# Patient Record
Sex: MALE | Race: BLACK OR AFRICAN AMERICAN | NOT HISPANIC OR LATINO | Employment: OTHER | URBAN - METROPOLITAN AREA
[De-identification: names, ages, dates, MRNs, and addresses within clinical notes are randomized per-mention and may not be internally consistent; named-entity substitution may affect disease eponyms.]

---

## 2017-01-20 ENCOUNTER — PATIENT MESSAGE (OUTPATIENT)
Dept: FAMILY MEDICINE | Facility: CLINIC | Age: 67
End: 2017-01-20

## 2017-01-20 DIAGNOSIS — I10 ESSENTIAL HYPERTENSION: ICD-10-CM

## 2017-01-23 RX ORDER — LISINOPRIL 40 MG/1
40 TABLET ORAL DAILY
Qty: 90 TABLET | Refills: 4 | Status: SHIPPED | OUTPATIENT
Start: 2017-01-23 | End: 2018-02-02 | Stop reason: SDUPTHER

## 2017-02-07 DIAGNOSIS — I10 ESSENTIAL HYPERTENSION: ICD-10-CM

## 2017-02-07 DIAGNOSIS — E78.5 HYPERLIPIDEMIA: ICD-10-CM

## 2017-02-07 RX ORDER — MYCOPHENOLATE MOFETIL 250 MG/1
750 CAPSULE ORAL 2 TIMES DAILY
Qty: 540 CAPSULE | Refills: 3 | Status: SHIPPED | OUTPATIENT
Start: 2017-02-07 | End: 2018-02-02 | Stop reason: SDUPTHER

## 2017-02-07 RX ORDER — DILTIAZEM HYDROCHLORIDE 180 MG/1
180 CAPSULE, EXTENDED RELEASE ORAL DAILY
Qty: 90 CAPSULE | Refills: 3 | Status: SHIPPED | OUTPATIENT
Start: 2017-02-07 | End: 2018-02-02 | Stop reason: SDUPTHER

## 2017-02-07 RX ORDER — ATORVASTATIN CALCIUM 80 MG/1
80 TABLET, FILM COATED ORAL DAILY
Qty: 90 TABLET | Refills: 3 | Status: SHIPPED | OUTPATIENT
Start: 2017-02-07 | End: 2018-05-16 | Stop reason: SDUPTHER

## 2017-02-17 ENCOUNTER — LAB VISIT (OUTPATIENT)
Dept: LAB | Facility: HOSPITAL | Age: 67
End: 2017-02-17
Attending: INTERNAL MEDICINE
Payer: MEDICARE

## 2017-02-17 ENCOUNTER — OFFICE VISIT (OUTPATIENT)
Dept: DIABETES | Facility: CLINIC | Age: 67
End: 2017-02-17
Payer: MEDICARE

## 2017-02-17 VITALS
HEIGHT: 74 IN | BODY MASS INDEX: 39.66 KG/M2 | WEIGHT: 309.06 LBS | SYSTOLIC BLOOD PRESSURE: 132 MMHG | DIASTOLIC BLOOD PRESSURE: 82 MMHG

## 2017-02-17 DIAGNOSIS — E78.2 MIXED HYPERLIPIDEMIA: ICD-10-CM

## 2017-02-17 DIAGNOSIS — Z79.52 LONG TERM CURRENT USE OF SYSTEMIC STEROIDS: ICD-10-CM

## 2017-02-17 DIAGNOSIS — E11.65 TYPE II OR UNSPECIFIED TYPE DIABETES MELLITUS WITH RENAL MANIFESTATIONS, UNCONTROLLED(250.42): Primary | ICD-10-CM

## 2017-02-17 DIAGNOSIS — E78.5 HYPERLIPIDEMIA, UNSPECIFIED HYPERLIPIDEMIA TYPE: ICD-10-CM

## 2017-02-17 DIAGNOSIS — R19.7 DIARRHEA: ICD-10-CM

## 2017-02-17 DIAGNOSIS — E11.29 TYPE II OR UNSPECIFIED TYPE DIABETES MELLITUS WITH RENAL MANIFESTATIONS, UNCONTROLLED(250.42): Primary | ICD-10-CM

## 2017-02-17 DIAGNOSIS — Z79.899 ENCOUNTER FOR LONG-TERM (CURRENT) USE OF MEDICATIONS: ICD-10-CM

## 2017-02-17 DIAGNOSIS — I10 ESSENTIAL HYPERTENSION: ICD-10-CM

## 2017-02-17 DIAGNOSIS — E66.01 MORBID OBESITY WITH BMI OF 40.0-44.9, ADULT: ICD-10-CM

## 2017-02-17 DIAGNOSIS — N28.9 RENAL INSUFFICIENCY: ICD-10-CM

## 2017-02-17 DIAGNOSIS — Z94.1 STATUS POST HEART TRANSPLANT: ICD-10-CM

## 2017-02-17 DIAGNOSIS — T86.298 OTHER COMPLICATION OF HEART TRANSPLANT: ICD-10-CM

## 2017-02-17 LAB
ALBUMIN SERPL BCP-MCNC: 2.9 G/DL
ALP SERPL-CCNC: 117 U/L
ALT SERPL W/O P-5'-P-CCNC: 25 U/L
ANION GAP SERPL CALC-SCNC: 6 MMOL/L
AST SERPL-CCNC: 31 U/L
BASOPHILS # BLD AUTO: 0.01 K/UL
BASOPHILS NFR BLD: 0.3 %
BILIRUB SERPL-MCNC: 0.4 MG/DL
BNP SERPL-MCNC: 188 PG/ML
BUN SERPL-MCNC: 17 MG/DL
CALCIUM SERPL-MCNC: 8.9 MG/DL
CHLORIDE SERPL-SCNC: 109 MMOL/L
CHOLEST/HDLC SERPL: 4 {RATIO}
CO2 SERPL-SCNC: 28 MMOL/L
CREAT SERPL-MCNC: 1.8 MG/DL
DIFFERENTIAL METHOD: ABNORMAL
EOSINOPHIL # BLD AUTO: 0.1 K/UL
EOSINOPHIL NFR BLD: 3.2 %
ERYTHROCYTE [DISTWIDTH] IN BLOOD BY AUTOMATED COUNT: 15.9 %
EST. GFR  (AFRICAN AMERICAN): 44.3 ML/MIN/1.73 M^2
EST. GFR  (NON AFRICAN AMERICAN): 38.4 ML/MIN/1.73 M^2
GLUCOSE SERPL-MCNC: 106 MG/DL
GLUCOSE SERPL-MCNC: 126 MG/DL (ref 70–110)
HCT VFR BLD AUTO: 38.6 %
HDL/CHOLESTEROL RATIO: 25 %
HDLC SERPL-MCNC: 144 MG/DL
HDLC SERPL-MCNC: 36 MG/DL
HGB BLD-MCNC: 11.8 G/DL
LDLC SERPL CALC-MCNC: 79.6 MG/DL
LYMPHOCYTES # BLD AUTO: 1.5 K/UL
LYMPHOCYTES NFR BLD: 38.7 %
MCH RBC QN AUTO: 24.7 PG
MCHC RBC AUTO-ENTMCNC: 30.6 %
MCV RBC AUTO: 81 FL
MONOCYTES # BLD AUTO: 0.4 K/UL
MONOCYTES NFR BLD: 10.4 %
NEUTROPHILS # BLD AUTO: 1.8 K/UL
NEUTROPHILS NFR BLD: 47.1 %
NONHDLC SERPL-MCNC: 108 MG/DL
PLATELET # BLD AUTO: 191 K/UL
PMV BLD AUTO: 10.7 FL
POTASSIUM SERPL-SCNC: 4.1 MMOL/L
PROT SERPL-MCNC: 6.8 G/DL
RBC # BLD AUTO: 4.77 M/UL
SODIUM SERPL-SCNC: 143 MMOL/L
TRIGL SERPL-MCNC: 142 MG/DL
WBC # BLD AUTO: 3.75 K/UL

## 2017-02-17 PROCEDURE — 99214 OFFICE O/P EST MOD 30 MIN: CPT | Mod: S$PBB,,, | Performed by: NURSE PRACTITIONER

## 2017-02-17 PROCEDURE — 86832 HLA CLASS I HIGH DEFIN QUAL: CPT | Mod: 91

## 2017-02-17 PROCEDURE — 99999 PR PBB SHADOW E&M-EST. PATIENT-LVL III: CPT | Mod: PBBFAC,,, | Performed by: NURSE PRACTITIONER

## 2017-02-17 PROCEDURE — 99213 OFFICE O/P EST LOW 20 MIN: CPT | Mod: PBBFAC | Performed by: NURSE PRACTITIONER

## 2017-02-17 PROCEDURE — 86833 HLA CLASS II HIGH DEFIN QUAL: CPT | Mod: 91

## 2017-02-17 PROCEDURE — 82948 REAGENT STRIP/BLOOD GLUCOSE: CPT | Mod: PBBFAC | Performed by: NURSE PRACTITIONER

## 2017-02-17 NOTE — PROGRESS NOTES
"PCP: Dr. Stanley Chanel    HISTORY OF PRESENT ILLNESS: 66 year old  male patient is in clinic today for uncontrolled diabetes.   He has had diabetes for many years and has attended diabetes education in the past.  Patient currently takes Humulin 70/30 for diabetes.  He had a cardiac and renal transplant in 2002, on immunosuppressant drugs.  Most recent A1C was 8.2, ADA recommends less than 7.0.   He has gained 1 pound since last visit.    Per meter download, fasting BGs 87 - 148; hs 90, 144 - 228; overnight, BG range from 60 -70. On last visit, we discussed taking his Humulin 70/30 before evening meal, not at bedtime.  However, patient continues to take at bedtime, which is probably why he has some nocturnal hypoglycemia, with BG 60, which he treats with OJ.       Patient denies polyuria, polydipsia, polyphagia, or blurred vision.  Also denies nausea, vomiting, has occasional diarrhea.        Height: 6' 2 "  Weight: 309 pounds, BMI 39.63  Blood Glucose reading this AM: 112 mg/dl Fasting  Blood Glucose reading in clinic: 126 mg/dl at 9:15 am    DIABETES MEDICATIONS:   Humulin 70/30, 55 - 60 units TWICE DAILY ac    LABS REVIEWED.    REVIEW OF SYSTEMS:  GENERAL: Denies fever, chills, change in appetite.  HEENT: Has minor impaired hearing, denies dysphagia. (+) history of vertigo  RESPIRATORY: Denies shortness of breath, cough or wheezing.  CARDIOVASCULAR: Denies chest pain, palpitations.  GI: Denies hematochezia.  : Denies hematuria, dysuria or frequency.  MS: Normal gait. Denies difficulty with mobility, muscle or joint pain.   SKIN: Denies rashes and lesions.  NEURO: Occasional numbness, tingling in feet.   PSYCH: Denies depression or anxiety. No suicidal ideations.  ENDO: See HPI.    STANDARDS OF CARE:   Eye exam: [facility], Last exam Spring 2016  Dental exam: Has regular exams; denies gums bleeding.  Podiatry exam: None.  SOCIAL HISTORY: . Lives with " spouse. Has 2 children. Patient retired as manager for [employer].     ACTIVITY LEVEL: No regular activity. Works in garden and yard. Occasionally walks.  MEAL PLANNING: Number of meals per day - three. Number of snacks per day - occasionally. Per dietary recall, patient is not limiting carbohydrates, saturated fats and sodium.  Breakfast can be cereal with lactose free milk or 2 eggs, toast or eggs grits and jaffe.  Mid morning snack can be chips or cookies.  Lunch can be sandwich with price, fruit.  Dinner is usually red beans, rice, sausage, broccolli salad.  Can then have a lite beer.  Drinks water with meals.  BLOOD GLUCOSE TESTING: Self-monitoring with ONE TOUCH meter    PHYSICAL EXAMINATION:  GENERAL: WDWN  male in no acute distress, responds appropriately. AAO X 3.   NECK: Supple, no thyromegaly, no cervical or supraclavicular lymphadenopathy, no carotid bruit.  HEART: Regular rate and rhythm. No rubs, murmurs or gallops.   LUNGS: CTA bilaterally. Unlabored breathing, no use of accessory muscles.  MUSCULOSKELETAL: Normal gait and muscle strength.  ABDOMEN: Active bowel sounds X 4, no masses or tenderness.   SKIN: Warm, dry skin. No lesions or abrasions.  NEUROLOGIC: Cranial nerves II-XII grossly intact.   EXTREMITIES: Trace edema.  FOOT EXAMINATION: Protective Sensation (w/ 10 gram monofilament): Right: Intact Left: Intact //  Visual Inspection: Dry Skin -  Bilateral, except pale, boggy areas between 4 and 5 digit of bilateral feet. .  //  Pedal Pulses:  Right: Present Left: Present    ASSESSMENT:  1. Diabetes Type 2, ED, s/p renal and cardiac transplant - improving control per records on Humulin 70/30, A1C 8.2   2. Hypertension - good control on diltiazem, lisinopril, lasix.  3. Hyperlipidemia - good control on Lipitor  4. Morbid Obesity - BMI 39.68  5. Tinea Pedis    PLAN:  1.) Patient was instructed to monitor blood glucose 2 - 3 x daily, fasting and ac dinner or at bedtime.   Discussed ADA goal for fasting blood sugar, 80 - 130 mg/dL; pp blood sugars below 180 mg/dl. Also, discussed prevention of hypoglycemia and the need to adjust goals to higher levels if persistent hypoglycemia.  Reminded him to bring records or meter to each visit for review.   2.) He will continue Humulin 70/30 pens, 55 - 60 units TWICE DAILY ac.  He continues to take his evening dose of Humulin 70/30 at bedtime, despite our discussion to take this dose before evening meal.  This is probably why he continues to have nocturnal hypo.  Lamisil cream to feet twice daily for 2 weeks.  Instructed to keep feet clean, dry, wear white cotton socks.  Phone review of BG readings in one week with adjustment of meds as needed.    3.) Reviewed carb counting, portion control, importance of spacing meals throughout the day to prevent post prandial elevations. Recommended low saturated fat, low sodium diet to aid in control of hypertension and cholesterol.  4.) Discussed activity, benefits, methods, and precautions. Recommended patient start some form of exercise and increase as tolerated to 30 minutes per day to facilitate weight loss and aid in control of BGs.  5.) Future Labs at follow up appointment: C peptide, ELIZA, insulin antibody.   6.) Return to clinic in 3 months for follow up.  Pending Labs today, including A1C.  Advised patient to call clinic with any questions or concerns.     Jael Garrison, BELÉN-C, CDE

## 2017-02-17 NOTE — MR AVS SNAPSHOT
[facility]  [address] [facility]  [place] [address]  Phone: [phone]  Fax: [phone]                  Nigel Albrecht   2017 9:00 AM   Office Visit    Description:  Male : 1950   Provider:  OTTO Tran, MAICO   Department:  [facility]           Reason for Visit     Diabetes           Diagnoses this Visit        Comments    Type II or unspecified type diabetes mellitus with renal manifestations, uncontrolled    -  Primary     Morbid obesity with BMI of 40.0-44.9, adult         Hyperlipidemia, unspecified hyperlipidemia type                To Do List           Future Appointments        Provider Department Dept Phone    3/27/2017 9:00 AM MD Teddy Dvais IV - Urology [phone]    2017 8:30 AM OTTO Tran, MAICO [facility] [phone]      Goals (5 Years of Data)     None      Follow-Up and Disposition     Return in about 3 months (around 2017).      [facility]     [facility] On Call Nurse Care Line - / Assistance  Registered nurses in the [facility] On Call Center provide clinical advisement, health education, appointment booking, and other advisory services.  Call for this free service at [phone].             Medications           Message regarding Medications     Verify the changes and/or additions to your medication regime listed below are the same as discussed with your clinician today.  If any of these changes or additions are incorrect, please notify your healthcare provider.             Verify that the below list of medications is an accurate representation of the medications you are currently taking.  If none reported, the list may be blank. If incorrect, please contact your healthcare provider. Carry this list with you in case of emergency.           Current Medications     atorvastatin (LIPITOR) 80 MG tablet Take 1 tablet (80 mg total) by mouth once  daily.    blood pressure kit-extra large Kit     blood sugar diagnostic (ONETOUCH ULTRA TEST) Strp 1 strip by Misc.(Non-Drug; Combo Route) route 3 (three) times daily. Dispense Accucheck  brand    calcium carbonate (OS-CARRIE) 500 mg calcium (1,250 mg) tablet Take 1 tablet by mouth once daily.    cetirizine (ZYRTEC) 10 MG tablet Take 10 mg by mouth as needed. 1 Tablet Oral Every day as needed    cholecalciferol, vitamin D3, (VITAMIN D3) 5,000 unit Tab Take 5,000 Units by mouth once daily.    clobetasol 0.05% (TEMOVATE) 0.05 % Oint AAA bid    diltiaZEM (TIAZAC) 180 MG CpSR Take 1 capsule (180 mg total) by mouth once daily.    esomeprazole (NEXIUM) 40 MG capsule Take by mouth. 1 Capsule, Delayed Release(E.C.) Oral PRN    fluticasone (FLONASE) 50 mcg/actuation nasal spray 1 spray by Each Nare route as needed for Rhinitis.    insulin NPH and regular human (HUMULIN 70/30 KWIKPEN) 100 unit/mL (70-30) InPn pen INJECT 60 TO 65 UNITS IN   THE MORNING BEFORE A MEAL  AND 55 TO 60 UNITS IN THE  EVENING BEFORE A MEAL    iron polysaccharides (NIFEREX) 150 mg iron Cap Take 1 capsule (150 mg total) by mouth once daily.    lisinopril (PRINIVIL,ZESTRIL) 40 MG tablet Take 1 tablet (40 mg total) by mouth once daily.    Low-Dose Aspirin 81 mg Tab Take by mouth. 1 Tablet Oral Every day    meclizine (ANTIVERT) 25 mg tablet Take 25 mg by mouth 2 (two) times daily as needed.     mycophenolate (CELLCEPT) 250 mg Cap Take 3 capsules (750 mg total) by mouth 2 (two) times daily.    sirolimus 0.5 mg Tab Take 3 tablets (1.5 mg total) by mouth once daily.    torsemide (DEMADEX) 5 MG Tab Take 2 tablets (10 mg total) by mouth once daily.    metoprolol succinate (TOPROL-XL) 50 MG 24 hr tablet Take 1 tablet (50 mg total) by mouth once daily.    sildenafil (VIAGRA) 100 MG tablet Take 1 tablet (100 mg total) by mouth daily as needed for Erectile Dysfunction.           Clinical Reference Information           Your Vitals Were     BP Height Weight BMI        "132/82 6' 2" (1.88 m) 140.2 kg (309 lb 1.4 oz) 39.68 kg/m2       Blood Pressure          Most Recent Value    BP  132/82      Allergies as of 2/17/2017     No Known Drug Allergies      Immunizations Administered on Date of Encounter - 2/17/2017     None      Orders Placed During Today's Visit      Normal Orders This Visit    POCT glucose          2/17/2017  9:50 AM - Jay Sinclair LPN      Component Results     Component Value Flag Ref Range Units Status    POC Glucose 126 (A) 70 - 110 mg/dL Final            Maintenance Dialysis History     Patient has no recorded history of maintenance dialysis.      Transplant Information        Txp Date Organ Coordinator Care Team    5/24/2002 Heart Loretta Will        Txp Date Organ Coordinator Care Team    5/24/2002 Kidney Emy Sandoval, REYES       Language Assistance Services     ATTENTION: Language assistance services are available, free of charge. Please call [phone].      ATENCIÓN: Si habla español, tiene a luna disposición servicios gratuitos de asistencia lingüística. Llame al [phone].     CHÚ Ý: N?u b?n nói Ti?ng Vi?t, có các d?ch v? h? tr? ngôn ng? mi?n phí dành cho b?n. G?i s? [phone].         [facility] complies with applicable Federal civil rights laws and does not discriminate on the basis of race, color, national origin, age, disability, or sex.        "

## 2017-02-18 LAB — SIROLIMUS BLD-MCNC: 6.6 NG/ML

## 2017-02-19 LAB
ESTIMATED AVG GLUCOSE: 163 MG/DL
HBA1C MFR BLD HPLC: 7.3 %

## 2017-02-20 LAB
CLASS I ANTIBODY COMMENTS - LUMINEX: NORMAL
CLASS II ANTIBODIES - LUMINEX: NORMAL
CLASS II ANTIBODY COMMENTS - LUMINEX: NORMAL
DSA1 TESTING DATE: NORMAL
DSA2 TESTING DATE: NORMAL
SERUM COLLECTION DT - LUMINEX CLASS I: NORMAL
SERUM COLLECTION DT - LUMINEX CLASS II: NORMAL

## 2017-02-21 ENCOUNTER — TELEPHONE (OUTPATIENT)
Dept: TRANSPLANT | Facility: CLINIC | Age: 67
End: 2017-02-21

## 2017-02-23 LAB
C1Q1 TESTING DATE: NORMAL
C1Q2 TESTING DATE: NORMAL
CLASS I ANTIBODY COMMENTS - LUMINEX: NORMAL
CLASS II ANTIBODY COMMENTS - LUMINEX: NORMAL
SERUM COLLECTION DT - LUMINEX CLASS I: NORMAL
SERUM COLLECTION DT - LUMINEX CLASS II: NORMAL

## 2017-04-05 ENCOUNTER — TELEPHONE (OUTPATIENT)
Dept: TRANSPLANT | Facility: CLINIC | Age: 67
End: 2017-04-05

## 2017-04-11 ENCOUNTER — TELEPHONE (OUTPATIENT)
Dept: TRANSPLANT | Facility: CLINIC | Age: 67
End: 2017-04-11

## 2017-04-17 DIAGNOSIS — Z94.1 HEART REPLACED BY TRANSPLANT: Primary | ICD-10-CM

## 2017-04-24 ENCOUNTER — PATIENT MESSAGE (OUTPATIENT)
Dept: FAMILY MEDICINE | Facility: CLINIC | Age: 67
End: 2017-04-24

## 2017-04-24 RX ORDER — METOPROLOL SUCCINATE 50 MG/1
50 TABLET, EXTENDED RELEASE ORAL DAILY
Qty: 90 TABLET | Refills: 3 | Status: SHIPPED | OUTPATIENT
Start: 2017-04-24 | End: 2018-04-20 | Stop reason: SDUPTHER

## 2017-04-24 NOTE — TELEPHONE ENCOUNTER
Nigel Turner MD 4 hours ago (9:29 AM)                          Please send in a mail order prescription to [facility] for metoprolol succinate 50 MG 24 hr tablet, 90 day supply with refills.  Thanks

## 2017-05-11 ENCOUNTER — TELEPHONE (OUTPATIENT)
Dept: TRANSPLANT | Facility: CLINIC | Age: 67
End: 2017-05-11

## 2017-05-11 ENCOUNTER — LAB VISIT (OUTPATIENT)
Dept: LAB | Facility: HOSPITAL | Age: 67
End: 2017-05-11
Attending: INTERNAL MEDICINE
Payer: MEDICARE

## 2017-05-11 DIAGNOSIS — Z79.899 ENCOUNTER FOR LONG-TERM (CURRENT) USE OF MEDICATIONS: ICD-10-CM

## 2017-05-11 DIAGNOSIS — Z94.1 STATUS POST HEART TRANSPLANT: ICD-10-CM

## 2017-05-11 DIAGNOSIS — I10 ESSENTIAL HYPERTENSION: ICD-10-CM

## 2017-05-11 DIAGNOSIS — Z79.52 LONG TERM CURRENT USE OF SYSTEMIC STEROIDS: ICD-10-CM

## 2017-05-11 DIAGNOSIS — T86.298 OTHER COMPLICATIONS OF HEART TRANSPLANT: ICD-10-CM

## 2017-05-11 DIAGNOSIS — Z29.89 PROPHYLACTIC IMMUNOTHERAPY: Chronic | ICD-10-CM

## 2017-05-11 DIAGNOSIS — N28.9 RENAL INSUFFICIENCY: ICD-10-CM

## 2017-05-11 DIAGNOSIS — E78.5 HYPERLIPIDEMIA: ICD-10-CM

## 2017-05-11 DIAGNOSIS — E78.2 MIXED HYPERLIPIDEMIA: ICD-10-CM

## 2017-05-11 DIAGNOSIS — R19.7 DIARRHEA: ICD-10-CM

## 2017-05-11 DIAGNOSIS — N18.30 CKD (CHRONIC KIDNEY DISEASE), STAGE III: Chronic | ICD-10-CM

## 2017-05-11 DIAGNOSIS — Z94.1 HEART TRANSPLANTED: ICD-10-CM

## 2017-05-11 LAB
ALBUMIN SERPL BCP-MCNC: 3.1 G/DL
ALP SERPL-CCNC: 106 U/L
ALT SERPL W/O P-5'-P-CCNC: 16 U/L
ANION GAP SERPL CALC-SCNC: 9 MMOL/L
AST SERPL-CCNC: 26 U/L
BASOPHILS # BLD AUTO: 0.03 K/UL
BASOPHILS NFR BLD: 0.8 %
BILIRUB SERPL-MCNC: 0.5 MG/DL
BNP SERPL-MCNC: 230 PG/ML
BUN SERPL-MCNC: 15 MG/DL
CALCIUM SERPL-MCNC: 9.1 MG/DL
CHLORIDE SERPL-SCNC: 108 MMOL/L
CHOLEST/HDLC SERPL: 4 {RATIO}
CO2 SERPL-SCNC: 26 MMOL/L
COMPLEXED PSA SERPL-MCNC: 1.5 NG/ML
CREAT SERPL-MCNC: 1.8 MG/DL
DIFFERENTIAL METHOD: ABNORMAL
EOSINOPHIL # BLD AUTO: 0.1 K/UL
EOSINOPHIL NFR BLD: 2.3 %
ERYTHROCYTE [DISTWIDTH] IN BLOOD BY AUTOMATED COUNT: 15.5 %
EST. GFR  (AFRICAN AMERICAN): 44.3 ML/MIN/1.73 M^2
EST. GFR  (NON AFRICAN AMERICAN): 38.4 ML/MIN/1.73 M^2
GLUCOSE SERPL-MCNC: 93 MG/DL
HCT VFR BLD AUTO: 38.5 %
HDL/CHOLESTEROL RATIO: 25 %
HDLC SERPL-MCNC: 148 MG/DL
HDLC SERPL-MCNC: 37 MG/DL
HGB BLD-MCNC: 12.4 G/DL
LDLC SERPL CALC-MCNC: 87.2 MG/DL
LYMPHOCYTES # BLD AUTO: 1.7 K/UL
LYMPHOCYTES NFR BLD: 41.6 %
MAGNESIUM SERPL-MCNC: 2 MG/DL
MCH RBC QN AUTO: 25.6 PG
MCHC RBC AUTO-ENTMCNC: 32.2 %
MCV RBC AUTO: 79 FL
MONOCYTES # BLD AUTO: 0.3 K/UL
MONOCYTES NFR BLD: 6.8 %
NEUTROPHILS # BLD AUTO: 1.9 K/UL
NEUTROPHILS NFR BLD: 48.2 %
NONHDLC SERPL-MCNC: 111 MG/DL
PLATELET # BLD AUTO: 189 K/UL
PMV BLD AUTO: 10.4 FL
POTASSIUM SERPL-SCNC: 4.1 MMOL/L
PROT SERPL-MCNC: 6.9 G/DL
RBC # BLD AUTO: 4.85 M/UL
SODIUM SERPL-SCNC: 143 MMOL/L
TRIGL SERPL-MCNC: 119 MG/DL
WBC # BLD AUTO: 3.97 K/UL

## 2017-05-11 PROCEDURE — 86977 RBC SERUM PRETX INCUBJ/INHIB: CPT

## 2017-05-11 PROCEDURE — 80053 COMPREHEN METABOLIC PANEL: CPT

## 2017-05-11 PROCEDURE — 36415 COLL VENOUS BLD VENIPUNCTURE: CPT | Mod: PO

## 2017-05-11 PROCEDURE — 80195 ASSAY OF SIROLIMUS: CPT

## 2017-05-11 PROCEDURE — 85025 COMPLETE CBC W/AUTO DIFF WBC: CPT

## 2017-05-11 PROCEDURE — 86833 HLA CLASS II HIGH DEFIN QUAL: CPT | Mod: 91

## 2017-05-11 PROCEDURE — 83735 ASSAY OF MAGNESIUM: CPT

## 2017-05-11 PROCEDURE — 80061 LIPID PANEL: CPT

## 2017-05-11 PROCEDURE — 86832 HLA CLASS I HIGH DEFIN QUAL: CPT

## 2017-05-11 PROCEDURE — 83880 ASSAY OF NATRIURETIC PEPTIDE: CPT

## 2017-05-11 PROCEDURE — 84153 ASSAY OF PSA TOTAL: CPT

## 2017-05-11 NOTE — TELEPHONE ENCOUNTER
Pt called to inform me that the lab in [place] could not draw one of his labs today.I scheduled his allomap on May 16, 2017 when he comes to clinic.

## 2017-05-12 LAB
CLASS I ANTIBODY COMMENTS - LUMINEX: NORMAL
CLASS II ANTIBODIES - LUMINEX: NORMAL
CLASS II ANTIBODY COMMENTS - LUMINEX: NORMAL
DSA1 TESTING DATE: NORMAL
DSA2 TESTING DATE: NORMAL
SERUM COLLECTION DT - LUMINEX CLASS I: NORMAL
SERUM COLLECTION DT - LUMINEX CLASS II: NORMAL
SIROLIMUS BLD-MCNC: 7.1 NG/ML

## 2017-05-15 ENCOUNTER — TELEPHONE (OUTPATIENT)
Dept: TRANSPLANT | Facility: CLINIC | Age: 67
End: 2017-05-15

## 2017-05-15 NOTE — TELEPHONE ENCOUNTER
Called pt to let him know I added on a chest xray since we usually get one yearly. Also, told him his allomap lab is not fasting so I was moving it to after the DSE. I reminded him that he needs to take meds especially HTN meds prior to DSE with a sip of water (despite fasting).

## 2017-05-16 ENCOUNTER — HOSPITAL ENCOUNTER (OUTPATIENT)
Dept: CARDIOLOGY | Facility: CLINIC | Age: 67
Discharge: HOME | End: 2017-05-16

## 2017-05-16 ENCOUNTER — HOSPITAL ENCOUNTER (OUTPATIENT)
Dept: CARDIOLOGY | Facility: CLINIC | Age: 67
Discharge: HOME | End: 2017-05-16
Payer: MEDICARE

## 2017-05-16 ENCOUNTER — OFFICE VISIT (OUTPATIENT)
Dept: TRANSPLANT | Facility: CLINIC | Age: 67
End: 2017-05-16
Payer: MEDICARE

## 2017-05-16 ENCOUNTER — TELEPHONE (OUTPATIENT)
Dept: TRANSPLANT | Facility: CLINIC | Age: 67
End: 2017-05-16

## 2017-05-16 ENCOUNTER — HOSPITAL ENCOUNTER (OUTPATIENT)
Dept: RADIOLOGY | Facility: HOSPITAL | Age: 67
Discharge: HOME | End: 2017-05-16
Attending: INTERNAL MEDICINE
Payer: MEDICARE

## 2017-05-16 VITALS
SYSTOLIC BLOOD PRESSURE: 185 MMHG | WEIGHT: 313.94 LBS | RESPIRATION RATE: 18 BRPM | HEART RATE: 68 BPM | BODY MASS INDEX: 40.29 KG/M2 | HEIGHT: 74 IN | DIASTOLIC BLOOD PRESSURE: 82 MMHG | TEMPERATURE: 98 F | OXYGEN SATURATION: 100 %

## 2017-05-16 VITALS
WEIGHT: 315 LBS | HEART RATE: 69 BPM | BODY MASS INDEX: 40.43 KG/M2 | SYSTOLIC BLOOD PRESSURE: 140 MMHG | HEIGHT: 74 IN | DIASTOLIC BLOOD PRESSURE: 70 MMHG

## 2017-05-16 DIAGNOSIS — I10 ESSENTIAL HYPERTENSION: ICD-10-CM

## 2017-05-16 DIAGNOSIS — T86.298 OTHER COMPLICATIONS OF HEART TRANSPLANT: ICD-10-CM

## 2017-05-16 DIAGNOSIS — R19.7 DIARRHEA: ICD-10-CM

## 2017-05-16 DIAGNOSIS — N18.30 CKD (CHRONIC KIDNEY DISEASE), STAGE III: Chronic | ICD-10-CM

## 2017-05-16 DIAGNOSIS — Z94.1 HEART TRANSPLANTED: ICD-10-CM

## 2017-05-16 DIAGNOSIS — Z94.0 DECEASED-DONOR KIDNEY TRANSPLANT: Primary | ICD-10-CM

## 2017-05-16 DIAGNOSIS — E78.2 MIXED HYPERLIPIDEMIA: ICD-10-CM

## 2017-05-16 DIAGNOSIS — Z79.52 LONG TERM CURRENT USE OF SYSTEMIC STEROIDS: ICD-10-CM

## 2017-05-16 DIAGNOSIS — Z79.899 ENCOUNTER FOR LONG-TERM (CURRENT) USE OF MEDICATIONS: ICD-10-CM

## 2017-05-16 DIAGNOSIS — Z29.89 PROPHYLACTIC IMMUNOTHERAPY: Chronic | ICD-10-CM

## 2017-05-16 DIAGNOSIS — Z94.1 STATUS POST HEART TRANSPLANT: ICD-10-CM

## 2017-05-16 DIAGNOSIS — N28.9 RENAL INSUFFICIENCY: ICD-10-CM

## 2017-05-16 DIAGNOSIS — N52.1 ERECTILE DYSFUNCTION DUE TO DISEASES CLASSIFIED ELSEWHERE: ICD-10-CM

## 2017-05-16 DIAGNOSIS — Z94.1 HEART REPLACED BY TRANSPLANT: ICD-10-CM

## 2017-05-16 DIAGNOSIS — T86.290 VASCULOPATHY OF CARDIAC ALLOGRAFT: ICD-10-CM

## 2017-05-16 DIAGNOSIS — R80.9 PROTEINURIA, UNSPECIFIED: ICD-10-CM

## 2017-05-16 DIAGNOSIS — E66.01 MORBID OBESITY WITH BMI OF 40.0-44.9, ADULT: ICD-10-CM

## 2017-05-16 LAB
DIASTOLIC DYSFUNCTION: NO
ESTIMATED PA SYSTOLIC PRESSURE: 18.84
RETIRED EF AND QEF - SEE NOTES: 60 (ref 55–65)
TRICUSPID VALVE REGURGITATION: NORMAL

## 2017-05-16 PROCEDURE — 99213 OFFICE O/P EST LOW 20 MIN: CPT | Mod: PBBFAC,25,27 | Performed by: INTERNAL MEDICINE

## 2017-05-16 PROCEDURE — 99215 OFFICE O/P EST HI 40 MIN: CPT | Mod: S$PBB,,, | Performed by: INTERNAL MEDICINE

## 2017-05-16 PROCEDURE — 99999 PR PBB SHADOW E&M-EST. PATIENT-LVL IV: CPT | Mod: PBBFAC,,, | Performed by: INTERNAL MEDICINE

## 2017-05-16 PROCEDURE — 93325 DOPPLER ECHO COLOR FLOW MAPG: CPT | Mod: 26,S$PBB,, | Performed by: INTERNAL MEDICINE

## 2017-05-16 PROCEDURE — 93351 STRESS TTE COMPLETE: CPT | Mod: 26,S$PBB,, | Performed by: INTERNAL MEDICINE

## 2017-05-16 PROCEDURE — 99999 PR PBB SHADOW E&M-EST. PATIENT-LVL III: CPT | Mod: PBBFAC,,, | Performed by: INTERNAL MEDICINE

## 2017-05-16 PROCEDURE — 71020 XR CHEST PA AND LATERAL: CPT | Mod: 26,,, | Performed by: RADIOLOGY

## 2017-05-16 PROCEDURE — 93320 DOPPLER ECHO COMPLETE: CPT | Mod: 26,S$PBB,, | Performed by: INTERNAL MEDICINE

## 2017-05-16 RX ORDER — SILDENAFIL 100 MG/1
100 TABLET, FILM COATED ORAL
Qty: 15 TABLET | Refills: 5 | Status: SHIPPED | OUTPATIENT
Start: 2017-05-16 | End: 2017-05-22 | Stop reason: SDUPTHER

## 2017-05-16 RX ORDER — SIROLIMUS 1 MG/1
1 TABLET, FILM COATED ORAL DAILY
COMMUNITY
Start: 2017-04-23 | End: 2018-02-05 | Stop reason: SDUPTHER

## 2017-05-16 NOTE — PROGRESS NOTES
"Subjective:   Patient ID:  Nigel Albrecht is a 66 y.o. male who presents for heart transplant follow-up.      HPI    67 y/o AAM s/p OHTx 5/24/02 and kidney tx 5/25/02 at 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 No issues.     Review of Systems   Constitution: Negative for chills, decreased appetite, diaphoresis, fever, weakness, malaise/fatigue, night sweats, weight gain and weight loss.   Cardiovascular: Negative for chest pain, claudication, cyanosis, dyspnea on exertion, irregular heartbeat, leg swelling, near-syncope, orthopnea and palpitations.   Respiratory: Negative for cough, hemoptysis, shortness of breath, sleep disturbances due to breathing, snoring, sputum production and wheezing.    Gastrointestinal: Negative for abdominal pain and diarrhea.       Objective:     BP (!) 193/88 (BP Location: Right arm, Patient Position: Sitting, BP Method: Automatic)  Pulse 69  Ht 6' 2" (1.88 m)  Wt (!) 143.2 kg (315 lb 11.2 oz)  BMI 40.53 kg/m2    Physical Exam   Constitutional: He is oriented to person, place, and time. He appears well-developed and well-nourished.   HENT:   Head: Normocephalic and atraumatic.   Eyes: Conjunctivae and EOM are normal. Pupils are equal, round, and reactive to light.   Neck: Normal range of motion. Neck supple. No JVD present.   Cardiovascular: Normal rate and regular rhythm.  Exam reveals no gallop and no friction rub.    No murmur heard.  Pulmonary/Chest: Breath sounds normal. No respiratory distress. He has no wheezes. He has no rales. He exhibits no tenderness.   Abdominal: Soft. Bowel sounds are normal. He exhibits no distension and no mass. There is no hepatosplenomegaly, splenomegaly or hepatomegaly. There is no tenderness. There is no rebound, no guarding and no CVA tenderness.   No hepatoslenomegaly   Musculoskeletal: Normal range of motion. He exhibits no " edema or tenderness.   Neurological: He is alert and oriented to person, place, and time. He has normal reflexes. No cranial nerve deficit. He exhibits normal muscle tone. Coordination normal.   Skin: Skin is warm and dry.   Psychiatric: He has a normal mood and affect.         Chemistry        Component Value Date/Time     2017 0933    K 4.1 201733     2017 0933    CO2 26 2017 0933    BUN 15 2017 0933    CREATININE 1.8 (H) 2017 0933    GLU 93 2017 0933        Component Value Date/Time    CALCIUM 9.1 2017 0933    ALKPHOS 106 2017 0933    AST 26 201733    ALT 16 201733    BILITOT 0.5 2017 0933            Magnesium   Date Value Ref Range Status   2017 2.0 1.6 - 2.6 mg/dL Final       Lab Results   Component Value Date    WBC 3.97 2017    HGB 12.4 (L) 2017    HCT 38.5 (L) 2017    MCV 79 (L) 2017     2017       Lab Results   Component Value Date    INR 0.9 2016    INR 1.0 10/03/2014    INR 1.0 2014       BNP   Date Value Ref Range Status   2017 230 (H) 0 - 99 pg/mL Final     Comment:     Values of less than 100 pg/ml are consistent with non-CHF populations.   2017 188 (H) 0 - 99 pg/mL Final     Comment:     Values of less than 100 pg/ml are consistent with non-CHF populations.   10/05/2016 167 (H) 0 - 99 pg/mL Final     Comment:     Values of less than 100 pg/ml are consistent with non-CHF populations.       No results found for: LDH    Tacrolimus Lvl   Date Value Ref Range Status   2011 <1.5 (L) 5.0 - 15.0 ng/mL Final     No results found for: SIROLIMUS  Cyclosporine, LC/MS   Date Value Ref Range Status   2011 <10 (L) 100 - 400 ng/ml Final     Comment:     Reference Normals:  For Kidney Transplants: 100-300 ng/mL       Assessment:     1. -donor kidney/ heart transplant performed at [facility] on 2002 - ESRD etiology unknown; ESHF due to IHSS     2. Chronic immunosuppression with Sirolimus and Cellcept    3. CKD (chronic kidney disease), stage III    4. Heart transplanted    5. Vasculopathy of cardiac allograft        Plan:   Await Dobutamine echo    Return instructions as set forth by post transplant schedule or as needed:    Clinic: Return for labs and/or biopsy weekly the first month, every two weeks during month 2 and then monthly for the first year at the provider or coordinator's discretion. During the second year, return to clinic every 3 months. Post transplant year 3-5 return every 6 months. There will be a comprehensive post transplant evaluation every year that may include LHC/RHC/biopsy, stress test, echo, CXR, and other health screening exams.    In addition to the clinical assessment, I have ordered Allomap testing for this patient to assist in identification of moderate/severe acute cellular rejection (ACR) in a pt with stable Allograft function instead of endomyocardial biopsy.     Patient is reminded to call with any health changes as these can be early signs of transplant complications. Patient is advised to make sure any new medications or changes of old medications are discussed with a pharmacist or physician knowledgeable with transplant to avoid rejection/drug toxicity related to significant drug interactions.    UNOS Patient Status  Functional Status: 100% - Normal, no complaints, no evidence of disease  Physical Capacity: No Limitations  Working for Income: yes  If yes, working activity level: Working Part Time Reason Unknown

## 2017-05-16 NOTE — MR AVS SNAPSHOT
[facility]  [address]  [facility] [address]  Phone: [phone]                  Nigel Castañedahire   2017 1:30 PM   Office Visit    Description:  Male : 1950   Provider:  Edy Reese MD   Department:  [facility]           Reason for Visit     Kidney Transplant Reassessment           Diagnoses this Visit        Comments    -donor kidney transplant    -  Primary     Prophylactic immunotherapy         Essential hypertension         CKD (chronic kidney disease), stage III         Morbid obesity with BMI of 40.0-44.9, adult         Erectile dysfunction due to diseases classified elsewhere         Proteinuria, unspecified                To Do List           Future Appointments        Provider Department Dept Phone    2017 8:30 AM Jael Garrison, NP-C, [facility] [phone]      Goals (5 Years of Data)     None       These Medications        Disp Refills Start End    sildenafil (VIAGRA) 100 MG tablet 15 tablet 5 2017    Take 1 tablet (100 mg total) by mouth as needed for Erectile Dysfunction. - Oral    Pharmacy: [facility]/pharmacy #[id number] - [place], [address] #: [phone]         [facility] On Call     [facility] On Call Nurse Care Line -  Assistance  Unless otherwise directed by your provider, please contact [facility], our nurse care line that is available for  assistance.     Registered nurses in the [facility] On Call Center provide: appointment scheduling, clinical advisement, health education, and other advisory services.  Call: [phone] (toll free)               Medications           Message regarding Medications     Verify the changes and/or additions to your medication regime listed below are the same as discussed with your clinician today.  If any of these changes or additions are incorrect, please notify your healthcare provider.        CHANGE how you are taking these  medications     Start Taking Instead of    sildenafil (VIAGRA) 100 MG tablet sildenafil (VIAGRA) 100 MG tablet    Dosage:  Take 1 tablet (100 mg total) by mouth as needed for Erectile Dysfunction. Dosage:  Take 1 tablet (100 mg total) by mouth daily as needed for Erectile Dysfunction.    Reason for Change:  Reorder            Verify that the below list of medications is an accurate representation of the medications you are currently taking.  If none reported, the list may be blank. If incorrect, please contact your healthcare provider. Carry this list with you in case of emergency.           Current Medications     atorvastatin (LIPITOR) 80 MG tablet Take 1 tablet (80 mg total) by mouth once daily.    blood pressure kit-extra large Kit     blood sugar diagnostic (ONETOUCH ULTRA TEST) Strp 1 strip by Misc.(Non-Drug; Combo Route) route 3 (three) times daily. Dispense Accucheck  brand    calcium carbonate (OS-CARRIE) 500 mg calcium (1,250 mg) tablet Take 1 tablet by mouth once daily.    cetirizine (ZYRTEC) 10 MG tablet Take 10 mg by mouth as needed. 1 Tablet Oral Every day as needed    cholecalciferol, vitamin D3, (VITAMIN D3) 5,000 unit Tab Take 5,000 Units by mouth once daily.    clobetasol 0.05% (TEMOVATE) 0.05 % Oint AAA bid    diltiaZEM (TIAZAC) 180 MG CpSR Take 1 capsule (180 mg total) by mouth once daily.    esomeprazole (NEXIUM) 40 MG capsule Take by mouth. 1 Capsule, Delayed Release(E.C.) Oral PRN    fluticasone (FLONASE) 50 mcg/actuation nasal spray 1 spray by Each Nare route as needed for Rhinitis.    insulin NPH and regular human (HUMULIN 70/30 KWIKPEN) 100 unit/mL (70-30) InPn pen INJECT 60 TO 65 UNITS IN   THE MORNING BEFORE A MEAL  AND 55 TO 60 UNITS IN THE  EVENING BEFORE A MEAL    iron polysaccharides (NIFEREX) 150 mg iron Cap Take 1 capsule (150 mg total) by mouth once daily.    lisinopril (PRINIVIL,ZESTRIL) 40 MG tablet Take 1 tablet (40 mg total) by mouth once daily.    Low-Dose Aspirin 81 mg Tab Take  "by mouth. 1 Tablet Oral Every day    meclizine (ANTIVERT) 25 mg tablet Take 25 mg by mouth 2 (two) times daily as needed.     metoprolol succinate (TOPROL-XL) 50 MG 24 hr tablet Take 1 tablet (50 mg total) by mouth once daily.    mycophenolate (CELLCEPT) 250 mg Cap Take 3 capsules (750 mg total) by mouth 2 (two) times daily.    sildenafil (VIAGRA) 100 MG tablet Take 1 tablet (100 mg total) by mouth as needed for Erectile Dysfunction.    sirolimus (RAPAMUNE) 1 MG Tab Take 1 tablet by mouth Daily.    torsemide (DEMADEX) 5 MG Tab Take 2 tablets (10 mg total) by mouth once daily.           Clinical Reference Information           Your Vitals Were     BP Pulse Temp Resp Height Weight    185/82 (BP Location: Right arm, Patient Position: Sitting, BP Method: Automatic) 68 97.7 °F (36.5 °C) (Oral) 18 6' 2" (1.88 m) 142.4 kg (313 lb 15 oz)    SpO2 BMI             100% 40.31 kg/m2         Blood Pressure          Most Recent Value    BP  (!)  185/82      Allergies as of 5/16/2017     No Known Drug Allergies      Immunizations Administered on Date of Encounter - 5/16/2017     None      Orders Placed During Today's Visit     Future Labs/Procedures Expected by Expires    Protein, urine, timed  As directed 5/16/2018      Maintenance Dialysis History     Patient has no recorded history of maintenance dialysis.      Transplant Information        Txp Date Organ Coordinator Care Team    5/24/2002 Heart Dominique Cobb, RN        Txp Date Organ Coordinator Care Team    5/24/2002 Kidney Emy Sandoval RN       Language Assistance Services     ATTENTION: Language assistance services are available, free of charge. Please call [phone].      ATENCIÓN: Si habla español, tiene a luna disposición servicios gratuitos de asistencia lingüística. Llame al [phone].     CHÚ Ý: N?u b?n nói Ti?ng Vi?t, có các d?ch v? h? tr? ngôn ng? mi?n phí dành cho b?n. G?i s? [phone].         [facility] complies with applicable " Federal civil rights laws and does not discriminate on the basis of race, color, national origin, age, disability, or sex.

## 2017-05-16 NOTE — PROGRESS NOTES
Kidney/Heart Post-Transplant Assessment    Referring Physician:   Current Nephrologist:     ORGAN:   Donor Type:   PHS Increased Risk:   Cold Ischemia:  mins  Induction Medications:     Subjective:     CC:  Reassessment of renal allograft function and management of chronic immunosuppression.    HPI:  Mr. Albrecht is a 66 y.o. year old Black or  male who received a  kidney transplant on 5/24/02.  He has CKD stage 3 - GFR 30-59 and his baseline creatinine is between 1.6 to 1.9. He takes mycophenolate mofetil and sirolimus for maintenance immunosuppression. He denies any recent hospitalizations or ER visits since his previous clinic visit.    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 Today I found the following issues:    Over proteinuria     Anasarca    Uncontrolled hypertension    Not following low salt diet    Positive DSA DR53  With MFI 16,000 to 20,000 for  At least 1 yr    Patient is feeling well, he saw Dr Wilkerson in . Acknowledge some non adherence with low salt diet and clinic visits with his nephrologist but coming to see Heart transplant once a year. No opportunistic infection, no ER admissions or surgeries    Appetite is good . Active at home. BMI is 40.    Diabetes is better controlled with a A1c of 7.3% down from 8 and 10% in the past orthopnea    No strokes. No claudication    Review of Systems     Anasarca  Uncontrolled hypertension     Skin: no skin rash  CNS; no headaches, blurred vision, seizure, or syncope  ENT: No JVD,  Adenopathies,  nasal congestion. No oral lesions  Cardiac: No chest pain, dyspnea, claudication, (++) edema. Negative for palpitations  Respiratory: No SOB, cough, hemoptysis   Gastro-intestinal: No diarrhea, constipation, abdominal pain, nausea, vomit. No ascitis  Genitourinary: no hematuria, dysuria, frequency, frequency  Musculoskeletal: joint pain, arthritis or vasculitic  "changes. ++edema upper extremeties  Psych: alert awake, oriented, No cranial nerves deficit.      Objective:   Blood pressure (!) 185/82, pulse 68, temperature 97.7 °F (36.5 °C), temperature source Oral, resp. rate 18, height 6' 2" (1.88 m), weight (!) 142.4 kg (313 lb 15 oz), SpO2 100 %.body mass index is 40.31 kg/(m^2).    Physical Exam     Edema arms and lower extremites  Abdominal obesity    Head: normocephalic  Neck: No JVD, cervical axillary, or femoral adenopathies  Heart: no murmurs, Normal s1 and s2, No gallops, no rubs, No murmurs  Lungs; CTA, good respiratory effort, no crackles  Abdomen: soft, non tender, no splenomegaly or hepatomegaly, no massess, no bruits  Extremities: (++) edema, skin rash, joint pain  SNC: awake, alert oriented. Cranial nerves are intact, no focalized, sensitivity and strength preserved      Labs:  Lab Results   Component Value Date    WBC 3.97 2017    HGB 12.4 (L) 2017    HCT 38.5 (L) 2017     2017    K 4.1 2017     2017    CO2 26 2017    BUN 15 2017    CREATININE 1.8 (H) 2017    EGFRNONAA 38.4 (A) 2017    CALCIUM 9.1 2017    PHOS 3.3 10/05/2016    MG 2.0 2017    ALBUMIN 3.1 (L) 2017    AST 26 2017    ALT 16 2017       No results found for: EXTANC, EXTWBC, EXTSEGS, EXTPLATELETS, EXTHEMOGLOBI, EXTHEMATOCRI, EXTCREATININ, EXTSODIUM, EXTPOTASSIUM, EXTBUN, EXTCO2, EXTCALCIUM, EXTPHOSPHORU, EXTGLUCOSE, EXTALBUMIN, EXTAST, EXTALT, EXTBILITOTAL, EXTLIPASE, EXTAMYLASE    No results found for: EXTCYCLOSLVL, EXTSIROLIMUS, EXTTACROLVL, EXTPROTCRE, EXTPTHINTACT, EXTPROTEINUA, EXTWBCUA, EXTRBCUA    Labs were reviewed with the patient.    Assessment:     1. -donor kidney/ heart transplant performed at [facility] on 2002 - ESRD etiology unknown; ESHF due to IHSS    2. Chronic immunosuppression with Sirolimus and Cellcept    3. Essential hypertension    4. CKD (chronic kidney disease), " stage III    5. Morbid obesity with BMI of 40.0-44.9, adult    6. Erectile dysfunction due to diseases classified elsewhere    7. Proteinuria, unspecified        Plan:     In summary:  No change with sirolimus and MMF dose  24 hr urine collection  Will discuss with heart transplant presence of DSA DR53 with high MFI. We discussed potential need for a kidney biopsy  Proteinuria: multifactorial Immune and non immune factors: chronic AMR, sirolimus induced, ?Morbid obesity with hyper filtration injury, ?Diabetic nephropathy, uncontrolled hypertension, Prograf toxicity  Extensive education provided about low salt intake, need for follow up with his nephrologist, wt loss, home BP monitoring, diabetes control, possible need for a kidney biopsy, will discuss with kidney and heart transplant team  We spoke for 1 hr  All questions answered    1. CKD stage: baseline CKD stage 3 with proteinuria. 24 hr urine collection. Possible kidney biopsy depending on my discussion with heart and kidney transplant team. Unfortunately limited data available for chronic AMR. For now strict life style changes . BP control, wt loss. See above. Will give consideration to conversion to tacro based protocol  Education provided in appropriate fluid intake, potassium intake. Continue with oral hydration    2. Immunosuppression: will discuss tacrolimus -based protocol. For now No change with sirolimus or MMF dose Will closely monitor for toxicities, education provided about adherence to medicines and need to communicate any side effct to the transplant nurse or physician    3. Allograft Function:stable creatinine worsening proteinuria. 24 collection for proteinuria,   Lab Results   Component Value Date    CREATININE 1.8 (H) 05/11/2017       4. Hypertension management:  Continue with home blood pressure monitoring, low salt and healthy life discussed with the patient. Discuss home BP monitoring with either: his nephrologist heart or kidney  transplant. I made it clear importance of BP control     5. Metabolic Bone Disease/Secondary Hyperparathyroidism: calcium and phosphorus as per our center protocol. Will monitor PTH, Vit D level, calcium      Lab Results   Component Value Date    .0 (H) 10/05/2016    CALCIUM 9.1 05/11/2017    PHOS 3.3 10/05/2016       6. Electrolytes: reviewed with the patient, essentially within the normal range no need for acute changes today, will monitor as per our center guidelines     Lab Results   Component Value Date     05/11/2017    K 4.1 05/11/2017     05/11/2017    CO2 26 05/11/2017       7. Anemia: h/h stable, mild leucopenia associated with IS. will continue monitoring as per our center guidelines. No indication for acute intervention today     Lab Results   Component Value Date    WBC 3.97 05/11/2017    HGB 12.4 (L) 05/11/2017    HCT 38.5 (L) 05/11/2017    MCV 79 (L) 05/11/2017     05/11/2017       8.Proteinuria: 24 hr urine collection, possible sirolimus induced. CAN, hyper filtration injury, diabetic, chronic AMR injury will continue with pr/cr ratio as per our center guidelines  Lab Results   Component Value Date    PROTEINURINE 227 (H) 05/11/2017    CREATRANDUR 191.0 05/11/2017    UTPCR 1.19 (H) 05/11/2017        9. BK virus infection screening: will continue with urine or blood PCR as per our guidelines to prevent BK virus viremia and allograft dysfunction  No results found for: BKVIRUSDNAUR, BKQUANTURINE, BKVIRUSLOG, BKVIRUSURINE, BKVIRUSPCRQB      10. Weight education: provided during the clinic visit   Body mass index is 40.31 kg/(m^2).       11.Patient safety education regarding immunosuppression including prophylaxis posttransplant for CMV, PCP : Education provided about vaccination and prevention of infections    12.  Cytopenias: no significant cytopenias will monitor as per our guidelines. Medicine list reviewed including potential causes of drug-induced cytopenias     Lab Results    Component Value Date    WBC 3.97 05/11/2017    HGB 12.4 (L) 05/11/2017    HCT 38.5 (L) 05/11/2017    MCV 79 (L) 05/11/2017     05/11/2017       I spoke with the patient for 60 minutes. More than half dedicated to counseling and education. All questions answered                Follow-up:   Annual follow-up with kidney transplant clinic with repeat labs, including renal function panel with UA, urine protein/creatinine ratio, and drug trough level q3 months.  Patient also reminded to follow-up with general nephrologist.    Edy Reese MD       Education:   Material provided to the patient.  Patient reminded to call with any health changes since these can be early signs of significant complications.  Also, I advised the patient to be sure any new medications or changes of old medications are discussed with either a pharmacist or physician knowledgeable with transplant to avoid rejection/drug toxicity related to significant drug interactions.    UNOS Patient Status  Functional Status: 90% - Able to carry on normal activity: minor symptoms of disease  Physical Capacity: No Limitations

## 2017-05-19 ENCOUNTER — PATIENT MESSAGE (OUTPATIENT)
Dept: TRANSPLANT | Facility: CLINIC | Age: 67
End: 2017-05-19

## 2017-05-22 DIAGNOSIS — N18.30 CKD (CHRONIC KIDNEY DISEASE), STAGE III: Chronic | ICD-10-CM

## 2017-05-22 DIAGNOSIS — Z29.89 PROPHYLACTIC IMMUNOTHERAPY: Chronic | ICD-10-CM

## 2017-05-22 DIAGNOSIS — Z94.0 DECEASED-DONOR KIDNEY TRANSPLANT: ICD-10-CM

## 2017-05-22 DIAGNOSIS — E66.01 MORBID OBESITY WITH BMI OF 40.0-44.9, ADULT: ICD-10-CM

## 2017-05-22 DIAGNOSIS — I10 ESSENTIAL HYPERTENSION: ICD-10-CM

## 2017-05-22 DIAGNOSIS — N52.1 ERECTILE DYSFUNCTION DUE TO DISEASES CLASSIFIED ELSEWHERE: ICD-10-CM

## 2017-05-22 RX ORDER — SILDENAFIL 25 MG/1
25 TABLET, FILM COATED ORAL
Qty: 15 TABLET | Refills: 3 | Status: SHIPPED | OUTPATIENT
Start: 2017-05-22 | End: 2017-05-26 | Stop reason: SDUPTHER

## 2017-05-22 RX ORDER — SILDENAFIL CITRATE 20 MG/1
20 TABLET ORAL DAILY PRN
Qty: 30 TABLET | Refills: 0 | OUTPATIENT
Start: 2017-05-22 | End: 2018-05-22

## 2017-05-26 ENCOUNTER — TELEPHONE (OUTPATIENT)
Dept: TRANSPLANT | Facility: CLINIC | Age: 67
End: 2017-05-26

## 2017-05-26 DIAGNOSIS — Z94.0 DECEASED-DONOR KIDNEY TRANSPLANT: ICD-10-CM

## 2017-05-26 DIAGNOSIS — Z29.89 PROPHYLACTIC IMMUNOTHERAPY: Chronic | ICD-10-CM

## 2017-05-26 DIAGNOSIS — I10 ESSENTIAL HYPERTENSION: ICD-10-CM

## 2017-05-26 DIAGNOSIS — N18.30 CKD (CHRONIC KIDNEY DISEASE), STAGE III: Chronic | ICD-10-CM

## 2017-05-26 DIAGNOSIS — Z94.0 KIDNEY REPLACED BY TRANSPLANT: Primary | ICD-10-CM

## 2017-05-26 DIAGNOSIS — E66.01 MORBID OBESITY WITH BMI OF 40.0-44.9, ADULT: ICD-10-CM

## 2017-05-26 DIAGNOSIS — N52.1 ERECTILE DYSFUNCTION DUE TO DISEASES CLASSIFIED ELSEWHERE: ICD-10-CM

## 2017-05-26 DIAGNOSIS — R80.9 NEPHROTIC RANGE PROTEINURIA: ICD-10-CM

## 2017-05-26 RX ORDER — SILDENAFIL 25 MG/1
25 TABLET, FILM COATED ORAL
Qty: 15 TABLET | Refills: 3 | Status: SHIPPED | OUTPATIENT
Start: 2017-05-26 | End: 2017-05-29 | Stop reason: SDUPTHER

## 2017-05-26 NOTE — TELEPHONE ENCOUNTER
Spoke to pt, he requests rx for Sildenafil sent to his local  [facility] pharmacy, not the mail order [facility] pharmacy.  Scheduled 24hr urine PC ratio 5/30/17.

## 2017-05-26 NOTE — TELEPHONE ENCOUNTER
----- Message from Dot Gtz sent at 5/26/2017  8:52 AM CDT -----  Contact: patient   Calling to speak with you about his prescription. Please call

## 2017-05-29 DIAGNOSIS — Z29.89 PROPHYLACTIC IMMUNOTHERAPY: Chronic | ICD-10-CM

## 2017-05-29 DIAGNOSIS — N18.30 CKD (CHRONIC KIDNEY DISEASE), STAGE III: Chronic | ICD-10-CM

## 2017-05-29 DIAGNOSIS — Z94.0 DECEASED-DONOR KIDNEY TRANSPLANT: ICD-10-CM

## 2017-05-29 DIAGNOSIS — N52.1 ERECTILE DYSFUNCTION DUE TO DISEASES CLASSIFIED ELSEWHERE: ICD-10-CM

## 2017-05-29 DIAGNOSIS — I10 ESSENTIAL HYPERTENSION: ICD-10-CM

## 2017-05-29 DIAGNOSIS — E66.01 MORBID OBESITY WITH BMI OF 40.0-44.9, ADULT: ICD-10-CM

## 2017-05-29 RX ORDER — SILDENAFIL 25 MG/1
25 TABLET, FILM COATED ORAL
Qty: 15 TABLET | Refills: 3 | Status: SHIPPED | OUTPATIENT
Start: 2017-05-29 | End: 2018-05-16

## 2017-05-30 ENCOUNTER — LAB VISIT (OUTPATIENT)
Dept: LAB | Facility: HOSPITAL | Age: 67
End: 2017-05-30
Attending: NURSE PRACTITIONER
Payer: MEDICARE

## 2017-05-30 ENCOUNTER — OFFICE VISIT (OUTPATIENT)
Dept: DIABETES | Facility: CLINIC | Age: 67
End: 2017-05-30
Payer: MEDICARE

## 2017-05-30 VITALS
HEIGHT: 74 IN | SYSTOLIC BLOOD PRESSURE: 126 MMHG | DIASTOLIC BLOOD PRESSURE: 86 MMHG | BODY MASS INDEX: 40.43 KG/M2 | WEIGHT: 315 LBS

## 2017-05-30 DIAGNOSIS — R80.9 NEPHROTIC RANGE PROTEINURIA: ICD-10-CM

## 2017-05-30 DIAGNOSIS — E11.22 TYPE 2 DIABETES MELLITUS WITH STAGE 3 CHRONIC KIDNEY DISEASE, WITH LONG-TERM CURRENT USE OF INSULIN: Primary | ICD-10-CM

## 2017-05-30 DIAGNOSIS — N18.30 TYPE 2 DIABETES MELLITUS WITH STAGE 3 CHRONIC KIDNEY DISEASE, WITH LONG-TERM CURRENT USE OF INSULIN: Primary | ICD-10-CM

## 2017-05-30 DIAGNOSIS — Z94.0 KIDNEY REPLACED BY TRANSPLANT: ICD-10-CM

## 2017-05-30 DIAGNOSIS — I10 ESSENTIAL HYPERTENSION: ICD-10-CM

## 2017-05-30 DIAGNOSIS — N18.30 TYPE 2 DIABETES MELLITUS WITH STAGE 3 CHRONIC KIDNEY DISEASE, WITH LONG-TERM CURRENT USE OF INSULIN: ICD-10-CM

## 2017-05-30 DIAGNOSIS — E11.22 TYPE 2 DIABETES MELLITUS WITH STAGE 3 CHRONIC KIDNEY DISEASE, WITH LONG-TERM CURRENT USE OF INSULIN: ICD-10-CM

## 2017-05-30 DIAGNOSIS — Z79.4 TYPE 2 DIABETES MELLITUS WITH STAGE 3 CHRONIC KIDNEY DISEASE, WITH LONG-TERM CURRENT USE OF INSULIN: Primary | ICD-10-CM

## 2017-05-30 DIAGNOSIS — Z79.4 TYPE 2 DIABETES MELLITUS WITH STAGE 3 CHRONIC KIDNEY DISEASE, WITH LONG-TERM CURRENT USE OF INSULIN: ICD-10-CM

## 2017-05-30 LAB
CREAT 24H UR-MRATE: 84.9 MG/HR
CREAT SERPL-MCNC: 1.7 MG/DL
CREAT UR-MCNC: 81 MG/DL
CREATININE, URINE (MG/SPEC): 2037.2 MG/SPEC
EST. GFR  (AFRICAN AMERICAN): 47.5 ML/MIN/1.73 M^2
EST. GFR  (NON AFRICAN AMERICAN): 41.1 ML/MIN/1.73 M^2
GLUCOSE SERPL-MCNC: 134 MG/DL (ref 70–110)
PROT 24H UR-MRATE: 2012 MG/SPEC
PROT UR-MCNC: 80 MG/DL
URINE COLLECTION DURATION: 24 HR
URINE COLLECTION DURATION: 24 HR
URINE VOLUME: 2515 ML
URINE VOLUME: 2515 ML

## 2017-05-30 PROCEDURE — 84156 ASSAY OF PROTEIN URINE: CPT

## 2017-05-30 PROCEDURE — 82565 ASSAY OF CREATININE: CPT

## 2017-05-30 PROCEDURE — 36415 COLL VENOUS BLD VENIPUNCTURE: CPT

## 2017-05-30 PROCEDURE — 99214 OFFICE O/P EST MOD 30 MIN: CPT | Mod: S$PBB,,, | Performed by: NURSE PRACTITIONER

## 2017-05-30 PROCEDURE — 82570 ASSAY OF URINE CREATININE: CPT

## 2017-05-30 PROCEDURE — 83036 HEMOGLOBIN GLYCOSYLATED A1C: CPT

## 2017-05-30 PROCEDURE — 99999 PR PBB SHADOW E&M-EST. PATIENT-LVL IV: CPT | Mod: PBBFAC,,, | Performed by: NURSE PRACTITIONER

## 2017-05-30 NOTE — PROGRESS NOTES
"PCP: Dr. Stanley Chanel    HISTORY OF PRESENT ILLNESS: 66 year old  male patient is in clinic today for uncontrolled diabetes.   He has had diabetes for many years and has attended diabetes education in the past.  Patient currently takes Humulin 70 / 30 for diabetes.  He had a cardiac and renal transplant in 2002, on immunosuppressant drugs.  Most recent A1C was 7.3, ADA recommends less than 7.0.   He has gained 6 pounds since last visit.  He forgot his meter today.  Per recall,  fasting BG 90 - 120; evening time 130.     Patient denies polyuria, polydipsia, polyphagia, or blurred vision.   Also denies nausea, vomiting, has occasional diarrhea.  Has rare hypoglycemic symptoms, which he treats with OJ.  He denies any recent hospitalizations or emergency room visits.     Height: 6 ' 2 "  Weight: 315 pounds, BMI 40.45  Blood Glucose reading this AM: 126 mg/dl fasting  Blood Glucose reading in clinic: 134 mg/dl at 8:38 am    DIABETES MEDICATIONS:   Humulin 70 / 30, 55 - 60 units TWICE DAILY ac    LABS REVIEWED.    REVIEW OF SYSTEMS:  GENERAL: Denies fever, chills, change in appetite.  HEENT: Has minor impaired hearing, denies dysphagia. History of vertigo.   RESPIRATORY: Denies shortness of breath, cough or wheezing.  CARDIOVASCULAR: Denies chest pain, palpitations.  GI: Denies hematochezia.  : Denies hematuria, dysuria or frequency.  MS: Normal gait. Denies difficulty with mobility, muscle or joint pain.   SKIN: Denies rashes and lesions.  NEURO: Occasional numbness, tingling in feet.   PSYCH: Denies depression or anxiety. No suicidal ideations.  ENDO: See HPI.    STANDARDS OF CARE:   Eye exam: [facility], Last exam Spring 2017.  He plans to schedule an appointment.  Dental exam: Has regular exams; denies gums bleeding.  Podiatry exam: None.  SOCIAL HISTORY: . Lives with spouse. Has 2 children. Patient retired as manager for [employer]. "     ACTIVITY LEVEL: Stays active in the garden and yard. Occasionally walks.  MEAL PLANNING: Number of meals per day - 3. Number of snacks per day - occasionally.  Breakfast can be cereal with lactose free milk or 2 eggs, toast or eggs grits and jaffe.  Mid morning snack can be chips or cookies.  Lunch can be sandwich with price, fruit.  Dinner is usually red beans, rice, sausage, broccolli salad.  Can then have a lite beer.  He drinks mostly water.      BLOOD GLUCOSE TESTING: Self-monitoring with ONE TOUCH VERIO meter    PHYSICAL EXAMINATION:  GENERAL: WDWN  male in no acute distress, responds appropriately.  AAO X 3.   NECK: Supple, no thyromegaly, no cervical or supraclavicular lymphadenopathy, no carotid bruit.  HEART: Regular rate and rhythm. No rubs, murmurs or gallops.   LUNGS: CTA bilaterally. Unlabored breathing, no use of accessory muscles.  MUSCULOSKELETAL: Normal gait and muscle strength.  ABDOMEN: Active bowel sounds X 4, no masses or tenderness.   SKIN: Warm, dry skin. No lesions or abrasions.  NEUROLOGIC: Cranial nerves II-XII grossly intact.   EXTREMITIES: Trace edema.  FOOT EXAMINATION: Protective Sensation (w/ 10 gram monofilament):  Right: Intact Left: Intact //  Visual Inspection:  Normal -  Bilateral //  Pedal Pulses:  Right: Present  Left: Present    ASSESSMENT:  1. Diabetes Type 2, ED, s / p renal and cardiac transplant - fair control on Humulin 70 / 30, A1C 7.3   2. Hypertension - good control on diltiazem, Lisinopril, Lasix.  3. Hyperlipidemia - good control on Lipitor  4. Morbid Obesity - BMI 40.45    PLAN:  1.) Patient was instructed to monitor blood glucose 2 - 3 x daily, fasting and ac dinner or at bedtime.  Discussed ADA goal for fasting blood sugar, 80 - 130 mg/dL; pp blood sugars below 180 mg/dl. Also, discussed prevention of hypoglycemia and the need to adjust goals to higher levels if persistent hypoglycemia.  Reminded him to bring records or meter to each visit for  review.   2.) He will continue Humulin 70 / 30 pens, 55 - 60 units TWICE DAILY ac.  He continues to take his evening dose of Humulin 70 / 30 at bedtime, despite our discussion to take this dose before evening meal.  This is probably why he continues to have pp spikes around bedtime and occasional nocturnal hypo throughout the night. He did not bring readings today but agrees to fax for review.    3.) Meal planning teaching: Carbohydrate definition - one serving is 15 gms. Carbohydrate spacing - carbohydrates should be spaced into approximately 3 meals with 2 snacks (of one carbohydrate) between meals or at bedtime. Increase vegetable intake to 2 or more cups of vegetables per day as well as 2 fruit servings. Recommended low saturated fat, low sodium diet to aid in control of hypertension and cholesterol.  4.) Discussed activity, benefits, methods, and precautions. Recommended patient start some form of exercise and increase as tolerated to 30 minutes per day to facilitate weight loss and aid in control of BGs.  6.) Return to clinic in 3 months for follow up.  Pending Labs today, including A1C.  Advised patient to call clinic with any questions or concerns.     Jael Garrison, NP-C, CDE

## 2017-05-31 LAB
ESTIMATED AVG GLUCOSE: 163 MG/DL
HBA1C MFR BLD HPLC: 7.3 %

## 2017-07-25 ENCOUNTER — TELEPHONE (OUTPATIENT)
Dept: UROLOGY | Facility: CLINIC | Age: 67
End: 2017-07-25

## 2017-07-25 ENCOUNTER — PATIENT MESSAGE (OUTPATIENT)
Dept: UROLOGY | Facility: CLINIC | Age: 67
End: 2017-07-25

## 2017-07-25 NOTE — TELEPHONE ENCOUNTER
----- Message from Leonard Espinosa sent at 7/25/2017 11:46 AM CDT -----  Contact: Pt   Pt would like a call back from staff for scheduling    Pt is requesting to be seen before 8/22/17    Can be reached at [phone]

## 2017-08-02 DIAGNOSIS — Z12.5 SCREENING FOR PROSTATE CANCER: ICD-10-CM

## 2017-08-02 DIAGNOSIS — T86.21 CARDIAC TRANSPLANT REJECTION: ICD-10-CM

## 2017-08-02 DIAGNOSIS — Z79.52 LONG TERM CURRENT USE OF SYSTEMIC STEROIDS: ICD-10-CM

## 2017-08-02 DIAGNOSIS — Z94.1 STATUS POST HEART TRANSPLANT: Primary | ICD-10-CM

## 2017-08-02 DIAGNOSIS — I10 ESSENTIAL HYPERTENSION: ICD-10-CM

## 2017-08-02 DIAGNOSIS — Z79.899 ENCOUNTER FOR LONG-TERM (CURRENT) USE OF MEDICATIONS: ICD-10-CM

## 2017-08-02 DIAGNOSIS — E78.2 MIXED HYPERLIPIDEMIA: ICD-10-CM

## 2017-08-04 DIAGNOSIS — Z94.1 STATUS POST HEART TRANSPLANT: ICD-10-CM

## 2017-08-04 DIAGNOSIS — E78.2 MIXED HYPERLIPIDEMIA: ICD-10-CM

## 2017-08-10 ENCOUNTER — OFFICE VISIT (OUTPATIENT)
Dept: UROLOGY | Facility: CLINIC | Age: 67
End: 2017-08-10
Payer: MEDICARE

## 2017-08-10 VITALS — DIASTOLIC BLOOD PRESSURE: 92 MMHG | SYSTOLIC BLOOD PRESSURE: 140 MMHG | WEIGHT: 315 LBS | BODY MASS INDEX: 40.44 KG/M2

## 2017-08-10 DIAGNOSIS — Z12.5 PROSTATE CANCER SCREENING: Primary | ICD-10-CM

## 2017-08-10 DIAGNOSIS — N52.9 ERECTILE DYSFUNCTION, UNSPECIFIED ERECTILE DYSFUNCTION TYPE: ICD-10-CM

## 2017-08-10 PROCEDURE — 99214 OFFICE O/P EST MOD 30 MIN: CPT | Mod: S$PBB,,, | Performed by: UROLOGY

## 2017-08-10 PROCEDURE — 3080F DIAST BP >= 90 MM HG: CPT | Mod: ,,, | Performed by: UROLOGY

## 2017-08-10 PROCEDURE — 1159F MED LIST DOCD IN RCRD: CPT | Mod: ,,, | Performed by: UROLOGY

## 2017-08-10 PROCEDURE — 3077F SYST BP >= 140 MM HG: CPT | Mod: ,,, | Performed by: UROLOGY

## 2017-08-10 PROCEDURE — 1126F AMNT PAIN NOTED NONE PRSNT: CPT | Mod: ,,, | Performed by: UROLOGY

## 2017-08-10 PROCEDURE — 99212 OFFICE O/P EST SF 10 MIN: CPT | Mod: PBBFAC | Performed by: UROLOGY

## 2017-08-10 PROCEDURE — 99999 PR PBB SHADOW E&M-EST. PATIENT-LVL II: CPT | Mod: PBBFAC,,, | Performed by: UROLOGY

## 2017-08-10 RX ORDER — SILDENAFIL CITRATE 20 MG/1
20 TABLET ORAL
Qty: 50 TABLET | Refills: 11 | Status: SHIPPED | OUTPATIENT
Start: 2017-08-10 | End: 2018-08-20 | Stop reason: SDUPTHER

## 2017-08-10 NOTE — PROGRESS NOTES
Chief Complaint: Nocturia and frequency in daytime     HPI:   8/10/17: Flomax helped a bit but he stopped it not needing it; didn't get the sleep study.  No LUTS now.  Cut back on caffeine to one cup.    3/30/16: 64 yo man s/p renal transplant in  here with nocturia x3, was x0-1 4-5 months ago.  No hesitancy.  Some dribble when done putting things away.Urgency.  Some double voiding.  No abd/pelvic pain and no exac/rel factors.  No hematuria.  No urolithiasis.  No urinary bother.  No  history.  Normal sexual function.  Not usually constipated.  Viagra for ED rx but not used but once.  No BPH meds.    Allergies:  No known drug allergies    Medications:  has a current medication list which includes the following prescription(s): atorvastatin, blood pressure kit-extra large, blood sugar diagnostic, calcium carbonate, cetirizine, cholecalciferol (vitamin d3), clobetasol 0.05%, diltiazem, esomeprazole, fluticasone, insulin nph and regular human, iron polysaccharides, lisinopril, low-dose aspirin, meclizine, metoprolol succinate, mycophenolate, sildenafil, sirolimus, and torsemide.    Review of Systems:  General: No fever, chills, fatigability, or weight loss.  Skin: No rashes, itching, or changes in color or texture of skin.  Chest: Denies PETERSON, cyanosis, wheezing, cough, and sputum production.  Abdomen: Appetite fine. No weight loss. Denies diarrhea, abdominal pain, hematemesis, or blood in stool.  Musculoskeletal: No joint stiffness or swelling. Some back pain.  : As above.  All other review of systems negative.    PMH:   has a past medical history of Allergic rhinitis (2013); Blood transfusion; CKD (chronic kidney disease), stage III (2014); -donor kidney transplant; Diabetes mellitus, type 2 (2013); Gout, arthritis (2013); Heart attack; Heart transplanted; Hyperlipidemia (2013); Hypertension; Morbid obesity (2013); Organ transplant (); and Prophylactic  immunotherapy.    PSH:   has a past surgical history that includes Kidney transplant; Heart transplant; and Revision total hip arthroplasty.    FamHx: family history includes Diabetes in his brother and maternal grandmother; Early death in his brother; Heart disease in his brother; Hyperlipidemia in his brother and sister; Hypertension in his brother; Kidney disease in his son; Stroke in his brother and mother.    SocHx:  reports that he quit smoking about 33 years ago. His smoking use included Cigarettes. He has a 20.00 pack-year smoking history. He has never used smokeless tobacco. He reports that he drinks about 0.6 oz of alcohol per week . He reports that he does not use drugs.      Physical Exam:  Vitals:    08/10/17 1544   BP: (!) 140/92     General: A&Ox3, no apparent distress, no deformities  Neck: No masses, normal thyroid  Lungs: normal inspiration, no use of accessory muscles  Heart: normal pulse, no arrhythmias  Abdomen: Soft, NT, ND, no masses, no hernias, no hepatosplenomegaly  Lymphatic: Neck and groin nodes negative  Skin: The skin is warm and dry. No jaundice.  Ext: No c/c/e.  : Test desc chavo, no abnormalities of epididymus. Penis normal, with normal penile and scrotal skin. Meatus normal. Normal rectal tone, no hemorrhoids. Prost 40 gm no nodules or masses appreciated. SV not palpable. Perineum and anus normal.    Labs/Studies:   Urinalysis performed in clinic, summary: UA normal  PSA     6/13: 1.3    6/14: 1.1    6/15: 1.7    5/17: 1.5  Bladder Scan performed in office:     3/16: PVR 24 ml.    Impression/Plan:   1. Doing fine now.  Annual prostate screening.  PSA/RTC one year.  2. Discussed ED.  Sildenafil rx.

## 2017-08-30 ENCOUNTER — PATIENT MESSAGE (OUTPATIENT)
Dept: TRANSPLANT | Facility: CLINIC | Age: 67
End: 2017-08-30

## 2017-09-11 ENCOUNTER — OFFICE VISIT (OUTPATIENT)
Dept: DIABETES | Facility: CLINIC | Age: 67
End: 2017-09-11
Payer: MEDICARE

## 2017-09-11 ENCOUNTER — LAB VISIT (OUTPATIENT)
Dept: LAB | Facility: HOSPITAL | Age: 67
End: 2017-09-11
Attending: INTERNAL MEDICINE
Payer: MEDICARE

## 2017-09-11 VITALS
BODY MASS INDEX: 40.43 KG/M2 | SYSTOLIC BLOOD PRESSURE: 132 MMHG | WEIGHT: 315 LBS | DIASTOLIC BLOOD PRESSURE: 74 MMHG | HEIGHT: 74 IN

## 2017-09-11 DIAGNOSIS — N18.30 TYPE 2 DIABETES MELLITUS WITH STAGE 3 CHRONIC KIDNEY DISEASE, WITH LONG-TERM CURRENT USE OF INSULIN: ICD-10-CM

## 2017-09-11 DIAGNOSIS — N18.30 TYPE 2 DIABETES MELLITUS WITH STAGE 3 CHRONIC KIDNEY DISEASE, WITH LONG-TERM CURRENT USE OF INSULIN: Primary | ICD-10-CM

## 2017-09-11 DIAGNOSIS — Z79.4 TYPE 2 DIABETES MELLITUS WITH STAGE 3 CHRONIC KIDNEY DISEASE, WITH LONG-TERM CURRENT USE OF INSULIN: Primary | ICD-10-CM

## 2017-09-11 DIAGNOSIS — E66.01 MORBID OBESITY WITH BMI OF 40.0-44.9, ADULT: ICD-10-CM

## 2017-09-11 DIAGNOSIS — Z79.4 TYPE 2 DIABETES MELLITUS WITH STAGE 3 CHRONIC KIDNEY DISEASE, WITH LONG-TERM CURRENT USE OF INSULIN: ICD-10-CM

## 2017-09-11 DIAGNOSIS — E11.22 TYPE 2 DIABETES MELLITUS WITH STAGE 3 CHRONIC KIDNEY DISEASE, WITH LONG-TERM CURRENT USE OF INSULIN: ICD-10-CM

## 2017-09-11 DIAGNOSIS — E11.22 TYPE 2 DIABETES MELLITUS WITH STAGE 3 CHRONIC KIDNEY DISEASE, WITH LONG-TERM CURRENT USE OF INSULIN: Primary | ICD-10-CM

## 2017-09-11 DIAGNOSIS — Z94.1 STATUS POST HEART TRANSPLANT: ICD-10-CM

## 2017-09-11 DIAGNOSIS — I10 ESSENTIAL HYPERTENSION: ICD-10-CM

## 2017-09-11 DIAGNOSIS — E78.5 HYPERLIPIDEMIA, UNSPECIFIED HYPERLIPIDEMIA TYPE: ICD-10-CM

## 2017-09-11 LAB
ALBUMIN SERPL BCP-MCNC: 3 G/DL
ALP SERPL-CCNC: 116 U/L
ALT SERPL W/O P-5'-P-CCNC: 21 U/L
ANION GAP SERPL CALC-SCNC: 8 MMOL/L
AST SERPL-CCNC: 30 U/L
BASOPHILS # BLD AUTO: 0.02 K/UL
BASOPHILS NFR BLD: 0.4 %
BILIRUB SERPL-MCNC: 0.5 MG/DL
BNP SERPL-MCNC: 155 PG/ML
BUN SERPL-MCNC: 17 MG/DL
CALCIUM SERPL-MCNC: 8.9 MG/DL
CHLORIDE SERPL-SCNC: 106 MMOL/L
CO2 SERPL-SCNC: 27 MMOL/L
CREAT SERPL-MCNC: 1.7 MG/DL
DIFFERENTIAL METHOD: ABNORMAL
EOSINOPHIL # BLD AUTO: 0.1 K/UL
EOSINOPHIL NFR BLD: 1.9 %
ERYTHROCYTE [DISTWIDTH] IN BLOOD BY AUTOMATED COUNT: 14.9 %
EST. GFR  (AFRICAN AMERICAN): 47.2 ML/MIN/1.73 M^2
EST. GFR  (NON AFRICAN AMERICAN): 40.8 ML/MIN/1.73 M^2
GLUCOSE SERPL-MCNC: 115 MG/DL
GLUCOSE SERPL-MCNC: 126 MG/DL (ref 70–110)
HCT VFR BLD AUTO: 38.5 %
HGB BLD-MCNC: 12.5 G/DL
LYMPHOCYTES # BLD AUTO: 2 K/UL
LYMPHOCYTES NFR BLD: 43.4 %
MAGNESIUM SERPL-MCNC: 1.9 MG/DL
MCH RBC QN AUTO: 25.9 PG
MCHC RBC AUTO-ENTMCNC: 32.5 G/DL
MCV RBC AUTO: 80 FL
MONOCYTES # BLD AUTO: 0.3 K/UL
MONOCYTES NFR BLD: 6 %
NEUTROPHILS # BLD AUTO: 2.2 K/UL
NEUTROPHILS NFR BLD: 48.1 %
PLATELET # BLD AUTO: 199 K/UL
PMV BLD AUTO: 10.8 FL
POTASSIUM SERPL-SCNC: 3.6 MMOL/L
PROT SERPL-MCNC: 7 G/DL
RBC # BLD AUTO: 4.83 M/UL
SODIUM SERPL-SCNC: 141 MMOL/L
WBC # BLD AUTO: 4.63 K/UL

## 2017-09-11 PROCEDURE — 80195 ASSAY OF SIROLIMUS: CPT

## 2017-09-11 PROCEDURE — 99214 OFFICE O/P EST MOD 30 MIN: CPT | Mod: PBBFAC,PO | Performed by: NURSE PRACTITIONER

## 2017-09-11 PROCEDURE — 80053 COMPREHEN METABOLIC PANEL: CPT

## 2017-09-11 PROCEDURE — 1126F AMNT PAIN NOTED NONE PRSNT: CPT | Mod: ,,, | Performed by: NURSE PRACTITIONER

## 2017-09-11 PROCEDURE — 3075F SYST BP GE 130 - 139MM HG: CPT | Mod: ,,, | Performed by: NURSE PRACTITIONER

## 2017-09-11 PROCEDURE — 3066F NEPHROPATHY DOC TX: CPT | Mod: ,,, | Performed by: NURSE PRACTITIONER

## 2017-09-11 PROCEDURE — 83880 ASSAY OF NATRIURETIC PEPTIDE: CPT

## 2017-09-11 PROCEDURE — 86832 HLA CLASS I HIGH DEFIN QUAL: CPT

## 2017-09-11 PROCEDURE — 3045F PR MOST RECENT HEMOGLOBIN A1C LEVEL 7.0-9.0%: CPT | Mod: ,,, | Performed by: NURSE PRACTITIONER

## 2017-09-11 PROCEDURE — 85025 COMPLETE CBC W/AUTO DIFF WBC: CPT

## 2017-09-11 PROCEDURE — 99999 PR PBB SHADOW E&M-EST. PATIENT-LVL IV: CPT | Mod: PBBFAC,,, | Performed by: NURSE PRACTITIONER

## 2017-09-11 PROCEDURE — 86833 HLA CLASS II HIGH DEFIN QUAL: CPT | Mod: 91

## 2017-09-11 PROCEDURE — 99214 OFFICE O/P EST MOD 30 MIN: CPT | Mod: S$PBB,,, | Performed by: NURSE PRACTITIONER

## 2017-09-11 PROCEDURE — 82948 REAGENT STRIP/BLOOD GLUCOSE: CPT | Mod: PBBFAC,PO | Performed by: NURSE PRACTITIONER

## 2017-09-11 PROCEDURE — 86977 RBC SERUM PRETX INCUBJ/INHIB: CPT

## 2017-09-11 PROCEDURE — 83036 HEMOGLOBIN GLYCOSYLATED A1C: CPT

## 2017-09-11 PROCEDURE — 1159F MED LIST DOCD IN RCRD: CPT | Mod: ,,, | Performed by: NURSE PRACTITIONER

## 2017-09-11 PROCEDURE — 86832 HLA CLASS I HIGH DEFIN QUAL: CPT | Mod: 91

## 2017-09-11 PROCEDURE — 83735 ASSAY OF MAGNESIUM: CPT

## 2017-09-11 PROCEDURE — 3078F DIAST BP <80 MM HG: CPT | Mod: ,,, | Performed by: NURSE PRACTITIONER

## 2017-09-11 NOTE — PROGRESS NOTES
"PCP: Dr. Stanley Chanel    HISTORY OF PRESENT ILLNESS: 67 year old  male patient is in clinic today for uncontrolled diabetes.   He has had diabetes for many years and has attended diabetes education in the past.  Patient currently takes Humulin 70 / 30 for diabetes.  He had a cardiac and renal transplant in 2002, on immunosuppressant drugs.  Most recent A1C was 7.3, ADA recommends less than 7.0.   He has gained 2 pounds since last visit.  Per meter, fasting BG are 91, 115 - 148, < 167 >; hs 158 - 259.    Patient denies polyuria, polydipsia, polyphagia, or blurred vision.   Also denies nausea, vomiting, has occasional diarrhea.  Has rare hypoglycemic symptoms, which he treats with OJ.  He denies any recent hospitalizations or emergency room visits.     Height: 6 ' 2 "  Weight: 317 pounds, BMI 40.72  Blood Glucose reading this AM: 110 mg/dl fasting  Blood Glucose reading in clinic: 126 mg/dl at 8:09 am    DIABETES MEDICATIONS:   Humulin 70 / 30, 50 - 60 units TWICE DAILY ac    LABS REVIEWED.    REVIEW OF SYSTEMS:  GENERAL: Denies fever, chills, change in appetite.  HEENT: Has minor impaired hearing, denies dysphagia. History of vertigo.   RESPIRATORY: Denies shortness of breath, cough or wheezing.  CARDIOVASCULAR: Denies chest pain, palpitations.  GI: Denies hematochezia.  : Denies hematuria, dysuria or frequency.  MS: Normal gait. Denies difficulty with mobility, muscle or joint pain.   SKIN: Denies rashes and lesions.  NEURO: Occasional numbness, tingling in feet.   PSYCH: Denies depression or anxiety. No suicidal ideations.  ENDO: See HPI.    STANDARDS OF CARE:   Eye exam: [facility], Last exam Spring 2017.    Dental exam: Has regular exams; denies gums bleeding.  Podiatry exam: None.  SOCIAL HISTORY: . Lives with spouse. Has 2 children. Patient retired as manager for [employer].     ACTIVITY LEVEL: Stays active in the garden and yard. " Occasionally walks.  MEAL PLANNING: Number of meals per day - 3. Number of snacks per day - occasionally.  Breakfast can be cereal with lactose free milk or 2 eggs, toast or eggs grits and jaffe.  Mid morning snack can be chips or cookies.  Lunch can be sandwich with price, fruit.  Dinner is usually red beans, rice, sausage, broccolli salad.  Can then have a lite beer.  He drinks mostly water.      BLOOD GLUCOSE TESTING: Self-monitoring with ONE TOUCH VERIO meter    PHYSICAL EXAMINATION:  GENERAL: WDWN  male in no acute distress, responds appropriately.  AAO X 3.   NECK: Supple, no thyromegaly, no cervical or supraclavicular lymphadenopathy, no carotid bruit.  HEART: Regular rate and rhythm. No rubs, murmurs or gallops.   LUNGS: CTA bilaterally. Unlabored breathing, no use of accessory muscles.  MUSCULOSKELETAL: Normal gait and muscle strength.  ABDOMEN: Active bowel sounds X 4, no masses or tenderness.   SKIN: Warm, dry skin. No lesions or abrasions.  NEUROLOGIC: Cranial nerves II-XII grossly intact.   EXTREMITIES: Trace edema.  FOOT EXAMINATION: Protective Sensation (w/ 10 gram monofilament):  Right: Intact Left: Intact //  Visual Inspection:  Normal -  Bilateral, except dry skin. //  Pedal Pulses:  Right: Present  Left: Present    ASSESSMENT:  1. Diabetes Type 2, ED, s / p renal and cardiac transplant - fair control on Humulin 70 / 30, A1C 7.3   2. Hypertension - good control on diltiazem, Lisinopril, Lasix.  3. Hyperlipidemia - good control on Lipitor  4. Morbid Obesity - BMI 40.72    PLAN:  1.) Patient was instructed to monitor blood glucose 2 - 3 x daily, fasting and ac dinner or at bedtime.  Discussed ADA goal for fasting blood sugar, 80 - 130 mg/dL; pp blood sugars below 180 mg/dl. Also, discussed prevention of hypoglycemia and the need to adjust goals to higher levels if persistent hypoglycemia.  Reminded him to bring records or meter to each visit for review.   2.) Reviewed pathophysiology  of diabetes, complications related to the disease, importance of annual dilated eye exam and daily foot examination.  3.) We discussed the ADA recommendations: Hemoglobin A1c below 7.0 %. All patients with diabetes should be on statins unless contraindicated.  ACE or ARB therapy if not contraindicated.    4.) I discussed possibly switching to MDI with basal and bolus insulin, but patient is reluctant to do this due to multiple injections.  We are going to continue Humulin 70 / 30, but instead will do a 60 % dosing in the am, so 75 units before breakfast and and 40 % dosing in the evening, 50 units before dinner.  Hopefully, this will allow for more insulin throughout the day to cover breakfast and lunch.    5.) Meal planning teaching: Carbohydrate definition - one serving is 15 gms. Carbohydrate spacing - carbohydrates should be spaced into approximately 3 meals with 2 snacks ( of one carbohydrate ) between meals or at bedtime. Increase vegetable intake to 2 or more cups of vegetables per day as well as 2 fruit servings. Recommended low saturated fat, low sodium diet to aid in control of hypertension and cholesterol.  6.) Discussed activity, benefits, methods, and precautions. Recommended patient start some form of exercise and increase as tolerated to 30 minutes per day to facilitate weight loss and aid in control of BGs.  7.) Return to clinic in 3 months for follow up.  Pending A1C results today.  Advised patient to call clinic with any questions or concerns.     Jael Garrison, NP-C, CDE

## 2017-09-12 ENCOUNTER — TELEPHONE (OUTPATIENT)
Dept: DIABETES | Facility: CLINIC | Age: 67
End: 2017-09-12

## 2017-09-12 DIAGNOSIS — E66.01 MORBID OBESITY WITH BMI OF 40.0-44.9, ADULT: ICD-10-CM

## 2017-09-12 DIAGNOSIS — E78.5 HYPERLIPIDEMIA, UNSPECIFIED HYPERLIPIDEMIA TYPE: ICD-10-CM

## 2017-09-12 DIAGNOSIS — E11.22 TYPE 2 DIABETES MELLITUS WITH STAGE 3 CHRONIC KIDNEY DISEASE, WITH LONG-TERM CURRENT USE OF INSULIN: Primary | ICD-10-CM

## 2017-09-12 DIAGNOSIS — Z79.4 TYPE 2 DIABETES MELLITUS WITH STAGE 3 CHRONIC KIDNEY DISEASE, WITH LONG-TERM CURRENT USE OF INSULIN: Primary | ICD-10-CM

## 2017-09-12 DIAGNOSIS — I10 ESSENTIAL HYPERTENSION: ICD-10-CM

## 2017-09-12 DIAGNOSIS — N18.30 TYPE 2 DIABETES MELLITUS WITH STAGE 3 CHRONIC KIDNEY DISEASE, WITH LONG-TERM CURRENT USE OF INSULIN: Primary | ICD-10-CM

## 2017-09-12 LAB
CLASS I ANTIBODY COMMENTS - LUMINEX: NORMAL
CLASS II ANTIBODY COMMENTS - LUMINEX: NORMAL
DSA1 TESTING DATE: NORMAL
DSA12 TESTING DATE: NORMAL
DSA2 TESTING DATE: NORMAL
ESTIMATED AVG GLUCOSE: 177 MG/DL
HBA1C MFR BLD HPLC: 7.8 %
SERUM COLLECTION DT - LUMINEX CLASS I: NORMAL
SERUM COLLECTION DT - LUMINEX CLASS II: NORMAL
SIROLIMUS BLD-MCNC: 6.3 NG/ML

## 2017-09-12 NOTE — TELEPHONE ENCOUNTER
I spoke with patient via telephone and discussed recent A1C results of 7.8 %.  He started taking the adjusted insulin dose today and plans to call with readings next week so we can evaluate whether the changes helped.

## 2017-09-18 LAB
C1Q1 TESTING DATE: NORMAL
C1Q1 TESTING DATE: NORMAL
C1Q2 TESTING DATE: NORMAL
CLASS I ANTIBODY COMMENTS - LUMINEX: NORMAL
CLASS II ANTIBODY COMMENTS - LUMINEX: NORMAL
SERUM COLLECTION DT - LUMINEX CLASS I: NORMAL
SERUM COLLECTION DT - LUMINEX CLASS II: NORMAL

## 2017-09-19 ENCOUNTER — PATIENT MESSAGE (OUTPATIENT)
Dept: TRANSPLANT | Facility: CLINIC | Age: 67
End: 2017-09-19

## 2017-11-01 ENCOUNTER — PATIENT OUTREACH (OUTPATIENT)
Dept: ADMINISTRATIVE | Facility: HOSPITAL | Age: 67
End: 2017-11-01

## 2017-11-01 NOTE — PROGRESS NOTES
I spoke with pt that stated he will book his yearly f/u online sometime next week. Pt did confirm he still see's Dr Perry for his Diabetic Eye Exams. Pt stated it was ok to obtain that record for Dr Zimmerman. Request sent to Dr Perry's office. Pt did verbalized understanding of needed appt.

## 2017-11-01 NOTE — LETTER
November 1, 2017    Dr Júnior Perry             [facility]  [address]  [facility] [address]  Phone: [phone] November 1, 2017     Patient: Nigel Albrecht    YOB: 1950   Date of Visit: 11/1/2017     To whom it may concern     We are seeing Nigel Albrecht, YOB: 1950, at [facility]. MCKENNA MOSER MD is their primary care physician. To help with our paloma maintenance records could you please send the following:     Most recent copy of Diabetic Eye Exam that states   Positive or Negative Retinopathy.      Please send fax to [phone], Attention Deidre WESTON LPN Care Coordination.    Thank-you in advance for your assistance. If you have any questions or concerns, please don't hesitate to contact me at [phone].     Deidre DAVIS LPN  Care Coordination Department  [facility]

## 2017-11-27 ENCOUNTER — TELEPHONE (OUTPATIENT)
Dept: FAMILY MEDICINE | Facility: CLINIC | Age: 67
End: 2017-11-27

## 2017-11-27 ENCOUNTER — HOSPITAL ENCOUNTER (OUTPATIENT)
Dept: RADIOLOGY | Facility: HOSPITAL | Age: 67
Discharge: HOME | End: 2017-11-27
Attending: FAMILY MEDICINE
Payer: MEDICARE

## 2017-11-27 ENCOUNTER — OFFICE VISIT (OUTPATIENT)
Dept: FAMILY MEDICINE | Facility: CLINIC | Age: 67
End: 2017-11-27
Payer: MEDICARE

## 2017-11-27 VITALS
SYSTOLIC BLOOD PRESSURE: 140 MMHG | WEIGHT: 314.06 LBS | DIASTOLIC BLOOD PRESSURE: 80 MMHG | BODY MASS INDEX: 40.31 KG/M2 | TEMPERATURE: 98 F | HEIGHT: 74 IN | HEART RATE: 68 BPM | OXYGEN SATURATION: 94 %

## 2017-11-27 DIAGNOSIS — R06.02 SOB (SHORTNESS OF BREATH): ICD-10-CM

## 2017-11-27 DIAGNOSIS — I50.33 ACUTE ON CHRONIC DIASTOLIC CONGESTIVE HEART FAILURE: ICD-10-CM

## 2017-11-27 DIAGNOSIS — R06.02 SOB (SHORTNESS OF BREATH): Primary | ICD-10-CM

## 2017-11-27 LAB
CTP QC/QA: YES
FLUAV AG NPH QL: NEGATIVE
FLUBV AG NPH QL: NEGATIVE

## 2017-11-27 PROCEDURE — 99214 OFFICE O/P EST MOD 30 MIN: CPT | Mod: S$PBB,,, | Performed by: FAMILY MEDICINE

## 2017-11-27 PROCEDURE — 71020 XR CHEST PA AND LATERAL: CPT | Mod: TC,PO

## 2017-11-27 PROCEDURE — 99213 OFFICE O/P EST LOW 20 MIN: CPT | Mod: PBBFAC,25,PO | Performed by: FAMILY MEDICINE

## 2017-11-27 PROCEDURE — 99999 PR PBB SHADOW E&M-EST. PATIENT-LVL III: CPT | Mod: PBBFAC,,, | Performed by: FAMILY MEDICINE

## 2017-11-27 PROCEDURE — 71020 XR CHEST PA AND LATERAL: CPT | Mod: 26,,, | Performed by: RADIOLOGY

## 2017-11-27 PROCEDURE — 87804 INFLUENZA ASSAY W/OPTIC: CPT | Mod: PBBFAC,PO | Performed by: FAMILY MEDICINE

## 2017-11-27 RX ORDER — DOXYCYCLINE 100 MG/1
100 CAPSULE ORAL 2 TIMES DAILY
Qty: 20 CAPSULE | Refills: 0 | Status: SHIPPED | OUTPATIENT
Start: 2017-11-27 | End: 2018-05-16

## 2017-11-27 RX ORDER — ALBUTEROL SULFATE 90 UG/1
2 AEROSOL, METERED RESPIRATORY (INHALATION) EVERY 6 HOURS PRN
Qty: 1 INHALER | Refills: 0 | Status: SHIPPED | OUTPATIENT
Start: 2017-11-27 | End: 2019-01-09

## 2017-11-27 NOTE — TELEPHONE ENCOUNTER
----- Message from Torie Hodge sent at 11/27/2017  3:54 PM CST -----  called rg status of albuterol 90mg & doxycyline 100mg...[phone] (waiting at phar)

## 2017-11-27 NOTE — PROGRESS NOTES
Subjective:       Patient ID: Nigel Albrecht is a 67 y.o. male.    Chief Complaint: Cough and Chest Congestion    67 y old male on chronic  immunosuppressive agents , former  Heavy smoker now with  Chest congestion and sob for 1 w .  No fever ,  + flu vaccines . No Sick contacts .  Stopped smoking over 30 y .ago . Taking robitussin and mucinex       Cough   This is a recurrent problem. The current episode started 1 to 4 weeks ago. The problem has been waxing and waning. The problem occurs constantly. The cough is productive of sputum, productive of brown sputum and productive of purulent sputum. Associated symptoms include nasal congestion, shortness of breath and wheezing. Pertinent negatives include no chest pain, chills, ear congestion, ear pain, fever, headaches, heartburn, hemoptysis, rhinorrhea, sore throat, sweats or weight loss. Nothing aggravates the symptoms. He has tried OTC cough suppressant for the symptoms. The treatment provided mild relief. His past medical history is significant for environmental allergies. There is no history of asthma, bronchiectasis, emphysema or pneumonia.     Review of Systems   Constitutional: Negative.  Negative for chills, fever and weight loss.   HENT: Negative.  Negative for ear pain, rhinorrhea and sore throat.    Respiratory: Positive for cough, shortness of breath and wheezing. Negative for hemoptysis.    Cardiovascular: Negative.  Negative for chest pain.   Gastrointestinal: Negative.  Negative for heartburn.   Genitourinary: Negative.    Musculoskeletal: Negative.    Allergic/Immunologic: Positive for environmental allergies.   Neurological: Negative for headaches.   Hematological: Negative.    Psychiatric/Behavioral: Negative.        Objective:      Physical Exam   Constitutional: He is oriented to person, place, and time. He appears well-developed and well-nourished. No distress.   HENT:   Head: Normocephalic and atraumatic.   Right Ear: External ear normal.    Left Ear: External ear normal.   Nose: Nose normal.   Mouth/Throat: No oropharyngeal exudate.   Eyes: Conjunctivae and EOM are normal. Pupils are equal, round, and reactive to light. Right eye exhibits no discharge. Left eye exhibits no discharge. No scleral icterus.   Neck: Normal range of motion. Neck supple. No JVD present. No tracheal deviation present. No thyromegaly present.   Cardiovascular: Normal rate, regular rhythm, normal heart sounds and intact distal pulses.  Exam reveals no gallop and no friction rub.    No murmur heard.  Pulmonary/Chest: Effort normal. No stridor. No respiratory distress. He has decreased breath sounds in the right upper field, the right middle field, the right lower field, the left upper field, the left middle field and the left lower field. He has no wheezes. He has rhonchi in the right upper field, the right middle field, the right lower field, the left upper field, the left middle field and the left lower field. He has no rales. He exhibits no tenderness.   Abdominal: Soft. Bowel sounds are normal. He exhibits no distension. There is no tenderness. There is no rebound and no guarding.   Musculoskeletal: Normal range of motion. He exhibits no edema or tenderness.   Lymphadenopathy:     He has no cervical adenopathy.   Neurological: He is alert and oriented to person, place, and time. He has normal reflexes. He displays normal reflexes. No cranial nerve deficit. He exhibits normal muscle tone. Coordination normal.   Skin: Skin is warm and dry. No rash noted. He is not diaphoretic. No erythema. No pallor.   Psychiatric: He has a normal mood and affect. His behavior is normal. Judgment and thought content normal.       Assessment:      Nigel was seen today for cough and chest congestion.    Diagnoses and all orders for this visit:    SOB (shortness of breath)  -     X-Ray Chest PA And Lateral; Future  -     POCT Influenza A/B  -     Brain natriuretic peptide; Future  -     CBC auto  differential; Future  -     Comprehensive metabolic panel; Future    Acute on chronic diastolic congestive heart failure    Other orders  -     doxycycline (MONODOX) 100 MG capsule; Take 1 capsule (100 mg total) by mouth 2 (two) times daily.  -     albuterol 90 mcg/actuation inhaler; Inhale 2 puffs into the lungs every 6 (six) hours as needed for Wheezing. Rescue      Plan:   Increase torsemide to 20 mg  Qd  WS discussed. F.u in 2 days     cxr : Findings suggestive of possible pulmonary venous congestion versus early failure.  See above

## 2017-11-29 ENCOUNTER — OFFICE VISIT (OUTPATIENT)
Dept: FAMILY MEDICINE | Facility: CLINIC | Age: 67
End: 2017-11-29
Payer: MEDICARE

## 2017-11-29 ENCOUNTER — LAB VISIT (OUTPATIENT)
Dept: LAB | Facility: HOSPITAL | Age: 67
End: 2017-11-29
Attending: FAMILY MEDICINE
Payer: MEDICARE

## 2017-11-29 VITALS
SYSTOLIC BLOOD PRESSURE: 136 MMHG | OXYGEN SATURATION: 94 % | BODY MASS INDEX: 39.6 KG/M2 | HEIGHT: 74 IN | WEIGHT: 308.56 LBS | TEMPERATURE: 98 F | DIASTOLIC BLOOD PRESSURE: 82 MMHG | HEART RATE: 70 BPM

## 2017-11-29 DIAGNOSIS — I51.89 RIGHT VENTRICULAR SYSTOLIC DYSFUNCTION: Primary | ICD-10-CM

## 2017-11-29 DIAGNOSIS — I51.89 RIGHT VENTRICULAR SYSTOLIC DYSFUNCTION: ICD-10-CM

## 2017-11-29 LAB
ALBUMIN SERPL BCP-MCNC: 2.7 G/DL
ALP SERPL-CCNC: 123 U/L
ALT SERPL W/O P-5'-P-CCNC: 20 U/L
ANION GAP SERPL CALC-SCNC: 10 MMOL/L
AST SERPL-CCNC: 33 U/L
BILIRUB SERPL-MCNC: 0.6 MG/DL
BNP SERPL-MCNC: 134 PG/ML
BUN SERPL-MCNC: 15 MG/DL
CALCIUM SERPL-MCNC: 8.6 MG/DL
CHLORIDE SERPL-SCNC: 104 MMOL/L
CO2 SERPL-SCNC: 26 MMOL/L
CREAT SERPL-MCNC: 2 MG/DL
EST. GFR  (AFRICAN AMERICAN): 38.8 ML/MIN/1.73 M^2
EST. GFR  (NON AFRICAN AMERICAN): 33.5 ML/MIN/1.73 M^2
GLUCOSE SERPL-MCNC: 220 MG/DL
POTASSIUM SERPL-SCNC: 4.1 MMOL/L
PROT SERPL-MCNC: 7.4 G/DL
SODIUM SERPL-SCNC: 140 MMOL/L

## 2017-11-29 PROCEDURE — 99213 OFFICE O/P EST LOW 20 MIN: CPT | Mod: PBBFAC,PO | Performed by: FAMILY MEDICINE

## 2017-11-29 PROCEDURE — 83880 ASSAY OF NATRIURETIC PEPTIDE: CPT

## 2017-11-29 PROCEDURE — 99999 PR PBB SHADOW E&M-EST. PATIENT-LVL III: CPT | Mod: PBBFAC,,, | Performed by: FAMILY MEDICINE

## 2017-11-29 PROCEDURE — 99213 OFFICE O/P EST LOW 20 MIN: CPT | Mod: S$PBB,,, | Performed by: FAMILY MEDICINE

## 2017-11-29 PROCEDURE — 36415 COLL VENOUS BLD VENIPUNCTURE: CPT | Mod: PO

## 2017-11-29 PROCEDURE — 80053 COMPREHEN METABOLIC PANEL: CPT

## 2017-11-29 NOTE — PROGRESS NOTES
Subjective:       Patient ID: Nigel Albrecht is a 67 y.o. male.    Chief Complaint: Follow-up    67 y old male with   Systolic CHF here  For f.u on exacerbation . Feels much better , less congested . Down 8 lbs since increasing  torsemide dose.       Review of Systems   Constitutional: Negative.    HENT: Negative.    Respiratory: Positive for cough.    Cardiovascular: Negative.    Gastrointestinal: Negative.    Genitourinary: Negative.    Musculoskeletal: Negative.        Objective:      Physical Exam   Constitutional: He is oriented to person, place, and time. No distress.   HENT:   Head: Normocephalic and atraumatic.   Right Ear: External ear normal.   Left Ear: External ear normal.   Nose: Nose normal.   Mouth/Throat: No oropharyngeal exudate.   Eyes: Conjunctivae and EOM are normal. Pupils are equal, round, and reactive to light. Right eye exhibits no discharge. Left eye exhibits no discharge. No scleral icterus.   Neck: Normal range of motion. Neck supple. No JVD present. No tracheal deviation present. No thyromegaly present.   Cardiovascular: Normal rate, regular rhythm, normal heart sounds and intact distal pulses.  Exam reveals no gallop and no friction rub.    No murmur heard.  Pulmonary/Chest: Effort normal. No stridor. No respiratory distress. He has no wheezes. He has rales in the right middle field and the left middle field. He exhibits no tenderness.   Abdominal: Soft. Bowel sounds are normal. He exhibits no distension. There is no tenderness. There is no rebound and no guarding.   Musculoskeletal: Normal range of motion.        Right lower leg: He exhibits edema.        Left lower leg: He exhibits edema. He exhibits no tenderness.   Lymphadenopathy:     He has no cervical adenopathy.   Neurological: He is alert and oriented to person, place, and time. He has normal reflexes. He displays normal reflexes. No cranial nerve deficit. He exhibits normal muscle tone. Coordination normal.   Skin: Skin is  warm and dry. No rash noted. He is not diaphoretic. No erythema. No pallor.   Psychiatric: He has a normal mood and affect. His behavior is normal. Judgment and thought content normal.       Assessment:       1. Right ventricular systolic dysfunction        Plan:     Nigel was seen today for follow-up.    Diagnoses and all orders for this visit:    Right ventricular systolic dysfunction  -     Brain natriuretic peptide; Future  -     Comprehensive metabolic panel; Future     Clinically improved  . Get labs . Prn f.u

## 2017-11-30 ENCOUNTER — TELEPHONE (OUTPATIENT)
Dept: FAMILY MEDICINE | Facility: CLINIC | Age: 67
End: 2017-11-30

## 2017-11-30 DIAGNOSIS — N17.9 AKI (ACUTE KIDNEY INJURY): Primary | ICD-10-CM

## 2018-01-25 ENCOUNTER — TELEPHONE (OUTPATIENT)
Dept: TRANSPLANT | Facility: CLINIC | Age: 68
End: 2018-01-25

## 2018-01-25 NOTE — TELEPHONE ENCOUNTER
Pt called to ask if he could change his labs to [place] from [facility]. Labs were changed in his appts.

## 2018-01-30 ENCOUNTER — LAB VISIT (OUTPATIENT)
Dept: LAB | Facility: HOSPITAL | Age: 68
End: 2018-01-30
Attending: INTERNAL MEDICINE
Payer: MEDICARE

## 2018-01-30 DIAGNOSIS — E78.2 MIXED HYPERLIPIDEMIA: ICD-10-CM

## 2018-01-30 DIAGNOSIS — Z94.1 STATUS POST HEART TRANSPLANT: ICD-10-CM

## 2018-01-30 LAB
ALBUMIN SERPL BCP-MCNC: 2.9 G/DL
ALP SERPL-CCNC: 118 U/L
ALT SERPL W/O P-5'-P-CCNC: 24 U/L
ANION GAP SERPL CALC-SCNC: 7 MMOL/L
AST SERPL-CCNC: 34 U/L
BASOPHILS # BLD AUTO: 0.02 K/UL
BASOPHILS NFR BLD: 0.4 %
BILIRUB SERPL-MCNC: 0.4 MG/DL
BUN SERPL-MCNC: 18 MG/DL
CALCIUM SERPL-MCNC: 8.9 MG/DL
CHLORIDE SERPL-SCNC: 107 MMOL/L
CHOLEST SERPL-MCNC: 153 MG/DL
CHOLEST/HDLC SERPL: 4.4 {RATIO}
CO2 SERPL-SCNC: 28 MMOL/L
CREAT SERPL-MCNC: 1.8 MG/DL
DIFFERENTIAL METHOD: ABNORMAL
EOSINOPHIL # BLD AUTO: 0.1 K/UL
EOSINOPHIL NFR BLD: 2.2 %
ERYTHROCYTE [DISTWIDTH] IN BLOOD BY AUTOMATED COUNT: 15.4 %
EST. GFR  (AFRICAN AMERICAN): 44 ML/MIN/1.73 M^2
EST. GFR  (NON AFRICAN AMERICAN): 38.1 ML/MIN/1.73 M^2
GLUCOSE SERPL-MCNC: 133 MG/DL
HCT VFR BLD AUTO: 37.7 %
HDLC SERPL-MCNC: 35 MG/DL
HDLC SERPL: 22.9 %
HGB BLD-MCNC: 11.8 G/DL
IMM GRANULOCYTES # BLD AUTO: 0.01 K/UL
IMM GRANULOCYTES NFR BLD AUTO: 0.2 %
LDLC SERPL CALC-MCNC: 87.2 MG/DL
LYMPHOCYTES # BLD AUTO: 1.8 K/UL
LYMPHOCYTES NFR BLD: 39.4 %
MAGNESIUM SERPL-MCNC: 1.8 MG/DL
MCH RBC QN AUTO: 25.8 PG
MCHC RBC AUTO-ENTMCNC: 31.3 G/DL
MCV RBC AUTO: 82 FL
MONOCYTES # BLD AUTO: 0.4 K/UL
MONOCYTES NFR BLD: 8.2 %
NEUTROPHILS # BLD AUTO: 2.2 K/UL
NEUTROPHILS NFR BLD: 49.6 %
NONHDLC SERPL-MCNC: 118 MG/DL
NRBC BLD-RTO: 0 /100 WBC
PLATELET # BLD AUTO: 195 K/UL
PMV BLD AUTO: 11.2 FL
POTASSIUM SERPL-SCNC: 4.1 MMOL/L
PROT SERPL-MCNC: 7.1 G/DL
RBC # BLD AUTO: 4.58 M/UL
SODIUM SERPL-SCNC: 142 MMOL/L
TRIGL SERPL-MCNC: 154 MG/DL
WBC # BLD AUTO: 4.49 K/UL

## 2018-01-30 PROCEDURE — 85025 COMPLETE CBC W/AUTO DIFF WBC: CPT

## 2018-01-30 PROCEDURE — 86977 RBC SERUM PRETX INCUBJ/INHIB: CPT | Mod: 91

## 2018-01-30 PROCEDURE — 80061 LIPID PANEL: CPT

## 2018-01-30 PROCEDURE — 36415 COLL VENOUS BLD VENIPUNCTURE: CPT | Mod: PO

## 2018-01-30 PROCEDURE — 86833 HLA CLASS II HIGH DEFIN QUAL: CPT | Mod: 91

## 2018-01-30 PROCEDURE — 83735 ASSAY OF MAGNESIUM: CPT

## 2018-01-30 PROCEDURE — 83880 ASSAY OF NATRIURETIC PEPTIDE: CPT

## 2018-01-30 PROCEDURE — 80053 COMPREHEN METABOLIC PANEL: CPT

## 2018-01-30 PROCEDURE — 80195 ASSAY OF SIROLIMUS: CPT

## 2018-01-30 PROCEDURE — 86832 HLA CLASS I HIGH DEFIN QUAL: CPT | Mod: 91

## 2018-01-30 PROCEDURE — 86832 HLA CLASS I HIGH DEFIN QUAL: CPT

## 2018-01-31 LAB
BNP SERPL-MCNC: 196 PG/ML
SIROLIMUS BLD-MCNC: 8 NG/ML

## 2018-02-01 LAB
C1Q1 TESTING DATE: NORMAL
C1Q1 TESTING DATE: NORMAL
C1Q2 TESTING DATE: NORMAL
CLASS I ANTIBODY COMMENTS - LUMINEX: NORMAL
CLASS I ANTIBODY COMMENTS - LUMINEX: NORMAL
CLASS II ANTIBODIES - LUMINEX: NORMAL
CLASS II ANTIBODY COMMENTS - LUMINEX: NORMAL
CLASS II ANTIBODY COMMENTS - LUMINEX: NORMAL
DSA1 TESTING DATE: NORMAL
DSA12 TESTING DATE: NORMAL
DSA2 TESTING DATE: NORMAL
SERUM COLLECTION DT - LUMINEX CLASS I: NORMAL
SERUM COLLECTION DT - LUMINEX CLASS I: NORMAL
SERUM COLLECTION DT - LUMINEX CLASS II: NORMAL
SERUM COLLECTION DT - LUMINEX CLASS II: NORMAL

## 2018-02-02 ENCOUNTER — PATIENT MESSAGE (OUTPATIENT)
Dept: FAMILY MEDICINE | Facility: CLINIC | Age: 68
End: 2018-02-02

## 2018-02-02 DIAGNOSIS — I10 ESSENTIAL HYPERTENSION: ICD-10-CM

## 2018-02-03 RX ORDER — MYCOPHENOLATE MOFETIL 250 MG/1
CAPSULE ORAL
Qty: 540 CAPSULE | Refills: 3 | Status: SHIPPED | OUTPATIENT
Start: 2018-02-03 | End: 2018-02-26 | Stop reason: SDUPTHER

## 2018-02-03 RX ORDER — DILTIAZEM HYDROCHLORIDE 180 MG/1
CAPSULE, EXTENDED RELEASE ORAL
Qty: 90 CAPSULE | Refills: 3 | Status: SHIPPED | OUTPATIENT
Start: 2018-02-03 | End: 2018-02-26 | Stop reason: SDUPTHER

## 2018-02-05 DIAGNOSIS — Z94.1 STATUS POST HEART TRANSPLANT: Primary | ICD-10-CM

## 2018-02-05 RX ORDER — SIROLIMUS 1 MG/1
1 TABLET, FILM COATED ORAL DAILY
Qty: 90 TABLET | Refills: 3 | Status: SHIPPED | OUTPATIENT
Start: 2018-02-05 | End: 2019-02-25 | Stop reason: SDUPTHER

## 2018-02-05 RX ORDER — TORSEMIDE 5 MG/1
10 TABLET ORAL DAILY
Qty: 180 TABLET | Refills: 3 | Status: SHIPPED | OUTPATIENT
Start: 2018-02-05 | End: 2019-02-19 | Stop reason: SDUPTHER

## 2018-02-05 RX ORDER — SIROLIMUS 1 MG/1
1 TABLET, FILM COATED ORAL DAILY
OUTPATIENT
Start: 2018-02-05

## 2018-02-05 RX ORDER — LISINOPRIL 40 MG/1
TABLET ORAL
Qty: 90 TABLET | Refills: 1 | Status: SHIPPED | OUTPATIENT
Start: 2018-02-05 | End: 2018-09-05 | Stop reason: SDUPTHER

## 2018-02-05 NOTE — TELEPHONE ENCOUNTER
Spoke with patient and informed him that refill for demadex has been sent to pharmacy, however, he would need to contact his physician that prescribed the sirolimus in order to get a refill. Pt verbalized understanding.

## 2018-02-20 DIAGNOSIS — E08.9 DIABETES MELLITUS DUE TO UNDERLYING CONDITION WITHOUT COMPLICATION, WITH LONG-TERM CURRENT USE OF INSULIN: ICD-10-CM

## 2018-02-20 DIAGNOSIS — Z79.4 DIABETES MELLITUS DUE TO UNDERLYING CONDITION WITHOUT COMPLICATION, WITH LONG-TERM CURRENT USE OF INSULIN: ICD-10-CM

## 2018-02-20 RX ORDER — BLOOD-GLUCOSE METER
EACH MISCELLANEOUS
Qty: 300 STRIP | Refills: 3 | Status: SHIPPED | OUTPATIENT
Start: 2018-02-20 | End: 2019-03-18 | Stop reason: SDUPTHER

## 2018-02-26 ENCOUNTER — PATIENT MESSAGE (OUTPATIENT)
Dept: DIABETES | Facility: CLINIC | Age: 68
End: 2018-02-26

## 2018-02-26 DIAGNOSIS — Z94.1 STATUS POST HEART TRANSPLANT: Primary | ICD-10-CM

## 2018-02-26 DIAGNOSIS — I10 ESSENTIAL HYPERTENSION: ICD-10-CM

## 2018-02-26 RX ORDER — DILTIAZEM HYDROCHLORIDE 180 MG/1
180 CAPSULE, EXTENDED RELEASE ORAL DAILY
Qty: 90 CAPSULE | Refills: 3 | Status: SHIPPED | OUTPATIENT
Start: 2018-02-26 | End: 2018-10-30 | Stop reason: DRUGHIGH

## 2018-02-26 RX ORDER — MYCOPHENOLATE MOFETIL 250 MG/1
CAPSULE ORAL
Qty: 540 CAPSULE | Refills: 3 | Status: SHIPPED | OUTPATIENT
Start: 2018-02-26 | End: 2018-12-31 | Stop reason: SDUPTHER

## 2018-03-05 ENCOUNTER — OFFICE VISIT (OUTPATIENT)
Dept: DIABETES | Facility: CLINIC | Age: 68
End: 2018-03-05
Payer: MEDICARE

## 2018-03-05 VITALS
SYSTOLIC BLOOD PRESSURE: 144 MMHG | DIASTOLIC BLOOD PRESSURE: 88 MMHG | WEIGHT: 315 LBS | HEIGHT: 74 IN | BODY MASS INDEX: 40.43 KG/M2

## 2018-03-05 DIAGNOSIS — Z79.4 TYPE 2 DIABETES MELLITUS WITH STAGE 3 CHRONIC KIDNEY DISEASE, WITH LONG-TERM CURRENT USE OF INSULIN: Primary | ICD-10-CM

## 2018-03-05 DIAGNOSIS — N18.30 TYPE 2 DIABETES MELLITUS WITH STAGE 3 CHRONIC KIDNEY DISEASE, WITH LONG-TERM CURRENT USE OF INSULIN: Primary | ICD-10-CM

## 2018-03-05 DIAGNOSIS — I10 ESSENTIAL HYPERTENSION: ICD-10-CM

## 2018-03-05 DIAGNOSIS — E78.5 HYPERLIPIDEMIA, UNSPECIFIED HYPERLIPIDEMIA TYPE: ICD-10-CM

## 2018-03-05 DIAGNOSIS — E11.22 TYPE 2 DIABETES MELLITUS WITH STAGE 3 CHRONIC KIDNEY DISEASE, WITH LONG-TERM CURRENT USE OF INSULIN: Primary | ICD-10-CM

## 2018-03-05 DIAGNOSIS — E66.01 MORBID OBESITY WITH BMI OF 40.0-44.9, ADULT: ICD-10-CM

## 2018-03-05 PROCEDURE — 99214 OFFICE O/P EST MOD 30 MIN: CPT | Mod: S$PBB,,, | Performed by: NURSE PRACTITIONER

## 2018-03-05 PROCEDURE — 99214 OFFICE O/P EST MOD 30 MIN: CPT | Mod: PBBFAC,PO | Performed by: NURSE PRACTITIONER

## 2018-03-05 PROCEDURE — 99999 PR PBB SHADOW E&M-EST. PATIENT-LVL IV: CPT | Mod: PBBFAC,,, | Performed by: NURSE PRACTITIONER

## 2018-03-05 NOTE — PROGRESS NOTES
"PCP: Dr. Stanley Chanel    HISTORY OF PRESENT ILLNESS: 67 year old  male patient is in clinic today for uncontrolled diabetes.   He has had diabetes for many years and has attended diabetes education in the past.  Patient currently takes Humulin 70 / 30 for diabetes.  He had a cardiac and renal transplant in 2002, on immunosuppressant drugs.  Most recent A1C was 7.8, ADA recommends less than 7.0.   He has gained 5 pounds since last visit.  Per recall, fasting BG are 180 - 230; hs > 250 s.    Patient denies polyuria, polydipsia, polyphagia, or blurred vision.   Also denies nausea, vomiting, has occasional diarrhea.  Has rare hypoglycemic symptoms, which he treats with OJ.  He denies any recent hospitalizations or emergency room visits.     Height: 6 ' 2 "  Weight: 322 pounds, BMI 41.44  Blood Glucose reading this AM: 130 mg/dl fasting  Blood Glucose reading in clinic: 204  mg/dl at 12:30 pm    DIABETES MEDICATIONS:   Humulin 70 / 30, 55 - 60 units breakfast and 60 - 70 units before dinner     LABS REVIEWED.    REVIEW OF SYSTEMS:  GENERAL: Denies fever, chills, change in appetite.  HEENT: Has minor impaired hearing, denies dysphagia. History of vertigo.   RESPIRATORY: Denies shortness of breath, cough or wheezing.  CARDIOVASCULAR: Denies chest pain, palpitations.  GI: Denies hematochezia.  : Denies hematuria, dysuria or frequency.  MS: Normal gait. Denies difficulty with mobility, muscle or joint pain.   SKIN: Denies rashes and lesions.  NEURO: Occasional numbness, tingling in feet.   PSYCH: Denies depression or anxiety. No suicidal ideations.  ENDO: See HPI.    STANDARDS OF CARE:   Eye exam: [facility], Last exam Spring 2017    Dental exam: Has regular exams; denies gums bleeding.  Podiatry exam: None.  SOCIAL HISTORY: . Lives with spouse. Has 2 children. Patient retired as manager for [employer].     ACTIVITY LEVEL: Stays active in the " Heme/Onc garden and yard. Occasionally walks.  MEAL PLANNING: Number of meals per day - 3. Number of snacks per day - occasionally.  Breakfast can be cereal with lactose free milk or 2 eggs, toast or eggs grits and jaffe.  Mid morning snack can be chips or cookies.  Lunch can be sandwich with price, fruit.  Dinner is usually red beans, rice, sausage, broccolli salad.  Can then have a lite beer.  He drinks mostly water.      BLOOD GLUCOSE TESTING: Self-monitoring with ONE TOUCH VERIO meter    PHYSICAL EXAMINATION:  GENERAL: WDWN  male in no acute distress, responds appropriately.  AAO X 3.   NECK: Supple, no thyromegaly, no cervical or supraclavicular lymphadenopathy, no carotid bruit.  HEART: Regular rate and rhythm. No rubs, murmurs or gallops.   LUNGS: CTA bilaterally. Unlabored breathing, no use of accessory muscles.  MUSCULOSKELETAL: Normal gait and muscle strength.  ABDOMEN: Active bowel sounds X 4, no masses or tenderness.   SKIN: Warm, dry skin. No lesions or abrasions.  NEUROLOGIC: Cranial nerves II-XII grossly intact.   EXTREMITIES: Trace edema.  FOOT EXAMINATION: Protective Sensation (w/ 10 gram monofilament):  Right: Intact Left: Intact //  Visual Inspection:  Normal -  Bilateral, except dry skin. +1 pitting edema to ankles, lower extremities. //  Pedal Pulses:  Right: Present  Left: Present    ASSESSMENT:  1. 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 Hypertension - good control on diltiazem, Lisinopril, Lasix.  3. Hyperlipidemia - good control on Lipitor  4. Morbid Obesity - BMI 41.44    PLAN:  1.) Patient was instructed to monitor blood glucose 2 - 3 x daily, fasting and ac dinner or at bedtime.  Discussed ADA goal for fasting blood sugar, 80 - 130 mg/dL; pp blood sugars below 180 mg/dl. Also, discussed prevention of hypoglycemia and the need to adjust goals to higher levels if persistent hypoglycemia.  Reminded him to bring  records or meter to each visit for review.   2.) Reviewed pathophysiology of diabetes, complications related to the disease, importance of annual dilated eye exam and daily foot examination.  3.) We discussed the ADA recommendations: Hemoglobin A1c below 7.0 %. All patients with diabetes should be on statins unless contraindicated.  ACE or ARB therapy if not contraindicated.    4.) I discussed possibly switching to MDI with basal and bolus insulin, but patient is reluctant to do this due to multiple injections.  Will continue Humulin 70 / 30, 55 - 60 units before breakfast and 60 - 70 units before dinner.  We discussed starting a GLP-1 to help lower pp BG and weight. Start Trulicity 1.5 mg weekly. Sample voucher given. Discussed importance of rotating sites.  Reviewed possible GI side effects.  Patient verbalized understanding.   5.) Meal planning teaching: Carbohydrate definition - one serving is 15 gms. Carbohydrate spacing - carbohydrates should be spaced into approximately 3 meals with 2 snacks ( of one carbohydrate ) between meals or at bedtime. Increase vegetable intake to 2 or more cups of vegetables per day as well as 2 fruit servings. Recommended low saturated fat, low sodium diet to aid in control of hypertension and cholesterol.  6.) Discussed activity, benefits, methods, and precautions. Recommended patient start some form of exercise and increase as tolerated to 30 minutes per day to facilitate weight loss and aid in control of BGs.  7.) Return to clinic in 1 month for follow up. Advised patient to call clinic with any questions or concerns.     Jael Garrison, BELÉN-C, CDE

## 2018-03-07 ENCOUNTER — PATIENT MESSAGE (OUTPATIENT)
Dept: DIABETES | Facility: CLINIC | Age: 68
End: 2018-03-07

## 2018-03-23 ENCOUNTER — PATIENT MESSAGE (OUTPATIENT)
Dept: TRANSPLANT | Facility: CLINIC | Age: 68
End: 2018-03-23

## 2018-03-23 ENCOUNTER — PATIENT MESSAGE (OUTPATIENT)
Dept: FAMILY MEDICINE | Facility: CLINIC | Age: 68
End: 2018-03-23

## 2018-03-26 ENCOUNTER — OFFICE VISIT (OUTPATIENT)
Dept: FAMILY MEDICINE | Facility: CLINIC | Age: 68
End: 2018-03-26
Payer: MEDICARE

## 2018-03-26 VITALS
TEMPERATURE: 98 F | HEART RATE: 67 BPM | SYSTOLIC BLOOD PRESSURE: 159 MMHG | WEIGHT: 311.75 LBS | HEIGHT: 74 IN | DIASTOLIC BLOOD PRESSURE: 80 MMHG | BODY MASS INDEX: 40.01 KG/M2

## 2018-03-26 DIAGNOSIS — Z01.818 PRE-OP EXAMINATION: ICD-10-CM

## 2018-03-26 DIAGNOSIS — E66.01 SEVERE OBESITY (BMI >= 40): ICD-10-CM

## 2018-03-26 DIAGNOSIS — H53.8 BLURRY VISION, BILATERAL: Primary | ICD-10-CM

## 2018-03-26 DIAGNOSIS — Z94.0 H/O KIDNEY TRANSPLANT: ICD-10-CM

## 2018-03-26 DIAGNOSIS — Z94.1 H/O HEART TRANSPLANT: ICD-10-CM

## 2018-03-26 DIAGNOSIS — I10 HTN, GOAL BELOW 130/80: ICD-10-CM

## 2018-03-26 PROCEDURE — 99213 OFFICE O/P EST LOW 20 MIN: CPT | Mod: PBBFAC,PO | Performed by: FAMILY MEDICINE

## 2018-03-26 PROCEDURE — 99213 OFFICE O/P EST LOW 20 MIN: CPT | Mod: S$PBB,,, | Performed by: FAMILY MEDICINE

## 2018-03-26 PROCEDURE — 99999 PR PBB SHADOW E&M-EST. PATIENT-LVL III: CPT | Mod: PBBFAC,,, | Performed by: FAMILY MEDICINE

## 2018-03-26 NOTE — PATIENT INSTRUCTIONS

## 2018-03-26 NOTE — PROGRESS NOTES
Subjective:       Patient ID: Nigel Albrecht is a 67 y.o. male.    Chief Complaint: Pre-op Exam (for eye surgery)      HPI      Blurry vision. Associated w difficulty in reading.   Cataract surgery scheduled with [facility] for  and  and need clearance for surgery.   Denies chest pain and resp distress.  He is a heart transplant patient: saw his cardiology- Dr Vázquez yesterday and had EKG performed. Per patient , his cardiologist jackeline it would be ok to do the surgery.    HTN: chronic. Uncontrolled. BP was found to be elevated in the clinic. On CCB, ACEI, and torsemide. Reported that he takes med all the time, no issue with non complaince.          Last edited by Nida Barnett MA on 3/26/2018 11:09 AM. (History)      Past Medical History:   Diagnosis Date    Allergic rhinitis 2013    Blood transfusion     CKD (chronic kidney disease), stage III 2014    -donor kidney transplant     Diabetes mellitus, type 2 2013    Gout, arthritis 2013    Heart attack     Heart transplanted     Hyperlipidemia 2013    Hypertension     Morbid obesity 2013    Organ transplant 2002    heart and kidney    Prophylactic immunotherapy      Family History   Problem Relation Age of Onset    Stroke Mother     Hyperlipidemia Sister     Diabetes Brother     Early death Brother     Heart disease Brother     Hyperlipidemia Brother     Hypertension Brother     Stroke Brother     Kidney disease Son     Diabetes Maternal Grandmother     Melanoma Neg Hx     Eczema Neg Hx     Lupus Neg Hx     Psoriasis Neg Hx      Social History     Social History    Marital status:      Spouse name: N/A    Number of children: N/A    Years of education: N/A     Occupational History     Retired     Social History Main Topics    Smoking status: Former Smoker     Packs/day: 1.00     Years: 20.00     Types: Cigarettes     Quit date: 1984    Smokeless tobacco: Never Used  "   Alcohol use 0.6 oz/week     1 Cans of beer per week      Comment: daily    Drug use: No    Sexual activity: No     Other Topics Concern    Not on file     Social History Narrative    No narrative on file       Review of Systems   Constitutional: Negative for appetite change and fever.   HENT: Negative for congestion, facial swelling and voice change.    Eyes: Negative for discharge and itching.   Respiratory: Negative for cough, chest tightness and wheezing.    Cardiovascular: Negative.  Negative for chest pain and leg swelling.   Gastrointestinal: Negative for abdominal pain, nausea and vomiting.   Endocrine: Negative for cold intolerance and heat intolerance.   Genitourinary: Negative for dysuria and flank pain.   Musculoskeletal: Negative for myalgias and neck stiffness.   Skin: Negative for pallor and rash.   Neurological: Negative for facial asymmetry and weakness.   Psychiatric/Behavioral: Negative for agitation and confusion.       Objective:      BP (!) 159/80 (BP Location: Right arm, Patient Position: Sitting)   Pulse 67   Temp 98.2 °F (36.8 °C) (Oral)   Ht 6' 2" (1.88 m)   Wt (!) 141.4 kg (311 lb 11.7 oz)   BMI 40.02 kg/m²   Physical Exam   Constitutional: He is oriented to person, place, and time. He appears well-developed. No distress.   HENT:   Head: Normocephalic and atraumatic.   Right Ear: External ear normal.   Left Ear: External ear normal.   Eyes: Conjunctivae and EOM are normal.   Neck: Neck supple.   Cardiovascular: Normal rate and regular rhythm.    Pulmonary/Chest: Effort normal. No respiratory distress.   Abdominal: Normal appearance and bowel sounds are normal. There is no hepatosplenomegaly.   Neurological: He is alert and oriented to person, place, and time.   Skin: Skin is warm and dry.   Psychiatric: He has a normal mood and affect. His behavior is normal.   Nursing note and vitals reviewed.      Lab Results   Component Value Date    WBC 4.49 01/30/2018    HGB 11.8 (L) " 01/30/2018    HCT 37.7 (L) 01/30/2018    MCV 82 01/30/2018     01/30/2018       Chemistry        Component Value Date/Time     01/30/2018 0910    K 4.1 01/30/2018 0910     01/30/2018 0910    CO2 28 01/30/2018 0910    BUN 18 01/30/2018 0910    CREATININE 1.8 (H) 01/30/2018 0910     (H) 01/30/2018 0910        Component Value Date/Time    CALCIUM 8.9 01/30/2018 0910    ALKPHOS 118 01/30/2018 0910    AST 34 01/30/2018 0910    ALT 24 01/30/2018 0910    BILITOT 0.4 01/30/2018 0910    ESTGFRAFRICA 44.0 (A) 01/30/2018 0910    EGFRNONAA 38.1 (A) 01/30/2018 0910          Assessment:       1. Blurry vision, bilateral    2. Pre-op examination    3. H/O heart transplant    4. Severe obesity (BMI >= 40)    5. HTN, goal below 130/80        Plan:   Blurry vision, bilateral  Managed by ophthalmologist  Pre-op examination  Low risk surgery on a high risk patient based on his comorbidities heart-kidney transplant and uncontrolled HTN. No active chest pain or lung disease.   Has mets greater than 4. Recent labs reviewed. No further testing required.     H/O heart transplant  Stable, and managed by Cards.  Severe obesity (BMI >= 40)  Comments:  bmi 40.02    HTN, goal below 130/80  No change in med, iterated need to take med as ordered.   PCP is managing.

## 2018-03-27 ENCOUNTER — PATIENT MESSAGE (OUTPATIENT)
Dept: DIABETES | Facility: CLINIC | Age: 68
End: 2018-03-27

## 2018-03-27 NOTE — TELEPHONE ENCOUNTER
----- Message from Keaton Beltran sent at 3/27/2018 12:11 PM CDT -----  Contact: Pt  Please give pt a call at ..[phone] (home) he was returning the nurse call and regarding his trulicity.

## 2018-04-20 RX ORDER — METOPROLOL SUCCINATE 50 MG/1
TABLET, EXTENDED RELEASE ORAL
Qty: 90 TABLET | Refills: 1 | Status: SHIPPED | OUTPATIENT
Start: 2018-04-20 | End: 2018-11-13 | Stop reason: SDUPTHER

## 2018-04-27 DIAGNOSIS — Z94.0 KIDNEY REPLACED BY TRANSPLANT: Primary | ICD-10-CM

## 2018-04-30 ENCOUNTER — PATIENT MESSAGE (OUTPATIENT)
Dept: DIABETES | Facility: CLINIC | Age: 68
End: 2018-04-30

## 2018-04-30 DIAGNOSIS — N18.30 TYPE 2 DIABETES MELLITUS WITH STAGE 3 CHRONIC KIDNEY DISEASE, WITH LONG-TERM CURRENT USE OF INSULIN: Primary | ICD-10-CM

## 2018-04-30 DIAGNOSIS — Z79.4 TYPE 2 DIABETES MELLITUS WITH STAGE 3 CHRONIC KIDNEY DISEASE, WITH LONG-TERM CURRENT USE OF INSULIN: Primary | ICD-10-CM

## 2018-04-30 DIAGNOSIS — E11.22 TYPE 2 DIABETES MELLITUS WITH STAGE 3 CHRONIC KIDNEY DISEASE, WITH LONG-TERM CURRENT USE OF INSULIN: Primary | ICD-10-CM

## 2018-05-01 ENCOUNTER — PATIENT MESSAGE (OUTPATIENT)
Dept: DIABETES | Facility: CLINIC | Age: 68
End: 2018-05-01

## 2018-05-07 ENCOUNTER — LAB VISIT (OUTPATIENT)
Dept: LAB | Facility: HOSPITAL | Age: 68
End: 2018-05-07
Attending: INTERNAL MEDICINE
Payer: MEDICARE

## 2018-05-07 ENCOUNTER — OFFICE VISIT (OUTPATIENT)
Dept: DIABETES | Facility: CLINIC | Age: 68
End: 2018-05-07
Payer: MEDICARE

## 2018-05-07 VITALS
DIASTOLIC BLOOD PRESSURE: 82 MMHG | BODY MASS INDEX: 40.08 KG/M2 | WEIGHT: 312.19 LBS | SYSTOLIC BLOOD PRESSURE: 136 MMHG

## 2018-05-07 DIAGNOSIS — E11.22 TYPE 2 DIABETES MELLITUS WITH STAGE 3 CHRONIC KIDNEY DISEASE, WITH LONG-TERM CURRENT USE OF INSULIN: Primary | ICD-10-CM

## 2018-05-07 DIAGNOSIS — E66.01 MORBID OBESITY WITH BMI OF 40.0-44.9, ADULT: ICD-10-CM

## 2018-05-07 DIAGNOSIS — Z79.4 TYPE 2 DIABETES MELLITUS WITH STAGE 3 CHRONIC KIDNEY DISEASE, WITH LONG-TERM CURRENT USE OF INSULIN: Primary | ICD-10-CM

## 2018-05-07 DIAGNOSIS — I10 ESSENTIAL HYPERTENSION: ICD-10-CM

## 2018-05-07 DIAGNOSIS — E78.5 HYPERLIPIDEMIA, UNSPECIFIED HYPERLIPIDEMIA TYPE: ICD-10-CM

## 2018-05-07 DIAGNOSIS — Z94.1 STATUS POST HEART TRANSPLANT: ICD-10-CM

## 2018-05-07 DIAGNOSIS — N18.30 TYPE 2 DIABETES MELLITUS WITH STAGE 3 CHRONIC KIDNEY DISEASE, WITH LONG-TERM CURRENT USE OF INSULIN: Primary | ICD-10-CM

## 2018-05-07 LAB
ALBUMIN SERPL BCP-MCNC: 3 G/DL
ALP SERPL-CCNC: 114 U/L
ALT SERPL W/O P-5'-P-CCNC: 26 U/L
ANION GAP SERPL CALC-SCNC: 9 MMOL/L
AST SERPL-CCNC: 36 U/L
BASOPHILS # BLD AUTO: 0.03 K/UL
BASOPHILS NFR BLD: 0.7 %
BILIRUB SERPL-MCNC: 0.4 MG/DL
BNP SERPL-MCNC: 179 PG/ML
BUN SERPL-MCNC: 17 MG/DL
CALCIUM SERPL-MCNC: 9.1 MG/DL
CHLORIDE SERPL-SCNC: 109 MMOL/L
CO2 SERPL-SCNC: 25 MMOL/L
CREAT SERPL-MCNC: 1.7 MG/DL
DIFFERENTIAL METHOD: ABNORMAL
EOSINOPHIL # BLD AUTO: 0.1 K/UL
EOSINOPHIL NFR BLD: 2.3 %
ERYTHROCYTE [DISTWIDTH] IN BLOOD BY AUTOMATED COUNT: 14.5 %
EST. GFR  (AFRICAN AMERICAN): 47.2 ML/MIN/1.73 M^2
EST. GFR  (NON AFRICAN AMERICAN): 40.8 ML/MIN/1.73 M^2
ESTIMATED AVG GLUCOSE: 169 MG/DL
GLUCOSE SERPL-MCNC: 114 MG/DL
GLUCOSE SERPL-MCNC: 122 MG/DL (ref 70–110)
HBA1C MFR BLD HPLC: 7.5 %
HCT VFR BLD AUTO: 38.7 %
HGB BLD-MCNC: 12.2 G/DL
IMM GRANULOCYTES # BLD AUTO: 0.01 K/UL
IMM GRANULOCYTES NFR BLD AUTO: 0.2 %
LYMPHOCYTES # BLD AUTO: 1.7 K/UL
LYMPHOCYTES NFR BLD: 38.6 %
MAGNESIUM SERPL-MCNC: 2 MG/DL
MCH RBC QN AUTO: 26.1 PG
MCHC RBC AUTO-ENTMCNC: 31.5 G/DL
MCV RBC AUTO: 83 FL
MONOCYTES # BLD AUTO: 0.4 K/UL
MONOCYTES NFR BLD: 8.6 %
NEUTROPHILS # BLD AUTO: 2.2 K/UL
NEUTROPHILS NFR BLD: 49.6 %
NRBC BLD-RTO: 0 /100 WBC
PLATELET # BLD AUTO: 200 K/UL
PMV BLD AUTO: 11.7 FL
POTASSIUM SERPL-SCNC: 4.1 MMOL/L
PROT SERPL-MCNC: 7.2 G/DL
RBC # BLD AUTO: 4.68 M/UL
SODIUM SERPL-SCNC: 143 MMOL/L
T4 SERPL-MCNC: 5.6 UG/DL
TSH SERPL DL<=0.005 MIU/L-ACNC: 1.77 UIU/ML
WBC # BLD AUTO: 4.43 K/UL

## 2018-05-07 PROCEDURE — 86833 HLA CLASS II HIGH DEFIN QUAL: CPT

## 2018-05-07 PROCEDURE — 86833 HLA CLASS II HIGH DEFIN QUAL: CPT | Mod: 91

## 2018-05-07 PROCEDURE — 83735 ASSAY OF MAGNESIUM: CPT

## 2018-05-07 PROCEDURE — 86977 RBC SERUM PRETX INCUBJ/INHIB: CPT | Mod: 91

## 2018-05-07 PROCEDURE — 86832 HLA CLASS I HIGH DEFIN QUAL: CPT | Mod: 91

## 2018-05-07 PROCEDURE — 36415 COLL VENOUS BLD VENIPUNCTURE: CPT | Mod: PO

## 2018-05-07 PROCEDURE — 83880 ASSAY OF NATRIURETIC PEPTIDE: CPT

## 2018-05-07 PROCEDURE — 85025 COMPLETE CBC W/AUTO DIFF WBC: CPT

## 2018-05-07 PROCEDURE — 80053 COMPREHEN METABOLIC PANEL: CPT

## 2018-05-07 PROCEDURE — 84436 ASSAY OF TOTAL THYROXINE: CPT

## 2018-05-07 PROCEDURE — 84443 ASSAY THYROID STIM HORMONE: CPT

## 2018-05-07 PROCEDURE — 99214 OFFICE O/P EST MOD 30 MIN: CPT | Mod: S$PBB,,, | Performed by: NURSE PRACTITIONER

## 2018-05-07 PROCEDURE — 82948 REAGENT STRIP/BLOOD GLUCOSE: CPT | Mod: PBBFAC,PO | Performed by: NURSE PRACTITIONER

## 2018-05-07 PROCEDURE — 80195 ASSAY OF SIROLIMUS: CPT

## 2018-05-07 PROCEDURE — 99213 OFFICE O/P EST LOW 20 MIN: CPT | Mod: PBBFAC,PO,25 | Performed by: NURSE PRACTITIONER

## 2018-05-07 PROCEDURE — 99999 PR PBB SHADOW E&M-EST. PATIENT-LVL III: CPT | Mod: PBBFAC,,, | Performed by: NURSE PRACTITIONER

## 2018-05-07 PROCEDURE — 83036 HEMOGLOBIN GLYCOSYLATED A1C: CPT

## 2018-05-07 NOTE — PROGRESS NOTES
"PCP: Dr. Stanley Chanel    HISTORY OF PRESENT ILLNESS: 67 year old  male patient is in clinic today for uncontrolled diabetes.   He has had diabetes for many years and has attended diabetes education in the past.  Patient currently takes Humulin 70 / 30 for diabetes.  He had a cardiac and renal transplant in 2002, on immunosuppressant drugs.  Most recent A1C was 7.8, ADA recommends less than 7.0.   He has lost 10 pounds since last visit.  Per meter, fasting BG are 93 - 144.  He is not really monitoring other times.    Patient denies polyuria, polydipsia, polyphagia, or blurred vision.   Also denies nausea, vomiting, has occasional diarrhea.  Has rare hypoglycemic symptoms, which he treats with OJ.  He denies any recent hospitalizations or emergency room visits.     Height: 6 ' 2 "  Weight: 312 pounds, BMI 40.08  Blood Glucose reading this AM: 141 mg/dl fasting  Blood Glucose reading in clinic: 122 mg/dl at 10:48 am    DIABETES MEDICATIONS:   Humulin 70 / 30, 50 units BID ac  Trulicity 1.5 mg weekly    LABS REVIEWED.    REVIEW OF SYSTEMS:  GENERAL: Denies fever, chills, change in appetite.  HEENT: Has minor impaired hearing, denies dysphagia. History of vertigo.   RESPIRATORY: Denies shortness of breath, cough or wheezing.  CARDIOVASCULAR: Denies chest pain, palpitations.  GI: Denies hematochezia.  : Denies hematuria, dysuria or frequency.  MS: Normal gait. Denies difficulty with mobility, muscle or joint pain.   SKIN: Denies rashes and lesions.  NEURO: Occasional numbness, tingling in feet.   PSYCH: Denies depression or anxiety. No suicidal ideations.  ENDO: See HPI.    STANDARDS OF CARE:   Eye exam: [facility], Last exam Spring 2018  Dental exam: Has regular exams; denies gums bleeding.  Podiatry exam: None.  SOCIAL HISTORY: . Lives with spouse. Has 2 children. Patient retired as manager for [employer].     ACTIVITY LEVEL: Stays active in " the garden and yard. Occasionally walks.  MEAL PLANNING: Number of meals per day - 3. Number of snacks per day - occasionally.  Breakfast can be cereal with lactose free milk or 2 eggs, toast or eggs grits and jaffe.  Mid morning snack can be chips or cookies.  Lunch can be sandwich with price, fruit.  Dinner is usually red beans, rice, sausage, broccolli salad.  Can then have a lite beer.  He drinks mostly water.      BLOOD GLUCOSE TESTING: Self-monitoring with ONE TOUCH VERIO meter    PHYSICAL EXAMINATION:  GENERAL: WDWN  male in no acute distress, responds appropriately.  AAO X 3.   NECK: Supple, no thyromegaly, no cervical or supraclavicular lymphadenopathy, no carotid bruit.  HEART: Regular rate and rhythm. No rubs, murmurs or gallops.   LUNGS: CTA bilaterally. Unlabored breathing, no use of accessory muscles.  MUSCULOSKELETAL: Normal gait and muscle strength.  ABDOMEN: Active bowel sounds X 4, no masses or tenderness.   SKIN: Warm, dry skin. No lesions or abrasions.  NEUROLOGIC: Cranial nerves II-XII grossly intact.   EXTREMITIES: Trace edema.  FOOT EXAMINATION: Protective Sensation (w/ 10 gram monofilament):  Right: Intact Left: Intact //  Visual Inspection:  Normal -  Bilateral, except dry skin. +1 pitting edema to ankles, lower extremities. //  Pedal Pulses:  Right: Present  Left: Present    ASSESSMENT:  1. Diabetes Type 2, ED, s / p renal and cardiac transplant - per meter, improving control on Humulin 70 / 30, Trulicity, A1C 7.8   2. Hypertension - good control on diltiazem, Lisinopril, Lasix.  3. Hyperlipidemia - good control on Lipitor  4. Morbid Obesity - BMI 40.08    PLAN:  1.) Patient was instructed to monitor blood glucose 2 - 3 x daily, fasting and ac dinner or at bedtime.  Discussed ADA goal for fasting blood sugar, 80 - 130 mg/dL; pp blood sugars below 180 mg/dl. Also, discussed prevention of hypoglycemia and the need to adjust goals to higher levels if persistent hypoglycemia.   Reminded him to bring records or meter to each visit for review.   2.) Reviewed pathophysiology of diabetes, complications related to the disease, importance of annual dilated eye exam and daily foot examination.  3.) We discussed the ADA recommendations: Hemoglobin A1c below 7.0 %. All patients with diabetes should be on statins unless contraindicated.  ACE or ARB therapy if not contraindicated.    4.) Continue  Humulin 70 / 30, 50 units BID ac.  Continue Trulicity 1.5 mg weekly.   He currently has about 6 pens of Trulicity left and would like to use that supply.  Once he finishes his current supply, will consider starting Ozempic 1 mg weekly.  5.) Meal planning teaching: Carbohydrate definition - one serving is 15 gms. Carbohydrate spacing - carbohydrates should be spaced into approximately 3 meals with 2 snacks ( of one carbohydrate ) between meals or at bedtime. Increase vegetable intake to 2 or more cups of vegetables per day as well as 2 fruit servings. Recommended low saturated fat, low sodium diet to aid in control of hypertension and cholesterol.  6.) Discussed activity, benefits, methods, and precautions. Recommended patient start some form of exercise and increase as tolerated to 30 minutes per day to facilitate weight loss and aid in control of BGs.  7.) Return to clinic in 3 months for follow up.  Pending Labs Today.  Advised patient to call clinic with any questions or concerns.     Jael Garrison, BELÉN-C, CDE

## 2018-05-08 LAB — SIROLIMUS BLD-MCNC: 6.8 NG/ML

## 2018-05-09 LAB
CLASS I ANTIBODY COMMENTS - LUMINEX: NORMAL
CLASS II ANTIBODIES - LUMINEX: NORMAL
CLASS II ANTIBODY COMMENTS - LUMINEX: NORMAL
DSA1 TESTING DATE: NORMAL
DSA12 TESTING DATE: NORMAL
DSA2 TESTING DATE: NORMAL
SERUM COLLECTION DT - LUMINEX CLASS I: NORMAL
SERUM COLLECTION DT - LUMINEX CLASS II: NORMAL

## 2018-05-10 LAB
C1Q1 TESTING DATE: NORMAL
C1Q1 TESTING DATE: NORMAL
C1Q2 TESTING DATE: NORMAL
CLASS I ANTIBODY COMMENTS - LUMINEX: NORMAL
CLASS II ANTIBODIES - LUMINEX: NORMAL
CLASS II ANTIBODY COMMENTS - LUMINEX: NORMAL
SERUM COLLECTION DT - LUMINEX CLASS I: NORMAL
SERUM COLLECTION DT - LUMINEX CLASS II: NORMAL

## 2018-05-16 ENCOUNTER — HOSPITAL ENCOUNTER (OUTPATIENT)
Dept: CARDIOLOGY | Facility: CLINIC | Age: 68
Discharge: HOME | End: 2018-05-16
Attending: INTERNAL MEDICINE
Payer: MEDICARE

## 2018-05-16 ENCOUNTER — OFFICE VISIT (OUTPATIENT)
Dept: TRANSPLANT | Facility: CLINIC | Age: 68
End: 2018-05-16
Payer: MEDICARE

## 2018-05-16 ENCOUNTER — HOSPITAL ENCOUNTER (OUTPATIENT)
Dept: RADIOLOGY | Facility: HOSPITAL | Age: 68
Discharge: HOME | End: 2018-05-16
Attending: INTERNAL MEDICINE
Payer: MEDICARE

## 2018-05-16 ENCOUNTER — HOSPITAL ENCOUNTER (OUTPATIENT)
Dept: CARDIOLOGY | Facility: CLINIC | Age: 68
Discharge: HOME | End: 2018-05-16
Payer: MEDICARE

## 2018-05-16 ENCOUNTER — HOSPITAL ENCOUNTER (OUTPATIENT)
Dept: RADIOLOGY | Facility: CLINIC | Age: 68
Discharge: HOME | End: 2018-05-16
Attending: INTERNAL MEDICINE
Payer: MEDICARE

## 2018-05-16 VITALS
SYSTOLIC BLOOD PRESSURE: 163 MMHG | WEIGHT: 315 LBS | BODY MASS INDEX: 40.43 KG/M2 | HEIGHT: 74 IN | TEMPERATURE: 97 F | DIASTOLIC BLOOD PRESSURE: 80 MMHG | RESPIRATION RATE: 16 BRPM | OXYGEN SATURATION: 97 % | HEART RATE: 68 BPM

## 2018-05-16 VITALS
HEIGHT: 74 IN | SYSTOLIC BLOOD PRESSURE: 160 MMHG | DIASTOLIC BLOOD PRESSURE: 85 MMHG | WEIGHT: 314.63 LBS | HEART RATE: 62 BPM | BODY MASS INDEX: 40.38 KG/M2

## 2018-05-16 DIAGNOSIS — Z79.60 LONG-TERM USE OF IMMUNOSUPPRESSANT MEDICATION: ICD-10-CM

## 2018-05-16 DIAGNOSIS — E11.22 TYPE 2 DIABETES MELLITUS WITH STAGE 3 CHRONIC KIDNEY DISEASE, WITH LONG-TERM CURRENT USE OF INSULIN: ICD-10-CM

## 2018-05-16 DIAGNOSIS — N18.30 TYPE 2 DIABETES MELLITUS WITH STAGE 3 CHRONIC KIDNEY DISEASE, WITH LONG-TERM CURRENT USE OF INSULIN: ICD-10-CM

## 2018-05-16 DIAGNOSIS — Z94.0 DECEASED-DONOR KIDNEY TRANSPLANT: Primary | ICD-10-CM

## 2018-05-16 DIAGNOSIS — N18.30 CKD (CHRONIC KIDNEY DISEASE), STAGE III: Chronic | ICD-10-CM

## 2018-05-16 DIAGNOSIS — I10 ESSENTIAL HYPERTENSION: ICD-10-CM

## 2018-05-16 DIAGNOSIS — E78.5 HYPERLIPIDEMIA, UNSPECIFIED HYPERLIPIDEMIA TYPE: ICD-10-CM

## 2018-05-16 DIAGNOSIS — Z94.1 STATUS POST HEART TRANSPLANT: ICD-10-CM

## 2018-05-16 DIAGNOSIS — T86.290 VASCULOPATHY OF CARDIAC ALLOGRAFT: ICD-10-CM

## 2018-05-16 DIAGNOSIS — E66.01 MORBID OBESITY WITH BMI OF 40.0-44.9, ADULT: ICD-10-CM

## 2018-05-16 DIAGNOSIS — E78.2 MIXED HYPERLIPIDEMIA: ICD-10-CM

## 2018-05-16 DIAGNOSIS — Z94.1 HEART TRANSPLANTED: Primary | ICD-10-CM

## 2018-05-16 DIAGNOSIS — Z29.89 PROPHYLACTIC IMMUNOTHERAPY: Chronic | ICD-10-CM

## 2018-05-16 DIAGNOSIS — Z79.4 TYPE 2 DIABETES MELLITUS WITH STAGE 3 CHRONIC KIDNEY DISEASE, WITH LONG-TERM CURRENT USE OF INSULIN: ICD-10-CM

## 2018-05-16 LAB
DIASTOLIC DYSFUNCTION: NO
ESTIMATED PA SYSTOLIC PRESSURE: 25.81
MITRAL VALVE REGURGITATION: NORMAL
RETIRED EF AND QEF - SEE NOTES: 50 (ref 55–65)
TRICUSPID VALVE REGURGITATION: NORMAL

## 2018-05-16 PROCEDURE — 99999 PR PBB SHADOW E&M-EST. PATIENT-LVL V: CPT | Mod: PBBFAC,,, | Performed by: NURSE PRACTITIONER

## 2018-05-16 PROCEDURE — 71046 X-RAY EXAM CHEST 2 VIEWS: CPT | Mod: TC,FY

## 2018-05-16 PROCEDURE — C8930 TTE W OR W/O CONTR, CONT ECG: HCPCS | Mod: PBBFAC | Performed by: INTERNAL MEDICINE

## 2018-05-16 PROCEDURE — 99214 OFFICE O/P EST MOD 30 MIN: CPT | Mod: S$PBB,,, | Performed by: INTERNAL MEDICINE

## 2018-05-16 PROCEDURE — 93325 DOPPLER ECHO COLOR FLOW MAPG: CPT | Mod: 26,S$PBB,, | Performed by: INTERNAL MEDICINE

## 2018-05-16 PROCEDURE — 99214 OFFICE O/P EST MOD 30 MIN: CPT | Mod: S$PBB,,, | Performed by: NURSE PRACTITIONER

## 2018-05-16 PROCEDURE — 99999 PR PBB SHADOW E&M-EST. PATIENT-LVL III: CPT | Mod: PBBFAC,,, | Performed by: INTERNAL MEDICINE

## 2018-05-16 PROCEDURE — 77080 DXA BONE DENSITY AXIAL: CPT | Mod: TC

## 2018-05-16 PROCEDURE — 99215 OFFICE O/P EST HI 40 MIN: CPT | Mod: PBBFAC,25,27 | Performed by: NURSE PRACTITIONER

## 2018-05-16 PROCEDURE — 93010 ELECTROCARDIOGRAM REPORT: CPT | Mod: S$PBB,,, | Performed by: INTERNAL MEDICINE

## 2018-05-16 PROCEDURE — 93351 STRESS TTE COMPLETE: CPT | Mod: 26,S$PBB,, | Performed by: INTERNAL MEDICINE

## 2018-05-16 PROCEDURE — 99213 OFFICE O/P EST LOW 20 MIN: CPT | Mod: PBBFAC,25 | Performed by: INTERNAL MEDICINE

## 2018-05-16 PROCEDURE — 71046 X-RAY EXAM CHEST 2 VIEWS: CPT | Mod: 26,,, | Performed by: RADIOLOGY

## 2018-05-16 PROCEDURE — 93320 DOPPLER ECHO COMPLETE: CPT | Mod: 26,S$PBB,, | Performed by: INTERNAL MEDICINE

## 2018-05-16 PROCEDURE — 93005 ELECTROCARDIOGRAM TRACING: CPT | Mod: PBBFAC | Performed by: INTERNAL MEDICINE

## 2018-05-16 PROCEDURE — 77080 DXA BONE DENSITY AXIAL: CPT | Mod: 26,,, | Performed by: INTERNAL MEDICINE

## 2018-05-16 RX ORDER — DOXAZOSIN 2 MG/1
2 TABLET ORAL NIGHTLY
Qty: 90 TABLET | Refills: 3 | Status: SHIPPED | OUTPATIENT
Start: 2018-05-16 | End: 2019-08-26 | Stop reason: SDUPTHER

## 2018-05-16 RX ORDER — ATORVASTATIN CALCIUM 80 MG/1
80 TABLET, FILM COATED ORAL DAILY
Qty: 90 TABLET | Refills: 3 | Status: SHIPPED | OUTPATIENT
Start: 2018-05-16 | End: 2019-05-07 | Stop reason: SDUPTHER

## 2018-05-16 NOTE — PROGRESS NOTES
Two patient identifiers verified. Consent obtained. No prior blood transfusion reaction noted. # 22 g IV placed to right RFA via aseptic technique. 3ml of Optison administered for 2D imaging, patient tolerated well. Imaging completed. IV removed, pressure dressing applied.

## 2018-05-16 NOTE — LETTER
May 16, 2018                     [address]  [address]  [facility] [address]  Phone: [phone]   Patient: Nigel Albrecht   MR Number: [id number]   YOB: 1950   Date of Visit: 5/16/2018       Dear      Thank you for referring Nigel Albrecht to me for evaluation. Attached you will find relevant portions of my assessment and plan of care.    If you have questions, please do not hesitate to call me. I look forward to following Nigel Albrecht along with you.    Sincerely,    Malou Conn, NP    Enclosure    If you would like to receive this communication electronically, please contact [email or web address] or [phone] to request [employer] Link access.    [employer] Link is a tool which provides read-only access to select patient information with whom you have a relationship. Its easy to use and provides real time access to review your patients record including encounter summaries, notes, results, and demographic information.    If you feel you have received this communication in error or would no longer like to receive these types of communications, please e-mail [email or web address]

## 2018-05-16 NOTE — PROGRESS NOTES
Subjective:   Patient ID:  Nigel Albrecht is a 67 y.o. male who presents for heart transplant follow-up.      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       Pt has been doing very well- checks his BP at home, not usually as high as it is today but does run ~140. Keeps busy cutting grass, got his  license- doesn't really exercise and says he has no excuse- does not get SOB with his ADLs- able to walk around [employer] without difficulty.  Works in his garden without difficulty. No CP.  Has a little swelling in his legs (L>R) which is chronic- had an US done long ago and was recommended that he wear compression stockings but it's not convenient elevates his feet   At night and in am the swelling goes down    Current IS    Rapa  1mg daily (goal 5-8)  Cellcept 750mg bid      DSE today    1 - S/P OHTX    5/24/2002.     2 - Eccentric LVH with low normal to mildly depressed left ventricular systolic function (EF 50-55%).     3 - Normal left ventricular diastolic function.     4 - Normal right ventricular systolic function .     5 - Mild mitral regurgitation.     6 - Mild tricuspid regurgitation.     7 - The estimated PA systolic pressure is greater than 26 mmHg.     No evidence of stress induced myocardial ischemia. Sensitivity is impaired due to failure to reach target heart rate      Review of Systems   Constitution: Negative for chills, decreased appetite, diaphoresis, fever, weakness, malaise/fatigue, night sweats, weight gain and weight loss.   Cardiovascular: Negative for chest pain, claudication, cyanosis, dyspnea on exertion, irregular heartbeat, leg swelling, near-syncope, orthopnea and palpitations.   Respiratory: Negative for cough, hemoptysis, shortness of breath, sleep disturbances due to breathing, snoring,  "sputum production and wheezing.    Gastrointestinal: Negative for abdominal pain and diarrhea.       Objective:     BP (!) 160/85 (BP Location: Left arm, Patient Position: Sitting, BP Method: Large (Automatic))   Pulse 62   Ht 6' 2" (1.88 m)   Wt (!) 142.7 kg (314 lb 9.5 oz)   BMI 40.39 kg/m²     Physical Exam   Constitutional: He is oriented to person, place, and time. He appears well-developed and well-nourished.   HENT:   Head: Normocephalic and atraumatic.   Eyes: Conjunctivae and EOM are normal. Pupils are equal, round, and reactive to light.   Neck: Normal range of motion. Neck supple. No JVD present.   Cardiovascular: Normal rate and regular rhythm.  Exam reveals no gallop and no friction rub.    No murmur heard.  Pulmonary/Chest: Breath sounds normal. No respiratory distress. He has no wheezes. He has no rales. He exhibits no tenderness.   Abdominal: Soft. Bowel sounds are normal. He exhibits no distension and no mass. There is no hepatosplenomegaly, splenomegaly or hepatomegaly. There is no tenderness. There is no rebound, no guarding and no CVA tenderness.   No hepatoslenomegaly   Musculoskeletal: Normal range of motion. He exhibits no edema or tenderness.   Neurological: He is alert and oriented to person, place, and time. He has normal reflexes. No cranial nerve deficit. He exhibits normal muscle tone. Coordination normal.   Skin: Skin is warm and dry.   Psychiatric: He has a normal mood and affect.         Chemistry        Component Value Date/Time     05/07/2018 1021    K 4.1 05/07/2018 1021     05/07/2018 1021    CO2 25 05/07/2018 1021    BUN 17 05/07/2018 1021    CREATININE 1.7 (H) 05/07/2018 1021     (H) 05/07/2018 1021        Component Value Date/Time    CALCIUM 9.1 05/07/2018 1021    ALKPHOS 114 05/07/2018 1021    AST 36 05/07/2018 1021    ALT 26 05/07/2018 1021    BILITOT 0.4 05/07/2018 1021            Magnesium   Date Value Ref Range Status   05/07/2018 2.0 1.6 - 2.6 mg/dL " Final       Lab Results   Component Value Date    WBC 4.43 05/07/2018    HGB 12.2 (L) 05/07/2018    HCT 38.7 (L) 05/07/2018    MCV 83 05/07/2018     05/07/2018       Lab Results   Component Value Date    INR 0.9 06/22/2016    INR 1.0 10/03/2014    INR 1.0 06/02/2014       BNP   Date Value Ref Range Status   05/07/2018 179 (H) 0 - 99 pg/mL Final     Comment:     Values of less than 100 pg/ml are consistent with non-CHF populations.   01/30/2018 196 (H) 0 - 99 pg/mL Final     Comment:     Values of less than 100 pg/ml are consistent with non-CHF populations.   11/29/2017 134 (H) 0 - 99 pg/mL Final     Comment:     Values of less than 100 pg/ml are consistent with non-CHF populations.       No results found for: LDH    rapa level 6.8    Assessment:     1. Heart transplanted - 16 yr post OHT, overall doing well, has very mild LV dysfxn in setting of chronic DSA and today BP is uncontrolled, rapa level at goal   2. Long-term use of immunosuppressant medication    3. CKD (chronic kidney disease), stage III- Cr at lower end of his usual range   4. Essential hypertension    5. Hyperlipidemia, unspecified hyperlipidemia type    6. Morbid obesity with BMI of 40.0-44.9, adult  Body mass index is 40.39 kg/m².     7. Type 2 diabetes mellitus with stage 3 chronic kidney disease, with long-term current use of insulin    8. Vasculopathy of cardiac allograft    9. Chronic immunosuppression with Sirolimus and Cellcept    10. Mixed hyperlipidemia        Plan:   Start cardura 2mg qhs for HTN    Asked pt to Continue tracking blood pressure at home and bring log to next visit.    Really should work on diet, exercise and wt loss    Return instructions as set forth by post transplant schedule or as needed:    Clinic: Return for labs and/or biopsy weekly the first month, every two weeks during month 2 and then monthly for the first year at the provider or coordinator's discretion. During the second year, return to clinic every 3  months. Post transplant year 3-5 return every 6 months. There will be a comprehensive post transplant evaluation every year that may include LHC/RHC/biopsy, stress test, echo, CXR, and other health screening exams.    In addition to the clinical assessment, I have ordered Allomap testing for this patient to assist in identification of moderate/severe acute cellular rejection (ACR) in a pt with stable Allograft function instead of endomyocardial biopsy.     Patient is reminded to call with any health changes as these can be early signs of transplant complications. Patient is advised to make sure any new medications or changes of old medications are discussed with a pharmacist or physician knowledgeable with transplant to avoid rejection/drug toxicity related to significant drug interactions.    UNOS Patient Status  Functional Status: 100% - Normal, no complaints, no evidence of disease  Physical Capacity: No Limitations  Working for Income: yes  If yes, working activity level: Working Part Time Reason Unknown

## 2018-05-16 NOTE — PATIENT INSTRUCTIONS
For high blood pressure, start cardura (doxazosyn)  2mg at bedtime    Continue all other medications    Continue tracking blood pressure at home and bring log to next visit.    Keep salt intake to under 2000 mg sodium, fluids to under 2 L (64 oz)    Call us if you find yourself getting more short of breath, have more swelling or unexpected weight changes      Flu shot this fall

## 2018-05-16 NOTE — PROGRESS NOTES
Kidney Post-Transplant Assessment    Referring Physician:   Current Nephrologist:     ORGAN:   Donor Type:   PHS Increased Risk:   Cold Ischemia:  mins  Induction Medications:     Subjective:     CC:  Reassessment of renal allograft function and management of chronic immunosuppression.    HPI:  Mr. Albrecht is a 67 y.o. year old Black or  male who received a  heart and kidney transplant on 02.  He has CKD stage 3 - GFR 30-59 and his baseline creatinine is between 1.4-1.6, most recent sCr 1.7. He takes mycophenolate mofetil and sirolimus for maintenance immunosuppression. He denies any recent hospitalizations or ER visits since his previous clinic visit.    The patient saw heart transplant today   Overall feels well. No health concerns today. Denies chest pain, SOB, leg pain, abdominal pain or LUTs. He has not followed up with general nephrology in years. Reports having bilateral cataract surgery ~ 3 weeks ago and is healing well.       Past Medical History:   Diagnosis Date    Allergic rhinitis 2013    Blood transfusion     CKD (chronic kidney disease), stage III 2014    -donor kidney transplant     Diabetes mellitus, type 2 2013    Gout, arthritis 2013    Heart attack     Heart transplanted     Hyperlipidemia 2013    Hypertension     Morbid obesity 2013    Organ transplant 2002    heart and kidney    Prophylactic immunotherapy        Review of Systems   Constitutional: Negative for activity change, appetite change, chills, fatigue, fever and unexpected weight change.   HENT: Negative for congestion, facial swelling, postnasal drip, rhinorrhea, sinus pressure, sore throat and trouble swallowing.    Eyes: Negative for pain, redness and visual disturbance.   Respiratory: Negative for cough, chest tightness, shortness of breath and wheezing.    Cardiovascular: Positive for leg swelling. Negative for chest pain and palpitations.  "  Gastrointestinal: Negative for abdominal pain, diarrhea, nausea and vomiting.   Genitourinary: Negative for dysuria, flank pain and urgency.   Musculoskeletal: Negative for gait problem, neck pain and neck stiffness.   Skin: Negative for rash.   Allergic/Immunologic: Positive for immunocompromised state. Negative for environmental allergies and food allergies.   Neurological: Negative for dizziness, weakness, light-headedness and headaches.   Psychiatric/Behavioral: Negative for agitation and confusion. The patient is not nervous/anxious.        Objective:   Blood pressure (!) 163/80, pulse 68, temperature 97.3 °F (36.3 °C), temperature source Oral, resp. rate 16, height 6' 2" (1.88 m), weight (!) 143.1 kg (315 lb 7.7 oz), SpO2 97 %.body mass index is 40.5 kg/m².    Physical Exam   Constitutional: He is oriented to person, place, and time. He appears well-developed and well-nourished.   HENT:   Head: Normocephalic.   Mouth/Throat: Oropharynx is clear and moist. No oropharyngeal exudate.   Eyes: Conjunctivae and EOM are normal. Pupils are equal, round, and reactive to light. No scleral icterus.   Neck: Normal range of motion. Neck supple.   Cardiovascular: Normal rate, regular rhythm and normal heart sounds.    Pulmonary/Chest: Effort normal and breath sounds normal.   Abdominal: Soft. Normal appearance and bowel sounds are normal. He exhibits no distension and no mass. There is no splenomegaly or hepatomegaly. There is no tenderness. There is no rebound, no guarding, no CVA tenderness, no tenderness at McBurney's point and negative Good's sign.       Musculoskeletal: Normal range of motion. He exhibits edema.   Bilateral trace to +1 peripheral edema    Lymphadenopathy:     He has no cervical adenopathy.   Neurological: He is alert and oriented to person, place, and time. He exhibits normal muscle tone. Coordination normal.   Skin: Skin is warm and dry.   Psychiatric: He has a normal mood and affect. His behavior " is normal.   Vitals reviewed.      Labs:  Lab Results   Component Value Date    WBC 4.43 2018    HGB 12.2 (L) 2018    HCT 38.7 (L) 2018     2018    K 4.1 2018     2018    CO2 25 2018    BUN 17 2018    CREATININE 1.7 (H) 2018    EGFRNONAA 40.8 (A) 2018    CALCIUM 9.1 2018    PHOS 3.3 10/05/2016    MG 2.0 2018    ALBUMIN 3.0 (L) 2018    AST 36 2018    ALT 26 2018       No results found for: EXTANC, EXTWBC, EXTSEGS, EXTPLATELETS, EXTHEMOGLOBI, EXTHEMATOCRI, EXTCREATININ, EXTSODIUM, EXTPOTASSIUM, EXTBUN, EXTCO2, EXTCALCIUM, EXTPHOSPHORU, EXTGLUCOSE, EXTALBUMIN, EXTAST, EXTALT, EXTBILITOTAL, EXTLIPASE, EXTAMYLASE    No results found for: EXTCYCLOSLVL, EXTSIROLIMUS, EXTTACROLVL, EXTPROTCRE, EXTPTHINTACT, EXTPROTEINUA, EXTWBCUA, EXTRBCUA    Labs were reviewed with the patient.    Assessment:     1. -donor kidney/ heart transplant performed at [facility] on 2002 - ESRD etiology unknown; ESHF due to IHSS    2. Type 2 diabetes mellitus with stage 3 chronic kidney disease, with long-term current use of insulin    3. CKD (chronic kidney disease), stage III    4. Chronic immunosuppression with Sirolimus and Cellcept    5. Long-term use of immunosuppressant medication    6. Morbid obesity with BMI of 40.0-44.9, adult        Plan:   Referral made for general nephrology     Follow-up:   1. CKD stage: 3 stable    2. Immunosuppression:    Rapamune  trough 6.8 -->MGMT deferred to heart transplant.    Continue  Rapamune 1 mg daily ,  Mg BID . Will continue to monitor for drug toxicities    3. Allograft Function: Stable. Continue good po hydration.        Lab Results   Component Value Date    CREATININE 1.7 (H) 2018  15yr 11mo   eGFR if African American >60 mL/min/1.73 m^2 47.2 (A)       4. Hypertension management: advise low salt diet and home BP monitoring    Lisinopril 40 mg QD, Toprol XL 50 mg  QD      5. Metabolic Bone Disease/Secondary Hyperparathyroidism:stable  Will monitor PTH, CA and Vit D/guidelines,        5/7/2018  15yr 11mo   Magnesium 1.6 - 2.6 mg/dL 2.0     Lab Results   Component Value Date    .0 (H) 10/05/2016    CALCIUM 9.1 05/07/2018    PHOS 3.3 10/05/2016   cholecalciferol and calcium carbonate as prescribed -->MGMT deferred to general nephrology    6. Electrolytes:  Will monitor /guidelines  Lab Results   Component Value Date     05/07/2018    K 4.1 05/07/2018     05/07/2018    CO2 25 05/07/2018       7. Anemia: stable. No need for intervention    Will monitor /guidelines  Lab Results   Component Value Date    WBC 4.43 05/07/2018    HGB 12.2 (L) 05/07/2018    HCT 38.7 (L) 05/07/2018    MCV 83 05/07/2018     05/07/2018       8.  Cytopenias: no significant cytopenias will monitor as per our guidelines. Medicine list reviewed including potential causes of drug-induced cytopenias    9.Proteinuria: continue p/c ratio as per guidelines      Improving :   5/7/2018  15yr 11mo   Prot/Creat Ratio, Ur 0.00 - 0.20 0.90 (A)     10. BK virus infection screening:  will continue to monitor/ guidelines    11. Weight education: provided during the clinic visit   Body mass index is 40.5 kg/m².         12.Patient safety education regarding immunosuppression including prophylaxis posttransplant for CMV, PCP : Education provided about vaccination and prevention of infections          Follow-up:   Annual follow-up with kidney transplant clinic with repeat labs, including renal function panel with UA, urine protein/creatinine ratio, and drug trough level q3 months.  Patient also reminded to follow-up with general nephrologist.    Malou Conn NP       Education:   Material provided to the patient.  Patient reminded to call with any health changes since these can be early signs of significant complications.  Also, I advised the patient to be sure any new medications or changes of old  medications are discussed with either a pharmacist or physician knowledgeable with transplant to avoid rejection/drug toxicity related to significant drug interactions.    UNOS Patient Status  Functional Status: 80% - Normal activity with effort: some symptoms of disease  Physical Capacity: No Limitations

## 2018-05-18 LAB
C1Q1A TESTING DATE: NORMAL
CLASS I ANTIBODY COMMENTS - LUMINEX: NORMAL
SERUM COLLECTION DT - LUMINEX CLASS I: NORMAL

## 2018-06-19 ENCOUNTER — PATIENT MESSAGE (OUTPATIENT)
Dept: DIABETES | Facility: CLINIC | Age: 68
End: 2018-06-19

## 2018-06-20 NOTE — TELEPHONE ENCOUNTER
I spoke with patient via telephone.  He is interested in trying once weekly Ozempic for 90 days.  He agrees to stop Trulicity. Prescription sent to pharmacy.

## 2018-07-16 ENCOUNTER — LAB VISIT (OUTPATIENT)
Dept: LAB | Facility: HOSPITAL | Age: 68
End: 2018-07-16
Attending: PSYCHIATRY & NEUROLOGY
Payer: MEDICARE

## 2018-07-16 DIAGNOSIS — N18.6 ESRD (END STAGE RENAL DISEASE): Primary | ICD-10-CM

## 2018-07-16 DIAGNOSIS — N18.6 ESRD (END STAGE RENAL DISEASE): ICD-10-CM

## 2018-07-16 LAB
BASOPHILS # BLD AUTO: 0.03 K/UL
BASOPHILS NFR BLD: 0.5 %
DIFFERENTIAL METHOD: ABNORMAL
EOSINOPHIL # BLD AUTO: 0.1 K/UL
EOSINOPHIL NFR BLD: 1.8 %
ERYTHROCYTE [DISTWIDTH] IN BLOOD BY AUTOMATED COUNT: 14.6 %
HCT VFR BLD AUTO: 37 %
HGB BLD-MCNC: 11.8 G/DL
IMM GRANULOCYTES # BLD AUTO: 0.01 K/UL
IMM GRANULOCYTES NFR BLD AUTO: 0.2 %
LYMPHOCYTES # BLD AUTO: 2.2 K/UL
LYMPHOCYTES NFR BLD: 38.4 %
MCH RBC QN AUTO: 26.6 PG
MCHC RBC AUTO-ENTMCNC: 31.9 G/DL
MCV RBC AUTO: 83 FL
MONOCYTES # BLD AUTO: 0.5 K/UL
MONOCYTES NFR BLD: 9.6 %
NEUTROPHILS # BLD AUTO: 2.8 K/UL
NEUTROPHILS NFR BLD: 49.5 %
NRBC BLD-RTO: 0 /100 WBC
PLATELET # BLD AUTO: 182 K/UL
PMV BLD AUTO: 12.6 FL
RBC # BLD AUTO: 4.44 M/UL
WBC # BLD AUTO: 5.65 K/UL

## 2018-07-16 PROCEDURE — 85025 COMPLETE CBC W/AUTO DIFF WBC: CPT

## 2018-07-16 PROCEDURE — 80069 RENAL FUNCTION PANEL: CPT

## 2018-07-16 PROCEDURE — 36415 COLL VENOUS BLD VENIPUNCTURE: CPT | Mod: PO

## 2018-07-17 ENCOUNTER — OFFICE VISIT (OUTPATIENT)
Dept: NEPHROLOGY | Facility: CLINIC | Age: 68
End: 2018-07-17
Payer: MEDICARE

## 2018-07-17 VITALS
DIASTOLIC BLOOD PRESSURE: 74 MMHG | WEIGHT: 315 LBS | HEIGHT: 74 IN | SYSTOLIC BLOOD PRESSURE: 128 MMHG | HEART RATE: 71 BPM | BODY MASS INDEX: 40.43 KG/M2

## 2018-07-17 DIAGNOSIS — R80.1 PERSISTENT PROTEINURIA: ICD-10-CM

## 2018-07-17 DIAGNOSIS — E66.01 MORBID OBESITY DUE TO EXCESS CALORIES: ICD-10-CM

## 2018-07-17 DIAGNOSIS — N18.30 CKD (CHRONIC KIDNEY DISEASE), STAGE III: ICD-10-CM

## 2018-07-17 DIAGNOSIS — M10.9 GOUT, ARTHRITIS: ICD-10-CM

## 2018-07-17 DIAGNOSIS — I10 ESSENTIAL HYPERTENSION: ICD-10-CM

## 2018-07-17 DIAGNOSIS — Z94.0 DECEASED-DONOR KIDNEY TRANSPLANT: Primary | ICD-10-CM

## 2018-07-17 DIAGNOSIS — I87.8 VENOUS STASIS: ICD-10-CM

## 2018-07-17 DIAGNOSIS — Z94.1 HEART TRANSPLANTED: ICD-10-CM

## 2018-07-17 LAB
ALBUMIN SERPL BCP-MCNC: 3.1 G/DL
ANION GAP SERPL CALC-SCNC: 13 MMOL/L
BUN SERPL-MCNC: 20 MG/DL
CALCIUM SERPL-MCNC: 9.2 MG/DL
CHLORIDE SERPL-SCNC: 106 MMOL/L
CO2 SERPL-SCNC: 23 MMOL/L
CREAT SERPL-MCNC: 1.9 MG/DL
EST. GFR  (AFRICAN AMERICAN): 41 ML/MIN/1.73 M^2
EST. GFR  (NON AFRICAN AMERICAN): 35.4 ML/MIN/1.73 M^2
GLUCOSE SERPL-MCNC: 121 MG/DL
PHOSPHATE SERPL-MCNC: 3.1 MG/DL
POTASSIUM SERPL-SCNC: 4.4 MMOL/L
SODIUM SERPL-SCNC: 142 MMOL/L

## 2018-07-17 PROCEDURE — 99214 OFFICE O/P EST MOD 30 MIN: CPT | Mod: S$PBB,,, | Performed by: INTERNAL MEDICINE

## 2018-07-17 PROCEDURE — 99999 PR PBB SHADOW E&M-EST. PATIENT-LVL III: CPT | Mod: PBBFAC,,, | Performed by: INTERNAL MEDICINE

## 2018-07-17 PROCEDURE — 99213 OFFICE O/P EST LOW 20 MIN: CPT | Mod: PBBFAC,PO | Performed by: INTERNAL MEDICINE

## 2018-07-17 NOTE — PROGRESS NOTES
Subjective:       Patient ID: Nigel Albrecht is a 68 y.o. Black or  male who presents for new evaluation of Chronic Kidney Disease; Kidney Transplant; Hypertension; and Proteinuria    Hypertension   Pertinent negatives include no chest pain, headaches, neck pain, palpitations or shortness of breath.    Patient is a  Black or  male who received a heart and kidney transplant on 02. He has CKD stage 3 - GFR 30-59 and his baseline creatinine is between 1.4-1.6mg/dl. He takes cellcept and sirolimus for maintenance immunosuppression. He denies any recent hospitalizations or ER visits since his previous clinic visit.he has been followed in the transplant Center at [place].  He is now here to establish his care locally.  His main issues are that he has been weight with a BMI of 40.  His hemoglobin A1c is more than 10 with uncontrolled type II diabetes.  He has seen Caitlin Alcantar NP  for diabetic management.  Patient has been dealing with his son's illness in last 2 years who just  and during this time he gained a lot of weight and A1c went from 8 to 10 ; he is off steroids for 7 years now       Have been walking 20 min a day     2014 HbA1c came down to 8.1       6/30/15  CV evaluation normal DSE    2016  A1c down to 7.6 (9.4) still has LE edema ;started on torsemide ; Rapamycin increased to 2 mg ; C normal coronoaries; Lisinopril raised to 40 mg     10/2016 Rapa level 13 and dose reduced to 3 mg       2018 patient comes back for follow-up after a gap of 2 years.  Interim records reviewed from [facility] at the transplant Center          Review of Systems   Constitutional: Negative.  Negative for activity change, appetite change, chills, diaphoresis, fatigue and fever.   HENT: Negative.  Negative for congestion and trouble swallowing.    Eyes: Negative.    Respiratory: Positive for apnea. Negative for cough, chest tightness, shortness of breath and wheezing.         " ZEINAB symtoms    Cardiovascular: Negative.  Negative for chest pain, palpitations and leg swelling.   Gastrointestinal: Negative.  Negative for abdominal distention, abdominal pain, nausea and vomiting.   Genitourinary: Negative.  Negative for decreased urine volume, difficulty urinating, dysuria, enuresis, flank pain, frequency, hematuria, penile swelling, scrotal swelling and urgency.   Musculoskeletal: Negative.  Negative for arthralgias, back pain, joint swelling and neck pain.   Skin: Negative for rash.   Neurological: Negative.  Negative for tremors, seizures and headaches.   Psychiatric/Behavioral: Negative.  Negative for confusion and sleep disturbance. The patient is not nervous/anxious.        Objective:       Vitals:    07/17/18 1207   BP: 128/74   Pulse: 71   Weight: (!) 143.8 kg (317 lb 0.3 oz)   Height: 6' 2" (1.88 m)       5 ib weight loss in 3 mo ( 317 IN 6/2015 ) overall stable     Physical Exam   Constitutional: He is oriented to person, place, and time. He appears well-developed and well-nourished. No distress.   HENT:   Head: Normocephalic.   Eyes: Conjunctivae and EOM are normal. Pupils are equal, round, and reactive to light. No scleral icterus.   Neck: Normal range of motion. Neck supple. No JVD present. No thyromegaly present.   Cardiovascular: Normal rate, regular rhythm, normal heart sounds and intact distal pulses.  Exam reveals no gallop and no friction rub.    No murmur heard.  Loud P2   Pulmonary/Chest: Effort normal and breath sounds normal. No stridor. No respiratory distress. He has no wheezes. He has no rales. He exhibits no tenderness.   Abdominal: Soft. Bowel sounds are normal. He exhibits no distension and no mass. There is no tenderness. There is no rebound and no guarding.    positive truncal obesity   Musculoskeletal: Normal range of motion. He exhibits edema.   Neurological: He is alert and oriented to person, place, and time. He has normal reflexes. No cranial nerve deficit. " He exhibits normal muscle tone. Coordination normal.   Skin: Skin is warm and dry. No rash noted. He is not diaphoretic. No erythema. No pallor.   Psychiatric: He has a normal mood and affect. His behavior is normal. Judgment and thought content normal.         Lab Results   Component Value Date    WBC 5.65 2018    HGB 11.8 (L) 2018    HCT 37.0 (L) 2018    MCV 83 2018     2018     Lab Results   Component Value Date    CREATININE 1.9 (H) 2018    BUN 20 2018     2018    K 4.4 2018     2018    CO2 23 2018     Lab Results   Component Value Date    .0 (H) 10/05/2016    CALCIUM 9.2 2018    PHOS 3.1 2018     Lab Results   Component Value Date    HGBA1C 7.5 (H) 2018     Lab Results   Component Value Date    CHOL 153 2018    CHOL 148 2017    CHOL 144 2017     Lab Results   Component Value Date    HDL 35 (L) 2018    HDL 37 (L) 2017    HDL 36 (L) 2017     Lab Results   Component Value Date    LDLCALC 87.2 2018    LDLCALC 87.2 2017    LDLCALC 79.6 2017     Lab Results   Component Value Date    TRIG 154 (H) 2018    TRIG 119 2017    TRIG 142 2017     Lab Results   Component Value Date    CHOLHDL 22.9 2018    CHOLHDL 25.0 2017    CHOLHDL 25.0 2017           GFR 35% with cystatin C         Assessment:       1. -donor kidney/ heart transplant performed at [facility] on 2002 - ESRD etiology unknown; ESHF due to IHSS    2. CKD (chronic kidney disease), stage III    3. Essential hypertension    4. Morbid obesity due to excess calories    5. Gout, arthritis    6. Persistent proteinuria    7. Heart transplanted        Plan:         1.  Chronic kidney disease stage III: stable ; diabetic nephropathy ;  GFR 35 % stable in last many years ; check Vitamin D level     2.  Kidney transplant: immunosuppression is doing well on current  immunosuppressive agents.  Please refer to the transplant evaluation from 2017 for discussions with heart transplant for Prograf based regimen and considering kidney biopsy.      3.  Morbid obesity and diabetes type II controlled: follow up Caitlin Alcantar ; doing better now ; +luz feedback followed up in diabetic clinic as well with SHERRILL Hernandez    4.  Hypertension:  with Hx of IHSS and CHF now heart transplant ; stable    5. Proteinuria : on ACEI in remission ; consider Atrasentan once available     6. Edema:  Needs stockings but he doesn't use it ;  torsemide 5 mg tab --1-2 ( not taking daily) ; adequate diuresis will help heart, BP and leg swelling . Consult vascular for venous insufficiency              fup 3 months       Laith Wilkerson MD

## 2018-07-17 NOTE — LETTER
July 17, 2018      Malou Conn, BELÉN  [address]  [facility]  1st Floor Clinic  [place] [address]           [facility] - Nephrology  [address] [facility] [place] [address]  Phone: [phone]  Fax: [phone]          Patient: Nigel Albrecht   MR Number: [id number]   YOB: 1950   Date of Visit: 7/17/2018       Dear Malou Conn:    Thank you for referring Nigel Albrecht to me for evaluation. Attached you will find relevant portions of my assessment and plan of care.    If you have questions, please do not hesitate to call me. I look forward to following Nigel Albrecht along with you.    Sincerely,    Laith Wilkerson MD    Enclosure  CC:  Krystin Zimmerman MD    If you would like to receive this communication electronically, please contact [email or web address] or [phone] to request more information on [employer] Link access.    For providers and/or their staff who would like to refer a patient to [facility], please contact us through our one-stop-shop provider referral line, [facility], at [phone].    If you feel you have received this communication in error or would no longer like to receive these types of communications, please e-mail [email or web address]

## 2018-07-31 DIAGNOSIS — Z79.4 UNCONTROLLED TYPE 2 DIABETES MELLITUS WITH HYPEROSMOLARITY WITHOUT COMA, WITH LONG-TERM CURRENT USE OF INSULIN: Primary | ICD-10-CM

## 2018-07-31 DIAGNOSIS — E11.00 UNCONTROLLED TYPE 2 DIABETES MELLITUS WITH HYPEROSMOLARITY WITHOUT COMA, WITH LONG-TERM CURRENT USE OF INSULIN: Primary | ICD-10-CM

## 2018-08-08 ENCOUNTER — LAB VISIT (OUTPATIENT)
Dept: LAB | Facility: HOSPITAL | Age: 68
End: 2018-08-08
Attending: UROLOGY
Payer: MEDICARE

## 2018-08-08 DIAGNOSIS — Z12.5 PROSTATE CANCER SCREENING: ICD-10-CM

## 2018-08-08 DIAGNOSIS — N52.9 ERECTILE DYSFUNCTION, UNSPECIFIED ERECTILE DYSFUNCTION TYPE: ICD-10-CM

## 2018-08-08 DIAGNOSIS — Z79.4 UNCONTROLLED TYPE 2 DIABETES MELLITUS WITH HYPEROSMOLARITY WITHOUT COMA, WITH LONG-TERM CURRENT USE OF INSULIN: ICD-10-CM

## 2018-08-08 DIAGNOSIS — E11.00 UNCONTROLLED TYPE 2 DIABETES MELLITUS WITH HYPEROSMOLARITY WITHOUT COMA, WITH LONG-TERM CURRENT USE OF INSULIN: ICD-10-CM

## 2018-08-08 LAB
ALBUMIN SERPL BCP-MCNC: 3.2 G/DL
ALP SERPL-CCNC: 111 U/L
ALT SERPL W/O P-5'-P-CCNC: 26 U/L
ANION GAP SERPL CALC-SCNC: 10 MMOL/L
AST SERPL-CCNC: 34 U/L
BILIRUB SERPL-MCNC: 0.4 MG/DL
BUN SERPL-MCNC: 17 MG/DL
CALCIUM SERPL-MCNC: 9 MG/DL
CHLORIDE SERPL-SCNC: 105 MMOL/L
CO2 SERPL-SCNC: 26 MMOL/L
COMPLEXED PSA SERPL-MCNC: 1.8 NG/ML
CREAT SERPL-MCNC: 1.9 MG/DL
EST. GFR  (AFRICAN AMERICAN): 41 ML/MIN/1.73 M^2
EST. GFR  (NON AFRICAN AMERICAN): 35.4 ML/MIN/1.73 M^2
ESTIMATED AVG GLUCOSE: 157 MG/DL
GLUCOSE SERPL-MCNC: 88 MG/DL
HBA1C MFR BLD HPLC: 7.1 %
POTASSIUM SERPL-SCNC: 3.9 MMOL/L
PROT SERPL-MCNC: 7.3 G/DL
SODIUM SERPL-SCNC: 141 MMOL/L

## 2018-08-08 PROCEDURE — 36415 COLL VENOUS BLD VENIPUNCTURE: CPT | Mod: PO

## 2018-08-08 PROCEDURE — 84153 ASSAY OF PSA TOTAL: CPT

## 2018-08-08 PROCEDURE — 83036 HEMOGLOBIN GLYCOSYLATED A1C: CPT

## 2018-08-08 PROCEDURE — 80053 COMPREHEN METABOLIC PANEL: CPT

## 2018-08-09 ENCOUNTER — TELEPHONE (OUTPATIENT)
Dept: UROLOGY | Facility: CLINIC | Age: 68
End: 2018-08-09

## 2018-08-09 NOTE — TELEPHONE ENCOUNTER
----- Message from Eunice Houston sent at 8/9/2018  2:23 PM CDT -----  Contact: self  Patient need to speak with nurse.   Patient states accidentally cancelled appointment scheduled for 8/16/2018 at 1:40pm.   Please call pt at [phone]    FYI:   I was unable to reschedule appointment

## 2018-08-20 ENCOUNTER — OFFICE VISIT (OUTPATIENT)
Dept: UROLOGY | Facility: CLINIC | Age: 68
End: 2018-08-20
Payer: MEDICARE

## 2018-08-20 VITALS
HEIGHT: 74 IN | SYSTOLIC BLOOD PRESSURE: 144 MMHG | BODY MASS INDEX: 39.9 KG/M2 | HEART RATE: 73 BPM | DIASTOLIC BLOOD PRESSURE: 78 MMHG | WEIGHT: 310.88 LBS

## 2018-08-20 DIAGNOSIS — N40.0 BENIGN PROSTATIC HYPERPLASIA, UNSPECIFIED WHETHER LOWER URINARY TRACT SYMPTOMS PRESENT: ICD-10-CM

## 2018-08-20 DIAGNOSIS — N52.9 ERECTILE DYSFUNCTION, UNSPECIFIED ERECTILE DYSFUNCTION TYPE: ICD-10-CM

## 2018-08-20 DIAGNOSIS — Z12.5 PROSTATE CANCER SCREENING: Primary | ICD-10-CM

## 2018-08-20 LAB
BILIRUB SERPL-MCNC: NORMAL MG/DL
BLOOD URINE, POC: NORMAL
COLOR, POC UA: YELLOW
GLUCOSE UR QL STRIP: NORMAL
KETONES UR QL STRIP: NORMAL
LEUKOCYTE ESTERASE URINE, POC: NORMAL
NITRITE, POC UA: NORMAL
PH, POC UA: 5
PROTEIN, POC: NORMAL
SPECIFIC GRAVITY, POC UA: 1.01
UROBILINOGEN, POC UA: NORMAL

## 2018-08-20 PROCEDURE — 81002 URINALYSIS NONAUTO W/O SCOPE: CPT | Mod: PBBFAC | Performed by: UROLOGY

## 2018-08-20 PROCEDURE — 99999 PR PBB SHADOW E&M-EST. PATIENT-LVL III: CPT | Mod: PBBFAC,,, | Performed by: UROLOGY

## 2018-08-20 PROCEDURE — 99214 OFFICE O/P EST MOD 30 MIN: CPT | Mod: S$PBB,,, | Performed by: UROLOGY

## 2018-08-20 PROCEDURE — 99213 OFFICE O/P EST LOW 20 MIN: CPT | Mod: PBBFAC | Performed by: UROLOGY

## 2018-08-20 RX ORDER — SILDENAFIL CITRATE 20 MG/1
20 TABLET ORAL
Qty: 50 TABLET | Refills: 11 | Status: SHIPPED | OUTPATIENT
Start: 2018-08-20 | End: 2019-07-10

## 2018-08-20 RX ORDER — SILDENAFIL CITRATE 20 MG/1
20 TABLET ORAL
Qty: 50 TABLET | Refills: 11 | OUTPATIENT
Start: 2018-08-20 | End: 2018-08-20 | Stop reason: SDUPTHER

## 2018-08-20 NOTE — PROGRESS NOTES
Chief Complaint: Prostate cancer screening/BPH     HPI:   18: Not needing flomax, no caffeine still.  Took doxazosin for BP for a while.  Sildenafil working for ED.  8/10/17: Flomax helped a bit but he stopped it not needing it; didn't get the sleep study.  No LUTS now.  Cut back on caffeine to one cup.    3/30/16: 64 yo man s/p renal transplant in  here with nocturia x3, was x0-1 4-5 months ago.  No hesitancy.  Some dribble when done putting things away.Urgency.  Some double voiding.  No abd/pelvic pain and no exac/rel factors.  No hematuria.  No urolithiasis.  No urinary bother.  No  history.  Normal sexual function.  Not usually constipated.  Viagra for ED rx but not used but once.  No BPH meds.    Allergies:  No known drug allergies    Medications:  has a current medication list which includes the following prescription(s): albuterol, atorvastatin, blood pressure kit-extra large, calcium carbonate, cetirizine, cholecalciferol (vitamin d3), diltiazem, doxazosin, fluticasone, insulin nph/reg human, lisinopril, low-dose aspirin, meclizine, metoprolol succinate, multivit-min/fa/lycopen/lutein, mycophenolate, onetouch verio, semaglutide, sildenafil, sirolimus, and torsemide.    Review of Systems:  General: No fever, chills, fatigability, or weight loss.  Skin: No rashes, itching, or changes in color or texture of skin.  Chest: Denies PETERSON, cyanosis, wheezing, cough, and sputum production.  Abdomen: Appetite fine. No weight loss. Denies diarrhea, abdominal pain, hematemesis, or blood in stool.  Musculoskeletal: No joint stiffness or swelling. Some back pain.  : As above.  All other review of systems negative.    PMH:   has a past medical history of Allergic rhinitis (2013), Blood transfusion, Cataract, CKD (chronic kidney disease), stage III (2014), -donor kidney transplant, Diabetes mellitus, type 2 (2013), Gout, arthritis (2013), Heart attack, Heart transplanted,  Hyperlipidemia (6/26/2013), Hypertension, Morbid obesity (6/26/2013), Organ transplant (2002), and Prophylactic immunotherapy.    PSH:   has a past surgical history that includes Kidney transplant; Heart transplant; Revision total hip arthroplasty; Eye surgery; Cataract extraction (Bilateral); and COLONOSCOPY (N/A, 4/24/2015).    FamHx: family history includes Diabetes in his brother and maternal grandmother; Early death in his brother; Heart disease in his brother; Hyperlipidemia in his brother and sister; Hypertension in his brother; Kidney disease in his son; Stroke in his brother and mother.    SocHx:  reports that he quit smoking about 34 years ago. His smoking use included cigarettes. He has a 20.00 pack-year smoking history. he has never used smokeless tobacco. He reports that he drinks about 0.6 oz of alcohol per week. He reports that he does not use drugs.      Physical Exam:  Vitals:    08/20/18 1257   BP: (!) 144/78   Pulse: 73     General: A&Ox3, no apparent distress, no deformities  Neck: No masses, normal thyroid  Lungs: normal inspiration, no use of accessory muscles  Heart: normal pulse, no arrhythmias  Abdomen: Soft, NT, ND  Skin: The skin is warm and dry. No jaundice.  Ext: No c/c/e.  :   8/17: Test desc chavo, no abnormalities of epididymus. Penis normal, with normal penile and scrotal skin. Meatus normal. Normal rectal tone, no hemorrhoids. Prost 40 gm no nodules or masses appreciated. SV not palpable. Perineum and anus normal.    Labs/Studies:   Urinalysis performed in clinic, summary: UA normal  PSA     6/13: 1.3    6/14: 1.1    6/15: 1.7    5/17: 1.5    7/18: 1.8  Bladder Scan performed in office:     3/16: PVR 24 ml.    Impression/Plan:   1. Doing fine now.  Annual prostate screening.  PSA/RTC one year.  2. Discussed ED.  Sildenafil rx.  3. HTN: discussed and referred to PCP for further management.

## 2018-08-22 ENCOUNTER — OFFICE VISIT (OUTPATIENT)
Dept: DIABETES | Facility: CLINIC | Age: 68
End: 2018-08-22
Payer: MEDICARE

## 2018-08-22 VITALS
WEIGHT: 312.31 LBS | DIASTOLIC BLOOD PRESSURE: 88 MMHG | HEIGHT: 74 IN | SYSTOLIC BLOOD PRESSURE: 144 MMHG | BODY MASS INDEX: 40.08 KG/M2

## 2018-08-22 DIAGNOSIS — I10 ESSENTIAL HYPERTENSION: ICD-10-CM

## 2018-08-22 DIAGNOSIS — E66.01 MORBID OBESITY WITH BMI OF 40.0-44.9, ADULT: ICD-10-CM

## 2018-08-22 DIAGNOSIS — E78.5 HYPERLIPIDEMIA, UNSPECIFIED HYPERLIPIDEMIA TYPE: ICD-10-CM

## 2018-08-22 DIAGNOSIS — Z79.4 TYPE 2 DIABETES MELLITUS WITH STAGE 3 CHRONIC KIDNEY DISEASE, WITH LONG-TERM CURRENT USE OF INSULIN: Primary | ICD-10-CM

## 2018-08-22 DIAGNOSIS — N18.30 TYPE 2 DIABETES MELLITUS WITH STAGE 3 CHRONIC KIDNEY DISEASE, WITH LONG-TERM CURRENT USE OF INSULIN: Primary | ICD-10-CM

## 2018-08-22 DIAGNOSIS — E11.22 TYPE 2 DIABETES MELLITUS WITH STAGE 3 CHRONIC KIDNEY DISEASE, WITH LONG-TERM CURRENT USE OF INSULIN: Primary | ICD-10-CM

## 2018-08-22 LAB — GLUCOSE SERPL-MCNC: 150 MG/DL (ref 70–110)

## 2018-08-22 PROCEDURE — 99214 OFFICE O/P EST MOD 30 MIN: CPT | Mod: S$PBB,,, | Performed by: NURSE PRACTITIONER

## 2018-08-22 PROCEDURE — 99214 OFFICE O/P EST MOD 30 MIN: CPT | Mod: PBBFAC,PO | Performed by: NURSE PRACTITIONER

## 2018-08-22 PROCEDURE — 99999 PR PBB SHADOW E&M-EST. PATIENT-LVL IV: CPT | Mod: PBBFAC,,, | Performed by: NURSE PRACTITIONER

## 2018-08-22 PROCEDURE — 82948 REAGENT STRIP/BLOOD GLUCOSE: CPT | Mod: PBBFAC,PO | Performed by: NURSE PRACTITIONER

## 2018-08-22 NOTE — PROGRESS NOTES
"PCP: Dr. Stanley Chanel    HISTORY OF PRESENT ILLNESS: 68 year old  male patient is in clinic today for uncontrolled diabetes.   He has had diabetes for many years and has attended diabetes education in the past.  Patient currently takes Humulin 70 / 30 for diabetes.  He had a cardiac and renal transplant in 2002.  Most recent A1C was 7.1, ADA recommends less than 7.0.  Per meter, fasting BGs are 66, 73 - 149; hs 116 - 199.      Patient denies polyuria, polydipsia, polyphagia, or blurred vision.  Also denies nausea, vomiting, diarrhea.  Has rare hypoglycemia, which he treats with OJ.  He denies any recent hospitalizations or emergency room visits.     Of note, he was switched to Ozempic about 2 months ago and since starting this GLP-1, patient has not noticed much change in blood sugar readings or weight control.     Height: 6 ' 2 "  Weight: 312 pounds, BMI 40.09  Blood Glucose reading this AM: 141 mg/dl fasting  Blood Glucose reading in clinic: 122 mg/dl at 10:48 am    DIABETES MEDICATIONS:   Humulin 70 / 30, 50 units BID ac  Ozempic 1 mg weekly     LABS REVIEWED.    REVIEW OF SYSTEMS:  GENERAL: Denies fever, chills, change in appetite.  HEENT: Has minor impaired hearing, denies dysphagia. History of vertigo.   RESPIRATORY: Denies shortness of breath, cough or wheezing.  CARDIOVASCULAR: Denies chest pain, palpitations.  GI: Denies hematochezia.  : Denies hematuria, dysuria or frequency.  MS: Normal gait. Denies difficulty with mobility, muscle or joint pain.   SKIN: Denies rashes and lesions.  NEURO: Occasional numbness, tingling in feet.   PSYCH: Denies depression or anxiety. No suicidal ideations.  ENDO: See HPI.    STANDARDS OF CARE:   Eye exam: [facility], Last exam May 2018 - he had cataract removed  Dental exam: Has regular exams; denies gums bleeding.  Podiatry exam: None  ACE / ARB: Yes  Statin: Yes    ACTIVITY LEVEL: Stays active in the garden and " yard. Occasionally walks.  MEAL PLANNING: Number of meals per day - 3. Number of snacks per day - occasionally.  Breakfast can be cereal with lactose free milk or 2 eggs, toast or eggs grits and jaffe.  Mid morning snack can be chips or cookies.  Lunch can be sandwich with price, fruit.  Dinner is usually red beans, rice, sausage, broccolli salad.  Can then have a lite beer.  He drinks mostly water.      BLOOD GLUCOSE TESTING: Self-monitoring with ONE TOUCH VERIO meter  SOCIAL HISTORY: . Lives with spouse. Has 2 children. Patient retired as manager for [employer].     PHYSICAL EXAMINATION:  GENERAL: WDWN  male in no acute distress, responds appropriately.  AAO X 3.   NECK: Supple, no thyromegaly, no cervical or supraclavicular lymphadenopathy, no carotid bruit.  HEART: Regular rate and rhythm. No rubs, murmurs or gallops.   LUNGS: CTA bilaterally. Unlabored breathing, no use of accessory muscles.  MUSCULOSKELETAL: Normal gait and muscle strength.  ABDOMEN: Active bowel sounds X 4, no masses or tenderness.   SKIN: Warm, dry skin. No lesions or abrasions.  NEUROLOGIC: Cranial nerves II-XII grossly intact.   FOOT EXAMINATION: Protective Sensation (w/ 10 gram monofilament):  Right: Intact Left: Intact //  Visual Inspection:  Normal -  Bilateral, except dry skin. +1 pitting edema to ankles, lower extremities. //  Pedal Pulses:  Right: Present  Left: Present    ASSESSMENT:  1. Diabetes Type 2, ED, s / p renal and cardiac transplant - per meter, improving control on Humulin 70 / 30, Ozempic, A1C 7.1  2. Hypertension - good control on diltiazem, Lisinopril, Lasix.  3. Hyperlipidemia - good control on Lipitor  4. Morbid Obesity - BMI 40.09    PLAN:  1.) Patient was instructed to monitor blood glucose 2 - 3 x daily, fasting and ac dinner or at bedtime.  Discussed ADA goal for fasting blood sugar, 80 - 130 mg/dL; pp blood sugars below 180 mg/dl. Also, discussed prevention of hypoglycemia  and the need to adjust goals to higher levels if persistent hypoglycemia.  Reminded him to bring records or meter to each visit for review.   2.) Reviewed pathophysiology of diabetes, complications related to the disease, importance of annual dilated eye exam and daily foot examination.  3.) We discussed the ADA recommendations: Hemoglobin A1c below 7.0 %. All patients with diabetes should be on statins unless contraindicated.  ACE or ARB therapy if not contraindicated.    4.) Continue  Humulin 70 / 30, 55 - 60 units BID ac.  Continue Ozempic 1 mg weekly - he is tolerating medication as needed.  For now, we will continue with Ozempic, but patient has not really noticed a significant change in BG readings since starting this medication and Ozempic is more expensive.  In the future, may consider restarting Trulicity.   5.) Meal planning teaching: Carbohydrate definition - one serving is 15 gms. Carbohydrate spacing - carbohydrates should be spaced into approximately 3 meals with 2 snacks ( of one carbohydrate ) between meals or at bedtime. Increase vegetable intake to 2 or more cups of vegetables per day as well as 2 fruit servings. Recommended low saturated fat, low sodium diet to aid in control of hypertension and cholesterol.  6.) Discussed activity, benefits, methods, and precautions. Recommended patient start some form of exercise and increase as tolerated to 30 minutes per day to facilitate weight loss and aid in control of BGs.  7.) Return to clinic in 3 months for follow up. Advised patient to call clinic with any questions or concerns.     Jael Garrison, NP-C, CDE

## 2018-08-27 ENCOUNTER — PATIENT MESSAGE (OUTPATIENT)
Dept: NEPHROLOGY | Facility: CLINIC | Age: 68
End: 2018-08-27

## 2018-08-27 ENCOUNTER — TELEPHONE (OUTPATIENT)
Dept: NEPHROLOGY | Facility: CLINIC | Age: 68
End: 2018-08-27

## 2018-08-27 NOTE — TELEPHONE ENCOUNTER
Called patient in regards to the message he sent through the portal. Patient would like to know since he was taking Nexium, was that part of the problem that contributed to his CKD? He saw some legal litigations in regards to that. Please advise

## 2018-09-04 DIAGNOSIS — I10 ESSENTIAL HYPERTENSION: ICD-10-CM

## 2018-09-05 RX ORDER — LISINOPRIL 40 MG/1
TABLET ORAL
Qty: 90 TABLET | Refills: 1 | Status: SHIPPED | OUTPATIENT
Start: 2018-09-05 | End: 2018-12-26 | Stop reason: SDUPTHER

## 2018-10-18 ENCOUNTER — LAB VISIT (OUTPATIENT)
Dept: LAB | Facility: HOSPITAL | Age: 68
End: 2018-10-18
Attending: INTERNAL MEDICINE
Payer: MEDICARE

## 2018-10-18 DIAGNOSIS — I10 ESSENTIAL HYPERTENSION: ICD-10-CM

## 2018-10-18 DIAGNOSIS — Z94.1 HEART TRANSPLANTED: ICD-10-CM

## 2018-10-18 DIAGNOSIS — E66.01 MORBID OBESITY DUE TO EXCESS CALORIES: ICD-10-CM

## 2018-10-18 DIAGNOSIS — Z94.0 DECEASED-DONOR KIDNEY TRANSPLANT: ICD-10-CM

## 2018-10-18 DIAGNOSIS — R80.1 PERSISTENT PROTEINURIA: ICD-10-CM

## 2018-10-18 DIAGNOSIS — M10.9 GOUT, ARTHRITIS: ICD-10-CM

## 2018-10-18 DIAGNOSIS — N18.30 CKD (CHRONIC KIDNEY DISEASE), STAGE III: ICD-10-CM

## 2018-10-18 LAB
25(OH)D3+25(OH)D2 SERPL-MCNC: 36 NG/ML
ALBUMIN SERPL BCP-MCNC: 3.1 G/DL
ANION GAP SERPL CALC-SCNC: 5 MMOL/L
BASOPHILS # BLD AUTO: 0.02 K/UL
BASOPHILS NFR BLD: 0.4 %
BUN SERPL-MCNC: 16 MG/DL
CALCIUM SERPL-MCNC: 9 MG/DL
CHLORIDE SERPL-SCNC: 109 MMOL/L
CO2 SERPL-SCNC: 29 MMOL/L
CREAT SERPL-MCNC: 1.8 MG/DL
DIFFERENTIAL METHOD: ABNORMAL
EOSINOPHIL # BLD AUTO: 0.1 K/UL
EOSINOPHIL NFR BLD: 2 %
ERYTHROCYTE [DISTWIDTH] IN BLOOD BY AUTOMATED COUNT: 14.9 %
EST. GFR  (AFRICAN AMERICAN): 43.7 ML/MIN/1.73 M^2
EST. GFR  (NON AFRICAN AMERICAN): 37.8 ML/MIN/1.73 M^2
GLUCOSE SERPL-MCNC: 86 MG/DL
HCT VFR BLD AUTO: 39.9 %
HGB BLD-MCNC: 12.2 G/DL
IMM GRANULOCYTES # BLD AUTO: 0.02 K/UL
IMM GRANULOCYTES NFR BLD AUTO: 0.4 %
LYMPHOCYTES # BLD AUTO: 2.1 K/UL
LYMPHOCYTES NFR BLD: 41.6 %
MCH RBC QN AUTO: 25.8 PG
MCHC RBC AUTO-ENTMCNC: 30.6 G/DL
MCV RBC AUTO: 84 FL
MONOCYTES # BLD AUTO: 0.4 K/UL
MONOCYTES NFR BLD: 8.9 %
NEUTROPHILS # BLD AUTO: 2.3 K/UL
NEUTROPHILS NFR BLD: 46.7 %
NRBC BLD-RTO: 0 /100 WBC
PHOSPHATE SERPL-MCNC: 3.3 MG/DL
PLATELET # BLD AUTO: 201 K/UL
PMV BLD AUTO: 11.3 FL
POTASSIUM SERPL-SCNC: 4.3 MMOL/L
RBC # BLD AUTO: 4.73 M/UL
SODIUM SERPL-SCNC: 143 MMOL/L
WBC # BLD AUTO: 4.93 K/UL

## 2018-10-18 PROCEDURE — 36415 COLL VENOUS BLD VENIPUNCTURE: CPT | Mod: PO

## 2018-10-18 PROCEDURE — 85025 COMPLETE CBC W/AUTO DIFF WBC: CPT

## 2018-10-18 PROCEDURE — 82306 VITAMIN D 25 HYDROXY: CPT

## 2018-10-18 PROCEDURE — 80069 RENAL FUNCTION PANEL: CPT

## 2018-10-18 PROCEDURE — 80195 ASSAY OF SIROLIMUS: CPT

## 2018-10-19 LAB — SIROLIMUS BLD-MCNC: 6.9 NG/ML

## 2018-10-20 LAB
CYSTATIN C SERPL-MCNC: 1.93 MG/L
GFR/BSA.PRED SERPLBLD CYS-BASED-ARV: 31 ML/MIN/BSA

## 2018-10-30 ENCOUNTER — OFFICE VISIT (OUTPATIENT)
Dept: NEPHROLOGY | Facility: CLINIC | Age: 68
End: 2018-10-30
Payer: MEDICARE

## 2018-10-30 VITALS
WEIGHT: 314.63 LBS | HEART RATE: 76 BPM | SYSTOLIC BLOOD PRESSURE: 142 MMHG | HEIGHT: 74 IN | DIASTOLIC BLOOD PRESSURE: 90 MMHG | BODY MASS INDEX: 40.38 KG/M2

## 2018-10-30 DIAGNOSIS — N18.30 CKD (CHRONIC KIDNEY DISEASE), STAGE III: ICD-10-CM

## 2018-10-30 DIAGNOSIS — R80.1 PERSISTENT PROTEINURIA: ICD-10-CM

## 2018-10-30 DIAGNOSIS — I10 ESSENTIAL HYPERTENSION: ICD-10-CM

## 2018-10-30 DIAGNOSIS — E66.01 MORBID OBESITY DUE TO EXCESS CALORIES: ICD-10-CM

## 2018-10-30 DIAGNOSIS — Z94.1 HEART TRANSPLANTED: ICD-10-CM

## 2018-10-30 DIAGNOSIS — M10.9 GOUT, ARTHRITIS: ICD-10-CM

## 2018-10-30 DIAGNOSIS — I87.8 VENOUS STASIS: ICD-10-CM

## 2018-10-30 DIAGNOSIS — Z94.0 DECEASED-DONOR KIDNEY TRANSPLANT: Primary | ICD-10-CM

## 2018-10-30 PROCEDURE — 99999 PR PBB SHADOW E&M-EST. PATIENT-LVL III: CPT | Mod: PBBFAC,,, | Performed by: INTERNAL MEDICINE

## 2018-10-30 PROCEDURE — 99213 OFFICE O/P EST LOW 20 MIN: CPT | Mod: PBBFAC,PO | Performed by: INTERNAL MEDICINE

## 2018-10-30 PROCEDURE — 99214 OFFICE O/P EST MOD 30 MIN: CPT | Mod: S$PBB,,, | Performed by: INTERNAL MEDICINE

## 2018-10-30 RX ORDER — DILTIAZEM HYDROCHLORIDE 240 MG/1
1 CAPSULE, EXTENDED RELEASE ORAL DAILY
COMMUNITY
Start: 2018-09-24 | End: 2020-09-02 | Stop reason: SDUPTHER

## 2018-10-30 RX ORDER — AMOXICILLIN 500 MG/1
CAPSULE ORAL
Refills: 0 | COMMUNITY
Start: 2018-10-08 | End: 2019-01-09

## 2018-10-30 NOTE — PROGRESS NOTES
Subjective:       Patient ID: Nigel Albrecht is a 68 y.o. Black or  male who presents for new evaluation of Kidney Transplant; Heart Transplant; and Follow-up    Hypertension   Pertinent negatives include no chest pain, headaches, neck pain, palpitations or shortness of breath.    Patient is a  Black or  male who received a heart and kidney transplant on 02. He has CKD stage 3 - GFR 30-59 and his baseline creatinine is between 1.4-1.6mg/dl. He takes cellcept and sirolimus for maintenance immunosuppression. He denies any recent hospitalizations or ER visits since his previous clinic visit.he has been followed in the transplant Center at [facility].  He is now here to establish his care locally.  His main issues are that he has been weight with a BMI of 40.  His hemoglobin A1c is more than 10 with uncontrolled type II diabetes.  He has seen Caitlin Alcantar NP  for diabetic management.  Patient has been dealing with his son's illness in last 2 years who just  and during this time he gained a lot of weight and A1c went from 8 to 10 ; he is off steroids for 7 years now       Have been walking 20 min a day     2014 HbA1c came down to 8.1       6/30/15  CV evaluation normal DSE    2016  A1c down to 7.6 (9.4) still has LE edema ;started on torsemide ; Rapamycin increased to 2 mg ; C normal coronoaries; Lisinopril raised to 40 mg     10/2016 Rapa level 13 and dose reduced to 3 mg       2018 patient comes back for follow-up after a gap of 2 years.  Interim records reviewed from [facility] at the transplant Center    10/2018 s/p Cristino ( no flow issues) . Normal Vit D : still has edema : weight stable ; s/p  eval       Review of Systems   Constitutional: Negative.  Negative for activity change, appetite change, chills, diaphoresis, fatigue and fever.   HENT: Negative.  Negative for congestion and trouble swallowing.    Eyes: Negative.    Respiratory: Positive for apnea.  "Negative for cough, chest tightness, shortness of breath and wheezing.         ZEINAB symtoms    Cardiovascular: Negative.  Negative for chest pain, palpitations and leg swelling.   Gastrointestinal: Negative.  Negative for abdominal distention, abdominal pain, nausea and vomiting.   Genitourinary: Negative.  Negative for decreased urine volume, difficulty urinating, dysuria, enuresis, flank pain, frequency, hematuria, penile swelling, scrotal swelling and urgency.   Musculoskeletal: Negative.  Negative for arthralgias, back pain, joint swelling and neck pain.   Skin: Negative for rash.   Neurological: Negative.  Negative for tremors, seizures and headaches.   Psychiatric/Behavioral: Negative.  Negative for confusion and sleep disturbance. The patient is not nervous/anxious.        Objective:       Vitals:    10/30/18 0936   BP: (!) 142/90   Pulse: 76   Weight: (!) 142.7 kg (314 lb 9.5 oz)   Height: 6' 2" (1.88 m)       5 ib weight loss in 3 mo ( 317 IN 6/2015 ) overall stable x 3 years     Physical Exam   Constitutional: He is oriented to person, place, and time. He appears well-developed and well-nourished. No distress.   HENT:   Head: Normocephalic.   Eyes: Conjunctivae and EOM are normal. Pupils are equal, round, and reactive to light. No scleral icterus.   Neck: Normal range of motion. Neck supple. No JVD present. No thyromegaly present.   Cardiovascular: Normal rate, regular rhythm, normal heart sounds and intact distal pulses. Exam reveals no gallop and no friction rub.   No murmur heard.  Loud P2   Pulmonary/Chest: Effort normal and breath sounds normal. No stridor. No respiratory distress. He has no wheezes. He has no rales. He exhibits no tenderness.   Abdominal: Soft. Bowel sounds are normal. He exhibits no distension and no mass. There is no tenderness. There is no rebound and no guarding.    positive truncal obesity    RLQ allograft not tender no bruit    Musculoskeletal: Normal range of motion. He " exhibits edema.   Neurological: He is alert and oriented to person, place, and time. He has normal reflexes. No cranial nerve deficit. He exhibits normal muscle tone. Coordination normal.   Skin: Skin is warm and dry. No rash noted. He is not diaphoretic. No erythema. No pallor.   Psychiatric: He has a normal mood and affect. His behavior is normal. Judgment and thought content normal.         Lab Results   Component Value Date    WBC 4.93 10/18/2018    HGB 12.2 (L) 10/18/2018    HCT 39.9 (L) 10/18/2018    MCV 84 10/18/2018     10/18/2018     Lab Results   Component Value Date    CREATININE 1.8 (H) 10/18/2018    BUN 16 10/18/2018     10/18/2018    K 4.3 10/18/2018     10/18/2018    CO2 29 10/18/2018     Lab Results   Component Value Date    .0 (H) 10/05/2016    CALCIUM 9.0 10/18/2018    PHOS 3.3 10/18/2018     Lab Results   Component Value Date    HGBA1C 7.1 (H) 2018     Lab Results   Component Value Date    CHOL 153 2018    CHOL 148 2017    CHOL 144 2017     Lab Results   Component Value Date    HDL 35 (L) 2018    HDL 37 (L) 2017    HDL 36 (L) 2017     Lab Results   Component Value Date    LDLCALC 87.2 2018    LDLCALC 87.2 2017    LDLCALC 79.6 2017     Lab Results   Component Value Date    TRIG 154 (H) 2018    TRIG 119 2017    TRIG 142 2017     Lab Results   Component Value Date    CHOLHDL 22.9 2018    CHOLHDL 25.0 2017    CHOLHDL 25.0 2017           GFR 35% with cystatin C         Assessment:       1. -donor kidney/ heart transplant performed at [facility] on 2002 - ESRD etiology unknown; ESHF due to IHSS    2. CKD (chronic kidney disease), stage III    3. Essential hypertension    4. Morbid obesity due to excess calories    5. Gout, arthritis    6. Persistent proteinuria    7. Heart transplanted    8. Venous stasis        Plan:         1.  Chronic kidney disease stage III: stable ;  diabetic nephropathy ;  GFR 35 % stable in last many years ;normal  Vitamin D level     2.  Kidney transplant: immunosuppression is doing well on current immunosuppressive agents. ( Please refer to the transplant evaluation from 2017 for discussions with heart transplant for Prograf based regimen and considering kidney biopsy.)      3.  Morbid obesity and diabetes type II controlled: follow up Caitlin Ortiz ; doing better now ; +luz feedback followed up in diabetic clinic as well with a SHERRILL Mendoza    4.  Hypertension:  with Hx of IHSS and CHF now heart transplant ; stable    5. Proteinuria : on ACEI in remission ; consider Atrasentan once available     6. Edema:  Needs stockings but he doesn't use it ;  torsemide 5 mg tab --1-2 ( not taking daily) ; adequate diuresis will help heart, BP and leg swelling .s/p  Consult vascular for venous insufficiency     7. A1c 7.1              fup 3 months       Laith Wilkerson MD

## 2018-11-06 ENCOUNTER — PATIENT MESSAGE (OUTPATIENT)
Dept: TRANSPLANT | Facility: CLINIC | Age: 68
End: 2018-11-06

## 2018-11-06 RX ORDER — METOPROLOL SUCCINATE 50 MG/1
TABLET, EXTENDED RELEASE ORAL
Qty: 90 TABLET | Refills: 1 | OUTPATIENT
Start: 2018-11-06

## 2018-11-07 ENCOUNTER — PATIENT MESSAGE (OUTPATIENT)
Dept: TRANSPLANT | Facility: CLINIC | Age: 68
End: 2018-11-07

## 2018-11-08 ENCOUNTER — PATIENT MESSAGE (OUTPATIENT)
Dept: DIABETES | Facility: CLINIC | Age: 68
End: 2018-11-08

## 2018-11-08 DIAGNOSIS — Z79.4 TYPE 2 DIABETES MELLITUS WITH STAGE 3 CHRONIC KIDNEY DISEASE, WITH LONG-TERM CURRENT USE OF INSULIN: ICD-10-CM

## 2018-11-08 DIAGNOSIS — N18.30 TYPE 2 DIABETES MELLITUS WITH STAGE 3 CHRONIC KIDNEY DISEASE, WITH LONG-TERM CURRENT USE OF INSULIN: Primary | ICD-10-CM

## 2018-11-08 DIAGNOSIS — E11.22 TYPE 2 DIABETES MELLITUS WITH STAGE 3 CHRONIC KIDNEY DISEASE, WITH LONG-TERM CURRENT USE OF INSULIN: ICD-10-CM

## 2018-11-08 DIAGNOSIS — E11.22 TYPE 2 DIABETES MELLITUS WITH STAGE 3 CHRONIC KIDNEY DISEASE, WITH LONG-TERM CURRENT USE OF INSULIN: Primary | ICD-10-CM

## 2018-11-08 DIAGNOSIS — Z79.4 TYPE 2 DIABETES MELLITUS WITH STAGE 3 CHRONIC KIDNEY DISEASE, WITH LONG-TERM CURRENT USE OF INSULIN: Primary | ICD-10-CM

## 2018-11-08 DIAGNOSIS — N18.30 TYPE 2 DIABETES MELLITUS WITH STAGE 3 CHRONIC KIDNEY DISEASE, WITH LONG-TERM CURRENT USE OF INSULIN: ICD-10-CM

## 2018-11-13 ENCOUNTER — PATIENT MESSAGE (OUTPATIENT)
Dept: TRANSPLANT | Facility: CLINIC | Age: 68
End: 2018-11-13

## 2018-11-13 DIAGNOSIS — Z94.9 HYPERTENSION ASSOCIATED WITH TRANSPLANTATION: Primary | ICD-10-CM

## 2018-11-13 DIAGNOSIS — I15.8 HYPERTENSION ASSOCIATED WITH TRANSPLANTATION: Primary | ICD-10-CM

## 2018-11-13 RX ORDER — METOPROLOL SUCCINATE 50 MG/1
50 TABLET, EXTENDED RELEASE ORAL DAILY
Qty: 90 TABLET | Refills: 3 | Status: SHIPPED | OUTPATIENT
Start: 2018-11-13 | End: 2018-11-13 | Stop reason: SDUPTHER

## 2018-11-13 RX ORDER — METOPROLOL SUCCINATE 50 MG/1
50 TABLET, EXTENDED RELEASE ORAL DAILY
Qty: 90 TABLET | Refills: 3 | Status: SHIPPED | OUTPATIENT
Start: 2018-11-13 | End: 2019-10-28 | Stop reason: SDUPTHER

## 2018-11-14 ENCOUNTER — PATIENT MESSAGE (OUTPATIENT)
Dept: TRANSPLANT | Facility: CLINIC | Age: 68
End: 2018-11-14

## 2018-11-28 ENCOUNTER — LAB VISIT (OUTPATIENT)
Dept: LAB | Facility: HOSPITAL | Age: 68
End: 2018-11-28
Attending: NURSE PRACTITIONER
Payer: MEDICARE

## 2018-11-28 DIAGNOSIS — E11.22 TYPE 2 DIABETES MELLITUS WITH STAGE 3 CHRONIC KIDNEY DISEASE, WITH LONG-TERM CURRENT USE OF INSULIN: ICD-10-CM

## 2018-11-28 DIAGNOSIS — N18.30 TYPE 2 DIABETES MELLITUS WITH STAGE 3 CHRONIC KIDNEY DISEASE, WITH LONG-TERM CURRENT USE OF INSULIN: ICD-10-CM

## 2018-11-28 DIAGNOSIS — Z79.4 TYPE 2 DIABETES MELLITUS WITH STAGE 3 CHRONIC KIDNEY DISEASE, WITH LONG-TERM CURRENT USE OF INSULIN: ICD-10-CM

## 2018-11-28 LAB
ALBUMIN SERPL BCP-MCNC: 3 G/DL
ALP SERPL-CCNC: 102 U/L
ALT SERPL W/O P-5'-P-CCNC: 25 U/L
ANION GAP SERPL CALC-SCNC: 9 MMOL/L
AST SERPL-CCNC: 32 U/L
BILIRUB SERPL-MCNC: 0.3 MG/DL
BUN SERPL-MCNC: 17 MG/DL
CALCIUM SERPL-MCNC: 9.1 MG/DL
CHLORIDE SERPL-SCNC: 110 MMOL/L
CO2 SERPL-SCNC: 26 MMOL/L
CREAT SERPL-MCNC: 1.7 MG/DL
EST. GFR  (AFRICAN AMERICAN): 46.9 ML/MIN/1.73 M^2
EST. GFR  (NON AFRICAN AMERICAN): 40.5 ML/MIN/1.73 M^2
GLUCOSE SERPL-MCNC: 98 MG/DL
POTASSIUM SERPL-SCNC: 4.1 MMOL/L
PROT SERPL-MCNC: 6.9 G/DL
SODIUM SERPL-SCNC: 145 MMOL/L

## 2018-11-28 PROCEDURE — 36415 COLL VENOUS BLD VENIPUNCTURE: CPT | Mod: PO

## 2018-11-28 PROCEDURE — 83036 HEMOGLOBIN GLYCOSYLATED A1C: CPT

## 2018-11-28 PROCEDURE — 80053 COMPREHEN METABOLIC PANEL: CPT

## 2018-11-29 LAB
ESTIMATED AVG GLUCOSE: 151 MG/DL
HBA1C MFR BLD HPLC: 6.9 %

## 2018-12-05 ENCOUNTER — OFFICE VISIT (OUTPATIENT)
Dept: DIABETES | Facility: CLINIC | Age: 68
End: 2018-12-05
Payer: MEDICARE

## 2018-12-05 VITALS
DIASTOLIC BLOOD PRESSURE: 78 MMHG | BODY MASS INDEX: 39.9 KG/M2 | SYSTOLIC BLOOD PRESSURE: 138 MMHG | WEIGHT: 310.94 LBS | HEIGHT: 74 IN

## 2018-12-05 DIAGNOSIS — E11.22 TYPE 2 DIABETES MELLITUS WITH STAGE 3 CHRONIC KIDNEY DISEASE, WITH LONG-TERM CURRENT USE OF INSULIN: Primary | ICD-10-CM

## 2018-12-05 DIAGNOSIS — E78.5 HYPERLIPIDEMIA, UNSPECIFIED HYPERLIPIDEMIA TYPE: ICD-10-CM

## 2018-12-05 DIAGNOSIS — I10 ESSENTIAL HYPERTENSION: ICD-10-CM

## 2018-12-05 DIAGNOSIS — Z79.4 TYPE 2 DIABETES MELLITUS WITH STAGE 3 CHRONIC KIDNEY DISEASE, WITH LONG-TERM CURRENT USE OF INSULIN: Primary | ICD-10-CM

## 2018-12-05 DIAGNOSIS — N18.30 TYPE 2 DIABETES MELLITUS WITH STAGE 3 CHRONIC KIDNEY DISEASE, WITH LONG-TERM CURRENT USE OF INSULIN: Primary | ICD-10-CM

## 2018-12-05 DIAGNOSIS — E66.01 MORBID OBESITY WITH BMI OF 40.0-44.9, ADULT: ICD-10-CM

## 2018-12-05 LAB — GLUCOSE SERPL-MCNC: 193 MG/DL (ref 70–110)

## 2018-12-05 PROCEDURE — 99999 PR PBB SHADOW E&M-EST. PATIENT-LVL IV: CPT | Mod: PBBFAC,,, | Performed by: NURSE PRACTITIONER

## 2018-12-05 PROCEDURE — 82962 GLUCOSE BLOOD TEST: CPT | Mod: PBBFAC,PO | Performed by: NURSE PRACTITIONER

## 2018-12-05 PROCEDURE — 99214 OFFICE O/P EST MOD 30 MIN: CPT | Mod: PBBFAC,PO | Performed by: NURSE PRACTITIONER

## 2018-12-05 PROCEDURE — 99214 OFFICE O/P EST MOD 30 MIN: CPT | Mod: S$PBB,,, | Performed by: NURSE PRACTITIONER

## 2018-12-05 NOTE — PROGRESS NOTES
"PCP: Dr. Stanley Chanel    HISTORY OF PRESENT ILLNESS: 68 year old  male patient is in clinic today for diabetes follow up.   He has had diabetes for many years and has attended diabetes education in the past.  Patient currently takes Humulin 70 / 30 for diabetes.  He had a cardiac and renal transplant in 2002.  Most recent A1C was 6.9, ADA recommends less than 7.0.  For the past week, patient reports more elevated fasting BG readings in the 160 s and ac dinner 170 s - 180 s; however, prior to that BGs were 80 s - 130 s.  He has lost 2 pounds since last visit.      Patient denies polyuria, polydipsia, polyphagia, or blurred vision.  Also denies nausea, vomiting, diarrhea.  Has rare hypoglycemia, which he treats with OJ.  He denies any recent hospitalizations or emergency room visits.     Height: 6 ' 2 "  Weight: 310 pounds, BMI 39.92  Blood Glucose reading this AM: 141 mg/dl fasting  Blood Glucose reading in clinic: 193 mg/dl at 10:28 am    DIABETES MEDICATIONS:   Humulin 70 / 30, 60 units BID ac  Ozempic 1 mg weekly     LABS REVIEWED.    REVIEW OF SYSTEMS:  GENERAL: Denies fever, chills, change in appetite.   HEENT: Has minor impaired hearing, denies dysphagia. History of vertigo.  Nasal congestion.  RESPIRATORY: Denies shortness of breath, cough or wheezing.  CARDIOVASCULAR: Denies chest pain, palpitations.  GI: Denies hematochezia.  : Denies hematuria, dysuria or frequency.  MS: Normal gait. Denies difficulty with mobility, muscle or joint pain.   SKIN: Denies rashes and lesions.  NEURO: Occasional numbness, tingling in feet.   PSYCH: Denies depression or anxiety. No suicidal ideations.  ENDO: See HPI.    STANDARDS OF CARE:   Eye exam: [facility], Last exam May 2018 - he had cataract removed  Dental exam: Has regular exams; denies gums bleeding.  Podiatry exam: None  ACE / ARB: Yes  Statin: Yes    LIFESTYLE:  ACTIVITY LEVEL: None  MEAL PLANNING: Number of " meals per day - 3. Number of snacks per day - occasionally.  Breakfast can be cereal with lactose free milk or 2 eggs, toast or eggs grits and jaffe.  Mid morning snack can be chips or cookies.  Lunch can be sandwich with price, fruit.  Dinner is usually red beans, rice, sausage, broccolli salad.  Can then have a lite beer.  He drinks mostly water.    BLOOD GLUCOSE TESTING: Self-monitoring with ONE TOUCH VERIO meter  SOCIAL HISTORY: . Lives with spouse. Has 2 children. Patient retired as manager for [employer].     PHYSICAL EXAMINATION:  GENERAL: WDWN  male in no acute distress, responds appropriately.  AAO X 3.   NECK: Supple, no thyromegaly, no cervical or supraclavicular lymphadenopathy, no carotid bruit.  HEART: Regular rate and rhythm. No rubs, murmurs or gallops.   LUNGS: CTA bilaterally. Unlabored breathing, no use of accessory muscles.  MUSCULOSKELETAL: Normal gait and muscle strength.  ABDOMEN: Active bowel sounds X 4, no masses or tenderness.   SKIN: Warm, dry skin. No lesions or abrasions.  NEUROLOGIC: Cranial nerves II-XII grossly intact.   FOOT EXAMINATION: Protective Sensation (w/ 10 gram monofilament):  Right: Intact Left: Intact //  Visual Inspection:  Normal -  Bilateral, except dry skin. +1 pitting edema to ankles, lower extremities. //  Pedal Pulses:  Right: PresentLeft: Present    ASSESSMENT:  1. Diabetes Type 2, ED, s / p renal and cardiac transplant - controlled on Humulin 70 / 30, Ozempic, A1C 6.9  2. Hypertension - good control on diltiazem, Lisinopril, Lasix.  3. Hyperlipidemia - good control on Lipitor  4. Morbid Obesity - BMI 39.92    PLAN:  1.) Patient was instructed to monitor blood glucose 2 - 3 x daily, fasting and ac dinner or at bedtime.  Discussed ADA goal for fasting blood sugar, 80 - 130 mg/dL; pp blood sugars below 180 mg/dl. Also, discussed prevention of hypoglycemia and the need to adjust goals to higher levels if persistent hypoglycemia.   Reminded him to bring records or meter to each visit for review.   2.) Reviewed pathophysiology of diabetes, complications related to the disease, importance of annual dilated eye exam and daily foot examination.  3.) We discussed the ADA recommendations: Hemoglobin A1c below 7.0 %. All patients with diabetes should be on statins unless contraindicated.  ACE or ARB therapy if not contraindicated.    4.) Continue  Humulin 70 / 30, 55 - 60 units BID ac.  Continue Ozempic 1 mg weekly - he is tolerating medication as needed.  For now, will continue current dose of medications.  For the past week, fasting BGs have been in the 160 s, which patient attributes to cold symptoms.  He will continue to monitor and notify our office if BGs continue to stay elevated.  5.) Meal planning teaching: Carbohydrate definition - one serving is 15 gms. Carbohydrate spacing - carbohydrates should be spaced into approximately 3 meals with 2 snacks ( of one carbohydrate ) between meals or at bedtime. Increase vegetable intake to 2 or more cups of vegetables per day as well as 2 fruit servings. Recommended low saturated fat, low sodium diet to aid in control of hypertension and cholesterol.  6.) Discussed activity, benefits, methods, and precautions. Recommended patient start some form of exercise and increase as tolerated to 30 minutes per day to facilitate weight loss and aid in control of BGs.  7.) Return to clinic in 3 months for follow up. Advised patient to call clinic with any questions or concerns.     Jael Garrison, BELÉN-C, CDE    A total of 30 minutes was spent in face to face time, of which over 50 % was spent in counseling patient on disease process, complications, treatment, and side effects of medications.

## 2018-12-26 DIAGNOSIS — I10 ESSENTIAL HYPERTENSION: ICD-10-CM

## 2018-12-26 RX ORDER — LISINOPRIL 40 MG/1
40 TABLET ORAL DAILY
Qty: 90 TABLET | Refills: 1 | Status: SHIPPED | OUTPATIENT
Start: 2018-12-26 | End: 2019-02-19 | Stop reason: SDUPTHER

## 2018-12-26 RX ORDER — METOPROLOL SUCCINATE 50 MG/1
TABLET, EXTENDED RELEASE ORAL
Qty: 90 TABLET | Refills: 1 | OUTPATIENT
Start: 2018-12-26

## 2018-12-27 NOTE — TELEPHONE ENCOUNTER
Called into [facility] a 10 day rx of Lisinopril 30 mg daily for pt who is waiting for mail order rx.

## 2018-12-31 DIAGNOSIS — Z94.1 STATUS POST HEART TRANSPLANT: ICD-10-CM

## 2018-12-31 RX ORDER — MYCOPHENOLATE MOFETIL 250 MG/1
CAPSULE ORAL
Qty: 540 CAPSULE | Refills: 3 | Status: SHIPPED | OUTPATIENT
Start: 2018-12-31 | End: 2020-01-06 | Stop reason: SDUPTHER

## 2019-01-09 ENCOUNTER — OFFICE VISIT (OUTPATIENT)
Dept: FAMILY MEDICINE | Facility: CLINIC | Age: 69
End: 2019-01-09
Attending: FAMILY MEDICINE
Payer: MEDICARE

## 2019-01-09 VITALS
HEART RATE: 76 BPM | BODY MASS INDEX: 40.43 KG/M2 | HEIGHT: 74 IN | OXYGEN SATURATION: 97 % | TEMPERATURE: 98 F | WEIGHT: 315 LBS | DIASTOLIC BLOOD PRESSURE: 80 MMHG | SYSTOLIC BLOOD PRESSURE: 132 MMHG

## 2019-01-09 DIAGNOSIS — Z94.1 HEART TRANSPLANTED: ICD-10-CM

## 2019-01-09 DIAGNOSIS — E78.5 DM TYPE 2 WITH DIABETIC DYSLIPIDEMIA: Primary | ICD-10-CM

## 2019-01-09 DIAGNOSIS — N18.30 CKD (CHRONIC KIDNEY DISEASE) STAGE 3, GFR 30-59 ML/MIN: ICD-10-CM

## 2019-01-09 DIAGNOSIS — E11.69 DM TYPE 2 WITH DIABETIC DYSLIPIDEMIA: Primary | ICD-10-CM

## 2019-01-09 DIAGNOSIS — E66.01 MORBID OBESITY WITH BMI OF 40.0-44.9, ADULT: ICD-10-CM

## 2019-01-09 DIAGNOSIS — T86.290 VASCULOPATHY OF CARDIAC ALLOGRAFT: ICD-10-CM

## 2019-01-09 PROCEDURE — 99999 PR PBB SHADOW E&M-EST. PATIENT-LVL III: CPT | Mod: PBBFAC,,, | Performed by: FAMILY MEDICINE

## 2019-01-09 PROCEDURE — 99214 OFFICE O/P EST MOD 30 MIN: CPT | Mod: 25,S$PBB,, | Performed by: FAMILY MEDICINE

## 2019-01-09 PROCEDURE — 99214 PR OFFICE/OUTPT VISIT, EST, LEVL IV, 30-39 MIN: ICD-10-PCS | Mod: 25,S$PBB,, | Performed by: FAMILY MEDICINE

## 2019-01-09 PROCEDURE — 99999 PR PBB SHADOW E&M-EST. PATIENT-LVL III: ICD-10-PCS | Mod: PBBFAC,,, | Performed by: FAMILY MEDICINE

## 2019-01-09 PROCEDURE — G0009 ADMIN PNEUMOCOCCAL VACCINE: HCPCS | Mod: PBBFAC,PO

## 2019-01-09 PROCEDURE — 99213 OFFICE O/P EST LOW 20 MIN: CPT | Mod: PBBFAC,PO,25 | Performed by: FAMILY MEDICINE

## 2019-01-09 NOTE — PROGRESS NOTES
Subjective:       Patient ID: Nigel Albrecht is a 68 y.o. male.    Chief Complaint: Annual Exam    68 y old male with DM , HTN , DLP , CKD s/p OHT and DDKT here To [facility] , On aspirin, Not LITA He will like to wait , completed  hep b vacc , he walks 15 min daily 3 times weekly , He does not follow any renal diet , no low carb . Opthalmology :  Seen at [place]  : seen last oct 18 ., Fastin bs :     , 1 hrs after dinner :  50  . F.u with Dr Vázquez(card) and transplant team in [place] . Denies rafael CP . Sob , palpations . off of prednisone        Review of Systems   Constitutional: Negative.    HENT: Negative.    Eyes: Negative.    Respiratory: Negative.    Cardiovascular: Negative.    Gastrointestinal: Negative.    Genitourinary: Negative.    Musculoskeletal: Negative.    Skin: Negative.    Hematological: Negative.        Objective:      Physical Exam   Constitutional: He is oriented to person, place, and time. No distress.   HENT:   Head: Normocephalic and atraumatic.   Right Ear: External ear normal.   Left Ear: External ear normal.   Nose: Nose normal.   Mouth/Throat: No oropharyngeal exudate.   Eyes: Conjunctivae and EOM are normal. Pupils are equal, round, and reactive to light. Right eye exhibits no discharge. Left eye exhibits no discharge. No scleral icterus.   Neck: Normal range of motion. Neck supple. No JVD present. No tracheal deviation present. No thyromegaly present.   Cardiovascular: Normal rate, regular rhythm, normal heart sounds and intact distal pulses. Exam reveals no gallop and no friction rub.   No murmur heard.  Pulses:       Dorsalis pedis pulses are 2+ on the right side, and 2+ on the left side.        Posterior tibial pulses are 2+ on the right side, and 2+ on the left side.   Pulmonary/Chest: Effort normal and breath sounds normal. No stridor. No respiratory distress. He has no wheezes. He has no rales. He exhibits no tenderness.   Abdominal: Soft. Bowel sounds are normal. He  exhibits no distension. There is no tenderness. There is no rebound and no guarding.   Musculoskeletal: Normal range of motion. He exhibits no edema or tenderness.        Right foot: There is normal range of motion and no deformity.        Left foot: There is no deformity.   Feet:   Right Foot:   Protective Sensation: 10 sites tested. 10 sites sensed.   Skin Integrity: Negative for ulcer, blister, skin breakdown, erythema, warmth or callus.   Left Foot:   Protective Sensation: 10 sites tested. 10 sites sensed.   Skin Integrity: Negative for ulcer, blister, skin breakdown, erythema, warmth, callus or dry skin.   Lymphadenopathy:     He has no cervical adenopathy.   Neurological: He is alert and oriented to person, place, and time. He has normal reflexes. He displays normal reflexes. No cranial nerve deficit. He exhibits normal muscle tone. Coordination normal.   Skin: Skin is warm and dry. No rash noted. He is not diaphoretic. No erythema. No pallor.   Dystrophic hyperkeratotic toenails bilaterally    Psychiatric: He has a normal mood and affect. His behavior is normal. Judgment and thought content normal.       Assessment:       1. DM type 2 with diabetic dyslipidemia    2. Morbid obesity with BMI of 40.0-44.9, adult    3. CKD (chronic kidney disease) stage 3, GFR 30-59 ml/min    4. Heart transplanted    5. Vasculopathy of cardiac allograft        Plan:     Nigel was seen today for annual exam.    Diagnoses and all orders for this visit:    DM type 2 with diabetic dyslipidemia    Morbid obesity with BMI of 40.0-44.9, adult    CKD (chronic kidney disease) stage 3, GFR 30-59 ml/min    Heart transplanted    Vasculopathy of cardiac allograft    Stable and well controlled currently. Continue Current medications as prescribed. No medication changes made today.   Pt. encouraged to follow a strict diabetic type diet.   Encouraged daily physical activity/exercise for at least 30mins if tolerated.   Weight control recommenced  as always.   Discussed how lifestyle changes make biggest impact on diabetes control.   Continue home glucose monitoring once daily and keep log.   Counseling provided today about hypoglycemia as well as about how diabetes increases risk of cardiovascular, cerebrovascular ,peripheral vascular disease and other serious health complications. He has been instructed to call if developing any new symptoms or hypoglycemia related symptoms.   See encounter for associated orders/medications.     The patient is asked to make an attempt to improve diet and exercise patterns to aid in medical management of this problem.  Avoid NSAID. F.u with  neprho    F.u with Dr Vázquez . Will request rec

## 2019-01-11 ENCOUNTER — PATIENT MESSAGE (OUTPATIENT)
Dept: TRANSPLANT | Facility: CLINIC | Age: 69
End: 2019-01-11

## 2019-01-28 ENCOUNTER — LAB VISIT (OUTPATIENT)
Dept: LAB | Facility: HOSPITAL | Age: 69
End: 2019-01-28
Attending: INTERNAL MEDICINE
Payer: MEDICARE

## 2019-01-28 DIAGNOSIS — I10 ESSENTIAL HYPERTENSION: ICD-10-CM

## 2019-01-28 DIAGNOSIS — Z94.1 STATUS POST HEART TRANSPLANT: ICD-10-CM

## 2019-01-28 DIAGNOSIS — N18.30 TYPE 2 DIABETES MELLITUS WITH STAGE 3 CHRONIC KIDNEY DISEASE, WITH LONG-TERM CURRENT USE OF INSULIN: ICD-10-CM

## 2019-01-28 DIAGNOSIS — E11.22 TYPE 2 DIABETES MELLITUS WITH STAGE 3 CHRONIC KIDNEY DISEASE, WITH LONG-TERM CURRENT USE OF INSULIN: ICD-10-CM

## 2019-01-28 DIAGNOSIS — E66.01 MORBID OBESITY DUE TO EXCESS CALORIES: ICD-10-CM

## 2019-01-28 DIAGNOSIS — N18.30 CKD (CHRONIC KIDNEY DISEASE), STAGE III: ICD-10-CM

## 2019-01-28 DIAGNOSIS — M10.9 GOUT, ARTHRITIS: ICD-10-CM

## 2019-01-28 DIAGNOSIS — I87.8 VENOUS STASIS: ICD-10-CM

## 2019-01-28 DIAGNOSIS — E78.2 MIXED HYPERLIPIDEMIA: ICD-10-CM

## 2019-01-28 DIAGNOSIS — Z79.4 TYPE 2 DIABETES MELLITUS WITH STAGE 3 CHRONIC KIDNEY DISEASE, WITH LONG-TERM CURRENT USE OF INSULIN: ICD-10-CM

## 2019-01-28 DIAGNOSIS — Z94.1 HEART TRANSPLANTED: ICD-10-CM

## 2019-01-28 DIAGNOSIS — Z94.0 DECEASED-DONOR KIDNEY TRANSPLANT: ICD-10-CM

## 2019-01-28 DIAGNOSIS — R80.1 PERSISTENT PROTEINURIA: ICD-10-CM

## 2019-01-28 LAB
ALBUMIN SERPL BCP-MCNC: 3.1 G/DL
ALBUMIN SERPL BCP-MCNC: 3.1 G/DL
ALP SERPL-CCNC: 110 U/L
ALT SERPL W/O P-5'-P-CCNC: 27 U/L
ANION GAP SERPL CALC-SCNC: 9 MMOL/L
ANION GAP SERPL CALC-SCNC: 9 MMOL/L
AST SERPL-CCNC: 34 U/L
BASOPHILS # BLD AUTO: 0.03 K/UL
BASOPHILS # BLD AUTO: 0.03 K/UL
BASOPHILS NFR BLD: 0.7 %
BASOPHILS NFR BLD: 0.7 %
BILIRUB SERPL-MCNC: 0.4 MG/DL
BNP SERPL-MCNC: 164 PG/ML
BUN SERPL-MCNC: 18 MG/DL
BUN SERPL-MCNC: 18 MG/DL
CALCIUM SERPL-MCNC: 9.3 MG/DL
CALCIUM SERPL-MCNC: 9.3 MG/DL
CHLORIDE SERPL-SCNC: 109 MMOL/L
CHLORIDE SERPL-SCNC: 109 MMOL/L
CHOLEST SERPL-MCNC: 174 MG/DL
CHOLEST/HDLC SERPL: 5.3 {RATIO}
CO2 SERPL-SCNC: 24 MMOL/L
CO2 SERPL-SCNC: 24 MMOL/L
CREAT SERPL-MCNC: 1.8 MG/DL
CREAT SERPL-MCNC: 1.8 MG/DL
DIFFERENTIAL METHOD: ABNORMAL
DIFFERENTIAL METHOD: ABNORMAL
EOSINOPHIL # BLD AUTO: 0.1 K/UL
EOSINOPHIL # BLD AUTO: 0.1 K/UL
EOSINOPHIL NFR BLD: 1.9 %
EOSINOPHIL NFR BLD: 1.9 %
ERYTHROCYTE [DISTWIDTH] IN BLOOD BY AUTOMATED COUNT: 15.3 %
ERYTHROCYTE [DISTWIDTH] IN BLOOD BY AUTOMATED COUNT: 15.3 %
EST. GFR  (AFRICAN AMERICAN): 43.7 ML/MIN/1.73 M^2
EST. GFR  (AFRICAN AMERICAN): 43.7 ML/MIN/1.73 M^2
EST. GFR  (NON AFRICAN AMERICAN): 37.8 ML/MIN/1.73 M^2
EST. GFR  (NON AFRICAN AMERICAN): 37.8 ML/MIN/1.73 M^2
GLUCOSE SERPL-MCNC: 124 MG/DL
GLUCOSE SERPL-MCNC: 124 MG/DL
HCT VFR BLD AUTO: 40 %
HCT VFR BLD AUTO: 40 %
HDLC SERPL-MCNC: 33 MG/DL
HDLC SERPL: 19 %
HGB BLD-MCNC: 12.4 G/DL
HGB BLD-MCNC: 12.4 G/DL
IMM GRANULOCYTES # BLD AUTO: 0.02 K/UL
IMM GRANULOCYTES # BLD AUTO: 0.02 K/UL
IMM GRANULOCYTES NFR BLD AUTO: 0.5 %
IMM GRANULOCYTES NFR BLD AUTO: 0.5 %
LDLC SERPL CALC-MCNC: 103.2 MG/DL
LYMPHOCYTES # BLD AUTO: 1.8 K/UL
LYMPHOCYTES # BLD AUTO: 1.8 K/UL
LYMPHOCYTES NFR BLD: 41.9 %
LYMPHOCYTES NFR BLD: 41.9 %
MAGNESIUM SERPL-MCNC: 1.8 MG/DL
MCH RBC QN AUTO: 25.9 PG
MCH RBC QN AUTO: 25.9 PG
MCHC RBC AUTO-ENTMCNC: 31 G/DL
MCHC RBC AUTO-ENTMCNC: 31 G/DL
MCV RBC AUTO: 84 FL
MCV RBC AUTO: 84 FL
MONOCYTES # BLD AUTO: 0.4 K/UL
MONOCYTES # BLD AUTO: 0.4 K/UL
MONOCYTES NFR BLD: 10.1 %
MONOCYTES NFR BLD: 10.1 %
NEUTROPHILS # BLD AUTO: 1.9 K/UL
NEUTROPHILS # BLD AUTO: 1.9 K/UL
NEUTROPHILS NFR BLD: 44.9 %
NEUTROPHILS NFR BLD: 44.9 %
NONHDLC SERPL-MCNC: 141 MG/DL
NRBC BLD-RTO: 0 /100 WBC
NRBC BLD-RTO: 0 /100 WBC
PHOSPHATE SERPL-MCNC: 3.3 MG/DL
PLATELET # BLD AUTO: 196 K/UL
PLATELET # BLD AUTO: 196 K/UL
PMV BLD AUTO: 11.1 FL
PMV BLD AUTO: 11.1 FL
POTASSIUM SERPL-SCNC: 3.8 MMOL/L
POTASSIUM SERPL-SCNC: 3.8 MMOL/L
PROT SERPL-MCNC: 7.1 G/DL
PTH-INTACT SERPL-MCNC: 102 PG/ML
RBC # BLD AUTO: 4.79 M/UL
RBC # BLD AUTO: 4.79 M/UL
SODIUM SERPL-SCNC: 142 MMOL/L
SODIUM SERPL-SCNC: 142 MMOL/L
TRIGL SERPL-MCNC: 189 MG/DL
WBC # BLD AUTO: 4.27 K/UL
WBC # BLD AUTO: 4.27 K/UL

## 2019-01-28 PROCEDURE — 83970 ASSAY OF PARATHORMONE: CPT

## 2019-01-28 PROCEDURE — 80195 ASSAY OF SIROLIMUS: CPT

## 2019-01-28 PROCEDURE — 80069 RENAL FUNCTION PANEL: CPT

## 2019-01-28 PROCEDURE — 86833 HLA CLASS II HIGH DEFIN QUAL: CPT | Mod: 59

## 2019-01-28 PROCEDURE — 36415 COLL VENOUS BLD VENIPUNCTURE: CPT | Mod: PO

## 2019-01-28 PROCEDURE — 83880 ASSAY OF NATRIURETIC PEPTIDE: CPT

## 2019-01-28 PROCEDURE — 86977 RBC SERUM PRETX INCUBJ/INHIB: CPT

## 2019-01-28 PROCEDURE — 85025 COMPLETE CBC W/AUTO DIFF WBC: CPT

## 2019-01-28 PROCEDURE — 80061 LIPID PANEL: CPT

## 2019-01-28 PROCEDURE — 80053 COMPREHEN METABOLIC PANEL: CPT

## 2019-01-28 PROCEDURE — 86832 HLA CLASS I HIGH DEFIN QUAL: CPT

## 2019-01-28 PROCEDURE — 86832 HLA CLASS I HIGH DEFIN QUAL: CPT | Mod: 91

## 2019-01-28 PROCEDURE — 83735 ASSAY OF MAGNESIUM: CPT

## 2019-01-29 LAB — SIROLIMUS BLD-MCNC: 8 NG/ML

## 2019-01-30 LAB
CLASS I ANTIBODY COMMENTS - LUMINEX: NORMAL
CLASS II ANTIBODIES - LUMINEX: NORMAL
CLASS II ANTIBODY COMMENTS - LUMINEX: NORMAL
CYSTATIN C SERPL-MCNC: 1.87 MG/L
DSA1 TESTING DATE: NORMAL
DSA12 TESTING DATE: NORMAL
DSA2 TESTING DATE: NORMAL
GFR/BSA.PRED SERPLBLD CYS-BASED-ARV: 33 ML/MIN/BSA
SERUM COLLECTION DT - LUMINEX CLASS I: NORMAL
SERUM COLLECTION DT - LUMINEX CLASS II: NORMAL

## 2019-02-06 ENCOUNTER — OFFICE VISIT (OUTPATIENT)
Dept: NEPHROLOGY | Facility: CLINIC | Age: 69
End: 2019-02-06
Payer: MEDICARE

## 2019-02-06 VITALS
DIASTOLIC BLOOD PRESSURE: 88 MMHG | WEIGHT: 315 LBS | HEIGHT: 74 IN | HEART RATE: 78 BPM | BODY MASS INDEX: 40.43 KG/M2 | SYSTOLIC BLOOD PRESSURE: 148 MMHG

## 2019-02-06 DIAGNOSIS — R80.1 PERSISTENT PROTEINURIA: ICD-10-CM

## 2019-02-06 DIAGNOSIS — E66.01 MORBID OBESITY DUE TO EXCESS CALORIES: ICD-10-CM

## 2019-02-06 DIAGNOSIS — Z94.0 DECEASED-DONOR KIDNEY TRANSPLANT: Primary | ICD-10-CM

## 2019-02-06 DIAGNOSIS — M10.9 GOUT, ARTHRITIS: ICD-10-CM

## 2019-02-06 DIAGNOSIS — N18.30 CKD (CHRONIC KIDNEY DISEASE), STAGE III: ICD-10-CM

## 2019-02-06 DIAGNOSIS — I87.8 VENOUS STASIS: ICD-10-CM

## 2019-02-06 DIAGNOSIS — Z94.1 HEART TRANSPLANTED: ICD-10-CM

## 2019-02-06 DIAGNOSIS — I10 ESSENTIAL HYPERTENSION: ICD-10-CM

## 2019-02-06 PROCEDURE — 99213 OFFICE O/P EST LOW 20 MIN: CPT | Mod: PBBFAC,PN | Performed by: INTERNAL MEDICINE

## 2019-02-06 PROCEDURE — 99214 PR OFFICE/OUTPT VISIT, EST, LEVL IV, 30-39 MIN: ICD-10-PCS | Mod: S$PBB,,, | Performed by: INTERNAL MEDICINE

## 2019-02-06 PROCEDURE — 99999 PR PBB SHADOW E&M-EST. PATIENT-LVL III: ICD-10-PCS | Mod: PBBFAC,,, | Performed by: INTERNAL MEDICINE

## 2019-02-06 PROCEDURE — 99214 OFFICE O/P EST MOD 30 MIN: CPT | Mod: S$PBB,,, | Performed by: INTERNAL MEDICINE

## 2019-02-06 PROCEDURE — 99999 PR PBB SHADOW E&M-EST. PATIENT-LVL III: CPT | Mod: PBBFAC,,, | Performed by: INTERNAL MEDICINE

## 2019-02-06 NOTE — PROGRESS NOTES
Subjective:       Patient ID: Nigel Albrecht is a 68 y.o. Black or  male who presents for new evaluation of Kidney Transplant; Heart Transplant; Follow-up; Edema; Chronic Kidney Disease; and hyperparathyroid of renal origin    Hypertension   Pertinent negatives include no chest pain, headaches, neck pain, palpitations or shortness of breath.   Edema   Pertinent negatives include no abdominal pain, arthralgias, chest pain, chills, congestion, coughing, diaphoresis, fatigue, fever, headaches, joint swelling, nausea, neck pain, rash or vomiting.    Patient is a  Black or  male who received a heart and kidney transplant on 02. He has CKD stage 3 - GFR 30-59 and his baseline creatinine is between 1.4-1.6mg/dl. He takes cellcept and sirolimus for maintenance immunosuppression. He denies any recent hospitalizations or ER visits since his previous clinic visit.he has been followed in the transplant Center at [facility].  He is now here to establish his care locally.  His main issues are that he has been weight with a BMI of 40.  His hemoglobin A1c is more than 10 with uncontrolled type II diabetes.  He has seen Caitlin Alcantar NP  for diabetic management.  Patient has been dealing with his son's illness in last 2 years who just  and during this time he gained a lot of weight and A1c went from 8 to 10 ; he is off steroids for 7 years now       Have been walking 20 min a day     2014 HbA1c came down to 8.1       6/30/15  CV evaluation normal DSE    2016  A1c down to 7.6 (9.4) still has LE edema ;started on torsemide ; Rapamycin increased to 2 mg ; C normal coronoaries; Lisinopril raised to 40 mg     10/2016 Rapa level 13 and dose reduced to 3 mg       2018 patient comes back for follow-up after a gap of 2 years.  Interim records reviewed from [facility] at the transplant Center    10/2018 s/p Cristino ( no flow issues) . Normal Vit D : still has edema : weight stable ; s/p  " eval       Review of Systems   Constitutional: Negative.  Negative for activity change, appetite change, chills, diaphoresis, fatigue and fever.   HENT: Negative.  Negative for congestion and trouble swallowing.    Eyes: Negative.    Respiratory: Positive for apnea. Negative for cough, chest tightness, shortness of breath and wheezing.         ZEINAB symtoms    Cardiovascular: Negative.  Negative for chest pain, palpitations and leg swelling.   Gastrointestinal: Negative.  Negative for abdominal distention, abdominal pain, nausea and vomiting.   Genitourinary: Negative.  Negative for decreased urine volume, difficulty urinating, dysuria, enuresis, flank pain, frequency, hematuria, penile swelling, scrotal swelling and urgency.   Musculoskeletal: Negative.  Negative for arthralgias, back pain, joint swelling and neck pain.   Skin: Negative for rash.   Neurological: Negative.  Negative for tremors, seizures and headaches.   Psychiatric/Behavioral: Negative.  Negative for confusion and sleep disturbance. The patient is not nervous/anxious.        Objective:       Vitals:    02/06/19 1034   BP: (!) 148/88   Pulse: 78   Weight: (!) 143.2 kg (315 lb 11.2 oz)   Height: 6' 2" (1.88 m)       5 ib weight loss in 3 mo ( 317 IN 6/2015 ) overall stable x 3 years     2/2019 315 ib     Physical Exam   Constitutional: He is oriented to person, place, and time. He appears well-developed and well-nourished. No distress.   HENT:   Head: Normocephalic.   Eyes: Conjunctivae and EOM are normal. Pupils are equal, round, and reactive to light. No scleral icterus.   Neck: Normal range of motion. Neck supple. No JVD present. No thyromegaly present.   Cardiovascular: Normal rate, regular rhythm, normal heart sounds and intact distal pulses. Exam reveals no gallop and no friction rub.   No murmur heard.  Loud P2   Pulmonary/Chest: Effort normal and breath sounds normal. No stridor. No respiratory distress. He has no wheezes. He has no rales. " He exhibits no tenderness.   Abdominal: Soft. Bowel sounds are normal. He exhibits no distension and no mass. There is no tenderness. There is no rebound and no guarding.    positive truncal obesity    RLQ allograft not tender no bruit    Musculoskeletal: Normal range of motion. He exhibits edema.   Neurological: He is alert and oriented to person, place, and time. He has normal reflexes. No cranial nerve deficit. He exhibits normal muscle tone. Coordination normal.   Skin: Skin is warm and dry. No rash noted. He is not diaphoretic. No erythema. No pallor.   Psychiatric: He has a normal mood and affect. His behavior is normal. Judgment and thought content normal.         Lab Results   Component Value Date    WBC 4.27 01/28/2019    WBC 4.27 01/28/2019    HGB 12.4 (L) 01/28/2019    HGB 12.4 (L) 01/28/2019    HCT 40.0 01/28/2019    HCT 40.0 01/28/2019    MCV 84 01/28/2019    MCV 84 01/28/2019     01/28/2019     01/28/2019     Lab Results   Component Value Date    CREATININE 1.8 (H) 01/28/2019    CREATININE 1.8 (H) 01/28/2019    BUN 18 01/28/2019    BUN 18 01/28/2019     01/28/2019     01/28/2019    K 3.8 01/28/2019    K 3.8 01/28/2019     01/28/2019     01/28/2019    CO2 24 01/28/2019    CO2 24 01/28/2019     Lab Results   Component Value Date    .0 (H) 01/28/2019    CALCIUM 9.3 01/28/2019    CALCIUM 9.3 01/28/2019    PHOS 3.3 01/28/2019     Lab Results   Component Value Date    HGBA1C 6.9 (H) 11/28/2018     Lab Results   Component Value Date    CHOL 174 01/28/2019    CHOL 153 01/30/2018    CHOL 148 05/11/2017     Lab Results   Component Value Date    HDL 33 (L) 01/28/2019    HDL 35 (L) 01/30/2018    HDL 37 (L) 05/11/2017     Lab Results   Component Value Date    LDLCALC 103.2 01/28/2019    LDLCALC 87.2 01/30/2018    LDLCALC 87.2 05/11/2017     Lab Results   Component Value Date    TRIG 189 (H) 01/28/2019    TRIG 154 (H) 01/30/2018    TRIG 119 05/11/2017     Lab Results    Component Value Date    CHOLHDL 19.0 (L) 2019    CHOLHDL 22.9 2018    CHOLHDL 25.0 2017           GFR 35% with cystatin C     33% 2019     rapa level 8         Assessment:       1. -donor kidney/ heart transplant performed at [facility] on 2002 - ESRD etiology unknown; ESHF due to IHSS    2. CKD (chronic kidney disease), stage III    3. Essential hypertension    4. Morbid obesity due to excess calories    5. Gout, arthritis    6. Persistent proteinuria    7. Heart transplanted    8. Venous stasis        Plan:         1.  Chronic kidney disease stage III: stable ; diabetic nephropathy ;  GFR 33 % stable in last many years ;normal  Vitamin D level ; Rapa is fine at 1 mg     2.  Kidney transplant: immunosuppression is doing well on current immunosuppressive agents. ( Please refer to the transplant evaluation from 2017 for discussions with heart transplant for Prograf based regimen and considering kidney biopsy.)      3.  Morbid obesity and diabetes type II controlled: follow up Caitlin Alcantar ; doing better now ; +luz feedback followed up in diabetic clinic as well with SHERRILL Hernandez; A1c 6.9 in 2018     4.  Hypertension:  with Hx of IHSS and CHF now heart transplant ; stable    5. Proteinuria : on ACEI in remission ; consider Atrasentan once available     6. Edema:  Needs stockings but he doesn't use it ;  torsemide 5 mg tab --1-2 ( not taking daily) ; adequate diuresis will help heart, BP and leg swelling .s/p  Consult vascular for venous insufficiency                fup 3 months       Laith Wilkerson MD

## 2019-02-06 NOTE — PATIENT INSTRUCTIONS
Check your weights every morning after getting out of bed and urinating.     Target weight :310 ib       If your weight goes up 3 pounds  overnight or 5 ib in one week take 2 torsemide in the am  and call us if the fluid doesn't come off    If your weight goes down  3 pounds  overnight or 5 ib in one week hold diuretics for 3 days and restart with half dose until you reach your desired weight

## 2019-02-19 DIAGNOSIS — I10 ESSENTIAL HYPERTENSION: ICD-10-CM

## 2019-02-19 RX ORDER — TORSEMIDE 5 MG/1
TABLET ORAL
Qty: 180 TABLET | Refills: 0 | Status: SHIPPED | OUTPATIENT
Start: 2019-02-19 | End: 2020-01-06 | Stop reason: SDUPTHER

## 2019-02-19 RX ORDER — LISINOPRIL 40 MG/1
TABLET ORAL
Qty: 90 TABLET | Refills: 0 | Status: SHIPPED | OUTPATIENT
Start: 2019-02-19 | End: 2019-05-07 | Stop reason: SDUPTHER

## 2019-02-23 ENCOUNTER — PATIENT MESSAGE (OUTPATIENT)
Dept: FAMILY MEDICINE | Facility: CLINIC | Age: 69
End: 2019-02-23

## 2019-02-23 DIAGNOSIS — Z94.1 STATUS POST HEART TRANSPLANT: ICD-10-CM

## 2019-02-25 ENCOUNTER — PATIENT MESSAGE (OUTPATIENT)
Dept: TRANSPLANT | Facility: CLINIC | Age: 69
End: 2019-02-25

## 2019-02-25 DIAGNOSIS — Z94.1 STATUS POST HEART TRANSPLANT: ICD-10-CM

## 2019-02-25 RX ORDER — SIROLIMUS 1 MG/1
1 TABLET, FILM COATED ORAL DAILY
Qty: 90 TABLET | Refills: 3 | Status: SHIPPED | OUTPATIENT
Start: 2019-02-25 | End: 2020-01-06

## 2019-02-25 RX ORDER — SIROLIMUS 1 MG/1
1 TABLET, FILM COATED ORAL DAILY
Qty: 90 TABLET | Refills: 3 | OUTPATIENT
Start: 2019-02-25

## 2019-02-25 NOTE — TELEPHONE ENCOUNTER
I'm not sure which doctor to forward this medication to? The last prescribing doctor is not in [facility].

## 2019-03-06 ENCOUNTER — LAB VISIT (OUTPATIENT)
Dept: LAB | Facility: HOSPITAL | Age: 69
End: 2019-03-06
Attending: NURSE PRACTITIONER
Payer: MEDICARE

## 2019-03-06 DIAGNOSIS — Z79.4 TYPE 2 DIABETES MELLITUS WITH STAGE 3 CHRONIC KIDNEY DISEASE, WITH LONG-TERM CURRENT USE OF INSULIN: ICD-10-CM

## 2019-03-06 DIAGNOSIS — E11.22 TYPE 2 DIABETES MELLITUS WITH STAGE 3 CHRONIC KIDNEY DISEASE, WITH LONG-TERM CURRENT USE OF INSULIN: ICD-10-CM

## 2019-03-06 DIAGNOSIS — N18.30 TYPE 2 DIABETES MELLITUS WITH STAGE 3 CHRONIC KIDNEY DISEASE, WITH LONG-TERM CURRENT USE OF INSULIN: ICD-10-CM

## 2019-03-06 LAB
ALBUMIN SERPL BCP-MCNC: 3 G/DL
ALP SERPL-CCNC: 104 U/L
ALT SERPL W/O P-5'-P-CCNC: 25 U/L
ANION GAP SERPL CALC-SCNC: 6 MMOL/L
AST SERPL-CCNC: 31 U/L
BILIRUB SERPL-MCNC: 0.2 MG/DL
BUN SERPL-MCNC: 18 MG/DL
CALCIUM SERPL-MCNC: 9.3 MG/DL
CHLORIDE SERPL-SCNC: 109 MMOL/L
CO2 SERPL-SCNC: 29 MMOL/L
CREAT SERPL-MCNC: 1.8 MG/DL
EST. GFR  (AFRICAN AMERICAN): 43.7 ML/MIN/1.73 M^2
EST. GFR  (NON AFRICAN AMERICAN): 37.8 ML/MIN/1.73 M^2
ESTIMATED AVG GLUCOSE: 180 MG/DL
GLUCOSE SERPL-MCNC: 121 MG/DL
HBA1C MFR BLD HPLC: 7.9 %
POTASSIUM SERPL-SCNC: 4.2 MMOL/L
PROT SERPL-MCNC: 6.8 G/DL
SODIUM SERPL-SCNC: 144 MMOL/L

## 2019-03-06 PROCEDURE — 83036 HEMOGLOBIN GLYCOSYLATED A1C: CPT

## 2019-03-06 PROCEDURE — 36415 COLL VENOUS BLD VENIPUNCTURE: CPT | Mod: PO

## 2019-03-06 PROCEDURE — 80053 COMPREHEN METABOLIC PANEL: CPT

## 2019-03-13 ENCOUNTER — OFFICE VISIT (OUTPATIENT)
Dept: DIABETES | Facility: CLINIC | Age: 69
End: 2019-03-13
Payer: MEDICARE

## 2019-03-13 VITALS
WEIGHT: 314.25 LBS | HEART RATE: 72 BPM | HEIGHT: 74 IN | SYSTOLIC BLOOD PRESSURE: 142 MMHG | BODY MASS INDEX: 40.33 KG/M2 | TEMPERATURE: 98 F | DIASTOLIC BLOOD PRESSURE: 76 MMHG

## 2019-03-13 DIAGNOSIS — Z79.4 TYPE 2 DIABETES MELLITUS WITH STAGE 3 CHRONIC KIDNEY DISEASE, WITH LONG-TERM CURRENT USE OF INSULIN: Primary | ICD-10-CM

## 2019-03-13 DIAGNOSIS — E66.01 MORBID OBESITY WITH BMI OF 40.0-44.9, ADULT: ICD-10-CM

## 2019-03-13 DIAGNOSIS — E11.22 TYPE 2 DIABETES MELLITUS WITH STAGE 3 CHRONIC KIDNEY DISEASE, WITH LONG-TERM CURRENT USE OF INSULIN: Primary | ICD-10-CM

## 2019-03-13 DIAGNOSIS — E11.49 OTHER DIABETIC NEUROLOGICAL COMPLICATION ASSOCIATED WITH TYPE 2 DIABETES MELLITUS: ICD-10-CM

## 2019-03-13 DIAGNOSIS — N18.30 TYPE 2 DIABETES MELLITUS WITH STAGE 3 CHRONIC KIDNEY DISEASE, WITH LONG-TERM CURRENT USE OF INSULIN: Primary | ICD-10-CM

## 2019-03-13 DIAGNOSIS — E78.5 HYPERLIPIDEMIA, UNSPECIFIED HYPERLIPIDEMIA TYPE: ICD-10-CM

## 2019-03-13 PROBLEM — E11.40 DIABETIC NEUROPATHY ASSOCIATED WITH TYPE 2 DIABETES MELLITUS: Status: ACTIVE | Noted: 2019-03-13

## 2019-03-13 LAB — GLUCOSE SERPL-MCNC: 160 MG/DL (ref 70–110)

## 2019-03-13 PROCEDURE — 99214 OFFICE O/P EST MOD 30 MIN: CPT | Mod: S$PBB,,, | Performed by: NURSE PRACTITIONER

## 2019-03-13 PROCEDURE — 99214 OFFICE O/P EST MOD 30 MIN: CPT | Mod: PBBFAC,PO | Performed by: NURSE PRACTITIONER

## 2019-03-13 PROCEDURE — 99999 PR PBB SHADOW E&M-EST. PATIENT-LVL IV: ICD-10-PCS | Mod: PBBFAC,,, | Performed by: NURSE PRACTITIONER

## 2019-03-13 PROCEDURE — 82962 GLUCOSE BLOOD TEST: CPT | Mod: PBBFAC,PO | Performed by: NURSE PRACTITIONER

## 2019-03-13 PROCEDURE — 99214 PR OFFICE/OUTPT VISIT, EST, LEVL IV, 30-39 MIN: ICD-10-PCS | Mod: S$PBB,,, | Performed by: NURSE PRACTITIONER

## 2019-03-13 PROCEDURE — 99999 PR PBB SHADOW E&M-EST. PATIENT-LVL IV: CPT | Mod: PBBFAC,,, | Performed by: NURSE PRACTITIONER

## 2019-03-13 NOTE — PROGRESS NOTES
"PCP: Dr. Stanley Chanel    HISTORY OF PRESENT ILLNESS: 68 year old  male patient is in clinic today for diabetes follow up.   He has had diabetes for many years and has attended diabetes education in the past. He has the following complications from diabetes: neuropathy, stage III CKD.  He had a cardiac and renal transplant in 2002.  Most recent A1C was 7.9, ADA recommends less than 7.0.   He has gained 4 pounds since last visit.    Last month, patient was sick with sinusitis and started on abx.  During that time, BGs were elevated in the 160 s - 190 s.  He is feeling much better and fasting BGs improved.  Lately, fasting BGs are 88 - 130 s, occasional 160 - 190 s; before meals  < 180 s; hs 160 s - 180 s.  He denies any recent hospitalizations or emergency room visits.     Height: 6 ' 2 "  Weight: 314 pounds, BMI 40.35  Blood Glucose reading this AM: 88 mg/dl fasting  Blood Glucose reading in clinic: 160 mg/dl at 10:38 am    DIABETES MEDICATIONS:   Humulin 70 / 30, 60 units BID ac  Ozempic 1 mg weekly     LABS REVIEWED.    REVIEW OF SYSTEMS:  GENERAL: Denies fever, chills, change in appetite.   HEENT: Has minor impaired hearing, denies dysphagia.   GI: Denies hematochezia. Denies nausea, vomiting, diarrhea.  : Denies hematuria, dysuria or frequency.   SKIN: Denies rashes and lesions.  NEURO: Occasional numbness, tingling in feet.   PSYCH: Denies depression or anxiety. No suicidal ideations.  ENDO: Denies polyuria, polydipsia, or polyphagia. Negative for blurred vision.    STANDARDS OF CARE:   Eye exam: [facility] [place] [facility], Last exam 03 / 2019 - he had cataract removed  Dental exam: Has regular exams; denies gums bleeding.  Podiatry exam: None  ACE / ARB: Yes  Statin: Yes    LIFESTYLE:  ACTIVITY LEVEL: None  MEAL PLANNING: Number of meals per day - 3. Number of snacks per day - occasionally.  Breakfast can be cereal with lactose free milk or 2 eggs, toast or eggs grits " and jaffe.  Mid morning snack can be chips or cookies.  Lunch can be sandwich with price, fruit.  Dinner is usually red beans, rice, sausage, broccolli salad.  Can then have a lite beer.  He drinks mostly water.    BLOOD GLUCOSE TESTING: Self-monitoring with ONE TOUCH VERIO meter  SOCIAL HISTORY: . Lives with spouse. Has 2 children. Patient retired as manager for [employer].     PHYSICAL EXAMINATION:  GENERAL: Well developed, well nourished  male in no acute distress.  NECK: Supple, no thyromegaly, normal range of motion.  HEART: Regular rate and rhythm. No rubs, murmurs or gallops.   LUNGS: CTA bilaterally. Unlabored breathing, no use of accessory muscles.  MUSCULOSKELETAL: Normal gait and muscle strength.  SKIN: Warm, dry skin. No lesions or abrasions.  NEUROLOGIC: Alert and oriented to person, place, and time. Responds appropriately.   FOOT EXAMINATION: Protective Sensation (w/ 10 gram monofilament):  Right: Intact  Left: Intact  // Visual Inspection: Dry Skin -  Bilateral  // Pedal Pulses:  Right: Present Left: Present    ASSESSMENT:  1. Diabetes Type 2, ED, s / p renal and cardiac transplant - uncontrolled on Humulin 70 / 30, Ozempic, A1C 7.9  2. Hypertension - good control on diltiazem, Lisinopril, Lasix.  3. Hyperlipidemia - good control on Lipitor  4. Morbid Obesity - BMI 40.35    PLAN:  1.) Continue monitoring blood glucose 4 x daily, fasting and ac dinner or at bedtime.  Discussed ADA goal for fasting blood sugar, 80 - 130 mg/dL; pp blood sugars below 180 mg/dl. Also, discussed prevention of hypoglycemia and the need to adjust goals to higher levels if persistent hypoglycemia.  Reminded him to bring records or meter to each visit for review.   2.) Reviewed pathophysiology of diabetes, complications related to the disease, importance of annual dilated eye exam and daily foot examination.  3.) We discussed the ADA recommendations: Hemoglobin A1c below 7.0 %. All patients  with diabetes should be on statins unless contraindicated.  ACE or ARB therapy if not contraindicated.    4.) Continue  Humulin 70 / 30, but adjust to 50 units before breakfast, 40 units before lunch, and 40 units before dinner.  Continue Ozempic 1 mg weekly - he is tolerating medication as needed.  AOB signed for dexcom CGM. Indications for CGM:  Elevated A1C, Hypoglycemic episodes and Postprandial hyperglycemia.   5.) Meal planning teaching: Carbohydrate definition - one serving is 15 gms. Carbohydrate spacing - carbohydrates should be spaced into approximately 3 meals with 2 snacks ( of one carbohydrate ) between meals or at bedtime. Increase vegetable intake to 2 or more cups of vegetables per day as well as 2 fruit servings. Recommended low saturated fat, low sodium diet to aid in control of hypertension and cholesterol.  6.) Discussed activity, benefits, methods, and precautions. Recommended patient start some form of exercise and increase as tolerated to 30 minutes per day to facilitate weight loss and aid in control of BGs.  7.) Return to clinic in 8 weeks for follow up. Advised patient to call clinic with any questions or concerns.     Jael Garrison, BELÉN-C, CDE    A total of 30 minutes was spent in face to face time, of which over 50 % was spent in counseling patient on disease process, complications, treatment, and side effects of medications.

## 2019-03-15 DIAGNOSIS — E08.9 DIABETES MELLITUS DUE TO UNDERLYING CONDITION WITHOUT COMPLICATION, WITH LONG-TERM CURRENT USE OF INSULIN: ICD-10-CM

## 2019-03-15 DIAGNOSIS — Z79.4 DIABETES MELLITUS DUE TO UNDERLYING CONDITION WITHOUT COMPLICATION, WITH LONG-TERM CURRENT USE OF INSULIN: ICD-10-CM

## 2019-03-18 ENCOUNTER — PATIENT MESSAGE (OUTPATIENT)
Dept: DIABETES | Facility: CLINIC | Age: 69
End: 2019-03-18

## 2019-03-18 DIAGNOSIS — E08.9 DIABETES MELLITUS DUE TO UNDERLYING CONDITION WITHOUT COMPLICATION, WITH LONG-TERM CURRENT USE OF INSULIN: ICD-10-CM

## 2019-03-18 DIAGNOSIS — Z94.1 STATUS POST HEART TRANSPLANT: Primary | ICD-10-CM

## 2019-03-18 DIAGNOSIS — Z79.4 DIABETES MELLITUS DUE TO UNDERLYING CONDITION WITHOUT COMPLICATION, WITH LONG-TERM CURRENT USE OF INSULIN: ICD-10-CM

## 2019-03-30 ENCOUNTER — OFFICE VISIT (OUTPATIENT)
Dept: URGENT CARE | Facility: CLINIC | Age: 69
End: 2019-03-30
Payer: MEDICARE

## 2019-03-30 VITALS
HEART RATE: 76 BPM | DIASTOLIC BLOOD PRESSURE: 80 MMHG | HEIGHT: 74 IN | SYSTOLIC BLOOD PRESSURE: 146 MMHG | BODY MASS INDEX: 40.43 KG/M2 | TEMPERATURE: 99 F | OXYGEN SATURATION: 97 % | WEIGHT: 315 LBS

## 2019-03-30 DIAGNOSIS — E11.22 TYPE 2 DIABETES MELLITUS WITH STAGE 3 CHRONIC KIDNEY DISEASE, WITH LONG-TERM CURRENT USE OF INSULIN: ICD-10-CM

## 2019-03-30 DIAGNOSIS — R10.9 ABDOMINAL PAIN, UNSPECIFIED ABDOMINAL LOCATION: Primary | ICD-10-CM

## 2019-03-30 DIAGNOSIS — Z79.4 TYPE 2 DIABETES MELLITUS WITH STAGE 3 CHRONIC KIDNEY DISEASE, WITH LONG-TERM CURRENT USE OF INSULIN: ICD-10-CM

## 2019-03-30 DIAGNOSIS — N18.30 TYPE 2 DIABETES MELLITUS WITH STAGE 3 CHRONIC KIDNEY DISEASE, WITH LONG-TERM CURRENT USE OF INSULIN: ICD-10-CM

## 2019-03-30 DIAGNOSIS — Z79.60 LONG-TERM USE OF IMMUNOSUPPRESSANT MEDICATION: ICD-10-CM

## 2019-03-30 DIAGNOSIS — Z94.1 HEART TRANSPLANTED: ICD-10-CM

## 2019-03-30 PROCEDURE — 99213 OFFICE O/P EST LOW 20 MIN: CPT | Mod: S$PBB,,, | Performed by: NURSE PRACTITIONER

## 2019-03-30 PROCEDURE — 99215 OFFICE O/P EST HI 40 MIN: CPT | Mod: PBBFAC,PN | Performed by: NURSE PRACTITIONER

## 2019-03-30 PROCEDURE — 99999 PR PBB SHADOW E&M-EST. PATIENT-LVL V: CPT | Mod: PBBFAC,,, | Performed by: NURSE PRACTITIONER

## 2019-03-30 PROCEDURE — 99213 PR OFFICE/OUTPT VISIT, EST, LEVL III, 20-29 MIN: ICD-10-PCS | Mod: S$PBB,,, | Performed by: NURSE PRACTITIONER

## 2019-03-30 PROCEDURE — 99999 PR PBB SHADOW E&M-EST. PATIENT-LVL V: ICD-10-PCS | Mod: PBBFAC,,, | Performed by: NURSE PRACTITIONER

## 2019-03-30 NOTE — PROGRESS NOTES
"Subjective:      Patient ID: Nigel Albrecht is a 68 y.o. male.    Chief Complaint: Abdominal Pain (3 days)    Abdominal Pain   This is a new problem. The current episode started in the past 7 days (3 days). The problem occurs intermittently. The problem has been waxing and waning. The pain is located in the epigastric region and RUQ. The pain is moderate. The quality of the pain is cramping. The abdominal pain radiates to the RUQ and back. Associated symptoms include constipation (chronic, LBM about 2 hrs ago - normal) and a fever (felt a little feverish today). Pertinent negatives include no vomiting. Exacerbated by: position, laying down. The pain is relieved by nothing. Treatments tried: took aleve 2 nights ago. The treatment provided no relief.     Review of Systems   Constitutional: Positive for appetite change and fever (felt a little feverish today).   HENT: Negative.    Respiratory: Negative.    Cardiovascular: Negative.    Gastrointestinal: Positive for abdominal pain and constipation (chronic, LBM about 2 hrs ago - normal). Negative for vomiting.   Skin: Negative.        Objective:   BP (!) 146/80   Pulse 76   Temp 99 °F (37.2 °C)   Ht 6' 2" (1.88 m)   Wt (!) 144 kg (317 lb 7.4 oz)   SpO2 97%   BMI 40.76 kg/m²   Physical Exam   Constitutional: He is oriented to person, place, and time. He appears well-developed and well-nourished. No distress.   Complete exam deferred due to recommending ER for more comprehensive workup than what is available in Urgent Care on weekend.   HENT:   Head: Normocephalic and atraumatic.   Neck: Normal range of motion. Neck supple.   Cardiovascular: Normal rate.   Pulmonary/Chest: Effort normal. No respiratory distress.   Neurological: He is alert and oriented to person, place, and time.   Skin: Skin is warm and dry. He is not diaphoretic.   Vitals reviewed.    Assessment:      1. Abdominal pain, unspecified abdominal location    2. Heart transplanted    3. Type 2 " diabetes mellitus with stage 3 chronic kidney disease, with long-term current use of insulin    4. Long-term use of immunosuppressant medication       Plan:   Abdominal pain, unspecified abdominal location    Heart transplanted    Type 2 diabetes mellitus with stage 3 chronic kidney disease, with long-term current use of insulin    Long-term use of immunosuppressant medication    ER for further workup. Mr. Albrecht agrees to plan and would like to go to [facility]. Report called to Rhiannon at [facility] ER nursing staff.

## 2019-04-08 ENCOUNTER — TELEPHONE (OUTPATIENT)
Dept: INTERNAL MEDICINE | Facility: CLINIC | Age: 69
End: 2019-04-08

## 2019-04-08 NOTE — TELEPHONE ENCOUNTER
Spoke to patient and was advised that he needs to schedule an appointment with dr.daniela SIMON Zimmerman.  Patient then advised that he just got out of the hospital and has the shingles.  Advised patient that dr.daniela SIMON Zimmerman was no longer at the [facility] location and that she was in [place].  Patient verbally understood and asked if I could schedule him an appointment for today.  I advised the patient that dr.daniela SIMON Zimmerman was booked today, but could send her staff a note to contact him.  Patient declined and stated that he was at another appointment and will go online later to see what she has available.

## 2019-04-09 ENCOUNTER — TELEPHONE (OUTPATIENT)
Dept: FAMILY MEDICINE | Facility: CLINIC | Age: 69
End: 2019-04-09

## 2019-04-10 ENCOUNTER — OFFICE VISIT (OUTPATIENT)
Dept: FAMILY MEDICINE | Facility: CLINIC | Age: 69
End: 2019-04-10
Attending: FAMILY MEDICINE
Payer: MEDICARE

## 2019-04-10 VITALS
BODY MASS INDEX: 39.61 KG/M2 | WEIGHT: 308.56 LBS | SYSTOLIC BLOOD PRESSURE: 158 MMHG | TEMPERATURE: 99 F | OXYGEN SATURATION: 95 % | HEART RATE: 95 BPM | DIASTOLIC BLOOD PRESSURE: 90 MMHG

## 2019-04-10 DIAGNOSIS — B02.7 DISSEMINATED HERPES ZOSTER: Primary | ICD-10-CM

## 2019-04-10 DIAGNOSIS — E11.69 DM TYPE 2 WITH DIABETIC DYSLIPIDEMIA: ICD-10-CM

## 2019-04-10 DIAGNOSIS — E78.5 DM TYPE 2 WITH DIABETIC DYSLIPIDEMIA: ICD-10-CM

## 2019-04-10 DIAGNOSIS — I10 HYPERTENSION, UNSPECIFIED TYPE: ICD-10-CM

## 2019-04-10 DIAGNOSIS — N18.30 STAGE 3 CHRONIC KIDNEY DISEASE: ICD-10-CM

## 2019-04-10 DIAGNOSIS — D84.9 IMMUNOCOMPROMISED: ICD-10-CM

## 2019-04-10 DIAGNOSIS — K82.8 BILIARY DYSKINESIA: ICD-10-CM

## 2019-04-10 PROCEDURE — 99215 PR OFFICE/OUTPT VISIT, EST, LEVL V, 40-54 MIN: ICD-10-PCS | Mod: S$PBB,,, | Performed by: FAMILY MEDICINE

## 2019-04-10 PROCEDURE — 99999 PR PBB SHADOW E&M-EST. PATIENT-LVL III: CPT | Mod: PBBFAC,,, | Performed by: FAMILY MEDICINE

## 2019-04-10 PROCEDURE — 99213 OFFICE O/P EST LOW 20 MIN: CPT | Mod: PBBFAC,PO | Performed by: FAMILY MEDICINE

## 2019-04-10 PROCEDURE — 99999 PR PBB SHADOW E&M-EST. PATIENT-LVL III: ICD-10-PCS | Mod: PBBFAC,,, | Performed by: FAMILY MEDICINE

## 2019-04-10 PROCEDURE — 99215 OFFICE O/P EST HI 40 MIN: CPT | Mod: S$PBB,,, | Performed by: FAMILY MEDICINE

## 2019-04-10 RX ORDER — OXYCODONE AND ACETAMINOPHEN 5; 325 MG/1; MG/1
1 TABLET ORAL EVERY 6 HOURS PRN
Qty: 20 TABLET | Refills: 0 | Status: SHIPPED | OUTPATIENT
Start: 2019-04-10 | End: 2019-04-23 | Stop reason: SDUPTHER

## 2019-04-10 RX ORDER — PANTOPRAZOLE SODIUM 40 MG/1
TABLET, DELAYED RELEASE ORAL
Refills: 0 | COMMUNITY
Start: 2019-04-06 | End: 2019-05-15

## 2019-04-10 RX ORDER — HYDRALAZINE HYDROCHLORIDE 25 MG/1
25 TABLET, FILM COATED ORAL DAILY
COMMUNITY
Start: 2019-04-06 | End: 2019-08-01 | Stop reason: SDUPTHER

## 2019-04-10 RX ORDER — MUPIROCIN 20 MG/G
OINTMENT TOPICAL 2 TIMES DAILY
Qty: 15 G | Refills: 0 | Status: SHIPPED | OUTPATIENT
Start: 2019-04-10 | End: 2022-01-07

## 2019-04-10 RX ORDER — CLONIDINE 0.3 MG/24H
1 PATCH, EXTENDED RELEASE TRANSDERMAL WEEKLY
COMMUNITY
Start: 2019-04-07 | End: 2019-04-23 | Stop reason: SDUPTHER

## 2019-04-10 RX ORDER — ACYCLOVIR 800 MG/1
TABLET ORAL
Refills: 0 | COMMUNITY
Start: 2019-04-06 | End: 2019-07-10

## 2019-04-10 NOTE — PROGRESS NOTES
Subjective:       Patient ID: Nigel Albrecht is a 68 y.o. male.    Chief Complaint: Follow-up    68-year-old male with  HTN, DM 2, chronic kidney disease s/ post transplant kidney ,heart transplant, MO here for f.u on Hosp at [facility] on 3/30 due to n/v and abd pain .  Hepatobiliary nuclear medicine scan demostarted  biliary dyskinesia, he also developped  rash on R flank . Diagnosed with zoster . Start on IV acyclovir . He is schedule to see Dr Purvis for Cholecystectomy . His Cr elevated to 2.0 . Lisinopril was held. He was already seen by Dr Sexton and had labs repeated , cr improved to 1.7 . BP was also noted to be elevated. He was started on oral  hydralazine 25 mg TID . Eating fair ,  Fastin bs: 120 , pre lunch : 160 , qhs: 160 . In severe pain due to zoster infection . Not taking any medications     Review of Systems   Constitutional: Negative.    HENT: Negative.    Eyes: Negative.    Respiratory: Negative.    Cardiovascular: Negative.    Gastrointestinal: Negative.    Genitourinary: Negative.    Musculoskeletal: Negative.    Skin: Negative.    Hematological: Negative.        Objective:      Physical Exam   Constitutional: He is oriented to person, place, and time. No distress.   HENT:   Head: Normocephalic and atraumatic.   Right Ear: External ear normal.   Left Ear: External ear normal.   Nose: Nose normal.   Mouth/Throat: No oropharyngeal exudate.   Eyes: Pupils are equal, round, and reactive to light. Conjunctivae and EOM are normal. Right eye exhibits no discharge. Left eye exhibits no discharge. No scleral icterus.   Neck: Normal range of motion. Neck supple. No JVD present. No tracheal deviation present. No thyromegaly present.   Cardiovascular: Normal rate, regular rhythm, normal heart sounds and intact distal pulses. Exam reveals no gallop and no friction rub.   No murmur heard.  Pulmonary/Chest: Effort normal and breath sounds normal. No stridor. No respiratory distress. He has no wheezes. He has no  rales. He exhibits no tenderness.   Abdominal: Soft. Bowel sounds are normal. He exhibits no distension. There is no tenderness. There is no rebound and no guarding.   Musculoskeletal: Normal range of motion. He exhibits no edema or tenderness.   Lymphadenopathy:     He has no cervical adenopathy.   Neurological: He is alert and oriented to person, place, and time. He has normal reflexes. He displays normal reflexes. No cranial nerve deficit. He exhibits normal muscle tone. Coordination normal.   Skin: Skin is warm and dry. Lesion, petechiae and rash noted. Rash is vesicular. He is not diaphoretic. There is erythema. No pallor.        Psychiatric: He has a normal mood and affect. His behavior is normal. Judgment and thought content normal.       Assessment:     Nigel was seen today for follow-up.    Diagnoses and all orders for this visit:    Disseminated herpes zoster    Immunocompromised    DM type 2 with diabetic dyslipidemia    Hypertension, unspecified type    Biliary dyskinesia    Stage 3 chronic kidney disease    Other orders  -     oxyCODONE-acetaminophen (PERCOCET) 5-325 mg per tablet; Take 1 tablet by mouth every 6 (six) hours as needed for Pain.  -     mupirocin (BACTROBAN) 2 % ointment; Apply topically 2 (two) times daily.       Plan:   Improving . Cont Acyclovir .   Transplant   Controlled. Cont med   Uncontrolled. Resume lisinopril . Cont hydralazine 12.5 mg BID   . N.v in 1 w   GS   Avoid NSAIDD. Fu with nephro

## 2019-04-17 ENCOUNTER — TELEPHONE (OUTPATIENT)
Dept: FAMILY MEDICINE | Facility: CLINIC | Age: 69
End: 2019-04-17

## 2019-04-17 ENCOUNTER — TELEPHONE (OUTPATIENT)
Dept: INTERNAL MEDICINE | Facility: CLINIC | Age: 69
End: 2019-04-17

## 2019-04-17 ENCOUNTER — CLINICAL SUPPORT (OUTPATIENT)
Dept: INTERNAL MEDICINE | Facility: CLINIC | Age: 69
End: 2019-04-17
Payer: MEDICARE

## 2019-04-17 VITALS — DIASTOLIC BLOOD PRESSURE: 94 MMHG | SYSTOLIC BLOOD PRESSURE: 166 MMHG

## 2019-04-17 DIAGNOSIS — N25.81 SECONDARY HYPERPARATHYROIDISM: ICD-10-CM

## 2019-04-17 DIAGNOSIS — Z94.0 KIDNEY REPLACED BY TRANSPLANT: Primary | ICD-10-CM

## 2019-04-17 NOTE — TELEPHONE ENCOUNTER
----- Message from Cuba Andrade sent at 4/17/2019  4:23 PM CDT -----  Contact: self  Type:  Patient Returning Call    Who Called:pt  Who Left Message for Patient:paloma  Does the patient know what this is regarding?:no  Would the patient rather a call back or a response via [facility]? Call back  Best Call Back Number:[phone]  Additional Information: no    Thanks,  Cuba Andrade

## 2019-04-17 NOTE — TELEPHONE ENCOUNTER
Blood Pressure taken L arm with Large cuff while patient was in a seated position with bilateral feet planted on the floor.  Blood pressure reading is 166/94.   Woodr, NRCMA

## 2019-04-17 NOTE — TELEPHONE ENCOUNTER
Conveyed to patient that Dr. Singletary recommends to take 25 mg of Hydralazine in the morning and 12.5 mg at night and to come for a nurse visit in 1 week. Pt states he will call back to schedule on Monday.

## 2019-04-23 ENCOUNTER — CLINICAL SUPPORT (OUTPATIENT)
Dept: FAMILY MEDICINE | Facility: CLINIC | Age: 69
End: 2019-04-23
Payer: MEDICARE

## 2019-04-23 VITALS — HEART RATE: 78 BPM

## 2019-04-23 PROCEDURE — 99213 OFFICE O/P EST LOW 20 MIN: CPT | Mod: PBBFAC,PO

## 2019-04-23 PROCEDURE — 99999 PR PBB SHADOW E&M-EST. PATIENT-LVL III: CPT | Mod: PBBFAC,,,

## 2019-04-23 PROCEDURE — 99999 PR PBB SHADOW E&M-EST. PATIENT-LVL III: ICD-10-PCS | Mod: PBBFAC,,,

## 2019-04-23 RX ORDER — OXYCODONE AND ACETAMINOPHEN 5; 325 MG/1; MG/1
1 TABLET ORAL EVERY 6 HOURS PRN
Qty: 20 TABLET | Refills: 0 | Status: SHIPPED | OUTPATIENT
Start: 2019-04-23 | End: 2019-05-15 | Stop reason: SDUPTHER

## 2019-04-23 RX ORDER — CLONIDINE 0.3 MG/24H
1 PATCH, EXTENDED RELEASE TRANSDERMAL WEEKLY
Qty: 4 PATCH | Refills: 11 | Status: SHIPPED | OUTPATIENT
Start: 2019-04-23 | End: 2020-06-09

## 2019-04-23 NOTE — TELEPHONE ENCOUNTER
Patient here for BP check. Reading in right arm is 138/86 and pulse of 78. Patient reports taking all BP medications. Pt is still taking Hydralazine 25 mg in the morning and 1/2 tablet at night.   Pt also requesting a refill on Oxycodone and Clonidine patches. Advised him that I will send request to Dr. Singletary.

## 2019-04-23 NOTE — TELEPHONE ENCOUNTER
Message left for patient to return call to clinic.    Oxycodone prescription faxed to [facility] pharmacy.

## 2019-04-25 ENCOUNTER — TELEPHONE (OUTPATIENT)
Dept: TRANSPLANT | Facility: CLINIC | Age: 69
End: 2019-04-25

## 2019-04-25 NOTE — TELEPHONE ENCOUNTER
Pt called to inform me he was just released from the hospital in  for Shingles and gallbladder issues. He is in need of cardiac clearance for gallbladder removal and asked if his annual can be moved up sooner. I sent a message to  to schedule in  on Tuesday with [facility].

## 2019-04-26 ENCOUNTER — TELEPHONE (OUTPATIENT)
Dept: FAMILY MEDICINE | Facility: CLINIC | Age: 69
End: 2019-04-26

## 2019-04-26 NOTE — TELEPHONE ENCOUNTER
Spoke with pt, informed him that Oxycodone prescription is at the front for . He verbalized understanding.

## 2019-04-26 NOTE — TELEPHONE ENCOUNTER
----- Message from Erica Parada sent at 4/26/2019 11:55 AM CDT -----  Contact: self [phone]  Type:  Patient Returning Call    Who Called:Nigel Albrecht  Who Left Message for Patient:Anali  Does the patient know what this is regarding?:unk  Would the patient rather a call back or a response via [facility]? Call back  Best Call Back Number:[phone]  Additional Information:

## 2019-04-30 ENCOUNTER — LAB VISIT (OUTPATIENT)
Dept: LAB | Facility: HOSPITAL | Age: 69
End: 2019-04-30
Attending: INTERNAL MEDICINE
Payer: MEDICARE

## 2019-04-30 ENCOUNTER — OFFICE VISIT (OUTPATIENT)
Dept: TRANSPLANT | Facility: CLINIC | Age: 69
End: 2019-04-30
Payer: MEDICARE

## 2019-04-30 ENCOUNTER — CLINICAL SUPPORT (OUTPATIENT)
Dept: CARDIOLOGY | Facility: CLINIC | Age: 69
End: 2019-04-30
Payer: MEDICARE

## 2019-04-30 VITALS
DIASTOLIC BLOOD PRESSURE: 86 MMHG | SYSTOLIC BLOOD PRESSURE: 140 MMHG | HEIGHT: 74 IN | BODY MASS INDEX: 37.06 KG/M2 | OXYGEN SATURATION: 99 % | WEIGHT: 288.81 LBS | HEART RATE: 73 BPM

## 2019-04-30 DIAGNOSIS — Z94.1 HEART TRANSPLANT RECIPIENT: Primary | ICD-10-CM

## 2019-04-30 DIAGNOSIS — E11.22 TYPE 2 DIABETES MELLITUS WITH STAGE 3 CHRONIC KIDNEY DISEASE, WITH LONG-TERM CURRENT USE OF INSULIN: ICD-10-CM

## 2019-04-30 DIAGNOSIS — Z79.4 TYPE 2 DIABETES MELLITUS WITH STAGE 3 CHRONIC KIDNEY DISEASE, WITH LONG-TERM CURRENT USE OF INSULIN: ICD-10-CM

## 2019-04-30 DIAGNOSIS — Z94.1 STATUS POST HEART TRANSPLANT: ICD-10-CM

## 2019-04-30 DIAGNOSIS — E78.2 MIXED HYPERLIPIDEMIA: ICD-10-CM

## 2019-04-30 DIAGNOSIS — Z94.0 KIDNEY REPLACED BY TRANSPLANT: ICD-10-CM

## 2019-04-30 DIAGNOSIS — N18.30 TYPE 2 DIABETES MELLITUS WITH STAGE 3 CHRONIC KIDNEY DISEASE, WITH LONG-TERM CURRENT USE OF INSULIN: ICD-10-CM

## 2019-04-30 LAB
ALBUMIN SERPL BCP-MCNC: 3.1 G/DL (ref 3.5–5.2)
ALBUMIN SERPL BCP-MCNC: 3.1 G/DL (ref 3.5–5.2)
ALP SERPL-CCNC: 99 U/L (ref 55–135)
ALT SERPL W/O P-5'-P-CCNC: 18 U/L (ref 10–44)
ANION GAP SERPL CALC-SCNC: 7 MMOL/L (ref 8–16)
AST SERPL-CCNC: 22 U/L (ref 10–40)
BILIRUB DIRECT SERPL-MCNC: 0.3 MG/DL (ref 0.1–0.3)
BILIRUB SERPL-MCNC: 0.6 MG/DL (ref 0.1–1)
BNP SERPL-MCNC: 121 PG/ML (ref 0–99)
BUN SERPL-MCNC: 13 MG/DL (ref 8–23)
CALCIUM SERPL-MCNC: 9.5 MG/DL (ref 8.7–10.5)
CHLORIDE SERPL-SCNC: 104 MMOL/L (ref 95–110)
CHOLEST SERPL-MCNC: 116 MG/DL (ref 120–199)
CHOLEST/HDLC SERPL: 4.1 {RATIO} (ref 2–5)
CO2 SERPL-SCNC: 26 MMOL/L (ref 23–29)
CREAT SERPL-MCNC: 1.8 MG/DL (ref 0.5–1.4)
EST. GFR  (AFRICAN AMERICAN): 43.7 ML/MIN/1.73 M^2
EST. GFR  (NON AFRICAN AMERICAN): 37.8 ML/MIN/1.73 M^2
ESTIMATED AVG GLUCOSE: 163 MG/DL (ref 68–131)
GLUCOSE SERPL-MCNC: 166 MG/DL (ref 70–110)
HBA1C MFR BLD HPLC: 7.3 % (ref 4–5.6)
HDLC SERPL-MCNC: 28 MG/DL (ref 40–75)
HDLC SERPL: 24.1 % (ref 20–50)
LDLC SERPL CALC-MCNC: 58 MG/DL (ref 63–159)
MAGNESIUM SERPL-MCNC: 1.8 MG/DL (ref 1.6–2.6)
MAGNESIUM SERPL-MCNC: 1.8 MG/DL (ref 1.6–2.6)
NONHDLC SERPL-MCNC: 88 MG/DL
PHOSPHATE SERPL-MCNC: 3.3 MG/DL (ref 2.7–4.5)
POTASSIUM SERPL-SCNC: 4.2 MMOL/L (ref 3.5–5.1)
PROT SERPL-MCNC: 7.6 G/DL (ref 6–8.4)
SODIUM SERPL-SCNC: 137 MMOL/L (ref 136–145)
T4 SERPL-MCNC: 8.4 UG/DL (ref 4.5–11.5)
TRIGL SERPL-MCNC: 150 MG/DL (ref 30–150)
TSH SERPL DL<=0.005 MIU/L-ACNC: 1.37 UIU/ML (ref 0.4–4)

## 2019-04-30 PROCEDURE — 86833 HLA CLASS II HIGH DEFIN QUAL: CPT | Mod: 59

## 2019-04-30 PROCEDURE — 99999 PR PBB SHADOW E&M-EST. PATIENT-LVL III: CPT | Mod: PBBFAC,,, | Performed by: INTERNAL MEDICINE

## 2019-04-30 PROCEDURE — 93005 ELECTROCARDIOGRAM TRACING: CPT | Mod: PBBFAC | Performed by: INTERNAL MEDICINE

## 2019-04-30 PROCEDURE — 83880 ASSAY OF NATRIURETIC PEPTIDE: CPT

## 2019-04-30 PROCEDURE — 84443 ASSAY THYROID STIM HORMONE: CPT

## 2019-04-30 PROCEDURE — 80195 ASSAY OF SIROLIMUS: CPT

## 2019-04-30 PROCEDURE — 80061 LIPID PANEL: CPT

## 2019-04-30 PROCEDURE — 99999 PR PBB SHADOW E&M-EST. PATIENT-LVL III: ICD-10-PCS | Mod: PBBFAC,,, | Performed by: INTERNAL MEDICINE

## 2019-04-30 PROCEDURE — 99213 PR OFFICE/OUTPT VISIT, EST, LEVL III, 20-29 MIN: ICD-10-PCS | Mod: S$PBB,,, | Performed by: INTERNAL MEDICINE

## 2019-04-30 PROCEDURE — 83735 ASSAY OF MAGNESIUM: CPT

## 2019-04-30 PROCEDURE — 83036 HEMOGLOBIN GLYCOSYLATED A1C: CPT

## 2019-04-30 PROCEDURE — 86977 RBC SERUM PRETX INCUBJ/INHIB: CPT | Mod: 59

## 2019-04-30 PROCEDURE — 86832 HLA CLASS I HIGH DEFIN QUAL: CPT | Mod: 59

## 2019-04-30 PROCEDURE — 84075 ASSAY ALKALINE PHOSPHATASE: CPT

## 2019-04-30 PROCEDURE — 80069 RENAL FUNCTION PANEL: CPT

## 2019-04-30 PROCEDURE — 36415 COLL VENOUS BLD VENIPUNCTURE: CPT

## 2019-04-30 PROCEDURE — 93010 EKG 12-LEAD: ICD-10-PCS | Mod: S$PBB,,, | Performed by: INTERNAL MEDICINE

## 2019-04-30 PROCEDURE — 86832 HLA CLASS I HIGH DEFIN QUAL: CPT

## 2019-04-30 PROCEDURE — 93010 ELECTROCARDIOGRAM REPORT: CPT | Mod: S$PBB,,, | Performed by: INTERNAL MEDICINE

## 2019-04-30 PROCEDURE — 84436 ASSAY OF TOTAL THYROXINE: CPT

## 2019-04-30 PROCEDURE — 99213 OFFICE O/P EST LOW 20 MIN: CPT | Mod: S$PBB,,, | Performed by: INTERNAL MEDICINE

## 2019-04-30 PROCEDURE — 99213 OFFICE O/P EST LOW 20 MIN: CPT | Mod: PBBFAC,25,PO | Performed by: INTERNAL MEDICINE

## 2019-04-30 RX ORDER — GABAPENTIN 100 MG/1
100 CAPSULE ORAL 3 TIMES DAILY
Qty: 90 CAPSULE | Refills: 11 | Status: SHIPPED | OUTPATIENT
Start: 2019-04-30 | End: 2019-05-01

## 2019-04-30 NOTE — PROGRESS NOTES
"Subjective:   Patient ID:  Nigel Albrecht is a 68 y.o. male who presents for heart transplant follow-up.      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 Patient seems to be doing well, all of his annual testing was not completed however. Patient denies N/V/F/C, lightheadedness, dizziness, PND, orthopnea, LE edema, abdominal pain, abdominal pressure, chest pain, chest pressure, syncope, pre-syncope.    Current IS    Rapa  1mg daily (goal 5-8)  Cellcept 750mg bid      DSE 05/16/18:    1 - S/P OHTX    5/24/2002.     2 - Eccentric LVH with low normal to mildly depressed left ventricular systolic function (EF 50-55%).     3 - Normal left ventricular diastolic function.     4 - Normal right ventricular systolic function .     5 - Mild mitral regurgitation.     6 - Mild tricuspid regurgitation.     7 - The estimated PA systolic pressure is greater than 26 mmHg.     No evidence of stress induced myocardial ischemia. Sensitivity is impaired due to failure to reach target heart rate      Review of Systems   Constitution: Negative for chills, decreased appetite, diaphoresis, fever, malaise/fatigue, night sweats, weight gain and weight loss.   Cardiovascular: Negative for chest pain, claudication, cyanosis, dyspnea on exertion, irregular heartbeat, leg swelling, near-syncope, orthopnea and palpitations.   Respiratory: Negative for cough, hemoptysis, shortness of breath, sleep disturbances due to breathing, snoring, sputum production and wheezing.    Gastrointestinal: Negative for abdominal pain and diarrhea.   Neurological: Negative for weakness.       Objective:     BP (!) 140/86 (BP Location: Left arm, Patient Position: Sitting, BP Method: Medium (Manual))   Pulse 73   Ht 6' 2" (1.88 m)   Wt 131 kg (288 lb 12.8 oz)   SpO2 99%   BMI 37.08 kg/m² "     Physical Exam   Constitutional: He is oriented to person, place, and time. He appears well-developed and well-nourished.   HENT:   Head: Normocephalic and atraumatic.   Eyes: Pupils are equal, round, and reactive to light. Conjunctivae and EOM are normal.   Neck: Normal range of motion. Neck supple. No JVD present.   Cardiovascular: Normal rate and regular rhythm. Exam reveals no gallop and no friction rub.   No murmur heard.  Pulmonary/Chest: Breath sounds normal. No respiratory distress. He has no wheezes. He has no rales. He exhibits no tenderness.   Abdominal: Soft. Bowel sounds are normal. He exhibits no distension and no mass. There is no hepatosplenomegaly, splenomegaly or hepatomegaly. There is no tenderness. There is no rebound, no guarding and no CVA tenderness.   No hepatoslenomegaly   Musculoskeletal: Normal range of motion. He exhibits no edema or tenderness.   Neurological: He is alert and oriented to person, place, and time. He has normal reflexes. No cranial nerve deficit. He exhibits normal muscle tone. Coordination normal.   Skin: Skin is warm and dry.   Psychiatric: He has a normal mood and affect.         Chemistry        Component Value Date/Time     03/06/2019 0803    K 4.2 03/06/2019 0803     03/06/2019 0803    CO2 29 03/06/2019 0803    BUN 18 03/06/2019 0803    CREATININE 1.8 (H) 03/06/2019 0803     (H) 03/06/2019 0803        Component Value Date/Time    CALCIUM 9.3 03/06/2019 0803    ALKPHOS 104 03/06/2019 0803    AST 31 03/06/2019 0803    ALT 25 03/06/2019 0803    BILITOT 0.2 03/06/2019 0803            Magnesium   Date Value Ref Range Status   01/28/2019 1.8 1.6 - 2.6 mg/dL Final       Lab Results   Component Value Date    WBC 4.27 01/28/2019    WBC 4.27 01/28/2019    HGB 12.4 (L) 01/28/2019    HGB 12.4 (L) 01/28/2019    HCT 40.0 01/28/2019    HCT 40.0 01/28/2019    MCV 84 01/28/2019    MCV 84 01/28/2019     01/28/2019     01/28/2019       Lab Results    Component Value Date    INR 0.9 06/22/2016    INR 1.0 10/03/2014    INR 1.0 06/02/2014       BNP   Date Value Ref Range Status   01/28/2019 164 (H) 0 - 99 pg/mL Final     Comment:     Values of less than 100 pg/ml are consistent with non-CHF populations.   05/07/2018 179 (H) 0 - 99 pg/mL Final     Comment:     Values of less than 100 pg/ml are consistent with non-CHF populations.   01/30/2018 196 (H) 0 - 99 pg/mL Final     Comment:     Values of less than 100 pg/ml are consistent with non-CHF populations.       No results found for: LDH    Assessment:     1. Heart transplanted - 16 yr post OHT, overall doing well, has very mild LV dysfxn in setting of chronic DSA and today BP is uncontrolled, rapa level at goal   2. Long-term use of immunosuppressant medication    3. CKD (chronic kidney disease), stage III- Cr at lower end of his usual range   4. Essential hypertension    5. Hyperlipidemia, unspecified hyperlipidemia type    6. Morbid obesity with BMI of 40.0-44.9, adult  Body mass index is 37.08 kg/m².     7. Type 2 diabetes mellitus with stage 3 chronic kidney disease, with long-term current use of insulin    8. Vasculopathy of cardiac allograft    9. Chronic immunosuppression with Sirolimus and Cellcept    10. Mixed hyperlipidemia        Plan:   Patient will send more BP logs to [facility]. No changes to any medications for now, will follow up on stress test, cxr, etc as indicated.     Return instructions as set forth by post transplant schedule or as needed:    Clinic: Return for labs and/or biopsy weekly the first month, every two weeks during month 2 and then monthly for the first year at the provider or coordinator's discretion. During the second year, return to clinic every 3 months. Post transplant year 3-5 return every 6 months. There will be a comprehensive post transplant evaluation every year that may include LHC/RHC/biopsy, stress test, echo, CXR, and other health screening exams.    In addition to the  clinical assessment, I have ordered Allomap testing for this patient to assist in identification of moderate/severe acute cellular rejection (ACR) in a pt with stable Allograft function instead of endomyocardial biopsy.     Patient is reminded to call with any health changes as these can be early signs of transplant complications. Patient is advised to make sure any new medications or changes of old medications are discussed with a pharmacist or physician knowledgeable with transplant to avoid rejection/drug toxicity related to significant drug interactions.    UNOS Patient Status  Functional Status: 100% - Normal, no complaints, no evidence of disease  Physical Capacity: No Limitations  Working for Income: yes  If yes, working activity level: Working Part Time Reason Unknown

## 2019-05-01 DIAGNOSIS — Z94.1 STATUS POST HEART TRANSPLANT: ICD-10-CM

## 2019-05-01 DIAGNOSIS — B02.23 POST-HERPETIC POLYNEUROPATHY: Primary | ICD-10-CM

## 2019-05-01 LAB — SIROLIMUS BLD-MCNC: 8.5 NG/ML (ref 4–20)

## 2019-05-01 RX ORDER — GABAPENTIN 100 MG/1
100 CAPSULE ORAL 3 TIMES DAILY
Qty: 21 CAPSULE | Refills: 0 | Status: SHIPPED | OUTPATIENT
Start: 2019-05-01 | End: 2019-05-08

## 2019-05-07 DIAGNOSIS — B02.23 POST-HERPETIC POLYNEUROPATHY: ICD-10-CM

## 2019-05-07 DIAGNOSIS — I10 ESSENTIAL HYPERTENSION: ICD-10-CM

## 2019-05-07 DIAGNOSIS — Z94.1 STATUS POST HEART TRANSPLANT: ICD-10-CM

## 2019-05-07 DIAGNOSIS — E78.2 MIXED HYPERLIPIDEMIA: ICD-10-CM

## 2019-05-08 RX ORDER — ATORVASTATIN CALCIUM 80 MG/1
TABLET, FILM COATED ORAL
Qty: 90 TABLET | Refills: 3 | Status: SHIPPED | OUTPATIENT
Start: 2019-05-08 | End: 2020-03-09

## 2019-05-08 RX ORDER — LISINOPRIL 40 MG/1
TABLET ORAL
Qty: 90 TABLET | Refills: 0 | Status: SHIPPED | OUTPATIENT
Start: 2019-05-08 | End: 2019-07-31 | Stop reason: SDUPTHER

## 2019-05-08 NOTE — TELEPHONE ENCOUNTER
Spoke with Dr. Cee who ordered to increase neftali to 300 mg TID and follow up with PCP  For any other problems related to shingles.

## 2019-05-08 NOTE — TELEPHONE ENCOUNTER
Pt called asking if he can increase the gabapentin Dr. Cee prescribed for him? The dose he is taking is not working. Will discuss with Dr. Cee.

## 2019-05-09 LAB
C1Q1 TESTING DATE: NORMAL
C1Q1A TESTING DATE: NORMAL
C1Q2 TESTING DATE: NORMAL
C1Q2A TESTING DATE: NORMAL
CLASS I ANTIBODY COMMENTS - LUMINEX: NORMAL
CLASS I ANTIBODY COMMENTS - LUMINEX: NORMAL
CLASS II ANTIBODY COMMENTS - LUMINEX: NORMAL
CLASS II ANTIBODY COMMENTS - LUMINEX: NORMAL
HC1Q TESTING DATE: NORMAL
HC1QA TESTING DATE: NORMAL
SERUM COLLECTION DT - LUMINEX CLASS I: NORMAL
SERUM COLLECTION DT - LUMINEX CLASS I: NORMAL
SERUM COLLECTION DT - LUMINEX CLASS II: NORMAL
SERUM COLLECTION DT - LUMINEX CLASS II: NORMAL

## 2019-05-10 RX ORDER — GABAPENTIN 100 MG/1
300 CAPSULE ORAL 3 TIMES DAILY
Qty: 63 CAPSULE | Refills: 0 | Status: SHIPPED | OUTPATIENT
Start: 2019-05-10 | End: 2019-07-10

## 2019-05-15 ENCOUNTER — OFFICE VISIT (OUTPATIENT)
Dept: DIABETES | Facility: CLINIC | Age: 69
End: 2019-05-15
Payer: MEDICARE

## 2019-05-15 ENCOUNTER — OFFICE VISIT (OUTPATIENT)
Dept: FAMILY MEDICINE | Facility: CLINIC | Age: 69
End: 2019-05-15
Attending: FAMILY MEDICINE
Payer: MEDICARE

## 2019-05-15 ENCOUNTER — TELEPHONE (OUTPATIENT)
Dept: FAMILY MEDICINE | Facility: CLINIC | Age: 69
End: 2019-05-15

## 2019-05-15 VITALS
HEIGHT: 74 IN | OXYGEN SATURATION: 100 % | HEART RATE: 81 BPM | TEMPERATURE: 98 F | BODY MASS INDEX: 36.7 KG/M2 | DIASTOLIC BLOOD PRESSURE: 80 MMHG | SYSTOLIC BLOOD PRESSURE: 134 MMHG | WEIGHT: 285.94 LBS

## 2019-05-15 VITALS
HEIGHT: 74 IN | BODY MASS INDEX: 34.66 KG/M2 | DIASTOLIC BLOOD PRESSURE: 68 MMHG | WEIGHT: 270.06 LBS | SYSTOLIC BLOOD PRESSURE: 120 MMHG

## 2019-05-15 DIAGNOSIS — E11.22 TYPE 2 DIABETES MELLITUS WITH STAGE 3 CHRONIC KIDNEY DISEASE, WITH LONG-TERM CURRENT USE OF INSULIN: Primary | ICD-10-CM

## 2019-05-15 DIAGNOSIS — N18.30 CKD (CHRONIC KIDNEY DISEASE) STAGE 3, GFR 30-59 ML/MIN: ICD-10-CM

## 2019-05-15 DIAGNOSIS — E11.49 OTHER DIABETIC NEUROLOGICAL COMPLICATION ASSOCIATED WITH TYPE 2 DIABETES MELLITUS: ICD-10-CM

## 2019-05-15 DIAGNOSIS — D84.821 IMMUNOSUPPRESSED DUE TO CHEMOTHERAPY: ICD-10-CM

## 2019-05-15 DIAGNOSIS — E78.5 DM TYPE 2 WITH DIABETIC DYSLIPIDEMIA: ICD-10-CM

## 2019-05-15 DIAGNOSIS — E78.5 HYPERLIPIDEMIA, UNSPECIFIED HYPERLIPIDEMIA TYPE: ICD-10-CM

## 2019-05-15 DIAGNOSIS — E66.01 MORBID OBESITY WITH BMI OF 40.0-44.9, ADULT: ICD-10-CM

## 2019-05-15 DIAGNOSIS — E11.69 DM TYPE 2 WITH DIABETIC DYSLIPIDEMIA: ICD-10-CM

## 2019-05-15 DIAGNOSIS — T45.1X5A IMMUNOSUPPRESSED DUE TO CHEMOTHERAPY: ICD-10-CM

## 2019-05-15 DIAGNOSIS — Z94.1 HEART TRANSPLANTED: Primary | ICD-10-CM

## 2019-05-15 DIAGNOSIS — E11.49 OTHER DIABETIC NEUROLOGICAL COMPLICATION ASSOCIATED WITH TYPE 2 DIABETES MELLITUS: Primary | ICD-10-CM

## 2019-05-15 DIAGNOSIS — Z79.4 TYPE 2 DIABETES MELLITUS WITH STAGE 3 CHRONIC KIDNEY DISEASE, WITH LONG-TERM CURRENT USE OF INSULIN: Primary | ICD-10-CM

## 2019-05-15 DIAGNOSIS — N18.30 TYPE 2 DIABETES MELLITUS WITH STAGE 3 CHRONIC KIDNEY DISEASE, WITH LONG-TERM CURRENT USE OF INSULIN: Primary | ICD-10-CM

## 2019-05-15 DIAGNOSIS — Z79.899 IMMUNOSUPPRESSED DUE TO CHEMOTHERAPY: ICD-10-CM

## 2019-05-15 DIAGNOSIS — I10 HYPERTENSION, UNSPECIFIED TYPE: ICD-10-CM

## 2019-05-15 DIAGNOSIS — B02.29 POST HERPETIC NEURALGIA: Primary | ICD-10-CM

## 2019-05-15 LAB — GLUCOSE SERPL-MCNC: 117 MG/DL (ref 70–110)

## 2019-05-15 PROCEDURE — 99214 PR OFFICE/OUTPT VISIT, EST, LEVL IV, 30-39 MIN: ICD-10-PCS | Mod: S$PBB,,, | Performed by: FAMILY MEDICINE

## 2019-05-15 PROCEDURE — 82962 GLUCOSE BLOOD TEST: CPT | Mod: PBBFAC,PO | Performed by: NURSE PRACTITIONER

## 2019-05-15 PROCEDURE — 99999 PR PBB SHADOW E&M-EST. PATIENT-LVL III: CPT | Mod: PBBFAC,,, | Performed by: NURSE PRACTITIONER

## 2019-05-15 PROCEDURE — 99999 PR PBB SHADOW E&M-EST. PATIENT-LVL III: CPT | Mod: PBBFAC,,, | Performed by: FAMILY MEDICINE

## 2019-05-15 PROCEDURE — 99213 OFFICE O/P EST LOW 20 MIN: CPT | Mod: PBBFAC,27,PO | Performed by: NURSE PRACTITIONER

## 2019-05-15 PROCEDURE — 99999 PR PBB SHADOW E&M-EST. PATIENT-LVL III: ICD-10-PCS | Mod: PBBFAC,,, | Performed by: FAMILY MEDICINE

## 2019-05-15 PROCEDURE — 99213 OFFICE O/P EST LOW 20 MIN: CPT | Mod: PBBFAC,PO | Performed by: FAMILY MEDICINE

## 2019-05-15 PROCEDURE — 99214 OFFICE O/P EST MOD 30 MIN: CPT | Mod: S$PBB,,, | Performed by: FAMILY MEDICINE

## 2019-05-15 PROCEDURE — 99214 PR OFFICE/OUTPT VISIT, EST, LEVL IV, 30-39 MIN: ICD-10-PCS | Mod: S$PBB,,, | Performed by: NURSE PRACTITIONER

## 2019-05-15 PROCEDURE — 99214 OFFICE O/P EST MOD 30 MIN: CPT | Mod: S$PBB,,, | Performed by: NURSE PRACTITIONER

## 2019-05-15 PROCEDURE — 99999 PR PBB SHADOW E&M-EST. PATIENT-LVL III: ICD-10-PCS | Mod: PBBFAC,,, | Performed by: NURSE PRACTITIONER

## 2019-05-15 RX ORDER — OXYCODONE AND ACETAMINOPHEN 5; 325 MG/1; MG/1
1 TABLET ORAL EVERY 6 HOURS PRN
Qty: 20 TABLET | Refills: 0 | Status: SHIPPED | OUTPATIENT
Start: 2019-05-15 | End: 2019-07-10

## 2019-05-15 RX ORDER — LIDOCAINE 50 MG/G
1 PATCH TOPICAL DAILY
Qty: 15 PATCH | Refills: 0 | Status: SHIPPED | OUTPATIENT
Start: 2019-05-15 | End: 2019-07-10

## 2019-05-15 NOTE — TELEPHONE ENCOUNTER
----- Message from Chani Fuentes sent at 5/15/2019 11:42 AM CDT -----  Contact: [facility]([place])-[phone]  Would like to consult with nurse regarding medication (Lidocaine patch), please call back at [phone]. Thanks/ar

## 2019-05-15 NOTE — PROGRESS NOTES
Subjective:       Patient ID: Nigel Albrecht is a 68 y.o. male.    Chief Complaint: Pain (from shingles; abdomen to back)    68 y old male with t2 dm , chronic  immunosuppression ,stage  3 ckd here for f.u on R flank  post herpetic neurologia  . Seen by Transplant team on 4/30 . Started on Gabapentin 200 mg tid wit poor relief . Oxycodone helps . Still feels stabbing pain , worst in am .     Review of Systems   Constitutional: Negative.    HENT: Negative.    Eyes: Negative.    Respiratory: Negative.    Cardiovascular: Negative.    Gastrointestinal: Negative.    Genitourinary: Negative.    Musculoskeletal: Negative.    Skin: Negative.    Hematological: Negative.        Objective:      Physical Exam   Skin:        hypopigmented patches        Assessment:     Nigel was seen today for pain.    Diagnoses and all orders for this visit:    Post herpetic neuralgia    DM type 2 with diabetic dyslipidemia    Immunosuppressed due to chemotherapy    CKD (chronic kidney disease) stage 3, GFR 30-59 ml/min    Other orders  -     oxyCODONE-acetaminophen (PERCOCET) 5-325 mg per tablet; Take 1 tablet by mouth every 6 (six) hours as needed for Pain.  -     lidocaine (LIDODERM) 5 %; Place 1 patch onto the skin once daily. Remove & Discard patch within 12 hours or as directed by MD      Plan:   Increase Gabapentin to 1200 mg ( max 1400 mg given stage 3 ckd)   Call with progress in 1 w   Controlled. Cont med   Stable , f.u with transplant team   Avoid High dose of gabapentin

## 2019-05-15 NOTE — TELEPHONE ENCOUNTER
Attempted to contact N[place] at [facility], no answer. Unable to leave message at this time.

## 2019-05-15 NOTE — PROGRESS NOTES
"PCP: Krystin Zimmerman MD    Subjective:     Chief Complaint: Diabetes Follow Up    HISTORY OF PRESENT ILLNESS: 68 y.o.  male presenting for diabetes follow up. Patient has had diabetes for many years with the following complications: neuropathy, stage III CKD.  He has attended diabetes education in the past.  He had a cardiac and renal transplant in 2002.  Blood glucose testing is performed regularly. Per recall, fasting BG 78 - 128, occasional 180; ac dinner 150 s.  He denies having hypoglycemia.     Last month, patient was diagnosed with active shingles, which has slowly improved, although he still has some pain.  He was scheduled to have cholecystomy ( secondary to bilary colic ), but development of shingles delayed procedure.  Patient reports decreased appetite and has lost > 20 pounds.     Height: 6' 2" (188 cm)  //  Weight: 122.5 kg (270 lb 1 oz), Body mass index is 34.67 kg/m².  Home Blood Glucose reading this AM: Not Taken  His blood sugar in clinic today is: 117 mg/dl at 9:58 am      Labs Reviewed.       DM MEDICATIONS:   Humulin 70 / 30, 50 units ( bfast ) / 40  units (lunch ) / 40 units (dinner )  Ozempic 1 mg weekly     Review of Systems   Constitutional: Positive for unexpected weight change. Negative for appetite change and fatigue.   Eyes: Negative for visual disturbance.   Gastrointestinal: Negative for abdominal pain, constipation, diarrhea and nausea.   Endocrine: Negative for polydipsia, polyphagia and polyuria.   Neurological: Negative for dizziness, numbness and headaches.   Psychiatric/Behavioral: Negative for confusion and decreased concentration. The patient is not nervous/anxious.        Diabetes Management Status  Statin: Taking  ACE/ARB: Taking    Screening or Prevention Patient's value Goal Complete/Controlled?   HgA1C Testing and Control   Lab Results   Component Value Date    HGBA1C 7.3 (H) 04/30/2019      Annually/Less than 8% Yes   Lipid profile : 04/30/2019 " Annually Yes   LDL control Lab Results   Component Value Date    LDLCALC 58.0 (L) 2019    Annually/Less than 100 mg/dl  Yes   Nephropathy screening Lab Results   Component Value Date    MICALBCREAT 527.8 (H) 2018     Lab Results   Component Value Date    PROTEINUA 2+ (A) 2019    Annually Yes   Blood pressure BP Readings from Last 1 Encounters:   05/15/19 134/80    Less than 140/90 Yes   Dilated retinal exam : 2019 Annually Yes, Aminata Perry    Foot exam   : 2019 Annually Yes     ACTIVITY LEVEL: Rarely Active. Discussed activities, benefits, methods, and precautions.  MEAL PLANNING: Patient reports number of meals per day to be 2 and number of snacks per day to be 2.   Patient is encouraged to carb count and consume no more than 45 - 60 grams of carbohydrates in each meal, and 1800 k / jovany per day.      BLOOD GLUCOSE TESTIN to 2 times daily  SOCIAL HISTORY: . Lives with spouse. Has 2 children. Patient retired as manager for [employer].     Objective:      Physical Exam   Constitutional: He appears well-developed and well-nourished.   HENT:   Head: Normocephalic and atraumatic.   Eyes: Pupils are equal, round, and reactive to light. EOM are normal.   Neck: Normal range of motion.   Cardiovascular: Normal rate and regular rhythm.   Pulses:       Dorsalis pedis pulses are 2+ on the right side, and 2+ on the left side.   Pulmonary/Chest: Effort normal and breath sounds normal.   Musculoskeletal: Normal range of motion.   Feet:   Right Foot:   Protective Sensation: 6 sites tested. 4 sites sensed.   Skin Integrity: Positive for dry skin. Negative for ulcer or callus.   Left Foot:   Protective Sensation: 6 sites tested. 4 sites sensed.   Skin Integrity: Positive for dry skin. Negative for ulcer, blister or callus.   Skin: Skin is warm and dry. No rash noted.   Psychiatric: He has a normal mood and affect. His behavior is normal. Judgment and thought content  normal.       Assessment / Plan:     1.) Other diabetic neurological complication associated with type 2 diabetes mellitus  Comments:  -  Condition controlled.  Due to recent weight loss and decreased appetite, patient blood sugars have been much lower.  For now, will hold off on making any adjustments to DM regimen.  Continue Ozempic 1 mg weekly. Will go  back to BID dosing of insulin, Humulin 70 / 30, 60 units BID. He received his Dexcom CGM in the mail and was scheduled for training with [facility] this month.     Orders:  -     POCT Glucose, Hand-Held Device  -     Nursing communication: signed medical release for Eye Exam records  -     semaglutide (OZEMPIC) 1 mg/dose (2 mg/1.5 mL) PnIj; Inject 1 Syringe into the skin every 14 (fourteen) days.  Dispense: 9 mL; Refill: 1    2.) Hyperlipidemia, unspecified hyperlipidemia type - continue meds as prescribed    3.) Hypertension, unspecified type - continue meds as prescribed    4.) Morbid obesity with BMI of 40.0-44.9, adult    Additional Plan Details:    1.) Continue monitoring blood sugar 4 x daily, fasting and ac dinner or at bedtime. Discussed ADA goal for fasting blood sugar, 80 - 130 mg/dL; pp blood sugars below 180 mg/dl. Also, discussed prevention of hypoglycemia and the need to adjust goals to higher levels if persistent hypoglycemia.  Reminded to bring BG records or meter to each visit for review.  2.) Return to clinic in 3 months for follow up. The patient was explained the above plan and given opportunity to ask questions.  He understands, chooses and consents to this plan and accepts all the risks, which include but are not limited to the risks mentioned above. He understands the alternative of having no testing, interventions or treatments at this time. He left content and without further questions.     Jael Garrison, BELÉN-C, CDE    A total of 30 minutes was spent in face to face time, of which over 50 % was spent in counseling patient on disease  process, complications, treatment, and side effects of medications.

## 2019-05-16 ENCOUNTER — CLINICAL SUPPORT (OUTPATIENT)
Dept: DIABETES | Facility: CLINIC | Age: 69
End: 2019-05-16
Payer: MEDICARE

## 2019-05-16 VITALS — BODY MASS INDEX: 36.64 KG/M2 | WEIGHT: 285.5 LBS | HEIGHT: 74 IN

## 2019-05-16 DIAGNOSIS — E11.49 DIABETIC NEUROPATHY WITH NEUROLOGIC COMPLICATION: Primary | ICD-10-CM

## 2019-05-16 DIAGNOSIS — E11.40 DIABETIC NEUROPATHY WITH NEUROLOGIC COMPLICATION: Primary | ICD-10-CM

## 2019-05-16 PROCEDURE — 99999 PR PBB SHADOW E&M-EST. PATIENT-LVL IV: CPT | Mod: PBBFAC,,, | Performed by: DIETITIAN, REGISTERED

## 2019-05-16 PROCEDURE — 95249 CONT GLUC MNTR PT PROV EQP: CPT | Mod: PBBFAC,PN | Performed by: DIETITIAN, REGISTERED

## 2019-05-16 PROCEDURE — 99999 PR PBB SHADOW E&M-EST. PATIENT-LVL IV: ICD-10-PCS | Mod: PBBFAC,,, | Performed by: DIETITIAN, REGISTERED

## 2019-05-16 PROCEDURE — 99214 OFFICE O/P EST MOD 30 MIN: CPT | Mod: PBBFAC,25,PN | Performed by: DIETITIAN, REGISTERED

## 2019-05-16 NOTE — LETTER
May 16, 2019        Jael Garrison, PhD, NP-C  [address]  [place] [address]             [facility]  [address]  [place] [address]  Phone: [phone]  Fax: [phone]   Patient: Nigel Albrecht   MR Number: [id number]   YOB: 1950   Date of Visit: 5/16/2019       Dear Dr. Garrison:    Thank you for referring Nigel Albrecht to me for Dexcom G6 CGMS training. Below are the relevant portions of my assessment and plan of care.     If you have questions, please do not hesitate to call me. I look forward to following Nigel along with you.    Sincerely,      Pavithra Landon RD, CDE           CC  Krystin Zimmerman MD

## 2019-05-16 NOTE — PROGRESS NOTES
"DIABETES EDUCATION  DEXCOM G6 CGM TRAINING     Patient referred to clinic today for Dexcom G6 continuous glucose sensor system training.  Patient arrived with  fully charged. Assisted in downloading Dexcom G6 mobile ace and Clarity report ace to phone (clinic share code [id number]). Education was provided using "Quick Start Guide" per Dexcom protocol. Set-up both  and phone ace.      § Overview:  5min glucose reading updates, trending arrows, BG graph screens, battery life indicator, Blue Tooth Symbol.  § Menus: trend Graph, start sensor, enter BG, events, Alerts, Settings, Shutdown, Stop Sensor  §  Settings:                          * Urgent Low: 55 mg/dL                          * Low alert: 80, repeat 15min                          * High alert:200                          * Rise rate: on                          * Fall Rate: on                          * Signal Loss: on                          * No Reading: on                          * Always sound: on     · Reviewed additional setting options with patient, including Graph Height and Transmitter ID. Transmitter was paired with /phone ace.    · Reviewed where to find sensor insertion time and sensor expiration date.   · Discussed no need to calibrate sensor during the entirety of the 10 day wear if sensor code entered at set-up every 10days. Discussed that pt can calibrate sensor if desired, but at that time she will need to continue calibrating every 12 hours for sensor to remain accurate. Reviewed appropriate calibration techniques.  · Reviewed sensor site selection. Pt selected site and prepped using aseptic technique. Pt inserted sensor, secured transmitter into pod and started sensor session. Patient able to demonstrate without difficulty.    · Reviewed sensor pod/transmitter removal from site, and removal of transmitter from sensor pod. Encouraged to review manual prior to starting another sensor.   · Reviewed problem " solving aspects of sensor transmission/ variables that can disrupt RF transmission.  range 20 feet, but the first 3 hrs keep within 3 feet of transmitter.  · Pt instructed on lag time of interstitial fluid from CBG and was advised to tx hypoglycemia and dose insulin based on SMBG values.  · Dexcom technical support contact number given and examples of when to contact them discussed. Pt will download software at home for Dexcom download.      Time spent with patient: 60 minutes

## 2019-05-20 ENCOUNTER — TELEPHONE (OUTPATIENT)
Dept: DIABETES | Facility: CLINIC | Age: 69
End: 2019-05-20

## 2019-05-20 NOTE — TELEPHONE ENCOUNTER
----- Message from Mery Fernandez sent at 5/20/2019  1:57 PM CDT -----  Contact: pharmacy ref# [id number]  Type:  Pharmacy Calling to Clarify an RX    Name of Caller: sofia   Pharmacy Name:     [facility] - [place], [place] - [address] AT [address] [place] [address]  [facility] [address]  Phone: [phone] Fax: [phone]      Prescription Name: OZEMPIC 1 mg  What do they need to clarify?: the direction of the pt  Med   Best Call Back Number:  Additional Information:

## 2019-05-20 NOTE — TELEPHONE ENCOUNTER
----- Message from Eda Martinez MA sent at 5/16/2019  3:52 PM CDT -----      ----- Message -----  From: Pavithra Landon RD, CDE  Sent: 5/16/2019   2:55 PM  To: Jael Garrison, PhD, NP-C, #    Good afternoon,  [facility] clinic share code [id number].   Saved to endocrine snapshot for when pt comes to clinic.  Thanks ladies!!! Pavithra

## 2019-05-20 NOTE — TELEPHONE ENCOUNTER
Called to clarify Ozempic prescription with pharmacist. Voiced understanding, and Rx will be processed today.

## 2019-05-27 ENCOUNTER — PATIENT OUTREACH (OUTPATIENT)
Dept: ADMINISTRATIVE | Facility: HOSPITAL | Age: 69
End: 2019-05-27

## 2019-05-30 NOTE — TELEPHONE ENCOUNTER
Orders linked.    Patient Education     Eating Heart-Healthy Foods  Eating has a big impact on your heart health. In fact, eating healthier can improve several of your heart risks at once. For instance, it helps you manage weight, cholesterol, and blood pressure. Here are ideas to help you make heart-healthy changes without giving up all the foods and flavors you love.  Getting started  · Talk with your healthcare provider about eating plans, such as the DASH or Mediterranean diet. You may also be referred to a dietitian.  · Change a few things at a time. Give yourself time to get used to a few eating changes before adding more.  · Work to create a tasty, healthy eating plan that you can stick to for the rest of your life.    Goals for healthy eating  Below are some tips to improve your eating habits:  · Limit saturated fats and trans fats. Saturated fats raise your levels of cholesterol, so keep these fats to a minimum. They are found in foods such as fatty meats, whole milk, cheese, and palm and coconut oils. Avoid trans fats because they lower good cholesterol as well as raise bad cholesterol. Trans fats are most often found in processed foods.  · Reduce sodium (salt) intake. Eating too much salt may increase your blood pressure. Limit your sodium intake to 2,300 milligrams (mg) per day (the amount in 1 teaspoon of salt), or less if your healthcare provider recommends it. Dining out less often and eating fewer processed foods are two great ways to decrease the amount of salt you consume.  · Managing calories. A calorie is a unit of energy. Your body burns calories for fuel, but if you eat more calories than your body burns, the extras are stored as fat. Your healthcare provider can help you create a diet plan to manage your calories. This will likely include eating healthier foods as well as exercising regularly. To help you track your progress, keep a diary to record what you eat and how often you exercise.  Choose the right  foods  Aim to make these foods staples of your diet. If you have diabetes, you may have different recommendations than what is listed here:  · Fruits and vegetables provide plenty of nutrients without a lot of calories. At meals, fill half your plate with these foods. Split the other half of your plate between whole grains and lean protein.  · Whole grains are high in fiber and rich in vitamins and nutrients. Good choices include whole-wheat bread, pasta, and brown rice.  · Lean proteins give you nutrition with less fat. Good choices include fish, skinless chicken, and beans.  · Low-fat or nonfat dairy provides nutrients without a lot of fat. Try low-fat or nonfat milk, cheese, or yogurt.  · Healthy fats can be good for you in small amounts. These are unsaturated fats, such as olive oil, nuts, and fish. Try to have at least 2 servings per week of fatty fish, such as salmon, sardines, mackerel, rainbow trout, and albacore tuna. These contain omega-3 fatty acids, which are good for your heart. Flaxseed is another source of a heart-healthy fat.  More on heart-healthy eating  Read food labels  Healthy eating starts at the grocery store. Be sure to pay attention to food labels on packaged foods. Look for products that are high in fiber and protein, and low in saturated fat, cholesterol, and sodium. Avoid products that contain trans fat. And pay close attention to serving size. For instance, if you plan to eat two servings, double all the numbers on the label.  Prepare food right  A key part of healthy cooking is cutting down on added fat and salt. Look on the internet for lower-fat, lower-sodium recipes. Also, try these tips:  · Remove fat from meat and skin from poultry before cooking.  · Skim fat from the surface of soups and sauces.  · Broil, boil, bake, steam, grill, and microwave food without added fats.  · Choose ingredients that spice up your food without adding calories, fat, or sodium. Try these items:  horseradish, hot sauce, lemon, mustard, nonfat salad dressings, and vinegar. For salt-free herbs and spices, try basil, cilantro, cinnamon, pepper, and rosemary.  Date Last Reviewed: 10/1/2017  © 5358-3523 [employer]. [address] [place] [address]. All rights reserved. This information is not intended as a substitute for professional medical care. Always follow your healthcare professional's instructions.         Patient Education     Discharge Instructions: Taking Blood Pressure Medications  You have been diagnosed with high blood pressure (hypertension). Diet and exercise help control high blood pressure. Many people also need the help of one or more medicines. Here are the main types of blood pressure medicines and how they work.  Diuretics  Diuretics are sometimes called \"water pills\" because they work in the kidney and flush excess water and sodium (salt) from the body. These are one of the most common and  important medicines for the management of blood pressure.  Beta-blockers  Beta-blockers reduce nerve impulses to the heart and blood vessels. This makes the heart beat slower and with less force. Your blood pressure drops, and your heart does not have to work as hard, which may improve pumping function.  ACE inhibitors  ACE inhibitors cause the vessels to relax. This helps the blood flow better and lowers blood pressure.  Angiotensin antagonists  Angiotensin antagonists shield blood vessels from a hormone that causes the blood vessels to get stiff and narrow. Your vessels become wider, and your blood pressure goes down.  Calcium channel blockers (CCBs)  CCBs keep calcium from entering the muscle cells of the heart and blood vessels. This causes blood vessels to relax, and your blood pressure goes down.  Alpha-blockers  Alpha-blockers reduce nerve impulses to blood vessels. This allows blood to pass easily, causing blood pressure to go down.  Alpha-beta blockers  Alpha-beta blockers  work the same way as alpha-blockers but also slow your heartbeat. As a result, less blood is pumped through your blood vessels and your blood pressure goes down.  Vasodilators  Vasodilators directly open blood vessels by relaxing the muscle in the vessel walls, causing blood pressure to go down.  Date Last Reviewed: 4/27/2016  © 8770-7786 [employer]. [address] [place] [address]. All rights reserved. This information is not intended as a substitute for professional medical care. Always follow your healthcare professional's instructions.

## 2019-06-05 ENCOUNTER — LAB VISIT (OUTPATIENT)
Dept: LAB | Facility: HOSPITAL | Age: 69
End: 2019-06-05
Attending: INTERNAL MEDICINE
Payer: MEDICARE

## 2019-06-05 DIAGNOSIS — E66.01 MORBID OBESITY DUE TO EXCESS CALORIES: ICD-10-CM

## 2019-06-05 DIAGNOSIS — M10.9 GOUT, ARTHRITIS: ICD-10-CM

## 2019-06-05 DIAGNOSIS — N18.30 CKD (CHRONIC KIDNEY DISEASE), STAGE III: ICD-10-CM

## 2019-06-05 DIAGNOSIS — Z94.0 DECEASED-DONOR KIDNEY TRANSPLANT: ICD-10-CM

## 2019-06-05 DIAGNOSIS — Z94.0 KIDNEY REPLACED BY TRANSPLANT: ICD-10-CM

## 2019-06-05 DIAGNOSIS — I10 ESSENTIAL HYPERTENSION: ICD-10-CM

## 2019-06-05 LAB
25(OH)D3+25(OH)D2 SERPL-MCNC: 46 NG/ML (ref 30–96)
ALBUMIN SERPL BCP-MCNC: 2.9 G/DL (ref 3.5–5.2)
ALBUMIN SERPL BCP-MCNC: 2.9 G/DL (ref 3.5–5.2)
ALP SERPL-CCNC: 95 U/L (ref 55–135)
ALT SERPL W/O P-5'-P-CCNC: 16 U/L (ref 10–44)
ANION GAP SERPL CALC-SCNC: 8 MMOL/L (ref 8–16)
AST SERPL-CCNC: 25 U/L (ref 10–40)
BASOPHILS # BLD AUTO: 0.04 K/UL (ref 0–0.2)
BASOPHILS NFR BLD: 0.9 % (ref 0–1.9)
BILIRUB DIRECT SERPL-MCNC: 0.2 MG/DL (ref 0.1–0.3)
BILIRUB SERPL-MCNC: 0.3 MG/DL (ref 0.1–1)
BUN SERPL-MCNC: 14 MG/DL (ref 8–23)
CALCIUM SERPL-MCNC: 9 MG/DL (ref 8.7–10.5)
CHLORIDE SERPL-SCNC: 109 MMOL/L (ref 95–110)
CO2 SERPL-SCNC: 25 MMOL/L (ref 23–29)
CREAT SERPL-MCNC: 1.5 MG/DL (ref 0.5–1.4)
DIFFERENTIAL METHOD: ABNORMAL
EOSINOPHIL # BLD AUTO: 0.1 K/UL (ref 0–0.5)
EOSINOPHIL NFR BLD: 2.2 % (ref 0–8)
ERYTHROCYTE [DISTWIDTH] IN BLOOD BY AUTOMATED COUNT: 15.1 % (ref 11.5–14.5)
EST. GFR  (AFRICAN AMERICAN): 54.5 ML/MIN/1.73 M^2
EST. GFR  (NON AFRICAN AMERICAN): 47.2 ML/MIN/1.73 M^2
GLUCOSE SERPL-MCNC: 129 MG/DL (ref 70–110)
HCT VFR BLD AUTO: 38.9 % (ref 40–54)
HGB BLD-MCNC: 11.8 G/DL (ref 14–18)
IMM GRANULOCYTES # BLD AUTO: 0.02 K/UL (ref 0–0.04)
IMM GRANULOCYTES NFR BLD AUTO: 0.4 % (ref 0–0.5)
LYMPHOCYTES # BLD AUTO: 1.8 K/UL (ref 1–4.8)
LYMPHOCYTES NFR BLD: 40.5 % (ref 18–48)
MAGNESIUM SERPL-MCNC: 1.8 MG/DL (ref 1.6–2.6)
MCH RBC QN AUTO: 26 PG (ref 27–31)
MCHC RBC AUTO-ENTMCNC: 30.3 G/DL (ref 32–36)
MCV RBC AUTO: 86 FL (ref 82–98)
MONOCYTES # BLD AUTO: 0.4 K/UL (ref 0.3–1)
MONOCYTES NFR BLD: 7.9 % (ref 4–15)
NEUTROPHILS # BLD AUTO: 2.2 K/UL (ref 1.8–7.7)
NEUTROPHILS NFR BLD: 48.1 % (ref 38–73)
NRBC BLD-RTO: 0 /100 WBC
PHOSPHATE SERPL-MCNC: 3.9 MG/DL (ref 2.7–4.5)
PLATELET # BLD AUTO: 203 K/UL (ref 150–350)
PMV BLD AUTO: 11.3 FL (ref 9.2–12.9)
POTASSIUM SERPL-SCNC: 3.9 MMOL/L (ref 3.5–5.1)
PROT SERPL-MCNC: 7.1 G/DL (ref 6–8.4)
PTH-INTACT SERPL-MCNC: 97 PG/ML (ref 9–77)
RBC # BLD AUTO: 4.54 M/UL (ref 4.6–6.2)
SODIUM SERPL-SCNC: 142 MMOL/L (ref 136–145)
URATE SERPL-MCNC: 5.2 MG/DL (ref 3.4–7)
WBC # BLD AUTO: 4.54 K/UL (ref 3.9–12.7)

## 2019-06-05 PROCEDURE — 83735 ASSAY OF MAGNESIUM: CPT

## 2019-06-05 PROCEDURE — 80069 RENAL FUNCTION PANEL: CPT

## 2019-06-05 PROCEDURE — 83970 ASSAY OF PARATHORMONE: CPT

## 2019-06-05 PROCEDURE — 85025 COMPLETE CBC W/AUTO DIFF WBC: CPT

## 2019-06-05 PROCEDURE — 36415 COLL VENOUS BLD VENIPUNCTURE: CPT | Mod: PO

## 2019-06-05 PROCEDURE — 80076 HEPATIC FUNCTION PANEL: CPT

## 2019-06-05 PROCEDURE — 80195 ASSAY OF SIROLIMUS: CPT

## 2019-06-05 PROCEDURE — 84550 ASSAY OF BLOOD/URIC ACID: CPT

## 2019-06-05 PROCEDURE — 82306 VITAMIN D 25 HYDROXY: CPT

## 2019-06-06 LAB — SIROLIMUS BLD-MCNC: 8.5 NG/ML (ref 4–20)

## 2019-06-07 LAB
CYSTATIN C SERPL-MCNC: 1.69 MG/L (ref 0.77–1.42)
GFR/BSA.PRED SERPLBLD CYS-BASED-ARV: 38 ML/MIN/BSA

## 2019-06-12 ENCOUNTER — NUTRITION (OUTPATIENT)
Dept: DIABETES | Facility: CLINIC | Age: 69
End: 2019-06-12
Payer: MEDICARE

## 2019-06-12 VITALS — WEIGHT: 277.13 LBS | HEIGHT: 75 IN | BODY MASS INDEX: 34.46 KG/M2

## 2019-06-12 DIAGNOSIS — E11.49 DIABETIC NEUROPATHY WITH NEUROLOGIC COMPLICATION: Primary | ICD-10-CM

## 2019-06-12 DIAGNOSIS — E11.40 DIABETIC NEUROPATHY WITH NEUROLOGIC COMPLICATION: Primary | ICD-10-CM

## 2019-06-12 PROCEDURE — 99999 PR PBB SHADOW E&M-EST. PATIENT-LVL IV: CPT | Mod: PBBFAC,,, | Performed by: DIETITIAN, REGISTERED

## 2019-06-12 PROCEDURE — 99999 PR PBB SHADOW E&M-EST. PATIENT-LVL IV: ICD-10-PCS | Mod: PBBFAC,,, | Performed by: DIETITIAN, REGISTERED

## 2019-06-12 PROCEDURE — G0108 DIAB MANAGE TRN  PER INDIV: HCPCS | Mod: PBBFAC | Performed by: DIETITIAN, REGISTERED

## 2019-06-12 PROCEDURE — 99214 OFFICE O/P EST MOD 30 MIN: CPT | Mod: PBBFAC | Performed by: DIETITIAN, REGISTERED

## 2019-06-12 NOTE — PROGRESS NOTES
Diabetes Education  Author: Pavithra Landon RD, CDE  Date: 6/12/2019    Diabetes Care Management Summary  Diabetes Education Record Assessment/Progress: Comprehensive/Group  Current Diabetes Risk Level: Moderate(Noted A1C improved from 7.9 to 7.3 over past couple mos.)     Last A1c:   Lab Results   Component Value Date    HGBA1C 7.3 (H) 04/30/2019     Last visit with Diabetes Educator: Last Education Visit: Not Found    Diabetes Type  Diabetes Type : Type II    Diabetes History  Diabetes Diagnosis: >10 years  Current Treatment: Diet, Exercise, Insulin(humulin 70/30 insulin 50units 1-2 times daily (per pt dosing))  Reviewed Problem List with Patient: Yes    Health Maintenance was reviewed today with patient. Discussed with patient importance of routine eye exams, foot exams/foot care, blood work (i.e.: A1c, microalbumin, and lipid), dental visits, yearly flu vaccine, and pneumonia vaccine as indicated by PCP. Patient verbalized understanding.     Health Maintenance Topics with due status: Not Due       Topic Last Completion Date    Colonoscopy 04/24/2015    Influenza Vaccine 09/24/2018    Eye Exam 03/01/2019    Lipid Panel 04/30/2019    Hemoglobin A1c 04/30/2019    Foot Exam 05/15/2019     Health Maintenance Due   Topic Date Due    TETANUS VACCINE  06/28/1968     Nutrition  Meal Planning: skipping meals(Intake ~8457-1378 cals/d; wt decreased ~8 lbs since 5/16. Pt working to reduce carb; no excess fat, sodium. Noted 2meals/d, skips lunch, no use MR shake.)  Meal Plan 24 Hour Recall - Breakfast: brunch - hasbrown OR grits, eggs OR toast - coffee, blk   Meal Plan 24 Hour Recall - Lunch: none  Meal Plan 24 Hour Recall - Dinner: rice, peas, okra, bkd chix - water    Meal Plan 24 Hour Recall - Snack: hs occs chips; alem: ethol (2-3d/wk)    Monitoring   Self Monitoring : Dexcom downloaded, saved to media. Avg  w/ 95% readings within target, 5% readings above and 0% below. Noted time period w/out data due to pt  waiting for supplies. He used glucometer as back-up during this time.   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 )    Current Diabetes Treatment   Current Treatment: Diet, Exercise, Insulin(humulin 70/30 insulin 50units 1-2 times daily (per pt dosing))    Social History  Preferred Learning Method: Face to Face  Primary Support: Self  Smoking Status: Ex Smoker  Alcohol Use: Weekly       DDS-2 Score  ( > 3 = SIGNIFICANT DISTRESS): 1.5        Barriers to Change  Barriers to Change: None  Learning Challenges : None    Readiness to Learn   Readiness to Learn : Eager    Cultural Influences  Cultural Influences: No    Diabetes Education Assessment/Progress  Diabetes Disease Process (diabetes disease process and treatment options): Demonstrates Understanding/Competency(verbalizes/demonstrates)  Nutrition (Incorporating nutritional management into one's lifestyle): Discussion, Individual Session, Demonstrates Understanding/Competency (verbalizes/demonstrates), Written Materials Provided  Physical Activity (incorporating physical activity into one's lifestyle): Discussion, Individual Session, Demonstrates Understanding/Competency (verbalizes/demonstrates), Written Materials Provided  Medications (states correct name, dose, onset, peak, duration, side effects & timing of meds): Discussion, Individual Session, Demonstrates Understanding/Competency(verbalizes/demonstrates), Written Materials Provided  Monitoring (monitoring blood glucose/other parameters & using results): Discussion, Individual Session, Demonstrates Understanding/Competency (verbalizes/demonstrates)  Acute Complications (preventing, detecting, and treating acute complications): Discussion, Individual Session, Demonstrates Understanding/Competency (verbalizes/demonstrates)  Chronic Complications (preventing, detecting, and treating chronic  complications): Demonstrates Understanding/Competency (verbalizes/demonstrates)  Clinical (diabetes, other pertinent medical history, and relevant comorbidities reviewed during visit): Demonstrates Understanding/Competency (verbalizes/demonstrates)  Cognitive (knowledge of self-management skills, functional health literacy): Demonstrates Understanding/Competency (verbalizes/demonstrates)  Psychosocial (emotional response to diabetes): Discussion, Individual Session, Demonstrates Understanding/Competency (verbalizes/demonstrates)  Diabetes Distress and Support Systems: Discussion, Individual Session, Demonstrates Understanding/Competency (verbalizes/demonstrates)  Behavioral (readiness for change, lifestyle practices, self-care behaviors): Discussion, Individual Session, Demonstrates Understanding/Competency (verbalizes/demonstrates)    Goals  Patient has selected/evaluated goals during today's session: Yes, selected  Healthy Eating: Set(use meal plan - use MR shake (Glucerna) to improve meal patterns 3meals/d and support BG stability )  Start Date: 06/12/19  Target Date: 07/24/19  Monitoring: Set(use dexcom as directed)  Met Percentage : 100%  Start Date: 06/12/19  Target Date: 07/24/19  Medications: Set(take medications as Rx)  Met Percentage : 75%  Start Date: 06/12/19  Target Date: 07/24/19     Diabetes Care Plan/Intervention  Education Plan/Intervention: Individual Follow-Up DSMT(Maintain visits w/ ABates for medical mgmt. )    Diabetes Meal Plan  Restrictions: Low Fat, Low Sodium  Calories: 1800  Carbohydrate Per Meal: 45-60g  Carbohydrate Per Snack : 15-30g    Today's Self-Management Care Plan was developed with the patient's input and is based on barriers identified during today's assessment.    The long and short-term goals in the care plan were written with the patient/caregiver's input. The patient has agreed to work toward these goals to improve his overall diabetes control.      The patient received a  copy of today's self-management plan and verbalized understanding of the care plan, goals, and all of today's instructions.      The patient was encouraged to communicate with his physician and care team regarding his condition(s) and treatment.  I provided the patient with my contact information today and encouraged him to contact me via phone or patient portal as needed.     Education Units of Time   Time Spent: 30 min

## 2019-06-12 NOTE — LETTER
June 12, 2019        Jael Garrison, PhD, NP-C  [address] [facility]  [place] [address]             [facility]  [address] [facility]  [place] [address]  Phone: [phone]  Fax: [phone]   Patient: Nigel Albrecht   MR Number: [id number]   YOB: 1950   Date of Visit: 6/12/2019       Dear Dr. Garrison:    Thank you for referring Nigel Albrecht to me for Dexcom fu and download. Below are the relevant portions of my assessment and plan of care.     If you have questions, please do not hesitate to call me. I look forward to following Nigel along with you.    Sincerely,      Pavithra Landon RD, CDE           CC  Krystin Zimmerman MD

## 2019-06-20 ENCOUNTER — OFFICE VISIT (OUTPATIENT)
Dept: NEPHROLOGY | Facility: CLINIC | Age: 69
End: 2019-06-20
Payer: MEDICARE

## 2019-06-20 VITALS
HEART RATE: 70 BPM | HEIGHT: 74 IN | SYSTOLIC BLOOD PRESSURE: 128 MMHG | BODY MASS INDEX: 35.88 KG/M2 | DIASTOLIC BLOOD PRESSURE: 64 MMHG | WEIGHT: 279.56 LBS

## 2019-06-20 DIAGNOSIS — I10 ESSENTIAL HYPERTENSION: ICD-10-CM

## 2019-06-20 DIAGNOSIS — Z94.0 DECEASED-DONOR KIDNEY TRANSPLANT: Primary | ICD-10-CM

## 2019-06-20 DIAGNOSIS — E66.01 MORBID OBESITY DUE TO EXCESS CALORIES: ICD-10-CM

## 2019-06-20 DIAGNOSIS — M10.9 GOUT, ARTHRITIS: ICD-10-CM

## 2019-06-20 DIAGNOSIS — N18.30 CKD (CHRONIC KIDNEY DISEASE), STAGE III: ICD-10-CM

## 2019-06-20 DIAGNOSIS — R80.1 PERSISTENT PROTEINURIA: ICD-10-CM

## 2019-06-20 PROCEDURE — 99214 OFFICE O/P EST MOD 30 MIN: CPT | Mod: PBBFAC | Performed by: INTERNAL MEDICINE

## 2019-06-20 PROCEDURE — 99214 OFFICE O/P EST MOD 30 MIN: CPT | Mod: S$PBB,,, | Performed by: INTERNAL MEDICINE

## 2019-06-20 PROCEDURE — 99999 PR PBB SHADOW E&M-EST. PATIENT-LVL IV: CPT | Mod: PBBFAC,,, | Performed by: INTERNAL MEDICINE

## 2019-06-20 PROCEDURE — 99214 PR OFFICE/OUTPT VISIT, EST, LEVL IV, 30-39 MIN: ICD-10-PCS | Mod: S$PBB,,, | Performed by: INTERNAL MEDICINE

## 2019-06-20 PROCEDURE — 99999 PR PBB SHADOW E&M-EST. PATIENT-LVL IV: ICD-10-PCS | Mod: PBBFAC,,, | Performed by: INTERNAL MEDICINE

## 2019-06-20 NOTE — PROGRESS NOTES
Subjective:       Patient ID: Nigel Albrecht is a 68 y.o. Black or  male who presents for follow up  evaluation of Chronic Kidney Disease and Proteinuria    Edema   Pertinent negatives include no abdominal pain, arthralgias, chest pain, chills, congestion, coughing, diaphoresis, fatigue, fever, headaches, joint swelling, nausea, neck pain, rash or vomiting.   Hypertension   Pertinent negatives include no chest pain, headaches, neck pain, palpitations or shortness of breath.    Patient is a  Black or  male who received a heart and kidney transplant on 02. He has CKD stage 3 - GFR 30-59 and his baseline creatinine is between 1.4-1.6mg/dl. He takes cellcept and sirolimus for maintenance immunosuppression. He denies any recent hospitalizations or ER visits since his previous clinic visit.he has been followed in the transplant Center at [facility].  He is now here to establish his care locally.  His main issues are that he has been weight with a BMI of 40.  His hemoglobin A1c is more than 10 with uncontrolled type II diabetes.  He has seen Caitlin Alcantar NP  for diabetic management.  Patient has been dealing with his son's illness in last 2 years who just  and during this time he gained a lot of weight and A1c went from 8 to 10 ; he is off steroids for 7 years now       Have been walking 20 min a day     2014 HbA1c came down to 8.1       6/30/15  CV evaluation normal DSE    2016  A1c down to 7.6 (9.4) still has LE edema ;started on torsemide ; Rapamycin increased to 2 mg ; C normal coronoaries; Lisinopril raised to 40 mg     10/2016 Rapa level 13 and dose reduced to 3 mg       2018 patient comes back for follow-up after a gap of 2 years.  Interim records reviewed from [facility] at the transplant Center    10/2018 s/p Olinde ( no flow issues) . Normal Vit D : still has edema : weight stable ; s/p  eval     2019 creatinine 1.5 GFR 38%.  Rapamycin level  "8.5.weight 279 = 35 ib loss after shingles       Review of Systems   Constitutional: Negative.  Negative for activity change, appetite change, chills, diaphoresis, fatigue and fever.   HENT: Negative.  Negative for congestion and trouble swallowing.    Eyes: Negative.    Respiratory: Positive for apnea. Negative for cough, chest tightness, shortness of breath and wheezing.         ZEINAB symtoms    Cardiovascular: Negative.  Negative for chest pain, palpitations and leg swelling.   Gastrointestinal: Negative.  Negative for abdominal distention, abdominal pain, nausea and vomiting.   Genitourinary: Negative.  Negative for decreased urine volume, difficulty urinating, dysuria, enuresis, flank pain, frequency, hematuria, penile swelling, scrotal swelling and urgency.   Musculoskeletal: Negative.  Negative for arthralgias, back pain, joint swelling and neck pain.   Skin: Negative for rash.   Neurological: Negative.  Negative for tremors, seizures and headaches.   Psychiatric/Behavioral: Negative.  Negative for confusion and sleep disturbance. The patient is not nervous/anxious.        Objective:       Vitals:    06/20/19 1050   BP: 128/64   Pulse: 70   Weight: 126.8 kg (279 lb 8.7 oz)   Height: 6' 2" (1.88 m)       5 ib weight loss in 3 mo ( 317 IN 6/2015 ) overall stable x 3 years     2/2019 315 ib     6/2019 279     Physical Exam   Constitutional: He is oriented to person, place, and time. He appears well-developed and well-nourished. No distress.   HENT:   Head: Normocephalic.   Eyes: Pupils are equal, round, and reactive to light. Conjunctivae and EOM are normal. No scleral icterus.   Neck: Normal range of motion. Neck supple. No JVD present. No thyromegaly present.   Cardiovascular: Normal rate, regular rhythm, normal heart sounds and intact distal pulses. Exam reveals no gallop and no friction rub.   No murmur heard.  Loud P2   Pulmonary/Chest: Effort normal and breath sounds normal. No stridor. No respiratory " distress. He has no wheezes. He has no rales. He exhibits no tenderness.   Abdominal: Soft. Bowel sounds are normal. He exhibits no distension and no mass. There is no tenderness. There is no rebound and no guarding.    positive truncal obesity    RLQ allograft not tender no bruit    Musculoskeletal: Normal range of motion. He exhibits edema.   Neurological: He is alert and oriented to person, place, and time. He has normal reflexes. No cranial nerve deficit. He exhibits normal muscle tone. Coordination normal.   Skin: Skin is warm and dry. No rash noted. He is not diaphoretic. No erythema. No pallor.   Psychiatric: He has a normal mood and affect. His behavior is normal. Judgment and thought content normal.         Lab Results   Component Value Date    WBC 4.54 2019    HGB 11.8 (L) 2019    HCT 38.9 (L) 2019    MCV 86 2019     2019     Lab Results   Component Value Date    CREATININE 1.5 (H) 2019    BUN 14 2019     2019    K 3.9 2019     2019    CO2 25 2019     Lab Results   Component Value Date    PTH 97.0 (H) 2019    CALCIUM 9.0 2019    PHOS 3.9 2019     Lab Results   Component Value Date    HGBA1C 7.3 (H) 2019     Lab Results   Component Value Date    CHOL 116 (L) 2019    CHOL 174 2019    CHOL 153 2018     Lab Results   Component Value Date    HDL 28 (L) 2019    HDL 33 (L) 2019    HDL 35 (L) 2018     Lab Results   Component Value Date    LDLCALC 58.0 (L) 2019    LDLCALC 103.2 2019    LDLCALC 87.2 2018     Lab Results   Component Value Date    TRIG 150 2019    TRIG 189 (H) 2019    TRIG 154 (H) 2018     Lab Results   Component Value Date    CHOLHDL 24.1 2019    CHOLHDL 19.0 (L) 2019    CHOLHDL 22.9 2018                 Assessment:       1. -donor kidney/ heart transplant performed at [facility] on 2002 - ESRD  etiology unknown; ESHF due to IHSS    2. CKD (chronic kidney disease), stage III    3. Essential hypertension    4. Morbid obesity due to excess calories    5. Gout, arthritis    6. Persistent proteinuria        Plan:         1.  Chronic kidney disease stage III: stable ; diabetic nephropathy ;  GFR 30-40 % stable in last many years ;normal  Vitamin D level ; rapamycin level is adequate at 8.5 with a dose of 1 mg     2.  Kidney transplant: immunosuppression is doing well on current immunosuppressive agents. ( Please refer to the transplant evaluation from 2017 for discussions with heart transplant for Prograf based regimen and considering kidney biopsy.)      3.  Morbid obesity and diabetes type II controlled: follow up Caitlin Alcantar ; doing better now ; +luz feedback followed up in diabetic clinic as well with a SHERRILL Mendoza; A1c 7.3 in April 2019    4.  Hypertension:  with Hx of IHSS and CHF now heart transplant ; stable    5. Proteinuria : on ACEI in remission    6. Edema:  Needs stockings but he doesn't use it ;  torsemide 5 mg tab --1-2 ( not taking daily) ; adequate diuresis will help heart, BP and leg swelling .s/p  Consult vascular for venous insufficiency                fup 6 months       Laith Wilkerson MD

## 2019-07-10 ENCOUNTER — LAB VISIT (OUTPATIENT)
Dept: LAB | Facility: HOSPITAL | Age: 69
End: 2019-07-10
Attending: FAMILY MEDICINE
Payer: MEDICARE

## 2019-07-10 ENCOUNTER — OFFICE VISIT (OUTPATIENT)
Dept: FAMILY MEDICINE | Facility: CLINIC | Age: 69
End: 2019-07-10
Attending: FAMILY MEDICINE
Payer: MEDICARE

## 2019-07-10 VITALS
HEIGHT: 74 IN | WEIGHT: 275.25 LBS | OXYGEN SATURATION: 98 % | DIASTOLIC BLOOD PRESSURE: 78 MMHG | BODY MASS INDEX: 35.32 KG/M2 | TEMPERATURE: 98 F | SYSTOLIC BLOOD PRESSURE: 138 MMHG | HEART RATE: 73 BPM

## 2019-07-10 DIAGNOSIS — E78.5 DM TYPE 2 WITH DIABETIC DYSLIPIDEMIA: ICD-10-CM

## 2019-07-10 DIAGNOSIS — E11.69 DM TYPE 2 WITH DIABETIC DYSLIPIDEMIA: ICD-10-CM

## 2019-07-10 DIAGNOSIS — B02.29 POSTHERPETIC NEURALGIA: ICD-10-CM

## 2019-07-10 DIAGNOSIS — I10 HYPERTENSION, UNSPECIFIED TYPE: ICD-10-CM

## 2019-07-10 DIAGNOSIS — E66.01 SEVERE OBESITY (BMI 35.0-35.9 WITH COMORBIDITY): ICD-10-CM

## 2019-07-10 DIAGNOSIS — E11.69 DM TYPE 2 WITH DIABETIC DYSLIPIDEMIA: Primary | ICD-10-CM

## 2019-07-10 DIAGNOSIS — E78.5 DM TYPE 2 WITH DIABETIC DYSLIPIDEMIA: Primary | ICD-10-CM

## 2019-07-10 LAB
ALBUMIN SERPL BCP-MCNC: 3.1 G/DL (ref 3.5–5.2)
ALP SERPL-CCNC: 100 U/L (ref 55–135)
ALT SERPL W/O P-5'-P-CCNC: 20 U/L (ref 10–44)
ANION GAP SERPL CALC-SCNC: 8 MMOL/L (ref 8–16)
AST SERPL-CCNC: 29 U/L (ref 10–40)
BASOPHILS # BLD AUTO: 0.03 K/UL (ref 0–0.2)
BASOPHILS NFR BLD: 0.5 % (ref 0–1.9)
BILIRUB SERPL-MCNC: 0.3 MG/DL (ref 0.1–1)
BUN SERPL-MCNC: 16 MG/DL (ref 8–23)
CALCIUM SERPL-MCNC: 9.6 MG/DL (ref 8.7–10.5)
CHLORIDE SERPL-SCNC: 108 MMOL/L (ref 95–110)
CO2 SERPL-SCNC: 26 MMOL/L (ref 23–29)
CREAT SERPL-MCNC: 1.7 MG/DL (ref 0.5–1.4)
DIFFERENTIAL METHOD: ABNORMAL
EOSINOPHIL # BLD AUTO: 0.1 K/UL (ref 0–0.5)
EOSINOPHIL NFR BLD: 2.1 % (ref 0–8)
ERYTHROCYTE [DISTWIDTH] IN BLOOD BY AUTOMATED COUNT: 15.2 % (ref 11.5–14.5)
EST. GFR  (AFRICAN AMERICAN): 46.5 ML/MIN/1.73 M^2
EST. GFR  (NON AFRICAN AMERICAN): 40.3 ML/MIN/1.73 M^2
ESTIMATED AVG GLUCOSE: 134 MG/DL (ref 68–131)
GLUCOSE SERPL-MCNC: 110 MG/DL (ref 70–110)
HBA1C MFR BLD HPLC: 6.3 % (ref 4–5.6)
HCT VFR BLD AUTO: 39 % (ref 40–54)
HGB BLD-MCNC: 11.9 G/DL (ref 14–18)
IMM GRANULOCYTES # BLD AUTO: 0.01 K/UL (ref 0–0.04)
IMM GRANULOCYTES NFR BLD AUTO: 0.2 % (ref 0–0.5)
LYMPHOCYTES # BLD AUTO: 2.2 K/UL (ref 1–4.8)
LYMPHOCYTES NFR BLD: 39 % (ref 18–48)
MCH RBC QN AUTO: 26.3 PG (ref 27–31)
MCHC RBC AUTO-ENTMCNC: 30.5 G/DL (ref 32–36)
MCV RBC AUTO: 86 FL (ref 82–98)
MONOCYTES # BLD AUTO: 0.6 K/UL (ref 0.3–1)
MONOCYTES NFR BLD: 9.8 % (ref 4–15)
NEUTROPHILS # BLD AUTO: 2.8 K/UL (ref 1.8–7.7)
NEUTROPHILS NFR BLD: 48.4 % (ref 38–73)
NRBC BLD-RTO: 0 /100 WBC
PLATELET # BLD AUTO: 204 K/UL (ref 150–350)
PMV BLD AUTO: 11.1 FL (ref 9.2–12.9)
POTASSIUM SERPL-SCNC: 4.3 MMOL/L (ref 3.5–5.1)
PROT SERPL-MCNC: 7.3 G/DL (ref 6–8.4)
RBC # BLD AUTO: 4.53 M/UL (ref 4.6–6.2)
SODIUM SERPL-SCNC: 142 MMOL/L (ref 136–145)
WBC # BLD AUTO: 5.72 K/UL (ref 3.9–12.7)

## 2019-07-10 PROCEDURE — 99214 OFFICE O/P EST MOD 30 MIN: CPT | Mod: S$PBB,,, | Performed by: FAMILY MEDICINE

## 2019-07-10 PROCEDURE — 99214 PR OFFICE/OUTPT VISIT, EST, LEVL IV, 30-39 MIN: ICD-10-PCS | Mod: S$PBB,,, | Performed by: FAMILY MEDICINE

## 2019-07-10 PROCEDURE — 83036 HEMOGLOBIN GLYCOSYLATED A1C: CPT

## 2019-07-10 PROCEDURE — 99999 PR PBB SHADOW E&M-EST. PATIENT-LVL III: CPT | Mod: PBBFAC,,, | Performed by: FAMILY MEDICINE

## 2019-07-10 PROCEDURE — 99213 OFFICE O/P EST LOW 20 MIN: CPT | Mod: PBBFAC,PO | Performed by: FAMILY MEDICINE

## 2019-07-10 PROCEDURE — 36415 COLL VENOUS BLD VENIPUNCTURE: CPT | Mod: PO

## 2019-07-10 PROCEDURE — 99999 PR PBB SHADOW E&M-EST. PATIENT-LVL III: ICD-10-PCS | Mod: PBBFAC,,, | Performed by: FAMILY MEDICINE

## 2019-07-10 PROCEDURE — 85025 COMPLETE CBC W/AUTO DIFF WBC: CPT

## 2019-07-10 PROCEDURE — 80053 COMPREHEN METABOLIC PANEL: CPT

## 2019-07-10 RX ORDER — OXYCODONE AND ACETAMINOPHEN 5; 325 MG/1; MG/1
1 TABLET ORAL EVERY 6 HOURS PRN
Qty: 20 TABLET | Refills: 0 | Status: SHIPPED | OUTPATIENT
Start: 2019-07-10 | End: 2022-01-07

## 2019-07-10 NOTE — PROGRESS NOTES
Subjective:       Patient ID: Nigel Albrecht is a 69 y.o. male.    Chief Complaint: Follow-up    69 y old male with DM , HTN , DLP ,  Sever obesity , CKD s/p OHT and DDKT here To [facility] , On aspirin, Not LITA He will like to wait , completed  hep b vacc , he walks 15 min daily 3 times weekly , He does not follow any renal diet , no low carb . Opthalmology :  Seen at [facility]  : seen last  Feb ., Fastin bs : 105     , 170  1 hrs after dinner  . F.u with Dr Vázquez([facility]) and transplant team in [place] . Denies any CP . Sob , palpations . off of prednisone  . Still with postherpetic neuralgia . Gabapentin caused nausea. Percocet alleviated pain . Seldom takes it .        Review of Systems   Constitutional: Negative.    HENT: Negative.    Eyes: Negative.    Respiratory: Negative.    Cardiovascular: Negative.    Gastrointestinal: Negative.    Genitourinary: Negative.    Musculoskeletal: Negative.    Skin: Negative.    Hematological: Negative.        Objective:      Physical Exam   Constitutional: He is oriented to person, place, and time. No distress.   HENT:   Head: Normocephalic and atraumatic.   Right Ear: External ear normal.   Left Ear: External ear normal.   Nose: Nose normal.   Mouth/Throat: No oropharyngeal exudate.   Eyes: Pupils are equal, round, and reactive to light. Conjunctivae and EOM are normal. Right eye exhibits no discharge. Left eye exhibits no discharge. No scleral icterus.   Neck: Normal range of motion. Neck supple. No JVD present. No tracheal deviation present. No thyromegaly present.   Cardiovascular: Normal rate, regular rhythm, normal heart sounds and intact distal pulses. Exam reveals no gallop and no friction rub.   No murmur heard.  Pulmonary/Chest: Effort normal and breath sounds normal. No stridor. No respiratory distress. He has no wheezes. He has no rales. He exhibits no tenderness.   Abdominal: Soft. Bowel sounds are normal. He exhibits no distension. There is no tenderness. There is  no rebound and no guarding.   Musculoskeletal: Normal range of motion. He exhibits no edema or tenderness.   Lymphadenopathy:     He has no cervical adenopathy.   Neurological: He is alert and oriented to person, place, and time. He has normal reflexes. He displays normal reflexes. No cranial nerve deficit. He exhibits normal muscle tone. Coordination normal.   Skin: Skin is warm and dry. No rash noted. He is not diaphoretic. No erythema. No pallor.   Psychiatric: He has a normal mood and affect. His behavior is normal. Judgment and thought content normal.       Assessment:     Nigel was seen today for follow-up.    Diagnoses and all orders for this visit:    DM type 2 with diabetic dyslipidemia  -     CBC auto differential; Future  -     Comprehensive metabolic panel; Future  -     Hemoglobin A1c; Future    Hypertension, unspecified type  -     CBC auto differential; Future  -     Comprehensive metabolic panel; Future  -     Hemoglobin A1c; Future    Severe obesity (BMI 35.0-35.9 with comorbidity)    Postherpetic neuralgia    Other orders  -     oxyCODONE-acetaminophen (PERCOCET) 5-325 mg per tablet; Take 1 tablet by mouth every 6 (six) hours as needed for Pain.      Plan:   Pt. encouraged to follow a strict diabetic type diet.   Encouraged daily physical activity/exercise for at least 30mins if tolerated.   Weight control recommenced as always.   Discussed how lifestyle changes make biggest impact on diabetes control.   Continue home glucose monitoring once daily and keep log.   Counseling provided today about hypoglycemia as well as about how diabetes increases risk of cardiovascular, cerebrovascular ,peripheral vascular disease and other serious health complications. He has been instructed to call if developing any new symptoms or hypoglycemia related symptoms.   See encounter for associated orders/medications.   - Lipid panel; Future    Controlled. Cont med   Diet and ex  Few Oxycodone provided

## 2019-07-11 ENCOUNTER — PATIENT MESSAGE (OUTPATIENT)
Dept: FAMILY MEDICINE | Facility: CLINIC | Age: 69
End: 2019-07-11

## 2019-07-24 ENCOUNTER — NUTRITION (OUTPATIENT)
Dept: DIABETES | Facility: CLINIC | Age: 69
End: 2019-07-24
Payer: MEDICARE

## 2019-07-24 VITALS — BODY MASS INDEX: 36.18 KG/M2 | HEIGHT: 74 IN | WEIGHT: 281.94 LBS

## 2019-07-24 DIAGNOSIS — E11.40 DIABETIC NEUROPATHY WITH NEUROLOGIC COMPLICATION: Primary | ICD-10-CM

## 2019-07-24 DIAGNOSIS — E11.49 DIABETIC NEUROPATHY WITH NEUROLOGIC COMPLICATION: Primary | ICD-10-CM

## 2019-07-24 PROCEDURE — 99999 PR PBB SHADOW E&M-EST. PATIENT-LVL IV: ICD-10-PCS | Mod: PBBFAC,,, | Performed by: DIETITIAN, REGISTERED

## 2019-07-24 PROCEDURE — G0108 DIAB MANAGE TRN  PER INDIV: HCPCS | Mod: PBBFAC | Performed by: DIETITIAN, REGISTERED

## 2019-07-24 PROCEDURE — 99999 PR PBB SHADOW E&M-EST. PATIENT-LVL IV: CPT | Mod: PBBFAC,,, | Performed by: DIETITIAN, REGISTERED

## 2019-07-24 PROCEDURE — 99214 OFFICE O/P EST MOD 30 MIN: CPT | Mod: PBBFAC | Performed by: DIETITIAN, REGISTERED

## 2019-07-24 NOTE — LETTER
July 24, 2019    Jael Garrison, PhD, NP-C  [address]  [place] [address]       Patient: Nigel Albrecht   MR Number: [id number]   YOB: 1950   Date of Visit: 7/24/2019     Dear Dr. Garrison:    Thank you for referring Nigel for diabetes self-management education and support. He has completed all components of our Diabetes Management Program and his Self-Management Support Plan. Below is a summary of his clinical outcomes and goal progress.     Patient Outcomes:    A1c Status:   Lab Results   Component Value Date    HGBA1C 6.3 (H) 07/10/2019    HGBA1C 7.3 (H) 04/30/2019     Goals  Patient has selected/evaluated goals during today's session: Yes, evaluated  Healthy Eating: Set(use meal plan - use MR shake (Glucerna) to improve meal patterns 3meals/d and support BG stability )  Met Percentage : 50%  Start Date: 07/24/19(switch to lower sugar cereal (list prov) and use pro shake at lunch (list prov) )  Target Date: 10/24/19  Physical Activity: Set(150min/wk - walking prog)  Start Date: 07/24/19  Target Date: 10/24/19  Monitoring: Set(use dexcom as directed)  Met Percentage : 100%  Start Date: 07/24/19  Target Date: 07/24/19  Medications: Set(take medications as Rx)  Met Percentage : 100%  Start Date: 07/24/19  Target Date: 10/24/19    Diabetes Self-Management Support Plan  Diabetes Learning: DM websites, DM apps(Dexcom ace)  Review Status: Patient has selected and agrees to support plan.    Follow up:   · Nigel to follow diabetes support plan indicated above  · Nigel to attend medical appointments as scheduled  · Nigel to update you on his DM education progress as needed      If you have questions, please do not hesitate to call me. I look forward to providing additional education and support 3mos, as needed.    Sincerely,    Pavithra Landon, RD, CDE

## 2019-07-24 NOTE — PROGRESS NOTES
Diabetes Education  Author: Pavithra Landon RD, CDE  Date: 7/24/2019    Diabetes Care Management Summary  Diabetes Education Record Assessment/Progress: Post Program/Follow-up  Current Diabetes Risk Level: Moderate(Noted A1C improved from 7.9 to 7.3 over past couple mos.)     Last A1c:   Lab Results   Component Value Date    HGBA1C 6.3 (H) 07/10/2019     Last visit with Diabetes Educator: Initial visit 5/16/19 EVANSShannon with A1C 7.3 (4/30/19)    Diabetes Type  Diabetes Type : Type II    Diabetes History  Diabetes Diagnosis: >10 years  Current Treatment: Diet, Exercise, Insulin, Injectable(ozempic 1mg weekly, humulin 70/30 insulin 50units 1-2 times daily (per pt dosing))  Reviewed Problem List with Patient: Yes    Health Maintenance was reviewed today with patient. Discussed with patient importance of routine eye exams, foot exams/foot care, blood work (i.e.: A1c, microalbumin, and lipid), dental visits, yearly flu vaccine, and pneumonia vaccine as indicated by PCP. Patient verbalized understanding.     Health Maintenance Topics with due status: Not Due       Topic Last Completion Date    Colonoscopy 04/24/2015    Influenza Vaccine 09/24/2018    Eye Exam 03/01/2019    Lipid Panel 04/30/2019    Foot Exam 05/15/2019    Hemoglobin A1c 07/10/2019    Low Dose Statin 07/24/2019     Health Maintenance Due   Topic Date Due    TETANUS VACCINE  06/28/1968     Nutrition  Meal Plan 24 Hour Recall - Breakfast: sugary cereal (frosted flakes, raisin bran) - coffee, blk   Meal Plan 24 Hour Recall - Lunch: none  Meal Plan 24 Hour Recall - Dinner: bkd chix/pork chop w/ greens, green beans, potatoes - water    Meal Plan 24 Hour Recall - Snack: none; alem: ethol - gin daily (1 serv/d)   Intake ~2751-1472 cals/d; wt decreased ~36 lbs since Mar'19. Pt working to reduce carb; no excess fat, sodium. Noted 2meals/d, skips lunch, no use MR shake; higher sugar bfst cereal contributing to pp excursion.     Monitoring   Self Monitoring :  Dexcom downloaded, 1mos report saved to media. Avg  w/ 74% readings within target, 26% readings above and 1% below. CGMS use 87%. PP excursions after bfst (sugary cereal) and after dinner (ethol cocktail).  Blood Glucose Logs: No  In the last month, how often have you had a low blood sugar reaction?: never    Exercise   Exercise Type: (Walks 2-3x/wk for ~1mile)    Current Diabetes Treatment   Current Treatment: Diet, Exercise, Insulin, Injectable(ozempic 1mg weekly, humulin 70/30 insulin 50units 1-2 times daily (per pt dosing))    Social History  Preferred Learning Method: Face to Face  Primary Support: Self  Smoking Status: Ex Smoker  Alcohol Use: Daily     DDS-2 Score  ( > 3 = SIGNIFICANT DISTRESS): 1       Barriers to Change  Barriers to Change: None  Learning Challenges : None    Readiness to Learn   Readiness to Learn : Eager    Cultural Influences  Cultural Influences: No    Diabetes Education Assessment/Progress  Diabetes Disease Process (diabetes disease process and treatment options): Demonstrates Understanding/Competency(verbalizes/demonstrates)  Nutrition (Incorporating nutritional management into one's lifestyle): Discussion, Individual Session, Demonstrates Understanding/Competency (verbalizes/demonstrates), Written Materials Provided  Physical Activity (incorporating physical activity into one's lifestyle): Discussion, Individual Session, Demonstrates Understanding/Competency (verbalizes/demonstrates)  Medications (states correct name, dose, onset, peak, duration, side effects & timing of meds): Discussion, Individual Session, Demonstrates Understanding/Competency(verbalizes/demonstrates), Written Materials Provided  Monitoring (monitoring blood glucose/other parameters & using results): Discussion, Individual Session, Demonstrates Understanding/Competency (verbalizes/demonstrates)  Acute Complications (preventing, detecting, and treating acute complications): Discussion, Individual Session,  Demonstrates Understanding/Competency (verbalizes/demonstrates)  Chronic Complications (preventing, detecting, and treating chronic complications): Demonstrates Understanding/Competency (verbalizes/demonstrates)  Clinical (diabetes, other pertinent medical history, and relevant comorbidities reviewed during visit): Demonstrates Understanding/Competency (verbalizes/demonstrates)  Cognitive (knowledge of self-management skills, functional health literacy): Demonstrates Understanding/Competency (verbalizes/demonstrates)  Psychosocial (emotional response to diabetes): Discussion, Individual Session, Demonstrates Understanding/Competency (verbalizes/demonstrates)  Diabetes Distress and Support Systems: Discussion, Individual Session, Demonstrates Understanding/Competency (verbalizes/demonstrates)  Behavioral (readiness for change, lifestyle practices, self-care behaviors): Discussion, Individual Session, Demonstrates Understanding/Competency (verbalizes/demonstrates)    Goals  Patient has selected/evaluated goals during today's session: Yes, evaluated  Healthy Eating: Set(use meal plan - use MR shake (Glucerna) to improve meal patterns 3meals/d and support BG stability )  Met Percentage : 50%  Start Date: 07/24/19(switch to lower sugar cereal (list prov) and use pro shake at lunch (list prov) )  Target Date: 10/24/19  Physical Activity: Set(150min/wk - walking prog)  Start Date: 07/24/19  Target Date: 10/24/19  Monitoring: Set(use dexcom as directed)  Met Percentage : 100%  Start Date: 07/24/19  Target Date: 07/24/19  Medications: Set(take medications as Rx)  Met Percentage : 100%  Start Date: 07/24/19  Target Date: 10/24/19    Diabetes Self-Management Support Plan  Diabetes Learning: DM websites, DM apps(Dexcom ace)  Review Status: Patient has selected and agrees to support plan.    Diabetes Care Plan/Intervention  Education Plan/Intervention: Individual Follow-Up DSMT(Maintain visits w/ ABates for medical mgmt.  Encouraged overall progress.)    Diabetes Meal Plan  Restrictions: Low Fat, Low Sodium  Calories: 1800  Carbohydrate Per Meal: 45-60g  Carbohydrate Per Snack : 15-30g    Today's Self-Management Care Plan was developed with the patient's input and is based on barriers identified during today's assessment.    The long and short-term goals in the care plan were written with the patient/caregiver's input. The patient has agreed to work toward these goals to improve his overall diabetes control.      The patient received a copy of today's self-management plan and verbalized understanding of the care plan, goals, and all of today's instructions.      The patient was encouraged to communicate with his physician and care team regarding his condition(s) and treatment.  I provided the patient with my contact information today and encouraged him to contact me via phone or patient portal as needed.     Education Units of Time   Time Spent: 30 min

## 2019-07-31 ENCOUNTER — PATIENT MESSAGE (OUTPATIENT)
Dept: FAMILY MEDICINE | Facility: CLINIC | Age: 69
End: 2019-07-31

## 2019-07-31 DIAGNOSIS — I10 ESSENTIAL HYPERTENSION: ICD-10-CM

## 2019-07-31 DIAGNOSIS — E11.22 TYPE 2 DIABETES MELLITUS WITH STAGE 3 CHRONIC KIDNEY DISEASE, WITH LONG-TERM CURRENT USE OF INSULIN: ICD-10-CM

## 2019-07-31 DIAGNOSIS — N18.30 TYPE 2 DIABETES MELLITUS WITH STAGE 3 CHRONIC KIDNEY DISEASE, WITH LONG-TERM CURRENT USE OF INSULIN: ICD-10-CM

## 2019-07-31 DIAGNOSIS — Z79.4 TYPE 2 DIABETES MELLITUS WITH STAGE 3 CHRONIC KIDNEY DISEASE, WITH LONG-TERM CURRENT USE OF INSULIN: ICD-10-CM

## 2019-07-31 RX ORDER — LISINOPRIL 40 MG/1
TABLET ORAL
Qty: 90 TABLET | Refills: 0 | Status: SHIPPED | OUTPATIENT
Start: 2019-07-31 | End: 2019-10-28 | Stop reason: SDUPTHER

## 2019-08-01 RX ORDER — HYDRALAZINE HYDROCHLORIDE 25 MG/1
25 TABLET, FILM COATED ORAL 2 TIMES DAILY
Qty: 180 TABLET | Refills: 0 | Status: SHIPPED | OUTPATIENT
Start: 2019-08-01 | End: 2019-10-28 | Stop reason: SDUPTHER

## 2019-08-03 ENCOUNTER — TELEPHONE (OUTPATIENT)
Dept: DIABETES | Facility: CLINIC | Age: 69
End: 2019-08-03

## 2019-08-19 ENCOUNTER — LAB VISIT (OUTPATIENT)
Dept: LAB | Facility: HOSPITAL | Age: 69
End: 2019-08-19
Attending: UROLOGY
Payer: MEDICARE

## 2019-08-19 DIAGNOSIS — N40.0 BENIGN PROSTATIC HYPERPLASIA, UNSPECIFIED WHETHER LOWER URINARY TRACT SYMPTOMS PRESENT: ICD-10-CM

## 2019-08-19 DIAGNOSIS — Z79.4 TYPE 2 DIABETES MELLITUS WITH STAGE 3 CHRONIC KIDNEY DISEASE, WITH LONG-TERM CURRENT USE OF INSULIN: ICD-10-CM

## 2019-08-19 DIAGNOSIS — E11.22 TYPE 2 DIABETES MELLITUS WITH STAGE 3 CHRONIC KIDNEY DISEASE, WITH LONG-TERM CURRENT USE OF INSULIN: ICD-10-CM

## 2019-08-19 DIAGNOSIS — N18.30 TYPE 2 DIABETES MELLITUS WITH STAGE 3 CHRONIC KIDNEY DISEASE, WITH LONG-TERM CURRENT USE OF INSULIN: ICD-10-CM

## 2019-08-19 DIAGNOSIS — N52.9 ERECTILE DYSFUNCTION, UNSPECIFIED ERECTILE DYSFUNCTION TYPE: ICD-10-CM

## 2019-08-19 DIAGNOSIS — Z12.5 PROSTATE CANCER SCREENING: ICD-10-CM

## 2019-08-19 LAB
ALBUMIN SERPL BCP-MCNC: 3 G/DL (ref 3.5–5.2)
ALP SERPL-CCNC: 103 U/L (ref 55–135)
ALT SERPL W/O P-5'-P-CCNC: 21 U/L (ref 10–44)
ANION GAP SERPL CALC-SCNC: 11 MMOL/L (ref 8–16)
AST SERPL-CCNC: 27 U/L (ref 10–40)
BILIRUB SERPL-MCNC: 0.3 MG/DL (ref 0.1–1)
BUN SERPL-MCNC: 18 MG/DL (ref 8–23)
CALCIUM SERPL-MCNC: 9.3 MG/DL (ref 8.7–10.5)
CHLORIDE SERPL-SCNC: 107 MMOL/L (ref 95–110)
CO2 SERPL-SCNC: 25 MMOL/L (ref 23–29)
CREAT SERPL-MCNC: 1.7 MG/DL (ref 0.5–1.4)
EST. GFR  (AFRICAN AMERICAN): 46.5 ML/MIN/1.73 M^2
EST. GFR  (NON AFRICAN AMERICAN): 40.3 ML/MIN/1.73 M^2
GLUCOSE SERPL-MCNC: 126 MG/DL (ref 70–110)
POTASSIUM SERPL-SCNC: 4.2 MMOL/L (ref 3.5–5.1)
PROT SERPL-MCNC: 6.9 G/DL (ref 6–8.4)
SODIUM SERPL-SCNC: 143 MMOL/L (ref 136–145)

## 2019-08-19 PROCEDURE — 83036 HEMOGLOBIN GLYCOSYLATED A1C: CPT

## 2019-08-19 PROCEDURE — 80053 COMPREHEN METABOLIC PANEL: CPT

## 2019-08-19 PROCEDURE — 36415 COLL VENOUS BLD VENIPUNCTURE: CPT | Mod: PO

## 2019-08-19 PROCEDURE — 84153 ASSAY OF PSA TOTAL: CPT

## 2019-08-20 LAB
COMPLEXED PSA SERPL-MCNC: 2.1 NG/ML (ref 0–4)
ESTIMATED AVG GLUCOSE: 134 MG/DL (ref 68–131)
HBA1C MFR BLD HPLC: 6.3 % (ref 4–5.6)

## 2019-08-26 ENCOUNTER — OFFICE VISIT (OUTPATIENT)
Dept: UROLOGY | Facility: CLINIC | Age: 69
End: 2019-08-26
Payer: MEDICARE

## 2019-08-26 VITALS
HEIGHT: 74 IN | WEIGHT: 275.56 LBS | HEART RATE: 76 BPM | SYSTOLIC BLOOD PRESSURE: 142 MMHG | BODY MASS INDEX: 35.36 KG/M2 | DIASTOLIC BLOOD PRESSURE: 88 MMHG

## 2019-08-26 DIAGNOSIS — I10 HYPERTENSION, UNSPECIFIED TYPE: ICD-10-CM

## 2019-08-26 DIAGNOSIS — N52.9 ERECTILE DYSFUNCTION, UNSPECIFIED ERECTILE DYSFUNCTION TYPE: ICD-10-CM

## 2019-08-26 DIAGNOSIS — Z12.5 PROSTATE CANCER SCREENING: Primary | ICD-10-CM

## 2019-08-26 LAB
BILIRUB SERPL-MCNC: NORMAL MG/DL
BLOOD URINE, POC: NORMAL
COLOR, POC UA: NORMAL
GLUCOSE UR QL STRIP: NORMAL
KETONES UR QL STRIP: NORMAL
LEUKOCYTE ESTERASE URINE, POC: NORMAL
NITRITE, POC UA: NORMAL
PH, POC UA: 5
PROTEIN, POC: 3
SPECIFIC GRAVITY, POC UA: 1.02
UROBILINOGEN, POC UA: NORMAL

## 2019-08-26 PROCEDURE — 81002 URINALYSIS NONAUTO W/O SCOPE: CPT | Mod: PBBFAC | Performed by: UROLOGY

## 2019-08-26 PROCEDURE — 99214 PR OFFICE/OUTPT VISIT, EST, LEVL IV, 30-39 MIN: ICD-10-PCS | Mod: S$PBB,,, | Performed by: UROLOGY

## 2019-08-26 PROCEDURE — 99214 OFFICE O/P EST MOD 30 MIN: CPT | Mod: S$PBB,,, | Performed by: UROLOGY

## 2019-08-26 PROCEDURE — 99999 PR PBB SHADOW E&M-EST. PATIENT-LVL III: CPT | Mod: PBBFAC,,, | Performed by: UROLOGY

## 2019-08-26 PROCEDURE — 99213 OFFICE O/P EST LOW 20 MIN: CPT | Mod: PBBFAC | Performed by: UROLOGY

## 2019-08-26 PROCEDURE — 99999 PR PBB SHADOW E&M-EST. PATIENT-LVL III: ICD-10-PCS | Mod: PBBFAC,,, | Performed by: UROLOGY

## 2019-08-26 RX ORDER — GABAPENTIN 100 MG/1
100 CAPSULE ORAL 3 TIMES DAILY
COMMUNITY
End: 2020-05-20

## 2019-08-26 RX ORDER — SILDENAFIL CITRATE 20 MG/1
20 TABLET ORAL 3 TIMES DAILY
COMMUNITY
End: 2020-06-09

## 2019-08-26 RX ORDER — DOXAZOSIN 2 MG/1
2 TABLET ORAL NIGHTLY
Qty: 90 TABLET | Refills: 3 | Status: SHIPPED | OUTPATIENT
Start: 2019-08-26 | End: 2020-06-09

## 2019-08-26 NOTE — PROGRESS NOTES
Chief Complaint: Prostate cancer screening/BPH     HPI:   8/26/19: Voiding fine, no complaints.  Reviewed history in detail. Had a bad case of shingles otherwise fine.  Not using the ED meds not worried about it.  8/20/18: Not needing flomax, no caffeine still.  Took doxazosin for BP for a while.  Sildenafil working for ED.  8/10/17: Flomax helped a bit but he stopped it not needing it; didn't get the sleep study.  No LUTS now.  Cut back on caffeine to one cup.    3/30/16: 64 yo man s/p renal transplant in 2002 here with nocturia x3, was x0-1 4-5 months ago.  No hesitancy.  Some dribble when done putting things away.Urgency.  Some double voiding.  No abd/pelvic pain and no exac/rel factors.  No hematuria.  No urolithiasis.  No urinary bother.  No  history.  Normal sexual function.  Not usually constipated.  Viagra for ED rx but not used but once.  No BPH meds.    Allergies:  No known drug allergies    Medications:  has a current medication list which includes the following prescription(s): atorvastatin, blood sugar diagnostic, blood-glucose sensor, calcium carbonate, cetirizine, cholecalciferol (vitamin d3), clonidine 0.3 mg/24 hr td ptwk, diltiazem, fluticasone propionate, gabapentin, insulin nph/reg human, lisinopril, low-dose aspirin, meclizine, metoprolol succinate, multivit-min/fa/lycopen/lutein, mupirocin, mycophenolate, oxycodone-acetaminophen, semaglutide, sildenafil, sirolimus, torsemide, doxazosin, and hydralazine.    Review of Systems:  General: No fever, chills, fatigability, or weight loss.  Skin: No rashes, itching, or changes in color or texture of skin.  Chest: Denies PETERSON, cyanosis, wheezing, cough, and sputum production.  Abdomen: Appetite fine. No weight loss. Denies diarrhea, abdominal pain, hematemesis, or blood in stool.  Musculoskeletal: No joint stiffness or swelling. Some back pain.  : As above.  All other review of systems negative.    PMH:   has a past medical history of Allergic  rhinitis (2013), Blood transfusion, Cataract, CKD (chronic kidney disease), stage III (2014), -donor kidney transplant, Diabetes mellitus, type 2 (2013), Gout, arthritis (2013), Heart attack, Heart transplanted, Hyperlipidemia (2013), Hypertension, Morbid obesity (2013), Organ transplant (), and Prophylactic immunotherapy.    PSH:   has a past surgical history that includes Kidney transplant; Heart transplant; Revision total hip arthroplasty; Eye surgery; and Cataract extraction (Bilateral).    FamHx: family history includes Diabetes in his brother and maternal grandmother; Early death in his brother; Heart disease in his brother; Hyperlipidemia in his brother and sister; Hypertension in his brother; Kidney disease in his son; Stroke in his brother and mother.    SocHx:  reports that he quit smoking about 35 years ago. His smoking use included cigarettes. He has a 20.00 pack-year smoking history. He has never used smokeless tobacco. He reports that he drinks about 0.6 oz of alcohol per week. He reports that he does not use drugs.      Physical Exam:  Vitals:    19 1332   BP: (!) 142/88   Pulse: 76     General: A&Ox3, no apparent distress, no deformities  Neck: No masses, normal thyroid  Lungs: normal inspiration, no use of accessory muscles  Heart: normal pulse, no arrhythmias  Abdomen: Soft, NT, ND  Skin: The skin is warm and dry. No jaundice.  Ext: No c/c/e.  :   : Test desc chavo, no abnormalities of epididymus. Penis normal, with normal penile and scrotal skin. Meatus normal. Normal rectal tone, no hemorrhoids. Prost 40 gm no nodules or masses appreciated. SV not palpable. Perineum and anus normal.    Labs/Studies:   Urinalysis performed in clinic, summary: UA normal  PSA     : 1.3    : 1.1    6/15: 1.7    : 1.5    : 1.8    : 2.1  Bladder Scan performed in office:     3/16: PVR 24 ml.    Impression/Plan:   1. Doing fine now.  Annual prostate  screening.  PSA/RTC one year.  2. Discussed ED and no meds at this time.  3. HTN: discussed and referred to PCP for further management.

## 2019-08-27 ENCOUNTER — TELEPHONE (OUTPATIENT)
Dept: FAMILY MEDICINE | Facility: CLINIC | Age: 69
End: 2019-08-27

## 2019-08-27 DIAGNOSIS — B02.29 POST HERPETIC NEURALGIA: Primary | ICD-10-CM

## 2019-08-27 NOTE — TELEPHONE ENCOUNTER
----- Message from Keaton Beltran sent at 8/27/2019  9:31 AM CDT -----  Contact: Pt  Please  Give pt a call at .[phone] regarding some issues with shingles

## 2019-08-28 ENCOUNTER — OFFICE VISIT (OUTPATIENT)
Dept: DIABETES | Facility: CLINIC | Age: 69
End: 2019-08-28
Payer: MEDICARE

## 2019-08-28 ENCOUNTER — PATIENT MESSAGE (OUTPATIENT)
Dept: FAMILY MEDICINE | Facility: CLINIC | Age: 69
End: 2019-08-28

## 2019-08-28 ENCOUNTER — TELEPHONE (OUTPATIENT)
Dept: FAMILY MEDICINE | Facility: CLINIC | Age: 69
End: 2019-08-28

## 2019-08-28 VITALS
BODY MASS INDEX: 35.25 KG/M2 | HEIGHT: 74 IN | DIASTOLIC BLOOD PRESSURE: 80 MMHG | SYSTOLIC BLOOD PRESSURE: 144 MMHG | WEIGHT: 274.69 LBS

## 2019-08-28 DIAGNOSIS — E78.5 HYPERLIPIDEMIA, UNSPECIFIED HYPERLIPIDEMIA TYPE: ICD-10-CM

## 2019-08-28 DIAGNOSIS — E66.01 MORBID OBESITY WITH BMI OF 40.0-44.9, ADULT: ICD-10-CM

## 2019-08-28 DIAGNOSIS — N18.30 TYPE 2 DIABETES MELLITUS WITH STAGE 3 CHRONIC KIDNEY DISEASE, WITH LONG-TERM CURRENT USE OF INSULIN: ICD-10-CM

## 2019-08-28 DIAGNOSIS — E11.22 TYPE 2 DIABETES MELLITUS WITH STAGE 3 CHRONIC KIDNEY DISEASE, WITH LONG-TERM CURRENT USE OF INSULIN: ICD-10-CM

## 2019-08-28 DIAGNOSIS — Z79.4 TYPE 2 DIABETES MELLITUS WITH STAGE 3 CHRONIC KIDNEY DISEASE, WITH LONG-TERM CURRENT USE OF INSULIN: ICD-10-CM

## 2019-08-28 DIAGNOSIS — E11.49 OTHER DIABETIC NEUROLOGICAL COMPLICATION ASSOCIATED WITH TYPE 2 DIABETES MELLITUS: Primary | ICD-10-CM

## 2019-08-28 LAB — GLUCOSE SERPL-MCNC: 147 MG/DL (ref 70–110)

## 2019-08-28 PROCEDURE — 99214 OFFICE O/P EST MOD 30 MIN: CPT | Mod: PBBFAC,PO | Performed by: NURSE PRACTITIONER

## 2019-08-28 PROCEDURE — 82962 GLUCOSE BLOOD TEST: CPT | Mod: PBBFAC,PO | Performed by: NURSE PRACTITIONER

## 2019-08-28 PROCEDURE — 99999 PR PBB SHADOW E&M-EST. PATIENT-LVL IV: ICD-10-PCS | Mod: PBBFAC,,, | Performed by: NURSE PRACTITIONER

## 2019-08-28 PROCEDURE — 99999 PR PBB SHADOW E&M-EST. PATIENT-LVL IV: CPT | Mod: PBBFAC,,, | Performed by: NURSE PRACTITIONER

## 2019-08-28 PROCEDURE — 99214 PR OFFICE/OUTPT VISIT, EST, LEVL IV, 30-39 MIN: ICD-10-PCS | Mod: S$PBB,,, | Performed by: NURSE PRACTITIONER

## 2019-08-28 PROCEDURE — 99214 OFFICE O/P EST MOD 30 MIN: CPT | Mod: S$PBB,,, | Performed by: NURSE PRACTITIONER

## 2019-08-28 NOTE — TELEPHONE ENCOUNTER
----- Message from Megan Mehta sent at 8/28/2019  9:59 AM CDT -----  Contact: Patient   ype:  Patient Returning Call    Who Called: Nigel  Who Left Message for Patient: Not sure  Does the patient know what this is regarding?: Appointment   Would the patient rather a call back or a response via [facility]? Call back   Best Call Back Number: Please call him at [phone]  Additional Information: n/a

## 2019-08-28 NOTE — TELEPHONE ENCOUNTER
Informed pt Dr. Singletary states he will need to see pain management to discuss pain med refill due to Dr. Singletary not treating chronic pain. PM appt scheduled at this time. Pt verbalized understanding.

## 2019-08-28 NOTE — PROGRESS NOTES
"PCP: Krystin Zimmerman MD    Subjective:     Chief Complaint: Diabetes Follow Up    HISTORY OF PRESENT ILLNESS: 69 y.o.  male presenting for diabetes follow up.  Patient has had diabetes for many years with the following complications: neuropathy, stage III CKD.  He has attended diabetes education in the past.  He had a cardiac and renal transplant in 2002.  Blood glucose testing is performed regularly.  Per recall, fasting BG 60 100  - 120 s; ac dinner 140 s.  He is having some fasting hypoglycemia, BGs in 60 s.     He denies any recent hospitalizations or emergency room visits, but continues to have post herpetic neuralgia from shingles, which he plans to see pain management.     Height: 6' 2" (188 cm)  //  Weight: 124.6 kg (274 lb 11.1 oz), Body mass index is 35.27 kg/m².  Home Blood Glucose reading this AM: 105 mg/dl fasting  His blood sugar in clinic today is: 147 mg/dl at 10:40 am      Labs Reviewed.       DM MEDICATIONS:   Humulin 70 / 30, 50 units BID ac  Ozempic 1 mg weekly     Review of Systems   Constitutional: Negative for appetite change, fatigue and unexpected weight change.   Eyes: Negative for visual disturbance.   Gastrointestinal: Negative for abdominal pain, constipation, diarrhea and nausea.   Endocrine: Negative for polydipsia, polyphagia and polyuria.   Neurological: Negative for dizziness, numbness and headaches.   Psychiatric/Behavioral: Negative for confusion and decreased concentration. The patient is not nervous/anxious.        Diabetes Management Status  Statin: Taking  ACE/ARB: Taking    Screening or Prevention Patient's value Goal Complete/Controlled?   HgA1C Testing and Control   Lab Results   Component Value Date    HGBA1C 6.3 (H) 08/19/2019      Annually/Less than 8% Yes   Lipid profile : 04/30/2019 Annually Yes   LDL control Lab Results   Component Value Date    LDLCALC 58.0 (L) 04/30/2019    Annually/Less than 100 mg/dl  Yes   Nephropathy screening Lab " Results   Component Value Date    MICALBCREAT 527.8 (H) 2018     Lab Results   Component Value Date    PROTEINUA 3+ (A) 2019    Annually Yes   Blood pressure BP Readings from Last 1 Encounters:   19 (!) 144/80    Less than 140/90 Yes   Dilated retinal exam : 2019 Annually Yes, Aminata Perry    Foot exam   : 2019 Annually Yes     ACTIVITY LEVEL: Rarely Active. Discussed activities, benefits, methods, and precautions.  MEAL PLANNING: Patient reports number of meals per day to be 2 and number of snacks per day to be 2.   Patient is encouraged to carb count and consume no more than 45 - 60 grams of carbohydrates in each meal, and 1800 k / jovany per day.      BLOOD GLUCOSE TESTIN to 2 times daily  SOCIAL HISTORY: . Lives with spouse. Has 2 children. Patient retired as manager for [email or web address]     Objective:      Physical Exam   Constitutional: He appears well-developed and well-nourished.   HENT:   Head: Normocephalic and atraumatic.   Eyes: Pupils are equal, round, and reactive to light. EOM are normal.   Neck: Normal range of motion.   Cardiovascular: Normal rate and regular rhythm.   Pulses:       Dorsalis pedis pulses are 2+ on the right side, and 2+ on the left side.   Pulmonary/Chest: Effort normal and breath sounds normal.   Musculoskeletal: Normal range of motion.   Feet:   Right Foot:   Protective Sensation: 6 sites tested. 4 sites sensed.   Skin Integrity: Positive for dry skin. Negative for ulcer or callus.   Left Foot:   Protective Sensation: 6 sites tested. 4 sites sensed.   Skin Integrity: Positive for dry skin. Negative for ulcer, blister or callus.   Skin: Skin is warm and dry. No rash noted.   Psychiatric: He has a normal mood and affect. His behavior is normal. Judgment and thought content normal.       Assessment / Plan:     1.) Other diabetic neurological complication associated with type 2 diabetes mellitus, stage III CKD  Comments:  -   Condition controlled.  Due to recent weight loss and decreased appetite, patient blood sugars have been much lower. Continue Ozempic 1 mg weekly. Continue Humulin 70 / 30, 50 units BID, some days he skips his evening  dose of Humulin due to BG < 140 s, which causes hypoglycemia upon awakening.  He can no longer afford the copay for Dexcom and is interested in Freestyle Georgette CGM.  Patient was given Freestyle reader.     Orders:  -     POCT Glucose, Hand-Held Device    2.) Hyperlipidemia, unspecified hyperlipidemia type - continue meds as prescribed    3.) Hypertension, unspecified type - continue meds as prescribed    4.) Morbid obesity with BMI of 40.0-44.9, adult    Additional Plan Details:    1.) Continue monitoring blood sugar 4 x daily, fasting and ac dinner or at bedtime. Discussed ADA goal for fasting blood sugar, 80 - 130 mg/dL; pp blood sugars below 180 mg/dl. Also, discussed prevention of hypoglycemia and the need to adjust goals to higher levels if persistent hypoglycemia.  Reminded to bring BG records or meter to each visit for review.  2.) Return to clinic in 3 months for follow up. The patient was explained the above plan and given opportunity to ask questions.  He understands, chooses and consents to this plan and accepts all the risks, which include but are not limited to the risks mentioned above. He understands the alternative of having no testing, interventions or treatments at this time. He left content and without further questions.     Jael Garrison, BELÉN-C, CDE    A total of 30 minutes was spent in face to face time, of which over 50 % was spent in counseling patient on disease process, complications, treatment, and side effects of medications.

## 2019-08-28 NOTE — TELEPHONE ENCOUNTER
----- Message from Corky Coates sent at 8/28/2019  9:36 AM CDT -----  Contact: pt   ..Type:  Patient Returning Call    Who Called: pt   Who Left Message for Patient: Anali   Does the patient know what this is regarding? Callback from yesterday   Would the patient rather a call back or a response via [facility]?  Callback   Best Call Back Number: ..[phone]  Additional Information:

## 2019-08-29 ENCOUNTER — PATIENT MESSAGE (OUTPATIENT)
Dept: FAMILY MEDICINE | Facility: CLINIC | Age: 69
End: 2019-08-29

## 2019-08-29 ENCOUNTER — TELEPHONE (OUTPATIENT)
Dept: FAMILY MEDICINE | Facility: CLINIC | Age: 69
End: 2019-08-29

## 2019-08-29 DIAGNOSIS — B02.29 POST HERPETIC NEURALGIA: Primary | ICD-10-CM

## 2019-09-04 ENCOUNTER — TELEPHONE (OUTPATIENT)
Dept: FAMILY MEDICINE | Facility: CLINIC | Age: 69
End: 2019-09-04

## 2019-09-04 ENCOUNTER — PATIENT MESSAGE (OUTPATIENT)
Dept: DIABETES | Facility: CLINIC | Age: 69
End: 2019-09-04

## 2019-09-04 NOTE — TELEPHONE ENCOUNTER
----- Message from Chani Fuentes sent at 9/4/2019  3:27 PM CDT -----  Contact: Dr Wagner([facility])-[phone] an [phone]-Fax  Would like to consult with nurse regarding office notes, please call back at [phone] or [phone]. Thanks/ar

## 2019-09-04 NOTE — TELEPHONE ENCOUNTER
Contacted [facility] to do further investigation on why patient hasn't been able to receive his sensors. Per cover my meds, a PA is not necessary for pt's freestyle georgette sensors. Rx was being held due to pt previously being on Dexcom. Updated pharmacist that patient is now using Freestyle Georgette. Rx will now be released. Pt was updated of all info.

## 2019-10-02 ENCOUNTER — PES CALL (OUTPATIENT)
Dept: ADMINISTRATIVE | Facility: CLINIC | Age: 69
End: 2019-10-02

## 2019-10-24 ENCOUNTER — NUTRITION (OUTPATIENT)
Dept: DIABETES | Facility: CLINIC | Age: 69
End: 2019-10-24
Payer: MEDICARE

## 2019-10-24 VITALS — BODY MASS INDEX: 36.13 KG/M2 | WEIGHT: 281.5 LBS | HEIGHT: 74 IN

## 2019-10-24 DIAGNOSIS — E11.40 DIABETIC NEUROPATHY WITH NEUROLOGIC COMPLICATION: Primary | ICD-10-CM

## 2019-10-24 DIAGNOSIS — E11.49 DIABETIC NEUROPATHY WITH NEUROLOGIC COMPLICATION: Primary | ICD-10-CM

## 2019-10-24 PROCEDURE — 99999 PR PBB SHADOW E&M-EST. PATIENT-LVL III: ICD-10-PCS | Mod: PBBFAC,,, | Performed by: DIETITIAN, REGISTERED

## 2019-10-24 PROCEDURE — 99213 OFFICE O/P EST LOW 20 MIN: CPT | Mod: PBBFAC | Performed by: DIETITIAN, REGISTERED

## 2019-10-24 PROCEDURE — G0108 DIAB MANAGE TRN  PER INDIV: HCPCS | Mod: PBBFAC | Performed by: DIETITIAN, REGISTERED

## 2019-10-24 PROCEDURE — 99999 PR PBB SHADOW E&M-EST. PATIENT-LVL III: CPT | Mod: PBBFAC,,, | Performed by: DIETITIAN, REGISTERED

## 2019-10-24 NOTE — LETTER
October 24, 2019      Jael Garrison, PhD, NP-C  [address]  [place] [address]         Patient: Nigel Albrecht   MR Number: [id number]   YOB: 1950   Date of Visit: 10/24/2019     Dear Dr. Garrison:    Thank you for referring Nigel for diabetes self-management education and support. He has completed all components of our Diabetes Management Program and his Self-Management Support Plan. Below is a summary of his clinical outcomes and goal progress.    Patient Outcomes:    A1c Status:   Lab Results   Component Value Date    HGBA1C 6.3 (H) 08/19/2019    HGBA1C 6.3 (H) 07/10/2019     Goals  Healthy Eating: % Met(regular meal patterns; switch to lower sugar cereal)  Met Percentage : 75%  Physical Activity: % Met  Met Percentage : 50%  Monitoring: % Met(use CGM as directed)  Met Percentage : 75%    Diabetes Self-Management Support Plan  Diabetes Learning: DM websites, DM apps    Follow up:   · Nigel to follow diabetes support plan indicated above  · Nigel to attend medical appointments as scheduled  · Nigel to update you on his DM education progress as needed      If you have questions, please do not hesitate to call me. Best practice for DSMT is once per year or as needed.    Sincerely, Pavithra Landon, KEERTHI, CDE

## 2019-10-24 NOTE — PROGRESS NOTES
Diabetes Education  Author: Pavithra Landon RD, CDE  Date: 10/24/2019    Diabetes Care Management Summary  Diabetes Education Record Assessment/Progress: Comprehensive/Group  Current Diabetes Risk Level: Moderate Noted A1C improved from 7.9 to 7.3 and now 6.3.     Last A1c:   Lab Results   Component Value Date    HGBA1C 6.3 (H) 08/19/2019     Last visit with Diabetes Educator: 7/24/19 Megan    Diabetes Type  Diabetes Type : Type II    Diabetes History  Diabetes Diagnosis: >10 years  Current Treatment: Diet, Injectable, Exercise, Insulin(ozempic 1mg weekly, humulin 70/30 insulin 50units 1-2 x/d)    Health Maintenance was reviewed today with patient. Discussed with patient importance of routine eye exams, foot exams/foot care, blood work (i.e.: A1c, microalbumin, and lipid), dental visits, yearly flu vaccine, and pneumonia vaccine as indicated by PCP. Patient verbalized understanding.     Health Maintenance Topics with due status: Not Due       Topic Last Completion Date    Colonoscopy 04/24/2015    Eye Exam 03/01/2019    Lipid Panel 04/30/2019    Hemoglobin A1c 08/19/2019    Foot Exam 08/28/2019    Low Dose Statin 10/24/2019     Health Maintenance Due   Topic Date Due    TETANUS VACCINE  06/28/1968       Nutrition  Meal Planning: (~1800-2500cals/d. Pt is working to lower carb, fat, sodium.) Higher jovany/carb foods when traveling.    Monitoring   Self Monitoring : Freestyle Georgette   Per reports, Avg-30d  w/ 90% in target (), 10% above target. Pt reports rare hypoglycemia, treats using candy.     Exercise   Exercise Type: walking  Intensity: Moderate  Frequency: (3d/wk)  Duration: (20-30min)    Current Diabetes Treatment   Current Treatment: Diet, Injectable, Exercise, Insulin(ozempic 1mg weekly, humulin 70/30 insulin 50units 1-2 x/d)    Social History  Preferred Learning Method: Face to Face  Primary Support: Self  Smoking Status: Ex Smoker  Alcohol Use: Never     DDS-2 Score  ( > 3 = SIGNIFICANT  DISTRESS): 1       Barriers to Change  Barriers to Change: None  Learning Challenges : None    Readiness to Learn   Readiness to Learn : Eager    Cultural Influences  Cultural Influences: No    Diabetes Education Assessment/Progress  Diabetes Disease Process (diabetes disease process and treatment options): Individual Session, Demonstrates Understanding/Competency(verbalizes/demonstrates)  Nutrition (Incorporating nutritional management into one's lifestyle): Individual Session, Demonstrates Understanding/Competency (verbalizes/demonstrates)  Physical Activity (incorporating physical activity into one's lifestyle): Individual Session, Demonstrates Understanding/Competency (verbalizes/demonstrates)  Medications (states correct name, dose, onset, peak, duration, side effects & timing of meds): Individual Session, Demonstrates Understanding/Competency(verbalizes/demonstrates)  Monitoring (monitoring blood glucose/other parameters & using results): Individual Session, Demonstrates Understanding/Competency (verbalizes/demonstrates)  Acute Complications (preventing, detecting, and treating acute complications): Individual Session, Demonstrates Understanding/Competency (verbalizes/demonstrates)  Chronic Complications (preventing, detecting, and treating chronic complications): Individual Session, Demonstrates Understanding/Competency (verbalizes/demonstrates)  Clinical (diabetes, other pertinent medical history, and relevant comorbidities reviewed during visit): Individual Session, Demonstrates Understanding/Competency (verbalizes/demonstrates)  Cognitive (knowledge of self-management skills, functional health literacy): Individual Session, Demonstrates Understanding/Competency (verbalizes/demonstrates)  Psychosocial (emotional response to diabetes): Individual Session, Demonstrates Understanding/Competency (verbalizes/demonstrates)  Diabetes Distress and Support Systems: Individual Session, Demonstrates  Understanding/Competency (verbalizes/demonstrates)  Behavioral (readiness for change, lifestyle practices, self-care behaviors): Individual Session, Demonstrates Understanding/Competency (verbalizes/demonstrates)    Goals  Healthy Eating: % Met(regular meal patterns; switch to lower sugar cereal)  Met Percentage : 75%  Physical Activity: % Met  Met Percentage : 50%  Monitoring: % Met(use CGM as directed)  Met Percentage : 75%    Diabetes Self-Management Support Plan  Diabetes Learning: DM websites, DM apps    Plan: Pt will fu w/ ABates for additional medical mgmt support. Encouraged overall progress, wt decreased ~30 lbs since 1/19. Pt will continue to work toward 150min/wk walking and continue to work on carb management. DSMT 1year, prn, as referred.     Diabetes Meal Plan  Restrictions: Low Fat, Low Sodium  Calories: 1800  Carbohydrate Per Meal: 45-60g  Carbohydrate Per Snack : 15-30g    Today's Self-Management Care Plan was developed with the patient's input and is based on barriers identified during today's assessment.    The long and short-term goals in the care plan were written with the patient/caregiver's input. The patient has agreed to work toward these goals to improve his overall diabetes control.      The patient received a copy of today's self-management plan and verbalized understanding of the care plan, goals, and all of today's instructions.      The patient was encouraged to communicate with his physician and care team regarding his condition(s) and treatment.  I provided the patient with my contact information today and encouraged him to contact me via phone or patient portal as needed.     Education Units of Time   Time Spent: 30 min

## 2019-10-28 DIAGNOSIS — I15.8 HYPERTENSION ASSOCIATED WITH TRANSPLANTATION: ICD-10-CM

## 2019-10-28 DIAGNOSIS — Z94.9 HYPERTENSION ASSOCIATED WITH TRANSPLANTATION: ICD-10-CM

## 2019-10-28 DIAGNOSIS — I10 ESSENTIAL HYPERTENSION: ICD-10-CM

## 2019-10-28 RX ORDER — HYDRALAZINE HYDROCHLORIDE 25 MG/1
TABLET, FILM COATED ORAL
Qty: 180 TABLET | Refills: 0 | Status: SHIPPED | OUTPATIENT
Start: 2019-10-28 | End: 2020-01-06

## 2019-10-28 RX ORDER — LISINOPRIL 40 MG/1
TABLET ORAL
Qty: 90 TABLET | Refills: 0 | Status: SHIPPED | OUTPATIENT
Start: 2019-10-28 | End: 2020-01-06

## 2019-10-28 RX ORDER — METOPROLOL SUCCINATE 50 MG/1
TABLET, EXTENDED RELEASE ORAL
Qty: 90 TABLET | Refills: 3 | Status: SHIPPED | OUTPATIENT
Start: 2019-10-28 | End: 2020-07-27 | Stop reason: SDUPTHER

## 2019-11-27 DIAGNOSIS — N18.30 TYPE 2 DIABETES MELLITUS WITH STAGE 3 CHRONIC KIDNEY DISEASE, WITH LONG-TERM CURRENT USE OF INSULIN: Primary | ICD-10-CM

## 2019-11-27 DIAGNOSIS — E11.22 TYPE 2 DIABETES MELLITUS WITH STAGE 3 CHRONIC KIDNEY DISEASE, WITH LONG-TERM CURRENT USE OF INSULIN: Primary | ICD-10-CM

## 2019-11-27 DIAGNOSIS — Z79.4 TYPE 2 DIABETES MELLITUS WITH STAGE 3 CHRONIC KIDNEY DISEASE, WITH LONG-TERM CURRENT USE OF INSULIN: Primary | ICD-10-CM

## 2019-12-02 ENCOUNTER — LAB VISIT (OUTPATIENT)
Dept: LAB | Facility: HOSPITAL | Age: 69
End: 2019-12-02
Attending: FAMILY MEDICINE
Payer: MEDICARE

## 2019-12-02 DIAGNOSIS — N18.30 TYPE 2 DIABETES MELLITUS WITH STAGE 3 CHRONIC KIDNEY DISEASE, WITH LONG-TERM CURRENT USE OF INSULIN: ICD-10-CM

## 2019-12-02 DIAGNOSIS — E11.22 TYPE 2 DIABETES MELLITUS WITH STAGE 3 CHRONIC KIDNEY DISEASE, WITH LONG-TERM CURRENT USE OF INSULIN: ICD-10-CM

## 2019-12-02 DIAGNOSIS — Z79.4 TYPE 2 DIABETES MELLITUS WITH STAGE 3 CHRONIC KIDNEY DISEASE, WITH LONG-TERM CURRENT USE OF INSULIN: ICD-10-CM

## 2019-12-02 LAB
ALBUMIN SERPL BCP-MCNC: 3.1 G/DL (ref 3.5–5.2)
ALP SERPL-CCNC: 106 U/L (ref 55–135)
ALT SERPL W/O P-5'-P-CCNC: 22 U/L (ref 10–44)
ANION GAP SERPL CALC-SCNC: 8 MMOL/L (ref 8–16)
AST SERPL-CCNC: 28 U/L (ref 10–40)
BILIRUB SERPL-MCNC: 0.5 MG/DL (ref 0.1–1)
BUN SERPL-MCNC: 19 MG/DL (ref 8–23)
CALCIUM SERPL-MCNC: 9.1 MG/DL (ref 8.7–10.5)
CHLORIDE SERPL-SCNC: 106 MMOL/L (ref 95–110)
CO2 SERPL-SCNC: 29 MMOL/L (ref 23–29)
CREAT SERPL-MCNC: 1.9 MG/DL (ref 0.5–1.4)
EST. GFR  (AFRICAN AMERICAN): 40.7 ML/MIN/1.73 M^2
EST. GFR  (NON AFRICAN AMERICAN): 35.2 ML/MIN/1.73 M^2
GLUCOSE SERPL-MCNC: 147 MG/DL (ref 70–110)
POTASSIUM SERPL-SCNC: 4.5 MMOL/L (ref 3.5–5.1)
PROT SERPL-MCNC: 7.2 G/DL (ref 6–8.4)
SODIUM SERPL-SCNC: 143 MMOL/L (ref 136–145)

## 2019-12-02 PROCEDURE — 83036 HEMOGLOBIN GLYCOSYLATED A1C: CPT

## 2019-12-02 PROCEDURE — 80053 COMPREHEN METABOLIC PANEL: CPT

## 2019-12-02 PROCEDURE — 36415 COLL VENOUS BLD VENIPUNCTURE: CPT | Mod: PO

## 2019-12-03 LAB
ESTIMATED AVG GLUCOSE: 163 MG/DL (ref 68–131)
HBA1C MFR BLD HPLC: 7.3 % (ref 4–5.6)

## 2019-12-04 ENCOUNTER — OFFICE VISIT (OUTPATIENT)
Dept: DIABETES | Facility: CLINIC | Age: 69
End: 2019-12-04
Payer: MEDICARE

## 2019-12-04 VITALS
DIASTOLIC BLOOD PRESSURE: 70 MMHG | WEIGHT: 280.19 LBS | HEIGHT: 74 IN | BODY MASS INDEX: 35.96 KG/M2 | SYSTOLIC BLOOD PRESSURE: 132 MMHG

## 2019-12-04 DIAGNOSIS — I10 HYPERTENSION, UNSPECIFIED TYPE: ICD-10-CM

## 2019-12-04 DIAGNOSIS — N18.30 TYPE 2 DIABETES MELLITUS WITH STAGE 3 CHRONIC KIDNEY DISEASE, WITH LONG-TERM CURRENT USE OF INSULIN: Primary | ICD-10-CM

## 2019-12-04 DIAGNOSIS — E11.22 TYPE 2 DIABETES MELLITUS WITH STAGE 3 CHRONIC KIDNEY DISEASE, WITH LONG-TERM CURRENT USE OF INSULIN: Primary | ICD-10-CM

## 2019-12-04 DIAGNOSIS — E78.5 HYPERLIPIDEMIA, UNSPECIFIED HYPERLIPIDEMIA TYPE: ICD-10-CM

## 2019-12-04 DIAGNOSIS — E66.01 MORBID OBESITY WITH BMI OF 40.0-44.9, ADULT: ICD-10-CM

## 2019-12-04 DIAGNOSIS — E11.49 OTHER DIABETIC NEUROLOGICAL COMPLICATION ASSOCIATED WITH TYPE 2 DIABETES MELLITUS: ICD-10-CM

## 2019-12-04 DIAGNOSIS — Z79.4 TYPE 2 DIABETES MELLITUS WITH STAGE 3 CHRONIC KIDNEY DISEASE, WITH LONG-TERM CURRENT USE OF INSULIN: Primary | ICD-10-CM

## 2019-12-04 LAB — GLUCOSE SERPL-MCNC: 161 MG/DL (ref 70–110)

## 2019-12-04 PROCEDURE — 99214 OFFICE O/P EST MOD 30 MIN: CPT | Mod: PBBFAC,PO | Performed by: NURSE PRACTITIONER

## 2019-12-04 PROCEDURE — 99999 PR PBB SHADOW E&M-EST. PATIENT-LVL IV: ICD-10-PCS | Mod: PBBFAC,,, | Performed by: NURSE PRACTITIONER

## 2019-12-04 PROCEDURE — 82962 GLUCOSE BLOOD TEST: CPT | Mod: PBBFAC,PO | Performed by: NURSE PRACTITIONER

## 2019-12-04 PROCEDURE — 99999 PR PBB SHADOW E&M-EST. PATIENT-LVL IV: CPT | Mod: PBBFAC,,, | Performed by: NURSE PRACTITIONER

## 2019-12-04 PROCEDURE — 99214 PR OFFICE/OUTPT VISIT, EST, LEVL IV, 30-39 MIN: ICD-10-PCS | Mod: S$PBB,,, | Performed by: NURSE PRACTITIONER

## 2019-12-04 PROCEDURE — 99214 OFFICE O/P EST MOD 30 MIN: CPT | Mod: S$PBB,,, | Performed by: NURSE PRACTITIONER

## 2019-12-04 NOTE — PROGRESS NOTES
"PCP: Krystin Zimmerman MD    Subjective:     Chief Complaint: Diabetes Follow Up    HISTORY OF PRESENT ILLNESS: 69 y.o.  male presenting for diabetes follow up.  Patient has had diabetes for many years with the following complications: neuropathy, stage III CKD.  He has attended diabetes education in the past.  He had a cardiac and renal transplant in 2002.      The patient Freestyle CGM was downloaded and reviewed. The report was technically satisfactory. For the past 14 days, patient average glucose was 130 mg/dL, with standard deviation of 35.2. He was above range 9 % of the time, in range 90 % of the time, and below range 1 % of the time. The target range for this patient was 70 - 180 mg/dL.      He denies any recent hospitalizations, emergency room, syncope, or diaphoresis.     Height: 6' 2" (188 cm)  //  Weight: 127.1 kg (280 lb 3.3 oz), Body mass index is 35.98 kg/m².  He has gained 6 pounds since last visit.    His blood sugar in clinic today is: 161 mg/dl at 10:53 am      Labs Reviewed.       DM MEDICATIONS:   Humulin 70 / 30, 50 units BID ac, if dinner BG reading < 120, he skips evening dose  Ozempic 1 mg weekly     Review of Systems   Constitutional: Negative for appetite change, fatigue and unexpected weight change.   Eyes: Negative for visual disturbance.   Gastrointestinal: Negative for abdominal pain, constipation, diarrhea and nausea.   Endocrine: Negative for polydipsia, polyphagia and polyuria.   Neurological: Negative for dizziness, numbness and headaches.   Psychiatric/Behavioral: Negative for confusion and decreased concentration. The patient is not nervous/anxious.        Diabetes Management Status  Statin: Taking  ACE/ARB: Taking    Screening or Prevention Patient's value Goal Complete/Controlled?   HgA1C Testing and Control   Lab Results   Component Value Date    HGBA1C 7.3 (H) 12/02/2019      Annually/Less than 8% Yes   Lipid profile : 04/30/2019 Annually Yes   LDL " control Lab Results   Component Value Date    LDLCALC 58.0 (L) 2019    Annually/Less than 100 mg/dl  Yes   Nephropathy screening Lab Results   Component Value Date    MICALBCREAT 527.8 (H) 2018     Lab Results   Component Value Date    PROTEINUA 3+ (A) 2019    Annually Yes   Blood pressure BP Readings from Last 1 Encounters:   19 132/70    Less than 140/90 Yes   Dilated retinal exam : 2019 Annually Yes, Aminata Perry    Foot exam   : 2019 Annually Yes     ACTIVITY LEVEL: Rarely Active. Discussed activities, benefits, methods, and precautions.  MEAL PLANNING: Patient reports number of meals per day to be 2 and number of snacks per day to be 2.   Patient is encouraged to carb count and consume no more than 45 - 60 grams of carbohydrates in each meal, and 1800 k / jovany per day.      BLOOD GLUCOSE TESTIN to 2 times daily  SOCIAL HISTORY: . Lives with spouse. Has 2 children. Patient retired as manager for [employer].     Objective:      Physical Exam   Constitutional: He appears well-developed and well-nourished.   HENT:   Head: Normocephalic and atraumatic.   Eyes: Pupils are equal, round, and reactive to light. EOM are normal.   Neck: Normal range of motion.   Cardiovascular: Normal rate and regular rhythm.   Pulses:       Dorsalis pedis pulses are 2+ on the right side, and 2+ on the left side.   Pulmonary/Chest: Effort normal and breath sounds normal.   Musculoskeletal: Normal range of motion.   Feet:   Right Foot:   Protective Sensation: 6 sites tested. 4 sites sensed.   Skin Integrity: Positive for dry skin. Negative for ulcer or callus.   Left Foot:   Protective Sensation: 6 sites tested. 4 sites sensed.   Skin Integrity: Positive for dry skin. Negative for ulcer, blister or callus.   Skin: Skin is warm and dry. No rash noted.   Psychiatric: He has a normal mood and affect. His behavior is normal. Judgment and thought content normal.        Assessment / Plan:     1.) Other diabetic neurological complication associated with type 2 diabetes mellitus, stage III CKD  Comments:  -  Condition controlled.  Continue Ozempic 1 mg weekly. Continue Humulin 70 / 30, 50 units BID. Some days he skips his evening dose of Humulin ( if BG < 120 s )to prevent overnight  Hypoglycemia. Continue Freestyle Georgette CGM.   He reports dietary indiscretion and will continue watching carbohydrates.  Otherwise, during his 3 month follow up, will need to make  medication adjustments.     Orders:  -     POCT Glucose, Hand-Held Device    2.) Hyperlipidemia, unspecified hyperlipidemia type - continue meds as prescribed    3.) Hypertension, unspecified type - continue meds as prescribed    4.) Morbid obesity with BMI of 40.0-44.9, adult    Additional Plan Details:    1.) Continue monitoring blood sugar 4 x daily, fasting and ac dinner or at bedtime. Discussed ADA goal for fasting blood sugar, 80 - 130 mg/dL; pp blood sugars below 180 mg/dl. Also, discussed prevention of hypoglycemia and the need to adjust goals to higher levels if persistent hypoglycemia.  Reminded to bring BG records or meter to each visit for review.  2.) Return to clinic in 3 months for follow up. The patient was explained the above plan and given opportunity to ask questions.  He understands, chooses and consents to this plan and accepts all the risks, which include but are not limited to the risks mentioned above. He understands the alternative of having no testing, interventions or treatments at this time. He left content and without further questions.     Jael Garrison, BELÉN-C, CDE    A total of 30 minutes was spent in face to face time, of which over 50 % was spent in counseling patient on disease process, complications, treatment, and side effects of medications.

## 2020-01-06 DIAGNOSIS — Z94.1 STATUS POST HEART TRANSPLANT: ICD-10-CM

## 2020-01-06 DIAGNOSIS — I10 ESSENTIAL HYPERTENSION: ICD-10-CM

## 2020-01-06 RX ORDER — LISINOPRIL 40 MG/1
TABLET ORAL
Qty: 90 TABLET | Refills: 0 | Status: SHIPPED | OUTPATIENT
Start: 2020-01-06 | End: 2020-03-09

## 2020-01-06 RX ORDER — SIROLIMUS 1 MG/1
TABLET, FILM COATED ORAL
Qty: 90 TABLET | Refills: 3 | Status: SHIPPED | OUTPATIENT
Start: 2020-01-06 | End: 2020-07-28 | Stop reason: SDUPTHER

## 2020-01-06 RX ORDER — MYCOPHENOLATE MOFETIL 250 MG/1
CAPSULE ORAL
Qty: 540 CAPSULE | Refills: 3 | Status: SHIPPED | OUTPATIENT
Start: 2020-01-06 | End: 2020-07-28 | Stop reason: SDUPTHER

## 2020-01-06 RX ORDER — TORSEMIDE 5 MG/1
TABLET ORAL
Qty: 180 TABLET | Refills: 0 | Status: SHIPPED | OUTPATIENT
Start: 2020-01-06 | End: 2020-03-10

## 2020-01-06 RX ORDER — HYDRALAZINE HYDROCHLORIDE 25 MG/1
TABLET, FILM COATED ORAL
Qty: 180 TABLET | Refills: 0 | Status: SHIPPED | OUTPATIENT
Start: 2020-01-06 | End: 2020-03-09

## 2020-03-09 DIAGNOSIS — I10 ESSENTIAL HYPERTENSION: ICD-10-CM

## 2020-03-09 DIAGNOSIS — E78.2 MIXED HYPERLIPIDEMIA: ICD-10-CM

## 2020-03-09 RX ORDER — HYDRALAZINE HYDROCHLORIDE 25 MG/1
25 TABLET, FILM COATED ORAL 2 TIMES DAILY
Qty: 180 TABLET | Refills: 3 | Status: SHIPPED | OUTPATIENT
Start: 2020-03-09 | End: 2020-06-09

## 2020-03-09 RX ORDER — ATORVASTATIN CALCIUM 80 MG/1
TABLET, FILM COATED ORAL
Qty: 90 TABLET | Refills: 3 | Status: SHIPPED | OUTPATIENT
Start: 2020-03-09 | End: 2020-07-28 | Stop reason: SDUPTHER

## 2020-03-09 RX ORDER — LISINOPRIL 40 MG/1
40 TABLET ORAL DAILY
Qty: 90 TABLET | Refills: 3 | Status: SHIPPED | OUTPATIENT
Start: 2020-03-09 | End: 2020-07-28 | Stop reason: SDUPTHER

## 2020-03-10 RX ORDER — TORSEMIDE 5 MG/1
TABLET ORAL
Qty: 180 TABLET | Refills: 0 | Status: SHIPPED | OUTPATIENT
Start: 2020-03-10 | End: 2020-07-27 | Stop reason: SDUPTHER

## 2020-03-11 ENCOUNTER — LAB VISIT (OUTPATIENT)
Dept: LAB | Facility: HOSPITAL | Age: 70
End: 2020-03-11
Attending: NURSE PRACTITIONER
Payer: MEDICARE

## 2020-03-11 DIAGNOSIS — Z79.4 TYPE 2 DIABETES MELLITUS WITH STAGE 3 CHRONIC KIDNEY DISEASE, WITH LONG-TERM CURRENT USE OF INSULIN: ICD-10-CM

## 2020-03-11 DIAGNOSIS — E11.22 TYPE 2 DIABETES MELLITUS WITH STAGE 3 CHRONIC KIDNEY DISEASE, WITH LONG-TERM CURRENT USE OF INSULIN: ICD-10-CM

## 2020-03-11 DIAGNOSIS — N18.30 TYPE 2 DIABETES MELLITUS WITH STAGE 3 CHRONIC KIDNEY DISEASE, WITH LONG-TERM CURRENT USE OF INSULIN: ICD-10-CM

## 2020-03-11 LAB
ESTIMATED AVG GLUCOSE: 169 MG/DL (ref 68–131)
HBA1C MFR BLD HPLC: 7.5 % (ref 4–5.6)

## 2020-03-11 PROCEDURE — 83036 HEMOGLOBIN GLYCOSYLATED A1C: CPT

## 2020-03-11 PROCEDURE — 36415 COLL VENOUS BLD VENIPUNCTURE: CPT | Mod: PO

## 2020-03-11 PROCEDURE — 80053 COMPREHEN METABOLIC PANEL: CPT

## 2020-03-12 LAB
ALBUMIN SERPL BCP-MCNC: 3 G/DL (ref 3.5–5.2)
ALP SERPL-CCNC: 106 U/L (ref 55–135)
ALT SERPL W/O P-5'-P-CCNC: 21 U/L (ref 10–44)
ANION GAP SERPL CALC-SCNC: 10 MMOL/L (ref 8–16)
AST SERPL-CCNC: 30 U/L (ref 10–40)
BILIRUB SERPL-MCNC: 0.4 MG/DL (ref 0.1–1)
BUN SERPL-MCNC: 15 MG/DL (ref 8–23)
CALCIUM SERPL-MCNC: 8.9 MG/DL (ref 8.7–10.5)
CHLORIDE SERPL-SCNC: 106 MMOL/L (ref 95–110)
CO2 SERPL-SCNC: 27 MMOL/L (ref 23–29)
CREAT SERPL-MCNC: 1.8 MG/DL (ref 0.5–1.4)
EST. GFR  (AFRICAN AMERICAN): 43.4 ML/MIN/1.73 M^2
EST. GFR  (NON AFRICAN AMERICAN): 37.6 ML/MIN/1.73 M^2
GLUCOSE SERPL-MCNC: 142 MG/DL (ref 70–110)
POTASSIUM SERPL-SCNC: 4.1 MMOL/L (ref 3.5–5.1)
PROT SERPL-MCNC: 7 G/DL (ref 6–8.4)
SODIUM SERPL-SCNC: 143 MMOL/L (ref 136–145)

## 2020-03-15 ENCOUNTER — TELEPHONE (OUTPATIENT)
Dept: DIABETES | Facility: CLINIC | Age: 70
End: 2020-03-15

## 2020-03-16 ENCOUNTER — PATIENT OUTREACH (OUTPATIENT)
Dept: ADMINISTRATIVE | Facility: OTHER | Age: 70
End: 2020-03-16

## 2020-03-25 ENCOUNTER — PATIENT MESSAGE (OUTPATIENT)
Dept: TRANSPLANT | Facility: CLINIC | Age: 70
End: 2020-03-25

## 2020-04-03 DIAGNOSIS — Z94.1 STATUS POST HEART TRANSPLANT: Primary | ICD-10-CM

## 2020-04-30 ENCOUNTER — TELEPHONE (OUTPATIENT)
Dept: TRANSPLANT | Facility: CLINIC | Age: 70
End: 2020-04-30

## 2020-04-30 NOTE — TELEPHONE ENCOUNTER
Pt called to ask if he can have labs drawn the week before his clinic visit in [place]. Informed pt he can have labs drawn there. Appt made for Rita 3 for labs in [place]. Unable to have urine pr/cr ratio done in [place], will keep it at [facility], but advised pt he can go at any time that day.

## 2020-05-18 ENCOUNTER — PATIENT OUTREACH (OUTPATIENT)
Dept: ADMINISTRATIVE | Facility: OTHER | Age: 70
End: 2020-05-18

## 2020-05-19 NOTE — PROGRESS NOTES
"PCP: Krystin Zimmerman MD    Subjective:     Chief Complaint: Diabetes Follow Up    HISTORY OF PRESENT ILLNESS: 69 y.o.  male presenting for diabetes follow up.  Patient has had diabetes for many years with the following complications: neuropathy, stage III CKD.  Other pertinent conditions include: cardiac and rental transplant in 2002 on immunosuppressants. He has attended diabetes education in the past.       The patient Freestyle CGM was downloaded and reviewed.  For the past 14 days, patient average glucose was 129 mg/d. He was above range 29 % of the time, in range 62 % of the time, and below range 9 % of the time.  His average blood sugars range from 76 - 176.  He has a total of 6 low events with 208 minutes.  Usually hypoglycemia occurs overnight or during the early am hours; but, he has pp spikes after dinner time.     He denies any recent hospitalizations, emergency room, syncope, or diaphoresis.     Height: 6' 2" (188 cm)  //  Weight: 133 kg (293 lb 1.6 oz), Body mass index is 37.63 kg/m².  He has gained 13 pounds since last visit.    His blood sugar in clinic today is: 150 mg/dl at 10:03 am      Labs Reviewed.       DM MEDICATIONS:   Humulin 70 / 30, 50 units BID ac  Ozempic 1 mg weekly     Review of Systems   Constitutional: Negative for appetite change, fatigue and unexpected weight change.   Eyes: Negative for visual disturbance.   Gastrointestinal: Negative for abdominal pain, constipation, diarrhea and nausea.   Endocrine: Negative for polydipsia, polyphagia and polyuria.   Neurological: Negative for dizziness, numbness and headaches.   Psychiatric/Behavioral: Negative for confusion and decreased concentration. The patient is not nervous/anxious.        Diabetes Management Status  Statin: Taking  ACE/ARB: Taking    Screening or Prevention Patient's value Goal Complete/Controlled?   HgA1C Testing and Control   Lab Results   Component Value Date    HGBA1C 7.5 (H) 03/11/2020 "      Annually/Less than 8% Yes   Lipid profile : 2019 Annually Yes   LDL control Lab Results   Component Value Date    LDLCALC 58.0 (L) 2019    Annually/Less than 100 mg/dl  Yes   Nephropathy screening Lab Results   Component Value Date    MICALBCREAT 527.8 (H) 2018     Lab Results   Component Value Date    PROTEINUA 3+ (A) 2019    Annually Yes   Blood pressure BP Readings from Last 1 Encounters:   20 134/80    Less than 140/90 Yes   Dilated retinal exam : 2019 Annually Yes, Aminata Perry   Foot exam   : 2020 Annually Yes     ACTIVITY LEVEL: Rarely Active. Discussed activities, benefits, methods, and precautions.  MEAL PLANNING: Patient reports number of meals per day to be 2 and number of snacks per day to be 2.   Patient is encouraged to carb count and consume no more than 45 - 60 grams of carbohydrates in each meal, and 1800 k / jovany per day.      BLOOD GLUCOSE TESTIN to 2 times daily  SOCIAL HISTORY: . Lives with spouse. Has 2 children. Patient retired as manager for [employer].     Objective:      Physical Exam   Constitutional: He appears well-developed and well-nourished.   HENT:   Head: Normocephalic and atraumatic.   Eyes: Pupils are equal, round, and reactive to light. EOM are normal.   Neck: Normal range of motion.   Cardiovascular: Normal rate and regular rhythm.   Pulses:       Dorsalis pedis pulses are 2+ on the right side, and 2+ on the left side.   Pulmonary/Chest: Effort normal and breath sounds normal.   Musculoskeletal: Normal range of motion.   Feet:   Right Foot:   Protective Sensation: 6 sites tested. 4 sites sensed.   Skin Integrity: Positive for dry skin. Negative for ulcer or callus.   Left Foot:   Protective Sensation: 6 sites tested. 4 sites sensed.   Skin Integrity: Positive for dry skin. Negative for ulcer, blister or callus.   Skin: Skin is warm and dry. No rash noted.   Psychiatric: He has a normal mood and  affect. His behavior is normal. Judgment and thought content normal.       Assessment / Plan:     1.) Other diabetic neurological complication associated with type 2 diabetes mellitus, stage III CKD  Comments:  -  Condition controlled.  Continue Freestyle Georgette CGM.  Continue Ozempic 1 mg weekly.  He is tolerating without GI side effects.      - We had a discussion about the MOA of mixed insulin.  I explained that nocturnal hypoglycemia can occur with NPH insulin.  I expressed that it would be best to split his mixed insulin into separate pens, so we could lower has  intermediate acting insulin without lowering meal time insulin.  However, he is hesitant about increasing his number of insulin injections.      - For now, he wishes to continue with Humulin 70 / 30, but agrees to take 70 units before breakfast and lower to 30 units before dinner to minimize fasting hypoglycemia.  He reports dietary indiscretion at dinner time, but will  start watching and decreasing carbs with dinner to hopefully prevent pp spikes especially since we are decreasing his dinner dose of insulin to 30 units to prevent overnight hypoglycemia.     Orders:  -     POCT Glucose, Hand-Held Device  - Future Labs scheduled: repeat A1C, micral     2.) Hyperlipidemia, unspecified hyperlipidemia type - continue meds as prescribed    3.) Hypertension, unspecified type - continue meds as prescribed    4.) Morbid obesity with BMI of 40.0-44.9, adult    Additional Plan Details:    1.) Continue monitoring blood sugar 4 x daily, fasting and ac dinner or at bedtime. Discussed ADA goal for fasting blood sugar, 80 - 130 mg/dL; pp blood sugars below 180 mg/dl. Also, discussed prevention of hypoglycemia and the need to adjust goals to higher levels if persistent hypoglycemia.  Reminded to bring BG records or meter to each visit for review.  2.) Return to clinic in 3 months for follow up. The patient was explained the above plan and given opportunity to ask  questions.  He understands, chooses and consents to this plan and accepts all the risks, which include but are not limited to the risks mentioned above. He understands the alternative of having no testing, interventions or treatments at this time. He left content and without further questions.     Jael Garrison, NP-C, CDE    A total of 30 minutes was spent in face to face time, of which over 50 % was spent in counseling patient on disease process, complications, treatment, and side effects of medications.

## 2020-05-20 ENCOUNTER — OFFICE VISIT (OUTPATIENT)
Dept: DIABETES | Facility: CLINIC | Age: 70
End: 2020-05-20
Payer: MEDICARE

## 2020-05-20 VITALS
HEIGHT: 74 IN | SYSTOLIC BLOOD PRESSURE: 134 MMHG | BODY MASS INDEX: 37.62 KG/M2 | DIASTOLIC BLOOD PRESSURE: 80 MMHG | TEMPERATURE: 98 F | WEIGHT: 293.13 LBS

## 2020-05-20 DIAGNOSIS — E11.49 OTHER DIABETIC NEUROLOGICAL COMPLICATION ASSOCIATED WITH TYPE 2 DIABETES MELLITUS: Primary | ICD-10-CM

## 2020-05-20 DIAGNOSIS — E78.5 HYPERLIPIDEMIA, UNSPECIFIED HYPERLIPIDEMIA TYPE: ICD-10-CM

## 2020-05-20 DIAGNOSIS — I10 HYPERTENSION, UNSPECIFIED TYPE: ICD-10-CM

## 2020-05-20 DIAGNOSIS — E66.01 MORBID OBESITY WITH BMI OF 40.0-44.9, ADULT: ICD-10-CM

## 2020-05-20 LAB — GLUCOSE SERPL-MCNC: 150 MG/DL (ref 70–110)

## 2020-05-20 PROCEDURE — 99999 PR PBB SHADOW E&M-EST. PATIENT-LVL V: CPT | Mod: PBBFAC,,, | Performed by: NURSE PRACTITIONER

## 2020-05-20 PROCEDURE — 99214 PR OFFICE/OUTPT VISIT, EST, LEVL IV, 30-39 MIN: ICD-10-PCS | Mod: S$PBB,,, | Performed by: NURSE PRACTITIONER

## 2020-05-20 PROCEDURE — 82962 GLUCOSE BLOOD TEST: CPT | Mod: PBBFAC,PO | Performed by: NURSE PRACTITIONER

## 2020-05-20 PROCEDURE — 99215 OFFICE O/P EST HI 40 MIN: CPT | Mod: PBBFAC,PO | Performed by: NURSE PRACTITIONER

## 2020-05-20 PROCEDURE — 99999 PR PBB SHADOW E&M-EST. PATIENT-LVL V: ICD-10-PCS | Mod: PBBFAC,,, | Performed by: NURSE PRACTITIONER

## 2020-05-20 PROCEDURE — 99214 OFFICE O/P EST MOD 30 MIN: CPT | Mod: S$PBB,,, | Performed by: NURSE PRACTITIONER

## 2020-05-20 RX ORDER — GABAPENTIN 300 MG/1
300 CAPSULE ORAL 2 TIMES DAILY
COMMUNITY
Start: 2020-03-09 | End: 2020-11-16 | Stop reason: SDUPTHER

## 2020-05-27 ENCOUNTER — TELEPHONE (OUTPATIENT)
Dept: TRANSPLANT | Facility: CLINIC | Age: 70
End: 2020-05-27

## 2020-05-27 NOTE — TELEPHONE ENCOUNTER
Pt called asking if he can do both urine labs at the same time. I linked one lab to the other for next week.

## 2020-06-03 ENCOUNTER — LAB VISIT (OUTPATIENT)
Dept: LAB | Facility: HOSPITAL | Age: 70
End: 2020-06-03
Attending: INTERNAL MEDICINE
Payer: MEDICARE

## 2020-06-03 DIAGNOSIS — E78.2 MIXED HYPERLIPIDEMIA: ICD-10-CM

## 2020-06-03 DIAGNOSIS — E11.49 OTHER DIABETIC NEUROLOGICAL COMPLICATION ASSOCIATED WITH TYPE 2 DIABETES MELLITUS: ICD-10-CM

## 2020-06-03 DIAGNOSIS — Z94.1 STATUS POST HEART TRANSPLANT: ICD-10-CM

## 2020-06-03 LAB
BASOPHILS # BLD AUTO: 0.04 K/UL (ref 0–0.2)
BASOPHILS NFR BLD: 0.9 % (ref 0–1.9)
DIFFERENTIAL METHOD: ABNORMAL
EOSINOPHIL # BLD AUTO: 0.1 K/UL (ref 0–0.5)
EOSINOPHIL NFR BLD: 2.6 % (ref 0–8)
ERYTHROCYTE [DISTWIDTH] IN BLOOD BY AUTOMATED COUNT: 14.6 % (ref 11.5–14.5)
HCT VFR BLD AUTO: 39.4 % (ref 40–54)
HGB BLD-MCNC: 11.7 G/DL (ref 14–18)
IMM GRANULOCYTES # BLD AUTO: 0.01 K/UL (ref 0–0.04)
IMM GRANULOCYTES NFR BLD AUTO: 0.2 % (ref 0–0.5)
LYMPHOCYTES # BLD AUTO: 1.8 K/UL (ref 1–4.8)
LYMPHOCYTES NFR BLD: 42.9 % (ref 18–48)
MCH RBC QN AUTO: 26.1 PG (ref 27–31)
MCHC RBC AUTO-ENTMCNC: 29.7 G/DL (ref 32–36)
MCV RBC AUTO: 88 FL (ref 82–98)
MONOCYTES # BLD AUTO: 0.4 K/UL (ref 0.3–1)
MONOCYTES NFR BLD: 9 % (ref 4–15)
NEUTROPHILS # BLD AUTO: 1.9 K/UL (ref 1.8–7.7)
NEUTROPHILS NFR BLD: 44.4 % (ref 38–73)
NRBC BLD-RTO: 0 /100 WBC
PLATELET # BLD AUTO: 200 K/UL (ref 150–350)
PMV BLD AUTO: 11.6 FL (ref 9.2–12.9)
RBC # BLD AUTO: 4.48 M/UL (ref 4.6–6.2)
WBC # BLD AUTO: 4.22 K/UL (ref 3.9–12.7)

## 2020-06-03 PROCEDURE — 84443 ASSAY THYROID STIM HORMONE: CPT

## 2020-06-03 PROCEDURE — 86977 RBC SERUM PRETX INCUBJ/INHIB: CPT

## 2020-06-03 PROCEDURE — 85025 COMPLETE CBC W/AUTO DIFF WBC: CPT

## 2020-06-03 PROCEDURE — 86832 HLA CLASS I HIGH DEFIN QUAL: CPT | Mod: 59

## 2020-06-03 PROCEDURE — 83880 ASSAY OF NATRIURETIC PEPTIDE: CPT

## 2020-06-03 PROCEDURE — 84436 ASSAY OF TOTAL THYROXINE: CPT

## 2020-06-03 PROCEDURE — 86833 HLA CLASS II HIGH DEFIN QUAL: CPT | Mod: 59

## 2020-06-03 PROCEDURE — 36415 COLL VENOUS BLD VENIPUNCTURE: CPT | Mod: PO

## 2020-06-03 PROCEDURE — 86832 HLA CLASS I HIGH DEFIN QUAL: CPT

## 2020-06-03 PROCEDURE — 80061 LIPID PANEL: CPT

## 2020-06-03 PROCEDURE — 83735 ASSAY OF MAGNESIUM: CPT

## 2020-06-03 PROCEDURE — 80053 COMPREHEN METABOLIC PANEL: CPT

## 2020-06-03 PROCEDURE — 80195 ASSAY OF SIROLIMUS: CPT

## 2020-06-03 PROCEDURE — 86833 HLA CLASS II HIGH DEFIN QUAL: CPT

## 2020-06-03 PROCEDURE — 83036 HEMOGLOBIN GLYCOSYLATED A1C: CPT

## 2020-06-04 ENCOUNTER — TELEPHONE (OUTPATIENT)
Dept: TRANSPLANT | Facility: CLINIC | Age: 70
End: 2020-06-04

## 2020-06-04 LAB
ALBUMIN SERPL BCP-MCNC: 3.1 G/DL (ref 3.5–5.2)
ALP SERPL-CCNC: 92 U/L (ref 55–135)
ALT SERPL W/O P-5'-P-CCNC: 23 U/L (ref 10–44)
ANION GAP SERPL CALC-SCNC: 9 MMOL/L (ref 8–16)
AST SERPL-CCNC: 30 U/L (ref 10–40)
BILIRUB SERPL-MCNC: 0.3 MG/DL (ref 0.1–1)
BNP SERPL-MCNC: 96 PG/ML (ref 0–99)
BUN SERPL-MCNC: 22 MG/DL (ref 8–23)
CALCIUM SERPL-MCNC: 8.6 MG/DL (ref 8.7–10.5)
CHLORIDE SERPL-SCNC: 109 MMOL/L (ref 95–110)
CHOLEST SERPL-MCNC: 169 MG/DL (ref 120–199)
CHOLEST/HDLC SERPL: 4.8 {RATIO} (ref 2–5)
CLASS I ANTIBODY COMMENTS - LUMINEX: NORMAL
CLASS II ANTIBODIES - LUMINEX: NORMAL
CLASS II ANTIBODY COMMENTS - LUMINEX: NORMAL
CO2 SERPL-SCNC: 24 MMOL/L (ref 23–29)
CREAT SERPL-MCNC: 1.9 MG/DL (ref 0.5–1.4)
DSA1 TESTING DATE: NORMAL
DSA12 TESTING DATE: NORMAL
DSA2 TESTING DATE: NORMAL
EST. GFR  (AFRICAN AMERICAN): 40.7 ML/MIN/1.73 M^2
EST. GFR  (NON AFRICAN AMERICAN): 35.2 ML/MIN/1.73 M^2
ESTIMATED AVG GLUCOSE: 148 MG/DL (ref 68–131)
GLUCOSE SERPL-MCNC: 85 MG/DL (ref 70–110)
HBA1C MFR BLD HPLC: 6.8 % (ref 4–5.6)
HDLC SERPL-MCNC: 35 MG/DL (ref 40–75)
HDLC SERPL: 20.7 % (ref 20–50)
LDLC SERPL CALC-MCNC: 93.8 MG/DL (ref 63–159)
MAGNESIUM SERPL-MCNC: 2 MG/DL (ref 1.6–2.6)
NONHDLC SERPL-MCNC: 134 MG/DL
POTASSIUM SERPL-SCNC: 4.1 MMOL/L (ref 3.5–5.1)
PROT SERPL-MCNC: 7 G/DL (ref 6–8.4)
SERUM COLLECTION DT - LUMINEX CLASS I: NORMAL
SERUM COLLECTION DT - LUMINEX CLASS II: NORMAL
SIROLIMUS BLD-MCNC: 9.8 NG/ML (ref 4–20)
SODIUM SERPL-SCNC: 142 MMOL/L (ref 136–145)
T4 SERPL-MCNC: 5.7 UG/DL (ref 4.5–11.5)
TRIGL SERPL-MCNC: 201 MG/DL (ref 30–150)
TSH SERPL DL<=0.005 MIU/L-ACNC: 1.3 UIU/ML (ref 0.4–4)

## 2020-06-04 NOTE — TELEPHONE ENCOUNTER
Reviewed sirolimus level with pt, which is 9.8, goal is 6-8. Asked pt to have it redrawn the morning of his appts, he stated that would be fine. Lab appt made for  0800.

## 2020-06-05 ENCOUNTER — PATIENT OUTREACH (OUTPATIENT)
Dept: ADMINISTRATIVE | Facility: OTHER | Age: 70
End: 2020-06-05

## 2020-06-09 ENCOUNTER — OFFICE VISIT (OUTPATIENT)
Dept: DERMATOLOGY | Facility: CLINIC | Age: 70
End: 2020-06-09
Payer: MEDICARE

## 2020-06-09 ENCOUNTER — HOSPITAL ENCOUNTER (OUTPATIENT)
Dept: CARDIOLOGY | Facility: HOSPITAL | Age: 70
Discharge: HOME | End: 2020-06-09
Attending: INTERNAL MEDICINE
Payer: MEDICARE

## 2020-06-09 ENCOUNTER — HOSPITAL ENCOUNTER (OUTPATIENT)
Dept: RADIOLOGY | Facility: HOSPITAL | Age: 70
Discharge: HOME | End: 2020-06-09
Attending: INTERNAL MEDICINE
Payer: MEDICARE

## 2020-06-09 ENCOUNTER — OFFICE VISIT (OUTPATIENT)
Dept: TRANSPLANT | Facility: CLINIC | Age: 70
End: 2020-06-09
Payer: MEDICARE

## 2020-06-09 VITALS
HEIGHT: 74 IN | RESPIRATION RATE: 18 BRPM | SYSTOLIC BLOOD PRESSURE: 164 MMHG | HEART RATE: 62 BPM | WEIGHT: 297 LBS | BODY MASS INDEX: 38.12 KG/M2 | DIASTOLIC BLOOD PRESSURE: 82 MMHG

## 2020-06-09 VITALS
SYSTOLIC BLOOD PRESSURE: 176 MMHG | WEIGHT: 297.63 LBS | HEIGHT: 74 IN | HEART RATE: 72 BPM | DIASTOLIC BLOOD PRESSURE: 84 MMHG | BODY MASS INDEX: 38.2 KG/M2

## 2020-06-09 DIAGNOSIS — E78.2 MIXED HYPERLIPIDEMIA: ICD-10-CM

## 2020-06-09 DIAGNOSIS — L81.4 LENTIGO: ICD-10-CM

## 2020-06-09 DIAGNOSIS — D22.9 MULTIPLE BENIGN NEVI: Primary | ICD-10-CM

## 2020-06-09 DIAGNOSIS — D18.01 CHERRY ANGIOMA: ICD-10-CM

## 2020-06-09 DIAGNOSIS — B02.29 POST ZOSTER NEURALGIA: ICD-10-CM

## 2020-06-09 DIAGNOSIS — I10 ESSENTIAL HYPERTENSION: ICD-10-CM

## 2020-06-09 DIAGNOSIS — L82.1 SEBORRHEIC KERATOSES: ICD-10-CM

## 2020-06-09 DIAGNOSIS — T86.290 VASCULOPATHY OF CARDIAC ALLOGRAFT: Primary | ICD-10-CM

## 2020-06-09 DIAGNOSIS — Z94.1 STATUS POST HEART TRANSPLANT: ICD-10-CM

## 2020-06-09 DIAGNOSIS — L85.3 DRY SKIN: ICD-10-CM

## 2020-06-09 DIAGNOSIS — Z94.0 DECEASED-DONOR KIDNEY TRANSPLANT: ICD-10-CM

## 2020-06-09 DIAGNOSIS — Z12.83 SCREENING FOR MALIGNANT NEOPLASM OF SKIN: ICD-10-CM

## 2020-06-09 DIAGNOSIS — N18.30 CKD (CHRONIC KIDNEY DISEASE), STAGE III: Chronic | ICD-10-CM

## 2020-06-09 DIAGNOSIS — Z94.1 HEART TRANSPLANTED: ICD-10-CM

## 2020-06-09 DIAGNOSIS — E66.01 MORBID OBESITY WITH BMI OF 40.0-44.9, ADULT: ICD-10-CM

## 2020-06-09 LAB
ASCENDING AORTA: 3.56 CM
BSA FOR ECHO PROCEDURE: 2.65 M2
CV ECHO LV RWT: 0.31 CM
CV STRESS BASE HR: 62 BPM
DIASTOLIC BLOOD PRESSURE: 87 MMHG
DOP CALC LVOT AREA: 3.9 CM2
DOP CALC LVOT DIAMETER: 2.22 CM
DOP CALC LVOT PEAK VEL: 0.71 M/S
DOP CALC LVOT STROKE VOLUME: 64.49 CM3
DOP CALCLVOT PEAK VEL VTI: 16.67 CM
E WAVE DECELERATION TIME: 184.61 MSEC
E/A RATIO: 2.11
E/E' RATIO: 9.79 M/S
ECHO LV POSTERIOR WALL: 0.96 CM (ref 0.6–1.1)
FRACTIONAL SHORTENING: 31 % (ref 28–44)
INTERVENTRICULAR SEPTUM: 0.98 CM (ref 0.6–1.1)
IVRT: 108.47 MSEC
LEFT INTERNAL DIMENSION IN SYSTOLE: 4.26 CM (ref 2.1–4)
LEFT VENTRICLE DIASTOLIC VOLUME INDEX: 74.03 ML/M2
LEFT VENTRICLE DIASTOLIC VOLUME: 190.32 ML
LEFT VENTRICLE MASS INDEX: 96 G/M2
LEFT VENTRICLE SYSTOLIC VOLUME INDEX: 31.6 ML/M2
LEFT VENTRICLE SYSTOLIC VOLUME: 81.28 ML
LEFT VENTRICULAR INTERNAL DIMENSION IN DIASTOLE: 6.15 CM (ref 3.5–6)
LEFT VENTRICULAR MASS: 247.59 G
LV LATERAL E/E' RATIO: 8.45 M/S
LV SEPTAL E/E' RATIO: 11.63 M/S
MV PEAK A VEL: 0.44 M/S
MV PEAK E VEL: 0.93 M/S
OHS CV CPX 1 MINUTE RECOVERY HEART RATE: 95 BPM
OHS CV CPX 85 PERCENT MAX PREDICTED HEART RATE MALE: 128
OHS CV CPX MAX PREDICTED HEART RATE: 151
OHS CV CPX PATIENT IS FEMALE: 0
OHS CV CPX PATIENT IS MALE: 1
OHS CV CPX PEAK DIASTOLIC BLOOD PRESSURE: 43 MMHG
OHS CV CPX PEAK HEAR RATE: 97 BPM
OHS CV CPX PEAK RATE PRESSURE PRODUCT: ABNORMAL
OHS CV CPX PEAK SYSTOLIC BLOOD PRESSURE: 167 MMHG
OHS CV CPX PERCENT MAX PREDICTED HEART RATE ACHIEVED: 64
OHS CV CPX RATE PRESSURE PRODUCT PRESENTING: ABNORMAL
PISA TR MAX VEL: 2.42 M/S
RA PRESSURE: 3 MMHG
RIGHT VENTRICULAR END-DIASTOLIC DIMENSION: 4.3 CM
RV TISSUE DOPPLER FREE WALL SYSTOLIC VELOCITY 1 (APICAL 4 CHAMBER VIEW): 8.04 CM/S
SINUS: 3.8 CM
STJ: 3.21 CM
SYSTOLIC BLOOD PRESSURE: 165 MMHG
TDI LATERAL: 0.11 M/S
TDI SEPTAL: 0.08 M/S
TDI: 0.1 M/S
TR MAX PG: 23 MMHG
TRICUSPID ANNULAR PLANE SYSTOLIC EXCURSION: 1.81 CM
TV REST PULMONARY ARTERY PRESSURE: 26 MMHG

## 2020-06-09 PROCEDURE — 63600175 PHARM REV CODE 636 W HCPCS: Performed by: INTERNAL MEDICINE

## 2020-06-09 PROCEDURE — 99999 PR PBB SHADOW E&M-EST. PATIENT-LVL II: CPT | Mod: PBBFAC,,, | Performed by: DERMATOLOGY

## 2020-06-09 PROCEDURE — 93352 ADMIN ECG CONTRAST AGENT: CPT | Mod: ,,, | Performed by: INTERNAL MEDICINE

## 2020-06-09 PROCEDURE — 93351 STRESS TTE COMPLETE: CPT

## 2020-06-09 PROCEDURE — 99212 OFFICE O/P EST SF 10 MIN: CPT | Mod: PBBFAC,25,27 | Performed by: DERMATOLOGY

## 2020-06-09 PROCEDURE — 71046 X-RAY EXAM CHEST 2 VIEWS: CPT | Mod: 26,,, | Performed by: RADIOLOGY

## 2020-06-09 PROCEDURE — 93351 ECHOCARDIOGRAM STRESS TEST WITH COLOR FLOW DOPPLER (CUPID ONLY): ICD-10-PCS | Mod: 26,,, | Performed by: INTERNAL MEDICINE

## 2020-06-09 PROCEDURE — 99999 PR PBB SHADOW E&M-EST. PATIENT-LVL II: ICD-10-PCS | Mod: PBBFAC,,, | Performed by: DERMATOLOGY

## 2020-06-09 PROCEDURE — 99999 PR PBB SHADOW E&M-EST. PATIENT-LVL III: CPT | Mod: PBBFAC,,, | Performed by: INTERNAL MEDICINE

## 2020-06-09 PROCEDURE — 99203 OFFICE O/P NEW LOW 30 MIN: CPT | Mod: S$PBB,,, | Performed by: DERMATOLOGY

## 2020-06-09 PROCEDURE — 99203 PR OFFICE/OUTPT VISIT, NEW, LEVL III, 30-44 MIN: ICD-10-PCS | Mod: S$PBB,,, | Performed by: DERMATOLOGY

## 2020-06-09 PROCEDURE — 99213 OFFICE O/P EST LOW 20 MIN: CPT | Mod: PBBFAC,25 | Performed by: INTERNAL MEDICINE

## 2020-06-09 PROCEDURE — 93351 STRESS TTE COMPLETE: CPT | Mod: 26,,, | Performed by: INTERNAL MEDICINE

## 2020-06-09 PROCEDURE — 71046 XR CHEST PA AND LATERAL: ICD-10-PCS | Mod: 26,,, | Performed by: RADIOLOGY

## 2020-06-09 PROCEDURE — 93352 ECHOCARDIOGRAM STRESS TEST WITH COLOR FLOW DOPPLER (CUPID ONLY): ICD-10-PCS | Mod: ,,, | Performed by: INTERNAL MEDICINE

## 2020-06-09 PROCEDURE — 99999 PR PBB SHADOW E&M-EST. PATIENT-LVL III: ICD-10-PCS | Mod: PBBFAC,,, | Performed by: INTERNAL MEDICINE

## 2020-06-09 PROCEDURE — 63600150 PHARM REV CODE 636: Performed by: INTERNAL MEDICINE

## 2020-06-09 PROCEDURE — 71046 X-RAY EXAM CHEST 2 VIEWS: CPT | Mod: TC,FY

## 2020-06-09 PROCEDURE — 99215 OFFICE O/P EST HI 40 MIN: CPT | Mod: S$PBB,,, | Performed by: INTERNAL MEDICINE

## 2020-06-09 PROCEDURE — 99215 PR OFFICE/OUTPT VISIT, EST, LEVL V, 40-54 MIN: ICD-10-PCS | Mod: S$PBB,,, | Performed by: INTERNAL MEDICINE

## 2020-06-09 RX ORDER — AMITRIPTYLINE HYDROCHLORIDE 25 MG/1
TABLET, FILM COATED ORAL
Qty: 60 TABLET | Refills: 0 | Status: SHIPPED | OUTPATIENT
Start: 2020-06-09 | End: 2020-09-02

## 2020-06-09 RX ORDER — DOBUTAMINE HYDROCHLORIDE 100 MG/100ML
60 INJECTION INTRAVENOUS
Status: COMPLETED | OUTPATIENT
Start: 2020-06-09 | End: 2020-06-09

## 2020-06-09 RX ADMIN — HUMAN ALBUMIN MICROSPHERES AND PERFLUTREN 0.66 MG: 10; .22 INJECTION, SOLUTION INTRAVENOUS at 02:06

## 2020-06-09 RX ADMIN — DOBUTAMINE IN DEXTROSE 60 MCG/KG/MIN: 100 INJECTION, SOLUTION INTRAVENOUS at 02:06

## 2020-06-09 NOTE — PROGRESS NOTES
"Subjective:   Patient ID:  Nigel Albrecht is a 69 y.o. male who presents for heart transplant follow-up.      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 Patient seems to be doing well, all of his annual testing was not completed however. Patient denies N/V/F/C, lightheadedness, dizziness, PND, orthopnea, LE edema, abdominal pain, abdominal pressure, chest pain, chest pressure, syncope, pre-syncope.    BP is elevated    Current IS    Rapa  1mg daily (goal 5-8)  Cellcept 750mg bid      DSE 05/16/18:    1 - S/P OHTX    5/24/2002.     2 - Eccentric LVH with low normal to mildly depressed left ventricular systolic function (EF 50-55%).     3 - Normal left ventricular diastolic function.     4 - Normal right ventricular systolic function .     5 - Mild mitral regurgitation.     6 - Mild tricuspid regurgitation.     7 - The estimated PA systolic pressure is greater than 26 mmHg.     No evidence of stress induced myocardial ischemia. Sensitivity is impaired due to failure to reach target heart rate      Review of Systems   Constitution: Negative for chills, decreased appetite, diaphoresis, fever, malaise/fatigue, night sweats, weight gain and weight loss.   Cardiovascular: Negative for chest pain, claudication, cyanosis, dyspnea on exertion, irregular heartbeat, leg swelling, near-syncope, orthopnea and palpitations.   Respiratory: Negative for cough, hemoptysis, shortness of breath, sleep disturbances due to breathing, snoring, sputum production and wheezing.    Gastrointestinal: Negative for abdominal pain and diarrhea.   Neurological: Negative for weakness.       Objective:     BP (!) 176/84 (BP Location: Right arm, Patient Position: Sitting, BP Method: Large (Automatic))   Pulse 72   Ht 6' 2" (1.88 m)   Wt 135 kg (297 lb 9.9 oz)   BMI 38.21 " kg/m²     Physical Exam   Constitutional: He is oriented to person, place, and time. He appears well-developed and well-nourished.   HENT:   Head: Normocephalic and atraumatic.   Eyes: Pupils are equal, round, and reactive to light. Conjunctivae and EOM are normal.   Neck: Normal range of motion. Neck supple. No JVD present.   Cardiovascular: Normal rate and regular rhythm. Exam reveals no gallop and no friction rub.   No murmur heard.  Pulmonary/Chest: Breath sounds normal. No respiratory distress. He has no wheezes. He has no rales. He exhibits no tenderness.   Abdominal: Soft. Bowel sounds are normal. He exhibits no distension and no mass. There is no hepatosplenomegaly, splenomegaly or hepatomegaly. There is no tenderness. There is no rebound, no guarding and no CVA tenderness.   No hepatoslenomegaly   Musculoskeletal: Normal range of motion. He exhibits no edema or tenderness.   Neurological: He is alert and oriented to person, place, and time. He has normal reflexes. No cranial nerve deficit. He exhibits normal muscle tone. Coordination normal.   Skin: Skin is warm and dry.   Psychiatric: He has a normal mood and affect.         Chemistry        Component Value Date/Time     06/09/2020 0720    K 4.2 06/09/2020 0720     06/09/2020 0720    CO2 27 06/09/2020 0720    BUN 16 06/09/2020 0720    CREATININE 1.8 (H) 06/09/2020 0720     (H) 06/09/2020 0720        Component Value Date/Time    CALCIUM 8.8 06/09/2020 0720    ALKPHOS 109 06/09/2020 0720    AST 29 06/09/2020 0720    ALT 21 06/09/2020 0720    BILITOT 0.3 06/09/2020 0720            Magnesium   Date Value Ref Range Status   06/03/2020 2.0 1.6 - 2.6 mg/dL Final       Lab Results   Component Value Date    WBC 4.22 06/03/2020    HGB 11.7 (L) 06/03/2020    HCT 39.4 (L) 06/03/2020    MCV 88 06/03/2020     06/03/2020       Lab Results   Component Value Date    INR 1.0 04/30/2019    INR 0.9 06/22/2016    INR 1.0 10/03/2014       BNP   Date  Value Ref Range Status   06/03/2020 96 0 - 99 pg/mL Final     Comment:     Values of less than 100 pg/ml are consistent with non-CHF populations.   04/30/2019 121 (H) 0 - 99 pg/mL Final     Comment:     Values of less than 100 pg/ml are consistent with non-CHF populations.   01/28/2019 164 (H) 0 - 99 pg/mL Final     Comment:     Values of less than 100 pg/ml are consistent with non-CHF populations.       No results found for: LDH    Assessment:     1. Heart transplanted - 16 yr post OHT, overall doing well, has very mild LV dysfxn in setting of chronic DSA and today BP is uncontrolled, rapa level at goal   2. Long-term use of immunosuppressant medication    3. CKD (chronic kidney disease), stage III- Cr at lower end of his usual range   4. Essential hypertension    5. Hyperlipidemia, unspecified hyperlipidemia type    6. Morbid obesity with BMI of 40.0-44.9, adult  Body mass index is 38.21 kg/m².     7. Type 2 diabetes mellitus with stage 3 chronic kidney disease, with long-term current use of insulin    8. Vasculopathy of cardiac allograft    9. Chronic immunosuppression with Sirolimus and Cellcept    10. Mixed hyperlipidemia        Plan:   BP log at home May need amlodipine instead of norvac    Clinic: Return for labs and/or biopsy weekly the first month, every two weeks during month 2 and then monthly for the first year at the provider or coordinator's discretion. During the second year, return to clinic every 3 months. Post transplant year 3-5 return every 6 months. There will be a comprehensive post transplant evaluation every year that may include LHC/RHC/biopsy, stress test, echo, CXR, and other health screening exams.    In addition to the clinical assessment, I have ordered Allomap testing for this patient to assist in identification of moderate/severe acute cellular rejection (ACR) in a pt with stable Allograft function instead of endomyocardial biopsy.     Patient is reminded to call with any health  changes as these can be early signs of transplant complications. Patient is advised to make sure any new medications or changes of old medications are discussed with a pharmacist or physician knowledgeable with transplant to avoid rejection/drug toxicity related to significant drug interactions.    UNOS Patient Status  Functional Status: 100% - Normal, no complaints, no evidence of disease  Physical Capacity: No Limitations  Working for Income: yes  If yes, working activity level: Working Part Time Reason Unknown

## 2020-06-09 NOTE — PATIENT INSTRUCTIONS
XEROSIS (DRY SKIN)        1. Definition    Xerosis is the term for dry skin.  We all have a natural oil coating over our skin produced by the skin oil glands.  If this oil is removed, the skin becomes dry which can lead to cracking, which can lead to inflammation.  Xerosis is usually a long-term problem that recurs often, especially in the winter.    2. Cause     Long hot baths or showers can remove our natural oil and lead to xerosis.  One should never take more than one bath or shower a day and for no longer than ten minutes.   Use of harsh soaps such as Zest, Dial, and Ivory can worsen and cause xerosis.   Cold winter weather worsens xerosis because the amount of moisture contained in cold air is much less than the amount of moisture in warm air.    3. Treatment     Treatment is intended to restore the natural oil to your skin.  Keep the skin lubricated.     Do not take more than one bath or shower a day.  Use lukewarm water, not hot.  Hot water dries out the skin.     Use a gentle moisturizing soap such as Cetaphil soap, Oil of Olay, Dove, Basis, Ivory moisture care, Restoraderm cleanser.     When toweling dry, dont rub.  Blot the skin so there is still some water left on the skin.  You should apply a moisturizing cream to all of the skin such as Cerave cream, Cetaphil cream, Restoraderm or Eucerin Original Formula cream.   Alpha hydroxyacid lotions, i.e., AmLactin, also work very well for preventing dry skin, but may burn when used on inflamed or reddened skin.     If you like to swim during the winter months, you should not use soap when getting out of the pool.  When you have finished swimming, rinse off the chlorine with cool to warm water.  If this will be the only shower of the day, then you may use Cetaphil or another mild soap to cleanse your skin.  After the shower, apply a moisturizing cream to all of the skin as above.        [address] [facility] [place] [address]/ [phone]  [phone] FAX/ [email or web address]

## 2020-06-09 NOTE — NURSING NOTE
Patient verified by 2 identifiers and allergies reviewed.  22 g IV placed to GIA.  Denies previous reactions to blood transfusions & DSE consent obtained.  3cc Optison administered, echo images obtained, DSE testing completed.  Pt tolerated testing well. IV removed post testing.  Post study discharge instructions reviewed with patient, patient verbalized understanding.  Patient discharged to home in stable condition.

## 2020-06-09 NOTE — PROGRESS NOTES
Subjective:       Patient ID:  Nigel Albrecht is a 69 y.o. male who presents for   Chief Complaint   Patient presents with    Skin Check     tbse     Patient is here today for a mole check. yes  Pt has a history of sun exposure in the past. yes  Pt recalls several blistering sunburns in the past- no  Pt has history of tanning bed use- no  Pt has  had moles removed in the past- no  Pt has history of melanoma in first degree relatives-  no        Review of Systems   Constitutional: Negative for fever, chills and fatigue.   Skin: Positive for wears hat. Negative for daily sunscreen use and recent sunburn.   Hematologic/Lymphatic: Does not bruise/bleed easily.        Objective:    Physical Exam   Constitutional: He appears well-developed and well-nourished. No distress.   Neurological: He is alert and oriented to person, place, and time. He is not disoriented.   Psychiatric: He has a normal mood and affect.   Skin:   Areas Examined (abnormalities noted in diagram):   Scalp / Hair Palpated and Inspected  Head / Face Inspection Performed  Neck Inspection Performed  Chest / Axilla Inspection Performed  Abdomen Inspection Performed  Genitals / Buttocks / Groin Inspection Performed  Back Inspection Performed  RUE Inspected  LUE Inspection Performed  RLE Inspected  LLE Inspection Performed  Nails and Digits Inspection Performed                       Diagram Legend     Erythematous scaling macule/papule c/w actinic keratosis       Vascular papule c/w angioma      Pigmented verrucoid papule/plaque c/w seborrheic keratosis      Yellow umbilicated papule c/w sebaceous hyperplasia      Irregularly shaped tan macule c/w lentigo     1-2 mm smooth white papules consistent with Milia      Movable subcutaneous cyst with punctum c/w epidermal inclusion cyst      Subcutaneous movable cyst c/w pilar cyst      Firm pink to brown papule c/w dermatofibroma      Pedunculated fleshy papule(s) c/w skin tag(s)      Evenly pigmented macule  c/w junctional nevus     Mildly variegated pigmented, slightly irregular-bordered macule c/w mildly atypical nevus      Flesh colored to evenly pigmented papule c/w intradermal nevus       Pink pearly papule/plaque c/w basal cell carcinoma      Erythematous hyperkeratotic cursted plaque c/w SCC      Surgical scar with no sign of skin cancer recurrence      Open and closed comedones      Inflammatory papules and pustules      Verrucoid papule consistent consistent with wart     Erythematous eczematous patches and plaques     Dystrophic onycholytic nail with subungual debris c/w onychomycosis     Umbilicated papule    Erythematous-base heme-crusted tan verrucoid plaque consistent with inflamed seborrheic keratosis     Erythematous Silvery Scaling Plaque c/w Psoriasis     See annotation      Assessment / Plan:        Multiple benign nevi  TBSE body skin examination performed today including at least 12 points as noted in physical examination. No lesions suspicious for malignancy noted.  Reassurance provided.  Instructed patient to observe lesion(s) for changes and follow up in clinic if changes are noted. Discussed ABCDE's of moles and brochure provided.      Cherry angioma  This is a benign vascular lesion. Reassurance given. No treatment required.       Seborrheic keratoses  These are benign inherited growths without a malignant potential. Reassurance given to patient. No treatment is necessary.     Lentigo  This is a benign hyperpigmented sun induced lesion. Daily sun protection will reduce the number of new lesions. Treatment of these benign lesions are considered cosmetic.    Screening for malignant neoplasm of skin  Patient instructed in importance in daily sun protection of at least spf 30. Sun avoidance and topical protection discussed.     Recommend  for daily use on face and neck.    Patient encouraged to wear hat for all outdoor exposure.     Also discussed sun protective clothing.      Dry skin  Good skin  care regimen discussed including limiting to one bath or shower/day, using lukewarm water with mild soap and moisturizing cream to skin 1 - 2x/day. Brochure was provided and reviewed with patient.      Post zoster neuralgia  -     amitriptyline (ELAVIL) 25 MG tablet; Take 1 pill for the first week at night, if tolerated take two pills nightly.  Dispense: 60 tablet; Refill: 0    [facility]  F/u 3 mo for post herpertic neuralgia (video visit)  F/u next June for skin cancer check          No follow-ups on file.

## 2020-06-12 LAB
C1Q1 TESTING DATE: NORMAL
C1Q2 TESTING DATE: NORMAL
CLASS I ANTIBODY COMMENTS - LUMINEX: NORMAL
CLASS II ANTIBODIES - LUMINEX: NORMAL
CLASS II ANTIBODY COMMENTS - LUMINEX: NORMAL
HC1Q TESTING DATE: NORMAL
SERUM COLLECTION DT - LUMINEX CLASS I: NORMAL
SERUM COLLECTION DT - LUMINEX CLASS II: NORMAL

## 2020-06-16 ENCOUNTER — TELEPHONE (OUTPATIENT)
Dept: TRANSPLANT | Facility: CLINIC | Age: 70
End: 2020-06-16

## 2020-06-16 NOTE — TELEPHONE ENCOUNTER
Pt called with his BP log. They are as follows:  6/14 am 142/88          Pm  121/74  6/15 am 135/79          Pm 127/76  6/16 am 143/93    Pt states his BP ranges from 120's-140's/70's-80's. Will stay on BOP medication at this time and will continue to check BP daily. Pt was instructed to call if his BP stays above 140/90. Pt stated his understanding.

## 2020-06-18 ENCOUNTER — TELEPHONE (OUTPATIENT)
Dept: TRANSPLANT | Facility: CLINIC | Age: 70
End: 2020-06-18

## 2020-06-18 NOTE — TELEPHONE ENCOUNTER
Chart review with Dr. Gonsalez. Pt 18 years post, doing well.  Reviewed BP readings 120-140/70-80, which was down from clinic.     Meds:  Sirolimus 1 mg (goal 5-8)  /750  Atorvastain 80 mg      Labs:  Sirolimus 9.8 (repeat 5.7)  Creatinine 1.9 (repeat 1.8, baseline)  WBC 4.22  BNP 96  Chol 169  Triglyceride 201 (down from 300's)  HDL 35  LDL 93.8    DSE 60%, no myocardial ischemia    Plan: No changes. Pt will call if BP readings stay above 140/80 for addition to BP regime.

## 2020-07-15 DIAGNOSIS — Z71.89 COMPLEX CARE COORDINATION: ICD-10-CM

## 2020-07-27 DIAGNOSIS — I10 ESSENTIAL HYPERTENSION: ICD-10-CM

## 2020-07-27 DIAGNOSIS — Z94.1 STATUS POST HEART TRANSPLANT: ICD-10-CM

## 2020-07-27 DIAGNOSIS — Z94.9 HYPERTENSION ASSOCIATED WITH TRANSPLANTATION: ICD-10-CM

## 2020-07-27 DIAGNOSIS — I15.8 HYPERTENSION ASSOCIATED WITH TRANSPLANTATION: ICD-10-CM

## 2020-07-27 DIAGNOSIS — E78.2 MIXED HYPERLIPIDEMIA: ICD-10-CM

## 2020-07-28 RX ORDER — ATORVASTATIN CALCIUM 80 MG/1
80 TABLET, FILM COATED ORAL DAILY
Qty: 90 TABLET | Refills: 3 | Status: SHIPPED | OUTPATIENT
Start: 2020-07-28 | End: 2020-09-02 | Stop reason: SDUPTHER

## 2020-07-28 RX ORDER — TORSEMIDE 5 MG/1
TABLET ORAL
Qty: 180 TABLET | Refills: 0 | Status: SHIPPED | OUTPATIENT
Start: 2020-07-28 | End: 2020-09-02 | Stop reason: SDUPTHER

## 2020-07-28 RX ORDER — MYCOPHENOLATE MOFETIL 250 MG/1
CAPSULE ORAL
Qty: 540 CAPSULE | Refills: 3 | OUTPATIENT
Start: 2020-07-28 | End: 2021-01-25 | Stop reason: SDUPTHER

## 2020-07-28 RX ORDER — SIROLIMUS 1 MG/1
1 TABLET, FILM COATED ORAL DAILY
Qty: 90 TABLET | Refills: 3 | OUTPATIENT
Start: 2020-07-28 | End: 2020-11-27

## 2020-07-28 RX ORDER — METOPROLOL SUCCINATE 50 MG/1
50 TABLET, EXTENDED RELEASE ORAL DAILY
Qty: 90 TABLET | Refills: 3 | Status: SHIPPED | OUTPATIENT
Start: 2020-07-28 | End: 2020-09-02 | Stop reason: SDUPTHER

## 2020-07-28 RX ORDER — LISINOPRIL 40 MG/1
40 TABLET ORAL DAILY
Qty: 90 TABLET | Refills: 3 | Status: SHIPPED | OUTPATIENT
Start: 2020-07-28 | End: 2020-09-02 | Stop reason: SDUPTHER

## 2020-08-05 ENCOUNTER — PATIENT OUTREACH (OUTPATIENT)
Dept: ADMINISTRATIVE | Facility: HOSPITAL | Age: 70
End: 2020-08-05

## 2020-08-05 NOTE — PROGRESS NOTES
Pt on MSSP report for not being seen in > 12 months by PCP. Called pt to schedule annual. No answer, left message for a return call.

## 2020-08-12 ENCOUNTER — LAB VISIT (OUTPATIENT)
Dept: LAB | Facility: HOSPITAL | Age: 70
End: 2020-08-12
Attending: UROLOGY
Payer: MEDICARE

## 2020-08-12 DIAGNOSIS — I10 HYPERTENSION, UNSPECIFIED TYPE: ICD-10-CM

## 2020-08-12 DIAGNOSIS — Z12.5 PROSTATE CANCER SCREENING: ICD-10-CM

## 2020-08-12 DIAGNOSIS — N52.9 ERECTILE DYSFUNCTION, UNSPECIFIED ERECTILE DYSFUNCTION TYPE: ICD-10-CM

## 2020-08-12 PROCEDURE — 36415 COLL VENOUS BLD VENIPUNCTURE: CPT | Mod: PO

## 2020-08-12 PROCEDURE — 84153 ASSAY OF PSA TOTAL: CPT

## 2020-08-13 LAB — COMPLEXED PSA SERPL-MCNC: 2.5 NG/ML (ref 0–4)

## 2020-08-17 ENCOUNTER — TELEPHONE (OUTPATIENT)
Dept: FAMILY MEDICINE | Facility: CLINIC | Age: 70
End: 2020-08-17

## 2020-08-17 NOTE — TELEPHONE ENCOUNTER
----- Message from Ismael Li sent at 8/17/2020  3:39 PM CDT -----  Contact: self  Type:  Sooner Apoointment Request    Caller is requesting a sooner appointment.  Caller declined first available appointment listed below.  Caller will not accept being placed on the waitlist and is requesting a message be sent to doctor.  Name of Caller:Nigel SCHMID Trena   When is the first available appointment?09/02/2020  Symptoms:annual  Would the patient rather a call back or a response via [facility]? Call back  Best Call Back Number:[phone]  Additional Information: pt already has appt on this day and would like to come in around 11am.  Thanks/AS

## 2020-08-17 NOTE — TELEPHONE ENCOUNTER
Spoke with pt, he states that he wanted to schedule an annual with Dr. Singletary on 9/2/20 when he comes for diabetes appointment on that day. Appointment scheduled on 9/2/20 at 11:20 am.

## 2020-08-25 ENCOUNTER — PATIENT OUTREACH (OUTPATIENT)
Dept: ADMINISTRATIVE | Facility: OTHER | Age: 70
End: 2020-08-25

## 2020-08-25 DIAGNOSIS — E11.22 TYPE 2 DIABETES MELLITUS WITH STAGE 3 CHRONIC KIDNEY DISEASE, WITH LONG-TERM CURRENT USE OF INSULIN: Primary | ICD-10-CM

## 2020-08-25 DIAGNOSIS — N18.30 TYPE 2 DIABETES MELLITUS WITH STAGE 3 CHRONIC KIDNEY DISEASE, WITH LONG-TERM CURRENT USE OF INSULIN: Primary | ICD-10-CM

## 2020-08-25 DIAGNOSIS — Z79.4 TYPE 2 DIABETES MELLITUS WITH STAGE 3 CHRONIC KIDNEY DISEASE, WITH LONG-TERM CURRENT USE OF INSULIN: Primary | ICD-10-CM

## 2020-08-25 NOTE — PROGRESS NOTES
Chart reviewed.   Immunizations: Triggered Imm Registry     Orders placed: eye exam   Upcoming appts to satisfy TELMA topics: n/a

## 2020-08-26 ENCOUNTER — OFFICE VISIT (OUTPATIENT)
Dept: UROLOGY | Facility: CLINIC | Age: 70
End: 2020-08-26
Payer: MEDICARE

## 2020-08-26 VITALS
HEIGHT: 74 IN | BODY MASS INDEX: 38.89 KG/M2 | SYSTOLIC BLOOD PRESSURE: 142 MMHG | WEIGHT: 303 LBS | DIASTOLIC BLOOD PRESSURE: 84 MMHG

## 2020-08-26 DIAGNOSIS — Z12.5 PROSTATE CANCER SCREENING: Primary | ICD-10-CM

## 2020-08-26 DIAGNOSIS — E66.01 MORBID OBESITY: ICD-10-CM

## 2020-08-26 DIAGNOSIS — I10 HYPERTENSION, UNSPECIFIED TYPE: ICD-10-CM

## 2020-08-26 DIAGNOSIS — N52.9 ERECTILE DYSFUNCTION, UNSPECIFIED ERECTILE DYSFUNCTION TYPE: ICD-10-CM

## 2020-08-26 LAB
BILIRUB SERPL-MCNC: NORMAL MG/DL
BLOOD URINE, POC: NORMAL
CLARITY, POC UA: CLEAR
COLOR, POC UA: YELLOW
GLUCOSE UR QL STRIP: NORMAL
KETONES UR QL STRIP: NORMAL
LEUKOCYTE ESTERASE URINE, POC: NORMAL
NITRITE, POC UA: NORMAL
PH, POC UA: 5
PROTEIN, POC: NORMAL
SPECIFIC GRAVITY, POC UA: 1.01
UROBILINOGEN, POC UA: NORMAL

## 2020-08-26 PROCEDURE — 99999 PR PBB SHADOW E&M-EST. PATIENT-LVL III: CPT | Mod: PBBFAC,,, | Performed by: UROLOGY

## 2020-08-26 PROCEDURE — 99213 OFFICE O/P EST LOW 20 MIN: CPT | Mod: PBBFAC | Performed by: UROLOGY

## 2020-08-26 PROCEDURE — 81002 URINALYSIS NONAUTO W/O SCOPE: CPT | Mod: PBBFAC | Performed by: UROLOGY

## 2020-08-26 PROCEDURE — 99214 OFFICE O/P EST MOD 30 MIN: CPT | Mod: S$PBB,,, | Performed by: UROLOGY

## 2020-08-26 PROCEDURE — 99999 PR PBB SHADOW E&M-EST. PATIENT-LVL III: ICD-10-PCS | Mod: PBBFAC,,, | Performed by: UROLOGY

## 2020-08-26 PROCEDURE — 99214 PR OFFICE/OUTPT VISIT, EST, LEVL IV, 30-39 MIN: ICD-10-PCS | Mod: S$PBB,,, | Performed by: UROLOGY

## 2020-08-26 RX ORDER — PAPAVERINE HYDROCHLORIDE 30 MG/ML
INJECTION INTRAMUSCULAR; INTRAVENOUS
Qty: 10 ML | Refills: 5 | Status: SHIPPED | OUTPATIENT
Start: 2020-08-26 | End: 2021-10-27

## 2020-08-26 NOTE — PROGRESS NOTES
Chief Complaint: Prostate cancer screening/BPH     HPI:   8/26/20: No problems at all.  Reviewed history in detail.  ED about the same.    8/26/19: Voiding fine, no complaints.  Reviewed history in detail. Had a bad case of shingles otherwise fine.  Not using the ED meds not worried about it.  8/20/18: Not needing flomax, no caffeine still.  Took doxazosin for BP for a while.  Sildenafil working for ED.  8/10/17: Flomax helped a bit but he stopped it not needing it; didn't get the sleep study.  No LUTS now.  Cut back on caffeine to one cup.    3/30/16: 64 yo man s/p renal transplant in 2002 here with nocturia x3, was x0-1 4-5 months ago.  No hesitancy.  Some dribble when done putting things away.Urgency.  Some double voiding.  No abd/pelvic pain and no exac/rel factors.  No hematuria.  No urolithiasis.  No urinary bother.  No  history.  Normal sexual function.  Not usually constipated.  Viagra for ED rx but not used but once.  No BPH meds.    Allergies:  No known drug allergies    Medications:  has a current medication list which includes the following prescription(s): amitriptyline, atorvastatin, blood sugar diagnostic, blood-glucose sensor, calcium carbonate, cetirizine, cholecalciferol (vitamin d3), diltiazem, flash glucose sensor, fluticasone propionate, gabapentin, insulin nph/reg human, lisinopril, low-dose aspirin, meclizine, metoprolol succinate, multivit-min/fa/lycopen/lutein, mupirocin, mycophenolate, oxycodone-acetaminophen, semaglutide, sirolimus, and torsemide.    Review of Systems:  General: No fever, chills, fatigability, or weight loss.  Skin: No rashes, itching, or changes in color or texture of skin.  Chest: Denies PETERSON, cyanosis, wheezing, cough, and sputum production.  Abdomen: Appetite fine. No weight loss. Denies diarrhea, abdominal pain, hematemesis, or blood in stool.  Musculoskeletal: No joint stiffness or swelling. Some back pain.  : As above.  All other review of systems  negative.    PMH:   has a past medical history of Allergic rhinitis (2013), Blood transfusion, Cataract, CKD (chronic kidney disease), stage III (2014), -donor kidney transplant, Diabetes mellitus, type 2 (2013), Gout, arthritis (2013), Heart attack, Heart transplanted, Hyperlipidemia (2013), Hypertension, Morbid obesity (2013), Organ transplant (), and Prophylactic immunotherapy.    PSH:   has a past surgical history that includes Kidney transplant; Heart transplant; Revision total hip arthroplasty; Eye surgery; and Cataract extraction (Bilateral).    FamHx: family history includes Diabetes in his brother and maternal grandmother; Early death in his brother; Heart disease in his brother; Hyperlipidemia in his brother and sister; Hypertension in his brother; Kidney disease in his son; Stroke in his brother and mother.    SocHx:  reports that he quit smoking about 36 years ago. His smoking use included cigarettes. He has a 20.00 pack-year smoking history. He has never used smokeless tobacco. He reports current alcohol use of about 1.0 standard drinks of alcohol per week. He reports that he does not use drugs.      Physical Exam:  Vitals:    20 1047   BP: (!) 142/84     General: A&Ox3, no apparent distress, no deformities  Neck: No masses, normal thyroid  Lungs: normal inspiration, no use of accessory muscles  Heart: normal pulse, no arrhythmias  Abdomen: Soft, NT, ND  Skin: The skin is warm and dry. No jaundice.  Ext: No c/c/e.  :   : Test desc chavo, no abnormalities of epididymus. Penis normal, with normal penile and scrotal skin. Meatus normal. Normal rectal tone, no hemorrhoids. Prost 40 gm no nodules or masses appreciated. SV not palpable. Perineum and anus normal.    Labs/Studies:   Urinalysis performed in clinic, summary: UA normal  PSA     : 1.3    : 1.1    6/15: 1.7    : 1.5    : 1.8    : 2.1    : 2.5  Bladder Scan performed in  office:     3/16: PVR 24 ml.    Impression/Plan:   1. Doing fine now.  Annual prostate screening.  PSA/RTC one year.  Slowly rising  2. Discussed ED and no meds at this time.  PDE-5 doesn't work.  Trimix rx.  3. HTN: discussed has been elev; going to PCP soon.  4. Obesity: discussed portion control

## 2020-09-01 DIAGNOSIS — E11.49 OTHER DIABETIC NEUROLOGICAL COMPLICATION ASSOCIATED WITH TYPE 2 DIABETES MELLITUS: Primary | ICD-10-CM

## 2020-09-01 NOTE — PROGRESS NOTES
"PCP: Krystin Zimmerman MD    Subjective:     Chief Complaint: Diabetes Follow Up    HISTORY OF PRESENT ILLNESS: 70 y.o.  male presenting for diabetes follow up.  Patient has had diabetes for many years with the following complications: neuropathy, stage III CKD.  Other pertinent conditions include: cardiac and renal transplant in 2002 on immunosuppressants. He has attended diabetes education in the past.       The patient Freestyle CGM was reviewed. For the past 28 days, patient average glucose was 147 mg/d. He was above range 24 % of the time, in range 75 % of the time, and below range 1 % of the time.  His average blood sugars range from 86 - 186.  He has a total of 4 low events with 109 minutes. Overall, he is having evening time hyperglycemia, which is due to taking insulin after rather than before meals.    He denies any recent hospitalizations, emergency room, syncope, or diaphoresis.     Height: 6' 2" (188 cm)  //  Weight: 136 kg (299 lb 13.2 oz), Body mass index is 38.5 kg/m².  He has gained 6 pounds since last visit.    His blood sugar in clinic today is: 127 mg/dl at 10:07 am      Labs Reviewed.       DM MEDICATIONS:   Humulin 70 / 30, 50 units before breakfast and 60 units AFTER dinner  Ozempic 1 mg weekly     Review of Systems   Constitutional: Negative for appetite change, fatigue and unexpected weight change.   Eyes: Negative for visual disturbance.   Gastrointestinal: Negative for abdominal pain, constipation, diarrhea and nausea.   Endocrine: Negative for polydipsia, polyphagia and polyuria.   Neurological: Negative for dizziness, numbness and headaches.   Psychiatric/Behavioral: Negative for confusion and decreased concentration. The patient is not nervous/anxious.        Diabetes Management Status  Statin: Taking  ACE/ARB: Taking    Screening or Prevention Patient's value Goal Complete/Controlled?   HgA1C Testing and Control   Lab Results   Component Value Date    HGBA1C " 6.8 (H) 2020      Annually/Less than 8% Yes   Lipid profile : 2020 Annually Yes   LDL control Lab Results   Component Value Date    LDLCALC 93.8 2020    Annually/Less than 100 mg/dl  Yes   Nephropathy screening Lab Results   Component Value Date    MICALBCREAT 274.7 (H) 2020     Lab Results   Component Value Date    PROTEINUA 3+ (A) 2019    Annually Yes   Blood pressure BP Readings from Last 1 Encounters:   20 128/80    Less than 140/90 Yes   Dilated retinal exam : 2019 Annually Yes, Aminata Perry, encouraged patient to make an appointment   Foot exam   : 2020 Annually Yes     ACTIVITY LEVEL: Rarely Active. Discussed activities, benefits, methods, and precautions.  MEAL PLANNING: Patient reports number of meals per day to be 2 and number of snacks per day to be 2.   Patient is encouraged to carb count and consume no more than 45 - 60 grams of carbohydrates in each meal, and 1800 k / jovany per day.      BLOOD GLUCOSE TESTIN to 2 times daily  SOCIAL HISTORY: . Lives with spouse. Has 2 children. Patient retired as manager for [employer].     Objective:      Physical Exam  Constitutional:       Appearance: He is well-developed.   HENT:      Head: Normocephalic and atraumatic.   Eyes:      Pupils: Pupils are equal, round, and reactive to light.   Neck:      Musculoskeletal: Normal range of motion.   Cardiovascular:      Rate and Rhythm: Normal rate and regular rhythm.      Pulses:           Dorsalis pedis pulses are 2+ on the right side and 2+ on the left side.   Pulmonary:      Effort: Pulmonary effort is normal.      Breath sounds: Normal breath sounds.   Musculoskeletal: Normal range of motion.   Feet:      Right foot:      Protective Sensation: 6 sites tested. 4 sites sensed.      Skin integrity: Dry skin present. No ulcer or callus.      Left foot:      Protective Sensation: 6 sites tested. 4 sites sensed.      Skin integrity: Dry skin  present. No ulcer, blister or callus.   Skin:     General: Skin is warm and dry.      Findings: No rash.   Psychiatric:         Behavior: Behavior normal.         Thought Content: Thought content normal.         Judgment: Judgment normal.         Assessment / Plan:     1.) Type 2 diabetes mellitus, with stage III CKD with long term use of insulin  Comments:  -  Condition controlled.  Continue Freestyle Georgette CGM.  Continue Ozempic 1 mg weekly. He is tolerating without GI side effects. In the past, we have concerned splitting his mixed insulin, but patient prefers  less injections.  On today, I learned that he is taking his evening dose of 70 /30 AFTER, rather than before meals, which causes pp hyperglycemia.  I advised to take his insulin BEFORE meals.  He agrees to  start doing this ! He reports dietary indiscretion at dinner time, but will start watching and decreasing carbs with dinner to hopefully prevent pp spikes especially.    Orders:  -     POCT Glucose, Hand-Held Device  - Labs Today: repeat A1C, micral     2.) Mixed Hyperlipidemia - continue meds as prescribed    3.) Essential Hypertension  - continue meds as prescribed    4.) Morbid obesity with BMI of 40.0-44.9, adult    Additional Plan Details:    1.) Continue monitoring blood sugar 4 x daily, fasting and ac dinner or at bedtime. Discussed ADA goal for fasting blood sugar, 80 - 130 mg/dL; pp blood sugars below 180 mg/dl. Also, discussed prevention of hypoglycemia and the need to adjust goals to higher levels if persistent hypoglycemia.  Reminded to bring BG records or meter to each visit for review.  2.) Return to clinic in 3 months for follow up. The patient was explained the above plan and given opportunity to ask questions.  He understands, chooses and consents to this plan and accepts all the risks, which include but are not limited to the risks mentioned above. He understands the alternative of having no testing, interventions or treatments at this  time. He left content and without further questions.     Jael Garrison, BELÉN-C, CDE    A total of 30 minutes was spent in face to face time, of which over 50 % was spent in counseling patient on disease process, complications, treatment, and side effects of medications.

## 2020-09-02 ENCOUNTER — LAB VISIT (OUTPATIENT)
Dept: LAB | Facility: HOSPITAL | Age: 70
End: 2020-09-02
Attending: NURSE PRACTITIONER
Payer: MEDICARE

## 2020-09-02 ENCOUNTER — OFFICE VISIT (OUTPATIENT)
Dept: FAMILY MEDICINE | Facility: CLINIC | Age: 70
End: 2020-09-02
Attending: FAMILY MEDICINE
Payer: MEDICARE

## 2020-09-02 ENCOUNTER — OFFICE VISIT (OUTPATIENT)
Dept: DIABETES | Facility: CLINIC | Age: 70
End: 2020-09-02
Payer: MEDICARE

## 2020-09-02 VITALS
DIASTOLIC BLOOD PRESSURE: 80 MMHG | HEIGHT: 74 IN | OXYGEN SATURATION: 97 % | SYSTOLIC BLOOD PRESSURE: 142 MMHG | WEIGHT: 300.06 LBS | WEIGHT: 299.81 LBS | BODY MASS INDEX: 38.52 KG/M2 | TEMPERATURE: 99 F | HEART RATE: 77 BPM | SYSTOLIC BLOOD PRESSURE: 128 MMHG | BODY MASS INDEX: 38.48 KG/M2 | DIASTOLIC BLOOD PRESSURE: 88 MMHG

## 2020-09-02 DIAGNOSIS — B02.29 POST ZOSTER NEURALGIA: ICD-10-CM

## 2020-09-02 DIAGNOSIS — N18.30 TYPE 2 DIABETES MELLITUS WITH STAGE 3 CHRONIC KIDNEY DISEASE, WITH LONG-TERM CURRENT USE OF INSULIN: Primary | ICD-10-CM

## 2020-09-02 DIAGNOSIS — I10 ESSENTIAL HYPERTENSION: ICD-10-CM

## 2020-09-02 DIAGNOSIS — Z94.9 HYPERTENSION ASSOCIATED WITH TRANSPLANTATION: ICD-10-CM

## 2020-09-02 DIAGNOSIS — E11.22 TYPE 2 DIABETES MELLITUS WITH STAGE 3 CHRONIC KIDNEY DISEASE, WITH LONG-TERM CURRENT USE OF INSULIN: Primary | ICD-10-CM

## 2020-09-02 DIAGNOSIS — E11.69 DM TYPE 2 WITH DIABETIC DYSLIPIDEMIA: ICD-10-CM

## 2020-09-02 DIAGNOSIS — Z79.4 TYPE 2 DIABETES MELLITUS WITH STAGE 3 CHRONIC KIDNEY DISEASE, WITH LONG-TERM CURRENT USE OF INSULIN: ICD-10-CM

## 2020-09-02 DIAGNOSIS — E66.01 MORBID OBESITY WITH BMI OF 40.0-44.9, ADULT: ICD-10-CM

## 2020-09-02 DIAGNOSIS — E78.5 DM TYPE 2 WITH DIABETIC DYSLIPIDEMIA: ICD-10-CM

## 2020-09-02 DIAGNOSIS — E78.2 MIXED HYPERLIPIDEMIA: ICD-10-CM

## 2020-09-02 DIAGNOSIS — E11.22 TYPE 2 DIABETES MELLITUS WITH STAGE 3 CHRONIC KIDNEY DISEASE, WITH LONG-TERM CURRENT USE OF INSULIN: ICD-10-CM

## 2020-09-02 DIAGNOSIS — Z12.11 COLON CANCER SCREENING: Primary | ICD-10-CM

## 2020-09-02 DIAGNOSIS — I15.8 HYPERTENSION ASSOCIATED WITH TRANSPLANTATION: ICD-10-CM

## 2020-09-02 DIAGNOSIS — Z79.4 TYPE 2 DIABETES MELLITUS WITH STAGE 3 CHRONIC KIDNEY DISEASE, WITH LONG-TERM CURRENT USE OF INSULIN: Primary | ICD-10-CM

## 2020-09-02 DIAGNOSIS — N18.30 TYPE 2 DIABETES MELLITUS WITH STAGE 3 CHRONIC KIDNEY DISEASE, WITH LONG-TERM CURRENT USE OF INSULIN: ICD-10-CM

## 2020-09-02 LAB
ALBUMIN SERPL BCP-MCNC: 3.2 G/DL (ref 3.5–5.2)
ALP SERPL-CCNC: 109 U/L (ref 55–135)
ALT SERPL W/O P-5'-P-CCNC: 22 U/L (ref 10–44)
ANION GAP SERPL CALC-SCNC: 6 MMOL/L (ref 8–16)
AST SERPL-CCNC: 32 U/L (ref 10–40)
BILIRUB SERPL-MCNC: 0.4 MG/DL (ref 0.1–1)
BUN SERPL-MCNC: 18 MG/DL (ref 8–23)
CALCIUM SERPL-MCNC: 9 MG/DL (ref 8.7–10.5)
CHLORIDE SERPL-SCNC: 107 MMOL/L (ref 95–110)
CO2 SERPL-SCNC: 28 MMOL/L (ref 23–29)
CREAT SERPL-MCNC: 2 MG/DL (ref 0.5–1.4)
EST. GFR  (AFRICAN AMERICAN): 38 ML/MIN/1.73 M^2
EST. GFR  (NON AFRICAN AMERICAN): 32.8 ML/MIN/1.73 M^2
GLUCOSE SERPL-MCNC: 105 MG/DL (ref 70–110)
GLUCOSE SERPL-MCNC: 127 MG/DL (ref 70–110)
POTASSIUM SERPL-SCNC: 4.2 MMOL/L (ref 3.5–5.1)
PROT SERPL-MCNC: 7.1 G/DL (ref 6–8.4)
SODIUM SERPL-SCNC: 141 MMOL/L (ref 136–145)

## 2020-09-02 PROCEDURE — 99215 OFFICE O/P EST HI 40 MIN: CPT | Mod: PBBFAC,PO,25 | Performed by: FAMILY MEDICINE

## 2020-09-02 PROCEDURE — 99213 OFFICE O/P EST LOW 20 MIN: CPT | Mod: PBBFAC,27,PO,25 | Performed by: NURSE PRACTITIONER

## 2020-09-02 PROCEDURE — 99999 PR PBB SHADOW E&M-EST. PATIENT-LVL III: ICD-10-PCS | Mod: PBBFAC,,, | Performed by: NURSE PRACTITIONER

## 2020-09-02 PROCEDURE — 99999 PR PBB SHADOW E&M-EST. PATIENT-LVL V: ICD-10-PCS | Mod: PBBFAC,,, | Performed by: FAMILY MEDICINE

## 2020-09-02 PROCEDURE — 99214 PR OFFICE/OUTPT VISIT, EST, LEVL IV, 30-39 MIN: ICD-10-PCS | Mod: 25,S$PBB,, | Performed by: FAMILY MEDICINE

## 2020-09-02 PROCEDURE — 80053 COMPREHEN METABOLIC PANEL: CPT

## 2020-09-02 PROCEDURE — 99214 PR OFFICE/OUTPT VISIT, EST, LEVL IV, 30-39 MIN: ICD-10-PCS | Mod: S$PBB,,, | Performed by: NURSE PRACTITIONER

## 2020-09-02 PROCEDURE — 82962 GLUCOSE BLOOD TEST: CPT | Mod: PBBFAC,PO | Performed by: NURSE PRACTITIONER

## 2020-09-02 PROCEDURE — 90471 IMMUNIZATION ADMIN: CPT | Mod: PBBFAC,PO

## 2020-09-02 PROCEDURE — 99214 OFFICE O/P EST MOD 30 MIN: CPT | Mod: S$PBB,,, | Performed by: NURSE PRACTITIONER

## 2020-09-02 PROCEDURE — 99999 PR PBB SHADOW E&M-EST. PATIENT-LVL III: CPT | Mod: PBBFAC,,, | Performed by: NURSE PRACTITIONER

## 2020-09-02 PROCEDURE — 99999 PR PBB SHADOW E&M-EST. PATIENT-LVL V: CPT | Mod: PBBFAC,,, | Performed by: FAMILY MEDICINE

## 2020-09-02 PROCEDURE — 36415 COLL VENOUS BLD VENIPUNCTURE: CPT | Mod: PO

## 2020-09-02 PROCEDURE — 99214 OFFICE O/P EST MOD 30 MIN: CPT | Mod: 25,S$PBB,, | Performed by: FAMILY MEDICINE

## 2020-09-02 PROCEDURE — 83036 HEMOGLOBIN GLYCOSYLATED A1C: CPT

## 2020-09-02 RX ORDER — TORSEMIDE 5 MG/1
TABLET ORAL
Qty: 180 TABLET | Refills: 4 | Status: SHIPPED | OUTPATIENT
Start: 2020-09-02 | End: 2021-11-11

## 2020-09-02 RX ORDER — METOPROLOL SUCCINATE 50 MG/1
50 TABLET, EXTENDED RELEASE ORAL DAILY
Qty: 90 TABLET | Refills: 3 | Status: SHIPPED | OUTPATIENT
Start: 2020-09-02 | End: 2021-06-24

## 2020-09-02 RX ORDER — DILTIAZEM HYDROCHLORIDE 240 MG/1
240 CAPSULE, EXTENDED RELEASE ORAL DAILY
Qty: 90 CAPSULE | Refills: 4 | Status: SHIPPED | OUTPATIENT
Start: 2020-09-02 | End: 2021-06-14 | Stop reason: ALTCHOICE

## 2020-09-02 RX ORDER — AMITRIPTYLINE HYDROCHLORIDE 25 MG/1
TABLET, FILM COATED ORAL
Qty: 180 TABLET | Refills: 1 | Status: SHIPPED | OUTPATIENT
Start: 2020-09-02 | End: 2021-09-25

## 2020-09-02 RX ORDER — LISINOPRIL 40 MG/1
40 TABLET ORAL DAILY
Qty: 90 TABLET | Refills: 3 | Status: SHIPPED | OUTPATIENT
Start: 2020-09-02 | End: 2021-08-26

## 2020-09-02 RX ORDER — ATORVASTATIN CALCIUM 80 MG/1
80 TABLET, FILM COATED ORAL DAILY
Qty: 90 TABLET | Refills: 3 | Status: SHIPPED | OUTPATIENT
Start: 2020-09-02 | End: 2021-06-24

## 2020-09-02 NOTE — PROGRESS NOTES
Subjective:       Patient ID: Nigel Albrecht is a 70 y.o. male.    Chief Complaint: Annual Exam    70  y old male with t2DM , HTN , DLP ,  Severe obesity , CKD s/p OHT and DDKT here To [facility] , On aspirin, Not LITA He will like to wait , completed  hep b vacc , he walks 15 min daily 3 times weekly , He does not follow any renal diet , no low carb . Opthalmology :  Seen at [facility]  : seen last  Feb ., Fastin bs : 105     , 170  1 hrs after dinner  . F.u with Dr Vázquez([facility]) and transplant team in [place] . Denies any CP . Sob , palpations . off of prednisone  . Still with postherpetic neuralgia . Gabapentin caused nausea. Percocet alleviated pain . Seldom takes it . On daily amitriptyline and topical lidocaine     Review of Systems   Constitutional: Negative.    HENT: Negative.    Eyes: Negative.    Respiratory: Negative.    Cardiovascular: Negative.    Gastrointestinal: Negative.    Genitourinary: Negative.    Musculoskeletal: Negative.    Skin: Negative.    Hematological: Negative.        Objective:      Physical Exam  Constitutional:       General: He is not in acute distress.     Appearance: He is well-developed. He is obese. He is not diaphoretic.   HENT:      Head: Normocephalic and atraumatic.      Right Ear: External ear normal.      Left Ear: External ear normal.      Nose: Nose normal.      Mouth/Throat:      Pharynx: No oropharyngeal exudate.   Eyes:      General: No scleral icterus.        Right eye: No discharge.         Left eye: No discharge.      Conjunctiva/sclera: Conjunctivae normal.      Pupils: Pupils are equal, round, and reactive to light.   Neck:      Musculoskeletal: Normal range of motion and neck supple.      Thyroid: No thyromegaly.      Vascular: No JVD.      Trachea: No tracheal deviation.   Cardiovascular:      Rate and Rhythm: Normal rate and regular rhythm.      Heart sounds: Normal heart sounds. No murmur. No friction rub. No gallop.    Pulmonary:      Effort: Pulmonary effort  is normal. No respiratory distress.      Breath sounds: Normal breath sounds. No stridor. No wheezing or rales.   Chest:      Chest wall: No tenderness.   Abdominal:      General: Bowel sounds are normal. There is no distension.      Palpations: Abdomen is soft.      Tenderness: There is no abdominal tenderness. There is no guarding or rebound.   Musculoskeletal: Normal range of motion.         General: No tenderness.   Lymphadenopathy:      Cervical: No cervical adenopathy.   Skin:     General: Skin is warm and dry.      Coloration: Skin is not pale.      Findings: No erythema or rash.   Neurological:      Mental Status: He is alert and oriented to person, place, and time.      Cranial Nerves: No cranial nerve deficit.      Motor: No abnormal muscle tone.      Coordination: Coordination normal.      Deep Tendon Reflexes: Reflexes are normal and symmetric. Reflexes normal.   Psychiatric:         Behavior: Behavior normal.         Thought Content: Thought content normal.         Judgment: Judgment normal.         Assessment:     Nigel was seen today for annual exam.    Diagnoses and all orders for this visit:    Colon cancer screening  -     Case request GI: COLONOSCOPY    Mixed hyperlipidemia  -     atorvastatin (LIPITOR) 80 MG tablet; Take 1 tablet (80 mg total) by mouth once daily.    Post zoster neuralgia  -     amitriptyline (ELAVIL) 25 MG tablet; Take 1 pill for the first week at night, if tolerated take two pills nightly.    Essential hypertension  -     lisinopriL (PRINIVIL,ZESTRIL) 40 MG tablet; Take 1 tablet (40 mg total) by mouth once daily.    Hypertension associated with transplantation  -     metoprolol succinate (TOPROL-XL) 50 MG 24 hr tablet; Take 1 tablet (50 mg total) by mouth once daily.    DM type 2 with diabetic dyslipidemia    Other orders  -     diltiaZEM (TIAZAC) 240 MG Cs24; Take 1 capsule (240 mg total) by mouth once daily.  -     torsemide (DEMADEX) 5 MG Tab; TAKE 2 TABLETS (10MG TOTAL)ONCE  DAILY  -     (In Office Administered) Tdap Vaccine      Plan:   Diet and ex   Cont meds   Controlled. Cont med   Labs

## 2020-09-03 LAB
ESTIMATED AVG GLUCOSE: 183 MG/DL (ref 68–131)
HBA1C MFR BLD HPLC: 8 % (ref 4–5.6)

## 2020-09-09 ENCOUNTER — TELEPHONE (OUTPATIENT)
Dept: DIABETES | Facility: CLINIC | Age: 70
End: 2020-09-09

## 2020-09-09 NOTE — TELEPHONE ENCOUNTER
I spoke with patient regarding elevated A1C of 8.0 %.  He voices that he has already made some dietary changes and is taking insulin before ( not after ) meals.  Please schedule patient for a nurse visit in 3 weeks, so Freestyle Georgette can be downloaded and reviewed.  Thanks !

## 2020-09-16 ENCOUNTER — TELEPHONE (OUTPATIENT)
Dept: ENDOSCOPY | Facility: HOSPITAL | Age: 70
End: 2020-09-16

## 2020-09-17 ENCOUNTER — TELEPHONE (OUTPATIENT)
Dept: ENDOSCOPY | Facility: HOSPITAL | Age: 70
End: 2020-09-17

## 2020-09-17 NOTE — TELEPHONE ENCOUNTER
Location Screening:    If answers yes to any of the following, schedule at [place] ONLY. If No, OK for either location.    1. Is there a diagnosis of heart failure, severe heart valve disease (aortic stenosis) or mechanical valve? no  a. Is the Left Ventricle Ejection Fraction <30% ? no    2. Does the pt have pulmonary hypertension? no   a. Is pulmonary arterial pressure gradient >50mmHg? no   b. Is there evidence of right ventricular dysfunction? no    3. Does the pt have achalasia? no    4. Any history of negative reaction to anesthesia? no   a. Myasthenia gravis? no   b. Malignant hyperthermia? no   c. Other? no    5. Is procedure for esophageal banding? no      COVID Screening    1. Have you had a fever in the last 7 days or have you used fever reducing medicines for a fever in the last 7 days?  no    2. Are you experiencing shortness of breath, cough, muscle aches, loss of taste or loss of smell?  no    If answered yes to questions 1 and 2, the patient must seek medical attention with their PCP.  Do not schedule their procedure.     3. Are you residing with anyone who has tested positive for Covid?  no    If answered yes to question 3, recommend 14 day self-quarantine from the date of relative's positive test and place special needs note in the depot.  Wait to schedule.     ENDO screening    1. Have you been admitted for cardiac, kidney or pulmonary causes to the hospital in the past 3 months? no   If yes, schedule an appointment with PCP before scheduling endoscopic procedure.     2. Have you had a stent placed in the last 12 months? no   If yes, for a screening visit, cancel and message the ordering provider.  The patient will need a new order when the time is appropriate.     3. Have you had a stroke or heart attack in the past 6 months? no   If yes, cancel and refer patient to ordering provider for clearance, also message ordering provider to inform.     4. Have you had any chest pain in the past 3 months?  "no   If yes, Have you been evaluated by your PCP and/or cardiologist and it was determined to not be heart related? no   If No, Pt needs to be seen by PCP or Cardiologist .  Pt can be scheduled once clearance obtained by either of those providers.     5. Do you take prescription weight loss medications?  no   If yes, must stop for 2 weeks prior to procedure.     6. Have you been diagnosed with diverticulitis within the past 3 months? no   If yes, must have been seen by GI within the last 3 months, if not schedule with GI [facility].    If pt has been seen by GI, schedule procedure 8-12 weeks post antibiotic treatment.     7. Are you on Dialysis? no  If yes, schedule procedure for the day AFTER dialysis.  Appt time should be 9am or later, patient arrival time is 2 hours prior.  Nulytely or miralax prep for all patients with kidney disease.     8. Are you diabetic?  yes   If yes, schedule morning appt. Advise pt to hold all diabetic meds day of procedure.     9. If pt is older than 80 years of age and HAS NOT been seen by GI or PCP within the last 6 months, needs appt with GI [facility].   If pt has been seen by the GI provider or PCP within the past 6  months AND meets criteria, schedule procedure AND send message to the endoscopist.     10. Is patient on a "high risk" medication (blood thinner/antiplatelet agent)?  no   If yes, has cardiac clearance been obtained within the last 60 days? No   If no, a new clearance needs to be obtained.     Final Questions:    1.I have reviewed the last colonoscopy for recommendations regarding next procedure bowel prep.  yes  2. I have reviewed medications and allergies.  yes  3. I have verified the pharmacy information and appropriate prep sent if needed. yes  4. Prep instructions have been mailed or sent to portal per patient request. yes        All schedulers will have ability to reach out to the ordering GI provider to clarify any issues.     "

## 2020-09-28 ENCOUNTER — TELEPHONE (OUTPATIENT)
Dept: ENDOSCOPY | Facility: HOSPITAL | Age: 70
End: 2020-09-28

## 2020-09-28 RX ORDER — SODIUM, POTASSIUM,MAG SULFATES 17.5-3.13G
1 SOLUTION, RECONSTITUTED, ORAL ORAL DAILY
Qty: 1 KIT | Refills: 0 | Status: SHIPPED | OUTPATIENT
Start: 2020-09-28 | End: 2020-09-30

## 2020-09-28 NOTE — TELEPHONE ENCOUNTER
Spoke with patient regarding procedure scheduled for 10-6-2020.  Patient stated he needed instructions and Rx sent to [facility].  Instructions sent via portal and patient verbalized understanding.

## 2020-09-29 ENCOUNTER — PATIENT MESSAGE (OUTPATIENT)
Dept: OTHER | Facility: OTHER | Age: 70
End: 2020-09-29

## 2020-09-30 ENCOUNTER — TELEPHONE (OUTPATIENT)
Dept: ENDOSCOPY | Facility: HOSPITAL | Age: 70
End: 2020-09-30

## 2020-09-30 DIAGNOSIS — Z13.9 SCREENING PROCEDURE: Primary | ICD-10-CM

## 2020-09-30 NOTE — TELEPHONE ENCOUNTER
Spoke with patient regarding covid testing.  Patient will arrive 2 hours early for testing as noted on snapboard patient verbalized understanding of the above.  Also reviewed prep drinking times with patient.

## 2020-10-01 ENCOUNTER — PATIENT OUTREACH (OUTPATIENT)
Dept: ADMINISTRATIVE | Facility: HOSPITAL | Age: 70
End: 2020-10-01

## 2020-10-02 ENCOUNTER — TELEPHONE (OUTPATIENT)
Dept: ENDOSCOPY | Facility: HOSPITAL | Age: 70
End: 2020-10-02

## 2020-10-02 DIAGNOSIS — K57.30 DIVERTICULOSIS OF COLON: Primary | Chronic | ICD-10-CM

## 2020-10-02 DIAGNOSIS — Z01.818 PRE-OPERATIVE CLEARANCE: ICD-10-CM

## 2020-10-03 ENCOUNTER — LAB VISIT (OUTPATIENT)
Dept: OTOLARYNGOLOGY | Facility: CLINIC | Age: 70
End: 2020-10-03
Payer: MEDICARE

## 2020-10-03 DIAGNOSIS — Z01.818 PRE-OPERATIVE CLEARANCE: ICD-10-CM

## 2020-10-03 DIAGNOSIS — K57.30 DIVERTICULOSIS OF COLON: Chronic | ICD-10-CM

## 2020-10-03 PROCEDURE — U0003 INFECTIOUS AGENT DETECTION BY NUCLEIC ACID (DNA OR RNA); SEVERE ACUTE RESPIRATORY SYNDROME CORONAVIRUS 2 (SARS-COV-2) (CORONAVIRUS DISEASE [COVID-19]), AMPLIFIED PROBE TECHNIQUE, MAKING USE OF HIGH THROUGHPUT TECHNOLOGIES AS DESCRIBED BY CMS-2020-01-R: HCPCS

## 2020-10-04 LAB — SARS-COV-2 RNA RESP QL NAA+PROBE: NOT DETECTED

## 2020-10-06 ENCOUNTER — ANESTHESIA EVENT (OUTPATIENT)
Dept: ENDOSCOPY | Facility: HOSPITAL | Age: 70
End: 2020-10-06
Payer: MEDICARE

## 2020-10-06 ENCOUNTER — HOSPITAL ENCOUNTER (OUTPATIENT)
Facility: HOSPITAL | Age: 70
Discharge: HOME | End: 2020-10-06
Attending: FAMILY MEDICINE | Admitting: FAMILY MEDICINE
Payer: MEDICARE

## 2020-10-06 ENCOUNTER — ANESTHESIA (OUTPATIENT)
Dept: ENDOSCOPY | Facility: HOSPITAL | Age: 70
End: 2020-10-06
Payer: MEDICARE

## 2020-10-06 ENCOUNTER — PATIENT MESSAGE (OUTPATIENT)
Dept: ADMINISTRATIVE | Facility: HOSPITAL | Age: 70
End: 2020-10-06

## 2020-10-06 VITALS
OXYGEN SATURATION: 98 % | DIASTOLIC BLOOD PRESSURE: 71 MMHG | BODY MASS INDEX: 39.03 KG/M2 | RESPIRATION RATE: 18 BRPM | WEIGHT: 304 LBS | HEART RATE: 71 BPM | TEMPERATURE: 97 F | SYSTOLIC BLOOD PRESSURE: 133 MMHG

## 2020-10-06 DIAGNOSIS — Z86.010 HISTORY OF COLON POLYPS: ICD-10-CM

## 2020-10-06 DIAGNOSIS — Z12.11 SPECIAL SCREENING FOR MALIGNANT NEOPLASMS, COLON: Primary | ICD-10-CM

## 2020-10-06 PROBLEM — Z86.0100 HISTORY OF COLON POLYPS: Status: ACTIVE | Noted: 2020-10-06

## 2020-10-06 LAB — POCT GLUCOSE: 124 MG/DL (ref 70–110)

## 2020-10-06 PROCEDURE — 25000003 PHARM REV CODE 250: Performed by: NURSE ANESTHETIST, CERTIFIED REGISTERED

## 2020-10-06 PROCEDURE — G0105 COLORECTAL SCRN; HI RISK IND: HCPCS | Mod: ,,, | Performed by: FAMILY MEDICINE

## 2020-10-06 PROCEDURE — 37000009 HC ANESTHESIA EA ADD 15 MINS: Performed by: FAMILY MEDICINE

## 2020-10-06 PROCEDURE — G0105 COLORECTAL SCRN; HI RISK IND: HCPCS | Performed by: FAMILY MEDICINE

## 2020-10-06 PROCEDURE — 37000008 HC ANESTHESIA 1ST 15 MINUTES: Performed by: FAMILY MEDICINE

## 2020-10-06 PROCEDURE — G0105 COLORECTAL SCRN; HI RISK IND: ICD-10-PCS | Mod: ,,, | Performed by: FAMILY MEDICINE

## 2020-10-06 PROCEDURE — 63600175 PHARM REV CODE 636 W HCPCS: Performed by: NURSE ANESTHETIST, CERTIFIED REGISTERED

## 2020-10-06 RX ORDER — SODIUM CHLORIDE 0.9 % (FLUSH) 0.9 %
10 SYRINGE (ML) INJECTION
Status: CANCELLED | OUTPATIENT
Start: 2020-10-06

## 2020-10-06 RX ORDER — LIDOCAINE HCL/PF 100 MG/5ML
SYRINGE (ML) INTRAVENOUS
Status: DISCONTINUED | OUTPATIENT
Start: 2020-10-06 | End: 2020-10-06

## 2020-10-06 RX ORDER — SODIUM CHLORIDE, SODIUM LACTATE, POTASSIUM CHLORIDE, CALCIUM CHLORIDE 600; 310; 30; 20 MG/100ML; MG/100ML; MG/100ML; MG/100ML
INJECTION, SOLUTION INTRAVENOUS CONTINUOUS PRN
Status: DISCONTINUED | OUTPATIENT
Start: 2020-10-06 | End: 2020-10-06

## 2020-10-06 RX ORDER — PROPOFOL 10 MG/ML
INJECTION, EMULSION INTRAVENOUS
Status: DISCONTINUED | OUTPATIENT
Start: 2020-10-06 | End: 2020-10-06

## 2020-10-06 RX ADMIN — PROPOFOL 30 MG: 10 INJECTION, EMULSION INTRAVENOUS at 09:10

## 2020-10-06 RX ADMIN — PROPOFOL 70 MG: 10 INJECTION, EMULSION INTRAVENOUS at 09:10

## 2020-10-06 RX ADMIN — LIDOCAINE HYDROCHLORIDE 50 MG: 20 INJECTION, SOLUTION INTRAVENOUS at 09:10

## 2020-10-06 RX ADMIN — SODIUM CHLORIDE, SODIUM LACTATE, POTASSIUM CHLORIDE, AND CALCIUM CHLORIDE: 600; 310; 30; 20 INJECTION, SOLUTION INTRAVENOUS at 09:10

## 2020-10-06 NOTE — ANESTHESIA PREPROCEDURE EVALUATION
10/06/2020  Nigel Albrecht is a 70 y.o., male.    Anesthesia Evaluation    I have reviewed the Patient Summary Reports.    I have reviewed the Nursing Notes. I have reviewed the NPO Status.   I have reviewed the Medications.     Review of Systems  Anesthesia Hx:  No problems with previous Anesthesia  Denies Family Hx of Anesthesia complications.   Denies Personal Hx of Anesthesia complications.   Social:  Former Smoker    Hematology/Oncology:  Hematology Normal   Oncology Normal     EENT/Dental:EENT/Dental Normal   Cardiovascular:   Hypertension Past MI  ECG has been reviewed. · S/P Heart Transplantation 2002  · Normal left ventricular systolic function. The estimated ejection fraction is 60%.  · Normal right ventricular systolic function.  · Normal LV diastolic function.  · The estimated PA systolic pressure is 26 mmHg.  · Normal central venous pressure (3 mmHg).  · The ECG portion of this study is negative for myocardial ischemia.  · The stress echo portion of this study is negative for myocardial ischemia.  · Overall, this study is negative for myocardial ischemia. Sensitivity is impaired due to the patient's failure to achieve target heart rate.      Pulmonary:  Pulmonary Normal    Renal/:   Chronic Renal Disease, CRI -donor kidney transplant   Hepatic/GI:  Hepatic/GI Normal    Musculoskeletal:  Musculoskeletal Normal    Neurological:  Neurology Normal    Endocrine:   Diabetes, poorly controlled, type 2    Dermatological:  Skin Normal    Psych:  Psychiatric Normal           Physical Exam  General:  Obesity    Airway/Jaw/Neck:  Airway Findings: Mouth Opening: Normal Tongue: Normal  General Airway Assessment: Adult, Average  Mallampati: II  TM Distance: Normal, at least 6 cm      Dental:  Dental Findings: In tact   Chest/Lungs:  Chest/Lungs Findings: Clear to auscultation, Normal  Respiratory Rate     Heart/Vascular:  Heart Findings: Rate: Normal  Rhythm: Regular Rhythm        Mental Status:  Mental Status Findings:  Cooperative, Alert and Oriented         Anesthesia Plan  Type of Anesthesia, risks & benefits discussed:  Anesthesia Type:  MAC  Patient's Preference:   Intra-op Monitoring Plan: standard ASA monitors  Intra-op Monitoring Plan Comments:   Post Op Pain Control Plan:   Post Op Pain Control Plan Comments:   Induction:   IV  Beta Blocker:  Patient is on a Beta-Blocker and has received one dose within the past 24 hours (No further documentation required).       Informed Consent: Patient understands risks and agrees with Anesthesia plan.  Questions answered. Anesthesia consent signed with patient.  ASA Score: 3     Day of Surgery Review of History & Physical: I have interviewed and examined the patient. I have reviewed the patient's H&P dated:  There are no significant changes.          Ready For Surgery From Anesthesia Perspective.

## 2020-10-06 NOTE — TRANSFER OF CARE
Anesthesia Transfer of Care Note    Patient: Nigel Albrecht    Procedure(s) Performed: Procedure(s) (LRB):  COLONOSCOPY (N/A)    Patient location: PACU    Anesthesia Type: MAC    Transport from OR: Transported from OR on room air with adequate spontaneous ventilation    Post pain: adequate analgesia    Post assessment: no apparent anesthetic complications    Post vital signs: stable    Level of consciousness: responds to stimulation    Nausea/Vomiting: no nausea/vomiting    Complications: none    Transfer of care protocol was followed      Last vitals:   Visit Vitals  BP (!) 173/83 (BP Location: Left arm, Patient Position: Lying)   Pulse 73   Temp 36.5 °C (97.7 °F) (Temporal)   Resp 18   Wt (!) 137.9 kg (304 lb 0.2 oz)   SpO2 100%   BMI 39.03 kg/m²

## 2020-10-06 NOTE — DISCHARGE SUMMARY
Endoscopy Discharge Summary      Admit Date: 10/6/2020    Discharge Date and Time:  10/6/2020 9:37 AM    Attending Physician: Conner Redman MD     Discharge Physician: Conner Redman MD     Principal Admitting Diagnoses: Special screening for malignant neoplasms, colon         Discharge Diagnosis: The primary encounter diagnosis was Special screening for malignant neoplasms, colon. A diagnosis of History of colon polyps was also pertinent to this visit.     Discharged Condition: Good    Indication for Admission: Special screening for malignant neoplasms, colon     Hospital Course: Patient was admitted for an inpatient procedure and tolerated the procedure well with no complications.    Significant Diagnostic Studies: Colonoscopy    Pathology (if any):  Specimen (12h ago, onward)    None          Estimated Blood Loss: 0 ml.    Discussed with: patient and family.    Disposition: Home.    Follow Up/Patient Instructions:   Current Discharge Medication List      CONTINUE these medications which have NOT CHANGED    Details   amitriptyline (ELAVIL) 25 MG tablet Take 1 pill for the first week at night, if tolerated take two pills nightly.  Qty: 180 tablet, Refills: 1    Associated Diagnoses: Post zoster neuralgia      atorvastatin (LIPITOR) 80 MG tablet Take 1 tablet (80 mg total) by mouth once daily.  Qty: 90 tablet, Refills: 3    Associated Diagnoses: Mixed hyperlipidemia      blood sugar diagnostic (ONETOUCH VERIO) Strp TEST THREE TIMES A DAY AS DIRECTED  Qty: 300 strip, Refills: 3    Associated Diagnoses: Diabetes mellitus due to underlying condition without complication, with long-term current use of insulin      calcium carbonate (OS-CARRIE) 500 mg calcium (1,250 mg) tablet Take 1 tablet by mouth once daily.      cholecalciferol, vitamin D3, (VITAMIN D3) 5,000 unit Tab Take 5,000 Units by mouth once daily.      diltiaZEM (TIAZAC) 240 MG Cs24 Take 1 capsule (240 mg total) by mouth once daily.  Qty: 90 capsule,  "Refills: 4      flash glucose sensor (FREESTYLE SHREE 14 DAY SENSOR) Kit 1 application by Misc.(Non-Drug; Combo Route) route every 14 (fourteen) days.  Qty: 6 kit, Refills: 3      gabapentin (NEURONTIN) 300 MG capsule 300 mg 2 (two) times daily.       insulin NPH/Reg human (HUMULIN 70/30 U-100 KWIKPEN) 100 unit/mL (70-30) InPn pen Inject 60 Units into the skin 2 (two) times daily.  Qty: 8 Box, Refills: 1    Associated Diagnoses: Type 2 diabetes mellitus with stage 3 chronic kidney disease, with long-term current use of insulin      lisinopriL (PRINIVIL,ZESTRIL) 40 MG tablet Take 1 tablet (40 mg total) by mouth once daily.  Qty: 90 tablet, Refills: 3    Comments: .  Associated Diagnoses: Essential hypertension      Low-Dose Aspirin 81 mg Tab Take by mouth. 1 Tablet Oral Every day      metoprolol succinate (TOPROL-XL) 50 MG 24 hr tablet Take 1 tablet (50 mg total) by mouth once daily.  Qty: 90 tablet, Refills: 3    Comments: .  Associated Diagnoses: Hypertension associated with transplantation      MULTIVIT-MIN/FA/LYCOPEN/LUTEIN (CENTRUM SILVER MEN ORAL) Take by mouth once daily.      mycophenolate (CELLCEPT) 250 mg Cap TAKE 3 CAPSULES (=750MG)   TWO TIMES A DAY  Qty: 540 capsule, Refills: 3    Comments: Txp Date:5/24/2002 (Kidney), 5/24/2002 (Heart / Kidney) Disch Date: ICD10:Z94.0 Txp Location:[facility] ([place], [place])  [facility] ([place])  Associated Diagnoses: Status post heart transplant      oxyCODONE-acetaminophen (PERCOCET) 5-325 mg per tablet Take 1 tablet by mouth every 6 (six) hours as needed for Pain.  Qty: 20 tablet, Refills: 0      papaverine 30 mg/mL injection Inject 0.3 ml of trimix solution prn ED max once daily  Prepare 10ml of trimix solution containing:    Papaverine 30mg/ml    Phentolamine 1 mg/ml    Alprostadil 10mcg/ml  Dispense 10ml per refill  Qs syringes 1cc/30g/0.5" and alcohol swabs dispense as needed for Intracavernosal injection  Qty: 10 " mL, Refills: 5      semaglutide (OZEMPIC) 1 mg/dose (2 mg/1.5 mL) PnIj Inject 1 mg into the skin weekly.  Qty: 9 mL, Refills: 1      sirolimus (RAPAMUNE) 1 MG Tab Take 1 tablet (1 mg total) by mouth once daily.  Qty: 90 tablet, Refills: 3    Comments: Txp Date:5/24/2002 (Kidney), 5/24/2002 (Heart / Kidney) Disch Date: ICD10:Z94.0 Txp Location:[facility] ([place], [place])  [facility] ([place])  Associated Diagnoses: Status post heart transplant      torsemide (DEMADEX) 5 MG Tab TAKE 2 TABLETS (10MG TOTAL)ONCE DAILY  Qty: 180 tablet, Refills: 4      cetirizine (ZYRTEC) 10 MG tablet Take 10 mg by mouth as needed. 1 Tablet Oral Every day as needed      fluticasone (FLONASE) 50 mcg/actuation nasal spray 1 spray by Each Nare route as needed for Rhinitis.      mupirocin (BACTROBAN) 2 % ointment Apply topically 2 (two) times daily.  Qty: 15 g, Refills: 0             Discharge Procedure Orders   Diet general     Call MD for:  temperature >100.4     Call MD for:  persistent nausea and vomiting     Call MD for:  severe uncontrolled pain     Call MD for:  difficulty breathing, headache or visual disturbances     Call MD for:  redness, tenderness, or signs of infection (pain, swelling, redness, odor or green/yellow discharge around incision site)     Call MD for:  hives     Call MD for:  persistent dizziness or light-headedness     No dressing needed       Follow-up Information     Go to Krystin Zimmerman MD.    Specialty: Family Medicine  Why: As needed  Contact information:  [address] [place] [id number] [phone]

## 2020-10-06 NOTE — [employer] PATIENT INSTRUCTIONS
Discharge Summary/Instructions after an Endoscopic Procedure  Patient Name: Nigel Albrecht  Patient MRN: [id number]  Patient YOB: 1950 Tuesday, October 6, 2020 Conner Redman MD  RESTRICTIONS:  During your procedure today, you received medications for sedation.  These   medications may affect your judgment, balance and coordination.  Therefore,   for 24 hours, you have the following restrictions:   - DO NOT drive a car, operate machinery, make legal/financial decisions,   sign important papers or drink alcohol.    ACTIVITY:  Today: no heavy lifting, straining or running due to procedural   sedation/anesthesia.  The following day: return to full activity including work.  DIET:  Eat and drink normally unless instructed otherwise.     TREATMENT FOR COMMON SIDE EFFECTS:  - Mild abdominal pain, nausea, belching, bloating or excessive gas:  rest,   eat lightly and use a heating pad.  - Sore Throat: treat with throat lozenges and/or gargle with warm salt   water.  - Because air was used during the procedure, expelling large amounts of air   from your rectum or belching is normal.  - If a bowel prep was taken, you may not have a bowel movement for 1-3 days.    This is normal.  SYMPTOMS TO WATCH FOR AND REPORT TO YOUR PHYSICIAN:  1. Abdominal pain or bloating, other than gas cramps.  2. Chest pain.  3. Back pain.  4. Signs of infection such as: chills or fever occurring within 24 hours   after the procedure.  5. Rectal bleeding, which would show as bright red, maroon, or black stools.   (A tablespoon of blood from the rectum is not serious, especially if   hemorrhoids are present.)  6. Vomiting.  7. Weakness or dizziness.  GO DIRECTLY TO THE NEAREST EMERGENCY ROOM IF YOU HAVE ANY OF THE FOLLOWING:      Difficulty breathing              Chills and/or fever over 101 F   Persistent vomiting and/or vomiting blood   Severe abdominal pain   Severe chest pain   Black, tarry stools   Bleeding- more than one  tablespoon   Any other symptom or condition that you feel may need urgent attention  Your doctor recommends these additional instructions:  If any biopsies were taken, your doctors clinic will contact you in 1 to 2   weeks with any results.  - Patient has a contact number available for emergencies.  The signs and   symptoms of potential delayed complications were discussed with the   patient.  Return to normal activities tomorrow.  Written discharge   instructions were provided to the patient.   - Resume previous diet.   - Continue present medications.   - No repeat colonoscopy due to current age (66 years or older) and the   absence of colonic polyps.   - Discharge patient to home (via wheelchair).  For questions, problems or results please call your physician Conner Redman MD at Work:  [phone]  If you have any questions about the above instructions, call the GI   department at [phone] or call the endoscopy unit at [phone]   from 7am until 3 pm.  [facility], EMERGENCY ROOM PHONE NUMBER:   [phone]  IF A COMPLICATION OR EMERGENCY SITUATION ARISES AND YOU ARE UNABLE TO REACH   YOUR PHYSICIAN - GO DIRECTLY TO THE EMERGENCY ROOM.  I have read or have had read to me these discharge instructions for my   procedure and have received a written copy.  I understand these   instructions and will follow-up with my physician if I have any questions.     __________________________________       _____________________________________  Nurse Signature                                          Patient/Designated   Responsible Party Signature  Conner Redman MD  10/6/2020 9:33:36 AM  This report has been verified and signed electronically.  [employer]

## 2020-10-06 NOTE — H&P
Short Stay Endoscopy History and Physical    PCP - Krystin Zimmerman MD    Procedure - Colonoscopy  ASA - 3  Mallampati - per anesthesia  History of Anesthesia problems - no  Family history Anesthesia problems -  no     HPI:  This is a 70 y.o. male here for evaluation of :   Active Hospital Problems    Diagnosis  POA    *Special screening for malignant neoplasms, colon [Z12.11]  Not Applicable    History of colon polyps [Z86.010]  Not Applicable      Resolved Hospital Problems   No resolved problems to display.         Health Maintenance       Date Due Completion Date    Eye Exam 03/01/2020 3/1/2019    Override on 8/10/2017: Done (Per Aminata Perry)    Override on 2/18/2016: Done (Please see media for report from Aminata Perry Ph# [phone] or [phone])    Override on 7/1/2014: Done    Colorectal Cancer Screening 04/24/2020 4/24/2015    Override on 5/9/2011: Done    Hemoglobin A1c 03/02/2021 9/2/2020    Lipid Panel 06/03/2021 6/3/2020    Urine Microalbumin 06/03/2021 6/3/2020    PROSTATE-SPECIFIC ANTIGEN 08/12/2021 8/12/2020    Foot Exam 09/02/2021 9/2/2020    Override on 8/22/2018: Done    Override on 5/7/2018: Done    Override on 3/5/2018: Done    Override on 9/11/2017: Done    Override on 5/30/2017: Done    Override on 2/19/2017: Done    Override on 2/17/2017: Done    Override on 2/18/2015: Done    Override on 11/18/2014: Done    Override on 8/18/2014: Done    Low Dose Statin 10/06/2021 10/6/2020    TETANUS VACCINE 09/02/2030 9/2/2020          Screening - Yes  History of polyps - Yes  Diarrhea - no  Anemia - no  Blood in stools - no  Abdominal pain - no  Other - no    ROS:  CONSTITUTIONAL: Denies weight change,  fatigue, fevers, chills, night sweats.  CARDIOVASCULAR: Denies chest pain, shortness of breath, orthopnea and edema.  RESPIRATORY: Denies cough, hemoptysis, dyspnea, and wheezing.  GI: See HPI.    Medical History:   Past Medical History:   Diagnosis Date    Allergic rhinitis  2013    Blood transfusion     Cataract     CKD (chronic kidney disease), stage III 2014    -donor kidney transplant     Diabetes mellitus, type 2 2013    Gout, arthritis 2013    Heart attack     Heart transplanted     Hyperlipidemia 2013    Hypertension     Morbid obesity 2013    Organ transplant 2002    heart and kidney    Prophylactic immunotherapy        Surgical History:   Past Surgical History:   Procedure Laterality Date    CATARACT EXTRACTION Bilateral     EYE SURGERY      HEART TRANSPLANT      KIDNEY TRANSPLANT      REVISION TOTAL HIP ARTHROPLASTY         Family History:   Family History   Problem Relation Age of Onset    Stroke Mother     Hyperlipidemia Sister     Diabetes Brother     Early death Brother     Heart disease Brother     Hyperlipidemia Brother     Hypertension Brother     Stroke Brother     Kidney disease Son     Diabetes Maternal Grandmother     Melanoma Neg Hx     Eczema Neg Hx     Lupus Neg Hx     Psoriasis Neg Hx        Social History:   Social History     Tobacco Use    Smoking status: Former Smoker     Packs/day: 1.00     Years: 20.00     Pack years: 20.00     Types: Cigarettes     Quit date: 1984     Years since quittin.2    Smokeless tobacco: Never Used   Substance Use Topics    Alcohol use: Yes     Alcohol/week: 1.0 standard drinks     Types: 1 Cans of beer per week     Comment: daily    Drug use: No       Allergies:   Review of patient's allergies indicates:   Allergen Reactions    No known drug allergies        Medications:   No current facility-administered medications on file prior to encounter.      Current Outpatient Medications on File Prior to Encounter   Medication Sig Dispense Refill    amitriptyline (ELAVIL) 25 MG tablet Take 1 pill for the first week at night, if tolerated take two pills nightly. 180 tablet 1    atorvastatin (LIPITOR) 80 MG tablet Take 1 tablet (80 mg total) by mouth once  "daily. 90 tablet 3    blood sugar diagnostic (ONETOUCH VERIO) Strp TEST THREE TIMES A DAY AS DIRECTED 300 strip 3    calcium carbonate (OS-CARRIE) 500 mg calcium (1,250 mg) tablet Take 1 tablet by mouth once daily.      cholecalciferol, vitamin D3, (VITAMIN D3) 5,000 unit Tab Take 5,000 Units by mouth once daily.      diltiaZEM (TIAZAC) 240 MG Cs24 Take 1 capsule (240 mg total) by mouth once daily. 90 capsule 4    flash glucose sensor (FREESTYLE SHREE 14 DAY SENSOR) Kit 1 application by Misc.(Non-Drug; Combo Route) route every 14 (fourteen) days. 6 kit 3    gabapentin (NEURONTIN) 300 MG capsule 300 mg 2 (two) times daily.       insulin NPH/Reg human (HUMULIN 70/30 U-100 KWIKPEN) 100 unit/mL (70-30) InPn pen Inject 60 Units into the skin 2 (two) times daily. 8 Box 1    lisinopriL (PRINIVIL,ZESTRIL) 40 MG tablet Take 1 tablet (40 mg total) by mouth once daily. 90 tablet 3    Low-Dose Aspirin 81 mg Tab Take by mouth. 1 Tablet Oral Every day      metoprolol succinate (TOPROL-XL) 50 MG 24 hr tablet Take 1 tablet (50 mg total) by mouth once daily. 90 tablet 3    MULTIVIT-MIN/FA/LYCOPEN/LUTEIN (CENTRUM SILVER MEN ORAL) Take by mouth once daily.      mycophenolate (CELLCEPT) 250 mg Cap TAKE 3 CAPSULES (=750MG)   TWO TIMES A  capsule 3    oxyCODONE-acetaminophen (PERCOCET) 5-325 mg per tablet Take 1 tablet by mouth every 6 (six) hours as needed for Pain. (Patient taking differently: Take 1 tablet by mouth every 24 hours as needed for Pain. ) 20 tablet 0    papaverine 30 mg/mL injection Inject 0.3 ml of trimix solution prn ED max once daily  Prepare 10ml of trimix solution containing:    Papaverine 30mg/ml    Phentolamine 1 mg/ml    Alprostadil 10mcg/ml  Dispense 10ml per refill  Qs syringes 1cc/30g/0.5" and alcohol swabs dispense as needed for Intracavernosal injection 10 mL 5    semaglutide (OZEMPIC) 1 mg/dose (2 mg/1.5 mL) PnLudmila Inject 1 mg into the skin weekly. 9 mL 1    sirolimus (RAPAMUNE) 1 MG Tab " Take 1 tablet (1 mg total) by mouth once daily. 90 tablet 3    torsemide (DEMADEX) 5 MG Tab TAKE 2 TABLETS (10MG TOTAL)ONCE DAILY 180 tablet 4    cetirizine (ZYRTEC) 10 MG tablet Take 10 mg by mouth as needed. 1 Tablet Oral Every day as needed      fluticasone (FLONASE) 50 mcg/actuation nasal spray 1 spray by Each Nare route as needed for Rhinitis.      mupirocin (BACTROBAN) 2 % ointment Apply topically 2 (two) times daily. 15 g 0       Physical Exam:  Vital Signs:   Vitals:    10/06/20 0837   BP: (!) 173/83   Pulse: 73   Resp: 18   Temp: 97.7 °F (36.5 °C)     General Appearance: Well appearing in no acute distress  ENT: OP clear  Chest: CTA B  CV: RRR, no m/r/g  Abd: s/nt/nd/nabs  Ext: no edema    Labs:Reviewed    IMP:  Active Hospital Problems    Diagnosis  POA    *Special screening for malignant neoplasms, colon [Z12.11]  Not Applicable    History of colon polyps [Z86.010]  Not Applicable      Resolved Hospital Problems   No resolved problems to display.         Plan:   I have explained the risks and benefits of colonoscopy to the patient including but not limited to bleeding, perforation, infection, and death. The patient wishes to proceed.

## 2020-10-06 NOTE — DISCHARGE INSTRUCTIONS
Hemorrhoids    Hemorrhoids are swollen and inflamed veins inside the rectum and near the anus. The rectum is the last several inches of the colon. The anus is the passage between the rectum and the outside of the body.  Causes  The veins can become swollen due to increased pressure in them. This is most often caused by:  · Chronic constipation or diarrhea  · Straining when having a bowel movement  · Sitting too long on the toilet  · A low-fiber diet  · Pregnancy  Symptoms  · Bleeding from the rectum (this may be noticeable after bowel movements)  · Lump near the anus  · Itching around the anus  · Pain around the anus  There are different types of hemorrhoids. Depending on the type you have and the severity, you may be able to treat yourself at home. In some cases, a procedure may be the best treatment option. Your healthcare provider can tell you more about this, if needed.  Home care  General care  · To get relief from pain or itching, try:  ¨ Topical products. Your healthcare provider may prescribe or recommend creams, ointments, or pads that can be applied to the hemorrhoid. Use these exactly as directed.  ¨ Medicines. Your healthcare provider may recommend stool softeners, suppositories, or laxatives to help manage constipation. Use these exactly as directed.  ¨ Sitz baths. A sitz bath involves sitting in a few inches of warm bath water. Be careful not to make the water so hot that you burn yourself--test it before sitting in it. Soak for about 10 to 15 minutes a few times a day. This may help relieve pain.  Tips to help prevent hemorrhoids  · Eat more fiber. Fiber adds bulk to stool and absorbs water as it moves through your colon. This makes stool softer and easier to pass.  ¨ Increase the fiber in your diet with more fiber-rich foods. These include fresh fruit, vegetables, and whole grains.  ¨ Take a fiber supplement or bulking agent, if advised to by your provider. These include products such as psyllium  or methylcellulose.  · Drink plenty of water, if directed to by your provider. This can help keep stool soft.  · Be more active. Frequent exercise aids digestion and helps prevent constipation. It may also help make bowel movements more regular.  · Dont strain during bowel movements. This can make hemorrhoids more likely. Also, dont sit on the toilet for long periods of time.  Follow-up care  Follow up with your healthcare provider, or as advised. If a culture or imaging tests were done, you will be notified of the results when they are ready. This may take a few days or longer.  When to seek medical advice  Call your healthcare provider right away if any of these occur:  · Increased bleeding from the rectum  · Increased pain around the rectum or anus  · Weakness or dizziness  Call 911  Call 911 or return to the emergency department right away if any of these occur:  · Trouble breathing or swallowing  · Fainting or loss of consciousness  · Unusually fast heart rate  · Vomiting blood  · Large amounts of blood in stool  Date Last Reviewed: 6/22/2015 © 2000-2017 [employer]. [address] [place] [address], [place] [address]. All rights reserved. This information is not intended as a substitute for professional medical care. Always follow your healthcare professional's instructions.

## 2020-10-06 NOTE — ANESTHESIA RELEASE NOTE
Anesthesia Release from PACU Note    Patient: Nigel Alvaradoopshire    Procedure(s) Performed: Procedure(s) (LRB):  COLONOSCOPY (N/A)    Anesthesia type: MAC    Post pain: Adequate analgesia    Post assessment: no apparent anesthetic complications, tolerated procedure well and no evidence of recall    Last Vitals:   Visit Vitals  BP (!) 173/83 (BP Location: Left arm, Patient Position: Lying)   Pulse 73   Temp 36.5 °C (97.7 °F) (Temporal)   Resp 18   Wt (!) 137.9 kg (304 lb 0.2 oz)   SpO2 100%   BMI 39.03 kg/m²       Post vital signs: stable    Level of consciousness: responds to stimulation    Nausea/Vomiting: no nausea/no vomiting    Complications: none    Airway Patency: patent    Respiratory: unassisted    Cardiovascular: stable and blood pressure at baseline    Hydration: euvolemic

## 2020-10-07 ENCOUNTER — CLINICAL SUPPORT (OUTPATIENT)
Dept: DIABETES | Facility: CLINIC | Age: 70
End: 2020-10-07
Payer: MEDICARE

## 2020-10-10 NOTE — PROGRESS NOTES
I spoke with patient via telephone regarding recent download of Freestyle Georgette CGM ( see media tab ).  Overall, BG readings are much improved and he is spending 88 % of time in range and only 7 % above range.  At this time, no changes were made to current DM regimen.  He voiced understanding and will continue current meds ( see last chart note ) along with lifestyle modifications ( diet and exercise ).

## 2020-10-14 DIAGNOSIS — Z94.0 KIDNEY REPLACED BY TRANSPLANT: Primary | ICD-10-CM

## 2020-10-14 DIAGNOSIS — N25.81 SECONDARY HYPERPARATHYROIDISM: ICD-10-CM

## 2020-10-20 ENCOUNTER — LAB VISIT (OUTPATIENT)
Dept: LAB | Facility: HOSPITAL | Age: 70
End: 2020-10-20
Attending: INTERNAL MEDICINE
Payer: MEDICARE

## 2020-10-20 DIAGNOSIS — Z94.0 KIDNEY REPLACED BY TRANSPLANT: ICD-10-CM

## 2020-10-20 LAB
ALBUMIN SERPL BCP-MCNC: 3 G/DL (ref 3.5–5.2)
ALBUMIN SERPL BCP-MCNC: 3 G/DL (ref 3.5–5.2)
ALP SERPL-CCNC: 112 U/L (ref 55–135)
ALT SERPL W/O P-5'-P-CCNC: 26 U/L (ref 10–44)
ANION GAP SERPL CALC-SCNC: 8 MMOL/L (ref 8–16)
AST SERPL-CCNC: 36 U/L (ref 10–40)
BASOPHILS # BLD AUTO: 0.04 K/UL (ref 0–0.2)
BASOPHILS NFR BLD: 0.9 % (ref 0–1.9)
BILIRUB DIRECT SERPL-MCNC: 0.2 MG/DL (ref 0.1–0.3)
BILIRUB SERPL-MCNC: 0.2 MG/DL (ref 0.1–1)
BUN SERPL-MCNC: 16 MG/DL (ref 8–23)
CALCIUM SERPL-MCNC: 8.8 MG/DL (ref 8.7–10.5)
CHLORIDE SERPL-SCNC: 105 MMOL/L (ref 95–110)
CO2 SERPL-SCNC: 26 MMOL/L (ref 23–29)
CREAT SERPL-MCNC: 1.7 MG/DL (ref 0.5–1.4)
DIFFERENTIAL METHOD: ABNORMAL
EOSINOPHIL # BLD AUTO: 0.1 K/UL (ref 0–0.5)
EOSINOPHIL NFR BLD: 2.4 % (ref 0–8)
ERYTHROCYTE [DISTWIDTH] IN BLOOD BY AUTOMATED COUNT: 14.9 % (ref 11.5–14.5)
EST. GFR  (AFRICAN AMERICAN): 46.2 ML/MIN/1.73 M^2
EST. GFR  (NON AFRICAN AMERICAN): 40 ML/MIN/1.73 M^2
GLUCOSE SERPL-MCNC: 113 MG/DL (ref 70–110)
HCT VFR BLD AUTO: 38.7 % (ref 40–54)
HGB BLD-MCNC: 11.7 G/DL (ref 14–18)
IMM GRANULOCYTES # BLD AUTO: 0.01 K/UL (ref 0–0.04)
IMM GRANULOCYTES NFR BLD AUTO: 0.2 % (ref 0–0.5)
LYMPHOCYTES # BLD AUTO: 1.7 K/UL (ref 1–4.8)
LYMPHOCYTES NFR BLD: 38.1 % (ref 18–48)
MAGNESIUM SERPL-MCNC: 2 MG/DL (ref 1.6–2.6)
MCH RBC QN AUTO: 26 PG (ref 27–31)
MCHC RBC AUTO-ENTMCNC: 30.2 G/DL (ref 32–36)
MCV RBC AUTO: 86 FL (ref 82–98)
MONOCYTES # BLD AUTO: 0.5 K/UL (ref 0.3–1)
MONOCYTES NFR BLD: 11.4 % (ref 4–15)
NEUTROPHILS # BLD AUTO: 2.1 K/UL (ref 1.8–7.7)
NEUTROPHILS NFR BLD: 47 % (ref 38–73)
NRBC BLD-RTO: 0 /100 WBC
PHOSPHATE SERPL-MCNC: 3 MG/DL (ref 2.7–4.5)
PLATELET # BLD AUTO: 201 K/UL (ref 150–350)
PMV BLD AUTO: 11.6 FL (ref 9.2–12.9)
POTASSIUM SERPL-SCNC: 4 MMOL/L (ref 3.5–5.1)
PROT SERPL-MCNC: 7 G/DL (ref 6–8.4)
RBC # BLD AUTO: 4.5 M/UL (ref 4.6–6.2)
SODIUM SERPL-SCNC: 139 MMOL/L (ref 136–145)
WBC # BLD AUTO: 4.49 K/UL (ref 3.9–12.7)

## 2020-10-20 PROCEDURE — 84075 ASSAY ALKALINE PHOSPHATASE: CPT

## 2020-10-20 PROCEDURE — 83735 ASSAY OF MAGNESIUM: CPT

## 2020-10-20 PROCEDURE — 36415 COLL VENOUS BLD VENIPUNCTURE: CPT | Mod: PO

## 2020-10-20 PROCEDURE — 80069 RENAL FUNCTION PANEL: CPT

## 2020-10-20 PROCEDURE — 80195 ASSAY OF SIROLIMUS: CPT

## 2020-10-20 PROCEDURE — 85025 COMPLETE CBC W/AUTO DIFF WBC: CPT

## 2020-10-21 LAB — SIROLIMUS BLD-MCNC: 6.7 NG/ML (ref 4–20)

## 2020-10-26 ENCOUNTER — OFFICE VISIT (OUTPATIENT)
Dept: TRANSPLANT | Facility: CLINIC | Age: 70
End: 2020-10-26
Payer: MEDICARE

## 2020-10-26 VITALS
HEART RATE: 73 BPM | BODY MASS INDEX: 38.89 KG/M2 | DIASTOLIC BLOOD PRESSURE: 79 MMHG | TEMPERATURE: 97 F | RESPIRATION RATE: 16 BRPM | WEIGHT: 302.94 LBS | OXYGEN SATURATION: 100 % | SYSTOLIC BLOOD PRESSURE: 167 MMHG

## 2020-10-26 DIAGNOSIS — E11.49 OTHER DIABETIC NEUROLOGICAL COMPLICATION ASSOCIATED WITH TYPE 2 DIABETES MELLITUS: ICD-10-CM

## 2020-10-26 DIAGNOSIS — N18.32 STAGE 3B CHRONIC KIDNEY DISEASE: Chronic | ICD-10-CM

## 2020-10-26 DIAGNOSIS — Z94.0 DECEASED-DONOR KIDNEY TRANSPLANT: Primary | ICD-10-CM

## 2020-10-26 DIAGNOSIS — N18.32 TYPE 2 DIABETES MELLITUS WITH STAGE 3B CHRONIC KIDNEY DISEASE, WITH LONG-TERM CURRENT USE OF INSULIN: ICD-10-CM

## 2020-10-26 DIAGNOSIS — Z29.89 PROPHYLACTIC IMMUNOTHERAPY: Chronic | ICD-10-CM

## 2020-10-26 DIAGNOSIS — E11.22 TYPE 2 DIABETES MELLITUS WITH STAGE 3B CHRONIC KIDNEY DISEASE, WITH LONG-TERM CURRENT USE OF INSULIN: ICD-10-CM

## 2020-10-26 DIAGNOSIS — Z79.4 TYPE 2 DIABETES MELLITUS WITH STAGE 3B CHRONIC KIDNEY DISEASE, WITH LONG-TERM CURRENT USE OF INSULIN: ICD-10-CM

## 2020-10-26 PROCEDURE — 99215 OFFICE O/P EST HI 40 MIN: CPT | Mod: S$PBB,,, | Performed by: NURSE PRACTITIONER

## 2020-10-26 PROCEDURE — 99215 PR OFFICE/OUTPT VISIT, EST, LEVL V, 40-54 MIN: ICD-10-PCS | Mod: S$PBB,,, | Performed by: NURSE PRACTITIONER

## 2020-10-26 PROCEDURE — 99215 OFFICE O/P EST HI 40 MIN: CPT | Mod: PBBFAC | Performed by: NURSE PRACTITIONER

## 2020-10-26 PROCEDURE — 99999 PR PBB SHADOW E&M-EST. PATIENT-LVL V: CPT | Mod: PBBFAC,,, | Performed by: NURSE PRACTITIONER

## 2020-10-26 PROCEDURE — 99999 PR PBB SHADOW E&M-EST. PATIENT-LVL V: ICD-10-PCS | Mod: PBBFAC,,, | Performed by: NURSE PRACTITIONER

## 2020-10-26 NOTE — LETTER
October 26, 2020        Laith Wilkerson  [address] [address] [facility] [place] [place] [address]  Phone: [phone]  Fax: [phone]             [facility] 1st Fl  [address]  [facility] [address]  Phone: [phone]   Patient: Nigel Albrecht   MR Number: [id number]   YOB: 1950   Date of Visit: 10/26/2020       Dear Dr. Laith Wilkerson    Thank you for referring Nigel Albrecht to me for evaluation. Attached you will find relevant portions of my assessment and plan of care.    If you have questions, please do not hesitate to call me. I look forward to following Nigel Albrecht along with you.    Sincerely,    Cady Alcantar, NP    Enclosure    If you would like to receive this communication electronically, please contact [email or web address] or [phone] to request WebMD Link access.    WebMD Link is a tool which provides read-only access to select patient information with whom you have a relationship. Its easy to use and provides real time access to review your patients record including encounter summaries, notes, results, and demographic information.    If you feel you have received this communication in error or would no longer like to receive these types of communications, please e-mail [email or web address]

## 2020-10-26 NOTE — PATIENT INSTRUCTIONS
It is my privilege to participate in your transplant care! Please be sure to let us know if you have any questions or concerns about your health care - we cannot help you if we do not know. Don't forget we are on call 24/7 for any emergencies.      Education:  Patient reminded to call with any health changes, since these can be early signs of significant complications. Also, advised the patient to be sure any new medications or changes of old medications are discussed with either a pharmacist, or physician knowledgeable with transplant to avoid rejection/drug toxicity related to significant drug interactions.     Exercise: reminded Nigel of the importance of regular exercise for weight management, blood sugar and blood pressure management.  I also explained exercise has been shown to improve cardiovascular health, energy level, and sleep hygiene.  Lastly, I advised him that cardiovascular complications are leading cause of death for renal transplant recipients, and regular exercise can help lower this risk.     Please continue seeking general medical care with your primary care provider.    Please continue seeking care for your kidney with your General Nephrologist. This is to ensure that monitoring and care of your kidney is being addressed in a timely manner. Your General Nephrologist will also manage your blood pressure and possible electrolyte abnormalities in your lab work; ei. Potassium, phosphorus, calcium, magnesium, PTH, and Vitamin D.     Please follow with a Dermatologist (skin doctor) for yearly skin checks starting at 1 year post transplant or if your develp skin lesion/new moles. Your are on immunosuppression therapy to help prevent rejection of your transplanted kidney. A side effect of this medications is skin cancer. You should see the Dermatologist at least yearly. If at any point after transplant a skin lesion was remove please update your coordinator with the information.       Best  Cady Awan, BELÉN-C

## 2020-10-26 NOTE — PROGRESS NOTES
Kidney Post-Transplant Assessment    Referring Physician:   Current Nephrologist: Laith Wilkerson    ORGAN:   Donor Type:   PHS Increased Risk:   Cold Ischemia:  mins  Induction Medications:     Subjective:     CC:  Reassessment of renal allograft function and management of chronic immunosuppression.    HPI:  Mr. Albrecht is a 70 y.o. year old Black or  male who received a  heart and kidney transplant on 5/24/02.  He has CKD stage 3 - GFR 30-59 and his baseline creatinine is between 1.4-1.6. He takes mycophenolate mofetil and sirolimus for maintenance immunosuppression. He denies any recent hospitalizations or ER visits since his previous clinic visit.    Hospitalization/ ED visits  None    Interval HX:  Last seen Dr. Saavedra 6/2020. Reports doing well and has no complaints. He takes in adequate amount of water along with UOP. He voiced his BP are stable at home. Reports blood sugar this . He has 11 acres of land in [place], [place] which he maintains. He lose weight last year when he had the shingles but has gained most of it back.   Lab Results   Component Value Date    HGBA1C 8.0 (H) 09/02/2020       PCP: BELÉN Garrison: management of DM, last time seen 9/2020  General Nephrologist: last seen Dr. Nayak last year   Heart Transplant: Dr. Saavedra last seen 6/2020    Review of Systems   Constitutional: Negative for appetite change, chills, fatigue and fever.   HENT: Negative for trouble swallowing.    Respiratory: Negative for cough, chest tightness, shortness of breath and wheezing.    Cardiovascular: Negative for chest pain, palpitations and leg swelling.   Gastrointestinal: Negative for abdominal pain, constipation, diarrhea and nausea.   Genitourinary: Negative for difficulty urinating, frequency and urgency.   Musculoskeletal: Negative for arthralgias and myalgias.   Skin: Negative for rash.   Neurological: Negative for dizziness, weakness, light-headedness and headaches.        Reports  shingles last year with some residual nerve   Psychiatric/Behavioral: Negative for sleep disturbance.       Objective:   Blood pressure (!) 167/79, pulse 73, temperature 97 °F (36.1 °C), temperature source Oral, resp. rate 16, weight (!) 137.4 kg (302 lb 14.6 oz), SpO2 100 %.body mass index is 38.89 kg/m².    Physical Exam  Constitutional:       General: He is not in acute distress.     Appearance: He is well-developed. He is obese. He is not diaphoretic.   Cardiovascular:      Rate and Rhythm: Normal rate and regular rhythm.      Heart sounds: Normal heart sounds. No murmur. No friction rub. No gallop.    Pulmonary:      Effort: Pulmonary effort is normal. No respiratory distress.      Breath sounds: Normal breath sounds. No wheezing or rales.   Abdominal:      General: Bowel sounds are normal. There is no distension.      Palpations: Abdomen is soft.      Tenderness: There is no abdominal tenderness.   Musculoskeletal: Normal range of motion.         General: No tenderness.   Skin:     General: Skin is warm and dry.      Findings: No rash.      Nails: There is no clubbing.     Neurological:      Mental Status: He is alert and oriented to person, place, and time.   Psychiatric:         Behavior: Behavior normal.         Labs:  Lab Results   Component Value Date    WBC 4.49 10/20/2020    HGB 11.7 (L) 10/20/2020    HCT 38.7 (L) 10/20/2020     10/20/2020    K 4.0 10/20/2020     10/20/2020    CO2 26 10/20/2020    BUN 16 10/20/2020    CREATININE 1.7 (H) 10/20/2020    EGFRNONAA 40.0 (A) 10/20/2020    CALCIUM 8.8 10/20/2020    PHOS 3.0 10/20/2020    MG 2.0 10/20/2020    ALBUMIN 3.0 (L) 10/20/2020    ALBUMIN 3.0 (L) 10/20/2020    AST 36 10/20/2020    ALT 26 10/20/2020       No results found for: EXTANC, EXTWBC, EXTSEGS, EXTPLATELETS, EXTHEMOGLOBI, EXTHEMATOCRI, EXTCREATININ, EXTSODIUM, EXTPOTASSIUM, EXTBUN, EXTCO2, EXTCALCIUM, EXTPHOSPHORU, EXTGLUCOSE, EXTALBUMIN, EXTAST, EXTALT, EXTBILITOTAL, EXTLIPASE,  EXTAMYLASE    No results found for: EXTCYCLOSLVL, EXTSIROLIMUS, EXTTACROLVL, EXTPROTCRE, EXTPTHINTACT, EXTPROTEINUA, EXTWBCUA, EXTRBCUA    Labs were reviewed with the patient.    Assessment:     1. -donor kidney/ heart transplant performed at [facility] on 2002 - ESRD etiology unknown; ESHF due to IHSS    2. Other diabetic neurological complication associated with type 2 diabetes mellitus    3. Stage 3b chronic kidney disease    4. Type 2 diabetes mellitus with stage 3b chronic kidney disease, with long-term current use of insulin    5. BMI 38.0-38.9,adult    6. Chronic immunosuppression with Sirolimus and Cellcept        Plan:     1. CKD stage 3: will continue follow up as per our center guidelines. patient to continue close follow up with the local General nephrologist. Education provided in appropriate fluid intake, potassium intake. Continue with oral hydration.      2. Immunosuppression: Sirolimu trough 6.7, which is herapeutic target 5-8. Continue Sirolimus 1 daily ,  Mg BID   10/20/2020  18yr 4mo   Sirolimus Lvl 4.0 - 20.0 ng/mL 6.7       Will closely monitor for toxicities, education provided about adherence to medicines and need to communicate any side effect to the transplant nurse or physician.      3. Allograft Function:stable at baseline for the patient. Continue follow up as per our guidelines and with the local General nephrologist. Communication will be sent today.    10/20/2020  18yr 4mo   BUN, Bld 8 - 23 mg/dL 16   Creatinine 0.5 - 1.4 mg/dL 1.7 (A)   eGFR if African American >60 mL/min/1.73 m^2 46.2 (A)   Glucose 70 - 110 mg/dL 113 (A)     Lab Results   Component Value Date    HGBA1C 8.0 (H) 2020     Takes NPH and ozepemic --deferred management to PCP    4. Hypertension management:  Continue with home blood pressure monitoring, low salt and healthy life discussed with the patient..  BP Readings from Last 3 Encounters:   10/26/20 (!) 167/79   10/06/20 133/71   20 128/80    takes diltiazem. Lisinopril, MTP --deferred management to Heart TXP/Cardiology      5. Metabolic Bone Disease/Secondary Hyperparathyroidism:calcium and phosphorus level discussed with the patient, patient will continue follow up with the general nephrologist for management of metabolic bone disease  calcium and phosphorus as per our center protocol. Will monitor PTH, Vit D level, calcium.   Lab Results   Component Value Date    PTH 97.0 (H) 06/05/2019    CALCIUM 8.8 10/20/2020    PHOS 3.0 10/20/2020    PHOS 3.9 06/05/2019    PHOS 3.3 04/30/2019   takes calcium carbonate, VitD 3 --deferred management to General Nephrology      6. Electrolytes: reviewed with the patient, essentially within the normal range no need for acute changes today, will monitor as per our center guidelines.    10/20/2020  18yr 4mo   Magnesium 1.6 - 2.6 mg/dL 2.0     Lab Results   Component Value Date     10/20/2020    K 4.0 10/20/2020     10/20/2020    CO2 26 10/20/2020    CO2 28 09/02/2020    CO2 27 06/09/2020       7. Anemia: will continue monitoring as per our center guidelines. No indication for acute intervention today.  Lab Results   Component Value Date    WBC 4.49 10/20/2020    HGB 11.7 (L) 10/20/2020    HCT 38.7 (L) 10/20/2020    MCV 86 10/20/2020     10/20/2020       8.Proteinuria: will continue with pr/cr ratio as per our center guidelines  Lab Results   Component Value Date    PROTEINURINE 136 (H) 10/20/2020    CREATRANDUR 189.0 10/20/2020    UTPCR 0.72 (H) 10/20/2020    UTPCR 0.47 (H) 06/03/2020    UTPCR 0.87 (H) 06/05/2019    lisinopril    9. BK virus infection screening: will continue with urine or blood PCR as per our guidelines to prevent BK virus viremia and allograft dysfunction  No results found for: BKVIRUSDNAUR, BKQUANTURINE, BKVIRUSLOG, BKVIRUSURINE, BKVIRUSPCRQB      10. Weight education: provided during the clinic visit.   Body mass index is 38.89 kg/m².       11.Patient safety education regarding  immunosuppression including prophylaxis posttransplant for CMV, PCP : Education provided about vaccination and prevention of infections.    12.  Cytopenias: no significant cytopenias will monitor as per our guidelines. Medicine list reviewed including potential causes of drug-induced cytopenias  Lab Results   Component Value Date    WBC 4.49 10/20/2020    HGB 11.7 (L) 10/20/2020    HCT 38.7 (L) 10/20/2020    MCV 86 10/20/2020     10/20/2020       Follow-up:   Annual follow-up with kidney transplant clinic with repeat labs, including renal function panel with UA, urine protein/creatinine ratio, and drug trough level q3 months.  Patient also reminded to follow-up with general nephrologist.    Labs: since patient remains at high risk for rejection and drug-related complications that warrant close monitoring, labs will be ordered as follows: continue twice weekly CBC, renal panel, and drug level for first month; then same labs once weekly through 3rd month post-transplant.  Urine for UA and protein/creatinine ratio monthly.  Serum BK - PCR at 1-, 3-, 6-, 9-, 12-, 18-, 24-, 36-, 48-, and 60 months post-transplant.  Hepatic panel at 1-, 2-, 3-, 6-, 9-, 12-, 18-, 24-, and 36- months post-transplant.    Education:   Material provided to the patient.  Patient reminded to call with any health changes since these can be early signs of significant complications.  Also, I advised the patient to be sure any new medications or changes of old medications are discussed with either a pharmacist or physician knowledgeable with transplant to avoid rejection/drug toxicity related to significant drug interactions.    Exercise: reminded Nigel of the importance of regular exercise for weight management, blood sugar and blood pressure management.  I also explained exercise has been shown to improve cardiovascular health, energy level, and sleep hygiene.  Lastly, I advised him that cardiovascular complications are leading cause of  death for renal transplant recipients, and regular exercise can help lower this risk.    I spoke with the patient for 30 minutes. More than half dedicated to counseling and education. All questions answered    Cady Alcantar, NP-C  Transplant Nephrology    [facility] Patient Status  Functional Status: 80% - Normal activity with effort: some symptoms of disease  Physical Capacity: No Limitations

## 2020-10-28 ENCOUNTER — PATIENT OUTREACH (OUTPATIENT)
Dept: ADMINISTRATIVE | Facility: HOSPITAL | Age: 70
End: 2020-10-28

## 2020-10-29 ENCOUNTER — OFFICE VISIT (OUTPATIENT)
Dept: INTERNAL MEDICINE | Facility: CLINIC | Age: 70
End: 2020-10-29
Payer: MEDICARE

## 2020-10-29 VITALS
OXYGEN SATURATION: 98 % | SYSTOLIC BLOOD PRESSURE: 152 MMHG | HEART RATE: 72 BPM | WEIGHT: 303.44 LBS | BODY MASS INDEX: 38.94 KG/M2 | DIASTOLIC BLOOD PRESSURE: 68 MMHG | TEMPERATURE: 98 F | HEIGHT: 74 IN

## 2020-10-29 DIAGNOSIS — E78.2 MIXED HYPERLIPIDEMIA: ICD-10-CM

## 2020-10-29 DIAGNOSIS — Z00.00 ENCOUNTER FOR PREVENTIVE HEALTH EXAMINATION: Primary | ICD-10-CM

## 2020-10-29 DIAGNOSIS — I77.1 TORTUOUS AORTA: ICD-10-CM

## 2020-10-29 DIAGNOSIS — E11.49 OTHER DIABETIC NEUROLOGICAL COMPLICATION ASSOCIATED WITH TYPE 2 DIABETES MELLITUS: ICD-10-CM

## 2020-10-29 DIAGNOSIS — Z94.0 DECEASED-DONOR KIDNEY TRANSPLANT: ICD-10-CM

## 2020-10-29 DIAGNOSIS — Z79.4 TYPE 2 DIABETES MELLITUS WITH STAGE 3B CHRONIC KIDNEY DISEASE, WITH LONG-TERM CURRENT USE OF INSULIN: ICD-10-CM

## 2020-10-29 DIAGNOSIS — I10 ESSENTIAL HYPERTENSION: ICD-10-CM

## 2020-10-29 DIAGNOSIS — N18.32 STAGE 3B CHRONIC KIDNEY DISEASE: Chronic | ICD-10-CM

## 2020-10-29 DIAGNOSIS — N18.32 TYPE 2 DIABETES MELLITUS WITH STAGE 3B CHRONIC KIDNEY DISEASE, WITH LONG-TERM CURRENT USE OF INSULIN: ICD-10-CM

## 2020-10-29 DIAGNOSIS — E11.22 TYPE 2 DIABETES MELLITUS WITH STAGE 3B CHRONIC KIDNEY DISEASE, WITH LONG-TERM CURRENT USE OF INSULIN: ICD-10-CM

## 2020-10-29 DIAGNOSIS — M10.9 GOUT, ARTHRITIS: ICD-10-CM

## 2020-10-29 DIAGNOSIS — Z29.89 PROPHYLACTIC IMMUNOTHERAPY: Chronic | ICD-10-CM

## 2020-10-29 DIAGNOSIS — Z94.1 HEART TRANSPLANTED: ICD-10-CM

## 2020-10-29 PROCEDURE — 99215 OFFICE O/P EST HI 40 MIN: CPT | Mod: PBBFAC,PN | Performed by: NURSE PRACTITIONER

## 2020-10-29 PROCEDURE — 99999 PR PBB SHADOW E&M-EST. PATIENT-LVL V: ICD-10-PCS | Mod: PBBFAC,,, | Performed by: NURSE PRACTITIONER

## 2020-10-29 PROCEDURE — 99999 PR PBB SHADOW E&M-EST. PATIENT-LVL V: CPT | Mod: PBBFAC,,, | Performed by: NURSE PRACTITIONER

## 2020-10-29 PROCEDURE — G0439 PPPS, SUBSEQ VISIT: HCPCS | Mod: S$GLB,,, | Performed by: NURSE PRACTITIONER

## 2020-10-29 PROCEDURE — G0439 PR MEDICARE ANNUAL WELLNESS SUBSEQUENT VISIT: ICD-10-PCS | Mod: S$GLB,,, | Performed by: NURSE PRACTITIONER

## 2020-10-29 RX ORDER — INFLUENZA A VIRUS A/MICHIGAN/45/2015 X-275 (H1N1) ANTIGEN (FORMALDEHYDE INACTIVATED), INFLUENZA A VIRUS A/SINGAPORE/INFIMH-16-0019/2016 IVR-186 (H3N2) ANTIGEN (FORMALDEHYDE INACTIVATED), INFLUENZA B VIRUS B/PHUKET/3073/2013 ANTIGEN (FORMALDEHYDE INACTIVATED), AND INFLUENZA B VIRUS B/MARYLAND/15/2016 BX-69A ANTIGEN (FORMALDEHYDE INACTIVATED) 60; 60; 60; 60 UG/.7ML; UG/.7ML; UG/.7ML; UG/.7ML
INJECTION, SUSPENSION INTRAMUSCULAR
COMMUNITY
Start: 2020-09-16 | End: 2022-01-07

## 2020-10-29 NOTE — PATIENT INSTRUCTIONS
Counseling and Referral of Other Preventative  (Italic type indicates deductible and co-insurance are waived)    Patient Name: Nigel Albrecht  Today's Date: 10/29/2020    Health Maintenance       Date Due Completion Date    Hemoglobin A1c 03/02/2021 9/2/2020    Lipid Panel 06/03/2021 6/3/2020    PROSTATE-SPECIFIC ANTIGEN 08/12/2021 8/12/2020    Foot Exam 09/02/2021 9/2/2020    Override on 8/22/2018: Done    Override on 5/7/2018: Done    Override on 3/5/2018: Done    Override on 9/11/2017: Done    Override on 5/30/2017: Done    Override on 2/19/2017: Done    Override on 2/17/2017: Done    Override on 2/18/2015: Done    Override on 11/18/2014: Done    Override on 8/18/2014: Done    Eye Exam 09/25/2021 9/25/2020    Override on 8/10/2017: Done (Per Aminata Perry)    Override on 2/18/2016: Done (Please see media for report from Aminata Perry Ph# [phone] or [phone])    Override on 7/1/2014: Done    Low Dose Statin 10/26/2021 10/26/2020    TETANUS VACCINE 09/02/2030 9/2/2020        No orders of the defined types were placed in this encounter.    The following information is provided to all patients.  This information is to help you find resources for any of the problems found today that may be affecting your health:                Living healthy guide: www.[facility].[place].gov      Understanding Diabetes: www.diabetes.org      Eating healthy: www.cdc.gov/healthyweight      CDC home safety checklist: www.cdc.gov/steadi/patient.html      Agency on Aging: www.goea.[place].gov      Alcoholics anonymous (AA): www.aa.org      Physical Activity: www.jaswant.nih.gov/yy2uovo      Tobacco use: www.quitwithusla.org

## 2020-10-29 NOTE — PROGRESS NOTES
"  Nigel Albrecht presented for a  Medicare AWV and comprehensive Health Risk Assessment today. The following components were reviewed and updated:    · Medical history  · Family History  · Social history  · Allergies and Current Medications  · Health Risk Assessment  · Health Maintenance  · Care Team     ** See Completed Assessments for Annual Wellness Visit within the encounter summary.**         The following assessments were completed:  · Living Situation  · CAGE  · Depression Screening  · Timed Get Up and Go  · Whisper Test  · Cognitive Function Screening  · Nutrition Screening  · ADL Screening  · PAQ Screening        Vitals:    10/29/20 0813   BP: (!) 152/68   BP Location: Right arm   Patient Position: Sitting   BP Method: X-Large (Manual)   Pulse: 72   Temp: 97.9 °F (36.6 °C)   TempSrc: Temporal   SpO2: 98%   Weight: (!) 137.6 kg (303 lb 7.4 oz)   Height: 6' 2" (1.88 m)     Body mass index is 38.96 kg/m².  Physical Exam  Vitals signs reviewed.   Constitutional:       Appearance: Normal appearance.   HENT:      Head: Normocephalic.   Cardiovascular:      Rate and Rhythm: Normal rate and regular rhythm.   Pulmonary:      Effort: Pulmonary effort is normal.   Abdominal:      General: Bowel sounds are normal. There is no distension.   Skin:     General: Skin is warm.   Neurological:      General: No focal deficit present.      Mental Status: He is alert and oriented to person, place, and time.   Psychiatric:         Mood and Affect: Mood normal.         Behavior: Behavior normal.                   Diagnoses and health risks identified today and associated recommendations/orders:    1. Encounter for preventive health examination    2. Other diabetic neurological complication associated with type 2 diabetes mellitus  Stable.  Will continue current treatment plan.   Component      Latest Ref Rng & Units 9/2/2020 6/3/2020   Hemoglobin A1C External      4.0 - 5.6 % 8.0 (H) 6.8 (H)   Estimated Avg Glucose      68 - 131 " mg/dL 183 (H) 148 (H)       3. Heart transplanted  Stable.  Had heart and kidney transplant in .  Has routine visits with transplant team.  Will continue.     4. Mixed hyperlipidemia  Stable.  Will continue current treatment plan.   Component      Latest Ref Rng & Units 6/3/2020 2019   Cholesterol      120 - 199 mg/dL 169 116 (L)   Triglycerides      30 - 150 mg/dL 201 (H) 150   HDL      40 - 75 mg/dL 35 (L) 28 (L)   LDL Cholesterol External      63.0 - 159.0 mg/dL 93.8 58.0 (L)   HDL/Cholesterol Ratio      20.0 - 50.0 % 20.7 24.1   Total Cholesterol/HDL Ratio      2.0 - 5.0 4.8 4.1   Non-HDL Cholesterol      mg/dL 134 88       5. Essential hypertension  Stable.  Will continue current treatment plan.     6. Stage 3b chronic kidney disease  Stable.  Will continue current treatment plan.   Component      Latest Ref Rng & Units 10/20/2020 2020           9:24 AM    eGFR if African American      >60 mL/min/1.73 m:2 46.2 (A) 38.0 (A)   eGFR if non African American      >60 mL/min/1.73 m:2 40.0 (A) 32.8 (A)       7. -donor kidney/ heart transplant performed at [facility] on 2002 - ESRD etiology unknown; ESHF due to IHSS  Stable.  Will continue current treatment plan.     8. Type 2 diabetes mellitus with stage 3b chronic kidney disease, with long-term current use of insulin  Stable.  Will continue current treatment plan.     9. Gout, arthritis  Stable.  Will continue current treatment plan.     10. Chronic immunosuppression with Sirolimus and Cellcept  Stable.  Will continue current treatment plan.         Provided Nigel with a 5-10 year written screening schedule and personal prevention plan. Recommendations were developed using the USPSTF age appropriate recommendations. Education, counseling, and referrals were provided as needed. After Visit Summary printed and given to patient which includes a list of additional screenings\tests needed.    No follow-ups on file.    Christina Salazar NP

## 2020-11-13 DIAGNOSIS — Z94.1 STATUS POST HEART TRANSPLANT: ICD-10-CM

## 2020-11-13 RX ORDER — SIROLIMUS 1 MG/1
1 TABLET, FILM COATED ORAL DAILY
Qty: 90 TABLET | Refills: 3 | Status: CANCELLED | OUTPATIENT
Start: 2020-11-13

## 2020-11-13 NOTE — TELEPHONE ENCOUNTER
Pt called stated that he took his last sirolimus this morning, he gets it through Mail order and there is no new prescription on file.  Would like me to call in a 30 day supply into local [facility],   I called 8 separate pharmacies in the [place] area no one has in stock, I explained to him that he will need to get at [facility] at [facility], sirolimus 14 day supply sent and confirmed with Hina Contreras.     Gave Mr. Albrecht the phone number for the  [facility] and he will call before driving over there to get the medicine.    Spoke to pt, he did get the medicine at [facility].

## 2020-11-16 ENCOUNTER — PATIENT MESSAGE (OUTPATIENT)
Dept: FAMILY MEDICINE | Facility: CLINIC | Age: 70
End: 2020-11-16

## 2020-11-16 RX ORDER — GABAPENTIN 300 MG/1
300 CAPSULE ORAL 2 TIMES DAILY
Qty: 180 CAPSULE | Refills: 1 | Status: SHIPPED | OUTPATIENT
Start: 2020-11-16 | End: 2021-03-25

## 2020-11-27 RX ORDER — SIROLIMUS 1 MG/1
1 TABLET, FILM COATED ORAL DAILY
Qty: 14 TABLET | Refills: 0 | Status: SHIPPED | OUTPATIENT
Start: 2020-11-27 | End: 2021-02-05

## 2020-11-27 NOTE — TELEPHONE ENCOUNTER
Pt called and stated that only 14 tablets. Pt reports that he will be out on Sunday. Pt asked to have medications sent to [facility] for 90 days. Called in prescription to pharmacist at [facility]. No other questions asked.

## 2020-12-01 ENCOUNTER — LAB VISIT (OUTPATIENT)
Dept: LAB | Facility: HOSPITAL | Age: 70
End: 2020-12-01
Attending: NURSE PRACTITIONER
Payer: MEDICARE

## 2020-12-01 DIAGNOSIS — Z79.4 TYPE 2 DIABETES MELLITUS WITH STAGE 3 CHRONIC KIDNEY DISEASE, WITH LONG-TERM CURRENT USE OF INSULIN: ICD-10-CM

## 2020-12-01 DIAGNOSIS — N18.30 TYPE 2 DIABETES MELLITUS WITH STAGE 3 CHRONIC KIDNEY DISEASE, WITH LONG-TERM CURRENT USE OF INSULIN: ICD-10-CM

## 2020-12-01 DIAGNOSIS — E11.22 TYPE 2 DIABETES MELLITUS WITH STAGE 3 CHRONIC KIDNEY DISEASE, WITH LONG-TERM CURRENT USE OF INSULIN: ICD-10-CM

## 2020-12-01 LAB
ALBUMIN SERPL BCP-MCNC: 3.1 G/DL (ref 3.5–5.2)
ALP SERPL-CCNC: 102 U/L (ref 55–135)
ALT SERPL W/O P-5'-P-CCNC: 23 U/L (ref 10–44)
ANION GAP SERPL CALC-SCNC: 7 MMOL/L (ref 8–16)
AST SERPL-CCNC: 32 U/L (ref 10–40)
BILIRUB SERPL-MCNC: 0.4 MG/DL (ref 0.1–1)
BUN SERPL-MCNC: 18 MG/DL (ref 8–23)
CALCIUM SERPL-MCNC: 8.8 MG/DL (ref 8.7–10.5)
CHLORIDE SERPL-SCNC: 109 MMOL/L (ref 95–110)
CO2 SERPL-SCNC: 30 MMOL/L (ref 23–29)
CREAT SERPL-MCNC: 1.8 MG/DL (ref 0.5–1.4)
EST. GFR  (AFRICAN AMERICAN): 43.1 ML/MIN/1.73 M^2
EST. GFR  (NON AFRICAN AMERICAN): 37.3 ML/MIN/1.73 M^2
ESTIMATED AVG GLUCOSE: 151 MG/DL (ref 68–131)
GLUCOSE SERPL-MCNC: 83 MG/DL (ref 70–110)
HBA1C MFR BLD HPLC: 6.9 % (ref 4–5.6)
POTASSIUM SERPL-SCNC: 4.2 MMOL/L (ref 3.5–5.1)
PROT SERPL-MCNC: 7 G/DL (ref 6–8.4)
SODIUM SERPL-SCNC: 146 MMOL/L (ref 136–145)

## 2020-12-01 PROCEDURE — 83036 HEMOGLOBIN GLYCOSYLATED A1C: CPT

## 2020-12-01 PROCEDURE — 80053 COMPREHEN METABOLIC PANEL: CPT

## 2020-12-01 PROCEDURE — 36415 COLL VENOUS BLD VENIPUNCTURE: CPT

## 2020-12-09 ENCOUNTER — OFFICE VISIT (OUTPATIENT)
Dept: DIABETES | Facility: CLINIC | Age: 70
End: 2020-12-09
Payer: MEDICARE

## 2020-12-09 VITALS
BODY MASS INDEX: 38.62 KG/M2 | SYSTOLIC BLOOD PRESSURE: 136 MMHG | HEIGHT: 74 IN | DIASTOLIC BLOOD PRESSURE: 78 MMHG | WEIGHT: 300.94 LBS | HEART RATE: 68 BPM

## 2020-12-09 DIAGNOSIS — N18.32 TYPE 2 DIABETES MELLITUS WITH STAGE 3B CHRONIC KIDNEY DISEASE, WITH LONG-TERM CURRENT USE OF INSULIN: Primary | ICD-10-CM

## 2020-12-09 DIAGNOSIS — E11.22 TYPE 2 DIABETES MELLITUS WITH STAGE 3B CHRONIC KIDNEY DISEASE, WITH LONG-TERM CURRENT USE OF INSULIN: Primary | ICD-10-CM

## 2020-12-09 DIAGNOSIS — Z79.4 TYPE 2 DIABETES MELLITUS WITH STAGE 3B CHRONIC KIDNEY DISEASE, WITH LONG-TERM CURRENT USE OF INSULIN: Primary | ICD-10-CM

## 2020-12-09 DIAGNOSIS — E78.2 MIXED HYPERLIPIDEMIA: ICD-10-CM

## 2020-12-09 DIAGNOSIS — I10 ESSENTIAL HYPERTENSION: ICD-10-CM

## 2020-12-09 LAB — GLUCOSE SERPL-MCNC: 191 MG/DL (ref 70–110)

## 2020-12-09 PROCEDURE — 99999 PR PBB SHADOW E&M-EST. PATIENT-LVL V: ICD-10-PCS | Mod: PBBFAC,,, | Performed by: NURSE PRACTITIONER

## 2020-12-09 PROCEDURE — 99214 PR OFFICE/OUTPT VISIT, EST, LEVL IV, 30-39 MIN: ICD-10-PCS | Mod: S$PBB,,, | Performed by: NURSE PRACTITIONER

## 2020-12-09 PROCEDURE — 95251 PR GLUCOSE MONITOR, 72 HOUR, PHYS INTERP: ICD-10-PCS | Mod: ,,, | Performed by: NURSE PRACTITIONER

## 2020-12-09 PROCEDURE — 99215 OFFICE O/P EST HI 40 MIN: CPT | Mod: PBBFAC,PO | Performed by: NURSE PRACTITIONER

## 2020-12-09 PROCEDURE — 95251 CONT GLUC MNTR ANALYSIS I&R: CPT | Mod: ,,, | Performed by: NURSE PRACTITIONER

## 2020-12-09 PROCEDURE — 82962 GLUCOSE BLOOD TEST: CPT | Mod: PBBFAC,PO | Performed by: NURSE PRACTITIONER

## 2020-12-09 PROCEDURE — 99999 PR PBB SHADOW E&M-EST. PATIENT-LVL V: CPT | Mod: PBBFAC,,, | Performed by: NURSE PRACTITIONER

## 2020-12-09 PROCEDURE — 99214 OFFICE O/P EST MOD 30 MIN: CPT | Mod: S$PBB,,, | Performed by: NURSE PRACTITIONER

## 2020-12-09 RX ORDER — FLASH GLUCOSE SENSOR
1 KIT MISCELLANEOUS
Qty: 6 KIT | Refills: 1 | Status: SHIPPED | OUTPATIENT
Start: 2020-12-09 | End: 2021-09-25 | Stop reason: SDUPTHER

## 2020-12-09 RX ORDER — FLASH GLUCOSE SCANNING READER
1 EACH MISCELLANEOUS
Qty: 1 EACH | Refills: 0 | Status: SHIPPED | OUTPATIENT
Start: 2020-12-09 | End: 2022-02-09 | Stop reason: ALTCHOICE

## 2020-12-09 NOTE — PROGRESS NOTES
"PCP: Krystin Zimmerman MD    Subjective:     Chief Complaint: Diabetes Follow Up    HISTORY OF PRESENT ILLNESS: 70 y.o.  male presenting for diabetes follow up.  Patient has had diabetes for many years with the following complications: neuropathy, stage III CKD.  Other pertinent conditions include: cardiac and renal transplant in 2002 on immunosuppressants. He has attended diabetes education in the past.       The patient Freestyle CGM was reviewed. For the past 28 days, patient average glucose was 122 mg/d. He was above range 10 % of the time, in range 86 % of the time, and below range 4 % of the time.  He has a total of 6 low events with 186 minutes. There is a pattern of overnight hypoglycemia, presumably due to giving his evening dose of mixed insulin AFTER eating or right before bedtime.     He denies any recent hospitalizations, emergency room, syncope, or diaphoresis.     Height: 6' 2" (188 cm)  //  Weight: (!) 136.5 kg (300 lb 14.9 oz), Body mass index is 38.64 kg/m².  He has gained 1 pound since last visit.    His blood sugar in clinic today is: 191 mg/dl at 10:07 am ( treated a low this am )      Labs Reviewed.       DM MEDICATIONS:   Humulin 70 / 30, 30 - 40 units before BID ac  Ozempic 1 mg weekly     Review of Systems   Constitutional: Negative for appetite change, fatigue and unexpected weight change.   Eyes: Negative for visual disturbance.   Gastrointestinal: Negative for abdominal pain, constipation, diarrhea and nausea.   Endocrine: Negative for polydipsia, polyphagia and polyuria.   Neurological: Negative for dizziness, numbness and headaches.   Psychiatric/Behavioral: Negative for confusion and decreased concentration. The patient is not nervous/anxious.        Diabetes Management Status  Statin: Taking  ACE/ARB: Taking    Screening or Prevention Patient's value Goal Complete/Controlled?   HgA1C Testing and Control   Lab Results   Component Value Date    HGBA1C 6.9 (H) " 12/01/2020      Annually/Less than 8% Yes   Lipid profile : 06/03/2020 Annually Yes   LDL control Lab Results   Component Value Date    LDLCALC 93.8 06/03/2020    Annually/Less than 100 mg/dl  Yes   Nephropathy screening Lab Results   Component Value Date    MICALBCREAT 274.7 (H) 06/03/2020     Lab Results   Component Value Date    PROTEINUA 2+ (A) 10/20/2020    Annually Yes   Blood pressure BP Readings from Last 1 Encounters:   12/09/20 136/78    Less than 140/90 Yes   Dilated retinal exam : 09/25/2020 Annually Yes, Aminata Perry   Foot exam   : 12/09/2020 Annually Yes     ACTIVITY LEVEL: Rarely Active. Discussed activities, benefits, methods, and precautions.  MEAL PLANNING: Patient reports number of meals per day to be 2 and number of snacks per day to be 2.   Patient is encouraged to carb count and consume no more than 45 - 60 grams of carbohydrates in each meal, and 1800 k / jovany per day.      BLOOD GLUCOSE TESTING:  Freestyle Georgette CGM  SOCIAL HISTORY: . Lives with spouse. Has 2 children. Patient retired as manager for [employer].     Objective:      Physical Exam  Constitutional:       Appearance: He is well-developed.   HENT:      Head: Normocephalic and atraumatic.   Eyes:      Pupils: Pupils are equal, round, and reactive to light.   Neck:      Musculoskeletal: Normal range of motion.   Cardiovascular:      Rate and Rhythm: Normal rate and regular rhythm.      Pulses:           Dorsalis pedis pulses are 2+ on the right side and 2+ on the left side.   Pulmonary:      Effort: Pulmonary effort is normal.      Breath sounds: Normal breath sounds.   Musculoskeletal: Normal range of motion.   Feet:      Right foot:      Protective Sensation: 6 sites tested. 4 sites sensed.      Skin integrity: Dry skin present. No ulcer or callus.      Left foot:      Protective Sensation: 6 sites tested. 4 sites sensed.      Skin integrity: Dry skin present. No ulcer, blister or callus.   Skin:      General: Skin is warm and dry.      Findings: No rash.   Psychiatric:         Behavior: Behavior normal.         Thought Content: Thought content normal.         Judgment: Judgment normal.         Assessment / Plan:     1.) Type 2 diabetes mellitus, with stage III CKD with long term use of insulin  Comments:  -  Condition controlled. Continue Freestyle Georgette CGM. Prescription for updated Freestyle Georgette 2 sent to pharmacy.  Continue Ozempic 1 mg weekly. He is tolerating without GI side effects. In the past, we  have concerned splitting his mixed insulin, but patient prefers less injections.     - Per CGM report, there is a pattern of overnight hypoglycemia, presumably due to giving his evening dose of mixed insulin AFTER eating or right before bedtime.      - Encouraged patient to always take his mixed insulin before meals, not at bedtime to prevent overnight hypoglycemia.  For now, will continue current dose of  340 - 40 units of Humulin 70/30 BID ac.  He  reports improvement in dietary choices and is eating less carbs with dinner to hopefully prevent pp spikes especially.    Orders:  -     POCT Glucose, Hand-Held Device    2.) Mixed Hyperlipidemia - continue meds as prescribed    3.) Essential Hypertension  - continue meds as prescribed    4.) Morbid obesity with BMI of 40.0-44.9, adult    Additional Plan Details:    1.) Continue monitoring blood sugar 4 x daily, fasting and ac dinner or at bedtime. Discussed ADA goal for fasting blood sugar, 80 - 130 mg/dL; pp blood sugars below 180 mg/dl. Also, discussed prevention of hypoglycemia and the need to adjust goals to higher levels if persistent hypoglycemia.  Reminded to bring BG records or meter to each visit for review.  2.) Return to clinic in 4 months for follow up. The patient was explained the above plan and given opportunity to ask questions.  He understands, chooses and consents to this plan and accepts all the risks, which include but are not limited to the  risks mentioned above. He understands the alternative of having no testing, interventions or treatments at this time. He left content and without further questions.     Jael Garrison, RUBYC, CDE    A total of 30 minutes was spent in face to face time, of which over 50 % was spent in counseling patient on disease process, complications, treatment, and side effects of medications.

## 2021-01-08 ENCOUNTER — PATIENT MESSAGE (OUTPATIENT)
Dept: TRANSPLANT | Facility: CLINIC | Age: 71
End: 2021-01-08

## 2021-01-10 ENCOUNTER — IMMUNIZATION (OUTPATIENT)
Dept: INTERNAL MEDICINE | Facility: CLINIC | Age: 71
End: 2021-01-10
Payer: MEDICARE

## 2021-01-10 DIAGNOSIS — Z23 NEED FOR VACCINATION: ICD-10-CM

## 2021-01-10 PROCEDURE — 91300 COVID-19, MRNA, LNP-S, PF, 30 MCG/0.3 ML DOSE VACCINE: CPT | Mod: PBBFAC | Performed by: FAMILY MEDICINE

## 2021-01-31 ENCOUNTER — IMMUNIZATION (OUTPATIENT)
Dept: INTERNAL MEDICINE | Facility: CLINIC | Age: 71
End: 2021-01-31
Payer: MEDICARE

## 2021-01-31 DIAGNOSIS — Z23 NEED FOR VACCINATION: Primary | ICD-10-CM

## 2021-01-31 PROCEDURE — 91300 COVID-19, MRNA, LNP-S, PF, 30 MCG/0.3 ML DOSE VACCINE: CPT | Mod: PBBFAC

## 2021-01-31 PROCEDURE — 0002A COVID-19, MRNA, LNP-S, PF, 30 MCG/0.3 ML DOSE VACCINE: CPT | Mod: PBBFAC

## 2021-02-05 ENCOUNTER — PATIENT MESSAGE (OUTPATIENT)
Dept: TRANSPLANT | Facility: CLINIC | Age: 71
End: 2021-02-05

## 2021-02-05 RX ORDER — SIROLIMUS 1 MG/1
1 TABLET, FILM COATED ORAL DAILY
Qty: 90 TABLET | Refills: 3 | Status: SHIPPED | OUTPATIENT
Start: 2021-02-05 | End: 2021-06-14 | Stop reason: SDUPTHER

## 2021-04-07 ENCOUNTER — LAB VISIT (OUTPATIENT)
Dept: LAB | Facility: HOSPITAL | Age: 71
End: 2021-04-07
Attending: NURSE PRACTITIONER
Payer: MEDICARE

## 2021-04-07 DIAGNOSIS — E11.22 TYPE 2 DIABETES MELLITUS WITH STAGE 3 CHRONIC KIDNEY DISEASE, WITH LONG-TERM CURRENT USE OF INSULIN: ICD-10-CM

## 2021-04-07 DIAGNOSIS — N18.30 TYPE 2 DIABETES MELLITUS WITH STAGE 3 CHRONIC KIDNEY DISEASE, WITH LONG-TERM CURRENT USE OF INSULIN: ICD-10-CM

## 2021-04-07 DIAGNOSIS — Z79.4 TYPE 2 DIABETES MELLITUS WITH STAGE 3 CHRONIC KIDNEY DISEASE, WITH LONG-TERM CURRENT USE OF INSULIN: ICD-10-CM

## 2021-04-07 PROCEDURE — 80053 COMPREHEN METABOLIC PANEL: CPT | Performed by: NURSE PRACTITIONER

## 2021-04-07 PROCEDURE — 36415 COLL VENOUS BLD VENIPUNCTURE: CPT | Mod: PO | Performed by: NURSE PRACTITIONER

## 2021-04-07 PROCEDURE — 83036 HEMOGLOBIN GLYCOSYLATED A1C: CPT | Performed by: NURSE PRACTITIONER

## 2021-04-08 LAB
ALBUMIN SERPL BCP-MCNC: 3 G/DL (ref 3.5–5.2)
ALP SERPL-CCNC: 96 U/L (ref 55–135)
ALT SERPL W/O P-5'-P-CCNC: 28 U/L (ref 10–44)
ANION GAP SERPL CALC-SCNC: 7 MMOL/L (ref 8–16)
AST SERPL-CCNC: 33 U/L (ref 10–40)
BILIRUB SERPL-MCNC: 0.2 MG/DL (ref 0.1–1)
BUN SERPL-MCNC: 16 MG/DL (ref 8–23)
CALCIUM SERPL-MCNC: 8.2 MG/DL (ref 8.7–10.5)
CHLORIDE SERPL-SCNC: 107 MMOL/L (ref 95–110)
CO2 SERPL-SCNC: 27 MMOL/L (ref 23–29)
CREAT SERPL-MCNC: 1.7 MG/DL (ref 0.5–1.4)
EST. GFR  (AFRICAN AMERICAN): 46.2 ML/MIN/1.73 M^2
EST. GFR  (NON AFRICAN AMERICAN): 40 ML/MIN/1.73 M^2
ESTIMATED AVG GLUCOSE: 174 MG/DL (ref 68–131)
GLUCOSE SERPL-MCNC: 106 MG/DL (ref 70–110)
HBA1C MFR BLD: 7.7 % (ref 4–5.6)
POTASSIUM SERPL-SCNC: 4 MMOL/L (ref 3.5–5.1)
PROT SERPL-MCNC: 6.7 G/DL (ref 6–8.4)
SODIUM SERPL-SCNC: 141 MMOL/L (ref 136–145)

## 2021-04-14 ENCOUNTER — OFFICE VISIT (OUTPATIENT)
Dept: DIABETES | Facility: CLINIC | Age: 71
End: 2021-04-14
Payer: MEDICARE

## 2021-04-14 VITALS
DIASTOLIC BLOOD PRESSURE: 88 MMHG | WEIGHT: 306 LBS | HEART RATE: 73 BPM | HEIGHT: 74 IN | SYSTOLIC BLOOD PRESSURE: 150 MMHG | BODY MASS INDEX: 39.27 KG/M2

## 2021-04-14 DIAGNOSIS — E78.2 MIXED HYPERLIPIDEMIA: ICD-10-CM

## 2021-04-14 DIAGNOSIS — Z79.4 TYPE 2 DIABETES MELLITUS WITH STAGE 3B CHRONIC KIDNEY DISEASE, WITH LONG-TERM CURRENT USE OF INSULIN: Primary | ICD-10-CM

## 2021-04-14 DIAGNOSIS — I10 ESSENTIAL HYPERTENSION: ICD-10-CM

## 2021-04-14 DIAGNOSIS — E11.49 OTHER DIABETIC NEUROLOGICAL COMPLICATION ASSOCIATED WITH TYPE 2 DIABETES MELLITUS: Primary | ICD-10-CM

## 2021-04-14 DIAGNOSIS — E11.22 TYPE 2 DIABETES MELLITUS WITH STAGE 3B CHRONIC KIDNEY DISEASE, WITH LONG-TERM CURRENT USE OF INSULIN: Primary | ICD-10-CM

## 2021-04-14 DIAGNOSIS — N18.32 TYPE 2 DIABETES MELLITUS WITH STAGE 3B CHRONIC KIDNEY DISEASE, WITH LONG-TERM CURRENT USE OF INSULIN: Primary | ICD-10-CM

## 2021-04-14 LAB — GLUCOSE SERPL-MCNC: 149 MG/DL (ref 70–110)

## 2021-04-14 PROCEDURE — 99999 PR PBB SHADOW E&M-EST. PATIENT-LVL V: CPT | Mod: PBBFAC,,, | Performed by: NURSE PRACTITIONER

## 2021-04-14 PROCEDURE — 95251 CONT GLUC MNTR ANALYSIS I&R: CPT | Mod: ,,, | Performed by: NURSE PRACTITIONER

## 2021-04-14 PROCEDURE — 99215 OFFICE O/P EST HI 40 MIN: CPT | Mod: PBBFAC,PO | Performed by: NURSE PRACTITIONER

## 2021-04-14 PROCEDURE — 99214 PR OFFICE/OUTPT VISIT, EST, LEVL IV, 30-39 MIN: ICD-10-PCS | Mod: S$PBB,,, | Performed by: NURSE PRACTITIONER

## 2021-04-14 PROCEDURE — 99999 PR PBB SHADOW E&M-EST. PATIENT-LVL V: ICD-10-PCS | Mod: PBBFAC,,, | Performed by: NURSE PRACTITIONER

## 2021-04-14 PROCEDURE — 95251 PR GLUCOSE MONITOR, 72 HOUR, PHYS INTERP: ICD-10-PCS | Mod: ,,, | Performed by: NURSE PRACTITIONER

## 2021-04-14 PROCEDURE — 99214 OFFICE O/P EST MOD 30 MIN: CPT | Mod: S$PBB,,, | Performed by: NURSE PRACTITIONER

## 2021-04-14 PROCEDURE — 82962 GLUCOSE BLOOD TEST: CPT | Mod: PBBFAC,PO | Performed by: NURSE PRACTITIONER

## 2021-04-14 RX ORDER — FLASH GLUCOSE SENSOR
KIT MISCELLANEOUS
Qty: 6 KIT | Refills: 1 | Status: SHIPPED | OUTPATIENT
Start: 2021-04-14 | End: 2021-08-26

## 2021-05-06 ENCOUNTER — TELEPHONE (OUTPATIENT)
Dept: UROLOGY | Facility: CLINIC | Age: 71
End: 2021-05-06

## 2021-05-06 ENCOUNTER — PATIENT MESSAGE (OUTPATIENT)
Dept: UROLOGY | Facility: CLINIC | Age: 71
End: 2021-05-06

## 2021-05-06 DIAGNOSIS — Z94.1 STATUS POST HEART TRANSPLANT: Primary | ICD-10-CM

## 2021-05-25 RX ORDER — SIROLIMUS 1 MG/1
1 TABLET, FILM COATED ORAL DAILY
Qty: 90 TABLET | Refills: 3 | Status: SHIPPED | OUTPATIENT
Start: 2021-05-25 | End: 2021-07-28

## 2021-06-14 ENCOUNTER — HOSPITAL ENCOUNTER (OUTPATIENT)
Dept: RADIOLOGY | Facility: HOSPITAL | Age: 71
Discharge: HOME | End: 2021-06-14
Attending: INTERNAL MEDICINE
Payer: MEDICARE

## 2021-06-14 ENCOUNTER — HOSPITAL ENCOUNTER (OUTPATIENT)
Dept: CARDIOLOGY | Facility: HOSPITAL | Age: 71
Discharge: HOME | End: 2021-06-14
Attending: INTERNAL MEDICINE
Payer: MEDICARE

## 2021-06-14 ENCOUNTER — TELEPHONE (OUTPATIENT)
Dept: TRANSPLANT | Facility: CLINIC | Age: 71
End: 2021-06-14

## 2021-06-14 ENCOUNTER — OFFICE VISIT (OUTPATIENT)
Dept: TRANSPLANT | Facility: CLINIC | Age: 71
End: 2021-06-14
Payer: MEDICARE

## 2021-06-14 VITALS
HEART RATE: 65 BPM | BODY MASS INDEX: 38.73 KG/M2 | SYSTOLIC BLOOD PRESSURE: 167 MMHG | WEIGHT: 301.81 LBS | DIASTOLIC BLOOD PRESSURE: 85 MMHG | HEIGHT: 74 IN

## 2021-06-14 VITALS
SYSTOLIC BLOOD PRESSURE: 180 MMHG | HEIGHT: 74 IN | WEIGHT: 300 LBS | BODY MASS INDEX: 38.5 KG/M2 | DIASTOLIC BLOOD PRESSURE: 90 MMHG | HEART RATE: 66 BPM | RESPIRATION RATE: 16 BRPM

## 2021-06-14 DIAGNOSIS — E78.2 MIXED HYPERLIPIDEMIA: ICD-10-CM

## 2021-06-14 DIAGNOSIS — Z94.1 STATUS POST HEART TRANSPLANT: ICD-10-CM

## 2021-06-14 DIAGNOSIS — I10 ESSENTIAL HYPERTENSION: ICD-10-CM

## 2021-06-14 DIAGNOSIS — E11.22 TYPE 2 DIABETES MELLITUS WITH STAGE 3A CHRONIC KIDNEY DISEASE, WITHOUT LONG-TERM CURRENT USE OF INSULIN: ICD-10-CM

## 2021-06-14 DIAGNOSIS — Z94.0 DECEASED-DONOR KIDNEY TRANSPLANT: ICD-10-CM

## 2021-06-14 DIAGNOSIS — Z94.1 HEART REPLACED BY TRANSPLANT: Primary | ICD-10-CM

## 2021-06-14 DIAGNOSIS — N18.32 STAGE 3B CHRONIC KIDNEY DISEASE: Chronic | ICD-10-CM

## 2021-06-14 DIAGNOSIS — Z94.1 HEART TRANSPLANTED: Primary | ICD-10-CM

## 2021-06-14 DIAGNOSIS — Z79.60 LONG-TERM USE OF IMMUNOSUPPRESSANT MEDICATION: ICD-10-CM

## 2021-06-14 DIAGNOSIS — Z29.89 PROPHYLACTIC IMMUNOTHERAPY: Chronic | ICD-10-CM

## 2021-06-14 DIAGNOSIS — I15.8 HYPERTENSION ASSOCIATED WITH TRANSPLANTATION: ICD-10-CM

## 2021-06-14 DIAGNOSIS — Z94.9 HYPERTENSION ASSOCIATED WITH TRANSPLANTATION: ICD-10-CM

## 2021-06-14 DIAGNOSIS — N18.31 TYPE 2 DIABETES MELLITUS WITH STAGE 3A CHRONIC KIDNEY DISEASE, WITHOUT LONG-TERM CURRENT USE OF INSULIN: ICD-10-CM

## 2021-06-14 DIAGNOSIS — T86.290 VASCULOPATHY OF CARDIAC ALLOGRAFT: ICD-10-CM

## 2021-06-14 PROBLEM — D84.821 IMMUNOSUPPRESSION DUE TO DRUG THERAPY: Status: ACTIVE | Noted: 2018-05-16

## 2021-06-14 PROBLEM — Z79.899 IMMUNOSUPPRESSION DUE TO DRUG THERAPY: Status: ACTIVE | Noted: 2018-05-16

## 2021-06-14 LAB
ASCENDING AORTA: 3.58 CM
BSA FOR ECHO PROCEDURE: 2.67 M2
CV ECHO LV RWT: 0.47 CM
CV STRESS BASE HR: 65 BPM
DIASTOLIC BLOOD PRESSURE: 86 MMHG
DOP CALC LVOT AREA: 4.3 CM2
DOP CALC LVOT DIAMETER: 2.34 CM
DOP CALC LVOT PEAK VEL: 0.72 M/S
DOP CALC LVOT STROKE VOLUME: 68.09 CM3
DOP CALCLVOT PEAK VEL VTI: 15.84 CM
E WAVE DECELERATION TIME: 197.48 MSEC
E/A RATIO: 0.98
E/E' RATIO: 6.11 M/S
ECHO LV POSTERIOR WALL: 1.32 CM (ref 0.6–1.1)
EJECTION FRACTION: 60 %
FRACTIONAL SHORTENING: 26 % (ref 28–44)
INTERVENTRICULAR SEPTUM: 1.13 CM (ref 0.6–1.1)
IVRT: 139.87 MSEC
LEFT INTERNAL DIMENSION IN SYSTOLE: 4.17 CM (ref 2.1–4)
LEFT VENTRICLE DIASTOLIC VOLUME INDEX: 59.74 ML/M2
LEFT VENTRICLE DIASTOLIC VOLUME: 154.13 ML
LEFT VENTRICLE MASS INDEX: 112 G/M2
LEFT VENTRICLE SYSTOLIC VOLUME INDEX: 29.9 ML/M2
LEFT VENTRICLE SYSTOLIC VOLUME: 77.15 ML
LEFT VENTRICULAR INTERNAL DIMENSION IN DIASTOLE: 5.61 CM (ref 3.5–6)
LEFT VENTRICULAR MASS: 289.34 G
LV LATERAL E/E' RATIO: 5.5 M/S
LV SEPTAL E/E' RATIO: 6.88 M/S
MV PEAK A VEL: 0.56 M/S
MV PEAK E VEL: 0.55 M/S
MV STENOSIS PRESSURE HALF TIME: 57.27 MS
MV VALVE AREA P 1/2 METHOD: 3.84 CM2
OHS CV CPX 1 MINUTE RECOVERY HEART RATE: 106 BPM
OHS CV CPX 85 PERCENT MAX PREDICTED HEART RATE MALE: 128
OHS CV CPX MAX PREDICTED HEART RATE: 150
OHS CV CPX PATIENT IS FEMALE: 0
OHS CV CPX PATIENT IS MALE: 1
OHS CV CPX PEAK DIASTOLIC BLOOD PRESSURE: 54 MMHG
OHS CV CPX PEAK HEAR RATE: 107 BPM
OHS CV CPX PEAK RATE PRESSURE PRODUCT: ABNORMAL
OHS CV CPX PEAK SYSTOLIC BLOOD PRESSURE: 204 MMHG
OHS CV CPX PERCENT MAX PREDICTED HEART RATE ACHIEVED: 71
OHS CV CPX RATE PRESSURE PRODUCT PRESENTING: ABNORMAL
PISA TR MAX VEL: 2.25 M/S
RIGHT VENTRICULAR END-DIASTOLIC DIMENSION: 4.27 CM
RV TISSUE DOPPLER FREE WALL SYSTOLIC VELOCITY 1 (APICAL 4 CHAMBER VIEW): 6.97 CM/S
SINUS: 3.86 CM
STJ: 3.27 CM
SYSTOLIC BLOOD PRESSURE: 157 MMHG
TDI LATERAL: 0.1 M/S
TDI SEPTAL: 0.08 M/S
TDI: 0.09 M/S
TR MAX PG: 20 MMHG
TRICUSPID ANNULAR PLANE SYSTOLIC EXCURSION: 1.66 CM

## 2021-06-14 PROCEDURE — 25500020 PHARM REV CODE 255: Performed by: INTERNAL MEDICINE

## 2021-06-14 PROCEDURE — 93351 STRESS TTE COMPLETE: CPT

## 2021-06-14 PROCEDURE — 71046 XR CHEST PA AND LATERAL: ICD-10-PCS | Mod: 26,,, | Performed by: RADIOLOGY

## 2021-06-14 PROCEDURE — 93352 ADMIN ECG CONTRAST AGENT: CPT | Mod: ,,, | Performed by: INTERNAL MEDICINE

## 2021-06-14 PROCEDURE — 71046 X-RAY EXAM CHEST 2 VIEWS: CPT | Mod: 26,,, | Performed by: RADIOLOGY

## 2021-06-14 PROCEDURE — 99214 OFFICE O/P EST MOD 30 MIN: CPT | Mod: S$PBB,,, | Performed by: INTERNAL MEDICINE

## 2021-06-14 PROCEDURE — 99214 OFFICE O/P EST MOD 30 MIN: CPT | Mod: PBBFAC,25 | Performed by: INTERNAL MEDICINE

## 2021-06-14 PROCEDURE — 63600175 PHARM REV CODE 636 W HCPCS: Performed by: INTERNAL MEDICINE

## 2021-06-14 PROCEDURE — 93352 STRESS ECHO (CUPID ONLY): ICD-10-PCS | Mod: ,,, | Performed by: INTERNAL MEDICINE

## 2021-06-14 PROCEDURE — 99999 PR PBB SHADOW E&M-EST. PATIENT-LVL IV: ICD-10-PCS | Mod: PBBFAC,,, | Performed by: INTERNAL MEDICINE

## 2021-06-14 PROCEDURE — 93351 STRESS TTE COMPLETE: CPT | Mod: 26,,, | Performed by: INTERNAL MEDICINE

## 2021-06-14 PROCEDURE — 93351 STRESS ECHO (CUPID ONLY): ICD-10-PCS | Mod: 26,,, | Performed by: INTERNAL MEDICINE

## 2021-06-14 PROCEDURE — 99999 PR PBB SHADOW E&M-EST. PATIENT-LVL IV: CPT | Mod: PBBFAC,,, | Performed by: INTERNAL MEDICINE

## 2021-06-14 PROCEDURE — 99214 PR OFFICE/OUTPT VISIT, EST, LEVL IV, 30-39 MIN: ICD-10-PCS | Mod: S$PBB,,, | Performed by: INTERNAL MEDICINE

## 2021-06-14 PROCEDURE — 71046 X-RAY EXAM CHEST 2 VIEWS: CPT | Mod: TC,FY

## 2021-06-14 RX ORDER — DOBUTAMINE HYDROCHLORIDE 400 MG/100ML
10 INJECTION INTRAVENOUS
Status: COMPLETED | OUTPATIENT
Start: 2021-06-14 | End: 2021-06-14

## 2021-06-14 RX ORDER — AMLODIPINE BESYLATE 5 MG/1
5 TABLET ORAL DAILY
Qty: 90 TABLET | Refills: 3 | Status: SHIPPED | OUTPATIENT
Start: 2021-06-14 | End: 2021-09-02

## 2021-06-14 RX ORDER — METOPROLOL SUCCINATE 25 MG/1
TABLET, EXTENDED RELEASE ORAL
Qty: 90 TABLET | Refills: 3 | Status: SHIPPED | OUTPATIENT
Start: 2021-06-14 | End: 2022-04-28

## 2021-06-14 RX ADMIN — HUMAN ALBUMIN MICROSPHERES AND PERFLUTREN 0.66 MG: 10; .22 INJECTION, SOLUTION INTRAVENOUS at 10:06

## 2021-06-14 RX ADMIN — DOBUTAMINE HYDROCHLORIDE 10 MCG/KG/MIN: 400 INJECTION INTRAVENOUS at 10:06

## 2021-06-16 ENCOUNTER — TELEPHONE (OUTPATIENT)
Dept: TRANSPLANT | Facility: CLINIC | Age: 71
End: 2021-06-16

## 2021-06-16 ENCOUNTER — TELEPHONE (OUTPATIENT)
Dept: UROLOGY | Facility: CLINIC | Age: 71
End: 2021-06-16

## 2021-06-17 ENCOUNTER — LAB VISIT (OUTPATIENT)
Dept: LAB | Facility: HOSPITAL | Age: 71
End: 2021-06-17
Attending: INTERNAL MEDICINE
Payer: MEDICARE

## 2021-06-17 DIAGNOSIS — Z94.0 KIDNEY REPLACED BY TRANSPLANT: ICD-10-CM

## 2021-06-17 DIAGNOSIS — Z94.1 STATUS POST HEART TRANSPLANT: ICD-10-CM

## 2021-06-17 PROCEDURE — 36415 COLL VENOUS BLD VENIPUNCTURE: CPT | Mod: PO | Performed by: INTERNAL MEDICINE

## 2021-06-17 PROCEDURE — 80053 COMPREHEN METABOLIC PANEL: CPT | Performed by: INTERNAL MEDICINE

## 2021-06-17 PROCEDURE — 80195 ASSAY OF SIROLIMUS: CPT | Performed by: INTERNAL MEDICINE

## 2021-06-18 LAB
ALBUMIN SERPL BCP-MCNC: 2.9 G/DL (ref 3.5–5.2)
ALP SERPL-CCNC: 108 U/L (ref 55–135)
ALT SERPL W/O P-5'-P-CCNC: 23 U/L (ref 10–44)
ANION GAP SERPL CALC-SCNC: 10 MMOL/L (ref 8–16)
AST SERPL-CCNC: 30 U/L (ref 10–40)
BILIRUB SERPL-MCNC: 0.4 MG/DL (ref 0.1–1)
BUN SERPL-MCNC: 16 MG/DL (ref 8–23)
CALCIUM SERPL-MCNC: 9 MG/DL (ref 8.7–10.5)
CHLORIDE SERPL-SCNC: 108 MMOL/L (ref 95–110)
CO2 SERPL-SCNC: 24 MMOL/L (ref 23–29)
CREAT SERPL-MCNC: 1.9 MG/DL (ref 0.5–1.4)
EST. GFR  (AFRICAN AMERICAN): 40.4 ML/MIN/1.73 M^2
EST. GFR  (NON AFRICAN AMERICAN): 34.9 ML/MIN/1.73 M^2
GLUCOSE SERPL-MCNC: 115 MG/DL (ref 70–110)
POTASSIUM SERPL-SCNC: 4.4 MMOL/L (ref 3.5–5.1)
PROT SERPL-MCNC: 6.7 G/DL (ref 6–8.4)
SIROLIMUS BLD-MCNC: 4.8 NG/ML (ref 4–20)
SODIUM SERPL-SCNC: 142 MMOL/L (ref 136–145)

## 2021-06-21 ENCOUNTER — PATIENT OUTREACH (OUTPATIENT)
Dept: ADMINISTRATIVE | Facility: HOSPITAL | Age: 71
End: 2021-06-21

## 2021-06-22 ENCOUNTER — TELEPHONE (OUTPATIENT)
Dept: TRANSPLANT | Facility: CLINIC | Age: 71
End: 2021-06-22

## 2021-06-23 DIAGNOSIS — I15.8 HYPERTENSION ASSOCIATED WITH TRANSPLANTATION: ICD-10-CM

## 2021-06-23 DIAGNOSIS — E78.2 MIXED HYPERLIPIDEMIA: ICD-10-CM

## 2021-06-23 DIAGNOSIS — Z94.9 HYPERTENSION ASSOCIATED WITH TRANSPLANTATION: ICD-10-CM

## 2021-06-24 RX ORDER — ATORVASTATIN CALCIUM 80 MG/1
TABLET, FILM COATED ORAL
Qty: 90 TABLET | Refills: 0 | Status: SHIPPED | OUTPATIENT
Start: 2021-06-24 | End: 2021-08-26

## 2021-06-24 RX ORDER — METOPROLOL SUCCINATE 50 MG/1
TABLET, EXTENDED RELEASE ORAL
Qty: 90 TABLET | Refills: 0 | Status: SHIPPED | OUTPATIENT
Start: 2021-06-24 | End: 2021-08-26

## 2021-07-01 ENCOUNTER — EPISODE CHANGES (OUTPATIENT)
Dept: TRANSPLANT | Facility: CLINIC | Age: 71
End: 2021-07-01

## 2021-07-07 ENCOUNTER — LAB VISIT (OUTPATIENT)
Dept: LAB | Facility: HOSPITAL | Age: 71
End: 2021-07-07
Attending: NURSE PRACTITIONER
Payer: MEDICARE

## 2021-07-07 DIAGNOSIS — E11.22 TYPE 2 DIABETES MELLITUS WITH STAGE 3B CHRONIC KIDNEY DISEASE, WITH LONG-TERM CURRENT USE OF INSULIN: ICD-10-CM

## 2021-07-07 DIAGNOSIS — Z79.4 TYPE 2 DIABETES MELLITUS WITH STAGE 3B CHRONIC KIDNEY DISEASE, WITH LONG-TERM CURRENT USE OF INSULIN: ICD-10-CM

## 2021-07-07 DIAGNOSIS — N18.32 TYPE 2 DIABETES MELLITUS WITH STAGE 3B CHRONIC KIDNEY DISEASE, WITH LONG-TERM CURRENT USE OF INSULIN: ICD-10-CM

## 2021-07-07 PROCEDURE — 80053 COMPREHEN METABOLIC PANEL: CPT | Performed by: NURSE PRACTITIONER

## 2021-07-07 PROCEDURE — 83036 HEMOGLOBIN GLYCOSYLATED A1C: CPT | Performed by: NURSE PRACTITIONER

## 2021-07-07 PROCEDURE — 36415 COLL VENOUS BLD VENIPUNCTURE: CPT | Mod: PO | Performed by: NURSE PRACTITIONER

## 2021-07-08 LAB
ALBUMIN SERPL BCP-MCNC: 3 G/DL (ref 3.5–5.2)
ALP SERPL-CCNC: 101 U/L (ref 55–135)
ALT SERPL W/O P-5'-P-CCNC: 23 U/L (ref 10–44)
ANION GAP SERPL CALC-SCNC: 7 MMOL/L (ref 8–16)
AST SERPL-CCNC: 33 U/L (ref 10–40)
BILIRUB SERPL-MCNC: 0.4 MG/DL (ref 0.1–1)
BUN SERPL-MCNC: 17 MG/DL (ref 8–23)
CALCIUM SERPL-MCNC: 9.3 MG/DL (ref 8.7–10.5)
CHLORIDE SERPL-SCNC: 108 MMOL/L (ref 95–110)
CO2 SERPL-SCNC: 27 MMOL/L (ref 23–29)
CREAT SERPL-MCNC: 1.9 MG/DL (ref 0.5–1.4)
EST. GFR  (AFRICAN AMERICAN): 40.1 ML/MIN/1.73 M^2
EST. GFR  (NON AFRICAN AMERICAN): 34.7 ML/MIN/1.73 M^2
ESTIMATED AVG GLUCOSE: 169 MG/DL (ref 68–131)
GLUCOSE SERPL-MCNC: 103 MG/DL (ref 70–110)
HBA1C MFR BLD: 7.5 % (ref 4–5.6)
POTASSIUM SERPL-SCNC: 4.7 MMOL/L (ref 3.5–5.1)
PROT SERPL-MCNC: 7 G/DL (ref 6–8.4)
SODIUM SERPL-SCNC: 142 MMOL/L (ref 136–145)

## 2021-07-14 ENCOUNTER — OFFICE VISIT (OUTPATIENT)
Dept: DIABETES | Facility: CLINIC | Age: 71
End: 2021-07-14
Payer: MEDICARE

## 2021-07-14 VITALS
HEART RATE: 72 BPM | DIASTOLIC BLOOD PRESSURE: 89 MMHG | BODY MASS INDEX: 38.31 KG/M2 | SYSTOLIC BLOOD PRESSURE: 161 MMHG | WEIGHT: 298.5 LBS | HEIGHT: 74 IN

## 2021-07-14 DIAGNOSIS — E78.2 MIXED HYPERLIPIDEMIA: ICD-10-CM

## 2021-07-14 DIAGNOSIS — N18.31 TYPE 2 DIABETES MELLITUS WITH STAGE 3A CHRONIC KIDNEY DISEASE, WITHOUT LONG-TERM CURRENT USE OF INSULIN: Primary | ICD-10-CM

## 2021-07-14 DIAGNOSIS — E11.22 TYPE 2 DIABETES MELLITUS WITH STAGE 3A CHRONIC KIDNEY DISEASE, WITHOUT LONG-TERM CURRENT USE OF INSULIN: Primary | ICD-10-CM

## 2021-07-14 DIAGNOSIS — I10 ESSENTIAL HYPERTENSION: ICD-10-CM

## 2021-07-14 LAB — GLUCOSE SERPL-MCNC: 104 MG/DL (ref 70–110)

## 2021-07-14 PROCEDURE — 95251 CONT GLUC MNTR ANALYSIS I&R: CPT | Mod: ,,, | Performed by: NURSE PRACTITIONER

## 2021-07-14 PROCEDURE — 99214 OFFICE O/P EST MOD 30 MIN: CPT | Mod: S$PBB,,, | Performed by: NURSE PRACTITIONER

## 2021-07-14 PROCEDURE — 99999 PR PBB SHADOW E&M-EST. PATIENT-LVL III: ICD-10-PCS | Mod: PBBFAC,,, | Performed by: NURSE PRACTITIONER

## 2021-07-14 PROCEDURE — 82962 GLUCOSE BLOOD TEST: CPT | Mod: PBBFAC,PO | Performed by: NURSE PRACTITIONER

## 2021-07-14 PROCEDURE — 99213 OFFICE O/P EST LOW 20 MIN: CPT | Mod: PBBFAC,PO | Performed by: NURSE PRACTITIONER

## 2021-07-14 PROCEDURE — 99999 PR PBB SHADOW E&M-EST. PATIENT-LVL III: CPT | Mod: PBBFAC,,, | Performed by: NURSE PRACTITIONER

## 2021-07-14 PROCEDURE — 99214 PR OFFICE/OUTPT VISIT, EST, LEVL IV, 30-39 MIN: ICD-10-PCS | Mod: S$PBB,,, | Performed by: NURSE PRACTITIONER

## 2021-07-14 PROCEDURE — 95251 PR GLUCOSE MONITOR, 72 HOUR, PHYS INTERP: ICD-10-PCS | Mod: ,,, | Performed by: NURSE PRACTITIONER

## 2021-07-15 ENCOUNTER — PATIENT OUTREACH (OUTPATIENT)
Dept: ADMINISTRATIVE | Facility: HOSPITAL | Age: 71
End: 2021-07-15

## 2021-07-15 ENCOUNTER — TELEPHONE (OUTPATIENT)
Dept: FAMILY MEDICINE | Facility: CLINIC | Age: 71
End: 2021-07-15

## 2021-07-20 DIAGNOSIS — N18.30 STAGE 3 CHRONIC KIDNEY DISEASE, UNSPECIFIED WHETHER STAGE 3A OR 3B CKD: Primary | ICD-10-CM

## 2021-07-22 ENCOUNTER — TELEPHONE (OUTPATIENT)
Dept: TRANSPLANT | Facility: CLINIC | Age: 71
End: 2021-07-22

## 2021-07-26 ENCOUNTER — LAB VISIT (OUTPATIENT)
Dept: LAB | Facility: HOSPITAL | Age: 71
End: 2021-07-26
Attending: INTERNAL MEDICINE
Payer: MEDICARE

## 2021-07-26 DIAGNOSIS — Z94.1 STATUS POST HEART TRANSPLANT: ICD-10-CM

## 2021-07-26 DIAGNOSIS — Z94.0 KIDNEY REPLACED BY TRANSPLANT: ICD-10-CM

## 2021-07-26 PROCEDURE — 80053 COMPREHEN METABOLIC PANEL: CPT | Performed by: INTERNAL MEDICINE

## 2021-07-26 PROCEDURE — 36415 COLL VENOUS BLD VENIPUNCTURE: CPT | Mod: PO | Performed by: INTERNAL MEDICINE

## 2021-07-26 PROCEDURE — 80195 ASSAY OF SIROLIMUS: CPT | Performed by: INTERNAL MEDICINE

## 2021-07-27 LAB
ALBUMIN SERPL BCP-MCNC: 2.8 G/DL (ref 3.5–5.2)
ALP SERPL-CCNC: 85 U/L (ref 55–135)
ALT SERPL W/O P-5'-P-CCNC: 21 U/L (ref 10–44)
ANION GAP SERPL CALC-SCNC: 9 MMOL/L (ref 8–16)
AST SERPL-CCNC: 30 U/L (ref 10–40)
BILIRUB SERPL-MCNC: 0.4 MG/DL (ref 0.1–1)
BUN SERPL-MCNC: 18 MG/DL (ref 8–23)
CALCIUM SERPL-MCNC: 9.2 MG/DL (ref 8.7–10.5)
CHLORIDE SERPL-SCNC: 109 MMOL/L (ref 95–110)
CO2 SERPL-SCNC: 25 MMOL/L (ref 23–29)
CREAT SERPL-MCNC: 1.8 MG/DL (ref 0.5–1.4)
EST. GFR  (AFRICAN AMERICAN): 42.8 ML/MIN/1.73 M^2
EST. GFR  (NON AFRICAN AMERICAN): 37 ML/MIN/1.73 M^2
GLUCOSE SERPL-MCNC: 83 MG/DL (ref 70–110)
POTASSIUM SERPL-SCNC: 4 MMOL/L (ref 3.5–5.1)
PROT SERPL-MCNC: 6.4 G/DL (ref 6–8.4)
SIROLIMUS BLD-MCNC: 3.6 NG/ML (ref 4–20)
SODIUM SERPL-SCNC: 143 MMOL/L (ref 136–145)

## 2021-07-28 DIAGNOSIS — Z94.1 HEART REPLACED BY TRANSPLANT: Primary | ICD-10-CM

## 2021-07-28 RX ORDER — SIROLIMUS 1 MG/1
2 TABLET, FILM COATED ORAL DAILY
Qty: 180 TABLET | Refills: 3 | Status: SHIPPED | OUTPATIENT
Start: 2021-07-28 | End: 2021-08-06

## 2021-07-30 ENCOUNTER — TELEPHONE (OUTPATIENT)
Dept: SPORTS MEDICINE | Facility: CLINIC | Age: 71
End: 2021-07-30

## 2021-07-30 DIAGNOSIS — M25.552 LEFT HIP PAIN: Primary | ICD-10-CM

## 2021-07-30 DIAGNOSIS — M25.562 LEFT KNEE PAIN, UNSPECIFIED CHRONICITY: ICD-10-CM

## 2021-08-02 ENCOUNTER — HOSPITAL ENCOUNTER (OUTPATIENT)
Dept: RADIOLOGY | Facility: HOSPITAL | Age: 71
Discharge: HOME | End: 2021-08-02
Attending: STUDENT IN AN ORGANIZED HEALTH CARE EDUCATION/TRAINING PROGRAM
Payer: MEDICARE

## 2021-08-02 ENCOUNTER — OFFICE VISIT (OUTPATIENT)
Dept: SPORTS MEDICINE | Facility: CLINIC | Age: 71
End: 2021-08-02
Payer: MEDICARE

## 2021-08-02 VITALS — WEIGHT: 298 LBS | BODY MASS INDEX: 38.24 KG/M2 | HEIGHT: 74 IN

## 2021-08-02 DIAGNOSIS — M25.552 LEFT HIP PAIN: ICD-10-CM

## 2021-08-02 DIAGNOSIS — Z94.1 HEART TRANSPLANTED: ICD-10-CM

## 2021-08-02 DIAGNOSIS — E11.22 TYPE 2 DIABETES MELLITUS WITH STAGE 3A CHRONIC KIDNEY DISEASE, WITHOUT LONG-TERM CURRENT USE OF INSULIN: ICD-10-CM

## 2021-08-02 DIAGNOSIS — N18.31 TYPE 2 DIABETES MELLITUS WITH STAGE 3A CHRONIC KIDNEY DISEASE, WITHOUT LONG-TERM CURRENT USE OF INSULIN: ICD-10-CM

## 2021-08-02 DIAGNOSIS — D84.821 IMMUNOSUPPRESSION DUE TO DRUG THERAPY: ICD-10-CM

## 2021-08-02 DIAGNOSIS — Z79.899 IMMUNOSUPPRESSION DUE TO DRUG THERAPY: ICD-10-CM

## 2021-08-02 DIAGNOSIS — M16.12 OSTEOARTHRITIS OF LEFT HIP, UNSPECIFIED OSTEOARTHRITIS TYPE: Primary | ICD-10-CM

## 2021-08-02 DIAGNOSIS — M17.12 PRIMARY OSTEOARTHRITIS OF LEFT KNEE: ICD-10-CM

## 2021-08-02 DIAGNOSIS — M25.562 LEFT KNEE PAIN, UNSPECIFIED CHRONICITY: ICD-10-CM

## 2021-08-02 PROCEDURE — 73562 XR KNEE ORTHO LEFT WITH FLEXION: ICD-10-PCS | Mod: 26,RT,, | Performed by: RADIOLOGY

## 2021-08-02 PROCEDURE — 73562 X-RAY EXAM OF KNEE 3: CPT | Mod: 26,RT,, | Performed by: RADIOLOGY

## 2021-08-02 PROCEDURE — 73564 X-RAY EXAM KNEE 4 OR MORE: CPT | Mod: 26,LT,, | Performed by: RADIOLOGY

## 2021-08-02 PROCEDURE — 73564 XR KNEE ORTHO LEFT WITH FLEXION: ICD-10-PCS | Mod: 26,LT,, | Performed by: RADIOLOGY

## 2021-08-02 PROCEDURE — 73564 X-RAY EXAM KNEE 4 OR MORE: CPT | Mod: TC,LT

## 2021-08-02 PROCEDURE — 99999 PR PBB SHADOW E&M-EST. PATIENT-LVL III: ICD-10-PCS | Mod: PBBFAC,,, | Performed by: STUDENT IN AN ORGANIZED HEALTH CARE EDUCATION/TRAINING PROGRAM

## 2021-08-02 PROCEDURE — 73502 XR HIP WITH PELVIS WHEN PERFORMED, 2 OR 3 VIEWS LEFT: ICD-10-PCS | Mod: 26,LT,, | Performed by: RADIOLOGY

## 2021-08-02 PROCEDURE — 99204 PR OFFICE/OUTPT VISIT, NEW, LEVL IV, 45-59 MIN: ICD-10-PCS | Mod: S$PBB,,, | Performed by: STUDENT IN AN ORGANIZED HEALTH CARE EDUCATION/TRAINING PROGRAM

## 2021-08-02 PROCEDURE — 99213 OFFICE O/P EST LOW 20 MIN: CPT | Mod: PBBFAC | Performed by: STUDENT IN AN ORGANIZED HEALTH CARE EDUCATION/TRAINING PROGRAM

## 2021-08-02 PROCEDURE — 99204 OFFICE O/P NEW MOD 45 MIN: CPT | Mod: S$PBB,,, | Performed by: STUDENT IN AN ORGANIZED HEALTH CARE EDUCATION/TRAINING PROGRAM

## 2021-08-02 PROCEDURE — 99999 PR PBB SHADOW E&M-EST. PATIENT-LVL III: CPT | Mod: PBBFAC,,, | Performed by: STUDENT IN AN ORGANIZED HEALTH CARE EDUCATION/TRAINING PROGRAM

## 2021-08-02 PROCEDURE — 73502 X-RAY EXAM HIP UNI 2-3 VIEWS: CPT | Mod: TC,LT

## 2021-08-02 PROCEDURE — 73502 X-RAY EXAM HIP UNI 2-3 VIEWS: CPT | Mod: 26,LT,, | Performed by: RADIOLOGY

## 2021-08-04 ENCOUNTER — LAB VISIT (OUTPATIENT)
Dept: LAB | Facility: HOSPITAL | Age: 71
End: 2021-08-04
Attending: INTERNAL MEDICINE
Payer: MEDICARE

## 2021-08-04 DIAGNOSIS — Z94.1 STATUS POST HEART TRANSPLANT: ICD-10-CM

## 2021-08-04 LAB
ALBUMIN SERPL BCP-MCNC: 2.8 G/DL (ref 3.5–5.2)
ALP SERPL-CCNC: 99 U/L (ref 55–135)
ALT SERPL W/O P-5'-P-CCNC: 29 U/L (ref 10–44)
ANION GAP SERPL CALC-SCNC: 4 MMOL/L (ref 8–16)
AST SERPL-CCNC: 33 U/L (ref 10–40)
BILIRUB SERPL-MCNC: 0.3 MG/DL (ref 0.1–1)
BUN SERPL-MCNC: 15 MG/DL (ref 8–23)
CALCIUM SERPL-MCNC: 8.5 MG/DL (ref 8.7–10.5)
CHLORIDE SERPL-SCNC: 107 MMOL/L (ref 95–110)
CO2 SERPL-SCNC: 27 MMOL/L (ref 23–29)
CREAT SERPL-MCNC: 1.4 MG/DL (ref 0.5–1.4)
EST. GFR  (AFRICAN AMERICAN): 58 ML/MIN/1.73 M^2
EST. GFR  (NON AFRICAN AMERICAN): 50.2 ML/MIN/1.73 M^2
GLUCOSE SERPL-MCNC: 112 MG/DL (ref 70–110)
POTASSIUM SERPL-SCNC: 3.8 MMOL/L (ref 3.5–5.1)
PROT SERPL-MCNC: 6.5 G/DL (ref 6–8.4)
SODIUM SERPL-SCNC: 138 MMOL/L (ref 136–145)

## 2021-08-04 PROCEDURE — 36415 COLL VENOUS BLD VENIPUNCTURE: CPT | Mod: PO | Performed by: INTERNAL MEDICINE

## 2021-08-04 PROCEDURE — 80053 COMPREHEN METABOLIC PANEL: CPT | Performed by: INTERNAL MEDICINE

## 2021-08-04 PROCEDURE — 80195 ASSAY OF SIROLIMUS: CPT | Performed by: INTERNAL MEDICINE

## 2021-08-05 ENCOUNTER — TELEPHONE (OUTPATIENT)
Dept: TRANSPLANT | Facility: CLINIC | Age: 71
End: 2021-08-05

## 2021-08-05 LAB — SIROLIMUS BLD-MCNC: 5.9 NG/ML (ref 4–20)

## 2021-08-06 ENCOUNTER — TELEPHONE (OUTPATIENT)
Dept: TRANSPLANT | Facility: CLINIC | Age: 71
End: 2021-08-06

## 2021-08-06 DIAGNOSIS — Z94.1 HEART REPLACED BY TRANSPLANT: ICD-10-CM

## 2021-08-06 RX ORDER — SIROLIMUS 1 MG/1
2 TABLET, FILM COATED ORAL DAILY
Qty: 180 TABLET | Refills: 3 | Status: SHIPPED | OUTPATIENT
Start: 2021-08-06 | End: 2021-08-06 | Stop reason: SDUPTHER

## 2021-08-06 RX ORDER — SIROLIMUS 1 MG/1
2 TABLET, FILM COATED ORAL DAILY
Qty: 180 TABLET | Refills: 3 | Status: SHIPPED | OUTPATIENT
Start: 2021-08-06 | End: 2022-06-03

## 2021-08-06 RX ORDER — SIROLIMUS 1 MG/1
1 TABLET, FILM COATED ORAL DAILY
Qty: 90 TABLET | Refills: 3 | Status: SHIPPED | OUTPATIENT
Start: 2021-08-06 | End: 2021-08-09 | Stop reason: SDUPTHER

## 2021-08-09 ENCOUNTER — TELEPHONE (OUTPATIENT)
Dept: TRANSPLANT | Facility: CLINIC | Age: 71
End: 2021-08-09

## 2021-08-10 ENCOUNTER — PATIENT MESSAGE (OUTPATIENT)
Dept: UROLOGY | Facility: CLINIC | Age: 71
End: 2021-08-10

## 2021-08-11 ENCOUNTER — TELEPHONE (OUTPATIENT)
Dept: TRANSPLANT | Facility: CLINIC | Age: 71
End: 2021-08-11

## 2021-08-16 ENCOUNTER — IMMUNIZATION (OUTPATIENT)
Dept: PRIMARY CARE CLINIC | Facility: CLINIC | Age: 71
End: 2021-08-16
Payer: MEDICARE

## 2021-08-16 DIAGNOSIS — Z23 NEED FOR VACCINATION: Primary | ICD-10-CM

## 2021-08-16 PROCEDURE — 0003A COVID-19, MRNA, LNP-S, PF, 30 MCG/0.3 ML DOSE VACCINE: ICD-10-PCS | Mod: CV19,S$GLB,, | Performed by: FAMILY MEDICINE

## 2021-08-16 PROCEDURE — 0003A COVID-19, MRNA, LNP-S, PF, 30 MCG/0.3 ML DOSE VACCINE: CPT | Mod: CV19,S$GLB,, | Performed by: FAMILY MEDICINE

## 2021-08-16 PROCEDURE — 91300 COVID-19, MRNA, LNP-S, PF, 30 MCG/0.3 ML DOSE VACCINE: CPT | Mod: S$GLB,,, | Performed by: FAMILY MEDICINE

## 2021-08-16 PROCEDURE — 91300 COVID-19, MRNA, LNP-S, PF, 30 MCG/0.3 ML DOSE VACCINE: ICD-10-PCS | Mod: S$GLB,,, | Performed by: FAMILY MEDICINE

## 2021-08-25 ENCOUNTER — LAB VISIT (OUTPATIENT)
Dept: LAB | Facility: HOSPITAL | Age: 71
End: 2021-08-25
Attending: UROLOGY
Payer: MEDICARE

## 2021-08-25 DIAGNOSIS — E66.01 MORBID OBESITY: ICD-10-CM

## 2021-08-25 DIAGNOSIS — Z12.5 PROSTATE CANCER SCREENING: ICD-10-CM

## 2021-08-25 DIAGNOSIS — I10 HYPERTENSION, UNSPECIFIED TYPE: ICD-10-CM

## 2021-08-25 DIAGNOSIS — N18.30 STAGE 3 CHRONIC KIDNEY DISEASE, UNSPECIFIED WHETHER STAGE 3A OR 3B CKD: ICD-10-CM

## 2021-08-25 DIAGNOSIS — N52.9 ERECTILE DYSFUNCTION, UNSPECIFIED ERECTILE DYSFUNCTION TYPE: ICD-10-CM

## 2021-08-25 DIAGNOSIS — Z94.1 STATUS POST HEART TRANSPLANT: ICD-10-CM

## 2021-08-25 LAB
ALBUMIN SERPL BCP-MCNC: 2.8 G/DL (ref 3.5–5.2)
ANION GAP SERPL CALC-SCNC: 10 MMOL/L (ref 8–16)
BASOPHILS # BLD AUTO: 0.02 K/UL (ref 0–0.2)
BASOPHILS NFR BLD: 0.4 % (ref 0–1.9)
BUN SERPL-MCNC: 21 MG/DL (ref 8–23)
CALCIUM SERPL-MCNC: 9.1 MG/DL (ref 8.7–10.5)
CHLORIDE SERPL-SCNC: 108 MMOL/L (ref 95–110)
CO2 SERPL-SCNC: 26 MMOL/L (ref 23–29)
COMPLEXED PSA SERPL-MCNC: 3.4 NG/ML (ref 0–4)
CREAT SERPL-MCNC: 1.9 MG/DL (ref 0.5–1.4)
DIFFERENTIAL METHOD: ABNORMAL
EOSINOPHIL # BLD AUTO: 0.1 K/UL (ref 0–0.5)
EOSINOPHIL NFR BLD: 1.2 % (ref 0–8)
ERYTHROCYTE [DISTWIDTH] IN BLOOD BY AUTOMATED COUNT: 14.6 % (ref 11.5–14.5)
EST. GFR  (AFRICAN AMERICAN): 40 ML/MIN/1.73 M^2
EST. GFR  (NON AFRICAN AMERICAN): 35 ML/MIN/1.73 M^2
GLUCOSE SERPL-MCNC: 109 MG/DL (ref 70–110)
HCT VFR BLD AUTO: 37.7 % (ref 40–54)
HGB BLD-MCNC: 11.6 G/DL (ref 14–18)
IMM GRANULOCYTES # BLD AUTO: 0.01 K/UL (ref 0–0.04)
IMM GRANULOCYTES NFR BLD AUTO: 0.2 % (ref 0–0.5)
LYMPHOCYTES # BLD AUTO: 2 K/UL (ref 1–4.8)
LYMPHOCYTES NFR BLD: 38.6 % (ref 18–48)
MCH RBC QN AUTO: 25.7 PG (ref 27–31)
MCHC RBC AUTO-ENTMCNC: 30.8 G/DL (ref 32–36)
MCV RBC AUTO: 84 FL (ref 82–98)
MONOCYTES # BLD AUTO: 0.5 K/UL (ref 0.3–1)
MONOCYTES NFR BLD: 10 % (ref 4–15)
NEUTROPHILS # BLD AUTO: 2.5 K/UL (ref 1.8–7.7)
NEUTROPHILS NFR BLD: 49.6 % (ref 38–73)
NRBC BLD-RTO: 0 /100 WBC
PHOSPHATE SERPL-MCNC: 3.7 MG/DL (ref 2.7–4.5)
PLATELET # BLD AUTO: 209 K/UL (ref 150–450)
PMV BLD AUTO: 11.2 FL (ref 9.2–12.9)
POTASSIUM SERPL-SCNC: 4.4 MMOL/L (ref 3.5–5.1)
PTH-INTACT SERPL-MCNC: 93.9 PG/ML (ref 9–77)
RBC # BLD AUTO: 4.51 M/UL (ref 4.6–6.2)
SODIUM SERPL-SCNC: 144 MMOL/L (ref 136–145)
WBC # BLD AUTO: 5.1 K/UL (ref 3.9–12.7)

## 2021-08-25 PROCEDURE — 83970 ASSAY OF PARATHORMONE: CPT | Performed by: INTERNAL MEDICINE

## 2021-08-25 PROCEDURE — 84153 ASSAY OF PSA TOTAL: CPT | Performed by: UROLOGY

## 2021-08-25 PROCEDURE — 85025 COMPLETE CBC W/AUTO DIFF WBC: CPT | Performed by: INTERNAL MEDICINE

## 2021-08-25 PROCEDURE — 36415 COLL VENOUS BLD VENIPUNCTURE: CPT | Mod: PO | Performed by: UROLOGY

## 2021-08-25 PROCEDURE — 80069 RENAL FUNCTION PANEL: CPT | Performed by: INTERNAL MEDICINE

## 2021-08-25 PROCEDURE — 80195 ASSAY OF SIROLIMUS: CPT | Performed by: INTERNAL MEDICINE

## 2021-08-26 DIAGNOSIS — Z94.9 HYPERTENSION ASSOCIATED WITH TRANSPLANTATION: ICD-10-CM

## 2021-08-26 DIAGNOSIS — E78.2 MIXED HYPERLIPIDEMIA: ICD-10-CM

## 2021-08-26 DIAGNOSIS — I15.8 HYPERTENSION ASSOCIATED WITH TRANSPLANTATION: ICD-10-CM

## 2021-08-26 DIAGNOSIS — I10 ESSENTIAL HYPERTENSION: ICD-10-CM

## 2021-08-26 LAB — SIROLIMUS BLD-MCNC: 7 NG/ML (ref 4–20)

## 2021-08-26 RX ORDER — METOPROLOL SUCCINATE 50 MG/1
TABLET, EXTENDED RELEASE ORAL
Qty: 90 TABLET | Refills: 0 | Status: SHIPPED | OUTPATIENT
Start: 2021-08-26 | End: 2021-11-11

## 2021-08-26 RX ORDER — LISINOPRIL 40 MG/1
TABLET ORAL
Qty: 90 TABLET | Refills: 3 | Status: SHIPPED | OUTPATIENT
Start: 2021-08-26 | End: 2022-06-13

## 2021-08-26 RX ORDER — ATORVASTATIN CALCIUM 80 MG/1
TABLET, FILM COATED ORAL
Qty: 90 TABLET | Refills: 0 | Status: SHIPPED | OUTPATIENT
Start: 2021-08-26 | End: 2021-12-13

## 2021-09-01 ENCOUNTER — TELEPHONE (OUTPATIENT)
Dept: NEPHROLOGY | Facility: CLINIC | Age: 71
End: 2021-09-01

## 2021-09-02 ENCOUNTER — OFFICE VISIT (OUTPATIENT)
Dept: NEPHROLOGY | Facility: CLINIC | Age: 71
End: 2021-09-02
Payer: MEDICARE

## 2021-09-02 ENCOUNTER — TELEPHONE (OUTPATIENT)
Dept: TRANSPLANT | Facility: CLINIC | Age: 71
End: 2021-09-02

## 2021-09-02 VITALS
SYSTOLIC BLOOD PRESSURE: 150 MMHG | BODY MASS INDEX: 38.34 KG/M2 | RESPIRATION RATE: 20 BRPM | WEIGHT: 298.75 LBS | HEIGHT: 74 IN | DIASTOLIC BLOOD PRESSURE: 80 MMHG | HEART RATE: 72 BPM

## 2021-09-02 DIAGNOSIS — Z94.1 HEART REPLACED BY TRANSPLANT: ICD-10-CM

## 2021-09-02 DIAGNOSIS — I15.8 HYPERTENSION ASSOCIATED WITH TRANSPLANTATION: ICD-10-CM

## 2021-09-02 DIAGNOSIS — Z94.0 KIDNEY TRANSPLANTED: Primary | ICD-10-CM

## 2021-09-02 DIAGNOSIS — Z94.9 HYPERTENSION ASSOCIATED WITH TRANSPLANTATION: ICD-10-CM

## 2021-09-02 PROCEDURE — 99215 OFFICE O/P EST HI 40 MIN: CPT | Mod: S$PBB,,, | Performed by: INTERNAL MEDICINE

## 2021-09-02 PROCEDURE — 99999 PR PBB SHADOW E&M-EST. PATIENT-LVL IV: CPT | Mod: PBBFAC,,, | Performed by: INTERNAL MEDICINE

## 2021-09-02 PROCEDURE — 99215 PR OFFICE/OUTPT VISIT, EST, LEVL V, 40-54 MIN: ICD-10-PCS | Mod: S$PBB,,, | Performed by: INTERNAL MEDICINE

## 2021-09-02 PROCEDURE — 99214 OFFICE O/P EST MOD 30 MIN: CPT | Mod: PBBFAC | Performed by: INTERNAL MEDICINE

## 2021-09-02 PROCEDURE — 99999 PR PBB SHADOW E&M-EST. PATIENT-LVL IV: ICD-10-PCS | Mod: PBBFAC,,, | Performed by: INTERNAL MEDICINE

## 2021-09-02 RX ORDER — AMLODIPINE BESYLATE 5 MG/1
7.5 TABLET ORAL DAILY
Qty: 135 TABLET | Refills: 3 | Status: SHIPPED | OUTPATIENT
Start: 2021-09-02 | End: 2022-01-07

## 2021-09-22 ENCOUNTER — OFFICE VISIT (OUTPATIENT)
Dept: UROLOGY | Facility: CLINIC | Age: 71
End: 2021-09-22
Payer: MEDICARE

## 2021-09-22 ENCOUNTER — TELEPHONE (OUTPATIENT)
Dept: ADMINISTRATIVE | Facility: HOSPITAL | Age: 71
End: 2021-09-22

## 2021-09-22 VITALS — DIASTOLIC BLOOD PRESSURE: 80 MMHG | WEIGHT: 295.5 LBS | SYSTOLIC BLOOD PRESSURE: 140 MMHG | BODY MASS INDEX: 37.94 KG/M2

## 2021-09-22 DIAGNOSIS — N52.9 ERECTILE DYSFUNCTION, UNSPECIFIED ERECTILE DYSFUNCTION TYPE: Primary | ICD-10-CM

## 2021-09-22 DIAGNOSIS — R97.20 ELEVATED PSA: ICD-10-CM

## 2021-09-22 PROCEDURE — 99212 OFFICE O/P EST SF 10 MIN: CPT | Mod: PBBFAC | Performed by: UROLOGY

## 2021-09-22 PROCEDURE — 99214 PR OFFICE/OUTPT VISIT, EST, LEVL IV, 30-39 MIN: ICD-10-PCS | Mod: S$PBB,,, | Performed by: UROLOGY

## 2021-09-22 PROCEDURE — 99999 PR PBB SHADOW E&M-EST. PATIENT-LVL II: ICD-10-PCS | Mod: PBBFAC,,, | Performed by: UROLOGY

## 2021-09-22 PROCEDURE — 99999 PR PBB SHADOW E&M-EST. PATIENT-LVL II: CPT | Mod: PBBFAC,,, | Performed by: UROLOGY

## 2021-09-22 PROCEDURE — 99214 OFFICE O/P EST MOD 30 MIN: CPT | Mod: S$PBB,,, | Performed by: UROLOGY

## 2021-09-25 DIAGNOSIS — Z79.4 TYPE 2 DIABETES MELLITUS WITH STAGE 3B CHRONIC KIDNEY DISEASE, WITH LONG-TERM CURRENT USE OF INSULIN: ICD-10-CM

## 2021-09-25 DIAGNOSIS — N18.32 TYPE 2 DIABETES MELLITUS WITH STAGE 3B CHRONIC KIDNEY DISEASE, WITH LONG-TERM CURRENT USE OF INSULIN: ICD-10-CM

## 2021-09-25 DIAGNOSIS — E11.22 TYPE 2 DIABETES MELLITUS WITH STAGE 3B CHRONIC KIDNEY DISEASE, WITH LONG-TERM CURRENT USE OF INSULIN: ICD-10-CM

## 2021-09-25 RX ORDER — FLASH GLUCOSE SENSOR
1 KIT MISCELLANEOUS
Qty: 6 KIT | Refills: 2 | Status: SHIPPED | OUTPATIENT
Start: 2021-09-25 | End: 2022-04-27

## 2021-10-13 ENCOUNTER — LAB VISIT (OUTPATIENT)
Dept: LAB | Facility: HOSPITAL | Age: 71
End: 2021-10-13
Attending: NURSE PRACTITIONER
Payer: MEDICARE

## 2021-10-13 DIAGNOSIS — E11.22 TYPE 2 DIABETES MELLITUS WITH STAGE 3A CHRONIC KIDNEY DISEASE, WITHOUT LONG-TERM CURRENT USE OF INSULIN: ICD-10-CM

## 2021-10-13 DIAGNOSIS — N18.31 TYPE 2 DIABETES MELLITUS WITH STAGE 3A CHRONIC KIDNEY DISEASE, WITHOUT LONG-TERM CURRENT USE OF INSULIN: ICD-10-CM

## 2021-10-13 LAB
ALBUMIN SERPL BCP-MCNC: 2.9 G/DL (ref 3.5–5.2)
ALP SERPL-CCNC: 105 U/L (ref 55–135)
ALT SERPL W/O P-5'-P-CCNC: 26 U/L (ref 10–44)
ANION GAP SERPL CALC-SCNC: 7 MMOL/L (ref 8–16)
AST SERPL-CCNC: 35 U/L (ref 10–40)
BILIRUB SERPL-MCNC: 0.3 MG/DL (ref 0.1–1)
BUN SERPL-MCNC: 15 MG/DL (ref 8–23)
CALCIUM SERPL-MCNC: 8.6 MG/DL (ref 8.7–10.5)
CHLORIDE SERPL-SCNC: 112 MMOL/L (ref 95–110)
CHOLEST SERPL-MCNC: 141 MG/DL (ref 120–199)
CHOLEST/HDLC SERPL: 4.4 {RATIO} (ref 2–5)
CO2 SERPL-SCNC: 25 MMOL/L (ref 23–29)
CREAT SERPL-MCNC: 1.6 MG/DL (ref 0.5–1.4)
EST. GFR  (AFRICAN AMERICAN): 49.4 ML/MIN/1.73 M^2
EST. GFR  (NON AFRICAN AMERICAN): 42.7 ML/MIN/1.73 M^2
ESTIMATED AVG GLUCOSE: 157 MG/DL (ref 68–131)
GLUCOSE SERPL-MCNC: 114 MG/DL (ref 70–110)
HBA1C MFR BLD: 7.1 % (ref 4–5.6)
HDLC SERPL-MCNC: 32 MG/DL (ref 40–75)
HDLC SERPL: 22.7 % (ref 20–50)
LDLC SERPL CALC-MCNC: 71.2 MG/DL (ref 63–159)
NONHDLC SERPL-MCNC: 109 MG/DL
POTASSIUM SERPL-SCNC: 4.2 MMOL/L (ref 3.5–5.1)
PROT SERPL-MCNC: 6.7 G/DL (ref 6–8.4)
SODIUM SERPL-SCNC: 144 MMOL/L (ref 136–145)
TRIGL SERPL-MCNC: 189 MG/DL (ref 30–150)

## 2021-10-13 PROCEDURE — 36415 COLL VENOUS BLD VENIPUNCTURE: CPT | Mod: PO | Performed by: NURSE PRACTITIONER

## 2021-10-13 PROCEDURE — 80061 LIPID PANEL: CPT | Performed by: NURSE PRACTITIONER

## 2021-10-13 PROCEDURE — 80053 COMPREHEN METABOLIC PANEL: CPT | Performed by: NURSE PRACTITIONER

## 2021-10-13 PROCEDURE — 83036 HEMOGLOBIN GLYCOSYLATED A1C: CPT | Performed by: NURSE PRACTITIONER

## 2021-10-27 ENCOUNTER — OFFICE VISIT (OUTPATIENT)
Dept: DIABETES | Facility: CLINIC | Age: 71
End: 2021-10-27
Payer: MEDICARE

## 2021-10-27 VITALS
DIASTOLIC BLOOD PRESSURE: 84 MMHG | HEART RATE: 70 BPM | WEIGHT: 295.44 LBS | HEIGHT: 74 IN | BODY MASS INDEX: 37.92 KG/M2 | SYSTOLIC BLOOD PRESSURE: 152 MMHG

## 2021-10-27 DIAGNOSIS — N18.31 TYPE 2 DIABETES MELLITUS WITH STAGE 3A CHRONIC KIDNEY DISEASE, WITHOUT LONG-TERM CURRENT USE OF INSULIN: Primary | ICD-10-CM

## 2021-10-27 DIAGNOSIS — E11.22 TYPE 2 DIABETES MELLITUS WITH STAGE 3A CHRONIC KIDNEY DISEASE, WITHOUT LONG-TERM CURRENT USE OF INSULIN: Primary | ICD-10-CM

## 2021-10-27 DIAGNOSIS — E78.2 MIXED HYPERLIPIDEMIA: ICD-10-CM

## 2021-10-27 LAB — GLUCOSE SERPL-MCNC: 109 MG/DL (ref 70–110)

## 2021-10-27 PROCEDURE — 99215 OFFICE O/P EST HI 40 MIN: CPT | Mod: PBBFAC,PO | Performed by: NURSE PRACTITIONER

## 2021-10-27 PROCEDURE — 99999 PR PBB SHADOW E&M-EST. PATIENT-LVL V: CPT | Mod: PBBFAC,,, | Performed by: NURSE PRACTITIONER

## 2021-10-27 PROCEDURE — 99214 PR OFFICE/OUTPT VISIT, EST, LEVL IV, 30-39 MIN: ICD-10-PCS | Mod: S$PBB,,, | Performed by: NURSE PRACTITIONER

## 2021-10-27 PROCEDURE — 99214 OFFICE O/P EST MOD 30 MIN: CPT | Mod: S$PBB,,, | Performed by: NURSE PRACTITIONER

## 2021-10-27 PROCEDURE — 82962 GLUCOSE BLOOD TEST: CPT | Mod: PBBFAC,PO | Performed by: NURSE PRACTITIONER

## 2021-10-27 PROCEDURE — 99999 PR PBB SHADOW E&M-EST. PATIENT-LVL V: ICD-10-PCS | Mod: PBBFAC,,, | Performed by: NURSE PRACTITIONER

## 2021-11-10 LAB
LEFT EYE DM RETINOPATHY: NEGATIVE
RIGHT EYE DM RETINOPATHY: NEGATIVE

## 2021-11-11 DIAGNOSIS — I15.8 HYPERTENSION ASSOCIATED WITH TRANSPLANTATION: ICD-10-CM

## 2021-11-11 DIAGNOSIS — Z94.9 HYPERTENSION ASSOCIATED WITH TRANSPLANTATION: ICD-10-CM

## 2021-11-11 RX ORDER — TORSEMIDE 5 MG/1
TABLET ORAL
Qty: 180 TABLET | Refills: 3 | Status: ON HOLD | OUTPATIENT
Start: 2021-11-11 | End: 2022-07-07 | Stop reason: SDUPTHER

## 2021-11-11 RX ORDER — METOPROLOL SUCCINATE 50 MG/1
TABLET, EXTENDED RELEASE ORAL
Qty: 90 TABLET | Refills: 0 | Status: SHIPPED | OUTPATIENT
Start: 2021-11-11 | End: 2022-01-24 | Stop reason: SDUPTHER

## 2021-11-12 ENCOUNTER — PATIENT MESSAGE (OUTPATIENT)
Dept: FAMILY MEDICINE | Facility: CLINIC | Age: 71
End: 2021-11-12
Payer: MEDICARE

## 2021-11-23 ENCOUNTER — CLINICAL SUPPORT (OUTPATIENT)
Dept: URGENT CARE | Facility: CLINIC | Age: 71
End: 2021-11-23
Payer: MEDICARE

## 2021-11-23 DIAGNOSIS — Z11.52 ENCOUNTER FOR SCREENING FOR COVID-19: Primary | ICD-10-CM

## 2021-11-23 LAB
CTP QC/QA: YES
SARS-COV-2 RDRP RESP QL NAA+PROBE: NEGATIVE

## 2021-11-23 PROCEDURE — U0002: ICD-10-PCS | Mod: QW,CR,S$GLB, | Performed by: PHYSICIAN ASSISTANT

## 2021-11-23 PROCEDURE — U0002 COVID-19 LAB TEST NON-CDC: HCPCS | Mod: QW,CR,S$GLB, | Performed by: PHYSICIAN ASSISTANT

## 2021-12-16 ENCOUNTER — PATIENT OUTREACH (OUTPATIENT)
Dept: ADMINISTRATIVE | Facility: HOSPITAL | Age: 71
End: 2021-12-16
Payer: MEDICARE

## 2021-12-16 ENCOUNTER — PATIENT MESSAGE (OUTPATIENT)
Dept: ADMINISTRATIVE | Facility: HOSPITAL | Age: 71
End: 2021-12-16
Payer: MEDICARE

## 2022-01-04 ENCOUNTER — PATIENT MESSAGE (OUTPATIENT)
Dept: ADMINISTRATIVE | Facility: HOSPITAL | Age: 72
End: 2022-01-04
Payer: MEDICARE

## 2022-01-04 ENCOUNTER — PATIENT MESSAGE (OUTPATIENT)
Dept: TRANSPLANT | Facility: CLINIC | Age: 72
End: 2022-01-04
Payer: MEDICARE

## 2022-01-05 ENCOUNTER — TELEPHONE (OUTPATIENT)
Dept: FAMILY MEDICINE | Facility: CLINIC | Age: 72
End: 2022-01-05
Payer: MEDICARE

## 2022-01-05 NOTE — TELEPHONE ENCOUNTER
Working HTN Report.    Responded by portal message on 1/04/22 to request on 12/16/21 for updated bp reading.  /78, remote entry made.

## 2022-01-06 ENCOUNTER — PATIENT OUTREACH (OUTPATIENT)
Dept: ADMINISTRATIVE | Facility: HOSPITAL | Age: 72
End: 2022-01-06
Payer: MEDICARE

## 2022-01-07 ENCOUNTER — TELEPHONE (OUTPATIENT)
Dept: PAIN MEDICINE | Facility: CLINIC | Age: 72
End: 2022-01-07
Payer: MEDICARE

## 2022-01-07 ENCOUNTER — OFFICE VISIT (OUTPATIENT)
Dept: FAMILY MEDICINE | Facility: CLINIC | Age: 72
End: 2022-01-07
Attending: FAMILY MEDICINE
Payer: MEDICARE

## 2022-01-07 VITALS
HEIGHT: 74 IN | BODY MASS INDEX: 38.49 KG/M2 | WEIGHT: 299.94 LBS | TEMPERATURE: 98 F | SYSTOLIC BLOOD PRESSURE: 150 MMHG | DIASTOLIC BLOOD PRESSURE: 86 MMHG | OXYGEN SATURATION: 99 % | HEART RATE: 70 BPM

## 2022-01-07 DIAGNOSIS — E11.69 DM TYPE 2 WITH DIABETIC DYSLIPIDEMIA: Primary | ICD-10-CM

## 2022-01-07 DIAGNOSIS — N18.30 STAGE 3 CHRONIC KIDNEY DISEASE, UNSPECIFIED WHETHER STAGE 3A OR 3B CKD: ICD-10-CM

## 2022-01-07 DIAGNOSIS — B02.29 POST HERPETIC NEURALGIA: ICD-10-CM

## 2022-01-07 DIAGNOSIS — I10 HYPERTENSION, UNSPECIFIED TYPE: ICD-10-CM

## 2022-01-07 DIAGNOSIS — D84.9 IMMUNOSUPPRESSION: ICD-10-CM

## 2022-01-07 DIAGNOSIS — Z94.1 HEART TRANSPLANTED: ICD-10-CM

## 2022-01-07 DIAGNOSIS — E78.5 DM TYPE 2 WITH DIABETIC DYSLIPIDEMIA: Primary | ICD-10-CM

## 2022-01-07 PROCEDURE — 99214 OFFICE O/P EST MOD 30 MIN: CPT | Mod: S$PBB,,, | Performed by: FAMILY MEDICINE

## 2022-01-07 PROCEDURE — 99214 OFFICE O/P EST MOD 30 MIN: CPT | Mod: PBBFAC,PO | Performed by: FAMILY MEDICINE

## 2022-01-07 PROCEDURE — 99999 PR PBB SHADOW E&M-EST. PATIENT-LVL IV: ICD-10-PCS | Mod: PBBFAC,,, | Performed by: FAMILY MEDICINE

## 2022-01-07 PROCEDURE — 99999 PR PBB SHADOW E&M-EST. PATIENT-LVL IV: CPT | Mod: PBBFAC,,, | Performed by: FAMILY MEDICINE

## 2022-01-07 PROCEDURE — 99214 PR OFFICE/OUTPT VISIT, EST, LEVL IV, 30-39 MIN: ICD-10-PCS | Mod: S$PBB,,, | Performed by: FAMILY MEDICINE

## 2022-01-07 RX ORDER — AMLODIPINE BESYLATE 10 MG/1
10 TABLET ORAL DAILY
Qty: 90 TABLET | Refills: 4 | Status: SHIPPED | OUTPATIENT
Start: 2022-01-07 | End: 2022-06-03

## 2022-01-07 RX ORDER — SEMAGLUTIDE 1.34 MG/ML
INJECTION, SOLUTION SUBCUTANEOUS
Qty: 6 PEN | Refills: 1 | Status: SHIPPED | OUTPATIENT
Start: 2022-01-07 | End: 2022-05-27

## 2022-01-07 NOTE — PROGRESS NOTES
Subjective:       Patient ID: Nigel Albrecht is a 71 y.o. male.    Chief Complaint: Annual Exam    71  y old male with t2DM , HTN , DLP ,  Severe obesity , CKD s/p OHT and DDKT here [place] , On aspirin, he walks 15 min daily 3 times weekly , He does not follow any renal diet , no low carb . Opthalmology :  Seen at [facility]  : seen last  Feb ., Fastin bs : 105     , 170  1 hrs after dinner  . F.barbie with Dr Vázquez([facility]) and transplant team in [place] . Denies any CP . Sob , palpations . off of prednisone  . Still with postherpetic neuralgia . Gabapentin caused nausea. Percocet alleviated pain . Seldom takes it . On daily amitriptyline and topical lidocaine . Will like alterbative to opiates     Review of Systems   Constitutional: Negative.    HENT: Negative.    Eyes: Negative.    Respiratory: Negative.    Cardiovascular: Negative.    Gastrointestinal: Negative.    Genitourinary: Negative.    Musculoskeletal: Negative.    Skin: Negative.    Hematological: Negative.        Objective:      Physical Exam  Constitutional:       General: He is not in acute distress.     Appearance: He is well-developed and well-nourished. He is not diaphoretic.   HENT:      Head: Normocephalic and atraumatic.      Right Ear: External ear normal.      Left Ear: External ear normal.      Nose: Nose normal.      Mouth/Throat:      Pharynx: No oropharyngeal exudate.   Eyes:      General: No scleral icterus.        Right eye: No discharge.         Left eye: No discharge.      Extraocular Movements: EOM normal.      Conjunctiva/sclera: Conjunctivae normal.      Pupils: Pupils are equal, round, and reactive to light.   Neck:      Thyroid: No thyromegaly.      Vascular: No JVD.      Trachea: No tracheal deviation.   Cardiovascular:      Rate and Rhythm: Normal rate and regular rhythm.      Pulses: Intact distal pulses.      Heart sounds: Normal heart sounds. No murmur heard.  No friction rub. No gallop.    Pulmonary:      Effort: Pulmonary  effort is normal. No respiratory distress.      Breath sounds: Normal breath sounds. No stridor. No wheezing or rales.   Chest:      Chest wall: No tenderness.   Abdominal:      General: Bowel sounds are normal. There is no distension.      Palpations: Abdomen is soft.      Tenderness: There is no abdominal tenderness. There is no guarding or rebound.   Musculoskeletal:         General: No tenderness or edema. Normal range of motion.      Cervical back: Normal range of motion and neck supple.   Lymphadenopathy:      Cervical: No cervical adenopathy.   Skin:     General: Skin is warm and dry.      Coloration: Skin is not pale.      Findings: No erythema or rash.   Neurological:      Mental Status: He is alert and oriented to person, place, and time.      Cranial Nerves: No cranial nerve deficit.      Motor: No abnormal muscle tone.      Coordination: Coordination normal.      Deep Tendon Reflexes: Reflexes are normal and symmetric. Reflexes normal.   Psychiatric:         Mood and Affect: Mood and affect normal.         Behavior: Behavior normal.         Thought Content: Thought content normal.         Judgment: Judgment normal.         Assessment:     Nigel was seen today for annual exam.    Diagnoses and all orders for this visit:    DM type 2 with diabetic dyslipidemia  -     Lipid Panel; Future    Immunosuppression    Heart transplanted    Stage 3 chronic kidney disease, unspecified whether stage 3a or 3b CKD    Post herpetic neuralgia  -     Ambulatory referral/consult to [facility]; Future    Hypertension, unspecified type    Other orders  -     semaglutide (OZEMPIC) 1 mg/dose (2 mg/1.5 mL) PnIj; INJECT 1MG SUBCUTANEOUSLY  WEEKLY  -     amLODIPine (NORVASC) 10 MG tablet; Take 1 tablet (10 mg total) by mouth once daily.      Plan:   Diet and exercise   F.u with Transplant team   Avoid NSAID.f.u with nephro   Uncontrolled bp . Increase Amlodipine . Email BP readings in 1 w

## 2022-01-10 ENCOUNTER — LAB VISIT (OUTPATIENT)
Dept: LAB | Facility: HOSPITAL | Age: 72
End: 2022-01-10
Attending: UROLOGY
Payer: MEDICARE

## 2022-01-10 ENCOUNTER — TELEPHONE (OUTPATIENT)
Dept: PAIN MEDICINE | Facility: CLINIC | Age: 72
End: 2022-01-10
Payer: MEDICARE

## 2022-01-10 DIAGNOSIS — Z94.1 STATUS POST HEART TRANSPLANT: ICD-10-CM

## 2022-01-10 DIAGNOSIS — E78.5 DM TYPE 2 WITH DIABETIC DYSLIPIDEMIA: ICD-10-CM

## 2022-01-10 DIAGNOSIS — E78.2 MIXED HYPERLIPIDEMIA: ICD-10-CM

## 2022-01-10 DIAGNOSIS — E11.69 DM TYPE 2 WITH DIABETIC DYSLIPIDEMIA: ICD-10-CM

## 2022-01-10 DIAGNOSIS — R97.20 ELEVATED PSA: ICD-10-CM

## 2022-01-10 LAB
BASOPHILS # BLD AUTO: 0.03 K/UL (ref 0–0.2)
BASOPHILS NFR BLD: 0.5 % (ref 0–1.9)
DIFFERENTIAL METHOD: ABNORMAL
EOSINOPHIL # BLD AUTO: 0.1 K/UL (ref 0–0.5)
EOSINOPHIL NFR BLD: 1.6 % (ref 0–8)
ERYTHROCYTE [DISTWIDTH] IN BLOOD BY AUTOMATED COUNT: 15 % (ref 11.5–14.5)
HCT VFR BLD AUTO: 35.3 % (ref 40–54)
HGB BLD-MCNC: 11 G/DL (ref 14–18)
IMM GRANULOCYTES # BLD AUTO: 0.01 K/UL (ref 0–0.04)
IMM GRANULOCYTES NFR BLD AUTO: 0.2 % (ref 0–0.5)
LYMPHOCYTES # BLD AUTO: 1.6 K/UL (ref 1–4.8)
LYMPHOCYTES NFR BLD: 29.4 % (ref 18–48)
MCH RBC QN AUTO: 26 PG (ref 27–31)
MCHC RBC AUTO-ENTMCNC: 31.2 G/DL (ref 32–36)
MCV RBC AUTO: 84 FL (ref 82–98)
MONOCYTES # BLD AUTO: 0.5 K/UL (ref 0.3–1)
MONOCYTES NFR BLD: 8.2 % (ref 4–15)
NEUTROPHILS # BLD AUTO: 3.3 K/UL (ref 1.8–7.7)
NEUTROPHILS NFR BLD: 60.1 % (ref 38–73)
NRBC BLD-RTO: 0 /100 WBC
PLATELET # BLD AUTO: 218 K/UL (ref 150–450)
PMV BLD AUTO: 10.9 FL (ref 9.2–12.9)
RBC # BLD AUTO: 4.23 M/UL (ref 4.6–6.2)
WBC # BLD AUTO: 5.47 K/UL (ref 3.9–12.7)

## 2022-01-10 PROCEDURE — 80195 ASSAY OF SIROLIMUS: CPT | Performed by: INTERNAL MEDICINE

## 2022-01-10 PROCEDURE — 86833 HLA CLASS II HIGH DEFIN QUAL: CPT | Mod: 59 | Performed by: INTERNAL MEDICINE

## 2022-01-10 PROCEDURE — 84153 ASSAY OF PSA TOTAL: CPT | Performed by: UROLOGY

## 2022-01-10 PROCEDURE — 80061 LIPID PANEL: CPT | Performed by: INTERNAL MEDICINE

## 2022-01-10 PROCEDURE — 86977 RBC SERUM PRETX INCUBJ/INHIB: CPT | Performed by: INTERNAL MEDICINE

## 2022-01-10 PROCEDURE — 86832 HLA CLASS I HIGH DEFIN QUAL: CPT | Performed by: INTERNAL MEDICINE

## 2022-01-10 PROCEDURE — 86832 HLA CLASS I HIGH DEFIN QUAL: CPT | Mod: 59 | Performed by: INTERNAL MEDICINE

## 2022-01-10 PROCEDURE — 80053 COMPREHEN METABOLIC PANEL: CPT | Performed by: INTERNAL MEDICINE

## 2022-01-10 PROCEDURE — 36415 COLL VENOUS BLD VENIPUNCTURE: CPT | Mod: PO | Performed by: UROLOGY

## 2022-01-10 PROCEDURE — 86833 HLA CLASS II HIGH DEFIN QUAL: CPT | Performed by: INTERNAL MEDICINE

## 2022-01-10 PROCEDURE — 83880 ASSAY OF NATRIURETIC PEPTIDE: CPT | Performed by: INTERNAL MEDICINE

## 2022-01-10 PROCEDURE — 85025 COMPLETE CBC W/AUTO DIFF WBC: CPT | Performed by: INTERNAL MEDICINE

## 2022-01-11 ENCOUNTER — PATIENT MESSAGE (OUTPATIENT)
Dept: TRANSPLANT | Facility: CLINIC | Age: 72
End: 2022-01-11
Payer: MEDICARE

## 2022-01-11 LAB
ALBUMIN SERPL BCP-MCNC: 3 G/DL (ref 3.5–5.2)
ALP SERPL-CCNC: 110 U/L (ref 55–135)
ALT SERPL W/O P-5'-P-CCNC: 22 U/L (ref 10–44)
ANION GAP SERPL CALC-SCNC: 7 MMOL/L (ref 8–16)
AST SERPL-CCNC: 30 U/L (ref 10–40)
BILIRUB SERPL-MCNC: 0.4 MG/DL (ref 0.1–1)
BNP SERPL-MCNC: 144 PG/ML (ref 0–99)
BUN SERPL-MCNC: 17 MG/DL (ref 8–23)
CALCIUM SERPL-MCNC: 8.5 MG/DL (ref 8.7–10.5)
CHLORIDE SERPL-SCNC: 108 MMOL/L (ref 95–110)
CHOLEST SERPL-MCNC: 154 MG/DL (ref 120–199)
CHOLEST SERPL-MCNC: 154 MG/DL (ref 120–199)
CHOLEST/HDLC SERPL: 3.8 {RATIO} (ref 2–5)
CHOLEST/HDLC SERPL: 3.8 {RATIO} (ref 2–5)
CO2 SERPL-SCNC: 26 MMOL/L (ref 23–29)
COMPLEXED PSA SERPL-MCNC: 4 NG/ML (ref 0–4)
CREAT SERPL-MCNC: 1.7 MG/DL (ref 0.5–1.4)
EST. GFR  (AFRICAN AMERICAN): 45.9 ML/MIN/1.73 M^2
EST. GFR  (NON AFRICAN AMERICAN): 39.7 ML/MIN/1.73 M^2
GLUCOSE SERPL-MCNC: 87 MG/DL (ref 70–110)
HDLC SERPL-MCNC: 41 MG/DL (ref 40–75)
HDLC SERPL-MCNC: 41 MG/DL (ref 40–75)
HDLC SERPL: 26.6 % (ref 20–50)
HDLC SERPL: 26.6 % (ref 20–50)
LDLC SERPL CALC-MCNC: 86.6 MG/DL (ref 63–159)
LDLC SERPL CALC-MCNC: 86.6 MG/DL (ref 63–159)
NONHDLC SERPL-MCNC: 113 MG/DL
NONHDLC SERPL-MCNC: 113 MG/DL
POTASSIUM SERPL-SCNC: 4.1 MMOL/L (ref 3.5–5.1)
PROT SERPL-MCNC: 6.2 G/DL (ref 6–8.4)
SIROLIMUS BLD-MCNC: 6 NG/ML (ref 4–20)
SODIUM SERPL-SCNC: 141 MMOL/L (ref 136–145)
TRIGL SERPL-MCNC: 132 MG/DL (ref 30–150)
TRIGL SERPL-MCNC: 132 MG/DL (ref 30–150)

## 2022-01-14 ENCOUNTER — OFFICE VISIT (OUTPATIENT)
Dept: UROLOGY | Facility: CLINIC | Age: 72
End: 2022-01-14
Payer: MEDICARE

## 2022-01-14 VITALS
BODY MASS INDEX: 38.2 KG/M2 | SYSTOLIC BLOOD PRESSURE: 162 MMHG | WEIGHT: 297.63 LBS | HEART RATE: 71 BPM | DIASTOLIC BLOOD PRESSURE: 84 MMHG | HEIGHT: 74 IN | TEMPERATURE: 98 F

## 2022-01-14 DIAGNOSIS — N52.9 ERECTILE DYSFUNCTION, UNSPECIFIED ERECTILE DYSFUNCTION TYPE: ICD-10-CM

## 2022-01-14 DIAGNOSIS — R97.20 ELEVATED PSA: Primary | ICD-10-CM

## 2022-01-14 PROCEDURE — 99214 OFFICE O/P EST MOD 30 MIN: CPT | Mod: PBBFAC | Performed by: UROLOGY

## 2022-01-14 PROCEDURE — 99214 OFFICE O/P EST MOD 30 MIN: CPT | Mod: S$PBB,,, | Performed by: UROLOGY

## 2022-01-14 PROCEDURE — 99214 PR OFFICE/OUTPT VISIT, EST, LEVL IV, 30-39 MIN: ICD-10-PCS | Mod: S$PBB,,, | Performed by: UROLOGY

## 2022-01-14 PROCEDURE — 99999 PR PBB SHADOW E&M-EST. PATIENT-LVL IV: ICD-10-PCS | Mod: PBBFAC,,, | Performed by: UROLOGY

## 2022-01-14 PROCEDURE — 99999 PR PBB SHADOW E&M-EST. PATIENT-LVL IV: CPT | Mod: PBBFAC,,, | Performed by: UROLOGY

## 2022-01-14 RX ORDER — OXYCODONE AND ACETAMINOPHEN 5; 325 MG/1; MG/1
1 TABLET ORAL EVERY 4 HOURS PRN
Qty: 10 TABLET | Refills: 0 | Status: SHIPPED | OUTPATIENT
Start: 2022-01-14 | End: 2022-05-04

## 2022-01-14 RX ORDER — DIAZEPAM 5 MG/1
5 TABLET ORAL ONCE
Qty: 1 TABLET | Refills: 0 | Status: SHIPPED | OUTPATIENT
Start: 2022-01-14 | End: 2022-05-04

## 2022-01-14 RX ORDER — SULFAMETHOXAZOLE AND TRIMETHOPRIM 800; 160 MG/1; MG/1
1 TABLET ORAL 2 TIMES DAILY
Qty: 10 TABLET | Refills: 0 | Status: SHIPPED | OUTPATIENT
Start: 2022-01-14 | End: 2022-02-09 | Stop reason: ALTCHOICE

## 2022-01-14 RX ORDER — SOLIFENACIN SUCCINATE 5 MG/1
5 TABLET, FILM COATED ORAL DAILY
Qty: 90 TABLET | Refills: 3 | Status: SHIPPED | OUTPATIENT
Start: 2022-01-14 | End: 2022-05-04

## 2022-01-14 NOTE — PROGRESS NOTES
Chief Complaint:   Encounter Diagnoses   Name Primary?    Elevated PSA Yes    Erectile dysfunction, unspecified erectile dysfunction type            HPI:   22- patient's PSA is still rising, after discussions he is considering a biopsy.  He has noticed that his urgency appears to be a little bit worse, good flow.  Erections are really not an issue.  3/30/16: 66 yo man s/p renal transplant in  here with nocturia x3, was x0-1 4-5 months ago.  No hesitancy.  Some dribble when done putting things away.Urgency.  Some double voiding.  No abd/pelvic pain and no exac/rel factors.  No hematuria.  No urolithiasis.  No urinary bother.  No  history.  Normal sexual function.  Not usually constipated.  Viagra for ED rx but not used but once.  No BPH meds.    Allergies:  No known drug allergies    Medications:  has a current medication list which includes the following prescription(s): amlodipine, atorvastatin, blood sugar diagnostic, calcium carbonate, cholecalciferol (vitamin d3), freestyle jackie 2 reader, freestyle jackie 2 sensor, fluticasone propionate, freestyle jackie 2 sensor, gabapentin, humulin 70/30 u-100 kwikpen, lisinopril, low-dose aspirin, metoprolol succinate, metoprolol succinate, multivit-min/fa/lycopen/lutein, mycophenolate, ozempic, sirolimus, and torsemide.    Review of Systems:  General: No fever, chills, fatigability, or weight loss.  Skin: No rashes, itching, or changes in color or texture of skin.  Chest: Denies PETERSON, cyanosis, wheezing, cough, and sputum production.  Abdomen: Appetite fine. No weight loss. Denies diarrhea, abdominal pain, hematemesis, or blood in stool.  Musculoskeletal: No joint stiffness or swelling. Some back pain.  : As above.  All other review of systems negative.    PMH:   has a past medical history of Allergic rhinitis (2013), Blood transfusion, Cataract, CKD (chronic kidney disease), stage III (2014), -donor kidney transplant, Diabetes mellitus, type  2 (6/26/2013), Gout, arthritis (6/26/2013), Heart attack, Heart transplanted, Hyperlipidemia (6/26/2013), Hypertension, Immunodeficiency due to treatment with immunosuppressive medication, Morbid obesity (6/26/2013), Organ transplant (2002), Prophylactic immunotherapy, and Renal manifestation of secondary diabetes mellitus.    PSH:   has a past surgical history that includes Kidney transplant; Heart transplant; Revision total hip arthroplasty; Eye surgery; Cataract extraction (Bilateral); and Colonoscopy (N/A, 10/6/2020).    FamHx: family history includes Diabetes in his brother and maternal grandmother; Early death in his brother; Heart disease in his brother; Hyperlipidemia in his brother and sister; Hypertension in his brother; Kidney disease in his son; Stroke in his brother and mother.    SocHx:  reports that he quit smoking about 37 years ago. His smoking use included cigarettes. He has a 20.00 pack-year smoking history. He has never used smokeless tobacco. He reports current alcohol use of about 1.0 standard drink of alcohol per week. He reports that he does not use drugs.      Physical Exam:  Vitals:    01/14/22 0900   BP: (!) 162/84   Pulse: 71   Temp: 98.1 °F (36.7 °C)     General: A&Ox3, no apparent distress, no deformities  Neck: No masses, normal thyroid  Lungs: normal inspiration, no use of accessory muscles  Heart: normal pulse, no arrhythmias  Abdomen: Soft, NT, ND  Skin: The skin is warm and dry. No jaundice.  Ext: No c/c/e.  MARISOL: 1/22- 25g no nodules or fixation, otherwise benign.    Labs/Studies:   PSA 4.0 1/22  PSA 3.4 8/21  PSA 2.5 8/20  PSA 2.1 8/19    Impression/Plan:       1. Erectile dysfunction-  Patient was using TriMix, this is not an issue though.    2. Elevated PSA-  PSA is continuing to rise, therefore will go ahead and pursue a prostate needle biopsy.  We have discussed the implications of PSA screening, the risks, benefits and alternatives of the biopsy.  We will schedule today.       3. Urgency- relatively new finding, states that his flow is good.  Will go ahead and attempt a VESIcare 5 mg trial and re-evaluate at the next appointment with a PVR.  I have instructed him that this may cause dry mouth, dry eyes, urinary retention and constipation.  If he has any issues he is to stop the medication and contact our office.  If this does not improve we may then focus more on BPH.

## 2022-01-18 LAB
C1Q1 TESTING DATE: NORMAL
C1Q2 TESTING DATE: NORMAL
CLASS I ANTIBODY COMMENTS - LUMINEX: NORMAL
CLASS II ANTIBODY COMMENTS - LUMINEX: NORMAL
HC1Q TESTING DATE: NORMAL
SERUM COLLECTION DT - LUMINEX CLASS I: NORMAL
SERUM COLLECTION DT - LUMINEX CLASS II: NORMAL

## 2022-01-24 ENCOUNTER — PATIENT MESSAGE (OUTPATIENT)
Dept: ADMINISTRATIVE | Facility: HOSPITAL | Age: 72
End: 2022-01-24
Payer: MEDICARE

## 2022-01-24 DIAGNOSIS — I15.8 HYPERTENSION ASSOCIATED WITH TRANSPLANTATION: ICD-10-CM

## 2022-01-24 DIAGNOSIS — Z94.1 STATUS POST HEART TRANSPLANT: ICD-10-CM

## 2022-01-24 DIAGNOSIS — Z94.9 HYPERTENSION ASSOCIATED WITH TRANSPLANTATION: ICD-10-CM

## 2022-01-24 RX ORDER — METOPROLOL SUCCINATE 50 MG/1
50 TABLET, EXTENDED RELEASE ORAL DAILY
Qty: 90 TABLET | Refills: 1 | Status: SHIPPED | OUTPATIENT
Start: 2022-01-24 | End: 2022-07-18

## 2022-01-24 RX ORDER — MYCOPHENOLATE MOFETIL 250 MG/1
CAPSULE ORAL
Qty: 540 CAPSULE | Refills: 3 | OUTPATIENT
Start: 2022-01-24

## 2022-01-24 NOTE — TELEPHONE ENCOUNTER
Care Due:                  Date            Visit Type   Department     Provider  --------------------------------------------------------------------------------                                             [facility]  Last Visit: 01-      None         REYNALDO Zimmerman  Next Visit: None Scheduled  None         None Found                                                            Last  Test          Frequency    Reason                     Performed    Due Date  --------------------------------------------------------------------------------    HBA1C.......  6 months...  semaglutide..............  10-   04-    Powered by [employer]. Reference number: [id number].   1/24/2022 4:42:00 PM CST

## 2022-01-26 DIAGNOSIS — Z94.1 HEART REPLACED BY TRANSPLANT: Primary | ICD-10-CM

## 2022-01-26 DIAGNOSIS — Z94.0 KIDNEY REPLACED BY TRANSPLANT: ICD-10-CM

## 2022-01-27 DIAGNOSIS — Z94.1 STATUS POST HEART TRANSPLANT: ICD-10-CM

## 2022-01-27 RX ORDER — MYCOPHENOLATE MOFETIL 250 MG/1
750 CAPSULE ORAL 2 TIMES DAILY
Qty: 540 CAPSULE | Refills: 3 | Status: ON HOLD | OUTPATIENT
Start: 2022-01-27 | End: 2022-07-20 | Stop reason: HOSPADM

## 2022-01-31 ENCOUNTER — PES CALL (OUTPATIENT)
Dept: ADMINISTRATIVE | Facility: CLINIC | Age: 72
End: 2022-01-31
Payer: MEDICARE

## 2022-02-01 ENCOUNTER — TELEPHONE (OUTPATIENT)
Dept: FAMILY MEDICINE | Facility: CLINIC | Age: 72
End: 2022-02-01
Payer: MEDICARE

## 2022-02-01 ENCOUNTER — PATIENT OUTREACH (OUTPATIENT)
Dept: ADMINISTRATIVE | Facility: HOSPITAL | Age: 72
End: 2022-02-01
Payer: MEDICARE

## 2022-02-02 ENCOUNTER — TELEPHONE (OUTPATIENT)
Dept: TRANSPLANT | Facility: CLINIC | Age: 72
End: 2022-02-02
Payer: MEDICARE

## 2022-02-08 PROBLEM — R80.9 PROTEINURIA DUE TO TYPE 2 DIABETES MELLITUS: Status: ACTIVE | Noted: 2022-02-08

## 2022-02-08 PROBLEM — E11.29 PROTEINURIA DUE TO TYPE 2 DIABETES MELLITUS: Status: ACTIVE | Noted: 2022-02-08

## 2022-02-08 NOTE — PROGRESS NOTES
"PCP: Krystin Zimmerman MD    Subjective:     Chief Complaint: Diabetes Follow Up    HISTORY OF PRESENT ILLNESS: 71 y.o.  male presenting for diabetes follow up. Patient has had diabetes for several years with the following complications: neuropathy, stage III CKD. Other pertinent conditions include: cardiac and renal transplant in 2002 on immunosuppressants. He has attended diabetes education in the past.     The patient Freestyle CGM was reviewed. For the past 14 days, patient average glucose was 127 mg/dl, SD of 26.  He was above range 5 % of the time, in range 90 % of the time, and below range 5 % of the time.  He has a total of 3 low events with 265 minutes. Usually, hypoglycemia occurs during the nocturnal or early morning hours.     He denies any recent hospitalizations, emergency room, syncope, or diaphoresis.     Height: 6' 2" (188 cm)  //  Weight: 134.9 kg (297 lb 6.4 oz), Body mass index is 38.18 kg/m².  He has gained 2 pounds since last visit.    His blood sugar in clinic today is: 99 mg/dl at 9:17 am      Labs Reviewed.       DM MEDICATIONS:   Humulin 70 / 30, takes 30 - 40 units before breakfast; 15 - 25 units before dinner  Ozempic 1 mg weekly      Review of Systems   Constitutional: Negative for appetite change, fatigue and unexpected weight change.   Gastrointestinal: Negative for abdominal pain, constipation, diarrhea and nausea.   Endocrine: Negative for polydipsia, polyphagia and polyuria.   Neurological: Negative for dizziness, numbness and headaches.   Psychiatric/Behavioral: Negative for confusion and decreased concentration. The patient is not nervous/anxious.        Diabetes Management Status  Statin: Taking  ACE/ARB: Taking    Screening or Prevention Patient's value Goal Complete/Controlled?   HgA1C Testing and Control   Lab Results   Component Value Date    HGBA1C 6.5 (H) 02/09/2022      Annually/Less than 8% Yes   Lipid profile : 01/10/2022 Annually Yes   LDL " control Lab Results   Component Value Date    LDLCALC 86.6 01/10/2022    LDLCALC 86.6 01/10/2022    Annually/Less than 100 mg/dl  Yes   Nephropathy screening Lab Results   Component Value Date    MICALBCREAT 935.0 (H) 10/13/2021     Lab Results   Component Value Date    PROTEINUA 3+ (A) 08/25/2021    Annually Yes   Blood pressure BP Readings from Last 1 Encounters:   02/09/22 (!) 144/78    Less than 140/90 Yes   Dilated retinal exam : 11/10/2021 Annually Yes, Aminata Perry   Foot exam   : 02/09/2022 Annually Yes     ACTIVITY LEVEL: Rarely Active. Discussed activities, benefits, methods, and precautions.  MEAL PLANNING: Patient reports number of meals per day to be 2 and number of snacks per day to be 2.   Patient is encouraged to carb count and consume no more than 45 - 60 grams of carbohydrates in each meal, and 1800 k / jovany per day.      BLOOD GLUCOSE TESTING:  Freestyle Georgette CGM  SOCIAL HISTORY: . Lives with spouse. Has 2 children. Patient retired as manager for [employer].     Objective:      Physical Exam  Constitutional:       Appearance: He is well-developed.   HENT:      Head: Normocephalic and atraumatic.   Eyes:      Pupils: Pupils are equal, round, and reactive to light.   Cardiovascular:      Rate and Rhythm: Normal rate and regular rhythm.      Pulses:           Dorsalis pedis pulses are 2+ on the right side and 2+ on the left side.   Pulmonary:      Effort: Pulmonary effort is normal.      Breath sounds: Normal breath sounds.   Musculoskeletal:         General: Normal range of motion.      Cervical back: Normal range of motion.   Feet:      Right foot:      Protective Sensation: 6 sites tested. 4 sites sensed.      Skin integrity: Dry skin present. No ulcer or callus.      Left foot:      Protective Sensation: 6 sites tested. 4 sites sensed.      Skin integrity: Dry skin present. No ulcer, blister or callus.   Skin:     General: Skin is warm and dry.      Findings: No rash.    Psychiatric:         Behavior: Behavior normal.         Thought Content: Thought content normal.         Judgment: Judgment normal.         Assessment / Plan:     1.) Type 2 diabetes mellitus, with stage 3a CKD with long term use of insulin  Comments:  -  Continue Freestyle Georgette CGM for monitoring of blood sugars.  Continue Ozempic 1 mg weekly.  He is tolerating without GI side effects.  Per CGM report, patient is having  some hypoglycemia, particularly during overnight or early am hours.  Therefore, advised patient to decrease to 15 units  before dinner of Humulin 70/30 and continue  30  units before breakfast.      Orders:  -     POCT Glucose, Hand-Held Device  - Labs Today: repeat A1C    2.) Mixed Hyperlipidemia - continue meds as prescribed    3.) Hypertension, unspecified type - continue medications as prescribed.     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 Discussed ADA goal for fasting blood sugar, 80 - 130 mg/dL; pp blood sugars below 180 mg/dl. Also, discussed prevention of hypoglycemia and the need to adjust goals to higher levels if persistent hypoglycemia.  Reminded to bring BG records or meter to each visit for review.  2.) Return to clinic in 3 months for follow up. The patient was explained the above plan and given opportunity to ask questions.  He understands, chooses and consents to this plan and accepts all the risks, which include but are not limited to the risks mentioned above. He understands the alternative of having no testing, interventions or treatments at this time. He left content and without further questions.     Jael Garrison, RUBYC, CDE    A total of 30 minutes was spent in face to face time, of which over 50 % was spent in counseling patient on disease process, complications, treatment, and side effects of medications.

## 2022-02-09 ENCOUNTER — LAB VISIT (OUTPATIENT)
Dept: LAB | Facility: HOSPITAL | Age: 72
End: 2022-02-09
Attending: NURSE PRACTITIONER
Payer: MEDICARE

## 2022-02-09 ENCOUNTER — OFFICE VISIT (OUTPATIENT)
Dept: DIABETES | Facility: CLINIC | Age: 72
End: 2022-02-09
Payer: MEDICARE

## 2022-02-09 VITALS
HEIGHT: 74 IN | HEART RATE: 70 BPM | DIASTOLIC BLOOD PRESSURE: 78 MMHG | BODY MASS INDEX: 38.16 KG/M2 | WEIGHT: 297.38 LBS | SYSTOLIC BLOOD PRESSURE: 144 MMHG

## 2022-02-09 DIAGNOSIS — E11.22 TYPE 2 DIABETES MELLITUS WITH STAGE 3A CHRONIC KIDNEY DISEASE, WITH LONG-TERM CURRENT USE OF INSULIN: ICD-10-CM

## 2022-02-09 DIAGNOSIS — E11.29 PROTEINURIA DUE TO TYPE 2 DIABETES MELLITUS: ICD-10-CM

## 2022-02-09 DIAGNOSIS — N18.31 TYPE 2 DIABETES MELLITUS WITH STAGE 3A CHRONIC KIDNEY DISEASE, WITH LONG-TERM CURRENT USE OF INSULIN: ICD-10-CM

## 2022-02-09 DIAGNOSIS — Z79.4 TYPE 2 DIABETES MELLITUS WITH STAGE 3A CHRONIC KIDNEY DISEASE, WITH LONG-TERM CURRENT USE OF INSULIN: ICD-10-CM

## 2022-02-09 DIAGNOSIS — N18.31 TYPE 2 DIABETES MELLITUS WITH STAGE 3A CHRONIC KIDNEY DISEASE, WITH LONG-TERM CURRENT USE OF INSULIN: Primary | ICD-10-CM

## 2022-02-09 DIAGNOSIS — E11.22 TYPE 2 DIABETES MELLITUS WITH STAGE 3A CHRONIC KIDNEY DISEASE, WITH LONG-TERM CURRENT USE OF INSULIN: Primary | ICD-10-CM

## 2022-02-09 DIAGNOSIS — I10 HYPERTENSION, UNSPECIFIED TYPE: ICD-10-CM

## 2022-02-09 DIAGNOSIS — E78.2 MIXED HYPERLIPIDEMIA: ICD-10-CM

## 2022-02-09 DIAGNOSIS — Z79.4 TYPE 2 DIABETES MELLITUS WITH STAGE 3A CHRONIC KIDNEY DISEASE, WITH LONG-TERM CURRENT USE OF INSULIN: Primary | ICD-10-CM

## 2022-02-09 DIAGNOSIS — R80.9 PROTEINURIA DUE TO TYPE 2 DIABETES MELLITUS: ICD-10-CM

## 2022-02-09 LAB
ESTIMATED AVG GLUCOSE: 140 MG/DL (ref 68–131)
GLUCOSE SERPL-MCNC: 99 MG/DL (ref 70–110)
HBA1C MFR BLD: 6.5 % (ref 4–5.6)

## 2022-02-09 PROCEDURE — 83036 HEMOGLOBIN GLYCOSYLATED A1C: CPT | Performed by: NURSE PRACTITIONER

## 2022-02-09 PROCEDURE — 99999 PR PBB SHADOW E&M-EST. PATIENT-LVL IV: ICD-10-PCS | Mod: PBBFAC,,, | Performed by: NURSE PRACTITIONER

## 2022-02-09 PROCEDURE — 99214 OFFICE O/P EST MOD 30 MIN: CPT | Mod: S$PBB,,, | Performed by: NURSE PRACTITIONER

## 2022-02-09 PROCEDURE — 99214 PR OFFICE/OUTPT VISIT, EST, LEVL IV, 30-39 MIN: ICD-10-PCS | Mod: S$PBB,,, | Performed by: NURSE PRACTITIONER

## 2022-02-09 PROCEDURE — 82962 GLUCOSE BLOOD TEST: CPT | Mod: PBBFAC,PO | Performed by: NURSE PRACTITIONER

## 2022-02-09 PROCEDURE — 95251 PR GLUCOSE MONITOR, 72 HOUR, PHYS INTERP: ICD-10-PCS | Mod: ,,, | Performed by: NURSE PRACTITIONER

## 2022-02-09 PROCEDURE — 95251 CONT GLUC MNTR ANALYSIS I&R: CPT | Mod: ,,, | Performed by: NURSE PRACTITIONER

## 2022-02-09 PROCEDURE — 36415 COLL VENOUS BLD VENIPUNCTURE: CPT | Mod: PO | Performed by: NURSE PRACTITIONER

## 2022-02-09 PROCEDURE — 99214 OFFICE O/P EST MOD 30 MIN: CPT | Mod: PBBFAC,PO | Performed by: NURSE PRACTITIONER

## 2022-02-09 PROCEDURE — 99999 PR PBB SHADOW E&M-EST. PATIENT-LVL IV: CPT | Mod: PBBFAC,,, | Performed by: NURSE PRACTITIONER

## 2022-02-14 ENCOUNTER — PATIENT MESSAGE (OUTPATIENT)
Dept: TRANSPLANT | Facility: CLINIC | Age: 72
End: 2022-02-14
Payer: MEDICARE

## 2022-02-18 ENCOUNTER — PROCEDURE VISIT (OUTPATIENT)
Dept: UROLOGY | Facility: CLINIC | Age: 72
End: 2022-02-18
Payer: MEDICARE

## 2022-02-18 VITALS
TEMPERATURE: 98 F | WEIGHT: 298.31 LBS | DIASTOLIC BLOOD PRESSURE: 83 MMHG | HEIGHT: 74 IN | HEART RATE: 67 BPM | SYSTOLIC BLOOD PRESSURE: 154 MMHG | BODY MASS INDEX: 38.28 KG/M2

## 2022-02-18 DIAGNOSIS — N52.9 ERECTILE DYSFUNCTION, UNSPECIFIED ERECTILE DYSFUNCTION TYPE: ICD-10-CM

## 2022-02-18 DIAGNOSIS — R97.20 ELEVATED PSA: Primary | ICD-10-CM

## 2022-02-18 PROCEDURE — 96372 THER/PROPH/DIAG INJ SC/IM: CPT | Mod: PBBFAC

## 2022-02-18 PROCEDURE — 76872 PR US TRANSRECTAL: ICD-10-PCS | Mod: 26,S$PBB,, | Performed by: UROLOGY

## 2022-02-18 PROCEDURE — 88344 PR IHC OR ICC EACH MULTIPLEX ANTIBODY STAIN PROCEDURE: ICD-10-PCS | Mod: 26,,, | Performed by: PATHOLOGY

## 2022-02-18 PROCEDURE — 88305 TISSUE EXAM BY PATHOLOGIST: CPT | Mod: 26,,, | Performed by: PATHOLOGY

## 2022-02-18 PROCEDURE — 76942 ECHO GUIDE FOR BIOPSY: CPT | Mod: PBBFAC | Performed by: UROLOGY

## 2022-02-18 PROCEDURE — 88344 IMHCHEM/IMCYTCHM EA MLT ANTB: CPT | Mod: 26,,, | Performed by: PATHOLOGY

## 2022-02-18 PROCEDURE — G0416 PROSTATE BIOPSY, ANY MTHD: HCPCS | Performed by: PATHOLOGY

## 2022-02-18 PROCEDURE — 88344 IMHCHEM/IMCYTCHM EA MLT ANTB: CPT | Mod: 91 | Performed by: PATHOLOGY

## 2022-02-18 PROCEDURE — 88305 TISSUE EXAM BY PATHOLOGIST: ICD-10-PCS | Mod: 26,,, | Performed by: PATHOLOGY

## 2022-02-18 PROCEDURE — 55700 PR BIOPSY OF PROSTATE,NEEDLE/PUNCH: CPT | Mod: S$PBB,,, | Performed by: UROLOGY

## 2022-02-18 PROCEDURE — 55700 PR BIOPSY OF PROSTATE,NEEDLE/PUNCH: ICD-10-PCS | Mod: S$PBB,,, | Performed by: UROLOGY

## 2022-02-18 PROCEDURE — 76872 US TRANSRECTAL: CPT | Mod: 26,S$PBB,, | Performed by: UROLOGY

## 2022-02-18 PROCEDURE — 88305 TISSUE EXAM BY PATHOLOGIST: CPT | Mod: 59 | Performed by: PATHOLOGY

## 2022-02-18 PROCEDURE — 76872 US TRANSRECTAL: CPT | Mod: PBBFAC | Performed by: UROLOGY

## 2022-02-18 PROCEDURE — 55700 PR BIOPSY OF PROSTATE,NEEDLE/PUNCH: CPT | Mod: PBBFAC | Performed by: UROLOGY

## 2022-02-18 RX ORDER — CEFTRIAXONE 1 G/1
1 INJECTION, POWDER, FOR SOLUTION INTRAMUSCULAR; INTRAVENOUS
Status: COMPLETED | OUTPATIENT
Start: 2022-02-18 | End: 2022-02-18

## 2022-02-18 RX ORDER — LIDOCAINE HYDROCHLORIDE 20 MG/ML
JELLY TOPICAL
Status: COMPLETED | OUTPATIENT
Start: 2022-02-18 | End: 2022-02-18

## 2022-02-18 RX ADMIN — CEFTRIAXONE SODIUM 1 G: 1 INJECTION, POWDER, FOR SOLUTION INTRAMUSCULAR; INTRAVENOUS at 09:02

## 2022-02-18 RX ADMIN — LIDOCAINE HYDROCHLORIDE: 20 JELLY TOPICAL at 09:02

## 2022-02-18 NOTE — PROCEDURES
Procedures   Chief Complaint:   Encounter Diagnoses   Name Primary?    Elevated PSA Yes    Erectile dysfunction, unspecified erectile dysfunction type            HPI:   22- today for a prostate needle biopsy.  VESIcare is working extremely well to control his symptoms.  3/30/16: 64 yo man s/p renal transplant in  here with nocturia x3, was x0-1 4-5 months ago.  No hesitancy.  Some dribble when done putting things away.Urgency.  Some double voiding.  No abd/pelvic pain and no exac/rel factors.  No hematuria.  No urolithiasis.  No urinary bother.  No  history.  Normal sexual function.  Not usually constipated.  Viagra for ED rx but not used but once.  No BPH meds.    Allergies:  No known drug allergies    Medications:  has a current medication list which includes the following prescription(s): amlodipine, atorvastatin, calcium carbonate, cholecalciferol (vitamin d3), freestyle jackie 2 sensor, fluticasone propionate, gabapentin, humulin 70/30 u-100 kwikpen, lisinopril, low-dose aspirin, metoprolol succinate, metoprolol succinate, multivit-min/fa/lycopen/lutein, mycophenolate, oxycodone-acetaminophen, ozempic, sirolimus, solifenacin, torsemide, and diazepam.    Review of Systems:  General: No fever, chills, fatigability, or weight loss.  Skin: No rashes, itching, or changes in color or texture of skin.  Chest: Denies PETERSON, cyanosis, wheezing, cough, and sputum production.  Abdomen: Appetite fine. No weight loss. Denies diarrhea, abdominal pain, hematemesis, or blood in stool.  Musculoskeletal: No joint stiffness or swelling. Some back pain.  : As above.  All other review of systems negative.    PMH:   has a past medical history of Allergic rhinitis (2013), Blood transfusion, Cataract, CKD (chronic kidney disease), stage III (2014), -donor kidney transplant, Diabetes mellitus, type 2 (2013), Gout, arthritis (2013), Heart attack, Heart transplanted, Hyperlipidemia (2013),  Hypertension, Immunodeficiency due to treatment with immunosuppressive medication, Morbid obesity (6/26/2013), Organ transplant (2002), Prophylactic immunotherapy, and Renal manifestation of secondary diabetes mellitus.    PSH:   has a past surgical history that includes Kidney transplant; Heart transplant; Revision total hip arthroplasty; Eye surgery; Cataract extraction (Bilateral); and Colonoscopy (N/A, 10/6/2020).    FamHx: family history includes Diabetes in his brother and maternal grandmother; Early death in his brother; Heart disease in his brother; Hyperlipidemia in his brother and sister; Hypertension in his brother; Kidney disease in his son; Stroke in his brother and mother.    SocHx:  reports that he quit smoking about 37 years ago. His smoking use included cigarettes. He has a 20.00 pack-year smoking history. He has never used smokeless tobacco. He reports current alcohol use of about 1.0 standard drink of alcohol per week. He reports that he does not use drugs.      Physical Exam:  Vitals:    02/18/22 0855   BP: (!) 154/83   Pulse: 67   Temp: 98.1 °F (36.7 °C)     General: A&Ox3, no apparent distress, no deformities  Neck: No masses, normal thyroid  Lungs: normal inspiration, no use of accessory muscles  Heart: normal pulse, no arrhythmias  Abdomen: Soft, NT, ND  Skin: The skin is warm and dry. No jaundice.  Ext: No c/c/e.  MARISOL: 1/22- 25g no nodules or fixation, otherwise benign.    Labs/Studies:   pnbx 38.9g 2/18/22  PSA 4.0 1/22  PSA 3.4 8/21  PSA 2.5 8/20  PSA 2.1 8/19    Procedure: (1) Transrectal Prostate Biopsy                      (2) Transrectal ultrasound of prostate                     (3) Ultrasound Guidance of Prostate Biopsy needles    Detail: After proper consents were obtained, the patient was prepped and draped in normal fashion in the left lateral decubitus position for TRUS/Bx.  The U/S with rectal probe was used to size the prostate.  A spinal needle was used and 10ml of 1%  lidocaine was instilled on the either side of the prostate at the base of the seminal vesicles.  Biopsy was then performed using an 18Ga biopsy needle directed at the base, mid and apex bilaterally for a total of 12 cores. Antibiotic prophylaxis was provided using orally and intrasmuscular.    Findings: Prostate volume of 38.9 grams.      Impression/Plan:       1. Erectile dysfunction-  Patient was using TriMix, this is not an issue though.    2. Elevated PSA-  patient tolerated the biopsy well, will contact me with any complaints.  Otherwise will call him with the pathology when it returns and I will see him in 3 weeks.      3. Urgency- VESIcare 5 mg is working extremely well, will continue.

## 2022-02-22 DIAGNOSIS — I10 PRIMARY HYPERTENSION: ICD-10-CM

## 2022-02-22 DIAGNOSIS — N28.9 RENAL INSUFFICIENCY: ICD-10-CM

## 2022-02-22 DIAGNOSIS — Z94.1 STATUS POST HEART TRANSPLANT: ICD-10-CM

## 2022-02-22 DIAGNOSIS — E78.2 MIXED HYPERLIPIDEMIA: ICD-10-CM

## 2022-02-22 DIAGNOSIS — Z12.5 SCREENING FOR PROSTATE CANCER: ICD-10-CM

## 2022-02-22 NOTE — PROGRESS NOTES
New Patient Chronic Pain Note (Initial Visit)    Referring Physician: Sabino Zimmerman*    PCP: Krystin Zimmerman MD    Chief Complaint:   Chief Complaint   Patient presents with    Abdominal Pain    Low-back Pain        SUBJECTIVE:    Nigel Albrecht is a 71 y.o. male with past medical history significant for type 2 diabetes complicated by polyneuropathy, coronary artery disease status post myocardial infarction, history of orthotopic heart transplant, hyperlipidemia, hypertension, stage 3 chronic kidney disease status post orthotopic kidney transplant 2002 on immunosuppression, obesity, gout who presents to the clinic for the evaluation of right-sided lower abdominal pain.  Today patient reports prior history of shingles infection, approximately 3 years prior.  Today patient reports he had an eruption approximately from the dermatomal levels of T6 through L1 on the right side anteriorly and posteriorly.  In this time patient has had constant pain.  Pain is described as burning and stabbing in nature.  Patient denies any left-sided symptoms.  Patient denies weakness in the upper lower extremities.  Pain in this territories exacerbated with sweating and exertion.  Pain is improved with lying in the left lateral decubitus position, with heat in the shower and mildly with lidocaine patches.  Patient is currently taking gabapentin 300 mg twice daily.  Patient reports no significant improvement in his symptoms on this medication.  Patient has not been able to increase this dose secondary to feeling like a zombie.  Medication review reveals patient has trialed amitriptyline in the past with shaking side effect.    Patient reports loss of sensations.  Patient denies night fever/night sweats, urinary incontinence, bowel incontinence, significant weight loss and significant motor weakness.    Non-Pharmacologic Treatments:  Physical Therapy/Home Exercise: no  Ice/Heat:yes  TENS: no  Acupuncture:  no  Massage: no  Chiropractic: no    Other: no      Pain Medications:  - Opioids: Percocet (Oxycodone/Acetaminophen)  - Adjuvant Medications: Diazepam (Valium) and Neurontin (Gabapentin)    Pain Procedures:   None    Past Medical History:   Diagnosis Date    Allergic rhinitis 2013    Blood transfusion     Cataract     CKD (chronic kidney disease), stage III 2014    -donor kidney transplant     Diabetes mellitus, type 2 2013    Gout, arthritis 2013    Heart attack     Heart transplanted     Hyperlipidemia 2013    Hypertension     Immunodeficiency due to treatment with immunosuppressive medication     Morbid obesity 2013    Organ transplant 2002    heart and kidney    Prophylactic immunotherapy     Renal manifestation of secondary diabetes mellitus      Past Surgical History:   Procedure Laterality Date    CATARACT EXTRACTION Bilateral     COLONOSCOPY N/A 10/6/2020    Procedure: COLONOSCOPY;  Surgeon: Conner Redman MD;  Location: [facility];  Service: Endoscopy;  Laterality: N/A;    EYE SURGERY      HEART TRANSPLANT      KIDNEY TRANSPLANT      REVISION TOTAL HIP ARTHROPLASTY       Review of patient's allergies indicates:   Allergen Reactions    No known drug allergies        Current Outpatient Medications   Medication Sig    amLODIPine (NORVASC) 10 MG tablet Take 1 tablet (10 mg total) by mouth once daily.    atorvastatin (LIPITOR) 80 MG tablet TAKE 1 TABLET ONCE DAILY    calcium carbonate (OS-CARRIE) 500 mg calcium (1,250 mg) tablet Take 1 tablet by mouth once daily.    cholecalciferol, vitamin D3, 125 mcg (5,000 unit) Tab Take 5,000 Units by mouth once daily.    flash glucose sensor (FREESTYLE SHREE 2 SENSOR) Kit Inject 1 application into the skin every 14 (fourteen) days.    gabapentin (NEURONTIN) 300 MG capsule TAKE 1 CAPSULE TWICE DAILY    HUMULIN 70/30 U-100 KWIKPEN 100 unit/mL (70-30) InPn pen INJECT 60 UNITS            SUBCUTANEOUSLY TWO TIMES  A DAY    lisinopriL (PRINIVIL,ZESTRIL) 40 MG tablet TAKE 1 TABLET ONCE DAILY    Low-Dose Aspirin 81 mg Tab Take by mouth. 1 Tablet Oral Every day    metoprolol succinate (TOPROL-XL) 25 MG 24 hr tablet Take one tablet daily along with 50 mg tablet for a total of 75 mg daily.    metoprolol succinate (TOPROL-XL) 50 MG 24 hr tablet Take 1 tablet (50 mg total) by mouth once daily.    MULTIVIT-MIN/FA/LYCOPEN/LUTEIN (CENTRUM SILVER MEN ORAL) Take by mouth once daily.    mycophenolate (CELLCEPT) 250 mg Cap Take 3 capsules (750 mg total) by mouth 2 (two) times daily.    semaglutide (OZEMPIC) 1 mg/dose (2 mg/1.5 mL) PnIj INJECT 1MG SUBCUTANEOUSLY  WEEKLY    sirolimus (RAPAMUNE) 1 MG Tab Take 2 tablets (2 mg total) by mouth once daily.    solifenacin (VESICARE) 5 MG tablet Take 1 tablet (5 mg total) by mouth once daily.    torsemide (DEMADEX) 5 MG Tab TAKE 2 TABLETS (10MG TOTAL)ONCE DAILY (Patient taking differently: Take 5 mg by mouth once daily. TAKE 2 TABLETS (10MG TOTAL)ONCE DAILY)    diazePAM (VALIUM) 5 MG tablet Take 1 tablet (5 mg total) by mouth once. for 1 dose (Patient not taking: Reported on 2/23/2022)    fluticasone propionate (FLONASE) 50 mcg/actuation nasal spray 1 spray by Each Nare route as needed for Rhinitis.    nortriptyline (PAMELOR) 25 MG capsule Take 1 capsule (25 mg total) by mouth every evening for 10 days, THEN 2 capsules (50 mg total) every evening for 20 days.    oxyCODONE-acetaminophen (PERCOCET) 5-325 mg per tablet Take 1 tablet by mouth every 4 (four) hours as needed for Pain. (Patient not taking: Reported on 2/23/2022)     No current facility-administered medications for this visit.       Review of Systems     GENERAL:  No weight loss, malaise or fevers.  HEENT:   No recent changes in vision or hearing  NECK:  Negative for lumps, no difficulty with swallowing.  RESPIRATORY:  Negative for cough, wheezing or shortness of breath, patient denies any recent URI.  CARDIOVASCULAR:   Negative for chest pain, leg swelling or palpitations.  GI:  Negative for abdominal discomfort, blood in stools or black stools or change in bowel habits.  MUSCULOSKELETAL:  See HPI.  SKIN:  Negative for lesions, rash, and itching.  PSYCH:  No mood disorder or recent psychosocial stressors.   HEMATOLOGY/LYMPHOLOGY:  Negative for prolonged bleeding, bruising easily or swollen nodes.    NEURO:   No history of headaches, syncope, paralysis, seizures or tremors.  All other reviewed and negative other than HPI.    OBJECTIVE:    BP (!) 152/85   Pulse 67   Wt 136.1 kg (300 lb)   BMI 38.52 kg/m²       Physical Exam    GENERAL: Well appearing, in no acute distress, alert and oriented x3.  PSYCH:  Mood and affect appropriate.  SKIN: Skin color, texture, turgor normal, no rashes or lesions.  HEAD/FACE:  Normocephalic, atraumatic. Cranial nerves grossly intact.    CV: RRR with palpation of the radial artery.  PULM: No evidence of respiratory difficulty, symmetric chest rise.  GI:  Soft and non-tender.  THORAX:  Sternal keloid formation.  Hyperpigmentation from prior shingles rash right lower quadrant, right lower back.  Decreased sensation to alcohol wipe from thoracic dermatome T6 through L1 anteriorly and posteriorly on right  BACK:  No pain to palpation over the facet joints of the lumbar spine or spinous processes. Normal range of motion without pain reproduction.  EXTREMITIES: Peripheral joint ROM is full and pain free without obvious instability or laxity in all four extremities. No deformities, edema, or skin discoloration. Good capillary refill.  RIGHT Lower extremity: Hip flexion 5/5, Hip Abduction 5/5, Hip Adduction 5/5, Knee extension 5/5, Knee flexion 5/5, Ankle dorsiflexion5/5, Extensor hallucis longus 5/5, Ankle plantarflexion 5/5  LEFT Lower extremity:  Hip flexion 5/5, Hip Abduction 5/5,Hip Adduction 5/5, Knee extension 5/5, Knee flexion 5/5, Ankle dorsiflexion 5/5, Extensor hallucis longus 5/5, Ankle  plantarflexion 5/5    NEURO: Bilateral upper and lower extremity coordination and muscle stretch reflexes are physiologic and symmetric. No loss of sensation is noted.  GAIT: normal.    Imaging:   X-ray chest 05/06/2021  FINDINGS:  Patient has had a cardiac transplant and postoperative changes are noted.  Lungs are clear with no infiltrate vascular congestion or pleural effusion.        ASSESSMENT: 71 y.o. year old male with right-sided lower thoracic pain, consistent with     1. Neuropathic pain     2. Post herpetic neuralgia  [facility]ulatory referral[facility]         PLAN:   - Interventions:  We have discussed considering a multilevel thoracic transforaminal injection to see if this helps with neuropathic pain.  Explained the risks and benefits of the procedure in detail with the patient today in clinic along with alternative treatment options.  We will 1st start conservatively with pharmacologic management.    -we have also briefly discussed the pathophysiology behind spinal cord stimulation as a treatment for his symptoms.    - Anticoagulation use: no no anticoagulation     report:  Reviewed and consistent with medication use as prescribed.    - Medications:  -We will start nortriptyline 25 mg nightly.  We have discussed potential common side effects of duloxetine including nausea, drowsiness, excitement or anxiety, dry mouth, constipation or changes in appetite or weight.  Patient expresses understanding.    --I will start the patient on a prescription compound cream to see if this helps with their pain.  Compound medication will include lidocaine 5%, orphenadrine, aspirin, caffeine, ketamine and capsaicin for anti-inflammatory, neuropathic and anesthetic properties.  Patient can apply this medication 2-4 times daily to painful areas.    - Imaging: Reviewed available imaging with patient and answered any questions they had regarding study.    - Follow up visit: return to clinic in 4-6  weeks    The above plan and management options were discussed at length with patient. Patient is in agreement with the above and verbalized understanding.    - I discussed the goals of interventional chronic pain management with the patient on today's visit. We discussed a multimodal and systematic approach to pain.  This includes diagnostic and therapeutic injections, adjuvant pharmacologic treatment, physical therapy, and at times psychiatry.  I emphasized the importance of regular exercise, core strengthening and stretching, diet and weight loss as a cornerstone of long-term pain management.    - This condition does not require this patient to take time off of work, and the primary goal of our Pain Management services is to improve the patient's functional capacity.  - Patient Questions: Answered all of the patient's questions regarding diagnoses, therapy, treatment and next steps        Karoline Montesinos MD  Interventional Pain Management  [facility]    Disclaimer:  This note was prepared using voice recognition system and is likely to have sound alike errors that may have been overlooked even after proof reading.  Please call me with any questions

## 2022-02-23 ENCOUNTER — PATIENT OUTREACH (OUTPATIENT)
Dept: ADMINISTRATIVE | Facility: OTHER | Age: 72
End: 2022-02-23
Payer: MEDICARE

## 2022-02-23 ENCOUNTER — OFFICE VISIT (OUTPATIENT)
Dept: PAIN MEDICINE | Facility: CLINIC | Age: 72
End: 2022-02-23
Payer: MEDICARE

## 2022-02-23 VITALS
BODY MASS INDEX: 38.52 KG/M2 | WEIGHT: 300 LBS | HEART RATE: 67 BPM | SYSTOLIC BLOOD PRESSURE: 152 MMHG | DIASTOLIC BLOOD PRESSURE: 85 MMHG

## 2022-02-23 DIAGNOSIS — B02.29 POST HERPETIC NEURALGIA: ICD-10-CM

## 2022-02-23 DIAGNOSIS — M79.2 NEUROPATHIC PAIN: Primary | ICD-10-CM

## 2022-02-23 PROCEDURE — 99999 PR PBB SHADOW E&M-EST. PATIENT-LVL IV: ICD-10-PCS | Mod: PBBFAC,,, | Performed by: ANESTHESIOLOGY

## 2022-02-23 PROCEDURE — 99204 PR OFFICE/OUTPT VISIT, NEW, LEVL IV, 45-59 MIN: ICD-10-PCS | Mod: S$PBB,,, | Performed by: ANESTHESIOLOGY

## 2022-02-23 PROCEDURE — 99214 OFFICE O/P EST MOD 30 MIN: CPT | Mod: PBBFAC | Performed by: ANESTHESIOLOGY

## 2022-02-23 PROCEDURE — 99999 PR PBB SHADOW E&M-EST. PATIENT-LVL IV: CPT | Mod: PBBFAC,,, | Performed by: ANESTHESIOLOGY

## 2022-02-23 PROCEDURE — 99204 OFFICE O/P NEW MOD 45 MIN: CPT | Mod: S$PBB,,, | Performed by: ANESTHESIOLOGY

## 2022-02-23 RX ORDER — NORTRIPTYLINE HYDROCHLORIDE 25 MG/1
CAPSULE ORAL
Qty: 50 CAPSULE | Refills: 0 | Status: SHIPPED | OUTPATIENT
Start: 2022-02-23 | End: 2022-02-23

## 2022-02-23 RX ORDER — NORTRIPTYLINE HYDROCHLORIDE 25 MG/1
CAPSULE ORAL
Qty: 50 CAPSULE | Refills: 0 | Status: SHIPPED | OUTPATIENT
Start: 2022-02-23 | End: 2022-05-04

## 2022-02-24 DIAGNOSIS — Z94.1 STATUS POST HEART TRANSPLANT: Primary | ICD-10-CM

## 2022-02-25 LAB
FINAL PATHOLOGIC DIAGNOSIS: NORMAL
Lab: NORMAL

## 2022-03-04 DIAGNOSIS — E78.2 MIXED HYPERLIPIDEMIA: ICD-10-CM

## 2022-03-04 RX ORDER — ATORVASTATIN CALCIUM 80 MG/1
TABLET, FILM COATED ORAL
Qty: 90 TABLET | Refills: 3 | Status: ON HOLD | OUTPATIENT
Start: 2022-03-04 | End: 2022-07-20 | Stop reason: HOSPADM

## 2022-03-05 NOTE — TELEPHONE ENCOUNTER
Refill Authorization Note   Nigel Albrecht  is requesting a refill authorization.  Brief Assessment and Rationale for Refill:  Approve     Medication Therapy Plan:       Medication Reconciliation Completed: No   Comments:   --->Care Gap information included below if applicable.   Orders Placed This Encounter    atorvastatin (LIPITOR) 80 MG tablet      Requested Prescriptions   Signed Prescriptions Disp Refills    atorvastatin (LIPITOR) 80 MG tablet 90 tablet 3     Sig: TAKE 1 TABLET ONCE DAILY       Cardiovascular:  Antilipid - Statins Passed - 3/4/2022  8:34 PM        Passed - Patient is at least 18 years old        Passed - Valid encounter within last 15 months     Recent Visits  Date Type Provider Dept   01/07/22 Office Visit Krystin Zimmerman MD [facility] Internal Medicine   09/02/20 Office Visit Krystin Zimmerman MD [facility] Internal Medicine   Showing recent visits within past 720 days and meeting all other requirements  Future Appointments  No visits were found meeting these conditions.  Showing future appointments within next 150 days and meeting all other requirements      Future Appointments              In 1 week Jorge Kelly MD [facility] - Urology,  Medical     In 1 month LABORATORY, [facility] - Lab, [place]    In 1 month SPECIMEN LAB, [facility] - [facility]    In 2 months LAB, APPOINTMENT [facility] - Lab (Venipuncture), [facility]    In 2 months NOM OIC-XRAY [facility] - Imaging Center, Imaging Ctr    In 2 months DOBUTAMINE, ECHO Mohan Zimmerman - Echo / Stress Lab, [facility]    In 2 months MD [id number], [facility]    In 2 months BEL[facility]N [facility]- Transplant 1st Fl, [facility]    In 2 months Karoline Montesinos MD [facility] - Pain Medicine 1st Fl, [facility]    In 3 months Jael Garrison, PhD, NP-[facility] - Diabetes Mgmt, [facility]                Passed - ALT is 131 or below and within 360 days     ALT   Date  Value Ref Range Status   01/10/2022 22 10 - 44 U/L Final   10/13/2021 26 10 - 44 U/L Final   08/04/2021 29 10 - 44 U/L Final              Passed - AST is 119 or below and within 360 days     AST   Date Value Ref Range Status   01/10/2022 30 10 - 40 U/L Final   10/13/2021 35 10 - 40 U/L Final   08/04/2021 33 10 - 40 U/L Final              Passed - Total Cholesterol within 360 days     Lab Results   Component Value Date    CHOL 154 01/10/2022    CHOL 154 01/10/2022    CHOL 141 10/13/2021              Passed - LDL within 360 days     LDL Cholesterol   Date Value Ref Range Status   01/10/2022 86.6 63.0 - 159.0 mg/dL Final     Comment:     The National Cholesterol Education Program (NCEP) has set the  following guidelines (reference values) for LDL Cholesterol:  Optimal.......................<130 mg/dL  Borderline High...............130-159 mg/dL  High..........................160-189 mg/dL  Very High.....................>190 mg/dL     01/10/2022 86.6 63.0 - 159.0 mg/dL Final     Comment:     The National Cholesterol Education Program (NCEP) has set the  following guidelines (reference values) for LDL Cholesterol:  Optimal.......................<130 mg/dL  Borderline High...............130-159 mg/dL  High..........................160-189 mg/dL  Very High.....................>190 mg/dL              Passed - HDL within 360 days     HDL   Date Value Ref Range Status   01/10/2022 41 40 - 75 mg/dL Final     Comment:     The National Cholesterol Education Program (NCEP) has set the  following guidelines (reference values) for HDL Cholesterol:  Low...............<40 mg/dL  Optimal...........>60 mg/dL     01/10/2022 41 40 - 75 mg/dL Final     Comment:     The National Cholesterol Education Program (NCEP) has set the  following guidelines (reference values) for HDL Cholesterol:  Low...............<40 mg/dL  Optimal...........>60 mg/dL              Passed - Triglycerides within 360 days     Lab Results   Component Value Date     TRIG 132 01/10/2022    TRIG 132 01/10/2022    TRIG 189 (H) 10/13/2021                  Appointments  past 12m or future 3m with PCP    Date Provider   Last Visit   1/7/2022 Krystin Zimmerman MD   Next Visit   Visit date not found Krystin Zimmerman MD   ED visits in past 90 days: 0     Note composed:8:35 PM 03/04/2022          individual instruction

## 2022-03-05 NOTE — TELEPHONE ENCOUNTER
No new care gaps identified.  Powered by [employer]. Reference number: [id number].   3/04/2022 6:35:27 PM CST

## 2022-03-11 ENCOUNTER — TELEPHONE (OUTPATIENT)
Dept: RADIATION ONCOLOGY | Facility: CLINIC | Age: 72
End: 2022-03-11
Payer: MEDICARE

## 2022-03-11 ENCOUNTER — OFFICE VISIT (OUTPATIENT)
Dept: UROLOGY | Facility: CLINIC | Age: 72
End: 2022-03-11
Payer: MEDICARE

## 2022-03-11 VITALS
DIASTOLIC BLOOD PRESSURE: 78 MMHG | TEMPERATURE: 98 F | WEIGHT: 300.06 LBS | HEART RATE: 73 BPM | SYSTOLIC BLOOD PRESSURE: 150 MMHG | BODY MASS INDEX: 38.51 KG/M2 | HEIGHT: 74 IN

## 2022-03-11 DIAGNOSIS — C61 PROSTATE CANCER: Primary | ICD-10-CM

## 2022-03-11 DIAGNOSIS — N52.9 ERECTILE DYSFUNCTION, UNSPECIFIED ERECTILE DYSFUNCTION TYPE: ICD-10-CM

## 2022-03-11 PROCEDURE — 99214 OFFICE O/P EST MOD 30 MIN: CPT | Mod: S$PBB,,, | Performed by: UROLOGY

## 2022-03-11 PROCEDURE — 99214 PR OFFICE/OUTPT VISIT, EST, LEVL IV, 30-39 MIN: ICD-10-PCS | Mod: S$PBB,,, | Performed by: UROLOGY

## 2022-03-11 PROCEDURE — 99215 OFFICE O/P EST HI 40 MIN: CPT | Mod: PBBFAC | Performed by: UROLOGY

## 2022-03-11 PROCEDURE — 99999 PR PBB SHADOW E&M-EST. PATIENT-LVL V: ICD-10-PCS | Mod: PBBFAC,,, | Performed by: UROLOGY

## 2022-03-11 PROCEDURE — 99999 PR PBB SHADOW E&M-EST. PATIENT-LVL V: CPT | Mod: PBBFAC,,, | Performed by: UROLOGY

## 2022-03-11 NOTE — TELEPHONE ENCOUNTER
Attempted to call patient to schedule radiation oncology consult appt but there was no answer. Left a detailed message along with our direct number to call us back to schedule appt.

## 2022-03-11 NOTE — PROGRESS NOTES
Chief Complaint:   Encounter Diagnoses   Name Primary?    Prostate cancer Yes    Erectile dysfunction, unspecified erectile dysfunction type        HPI:   3/11/22- patient returns today to discuss his biopsy results, doing well.  [facility] is still assisting.  3/30/16: 66 yo man s/p renal transplant in  here with nocturia x3, was x0-1 4-5 months ago.  No hesitancy.  Some dribble when done putting things away.Urgency.  Some double voiding.  No abd/pelvic pain and no exac/rel factors.  No hematuria.  No urolithiasis.  No urinary bother.  No  history.  Normal sexual function.  Not usually constipated.  Viagra for ED rx but not used but once.  No BPH meds.    Allergies:  No known drug allergies    Medications:  has a current medication list which includes the following prescription(s): amlodipine, atorvastatin, calcium carbonate, cholecalciferol (vitamin d3), freestyle jackie 2 sensor, fluticasone propionate, gabapentin, humulin 70/30 u-100 kwikpen, lisinopril, low-dose aspirin, metoprolol succinate, metoprolol succinate, multivit-min/fa/lycopen/lutein, mycophenolate, oxycodone-acetaminophen, ozempic, sirolimus, solifenacin, torsemide, and diazepam.    Review of Systems:  General: No fever, chills, fatigability, or weight loss.  Skin: No rashes, itching, or changes in color or texture of skin.  Chest: Denies PETERSON, cyanosis, wheezing, cough, and sputum production.  Abdomen: Appetite fine. No weight loss. Denies diarrhea, abdominal pain, hematemesis, or blood in stool.  Musculoskeletal: No joint stiffness or swelling. Some back pain.  : As above.  All other review of systems negative.    PMH:   has a past medical history of Allergic rhinitis (2013), Blood transfusion, Cataract, CKD (chronic kidney disease), stage III (2014), -donor kidney transplant, Diabetes mellitus, type 2 (2013), Gout, arthritis (2013), Heart attack, Heart transplanted, Hyperlipidemia (2013), Hypertension,  Immunodeficiency due to treatment with immunosuppressive medication, Morbid obesity (6/26/2013), Organ transplant (2002), Prophylactic immunotherapy, and Renal manifestation of secondary diabetes mellitus.    PSH:   has a past surgical history that includes Kidney transplant; Heart transplant; Revision total hip arthroplasty; Eye surgery; Cataract extraction (Bilateral); and Colonoscopy (N/A, 10/6/2020).    FamHx: family history includes Diabetes in his brother and maternal grandmother; Early death in his brother; Heart disease in his brother; Hyperlipidemia in his brother and sister; Hypertension in his brother; Kidney disease in his son; Stroke in his brother and mother.    SocHx:  reports that he quit smoking about 37 years ago. His smoking use included cigarettes. He has a 20.00 pack-year smoking history. He has never used smokeless tobacco. He reports current alcohol use of about 1.0 standard drink of alcohol per week. He reports that he does not use drugs.      Physical Exam:  Vitals:    02/18/22 0855   BP: (!) 154/83   Pulse: 67   Temp: 98.1 °F (36.7 °C)     General: A&Ox3, no apparent distress, no deformities  Neck: No masses, normal thyroid  Lungs: normal inspiration, no use of accessory muscles  Heart: normal pulse, no arrhythmias  Abdomen: Soft, NT, ND  Skin: The skin is warm and dry. No jaundice.  Ext: No c/c/e.  MARISOL: 1/22- 25g no nodules or fixation, otherwise benign.    Labs/Studies:   pnbx Gl 6 38.9g 2/18/22  PSA 4.0 1/22  PSA 3.4 8/21  PSA 2.5 8/20  PSA 2.1 8/19    Impression/Plan:         1. Erectile dysfunction-  Patient was using TriMix, this is not an issue though.    2. Prostate cancer-  new diagnosis of prostate cancer, at this point due to findings he would rather pursue active surveillance over other treatment option which we have discussed, please see below.  He would like to talk to Radiation Oncology, therefore will send for consultation and I will see him back in 3 months with a  PSA.    3. Urgency- VESIcare 5 mg is working extremely well, will continue.    I had a long discussion patient that in regards to risk stratification, treatment options.  These options include but not limited to external beam radiation therapy, brachytherapy, robotically assisted laparoscopic radical prostatectomy, hormonal therapy or cryotherapy.  Discussed the risks and benefits of all the above including surgery.  These include but not limited to damage to surrounding structures including the vascular structures, bladder, ureters and bowel.  We understanding 2nd bowel prep prior to the surgery and he will have Laura catheter for at least a week after the surgery.  The risk of heart attack, stroke, death, DVT and PE from surgical management.  Risk of incontinence, erectile dysfunction, need for further surgeries in regards to all these.  Risk of recurrence even after surgical management which might require hormonal therapy or radiation therapy.  I have given the patient adequate amounts of times to answer all of his questions, I have asked him to contact us if he has further questions in the near future.

## 2022-03-15 ENCOUNTER — OFFICE VISIT (OUTPATIENT)
Dept: RADIATION ONCOLOGY | Facility: CLINIC | Age: 72
End: 2022-03-15
Payer: MEDICARE

## 2022-03-15 VITALS
WEIGHT: 301.5 LBS | HEIGHT: 74 IN | OXYGEN SATURATION: 99 % | DIASTOLIC BLOOD PRESSURE: 79 MMHG | RESPIRATION RATE: 18 BRPM | HEART RATE: 74 BPM | SYSTOLIC BLOOD PRESSURE: 149 MMHG | BODY MASS INDEX: 38.69 KG/M2 | TEMPERATURE: 98 F

## 2022-03-15 DIAGNOSIS — C61 PROSTATE CANCER: Primary | ICD-10-CM

## 2022-03-15 PROCEDURE — 99999 PR PBB SHADOW E&M-EST. PATIENT-LVL V: CPT | Mod: PBBFAC,,, | Performed by: RADIOLOGY

## 2022-03-15 PROCEDURE — 99205 OFFICE O/P NEW HI 60 MIN: CPT | Mod: S$PBB,,, | Performed by: RADIOLOGY

## 2022-03-15 PROCEDURE — 99999 PR PBB SHADOW E&M-EST. PATIENT-LVL V: ICD-10-PCS | Mod: PBBFAC,,, | Performed by: RADIOLOGY

## 2022-03-15 PROCEDURE — 99215 OFFICE O/P EST HI 40 MIN: CPT | Mod: PBBFAC | Performed by: RADIOLOGY

## 2022-03-15 PROCEDURE — 99205 PR OFFICE/OUTPT VISIT, NEW, LEVL V, 60-74 MIN: ICD-10-PCS | Mod: S$PBB,,, | Performed by: RADIOLOGY

## 2022-03-15 NOTE — PROGRESS NOTES
[facility]  RADIATION ONCOLOGY CONSULTATION    Name: Nigel Albrecht  : 1950      Patient Referred To Radiation Oncology By:  Dr. Jorge Kelly MD  [id number] [place] [address]    DIAGNOSIS: low risk prostate cancer, [place] 3+3=6, PSA 4, [id number]    HISTORY OF PRESENT ILLNESS:  Nigel Albrecht is a 71 y.o. male who presents for consultation for the above diagnosis.   S/p heart transplant and kidney in   PSA was 4 in 2022.  Prostate biopsy pathology showed 40cc gland, adenocarcinoma, Luz 3+3=6,  cores.    [facility]  Denies diarrhea, constipation, bloody stools.   Denies dysuria, hematuria.    REVIEW OF SYSTEMS: (Positive findings bold, otherwise negative)   Constitutional: fever, fatigue, weight change  Eyes: blurred vision in the past 3 months, double vision   ENT: ear pain, new mouth lesions, jaw pain, difficulty swallowing, sore throat  Cardiovascular: chest pain on exertion, reflux, leg swelling  Respiratory: shortness of breath, dyspnea, cough, hemoptysis.   GI: abdominal pain, diarrhea, constipation, blood in stool, painful bowel movements  : painful or burning urination, blood in urine  Musculoskeletal: new bone or joint pains  Neurologic: headache, seizure, focal numbness or tingling, balance changes, speech changes  Lymph: new or enlarged lymph nodes  Psychiatric: depression, anxiety    PRIOR RADIATION HISTORY: none    PAST MEDICAL HISTORY:  Past Medical History:   Diagnosis Date    Allergic rhinitis 2013    Blood transfusion     Cataract     CKD (chronic kidney disease), stage III 2014    -donor kidney transplant     Diabetes mellitus, type 2 2013    Gout, arthritis 2013    Heart attack     Heart transplanted     Hyperlipidemia 2013    Hypertension     Immunodeficiency due to treatment with immunosuppressive medication     Morbid obesity 2013    Organ transplant 2002    heart  and kidney    Prophylactic immunotherapy     Renal manifestation of secondary diabetes mellitus        PAST SURGICAL HISTORY:  Past Surgical History:   Procedure Laterality Date    CATARACT EXTRACTION Bilateral     COLONOSCOPY N/A 10/6/2020    Procedure: COLONOSCOPY;  Surgeon: Conner Redman MD;  [facility];  Service: Endoscopy;  Laterality: N/A;    EYE SURGERY      HEART TRANSPLANT      KIDNEY TRANSPLANT      REVISION TOTAL HIP ARTHROPLASTY         ALLERGIES:   Review of patient's allergies indicates:   Allergen Reactions    No known drug allergies        MEDICATIONS:    Current Outpatient Medications:     amLODIPine (NORVASC) 10 MG tablet, Take 1 tablet (10 mg total) by mouth once daily., Disp: 90 tablet, Rfl: 4    atorvastatin (LIPITOR) 80 MG tablet, TAKE 1 TABLET ONCE DAILY, Disp: 90 tablet, Rfl: 3    calcium carbonate (OS-CARRIE) 500 mg calcium (1,250 mg) tablet, Take 1 tablet by mouth once daily., Disp: , Rfl:     cholecalciferol, vitamin D3, 125 mcg (5,000 unit) Tab, Take 5,000 Units by mouth once daily., Disp: , Rfl:     diazePAM (VALIUM) 5 MG tablet, Take 1 tablet (5 mg total) by mouth once. for 1 dose (Patient not taking: Reported on 2/23/2022), Disp: 1 tablet, Rfl: 0    flash glucose sensor (FREESTYLE SHREE 2 SENSOR) Kit, Inject 1 application into the skin every 14 (fourteen) days., Disp: 6 kit, Rfl: 2    fluticasone propionate (FLONASE) 50 mcg/actuation nasal spray, 1 spray by Each Nare route as needed for Rhinitis., Disp: , Rfl:     gabapentin (NEURONTIN) 300 MG capsule, TAKE 1 CAPSULE TWICE DAILY, Disp: 180 capsule, Rfl: 1    HUMULIN 70/30 U-100 KWIKPEN 100 unit/mL (70-30) InPn pen, INJECT 60 UNITS            SUBCUTANEOUSLY TWO TIMES A DAY, Disp: 110 mL, Rfl: 1    lisinopriL (PRINIVIL,ZESTRIL) 40 MG tablet, TAKE 1 TABLET ONCE DAILY, Disp: 90 tablet, Rfl: 3    Low-Dose Aspirin 81 mg Tab, Take by mouth. 1 Tablet Oral Every day, Disp: , Rfl:     metoprolol succinate  (TOPROL-XL) 25 MG 24 hr tablet, Take one tablet daily along with 50 mg tablet for a total of 75 mg daily., Disp: 90 tablet, Rfl: 3    metoprolol succinate (TOPROL-XL) 50 MG 24 hr tablet, Take 1 tablet (50 mg total) by mouth once daily., Disp: 90 tablet, Rfl: 1    MULTIVIT-MIN/FA/LYCOPEN/LUTEIN (CENTRUM SILVER MEN ORAL), Take by mouth once daily., Disp: , Rfl:     mycophenolate (CELLCEPT) 250 mg Cap, Take 3 capsules (750 mg total) by mouth 2 (two) times daily., Disp: 540 capsule, Rfl: 3    nortriptyline (PAMELOR) 25 MG capsule, Take 1 capsule (25 mg total) by mouth every evening for 10 days, THEN 2 capsules (50 mg total) every evening for 20 days., Disp: 50 capsule, Rfl: 0    oxyCODONE-acetaminophen (PERCOCET) 5-325 mg per tablet, Take 1 tablet by mouth every 4 (four) hours as needed for Pain. (Patient not taking: No sig reported), Disp: 10 tablet, Rfl: 0    semaglutide (OZEMPIC) 1 mg/dose (2 mg/1.5 mL) PnIj, INJECT 1MG SUBCUTANEOUSLY  WEEKLY, Disp: 6 pen, Rfl: 1    sirolimus (RAPAMUNE) 1 MG Tab, Take 2 tablets (2 mg total) by mouth once daily., Disp: 180 tablet, Rfl: 3    solifenacin (VESICARE) 5 MG tablet, Take 1 tablet (5 mg total) by mouth once daily., Disp: 90 tablet, Rfl: 3    torsemide (DEMADEX) 5 MG Tab, TAKE 2 TABLETS (10MG TOTAL)ONCE DAILY (Patient taking differently: Take 5 mg by mouth once daily. TAKE 2 TABLETS (10MG TOTAL)ONCE DAILY), Disp: 180 tablet, Rfl: 3    SOCIAL HISTORY:  Social History     Socioeconomic History    Marital status:    Occupational History     Employer: Retired   Tobacco Use    Smoking status: Former Smoker     Packs/day: 1.00     Years: 20.00     Pack years: 20.00     Types: Cigarettes     Quit date: 1984     Years since quittin.7    Smokeless tobacco: Never Used   Substance and Sexual Activity    Alcohol use: Yes     Alcohol/week: 1.0 standard drink     Types: 1 Cans of beer per week     Comment: daily    Drug use: No    Sexual activity: Never  "    Lives in [place]    FAMILY HISTORY:  Family History   Problem Relation Age of Onset    Stroke Mother     Hyperlipidemia Sister     Diabetes Brother     Early death Brother     Heart disease Brother     Hyperlipidemia Brother     Hypertension Brother     Stroke Brother     Kidney disease Son     Diabetes Maternal Grandmother     Melanoma Neg Hx     Eczema Neg Hx     Lupus Neg Hx     Psoriasis Neg Hx        PHYSICAL EXAMINATION:  Constitutional: well appearing, no acute distress, ECOG 1 - Ambulates, capable of light work  Vitals:    BP (!) 149/79   Pulse 74   Temp 97.6 °F (36.4 °C)   Resp 18   Ht 6' 2" (1.88 m)   Wt (!) 136.8 kg (301 lb 8 oz)   SpO2 99%   BMI 38.71 kg/m²   Eyes: sclera anicteric, EOMI, pupils equal, round and reactive to light  ENT: oral cavity without lesions, moist mucous membranes  Neck: trachea midline, neck supple  Lymphatic: no cervical, supraclavicular or axillary adenopathy  Cardiovascular: regular rate, no edema of the upper or lower extremities, radial pulse 2+  Respiratory: unlabored effort, clear to auscultation, no wheezes  Abdomen: soft, non-tender, no rigidity, no masses, no hepatomegaly  Rectal: deferred  Neuro: Cranial nerves III-XII intact, speech not slurred, gait non-ataxic, no dysdiadochokinesia, strength 5/5 upper and lower extremities  Spine: non-tender to percussion cervical, thoracic and lumbosacral spine    IMAGING AND LABORATORY FINDINGS: As per HPI; images reviewed personally.    PSA  1/10/22 - 4  8/25/21 - 3.4    ASSESSMENT: 71 y.o. male with very low risk prostate cancer    PLAN: Mr. Albrecht has a very low risk prostate cancer. His treatment options include prostatectomy or radiation therapy without androgen deprivation therapy.   However, active surveillance remains my recommendation and a good option given his very low risk disease - consists of PSA q3-6 months with consideration of MRI or additional biopsy if PSA rises significantly.    For " low risk prostate cancer, radiation treatment typically is hypofractionated (70Gy/28fx) external beam radiation or SBRT 36.25Gy/5fx.    He will require fiducial placement by urology and spaceOAR prior to returning for CT simulation if he chose SBRT.    Potential acute toxicities include fatigue, nausea, and bowel/bladder irritation. Potential late toxicities include bowel/bladder irritation, erectile dysfunction, damage to bowel/bladder, and very low risk of secondary malignancy.     After this discussion regarding the techniques, toxicities and indications of radiation, I answered the patient's questions to their apparent satisfaction.    He would like to proceed with surveillance and I feel this is an excellent decision.  Sees urology back in June with PSA, can come back to me as needed if PSA jumps up or he wants to discuss treatment again at any time.    I spent approximately 60 minutes reviewing the available records and evaluating the patient, out of which over 50% of the time was spent face to face with the patient in counseling and coordinating this patient's care.    Ale Carlson III, M.D.  Radiation Oncology  [facility] [address] [place] [address]  Ph: [phone]  [email or web address]

## 2022-03-25 ENCOUNTER — PES CALL (OUTPATIENT)
Dept: ADMINISTRATIVE | Facility: CLINIC | Age: 72
End: 2022-03-25
Payer: MEDICARE

## 2022-04-01 ENCOUNTER — IMMUNIZATION (OUTPATIENT)
Dept: PRIMARY CARE CLINIC | Facility: CLINIC | Age: 72
End: 2022-04-01
Payer: MEDICARE

## 2022-04-01 DIAGNOSIS — Z23 NEED FOR VACCINATION: Primary | ICD-10-CM

## 2022-04-01 PROCEDURE — 91305 COVID-19, MRNA, LNP-S, PF, 30 MCG/0.3 ML DOSE VACCINE (PFIZER): CPT | Mod: PBBFAC | Performed by: FAMILY MEDICINE

## 2022-04-27 ENCOUNTER — LAB VISIT (OUTPATIENT)
Dept: LAB | Facility: HOSPITAL | Age: 72
End: 2022-04-27
Attending: INTERNAL MEDICINE
Payer: MEDICARE

## 2022-04-27 DIAGNOSIS — N18.32 TYPE 2 DIABETES MELLITUS WITH STAGE 3B CHRONIC KIDNEY DISEASE, WITH LONG-TERM CURRENT USE OF INSULIN: ICD-10-CM

## 2022-04-27 DIAGNOSIS — Z94.1 HEART REPLACED BY TRANSPLANT: ICD-10-CM

## 2022-04-27 DIAGNOSIS — E11.22 TYPE 2 DIABETES MELLITUS WITH STAGE 3B CHRONIC KIDNEY DISEASE, WITH LONG-TERM CURRENT USE OF INSULIN: ICD-10-CM

## 2022-04-27 DIAGNOSIS — Z94.0 KIDNEY REPLACED BY TRANSPLANT: ICD-10-CM

## 2022-04-27 DIAGNOSIS — Z79.4 TYPE 2 DIABETES MELLITUS WITH STAGE 3B CHRONIC KIDNEY DISEASE, WITH LONG-TERM CURRENT USE OF INSULIN: ICD-10-CM

## 2022-04-27 LAB
BASOPHILS # BLD AUTO: 0.03 K/UL (ref 0–0.2)
BASOPHILS NFR BLD: 0.7 % (ref 0–1.9)
DIFFERENTIAL METHOD: ABNORMAL
EOSINOPHIL # BLD AUTO: 0.1 K/UL (ref 0–0.5)
EOSINOPHIL NFR BLD: 2.6 % (ref 0–8)
ERYTHROCYTE [DISTWIDTH] IN BLOOD BY AUTOMATED COUNT: 14.9 % (ref 11.5–14.5)
HCT VFR BLD AUTO: 37.2 % (ref 40–54)
HGB BLD-MCNC: 11.1 G/DL (ref 14–18)
IMM GRANULOCYTES # BLD AUTO: 0.01 K/UL (ref 0–0.04)
IMM GRANULOCYTES NFR BLD AUTO: 0.2 % (ref 0–0.5)
LYMPHOCYTES # BLD AUTO: 1.8 K/UL (ref 1–4.8)
LYMPHOCYTES NFR BLD: 38.4 % (ref 18–48)
MCH RBC QN AUTO: 25.7 PG (ref 27–31)
MCHC RBC AUTO-ENTMCNC: 29.8 G/DL (ref 32–36)
MCV RBC AUTO: 86 FL (ref 82–98)
MONOCYTES # BLD AUTO: 0.4 K/UL (ref 0.3–1)
MONOCYTES NFR BLD: 9.1 % (ref 4–15)
NEUTROPHILS # BLD AUTO: 2.3 K/UL (ref 1.8–7.7)
NEUTROPHILS NFR BLD: 49 % (ref 38–73)
NRBC BLD-RTO: 0 /100 WBC
PLATELET # BLD AUTO: 246 K/UL (ref 150–450)
PMV BLD AUTO: 11.1 FL (ref 9.2–12.9)
RBC # BLD AUTO: 4.32 M/UL (ref 4.6–6.2)
WBC # BLD AUTO: 4.61 K/UL (ref 3.9–12.7)

## 2022-04-27 PROCEDURE — 85025 COMPLETE CBC W/AUTO DIFF WBC: CPT | Performed by: INTERNAL MEDICINE

## 2022-04-27 PROCEDURE — 84075 ASSAY ALKALINE PHOSPHATASE: CPT | Performed by: INTERNAL MEDICINE

## 2022-04-27 PROCEDURE — 80195 ASSAY OF SIROLIMUS: CPT | Performed by: INTERNAL MEDICINE

## 2022-04-27 PROCEDURE — 80069 RENAL FUNCTION PANEL: CPT | Performed by: INTERNAL MEDICINE

## 2022-04-27 PROCEDURE — 36415 COLL VENOUS BLD VENIPUNCTURE: CPT | Mod: PO | Performed by: INTERNAL MEDICINE

## 2022-04-27 PROCEDURE — 83735 ASSAY OF MAGNESIUM: CPT | Performed by: INTERNAL MEDICINE

## 2022-04-27 RX ORDER — FLASH GLUCOSE SENSOR
KIT MISCELLANEOUS
Qty: 6 KIT | Refills: 1 | Status: SHIPPED | OUTPATIENT
Start: 2022-04-27 | End: 2022-10-26

## 2022-04-28 LAB
ALBUMIN SERPL BCP-MCNC: 3.1 G/DL (ref 3.5–5.2)
ALBUMIN SERPL BCP-MCNC: 3.1 G/DL (ref 3.5–5.2)
ALP SERPL-CCNC: 99 U/L (ref 55–135)
ALT SERPL W/O P-5'-P-CCNC: 21 U/L (ref 10–44)
ANION GAP SERPL CALC-SCNC: 8 MMOL/L (ref 8–16)
AST SERPL-CCNC: 29 U/L (ref 10–40)
BILIRUB DIRECT SERPL-MCNC: 0.1 MG/DL (ref 0.1–0.3)
BILIRUB SERPL-MCNC: 0.3 MG/DL (ref 0.1–1)
BUN SERPL-MCNC: 17 MG/DL (ref 8–23)
CALCIUM SERPL-MCNC: 8.3 MG/DL (ref 8.7–10.5)
CHLORIDE SERPL-SCNC: 108 MMOL/L (ref 95–110)
CO2 SERPL-SCNC: 27 MMOL/L (ref 23–29)
CREAT SERPL-MCNC: 1.9 MG/DL (ref 0.5–1.4)
EST. GFR  (AFRICAN AMERICAN): 40.1 ML/MIN/1.73 M^2
EST. GFR  (NON AFRICAN AMERICAN): 34.7 ML/MIN/1.73 M^2
GLUCOSE SERPL-MCNC: 130 MG/DL (ref 70–110)
MAGNESIUM SERPL-MCNC: 2 MG/DL (ref 1.6–2.6)
PHOSPHATE SERPL-MCNC: 3.3 MG/DL (ref 2.7–4.5)
POTASSIUM SERPL-SCNC: 4.7 MMOL/L (ref 3.5–5.1)
PROT SERPL-MCNC: 6.5 G/DL (ref 6–8.4)
SIROLIMUS BLD-MCNC: 3.9 NG/ML (ref 4–20)
SODIUM SERPL-SCNC: 143 MMOL/L (ref 136–145)

## 2022-04-28 NOTE — PROGRESS NOTES
This pt has significant proteinuria. I think he has heart kidney transplant and is on sirolimus. I defer this to Dr Reese, he is assigned nephrologist.

## 2022-05-04 ENCOUNTER — TELEPHONE (OUTPATIENT)
Dept: TRANSPLANT | Facility: CLINIC | Age: 72
End: 2022-05-04

## 2022-05-04 ENCOUNTER — HOSPITAL ENCOUNTER (OUTPATIENT)
Dept: RADIOLOGY | Facility: HOSPITAL | Age: 72
Discharge: HOME | End: 2022-05-04
Attending: INTERNAL MEDICINE
Payer: MEDICARE

## 2022-05-04 ENCOUNTER — OFFICE VISIT (OUTPATIENT)
Dept: TRANSPLANT | Facility: CLINIC | Age: 72
End: 2022-05-04
Payer: MEDICARE

## 2022-05-04 ENCOUNTER — HOSPITAL ENCOUNTER (OUTPATIENT)
Dept: CARDIOLOGY | Facility: HOSPITAL | Age: 72
Discharge: HOME | End: 2022-05-04
Attending: INTERNAL MEDICINE
Payer: MEDICARE

## 2022-05-04 VITALS
OXYGEN SATURATION: 96 % | RESPIRATION RATE: 16 BRPM | SYSTOLIC BLOOD PRESSURE: 158 MMHG | DIASTOLIC BLOOD PRESSURE: 76 MMHG | TEMPERATURE: 97 F | HEART RATE: 62 BPM | HEIGHT: 74 IN | WEIGHT: 300.5 LBS | BODY MASS INDEX: 38.56 KG/M2

## 2022-05-04 VITALS
HEART RATE: 66 BPM | HEIGHT: 74 IN | BODY MASS INDEX: 38.4 KG/M2 | WEIGHT: 299.19 LBS | DIASTOLIC BLOOD PRESSURE: 78 MMHG | SYSTOLIC BLOOD PRESSURE: 172 MMHG

## 2022-05-04 VITALS
WEIGHT: 301 LBS | RESPIRATION RATE: 16 BRPM | SYSTOLIC BLOOD PRESSURE: 150 MMHG | DIASTOLIC BLOOD PRESSURE: 82 MMHG | HEIGHT: 74 IN | BODY MASS INDEX: 38.63 KG/M2

## 2022-05-04 DIAGNOSIS — N18.30 STAGE 3 CHRONIC KIDNEY DISEASE, UNSPECIFIED WHETHER STAGE 3A OR 3B CKD: Chronic | ICD-10-CM

## 2022-05-04 DIAGNOSIS — E11.22 TYPE 2 DIABETES MELLITUS WITH DIABETIC CHRONIC KIDNEY DISEASE, UNSPECIFIED CKD STAGE, UNSPECIFIED WHETHER LONG TERM INSULIN USE: ICD-10-CM

## 2022-05-04 DIAGNOSIS — I10 PRIMARY HYPERTENSION: ICD-10-CM

## 2022-05-04 DIAGNOSIS — Z94.1 STATUS POST HEART TRANSPLANT: ICD-10-CM

## 2022-05-04 DIAGNOSIS — D84.821 IMMUNODEFICIENCY DUE TO TREATMENT WITH IMMUNOSUPPRESSIVE MEDICATION: ICD-10-CM

## 2022-05-04 DIAGNOSIS — Z94.1 S/P ORTHOTOPIC HEART TRANSPLANT: Primary | ICD-10-CM

## 2022-05-04 DIAGNOSIS — R80.9 PROTEINURIA DUE TO TYPE 2 DIABETES MELLITUS: ICD-10-CM

## 2022-05-04 DIAGNOSIS — Z94.0 S/P KIDNEY TRANSPLANT: ICD-10-CM

## 2022-05-04 DIAGNOSIS — I10 HYPERTENSION, UNSPECIFIED TYPE: ICD-10-CM

## 2022-05-04 DIAGNOSIS — E11.29 PROTEINURIA DUE TO TYPE 2 DIABETES MELLITUS: ICD-10-CM

## 2022-05-04 DIAGNOSIS — Z79.899 IMMUNODEFICIENCY DUE TO TREATMENT WITH IMMUNOSUPPRESSIVE MEDICATION: ICD-10-CM

## 2022-05-04 DIAGNOSIS — R97.20 ELEVATED PSA: ICD-10-CM

## 2022-05-04 DIAGNOSIS — Z94.0 DECEASED-DONOR KIDNEY TRANSPLANT: Primary | ICD-10-CM

## 2022-05-04 DIAGNOSIS — Z29.89 PROPHYLACTIC IMMUNOTHERAPY: Chronic | ICD-10-CM

## 2022-05-04 DIAGNOSIS — Z94.1 HEART TRANSPLANTED: Primary | ICD-10-CM

## 2022-05-04 LAB
ASCENDING AORTA: 3.74 CM
BSA FOR ECHO PROCEDURE: 2.67 M2
CV ECHO LV RWT: 0.5 CM
CV STRESS BASE HR: 65 BPM
DIASTOLIC BLOOD PRESSURE: 81 MMHG
DOP CALC LVOT AREA: 3.6 CM2
DOP CALC LVOT DIAMETER: 2.13 CM
DOP CALC LVOT PEAK VEL: 0.72 M/S
DOP CALC LVOT STROKE VOLUME: 58.73 CM3
DOP CALCLVOT PEAK VEL VTI: 16.49 CM
E WAVE DECELERATION TIME: 186.06 MSEC
E/A RATIO: 1.18
E/E' RATIO: 7.33 M/S
ECHO LV POSTERIOR WALL: 1.33 CM (ref 0.6–1.1)
EJECTION FRACTION: 60 %
FRACTIONAL SHORTENING: 32 % (ref 28–44)
INTERVENTRICULAR SEPTUM: 1.14 CM (ref 0.6–1.1)
IVRT: 99.9 MSEC
LEFT INTERNAL DIMENSION IN SYSTOLE: 3.59 CM (ref 2.1–4)
LEFT VENTRICLE DIASTOLIC VOLUME INDEX: 52.22 ML/M2
LEFT VENTRICLE DIASTOLIC VOLUME: 135.24 ML
LEFT VENTRICLE MASS INDEX: 103 G/M2
LEFT VENTRICLE SYSTOLIC VOLUME INDEX: 20.9 ML/M2
LEFT VENTRICLE SYSTOLIC VOLUME: 54.2 ML
LEFT VENTRICULAR INTERNAL DIMENSION IN DIASTOLE: 5.3 CM (ref 3.5–6)
LEFT VENTRICULAR MASS: 267.02 G
LV LATERAL E/E' RATIO: 6.6 M/S
LV SEPTAL E/E' RATIO: 8.25 M/S
MV PEAK A VEL: 0.56 M/S
MV PEAK E VEL: 0.66 M/S
MV STENOSIS PRESSURE HALF TIME: 53.96 MS
MV VALVE AREA P 1/2 METHOD: 4.08 CM2
OHS CV CPX 1 MINUTE RECOVERY HEART RATE: 94 BPM
OHS CV CPX 85 PERCENT MAX PREDICTED HEART RATE MALE: 127
OHS CV CPX MAX PREDICTED HEART RATE: 149
OHS CV CPX PATIENT IS FEMALE: 0
OHS CV CPX PATIENT IS MALE: 1
OHS CV CPX PEAK DIASTOLIC BLOOD PRESSURE: 59 MMHG
OHS CV CPX PEAK HEAR RATE: 95 BPM
OHS CV CPX PEAK RATE PRESSURE PRODUCT: ABNORMAL
OHS CV CPX PEAK SYSTOLIC BLOOD PRESSURE: 168 MMHG
OHS CV CPX PERCENT MAX PREDICTED HEART RATE ACHIEVED: 64
OHS CV CPX RATE PRESSURE PRODUCT PRESENTING: ABNORMAL
PISA TR MAX VEL: 2.06 M/S
RA PRESSURE: 3 MMHG
RIGHT VENTRICULAR END-DIASTOLIC DIMENSION: 3.82 CM
RV TISSUE DOPPLER FREE WALL SYSTOLIC VELOCITY 1 (APICAL 4 CHAMBER VIEW): 7.77 CM/S
SINUS: 4.27 CM
STJ: 3.43 CM
SYSTOLIC BLOOD PRESSURE: 155 MMHG
TDI LATERAL: 0.1 M/S
TDI SEPTAL: 0.08 M/S
TDI: 0.09 M/S
TR MAX PG: 17 MMHG
TRICUSPID ANNULAR PLANE SYSTOLIC EXCURSION: 2.16 CM
TV REST PULMONARY ARTERY PRESSURE: 20 MMHG

## 2022-05-04 PROCEDURE — 99999 PR PBB SHADOW E&M-EST. PATIENT-LVL V: CPT | Mod: PBBFAC,,, | Performed by: NURSE PRACTITIONER

## 2022-05-04 PROCEDURE — 99213 OFFICE O/P EST LOW 20 MIN: CPT | Mod: PBBFAC,25 | Performed by: INTERNAL MEDICINE

## 2022-05-04 PROCEDURE — 71046 X-RAY EXAM CHEST 2 VIEWS: CPT | Mod: 26,,, | Performed by: RADIOLOGY

## 2022-05-04 PROCEDURE — 71046 X-RAY EXAM CHEST 2 VIEWS: CPT | Mod: TC,FY

## 2022-05-04 PROCEDURE — 99999 PR PBB SHADOW E&M-EST. PATIENT-LVL V: ICD-10-PCS | Mod: PBBFAC,,, | Performed by: NURSE PRACTITIONER

## 2022-05-04 PROCEDURE — 99215 OFFICE O/P EST HI 40 MIN: CPT | Mod: S$PBB,,, | Performed by: INTERNAL MEDICINE

## 2022-05-04 PROCEDURE — 63600175 PHARM REV CODE 636 W HCPCS: Performed by: INTERNAL MEDICINE

## 2022-05-04 PROCEDURE — 93351 STRESS TTE COMPLETE: CPT

## 2022-05-04 PROCEDURE — 99999 PR PBB SHADOW E&M-EST. PATIENT-LVL III: ICD-10-PCS | Mod: PBBFAC,,, | Performed by: INTERNAL MEDICINE

## 2022-05-04 PROCEDURE — 99215 OFFICE O/P EST HI 40 MIN: CPT | Mod: PBBFAC,25,27 | Performed by: NURSE PRACTITIONER

## 2022-05-04 PROCEDURE — 99215 OFFICE O/P EST HI 40 MIN: CPT | Mod: S$PBB,,, | Performed by: NURSE PRACTITIONER

## 2022-05-04 PROCEDURE — 93351 STRESS ECHO (CUPID ONLY): ICD-10-PCS | Mod: 26,,, | Performed by: INTERNAL MEDICINE

## 2022-05-04 PROCEDURE — 99215 PR OFFICE/OUTPT VISIT, EST, LEVL V, 40-54 MIN: ICD-10-PCS | Mod: S$PBB,,, | Performed by: INTERNAL MEDICINE

## 2022-05-04 PROCEDURE — 93351 STRESS TTE COMPLETE: CPT | Mod: 26,,, | Performed by: INTERNAL MEDICINE

## 2022-05-04 PROCEDURE — 99999 PR PBB SHADOW E&M-EST. PATIENT-LVL III: CPT | Mod: PBBFAC,,, | Performed by: INTERNAL MEDICINE

## 2022-05-04 PROCEDURE — 99215 PR OFFICE/OUTPT VISIT, EST, LEVL V, 40-54 MIN: ICD-10-PCS | Mod: S$PBB,,, | Performed by: NURSE PRACTITIONER

## 2022-05-04 PROCEDURE — 71046 XR CHEST PA AND LATERAL: ICD-10-PCS | Mod: 26,,, | Performed by: RADIOLOGY

## 2022-05-04 RX ORDER — DOBUTAMINE HYDROCHLORIDE 400 MG/100ML
10 INJECTION INTRAVENOUS
Status: COMPLETED | OUTPATIENT
Start: 2022-05-04 | End: 2022-05-04

## 2022-05-04 RX ADMIN — DOBUTAMINE HYDROCHLORIDE 10 MCG/KG/MIN: 400 INJECTION INTRAVENOUS at 09:05

## 2022-05-04 NOTE — LETTER
May 4, 2022        Lucila Matthew  [address] [facility] [place] [address]  Phone: [phone]  Fax: [phone]             [facility] 1st Fl  [address]  [facility] [address]  Phone: [phone]   Patient: Nigel Albrecht   MR Number: [id number]   YOB: 1950   Date of Visit: 5/4/2022       Dear Dr. Lucila Matthew    Thank you for referring Nigel Albrecht to me for evaluation. Attached you will find relevant portions of my assessment and plan of care.    If you have questions, please do not hesitate to call me. I look forward to following Nigel Albrecht along with you.    Sincerely,    Malou Conn, NP    Enclosure    If you would like to receive this communication electronically, please contact [email or web address] or [phone] to request [email or web address] Link access.    [email or web address] Link is a tool which provides read-only access to select patient information with whom you have a relationship. Its easy to use and provides real time access to review your patients record including encounter summaries, notes, results, and demographic information.    If you feel you have received this communication in error or would no longer like to receive these types of communications, please e-mail [email or web address]

## 2022-05-04 NOTE — PROGRESS NOTES
Patient case discussed in Wednesday am meeting.  Patient now with proteinuria.  Patient now on Sirolimus.  Patient to be seen by Dr. Chinchilla today in Cardiology clinic as well as Malou Conn today in Kidney Transplant clinic.  No changes in medication regimen at this time.  Dr. Reese will discuss with Neo Chinchilla and/or Dr. Cee about possibly changing patient back to tacrolimus depending upon why patient was changed to Sirolimus.  Vasculopathy was reason for change ???  Treatment plan dependant upon recommendations from Transplant Cardiology.

## 2022-05-04 NOTE — Clinical Note
Current issues: prostate adenocarcinoma-->active Under surveillance, no tx at this time-->Next apt 6/2022  Encouraged adequate hydration-->scr higher end of baseline   Has significant proteinuria.  With PMH  Heart/ kidney transplant and is on sirolimus , DM, HTN, Obesity Pt is currently on lisinopril 40 mg QD discussed with  Dr Reese: states will wait on Heart txp recs  Pt was seen by heart txp today  Reinforced CKD care:  weight loss, good BP and BS control

## 2022-05-04 NOTE — TELEPHONE ENCOUNTER
Met with pt in clinic. Doing ok. Pt with edema and HTN. Pt on amlodipine 10 mg daily, lisinopril 40 mg daily. BP at home 140-150/80-90 on current regimen. Dr. Chinchilla ordered a sleep study for sleep apnea. Orders placed. Creatinine trending upward. Will see what KTM and pt's nephrologist says.

## 2022-05-04 NOTE — PROGRESS NOTES
Kidney Post-Transplant Assessment    Referring Physician:   Current Nephrologist: Laith Wilkerson    ORGAN:   Donor Type:   PHS Increased Risk:   Cold Ischemia:  mins  Induction Medications:     Subjective:     CC:  Reassessment of renal allograft function and management of chronic immunosuppression.    HPI:  Mr. Albrecht is a 71 y.o. year old Black or  male who received a  heart and kidney transplant on 5/24/02.  He has CKD stage 3 - GFR 30-59 and his baseline creatinine is between 1.4-2.0. He takes mycophenolate mofetil and sirolimus for maintenance immunosuppression. He denies any recent hospitalizations or ER visits since his previous clinic visit.    Hospitalization/ ED visits  None    Interval HX:   Overall feels well. No health concerns today.   Denies chest pain, SOB, leg pain, abdominal pain or LUTs.    Prostate biopsy 2/2022  Noting adenocarcinoma   Prostate cancer--f/u with radiology/oncology, LOV 3/15/2022  Under active surveillance, no tx at this time-->Next apt 6/2022    Intake~ 1L water daily -discussed drinking 2 L water minimum   UOP-no problems reported   BP elevated today . Pt did not take BP meds d/t fasting labs and testing today   BP meds taken in clinic   BP Readings from Last 3 Encounters:   05/04/22 (!) 158/76   05/04/22 (!) 172/78   05/04/22 (!) 150/82     PCP: BELÉN Garrison: management of DM   DM   Lab Results   Component Value Date    HGBA1C 6.5 (H) 02/09/2022     General Nephrologist: following with Dr. Matthew LOV 9/2021  Heart Transplant: Dr. Saavedra  ,annual  apt today    Lab /diagnostic results reviewed with patient today.      Review of Systems   Constitutional: Negative for appetite change, chills, fatigue and fever.   HENT: Negative for trouble swallowing.    Eyes: Positive for visual disturbance.        Wears glasses    Respiratory: Negative for cough, chest tightness, shortness of breath and wheezing.    Cardiovascular: Positive for leg swelling. Negative  "for chest pain and palpitations.   Gastrointestinal: Positive for abdominal distention. Negative for abdominal pain, constipation, diarrhea and nausea.   Genitourinary: Negative for difficulty urinating, frequency and urgency.   Musculoskeletal: Positive for arthralgias (HX Gout arthritis ). Negative for myalgias.   Skin: Negative for rash.   Allergic/Immunologic: Positive for immunocompromised state.   Neurological: Negative for dizziness, weakness, light-headedness and headaches.         HX neuropathy    Psychiatric/Behavioral: Negative for sleep disturbance.       Objective:   Blood pressure (!) 158/76, pulse 62, temperature 97.3 °F (36.3 °C), temperature source Temporal, resp. rate 16, height 6' 2" (1.88 m), weight (!) 136.3 kg (300 lb 7.8 oz), SpO2 96 %.body mass index is 38.58 kg/m².    Physical Exam  Constitutional:       General: He is not in acute distress.     Appearance: He is well-developed. He is obese. He is not diaphoretic.   Cardiovascular:      Rate and Rhythm: Normal rate and regular rhythm.      Heart sounds: Normal heart sounds. No murmur heard.    No friction rub. No gallop.   Pulmonary:      Effort: Pulmonary effort is normal. No respiratory distress.      Breath sounds: Normal breath sounds. No wheezing or rales.   Abdominal:      General: Bowel sounds are normal. There is distension.      Palpations: Abdomen is soft.      Tenderness: There is no abdominal tenderness.   Musculoskeletal:         General: No tenderness. Normal range of motion.      Right lower leg: Edema present.      Left lower leg: Edema present.   Skin:     General: Skin is warm and dry.      Findings: No rash.      Nails: There is no clubbing.   Neurological:      Mental Status: He is alert and oriented to person, place, and time.   Psychiatric:         Behavior: Behavior normal.         Labs:  Lab Results   Component Value Date    WBC 4.42 05/04/2022    HGB 11.9 (L) 05/04/2022    HCT 37.6 (L) 05/04/2022     05/04/2022 "    K 4.2 2022     2022    CO2 28 2022    BUN 21 2022    CREATININE 2.0 (H) 2022    EGFRNONAA 32.6 (A) 2022    CALCIUM 9.1 2022    PHOS 3.3 2022    MG 2.2 2022    ALBUMIN 3.0 (L) 2022    AST 31 2022    ALT 18 2022       No results found for: EXTANC, EXTWBC, EXTSEGS, EXTPLATELETS, EXTHEMOGLOBI, EXTHEMATOCRI, EXTCREATININ, EXTSODIUM, EXTPOTASSIUM, EXTBUN, EXTCO2, EXTCALCIUM, EXTPHOSPHORU, EXTGLUCOSE, EXTALBUMIN, EXTAST, EXTALT, EXTBILITOTAL, EXTLIPASE, EXTAMYLASE    No results found for: EXTCYCLOSLVL, EXTSIROLIMUS, EXTTACROLVL, EXTPROTCRE, EXTPTHINTACT, EXTPROTEINUA, EXTWBCUA, EXTRBCUA    Labs were reviewed with the patient.    Assessment:     1. -donor kidney/ heart transplant performed at [facility] on 2002 - ESRD etiology unknown; ESHF due to IHSS    2. Chronic immunosuppression with Sirolimus and Cellcept    3. Hypertension, unspecified type    4. Stage 3 chronic kidney disease, unspecified whether stage 3a or 3b CKD    5. Type 2 diabetes mellitus with diabetic chronic kidney disease, unspecified CKD stage, unspecified whether long term insulin use    6. Elevated PSA    7. Proteinuria due to type 2 diabetes mellitus        Plan:     Current issues:  prostate adenocarcinoma-->active Under surveillance, no tx at this time-->Next apt 2022    Encouraged adequate hydration-->scr higher end of baseline     Has significant proteinuria.  With PMH  Heart/ kidney transplant and is on sirolimus , DM, HTN, Obesity  Pt is currently on lisinopril 40 mg QD  discussed with  Dr Reese: states will wait on Heart txp recs   Pt was seen by heart txp today   Reinforced CKD care:  weight loss, good BP and BS control         1. CKD stage 3: will continue follow up as per our center guidelines. patient to continue close follow up with the local General nephrologist. Education provided in appropriate fluid intake, potassium intake. Continue with oral  hydration.      2. Immunosuppression: Sirolimu trough 4.8, which is herapeutic target 5-8. Continue Sirolimus 1 daily ,  Mg BID   Will closely monitor for toxicities, education provided about adherence to medicines and need to communicate any side effect to the transplant nurse or physician.   IS mgmt deferred to heart txp    5/4/2022  19yr 11mo   eGFR if African American >60 mL/min/1.73 m^2 37.7 (A)         5/4/2022  19yr 11mo   Creatinine 0.5 - 1.4 mg/dL 2.0 (A)       3. Allograft Function:stable at baseline for the patient. Continue follow up as per our guidelines and with the local General nephrologist. Communication will be sent today.      Lab Results   Component Value Date    HGBA1C 6.5 (H) 02/09/2022     Takes NPH and ozepemic --deferred management to PCP    4. Hypertension management:  Continue with home blood pressure monitoring, low salt and healthy life discussed with the patient..  BP Readings from Last 3 Encounters:   05/04/22 (!) 158/76   05/04/22 (!) 172/78   05/04/22 (!) 150/82   takes norvasc, Toprol ,  Lisinopril, MTP --deferred management to Heart TXP/Cardiology      5. Metabolic Bone Disease/Secondary Hyperparathyroidism:calcium and phosphorus level discussed with the patient, patient will continue follow up with the general nephrologist for management of metabolic bone disease  calcium and phosphorus as per our center protocol. Will monitor PTH, Vit D level, calcium.       5/4/2022  19yr 11mo   Magnesium 1.6 - 2.6 mg/dL 2.2     Lab Results   Component Value Date    PTH 93.9 (H) 08/25/2021    CALCIUM 9.1 05/04/2022    PHOS 3.3 04/27/2022    PHOS 3.7 08/25/2021    PHOS 3.0 10/20/2020   takes calcium carbonate, VitD 3 --deferred management to General Nephrology      6. Electrolytes: reviewed with the patient, essentially within the normal range no need for acute changes today, will monitor as per our center guidelines.      Lab Results   Component Value Date     05/04/2022    K 4.2  05/04/2022     05/04/2022    CO2 28 05/04/2022    CO2 27 04/27/2022    CO2 26 01/10/2022       7. Anemia: will continue monitoring as per our center guidelines. No indication for acute intervention today.  Lab Results   Component Value Date    WBC 4.42 05/04/2022    HGB 11.9 (L) 05/04/2022    HCT 37.6 (L) 05/04/2022    MCV 83 05/04/2022     05/04/2022       8.Proteinuria: will continue with pr/cr ratio as per our center guidelines  Lab Results   Component Value Date    PROTEINURINE 225 (H) 04/27/2022    CREATRANDUR 106.0 04/27/2022    UTPCR 2.12 (H) 04/27/2022    UTPCR 1.16 (H) 08/25/2021    UTPCR 0.72 (H) 10/20/2020    lisinopril    9. BK virus infection screening: will continue with urine or blood PCR as per our guidelines to prevent BK virus viremia and allograft dysfunction  No results found for: BKVIRUSDNAUR, BKQUANTURINE, BKVIRUSLOG, BKVIRUSURINE, BKVIRUSPCRQB      10. Weight education: provided during the clinic visit.   Body mass index is 38.58 kg/m².       11.Patient safety education regarding immunosuppression including prophylaxis posttransplant for CMV, PCP : Education provided about vaccination and prevention of infections.    12.  Cytopenias: no significant cytopenias will monitor as per our guidelines. Medicine list reviewed including potential causes of drug-induced cytopenias  Lab Results   Component Value Date    WBC 4.42 05/04/2022    HGB 11.9 (L) 05/04/2022    HCT 37.6 (L) 05/04/2022    MCV 83 05/04/2022     05/04/2022       Follow-up:   Annual follow-up with kidney transplant clinic with repeat labs, including renal function panel with UA, urine protein/creatinine ratio, and drug trough level q3 months.  Patient also reminded to follow-up with general nephrologist.    Labs: since patient remains at high risk for rejection and drug-related complications that warrant close monitoring, labs will be ordered as follows: continue twice weekly CBC, renal panel, and drug level for  first month; then same labs once weekly through 3rd month post-transplant.  Urine for UA and protein/creatinine ratio monthly.  Serum BK - PCR at 1-, 3-, 6-, 9-, 12-, 18-, 24-, 36-, 48-, and 60 months post-transplant.  Hepatic panel at 1-, 2-, 3-, 6-, 9-, 12-, 18-, 24-, and 36- months post-transplant.    Education:   Material provided to the patient.  Patient reminded to call with any health changes since these can be early signs of significant complications.  Also, I advised the patient to be sure any new medications or changes of old medications are discussed with either a pharmacist or physician knowledgeable with transplant to avoid rejection/drug toxicity related to significant drug interactions.    Exercise: reminded Nigel of the importance of regular exercise for weight management, blood sugar and blood pressure management.  I also explained exercise has been shown to improve cardiovascular health, energy level, and sleep hygiene.  Lastly, I advised him that cardiovascular complications are leading cause of death for renal transplant recipients, and regular exercise can help lower this risk.    I spoke with the patient for 30 minutes. More than half dedicated to counseling and education. All questions answered    Cady Alcantar, BELÉN-C  Transplant Nephrology    OS Patient Status  Functional Status: 80% - Normal activity with effort: some symptoms of disease  Physical Capacity: No Limitations

## 2022-05-04 NOTE — PROGRESS NOTES
Subjective:   Mr. Albrecht is a 71 y.o. year old Black or  male who received a  heart transplant on 5/24/02.        HPI  Mr. Albrecht is a very pleasant 70 y/o black male s/p OHTx 5/24/02 and kidney tx 5/25/02 at [facility],  HTN, stage III CKD, DM, HLP,  who is here for his 20 th annual post-heart transplant f/u. Doing really well. Reports some foot swelling that started when he was prescribed amlodipine. Otherwise has done really well. Stress Echo done today showed normal graft function with no evidence of stress induced myocardial ischemia. Current IS include; Rapa 2 mg daily (goal 5-8), Cellcept 750mg bid.    Stress Echo done today  · S/P heart transplantation 5/24/2002  · The left ventricle is normal in size with concentric remodeling and normal systolic function. The estimated ejection fraction is 60%.  · Normal right ventricular size with normal right ventricular systolic function.  · Normal left ventricular diastolic function.  · The estimated PA systolic pressure is 20 mmHg.  · Normal central venous pressure (3 mmHg).  · The maximal doses of pharmaceutical stress agents were administered.  · The ECG portion of this study is negative for myocardial ischemia.  · The stress echo portion of this study is negative for myocardial ischemia.  · Overall, this study is negative for myocardial ischemia. Sensitivity is impaired due to the patient's failure to achieve target heart rate.    Review of Systems   Constitutional: Negative. Negative for chills, decreased appetite, diaphoresis, fever, malaise/fatigue, night sweats, weight gain and weight loss.   Eyes: Negative.    Cardiovascular: Positive for leg swelling. Negative for chest pain, claudication, cyanosis, dyspnea on exertion, irregular heartbeat, near-syncope, orthopnea, palpitations, paroxysmal nocturnal dyspnea and syncope.   Respiratory: Negative for cough, hemoptysis and shortness of breath.    Endocrine: Negative.    Hematologic/Lymphatic:  "Negative.    Skin: Negative for color change, dry skin and nail changes.   Musculoskeletal: Negative.    Gastrointestinal: Negative.    Genitourinary: Negative.    Neurological: Negative for weakness.       Objective:   Blood pressure (!) 172/78, pulse 66, height 6' 2" (1.88 m), weight 135.7 kg (299 lb 2.6 oz).body mass index is 38.41 kg/m².    Physical Exam  Vitals reviewed.   Constitutional:       Appearance: He is well-developed.      Comments: BP (!) 172/78   Pulse 66   Ht 6' 2" (1.88 m)   Wt 135.7 kg (299 lb 2.6 oz)   BMI 38.41 kg/m²      HENT:      Head: Normocephalic.   Neck:      Vascular: No carotid bruit or JVD.   Cardiovascular:      Rate and Rhythm: Regular rhythm.      Pulses: Normal pulses.      Heart sounds: Normal heart sounds. No murmur heard.  Pulmonary:      Effort: Pulmonary effort is normal.      Breath sounds: Normal breath sounds.   Abdominal:      General: Bowel sounds are normal.      Palpations: Abdomen is soft.   Musculoskeletal:      Right lower leg: Edema present.      Left lower leg: Edema present.   Skin:     General: Skin is warm.   Neurological:      Mental Status: He is alert.       Lab Results   Component Value Date    WBC 4.42 05/04/2022    HGB 11.9 (L) 05/04/2022    HCT 37.6 (L) 05/04/2022    MCV 83 05/04/2022     05/04/2022    CO2 28 05/04/2022    CREATININE 2.0 (H) 05/04/2022    CALCIUM 9.1 05/04/2022    ALBUMIN 3.0 (L) 05/04/2022    AST 31 05/04/2022     (H) 05/04/2022    ALT 18 05/04/2022    PTH 93.9 (H) 08/25/2021       Lab Results   Component Value Date    INR 1.0 04/30/2019    INR 0.9 06/22/2016    INR 1.0 10/03/2014       BNP   Date Value Ref Range Status   05/04/2022 217 (H) 0 - 99 pg/mL Final     Comment:     Values of less than 100 pg/ml are consistent with non-CHF populations.   01/10/2022 144 (H) 0 - 99 pg/mL Final     Comment:     Values of less than 100 pg/ml are consistent with non-CHF populations.   06/14/2021 139 (H) 0 - 99 pg/mL Final     " Comment:     Values of less than 100 pg/ml are consistent with non-CHF populations.       No results found for: LDH    Tacrolimus Lvl   Date Value Ref Range Status   07/18/2011 <1.5 (L) 5.0 - 15.0 ng/mL Final     No results found for: SIROLIMUS  Cyclosporine, LC/MS   Date Value Ref Range Status   03/01/2011 <10 (L) 100 - 400 ng/ml Final     Comment:     Reference Normals:  For Kidney Transplants: 100-300 ng/mL         Assessment:     1. S/P orthotopic heart transplant    2. Immunodeficiency due to treatment with immunosuppressive medication    3. Primary hypertension    4. Stage 3 chronic kidney disease, unspecified whether stage 3a or 3b CKD    5. S/P kidney transplant        Plan:   Doing really well  Needs a sleeps study to r/o ZEINAB  jis lower extremity edema is likely due to amlodipine. The laternative would be to use hydralazine but patient doesn't want to have too many meds in his regimen     Return instructions as set forth by post transplant schedule or as needed:    Clinic: Return for labs and/or biopsy weekly the first month, every two weeks during month 2 and then monthly for the first year at the provider or coordinator's discretion. During the second year, return to clinic every 3 months. Post transplant year 3-5 return every 6 months. There will be a comprehensive post transplant evaluation every year that may include LHC/RHC/biopsy, stress test, echo, CXR, and other health screening exams.    In addition to the clinical assessment, I have ordered Allomap testing for this patient to assist in identification of moderate/severe acute cellular rejection (ACR) in a pt with stable Allograft function instead of endomyocardial biopsy.     Patient is reminded to call with any health changes as these can be early signs of transplant complications. Patient is advised to make sure any new medications or changes of old medications are discussed with a pharmacist or physician knowledgeable with transplant to avoid  rejection/drug toxicity related to significant drug interactions.    Patient advised that it is recommended that all transplanted patients, and their close contacts and household members receive Covid vaccination.    UNOS Patient Status  Functional Status: 90% - Able to carry on normal activity: minor symptoms of disease  Physical Capacity: No Limitations  Working for Income: No  If no, reason not working: Demands of Treatment    Tray Chinchilla MD

## 2022-05-11 ENCOUNTER — COMMITTEE REVIEW (OUTPATIENT)
Dept: TRANSPLANT | Facility: CLINIC | Age: 72
End: 2022-05-11
Payer: MEDICARE

## 2022-05-11 ENCOUNTER — PATIENT MESSAGE (OUTPATIENT)
Dept: RESEARCH | Facility: CLINIC | Age: 72
End: 2022-05-11
Payer: MEDICARE

## 2022-05-11 ENCOUNTER — TELEPHONE (OUTPATIENT)
Dept: TRANSPLANT | Facility: CLINIC | Age: 72
End: 2022-05-11
Payer: MEDICARE

## 2022-05-11 DIAGNOSIS — Z94.1 HEART TRANSPLANTED: Primary | ICD-10-CM

## 2022-05-11 DIAGNOSIS — Z94.0 S/P KIDNEY TRANSPLANT: ICD-10-CM

## 2022-05-11 NOTE — PROGRESS NOTES
Patient case discussed today in the Wednesday am meeting.  Per Selection meeting:  Patient to have SPEP and UPEP performed to ensure there is no malignant process going on.  Will follow up in 6 weeks with Protein/Creatinine ratio.  Also will order free light chains    Edy Reese MD

## 2022-05-11 NOTE — TELEPHONE ENCOUNTER
Called patient's cell phone today.  Left message on voice mail.  Attempting to get SPEP and UPEP scheduled.

## 2022-05-11 NOTE — COMMITTEE REVIEW
Nigel Albrecht  MRN [id number] S/P combined Heart/Kidney transplant in 2002.  Patient now with low risk prostate cancer.   Patient opts for surveillance only at this time which is acceptable to Urology.    Plan:  Order SPEP (serum protein electrophoresis)                       UPEP (urine protein elecetrophoresis)                       Free serum light chains  Repeat UPC in 6 weeks.   I was present at the meeting and attest to the decision of the committee.    Edy Reese  05/11/2022

## 2022-05-12 ENCOUNTER — TELEPHONE (OUTPATIENT)
Dept: TRANSPLANT | Facility: CLINIC | Age: 72
End: 2022-05-12
Payer: MEDICARE

## 2022-05-13 ENCOUNTER — LAB VISIT (OUTPATIENT)
Dept: LAB | Facility: HOSPITAL | Age: 72
End: 2022-05-13
Attending: INTERNAL MEDICINE
Payer: MEDICARE

## 2022-05-13 DIAGNOSIS — Z94.1 HEART TRANSPLANTED: ICD-10-CM

## 2022-05-13 DIAGNOSIS — Z94.0 S/P KIDNEY TRANSPLANT: ICD-10-CM

## 2022-05-13 PROCEDURE — 84165 PROTEIN E-PHORESIS SERUM: CPT | Performed by: INTERNAL MEDICINE

## 2022-05-13 PROCEDURE — 36415 COLL VENOUS BLD VENIPUNCTURE: CPT | Mod: PO | Performed by: INTERNAL MEDICINE

## 2022-05-13 PROCEDURE — 83520 IMMUNOASSAY QUANT NOS NONAB: CPT | Mod: 59 | Performed by: INTERNAL MEDICINE

## 2022-05-13 PROCEDURE — 84165 PATHOLOGIST INTERPRETATION SPE: ICD-10-PCS | Mod: 26,,, | Performed by: PATHOLOGY

## 2022-05-13 PROCEDURE — 84165 PROTEIN E-PHORESIS SERUM: CPT | Mod: 26,,, | Performed by: PATHOLOGY

## 2022-05-16 LAB
ALBUMIN SERPL ELPH-MCNC: 2.9 G/DL (ref 3.35–5.55)
ALPHA1 GLOB SERPL ELPH-MCNC: 0.31 G/DL (ref 0.17–0.41)
ALPHA2 GLOB SERPL ELPH-MCNC: 1.01 G/DL (ref 0.43–0.99)
B-GLOBULIN SERPL ELPH-MCNC: 0.67 G/DL (ref 0.5–1.1)
GAMMA GLOB SERPL ELPH-MCNC: 1.21 G/DL (ref 0.67–1.58)
KAPPA LC SER QL IA: 7.51 MG/DL (ref 0.33–1.94)
KAPPA LC/LAMBDA SER IA: 1.93 (ref 0.26–1.65)
LAMBDA LC SER QL IA: 3.9 MG/DL (ref 0.57–2.63)
PROT SERPL-MCNC: 6.1 G/DL (ref 6–8.4)

## 2022-05-16 NOTE — PROGRESS NOTES
Ratio  is fine, likely this is of no significance and triggered by CKD, for completeness give as referral to hematology please. Thanks

## 2022-05-17 LAB — PATHOLOGIST INTERPRETATION SPE: NORMAL

## 2022-05-17 NOTE — PROGRESS NOTES
Established Patient Chronic Pain Note     Referring Physician: No ref. provider found    PCP: Krystin Zimmerman MD    Chief Complaint:   Abdominal pain     SUBJECTIVE:  Interval history 05/18/2022  Today patient presents for 3 month follow-up.  He reports overall pain is well controlled.  Today pain is reported to out of 10. Patient reports he was unable to tolerate nortriptyline secondary to legs trembling.  .  Patient has discontinued this medication.  Patient has continued judicious use of compound cream, however does report this has not been covered by insurance and is looking for an over-the-counter alternative.  Patient denies any lower extremity weakness, bowel or bladder incontinence or saddle anesthesia.  Overall patient reports there are more good days than bad days.    HPI 02/23/2022  Nigel Albrecht is a 71 y.o. male with past medical history significant for type 2 diabetes complicated by polyneuropathy, coronary artery disease status post myocardial infarction, history of orthotopic heart transplant, hyperlipidemia, hypertension, stage 3 chronic kidney disease status post orthotopic kidney transplant 2002 on immunosuppression, obesity, gout who presents to the clinic for the evaluation of right-sided lower abdominal pain.  Today patient reports prior history of shingles infection, approximately 3 years prior.  Today patient reports he had an eruption approximately from the dermatomal levels of T6 through L1 on the right side anteriorly and posteriorly.  In this time patient has had constant pain.  Pain is described as burning and stabbing in nature.  Patient denies any left-sided symptoms.  Patient denies weakness in the upper lower extremities.  Pain in this territories exacerbated with sweating and exertion.  Pain is improved with lying in the left lateral decubitus position, with heat in the shower and mildly with lidocaine patches.  Patient is currently taking gabapentin 300 mg twice  daily.  Patient reports no significant improvement in his symptoms on this medication.  Patient has not been able to increase this dose secondary to feeling like a zombie.  Medication review reveals patient has trialed amitriptyline in the past with shaking side effect.    Patient reports loss of sensations.  Patient denies night fever/night sweats, urinary incontinence, bowel incontinence, significant weight loss and significant motor weakness.    Non-Pharmacologic Treatments:  Physical Therapy/Home Exercise: no  Ice/Heat:yes  TENS: no  Acupuncture: no  Massage: no  Chiropractic: no    Other: no      Pain Medications:  - Opioids: Percocet (Oxycodone/Acetaminophen)  - Adjuvant Medications: Diazepam (Valium) and Neurontin (Gabapentin)    Pain Procedures:   None    Past Medical History:   Diagnosis Date    Allergic rhinitis 2013    Blood transfusion     Cataract     CKD (chronic kidney disease), stage III 2014    -donor kidney transplant     Diabetes mellitus, type 2 2013    Gout, arthritis 2013    Heart attack     Heart transplanted     Hyperlipidemia 2013    Hypertension     Immunodeficiency due to treatment with immunosuppressive medication     Morbid obesity 2013    Organ transplant 2002    heart and kidney    Prophylactic immunotherapy     Renal manifestation of secondary diabetes mellitus      Past Surgical History:   Procedure Laterality Date    CATARACT EXTRACTION Bilateral     COLONOSCOPY N/A 10/6/2020    Procedure: COLONOSCOPY;  Surgeon: Conner Redman MD;  Location: [facility];  Service: Endoscopy;  Laterality: N/A;    EYE SURGERY      HEART TRANSPLANT      KIDNEY TRANSPLANT      REVISION TOTAL HIP ARTHROPLASTY       Review of patient's allergies indicates:   Allergen Reactions    No known drug allergies        Current Outpatient Medications   Medication Sig    amLODIPine (NORVASC) 10 MG tablet Take 1 tablet (10 mg total) by mouth once  daily.    atorvastatin (LIPITOR) 80 MG tablet TAKE 1 TABLET ONCE DAILY    calcium carbonate (OS-CARRIE) 500 mg calcium (1,250 mg) tablet Take 1 tablet by mouth once daily.    cholecalciferol, vitamin D3, 125 mcg (5,000 unit) Tab Take 5,000 Units by mouth once daily.    fluticasone propionate (FLONASE) 50 mcg/actuation nasal spray 1 spray by Each Nare route as needed for Rhinitis.    FREESTYLE SHREE 2 SENSOR Kit APPLY 1 SENSOR             SUBCUTANEOUSLY EVERY 14    DAYS    gabapentin (NEURONTIN) 300 MG capsule TAKE 1 CAPSULE TWICE DAILY    HUMULIN 70/30 U-100 KWIKPEN 100 unit/mL (70-30) InPn pen INJECT 60 UNITS            SUBCUTANEOUSLY TWO TIMES A DAY    lisinopriL (PRINIVIL,ZESTRIL) 40 MG tablet TAKE 1 TABLET ONCE DAILY    Low-Dose Aspirin 81 mg Tab Take by mouth. 1 Tablet Oral Every day    metoprolol succinate (TOPROL-XL) 25 MG 24 hr tablet TAKE 1 TABLET DAILY ALONG  WITH 50MG TABLET FOR A     TOTAL OF 75MG DAILY    metoprolol succinate (TOPROL-XL) 50 MG 24 hr tablet Take 1 tablet (50 mg total) by mouth once daily.    MULTIVIT-MIN/FA/LYCOPEN/LUTEIN (CENTRUM SILVER MEN ORAL) Take by mouth once daily.    mycophenolate (CELLCEPT) 250 mg Cap Take 3 capsules (750 mg total) by mouth 2 (two) times daily.    semaglutide (OZEMPIC) 1 mg/dose (2 mg/1.5 mL) PnIj INJECT 1MG SUBCUTANEOUSLY  WEEKLY    sirolimus (RAPAMUNE) 1 MG Tab Take 2 tablets (2 mg total) by mouth once daily.    torsemide (DEMADEX) 5 MG Tab TAKE 2 TABLETS (10MG TOTAL)ONCE DAILY (Patient taking differently: Take 5 mg by mouth once daily. TAKE 1 TABLETS (10MG TOTAL)ONCE DAILY)    amitriptyline (ELAVIL) 10 MG tablet Take 1 tablet (10 mg total) by mouth every evening for 7 days, THEN 2 tablets (20 mg total) every evening for 23 days.     No current facility-administered medications for this visit.       Review of Systems     GENERAL:  No weight loss, malaise or fevers.  HEENT:   No recent changes in vision or hearing  NECK:  Negative for lumps, no  "difficulty with swallowing.  RESPIRATORY:  Negative for cough, wheezing or shortness of breath, patient denies any recent URI.  CARDIOVASCULAR:  Negative for chest pain, leg swelling or palpitations.  GI:  Negative for abdominal discomfort, blood in stools or black stools or change in bowel habits.  MUSCULOSKELETAL:  See HPI.  SKIN:  Negative for lesions, rash, and itching.  PSYCH:  No mood disorder or recent psychosocial stressors.   HEMATOLOGY/LYMPHOLOGY:  Negative for prolonged bleeding, bruising easily or swollen nodes.    NEURO:   No history of headaches, syncope, paralysis, seizures or tremors.  All other reviewed and negative other than HPI.    OBJECTIVE:    BP (!) 148/90   Pulse 67   Resp 17   Ht 6' 2" (1.88 m)   Wt 135 kg (297 lb 9.9 oz)   BMI 38.21 kg/m²       Physical Exam    GENERAL: Well appearing, in no acute distress, alert and oriented x3.  PSYCH:  Mood and affect appropriate.  SKIN: Skin color, texture, turgor normal, no rashes or lesions.  HEAD/FACE:  Normocephalic, atraumatic. Cranial nerves grossly intact.    CV: RRR with palpation of the radial artery.  PULM: No evidence of respiratory difficulty, symmetric chest rise.  GI:  Soft and non-tender.  THORAX:  Sternal keloid formation.  Hyperpigmentation from prior shingles rash right lower quadrant, right lower back.  Decreased sensation to alcohol wipe from thoracic dermatome T6 through L1 anteriorly and posteriorly on right  BACK:  No pain to palpation over the facet joints of the lumbar spine or spinous processes. Normal range of motion without pain reproduction.  EXTREMITIES: Peripheral joint ROM is full and pain free without obvious instability or laxity in all four extremities. No deformities, edema, or skin discoloration. Good capillary refill.  RIGHT Lower extremity: Hip flexion 5/5, Hip Abduction 5/5, Hip Adduction 5/5, Knee extension 5/5, Knee flexion 5/5, Ankle dorsiflexion5/5, Extensor hallucis longus 5/5, Ankle plantarflexion " 5/5  LEFT Lower extremity:  Hip flexion 5/5, Hip Abduction 5/5,Hip Adduction 5/5, Knee extension 5/5, Knee flexion 5/5, Ankle dorsiflexion 5/5, Extensor hallucis longus 5/5, Ankle plantarflexion 5/5    NEURO: Bilateral upper and lower extremity coordination and muscle stretch reflexes are physiologic and symmetric. No loss of sensation is noted.  GAIT: normal.    Imaging:   X-ray chest 05/06/2021  FINDINGS:  Patient has had a cardiac transplant and postoperative changes are noted.  Lungs are clear with no infiltrate vascular congestion or pleural effusion.        ASSESSMENT: 71 y.o. year old male with right-sided lower thoracic pain, consistent with     1. Post herpetic neuralgia     2. Neuropathic pain           PLAN:   - Interventions:  We have discussed considering a multilevel thoracic transforaminal injection to see if this helps with neuropathic pain.  Explained the risks and benefits of the procedure in detail with the patient today in clinic along with alternative treatment options.  We will continue with pharmacologic management.    -we have also briefly discussed the pathophysiology behind spinal cord stimulation as a treatment for his symptoms.    - Anticoagulation use: no no anticoagulation     report:  Reviewed and consistent with medication use as prescribed.    - Medications:  -discontinue nortriptyline secondary to intolerance    -We will start Elavil 10 mg nightly.  We have discussed potential common side effects of duloxetine including constipation, dizziness, dry mouth, drowsiness, urinary retention or headache.  Patient expresses understanding.      --continue prescription compound cream as this helps with their pain.  Compound medication will include lidocaine 5%, orphenadrine, aspirin, caffeine, ketamine and capsaicin for anti-inflammatory, neuropathic and anesthetic properties.  Patient can apply this medication 2-4 times daily to painful areas.  -have also recommended over-the-counter  capsaicin cream to the patient as a more affordable alternative.    - Imaging: Reviewed available imaging with patient and answered any questions they had regarding study.    - Follow up visit: return to clinic in 4-6 weeks    The above plan and management options were discussed at length with patient. Patient is in agreement with the above and verbalized understanding.    - I discussed the goals of interventional chronic pain management with the patient on today's visit. We discussed a multimodal and systematic approach to pain.  This includes diagnostic and therapeutic injections, adjuvant pharmacologic treatment, physical therapy, and at times psychiatry.  I emphasized the importance of regular exercise, core strengthening and stretching, diet and weight loss as a cornerstone of long-term pain management.    - This condition does not require this patient to take time off of work, and the primary goal of our Pain Management services is to improve the patient's functional capacity.  - Patient Questions: Answered all of the patient's questions regarding diagnoses, therapy, treatment and next steps        Karoline Montesinos MD  Interventional Pain Management  [facility]    Disclaimer:  This note was prepared using voice recognition system and is likely to have sound alike errors that may have been overlooked even after proof reading.  Please call me with any questions

## 2022-05-18 ENCOUNTER — TELEPHONE (OUTPATIENT)
Dept: TRANSPLANT | Facility: CLINIC | Age: 72
End: 2022-05-18
Payer: MEDICARE

## 2022-05-18 ENCOUNTER — OFFICE VISIT (OUTPATIENT)
Dept: PAIN MEDICINE | Facility: CLINIC | Age: 72
End: 2022-05-18
Payer: MEDICARE

## 2022-05-18 VITALS
SYSTOLIC BLOOD PRESSURE: 148 MMHG | BODY MASS INDEX: 38.2 KG/M2 | RESPIRATION RATE: 17 BRPM | DIASTOLIC BLOOD PRESSURE: 90 MMHG | HEIGHT: 74 IN | WEIGHT: 297.63 LBS | HEART RATE: 67 BPM

## 2022-05-18 DIAGNOSIS — Z94.0 S/P KIDNEY TRANSPLANT: ICD-10-CM

## 2022-05-18 DIAGNOSIS — Z94.1 HEART TRANSPLANTED: Primary | ICD-10-CM

## 2022-05-18 DIAGNOSIS — B02.29 POST HERPETIC NEURALGIA: Primary | ICD-10-CM

## 2022-05-18 DIAGNOSIS — M79.2 NEUROPATHIC PAIN: ICD-10-CM

## 2022-05-18 PROCEDURE — 99213 OFFICE O/P EST LOW 20 MIN: CPT | Mod: PBBFAC | Performed by: ANESTHESIOLOGY

## 2022-05-18 PROCEDURE — 99214 OFFICE O/P EST MOD 30 MIN: CPT | Mod: S$PBB,,, | Performed by: ANESTHESIOLOGY

## 2022-05-18 PROCEDURE — 99999 PR PBB SHADOW E&M-EST. PATIENT-LVL III: ICD-10-PCS | Mod: PBBFAC,,, | Performed by: ANESTHESIOLOGY

## 2022-05-18 PROCEDURE — 99214 PR OFFICE/OUTPT VISIT, EST, LEVL IV, 30-39 MIN: ICD-10-PCS | Mod: S$PBB,,, | Performed by: ANESTHESIOLOGY

## 2022-05-18 PROCEDURE — 99999 PR PBB SHADOW E&M-EST. PATIENT-LVL III: CPT | Mod: PBBFAC,,, | Performed by: ANESTHESIOLOGY

## 2022-05-18 RX ORDER — AMITRIPTYLINE HYDROCHLORIDE 10 MG/1
TABLET, FILM COATED ORAL
Qty: 53 TABLET | Refills: 1 | Status: SHIPPED | OUTPATIENT
Start: 2022-05-18 | End: 2022-07-18

## 2022-05-19 DIAGNOSIS — Z94.1 HEART TRANSPLANTED: Primary | ICD-10-CM

## 2022-05-19 RX ORDER — TACROLIMUS 1 MG/1
1 CAPSULE ORAL EVERY 12 HOURS
Qty: 60 CAPSULE | Refills: 11 | Status: SHIPPED | OUTPATIENT
Start: 2022-05-19 | End: 2022-05-20 | Stop reason: SDUPTHER

## 2022-05-19 NOTE — TELEPHONE ENCOUNTER
Chart Review with Efraín PEDERSEN, pharm D.  Dr. Reese with [facility] would like to switch patient to tacrolimus due to proteinuria. Will add tac 1/1, decrease rapa to 1 mg daily, then one week later stop rapa. Will keep tac goal at 5-10. Asked pt to call when tac arrives from mail order pharmacy and I will give him directions on use of tac and rapa. Pt stated his understanding.

## 2022-05-24 ENCOUNTER — TELEPHONE (OUTPATIENT)
Dept: HEMATOLOGY/ONCOLOGY | Facility: CLINIC | Age: 72
End: 2022-05-24
Payer: MEDICARE

## 2022-05-24 NOTE — TELEPHONE ENCOUNTER
----- Message from [facility] sent at 5/24/2022  8:20 AM CDT -----  .Type:  Same Day Appointment Request     Caller is requesting a sooner appointment      Caller declined first available appointment pt is scheduled 7/5      Best Call Back Number: [phone]    Additional Information: None

## 2022-05-26 ENCOUNTER — TELEPHONE (OUTPATIENT)
Dept: TRANSPLANT | Facility: CLINIC | Age: 72
End: 2022-05-26
Payer: MEDICARE

## 2022-05-26 DIAGNOSIS — Z94.1 HEART REPLACED BY TRANSPLANT: Primary | ICD-10-CM

## 2022-05-26 NOTE — TELEPHONE ENCOUNTER
Spoke to pt regarding starting tacrolimus today and stopping sirolimus. Changed his PSA from 6/3 to 6/2 and added tacrolimus / sirolimus levels, renal profile. Pt also asked about his swelling in his lower extremities. He takes Amlodipine and was asking if he could be switched off this medicine. I told him I would talk to Dr. Chinchilla today and let him know. He said he uses diuretics (furosemide) sparingly. The swelling usually decreases at night.

## 2022-05-28 NOTE — TELEPHONE ENCOUNTER
Refill Routing Note   Medication(s) are not appropriate for processing by [facility] for the following reason(s):      - Medication not active on medication list    [facility] action(s):  Defer Medication-related problems identified: Requires labs     Medication Therapy Plan: Labs (a1c)  Medication reconciliation completed: No     Appointments  past 12m or future 3m with PCP    Date Provider   Last Visit   1/7/2022 Krystin Zimmerman MD   Next Visit   Visit date not found Krystin Zimmerman MD   ED visits in past 90 days: 0        Note composed:9:26 PM 05/27/2022

## 2022-05-28 NOTE — TELEPHONE ENCOUNTER
Care Due:                  Date            Visit Type   Department     Provider  --------------------------------------------------------------------------------                                EP -                              PRIMARY      [facility] INTERNAL  Krystin MONTES  Last Visit: 01-      CARE ([facility])   MEDICINE       Erasmo  Next Visit: None Scheduled  None         None Found                                                            Last  Test          Frequency    Reason                     Performed    Due Date  --------------------------------------------------------------------------------    HBA1C.......  6 months...  semaglutide..............  02-   08-    [facility] Embedded Care Gaps. Reference number: [id number]. 5/27/2022   8:42:08 PM CDT

## 2022-05-30 RX ORDER — SEMAGLUTIDE 1.34 MG/ML
INJECTION, SOLUTION SUBCUTANEOUS
Qty: 3 PEN | Refills: 1 | Status: SHIPPED | OUTPATIENT
Start: 2022-05-30 | End: 2022-06-22 | Stop reason: DRUGHIGH

## 2022-06-02 ENCOUNTER — LAB VISIT (OUTPATIENT)
Dept: LAB | Facility: HOSPITAL | Age: 72
End: 2022-06-02
Attending: UROLOGY
Payer: MEDICARE

## 2022-06-02 DIAGNOSIS — C61 PROSTATE CANCER: ICD-10-CM

## 2022-06-02 DIAGNOSIS — Z94.0 KIDNEY TRANSPLANTED: ICD-10-CM

## 2022-06-02 DIAGNOSIS — Z94.1 HEART REPLACED BY TRANSPLANT: ICD-10-CM

## 2022-06-02 PROCEDURE — 80197 ASSAY OF TACROLIMUS: CPT | Performed by: INTERNAL MEDICINE

## 2022-06-02 PROCEDURE — 80069 RENAL FUNCTION PANEL: CPT | Performed by: INTERNAL MEDICINE

## 2022-06-02 PROCEDURE — 84153 ASSAY OF PSA TOTAL: CPT | Performed by: UROLOGY

## 2022-06-02 PROCEDURE — 80195 ASSAY OF SIROLIMUS: CPT | Performed by: INTERNAL MEDICINE

## 2022-06-02 PROCEDURE — 36415 COLL VENOUS BLD VENIPUNCTURE: CPT | Mod: PO | Performed by: UROLOGY

## 2022-06-03 DIAGNOSIS — Z94.1 HEART TRANSPLANTED: ICD-10-CM

## 2022-06-03 LAB
ALBUMIN SERPL BCP-MCNC: 2.7 G/DL (ref 3.5–5.2)
ANION GAP SERPL CALC-SCNC: 5 MMOL/L (ref 8–16)
BUN SERPL-MCNC: 17 MG/DL (ref 8–23)
CALCIUM SERPL-MCNC: 8.5 MG/DL (ref 8.7–10.5)
CHLORIDE SERPL-SCNC: 106 MMOL/L (ref 95–110)
CO2 SERPL-SCNC: 27 MMOL/L (ref 23–29)
COMPLEXED PSA SERPL-MCNC: 3.1 NG/ML (ref 0–4)
CREAT SERPL-MCNC: 2 MG/DL (ref 0.5–1.4)
EST. GFR  (AFRICAN AMERICAN): 37.7 ML/MIN/1.73 M^2
EST. GFR  (NON AFRICAN AMERICAN): 32.6 ML/MIN/1.73 M^2
GLUCOSE SERPL-MCNC: 100 MG/DL (ref 70–110)
PHOSPHATE SERPL-MCNC: 3.3 MG/DL (ref 2.7–4.5)
POTASSIUM SERPL-SCNC: 4.2 MMOL/L (ref 3.5–5.1)
SIROLIMUS BLD-MCNC: <2 NG/ML (ref 4–20)
SODIUM SERPL-SCNC: 138 MMOL/L (ref 136–145)
TACROLIMUS BLD-MCNC: <2 NG/ML (ref 5–15)

## 2022-06-03 RX ORDER — AMLODIPINE BESYLATE 10 MG/1
5 TABLET ORAL DAILY
Qty: 30 TABLET | Refills: 5 | Status: SHIPPED | OUTPATIENT
Start: 2022-06-03 | End: 2022-07-07

## 2022-06-03 RX ORDER — TACROLIMUS 1 MG/1
2 CAPSULE ORAL EVERY 12 HOURS
Qty: 120 CAPSULE | Refills: 11 | Status: SHIPPED | OUTPATIENT
Start: 2022-06-03 | End: 2022-06-08 | Stop reason: SDUPTHER

## 2022-06-03 NOTE — TELEPHONE ENCOUNTER
Reviewed pt's repeat tac level with Dr. Chinchilla. Level is <2.0 on 1/1, goal is 5-8. Will increase to 2/2 and repeat next week. Pt also asked if we discussed amlodipine and edema. Will decrease amlodipine to 5 mg, wear compression stockings and check in 10 days his BP and edema. Pt stated his understanding.

## 2022-06-06 ENCOUNTER — OFFICE VISIT (OUTPATIENT)
Dept: HEMATOLOGY/ONCOLOGY | Facility: CLINIC | Age: 72
End: 2022-06-06
Payer: MEDICARE

## 2022-06-06 VITALS
HEART RATE: 67 BPM | BODY MASS INDEX: 38.59 KG/M2 | SYSTOLIC BLOOD PRESSURE: 153 MMHG | OXYGEN SATURATION: 99 % | TEMPERATURE: 97 F | DIASTOLIC BLOOD PRESSURE: 80 MMHG | RESPIRATION RATE: 16 BRPM | WEIGHT: 300.69 LBS | HEIGHT: 74 IN

## 2022-06-06 DIAGNOSIS — R76.8 ELEVATED SERUM IMMUNOGLOBULIN FREE LIGHT CHAIN LEVEL: Primary | ICD-10-CM

## 2022-06-06 DIAGNOSIS — Z94.0 S/P KIDNEY TRANSPLANT: ICD-10-CM

## 2022-06-06 DIAGNOSIS — Z94.1 HEART TRANSPLANTED: ICD-10-CM

## 2022-06-06 PROCEDURE — 99999 PR PBB SHADOW E&M-EST. PATIENT-LVL V: ICD-10-PCS | Mod: PBBFAC,,, | Performed by: INTERNAL MEDICINE

## 2022-06-06 PROCEDURE — 99204 OFFICE O/P NEW MOD 45 MIN: CPT | Mod: S$PBB,,, | Performed by: INTERNAL MEDICINE

## 2022-06-06 PROCEDURE — 99215 OFFICE O/P EST HI 40 MIN: CPT | Mod: PBBFAC | Performed by: INTERNAL MEDICINE

## 2022-06-06 PROCEDURE — 99999 PR PBB SHADOW E&M-EST. PATIENT-LVL V: CPT | Mod: PBBFAC,,, | Performed by: INTERNAL MEDICINE

## 2022-06-06 PROCEDURE — 99204 PR OFFICE/OUTPT VISIT, NEW, LEVL IV, 45-59 MIN: ICD-10-PCS | Mod: S$PBB,,, | Performed by: INTERNAL MEDICINE

## 2022-06-06 NOTE — PROGRESS NOTES
Subjective:      DATE OF VISIT: 22     ?  Patient ID:?Nigel Albrecht is a 71 y.o. male.?? MR#: [id number]   ?   REFERRING PROVIDER: Edy Reese MD  [address] [place] [address]     ? Primary Care Providers:  Krystin Zimmerman MD, MD (General)     CHIEF COMPLAINT: ?  Lab abnormality free light chains ordered by renal transplant physician??   ?   HPI    Mr. Trevino is a pleasant 71-year-old gentleman status post heart/renal transplant on immunosuppression with tacrolimus recently changed from sirolimus and CellCept.  He is follows closely with Transplant Nephrology.  He has had mild decline in GFR over the last couple months.  He feels well and has good management of his diabetes.  History of hip replacement osteoarthritis.  No new bony pain or fracture history.  Baseline neuropathy related to his diabetes.  He is up-to-date on age-appropriate cancer screening including colonoscopy.  He has low risk prostate cancer under active surveillance.    Review of Systems    ?   A comprehensive 14-point review of systems was reviewed with patient and was negative other than as specified above.   ?   PAST MEDICAL HISTORY:   Past Medical History:   Diagnosis Date    Allergic rhinitis 2013    Blood transfusion     Cataract     CKD (chronic kidney disease), stage III 2014    -donor kidney transplant     Diabetes mellitus, type 2 2013    Gout, arthritis 2013    Heart attack     Heart transplanted     Hyperlipidemia 2013    Hypertension     Immunodeficiency due to treatment with immunosuppressive medication     Morbid obesity 2013    Organ transplant 2002    heart and kidney    Prophylactic immunotherapy     Renal manifestation of secondary diabetes mellitus     ?     PAST SURGICAL HISTORY:   Past Surgical History:   Procedure Laterality Date    CATARACT EXTRACTION Bilateral     COLONOSCOPY N/A 10/6/2020    Procedure: COLONOSCOPY;  Surgeon:  Conner Redman MD;  Location[facility];  Service: Endoscopy;  Laterality: N/A;    EYE SURGERY      HEART TRANSPLANT      KIDNEY TRANSPLANT      REVISION TOTAL HIP ARTHROPLASTY        ?   ALLERGIES:   Allergies as of 06/06/2022 - Reviewed 06/06/2022   Allergen Reaction Noted    No known drug allergies  06/26/2013      ?   MEDICATIONS:?   Outpatient Medications Marked as Taking for the 6/6/22 encounter (Office Visit) with Patricia Newby MD   Medication Sig Dispense Refill    amitriptyline (ELAVIL) 10 MG tablet Take 1 tablet (10 mg total) by mouth every evening for 7 days, THEN 2 tablets (20 mg total) every evening for 23 days. 53 tablet 1    amLODIPine (NORVASC) 10 MG tablet Take 0.5 tablets (5 mg total) by mouth once daily. 30 tablet 5    atorvastatin (LIPITOR) 80 MG tablet TAKE 1 TABLET ONCE DAILY 90 tablet 3    cholecalciferol, vitamin D3, 125 mcg (5,000 unit) Tab Take 5,000 Units by mouth once daily.      fluticasone propionate (FLONASE) 50 mcg/actuation nasal spray 1 spray by Each Nare route as needed for Rhinitis.      FREESTYLE SHREE 2 SENSOR Kit APPLY 1 SENSOR             SUBCUTANEOUSLY EVERY 14    DAYS 6 kit 1    gabapentin (NEURONTIN) 300 MG capsule TAKE 1 CAPSULE TWICE DAILY 180 capsule 1    HUMULIN 70/30 U-100 KWIKPEN 100 unit/mL (70-30) InPn pen INJECT 60 UNITS            SUBCUTANEOUSLY TWO TIMES A  mL 1    lisinopriL (PRINIVIL,ZESTRIL) 40 MG tablet TAKE 1 TABLET ONCE DAILY 90 tablet 3    Low-Dose Aspirin 81 mg Tab Take by mouth. 1 Tablet Oral Every day      metoprolol succinate (TOPROL-XL) 25 MG 24 hr tablet TAKE 1 TABLET DAILY ALONG  WITH 50MG TABLET FOR A     TOTAL OF 75MG DAILY 90 tablet 3    metoprolol succinate (TOPROL-XL) 50 MG 24 hr tablet Take 1 tablet (50 mg total) by mouth once daily. 90 tablet 1    MULTIVIT-MIN/FA/LYCOPEN/LUTEIN (CENTRUM SILVER MEN ORAL) Take by mouth once daily.      mycophenolate (CELLCEPT) 250 mg Cap Take 3 capsules (750 mg total) by mouth  2 (two) times daily. 540 capsule 3    semaglutide (OZEMPIC) 1 mg/dose (4 mg/3 mL) INJECT 1MG SUBCUTANEOUSLY  WEEKLY 3 pen 1    tacrolimus (PROGRAF) 1 MG Cap Take 2 capsules (2 mg total) by mouth every 12 (twelve) hours. 120 capsule 11    torsemide (DEMADEX) 5 MG Tab TAKE 2 TABLETS (10MG TOTAL)ONCE DAILY (Patient taking differently: Take 5 mg by mouth once daily. TAKE 1 TABLETS (10MG TOTAL)ONCE DAILY) 180 tablet 3      ?   SOCIAL HISTORY:?   Social History     Tobacco Use    Smoking status: Former Smoker     Packs/day: 1.00     Years: 20.00     Pack years: 20.00     Types: Cigarettes     Quit date: 1984     Years since quittin.9    Smokeless tobacco: Never Used   Substance Use Topics    Alcohol use: Yes     Alcohol/week: 1.0 standard drink     Types: 1 Cans of beer per week     Comment: daily      ?      ?   FAMILY HISTORY:   family history includes Diabetes in his brother and maternal grandmother; Early death in his brother; Heart disease in his brother; Hyperlipidemia in his brother and sister; Hypertension in his brother; Kidney disease in his son; Stroke in his brother and mother.   ?        Objective:      Physical Exam      ?   Vitals:    22 0852   BP: (!) 153/80   Pulse: 67   Resp: 16   Temp: 97.2 °F (36.2 °C)      ?   ECOG:?0  General appearance: Generally well appearing, in no acute distress.   Head, eyes, ears, nose, and throat: moist mucous membranes.   Respiratory:  Normal work of breathing  Abdomen: nontender, nondistended.   Extremities: Warm, without edema.   Neurologic: Alert and oriented.   Skin: No rashes, ecchymoses or petechial lesion.   Psychiatric:  Normal mood and affect.    ?   Laboratory:    Lab Results   Component Value Date    WBC 4.42 2022    RBC 4.53 (L) 2022    HGB 11.9 (L) 2022    HCT 37.6 (L) 2022    MCV 83 2022    MCH 26.3 (L) 2022    MCHC 31.6 (L) 2022    RDW 15.2 (H) 2022     2022    MPV 10.7  05/04/2022    GRAN 2.3 05/04/2022    GRAN 51.6 05/04/2022    LYMPH 1.6 05/04/2022    LYMPH 35.5 05/04/2022    MONO 0.4 05/04/2022    MONO 9.5 05/04/2022    EOS 0.1 05/04/2022    BASO 0.03 05/04/2022    EOSINOPHIL 2.5 05/04/2022    BASOPHIL 0.7 05/04/2022       Sodium   Date Value Ref Range Status   06/02/2022 138 136 - 145 mmol/L Final     Potassium   Date Value Ref Range Status   06/02/2022 4.2 3.5 - 5.1 mmol/L Final     Chloride   Date Value Ref Range Status   06/02/2022 106 95 - 110 mmol/L Final     CO2   Date Value Ref Range Status   06/02/2022 27 23 - 29 mmol/L Final     Glucose   Date Value Ref Range Status   06/02/2022 100 70 - 110 mg/dL Final     BUN   Date Value Ref Range Status   06/02/2022 17 8 - 23 mg/dL Final     Creatinine   Date Value Ref Range Status   06/02/2022 2.0 (H) 0.5 - 1.4 mg/dL Final     Calcium   Date Value Ref Range Status   06/02/2022 8.5 (L) 8.7 - 10.5 mg/dL Final     Total Protein   Date Value Ref Range Status   05/04/2022 7.2 6.0 - 8.4 g/dL Final     Albumin   Date Value Ref Range Status   06/02/2022 2.7 (L) 3.5 - 5.2 g/dL Final     Total Bilirubin   Date Value Ref Range Status   05/04/2022 0.5 0.1 - 1.0 mg/dL Final     Comment:     For infants and newborns, interpretation of results should be based  on gestational age, weight and in agreement with clinical  observations.    Premature Infant recommended reference ranges:  Up to 24 hours.............<8.0 mg/dL  Up to 48 hours............<12.0 mg/dL  3-5 days..................<15.0 mg/dL  6-29 days.................<15.0 mg/dL       Alkaline Phosphatase   Date Value Ref Range Status   05/04/2022 100 55 - 135 U/L Final     AST   Date Value Ref Range Status   05/04/2022 31 10 - 40 U/L Final     ALT   Date Value Ref Range Status   05/04/2022 18 10 - 44 U/L Final     Anion Gap   Date Value Ref Range Status   06/02/2022 5 (L) 8 - 16 mmol/L Final     eGFR if    Date Value Ref Range Status   06/02/2022 37.7 (A) >60 mL/min/1.73  m^2 Final     eGFR if non    Date Value Ref Range Status   06/02/2022 32.6 (A) >60 mL/min/1.73 m^2 Final     Comment:     Calculation used to obtain the estimated glomerular filtration  rate (eGFR) is the CKD-EPI equation.          Iron   Date Value Ref Range Status   10/05/2016 49 45 - 160 ug/dL Final     TIBC   Date Value Ref Range Status   10/05/2016 280 250 - 450 ug/dL Final     Saturated Iron   Date Value Ref Range Status   10/05/2016 18 (L) 20 - 50 % Final     TSH   Date Value Ref Range Status   05/04/2022 2.325 0.400 - 4.000 uIU/mL Final           ?   Assessment/Plan:   Elevated serum immunoglobulin free light chain level  -     CBC Auto Differential; Future; Expected date: 09/06/2022  -     Comprehensive Metabolic Panel; Future; Expected date: 09/06/2022  -     Immunoglobulin Free LT Chains Blood; Future; Expected date: 09/06/2022  -     Immunoglobulins (IgG, IgA, IgM) Quantitative; Future; Expected date: 09/06/2022  -     Protein Electrophoresis, Serum; Future; Expected date: 09/06/2022  -     Protein Electrophoresis, Random Urine; Future; Expected date: 09/06/2022  -     Urine Protein Electrophoresis with BABATUNDE 24 Hours; Future; Expected date: 09/06/2022  -     IMMUNOFIXATION ELECTROPHORESIS, SERUM; Future; Expected date: 09/06/2022  -     Immunofixation, 24 hour Urine; Future; Expected date: 09/06/2022    Heart transplanted  -     Ambulatory consult to Hematology / Oncology    S/P kidney transplant  -     Ambulatory consult to Hematology / Oncology       1. Elevated serum immunoglobulin free light chain level    2. Heart transplanted    3. S/P kidney transplant          Plan:     Problem List Items Addressed This Visit        Cardiac/Vascular    Heart transplanted      Other Visit Diagnoses     Elevated serum immunoglobulin free light chain level    -  Primary    S/P kidney transplant            Elevated free light chains:  Mild elevation of kappa and lambda ratio mildly elevated at 1.9  (normal range upper limit 1.65). May be consistent with inflammation chronic illness but we did discuss entity of free light chain disorder and multiple myeloma although no new clinical concerns/clearly associated end-organ damage (no new bony or other atypical pain, no hypercalcemia, chronic kidney disease appears relatively stable, mild anemia in similar range to prior) to suggest such and no paraprotein on serum or urine protein electrophoresis.  Recommend continued surveillance with primary care, Nephrology and Transplant team and referral back to Hematology Oncology if new clinical concern or abnormal lab work.    Low risk prostate cancer:  Continue on active surveillance follow-up with Urology    Type 2 diabetes:  Continue follow-up with primary care/diabetes management    History of heart in liver transplant, renal dysfunction:  Renal dysfunction likely multifactorial continue follow-up with transplant physician, primary care for management of diabetes and we will follow-up for further monitoring of free light chain abnormality per above.    Follow-Up:   Patient Instructions   RV in 3mo with labs 1 wk prior, in [facility], including 24hr urine

## 2022-06-07 ENCOUNTER — LAB VISIT (OUTPATIENT)
Dept: LAB | Facility: HOSPITAL | Age: 72
End: 2022-06-07
Attending: INTERNAL MEDICINE
Payer: MEDICARE

## 2022-06-07 DIAGNOSIS — Z94.1 HEART REPLACED BY TRANSPLANT: ICD-10-CM

## 2022-06-07 PROCEDURE — 80197 ASSAY OF TACROLIMUS: CPT | Performed by: INTERNAL MEDICINE

## 2022-06-07 PROCEDURE — 36415 COLL VENOUS BLD VENIPUNCTURE: CPT | Mod: PO | Performed by: INTERNAL MEDICINE

## 2022-06-08 DIAGNOSIS — Z94.1 HEART TRANSPLANTED: ICD-10-CM

## 2022-06-08 LAB — TACROLIMUS BLD-MCNC: 2.3 NG/ML (ref 5–15)

## 2022-06-08 RX ORDER — TACROLIMUS 1 MG/1
CAPSULE ORAL
Qty: 15 CAPSULE | Refills: 11 | OUTPATIENT
Start: 2022-06-08 | End: 2022-06-21 | Stop reason: SDUPTHER

## 2022-06-08 NOTE — TELEPHONE ENCOUNTER
After reviewing with Dr. Chinchilla, I instructed pt to increase her Tacrolimus dose to 3 mg in evening and stay on 2 mg in morning. Discussed ideal level of 5 but we would be watching kidney function also. Scheduled lab at [place] 6/14 9 am for cmp, bnp, tac.

## 2022-06-12 DIAGNOSIS — I10 ESSENTIAL HYPERTENSION: ICD-10-CM

## 2022-06-12 NOTE — TELEPHONE ENCOUNTER
No new care gaps identified.  [facility] Embedded Care Gaps. Reference number: [id number]. 6/12/2022   6:30:58 PM CDT

## 2022-06-13 RX ORDER — LISINOPRIL 40 MG/1
40 TABLET ORAL DAILY
Qty: 90 TABLET | Refills: 3 | Status: ON HOLD | OUTPATIENT
Start: 2022-06-13 | End: 2022-07-20 | Stop reason: HOSPADM

## 2022-06-13 NOTE — TELEPHONE ENCOUNTER
Refill Routing Note   Medication(s) are not appropriate for processing by [facility] for the following reason(s):      - Required vitals are abnormal    [facility] action(s):  Defer       Medication Therapy Plan: Last BP 06/06/22 (!) 153/80  Medication reconciliation completed: No     Appointments  past 12m or future 3m with PCP    Date Provider   Last Visit   1/7/2022 Krystin Zimmerman MD   Next Visit   Visit date not found Krystin Zimmerman MD   ED visits in past 90 days: 0        Note composed:4:20 PM 06/13/2022

## 2022-06-14 ENCOUNTER — LAB VISIT (OUTPATIENT)
Dept: LAB | Facility: HOSPITAL | Age: 72
End: 2022-06-14
Attending: INTERNAL MEDICINE
Payer: MEDICARE

## 2022-06-14 DIAGNOSIS — Z94.1 STATUS POST HEART TRANSPLANT: ICD-10-CM

## 2022-06-14 DIAGNOSIS — Z94.1 HEART REPLACED BY TRANSPLANT: ICD-10-CM

## 2022-06-14 PROCEDURE — 36415 COLL VENOUS BLD VENIPUNCTURE: CPT | Mod: PO | Performed by: INTERNAL MEDICINE

## 2022-06-14 PROCEDURE — 83880 ASSAY OF NATRIURETIC PEPTIDE: CPT | Performed by: INTERNAL MEDICINE

## 2022-06-14 PROCEDURE — 80053 COMPREHEN METABOLIC PANEL: CPT | Performed by: INTERNAL MEDICINE

## 2022-06-14 PROCEDURE — 80197 ASSAY OF TACROLIMUS: CPT | Performed by: INTERNAL MEDICINE

## 2022-06-15 ENCOUNTER — PATIENT MESSAGE (OUTPATIENT)
Dept: TRANSPLANT | Facility: CLINIC | Age: 72
End: 2022-06-15
Payer: MEDICARE

## 2022-06-15 ENCOUNTER — TELEPHONE (OUTPATIENT)
Dept: TRANSPLANT | Facility: CLINIC | Age: 72
End: 2022-06-15
Payer: MEDICARE

## 2022-06-15 ENCOUNTER — TELEPHONE (OUTPATIENT)
Dept: PULMONOLOGY | Facility: CLINIC | Age: 72
End: 2022-06-15
Payer: MEDICARE

## 2022-06-15 ENCOUNTER — OFFICE VISIT (OUTPATIENT)
Dept: DIABETES | Facility: CLINIC | Age: 72
End: 2022-06-15
Payer: MEDICARE

## 2022-06-15 VITALS
HEIGHT: 74 IN | SYSTOLIC BLOOD PRESSURE: 141 MMHG | DIASTOLIC BLOOD PRESSURE: 98 MMHG | BODY MASS INDEX: 36.94 KG/M2 | HEART RATE: 67 BPM | WEIGHT: 287.81 LBS

## 2022-06-15 DIAGNOSIS — Z79.4 TYPE 2 DIABETES MELLITUS WITH STAGE 3A CHRONIC KIDNEY DISEASE, WITH LONG-TERM CURRENT USE OF INSULIN: Primary | ICD-10-CM

## 2022-06-15 DIAGNOSIS — N18.30 STAGE 3 CHRONIC KIDNEY DISEASE, UNSPECIFIED WHETHER STAGE 3A OR 3B CKD: Primary | ICD-10-CM

## 2022-06-15 DIAGNOSIS — I10 HYPERTENSION, UNSPECIFIED TYPE: ICD-10-CM

## 2022-06-15 DIAGNOSIS — E78.2 MIXED HYPERLIPIDEMIA: ICD-10-CM

## 2022-06-15 DIAGNOSIS — N18.31 TYPE 2 DIABETES MELLITUS WITH STAGE 3A CHRONIC KIDNEY DISEASE, WITH LONG-TERM CURRENT USE OF INSULIN: Primary | ICD-10-CM

## 2022-06-15 DIAGNOSIS — E11.22 TYPE 2 DIABETES MELLITUS WITH STAGE 3A CHRONIC KIDNEY DISEASE, WITH LONG-TERM CURRENT USE OF INSULIN: Primary | ICD-10-CM

## 2022-06-15 LAB
ALBUMIN SERPL BCP-MCNC: 2.8 G/DL (ref 3.5–5.2)
ALP SERPL-CCNC: 96 U/L (ref 55–135)
ALT SERPL W/O P-5'-P-CCNC: 19 U/L (ref 10–44)
ANION GAP SERPL CALC-SCNC: 9 MMOL/L (ref 8–16)
AST SERPL-CCNC: 23 U/L (ref 10–40)
BILIRUB SERPL-MCNC: 0.3 MG/DL (ref 0.1–1)
BNP SERPL-MCNC: 160 PG/ML (ref 0–99)
BUN SERPL-MCNC: 18 MG/DL (ref 8–23)
CALCIUM SERPL-MCNC: 8.5 MG/DL (ref 8.7–10.5)
CHLORIDE SERPL-SCNC: 106 MMOL/L (ref 95–110)
CO2 SERPL-SCNC: 26 MMOL/L (ref 23–29)
CREAT SERPL-MCNC: 2.1 MG/DL (ref 0.5–1.4)
EST. GFR  (AFRICAN AMERICAN): 35.5 ML/MIN/1.73 M^2
EST. GFR  (NON AFRICAN AMERICAN): 30.7 ML/MIN/1.73 M^2
GLUCOSE SERPL-MCNC: 74 MG/DL (ref 70–110)
GLUCOSE SERPL-MCNC: 93 MG/DL (ref 70–110)
POTASSIUM SERPL-SCNC: 4.6 MMOL/L (ref 3.5–5.1)
PROT SERPL-MCNC: 5.7 G/DL (ref 6–8.4)
SODIUM SERPL-SCNC: 141 MMOL/L (ref 136–145)
TACROLIMUS BLD-MCNC: 3.2 NG/ML (ref 5–15)

## 2022-06-15 PROCEDURE — 99214 OFFICE O/P EST MOD 30 MIN: CPT | Mod: PBBFAC,PO | Performed by: NURSE PRACTITIONER

## 2022-06-15 PROCEDURE — 99214 OFFICE O/P EST MOD 30 MIN: CPT | Mod: S$PBB,,, | Performed by: NURSE PRACTITIONER

## 2022-06-15 PROCEDURE — 99999 PR PBB SHADOW E&M-EST. PATIENT-LVL IV: ICD-10-PCS | Mod: PBBFAC,,, | Performed by: NURSE PRACTITIONER

## 2022-06-15 PROCEDURE — 82962 GLUCOSE BLOOD TEST: CPT | Mod: PBBFAC,PO | Performed by: NURSE PRACTITIONER

## 2022-06-15 PROCEDURE — 99214 PR OFFICE/OUTPT VISIT, EST, LEVL IV, 30-39 MIN: ICD-10-PCS | Mod: S$PBB,,, | Performed by: NURSE PRACTITIONER

## 2022-06-15 PROCEDURE — 99999 PR PBB SHADOW E&M-EST. PATIENT-LVL IV: CPT | Mod: PBBFAC,,, | Performed by: NURSE PRACTITIONER

## 2022-06-15 RX ORDER — INSULIN HUMAN 100 [IU]/ML
40 INJECTION, SUSPENSION SUBCUTANEOUS 2 TIMES DAILY
Qty: 75 ML | Refills: 1 | Status: ON HOLD | OUTPATIENT
Start: 2022-06-15 | End: 2022-07-20 | Stop reason: HOSPADM

## 2022-06-15 NOTE — PROGRESS NOTES
"PCP: Krystin Zimmerman MD    Subjective:     Chief Complaint: Diabetes Follow Up    HISTORY OF PRESENT ILLNESS: 71 y.o.  male presenting for diabetes follow up. Patient has had diabetes for several years with the following complications: neuropathy, stage III CKD. Other pertinent conditions include:  HTN, HLD, cardiac + renal transplant in 2002 on immunosuppressants. He has attended diabetes education in the past.     Patient uses  Freestyle Georgette 2 CGM to monitor blood sugars.  However, he forgot his reader today in clinic, so I am unable to download device.  He reports average BG of 123 for 90-day and 114 for the last 14 days.   He denies any recent hospitalizations, emergency room, syncope, or diaphoresis.     Height: 6' 2" (188 cm)  //  Weight: 130.6 kg (287 lb 13 oz), Body mass index is 36.95 kg/m².  He has lost 10 pounds since last visit.    His blood sugar in clinic today is: 93 mg/dl at 3:00 pm      Labs Reviewed.       DM MEDICATIONS:   Humulin 70 / 30, takes 30 - 40 units before breakfast; 15 - 30 units before dinner  Ozempic 1 mg weekly      Review of Systems   Constitutional: Negative for appetite change, fatigue and unexpected weight change.   Gastrointestinal: Negative for abdominal pain, constipation, diarrhea and nausea.   Endocrine: Negative for polydipsia, polyphagia and polyuria.   Neurological: Negative for dizziness, numbness and headaches.   Psychiatric/Behavioral: Negative for confusion and decreased concentration. The patient is not nervous/anxious.        Diabetes Management Status  Statin: Taking  ACE/ARB: Taking    Screening or Prevention Patient's value Goal Complete/Controlled?   HgA1C Testing and Control   Lab Results   Component Value Date    HGBA1C 6.5 (H) 02/09/2022      Annually/Less than 8% Yes   Lipid profile : 05/04/2022 Annually Yes   LDL control Lab Results   Component Value Date    LDLCALC 86.0 05/04/2022    Annually/Less than 100 mg/dl  Yes "   Nephropathy screening Lab Results   Component Value Date    MICALBCREAT 935.0 (H) 10/13/2021     Lab Results   Component Value Date    PROTEINUA 2+ (A) 04/27/2022    Annually Yes   Blood pressure BP Readings from Last 1 Encounters:   06/15/22 (!) 141/98    Less than 140/90 Yes   Dilated retinal exam : 11/10/2021 Annually Yes, Aminata De [facility]   Foot exam   : 06/15/2022 Annually Yes     ACTIVITY LEVEL: Rarely Active. Discussed activities, benefits, methods, and precautions.  MEAL PLANNING: Patient reports number of meals per day to be 2 and number of snacks per day to be 2.   Patient is encouraged to carb count and consume no more than 45 - 60 grams of carbohydrates in each meal, and 1800 k / jovany per day.      BLOOD GLUCOSE TESTING:  Freestyle Georgette 2 CGM  SOCIAL HISTORY: . Lives with spouse. Has 2 children. Patient retired as manager for [employer].     Objective:      Physical Exam  Constitutional:       Appearance: He is well-developed.   HENT:      Head: Normocephalic and atraumatic.   Eyes:      Pupils: Pupils are equal, round, and reactive to light.   Cardiovascular:      Rate and Rhythm: Normal rate and regular rhythm.      Pulses:           Dorsalis pedis pulses are 2+ on the right side and 2+ on the left side.   Pulmonary:      Effort: Pulmonary effort is normal.      Breath sounds: Normal breath sounds.   Musculoskeletal:         General: Normal range of motion.      Cervical back: Normal range of motion.   Feet:      Right foot:      Protective Sensation: 6 sites tested. 4 sites sensed.      Skin integrity: Dry skin present. No ulcer or callus.      Left foot:      Protective Sensation: 6 sites tested. 4 sites sensed.      Skin integrity: Dry skin present. No ulcer, blister or callus.   Skin:     General: Skin is warm and dry.      Findings: No rash.   Psychiatric:         Behavior: Behavior normal.         Thought Content: Thought content normal.         Judgment: Judgment  normal.         Assessment / Plan:     1.) Type 2 diabetes mellitus, with stage 3a CKD with long term use of insulin  Comments:  -  Continue Freestyle Georgette CGM for monitoring of blood sugars.  Continue Ozempic 1 mg weekly.  He is tolerating without GI side effects. In the future, may consider increasing  Ozempic to 2 mg weekly.  For now, continue Humulin  70/30, takes 30 - 40 units before breakfast; 15 - 30 units before dinner.      Orders:  -     POCT Glucose, Hand-Held Device  - Future Labs: Repeat A1C    2.) Mixed Hyperlipidemia - continue meds as prescribed    3.) Hypertension, unspecified type - above goal, continue medications as prescribed, follow up with PCP or cardiologist.     Additional Plan Details:    1.) Continue monitoring blood sugar 4 x daily, fasting and ac dinner or at bedtime. Discussed ADA goal for fasting blood sugar, 80 - 130 mg/dL; pp blood sugars below 180 mg/dl. Also, discussed prevention of hypoglycemia and the need to adjust goals to higher levels if persistent hypoglycemia.  Reminded to bring BG records or meter to each visit for review.  2.) Return to clinic in 4 months for follow up. The patient was explained the above plan and given opportunity to ask questions.  He understands, chooses and consents to this plan and accepts all the risks, which include but are not limited to the risks mentioned above. He understands the alternative of having no testing, interventions or treatments at this time. He left content and without further questions.     Jael Garrison, BELÉN-C, CDE    A total of 30 minutes was spent in face to face time, of which over 50 % was spent in counseling patient on disease process, complications, treatment, and side effects of medications.

## 2022-06-15 NOTE — TELEPHONE ENCOUNTER
Reviewed labs with Dr. Cee. Creatinine up to 2.1, tac at 3.2, goal 5-8. Pt is taking 2/3. Dr. Cee ordered to repeat labs before increasing tac dose and have pt follow with nephrology. Pt takes torsemide, but not daily. Dr. Cee is referring nephrology to decide on diuretic use and creatinine. Pt will schedule appt with nephrology.

## 2022-06-15 NOTE — TELEPHONE ENCOUNTER
----- Message from Nancy Enamorado sent at 6/15/2022 11:35 AM CDT -----  Contact: [phone]  Type:  Sooner Apoointment Request    Caller is requesting a sooner appointment.  Caller declined first available appointment listed below.  Caller will not accept being placed on the waitlist and is requesting a message be sent to doctor.  Name of Caller:Nigel   When is the first available appointment?9/2022   Symptoms:f/u   Would the patient rather a call back or a response via [facility]? Call back   Best Call Back Number:[phone]  Additional Information:

## 2022-06-20 ENCOUNTER — LAB VISIT (OUTPATIENT)
Dept: LAB | Facility: HOSPITAL | Age: 72
End: 2022-06-20
Attending: INTERNAL MEDICINE
Payer: MEDICARE

## 2022-06-20 DIAGNOSIS — Z94.1 STATUS POST HEART TRANSPLANT: ICD-10-CM

## 2022-06-20 DIAGNOSIS — N18.30 STAGE 3 CHRONIC KIDNEY DISEASE, UNSPECIFIED WHETHER STAGE 3A OR 3B CKD: ICD-10-CM

## 2022-06-20 DIAGNOSIS — E11.22 TYPE 2 DIABETES MELLITUS WITH STAGE 3A CHRONIC KIDNEY DISEASE, WITH LONG-TERM CURRENT USE OF INSULIN: ICD-10-CM

## 2022-06-20 DIAGNOSIS — Z79.4 TYPE 2 DIABETES MELLITUS WITH STAGE 3A CHRONIC KIDNEY DISEASE, WITH LONG-TERM CURRENT USE OF INSULIN: ICD-10-CM

## 2022-06-20 DIAGNOSIS — Z94.1 HEART REPLACED BY TRANSPLANT: ICD-10-CM

## 2022-06-20 DIAGNOSIS — N18.31 TYPE 2 DIABETES MELLITUS WITH STAGE 3A CHRONIC KIDNEY DISEASE, WITH LONG-TERM CURRENT USE OF INSULIN: ICD-10-CM

## 2022-06-20 LAB
ALBUMIN SERPL BCP-MCNC: 2.9 G/DL (ref 3.5–5.2)
ALP SERPL-CCNC: 99 U/L (ref 55–135)
ALT SERPL W/O P-5'-P-CCNC: 21 U/L (ref 10–44)
ANION GAP SERPL CALC-SCNC: 7 MMOL/L (ref 8–16)
AST SERPL-CCNC: 29 U/L (ref 10–40)
BILIRUB SERPL-MCNC: 0.4 MG/DL (ref 0.1–1)
BNP SERPL-MCNC: 269 PG/ML (ref 0–99)
BUN SERPL-MCNC: 19 MG/DL (ref 8–23)
CALCIUM SERPL-MCNC: 8.6 MG/DL (ref 8.7–10.5)
CHLORIDE SERPL-SCNC: 109 MMOL/L (ref 95–110)
CO2 SERPL-SCNC: 25 MMOL/L (ref 23–29)
CREAT SERPL-MCNC: 1.9 MG/DL (ref 0.5–1.4)
EST. GFR  (AFRICAN AMERICAN): 40.1 ML/MIN/1.73 M^2
EST. GFR  (NON AFRICAN AMERICAN): 34.7 ML/MIN/1.73 M^2
ESTIMATED AVG GLUCOSE: 123 MG/DL (ref 68–131)
GLUCOSE SERPL-MCNC: 95 MG/DL (ref 70–110)
HBA1C MFR BLD: 5.9 % (ref 4–5.6)
POTASSIUM SERPL-SCNC: 4.4 MMOL/L (ref 3.5–5.1)
PROT SERPL-MCNC: 6 G/DL (ref 6–8.4)
PTH-INTACT SERPL-MCNC: 191 PG/ML (ref 9–77)
SODIUM SERPL-SCNC: 141 MMOL/L (ref 136–145)

## 2022-06-20 PROCEDURE — 83970 ASSAY OF PARATHORMONE: CPT | Performed by: INTERNAL MEDICINE

## 2022-06-20 PROCEDURE — 83880 ASSAY OF NATRIURETIC PEPTIDE: CPT | Performed by: INTERNAL MEDICINE

## 2022-06-20 PROCEDURE — 80053 COMPREHEN METABOLIC PANEL: CPT | Performed by: INTERNAL MEDICINE

## 2022-06-20 PROCEDURE — 80197 ASSAY OF TACROLIMUS: CPT | Performed by: INTERNAL MEDICINE

## 2022-06-20 PROCEDURE — 36415 COLL VENOUS BLD VENIPUNCTURE: CPT | Mod: PO | Performed by: INTERNAL MEDICINE

## 2022-06-20 PROCEDURE — 83036 HEMOGLOBIN GLYCOSYLATED A1C: CPT | Performed by: NURSE PRACTITIONER

## 2022-06-21 DIAGNOSIS — Z94.1 HEART TRANSPLANTED: ICD-10-CM

## 2022-06-21 LAB — TACROLIMUS BLD-MCNC: 3.2 NG/ML (ref 5–15)

## 2022-06-22 ENCOUNTER — PATIENT MESSAGE (OUTPATIENT)
Dept: DIABETES | Facility: CLINIC | Age: 72
End: 2022-06-22
Payer: MEDICARE

## 2022-06-22 RX ORDER — SEMAGLUTIDE 2.68 MG/ML
2 INJECTION, SOLUTION SUBCUTANEOUS WEEKLY
Qty: 3 ML | Refills: 3 | Status: ON HOLD | OUTPATIENT
Start: 2022-06-22 | End: 2022-07-20 | Stop reason: HOSPADM

## 2022-06-22 RX ORDER — TACROLIMUS 1 MG/1
CAPSULE ORAL
Qty: 540 CAPSULE | Refills: 3 | Status: SHIPPED | OUTPATIENT
Start: 2022-06-22 | End: 2022-06-27

## 2022-06-22 NOTE — TELEPHONE ENCOUNTER
On 6/21/22 I Spoke with pt and reviewed lab results with Dr. Cee, we will increase tacrolimus from 2 mg in am and 3 mg in pm to 3 mg bid to get to goal of 5. Repeat labs next week on 6/27/22 at the [facility] lab.

## 2022-06-24 ENCOUNTER — NURSE TRIAGE (OUTPATIENT)
Dept: ADMINISTRATIVE | Facility: CLINIC | Age: 72
End: 2022-06-24
Payer: MEDICARE

## 2022-06-25 ENCOUNTER — NURSE TRIAGE (OUTPATIENT)
Dept: ADMINISTRATIVE | Facility: CLINIC | Age: 72
End: 2022-06-25
Payer: MEDICARE

## 2022-06-25 ENCOUNTER — HOSPITAL ENCOUNTER (EMERGENCY)
Facility: HOSPITAL | Age: 72
Discharge: HOME | End: 2022-06-25
Attending: EMERGENCY MEDICINE
Payer: MEDICARE

## 2022-06-25 VITALS
HEART RATE: 76 BPM | HEIGHT: 74 IN | TEMPERATURE: 99 F | RESPIRATION RATE: 18 BRPM | WEIGHT: 297.94 LBS | DIASTOLIC BLOOD PRESSURE: 66 MMHG | SYSTOLIC BLOOD PRESSURE: 120 MMHG | OXYGEN SATURATION: 100 % | BODY MASS INDEX: 38.24 KG/M2

## 2022-06-25 DIAGNOSIS — U07.1 COVID-19: ICD-10-CM

## 2022-06-25 DIAGNOSIS — Z94.89 TRANSPLANT RECIPIENT: Primary | ICD-10-CM

## 2022-06-25 LAB
ALBUMIN SERPL BCP-MCNC: 3.1 G/DL (ref 3.5–5.2)
ALP SERPL-CCNC: 100 U/L (ref 55–135)
ALT SERPL W/O P-5'-P-CCNC: 26 U/L (ref 10–44)
ANION GAP SERPL CALC-SCNC: 11 MMOL/L (ref 8–16)
AST SERPL-CCNC: 36 U/L (ref 10–40)
BACTERIA #/AREA URNS HPF: NORMAL /HPF
BASOPHILS # BLD AUTO: 0.04 K/UL (ref 0–0.2)
BASOPHILS NFR BLD: 0.6 % (ref 0–1.9)
BILIRUB SERPL-MCNC: 0.4 MG/DL (ref 0.1–1)
BILIRUB UR QL STRIP: NEGATIVE
BUN SERPL-MCNC: 17 MG/DL (ref 8–23)
CALCIUM SERPL-MCNC: 8.9 MG/DL (ref 8.7–10.5)
CHLORIDE SERPL-SCNC: 107 MMOL/L (ref 95–110)
CK SERPL-CCNC: 120 U/L (ref 20–200)
CLARITY UR: CLEAR
CO2 SERPL-SCNC: 22 MMOL/L (ref 23–29)
COLOR UR: YELLOW
CREAT SERPL-MCNC: 2.1 MG/DL (ref 0.5–1.4)
CRP SERPL-MCNC: 22.3 MG/L (ref 0–8.2)
CTP QC/QA: YES
DIFFERENTIAL METHOD: ABNORMAL
EOSINOPHIL # BLD AUTO: 0 K/UL (ref 0–0.5)
EOSINOPHIL NFR BLD: 0.5 % (ref 0–8)
ERYTHROCYTE [DISTWIDTH] IN BLOOD BY AUTOMATED COUNT: 16.6 % (ref 11.5–14.5)
EST. GFR  (AFRICAN AMERICAN): 36 ML/MIN/1.73 M^2
EST. GFR  (NON AFRICAN AMERICAN): 31 ML/MIN/1.73 M^2
FERRITIN SERPL-MCNC: 38 NG/ML (ref 20–300)
GLUCOSE SERPL-MCNC: 107 MG/DL (ref 70–110)
GLUCOSE UR QL STRIP: NEGATIVE
HCT VFR BLD AUTO: 35.9 % (ref 40–54)
HGB BLD-MCNC: 11.1 G/DL (ref 14–18)
HGB UR QL STRIP: ABNORMAL
HYALINE CASTS #/AREA URNS LPF: 0 /LPF
IMM GRANULOCYTES # BLD AUTO: 0.02 K/UL (ref 0–0.04)
IMM GRANULOCYTES NFR BLD AUTO: 0.3 % (ref 0–0.5)
KETONES UR QL STRIP: NEGATIVE
LACTATE SERPL-SCNC: 1.3 MMOL/L (ref 0.5–2.2)
LDH SERPL L TO P-CCNC: 216 U/L (ref 110–260)
LEUKOCYTE ESTERASE UR QL STRIP: NEGATIVE
LYMPHOCYTES # BLD AUTO: 1.2 K/UL (ref 1–4.8)
LYMPHOCYTES NFR BLD: 17.6 % (ref 18–48)
MCH RBC QN AUTO: 25.9 PG (ref 27–31)
MCHC RBC AUTO-ENTMCNC: 30.9 G/DL (ref 32–36)
MCV RBC AUTO: 84 FL (ref 82–98)
MICROSCOPIC COMMENT: NORMAL
MONOCYTES # BLD AUTO: 1.1 K/UL (ref 0.3–1)
MONOCYTES NFR BLD: 16.9 % (ref 4–15)
NEUTROPHILS # BLD AUTO: 4.2 K/UL (ref 1.8–7.7)
NEUTROPHILS NFR BLD: 64.1 % (ref 38–73)
NITRITE UR QL STRIP: NEGATIVE
NRBC BLD-RTO: 0 /100 WBC
PH UR STRIP: 6 [PH] (ref 5–8)
PLATELET # BLD AUTO: 202 K/UL (ref 150–450)
PMV BLD AUTO: 9.8 FL (ref 9.2–12.9)
POTASSIUM SERPL-SCNC: 4.1 MMOL/L (ref 3.5–5.1)
PROCALCITONIN SERPL IA-MCNC: 0.24 NG/ML
PROT SERPL-MCNC: 7 G/DL (ref 6–8.4)
PROT UR QL STRIP: ABNORMAL
RBC # BLD AUTO: 4.29 M/UL (ref 4.6–6.2)
RBC #/AREA URNS HPF: 2 /HPF (ref 0–4)
SARS-COV-2 RDRP RESP QL NAA+PROBE: POSITIVE
SODIUM SERPL-SCNC: 140 MMOL/L (ref 136–145)
SP GR UR STRIP: 1.02 (ref 1–1.03)
TROPONIN I SERPL DL<=0.01 NG/ML-MCNC: 0.03 NG/ML (ref 0–0.03)
URN SPEC COLLECT METH UR: ABNORMAL
UROBILINOGEN UR STRIP-ACNC: NEGATIVE EU/DL
WBC # BLD AUTO: 6.58 K/UL (ref 3.9–12.7)
WBC #/AREA URNS HPF: 0 /HPF (ref 0–5)

## 2022-06-25 PROCEDURE — 83615 LACTATE (LD) (LDH) ENZYME: CPT | Performed by: EMERGENCY MEDICINE

## 2022-06-25 PROCEDURE — 93010 EKG 12-LEAD: ICD-10-PCS | Mod: CR,,, | Performed by: INTERNAL MEDICINE

## 2022-06-25 PROCEDURE — 83605 ASSAY OF LACTIC ACID: CPT | Performed by: EMERGENCY MEDICINE

## 2022-06-25 PROCEDURE — 85025 COMPLETE CBC W/AUTO DIFF WBC: CPT | Performed by: EMERGENCY MEDICINE

## 2022-06-25 PROCEDURE — 86140 C-REACTIVE PROTEIN: CPT | Performed by: EMERGENCY MEDICINE

## 2022-06-25 PROCEDURE — 93010 ELECTROCARDIOGRAM REPORT: CPT | Mod: CR,,, | Performed by: INTERNAL MEDICINE

## 2022-06-25 PROCEDURE — 25000003 PHARM REV CODE 250: Performed by: EMERGENCY MEDICINE

## 2022-06-25 PROCEDURE — 84484 ASSAY OF TROPONIN QUANT: CPT | Performed by: EMERGENCY MEDICINE

## 2022-06-25 PROCEDURE — 99285 EMERGENCY DEPT VISIT HI MDM: CPT | Mod: 25

## 2022-06-25 PROCEDURE — 82728 ASSAY OF FERRITIN: CPT | Performed by: EMERGENCY MEDICINE

## 2022-06-25 PROCEDURE — 80053 COMPREHEN METABOLIC PANEL: CPT | Performed by: EMERGENCY MEDICINE

## 2022-06-25 PROCEDURE — 82550 ASSAY OF CK (CPK): CPT | Performed by: EMERGENCY MEDICINE

## 2022-06-25 PROCEDURE — U0002 COVID-19 LAB TEST NON-CDC: HCPCS | Performed by: EMERGENCY MEDICINE

## 2022-06-25 PROCEDURE — 84145 PROCALCITONIN (PCT): CPT | Performed by: EMERGENCY MEDICINE

## 2022-06-25 PROCEDURE — 81000 URINALYSIS NONAUTO W/SCOPE: CPT | Performed by: EMERGENCY MEDICINE

## 2022-06-25 PROCEDURE — 93005 ELECTROCARDIOGRAM TRACING: CPT

## 2022-06-25 RX ORDER — ACETAMINOPHEN 325 MG/1
650 TABLET ORAL
Status: COMPLETED | OUTPATIENT
Start: 2022-06-25 | End: 2022-06-25

## 2022-06-25 RX ADMIN — ACETAMINOPHEN 650 MG: 325 TABLET ORAL at 01:06

## 2022-06-25 NOTE — TELEPHONE ENCOUNTER
1st enrollment call - unable to make phone contact -  left and my chart message previously sent.     Reason for Disposition   Message left on unidentified voice mail.  Phone number verified.    Protocols used: NO CONTACT OR DUPLICATE CONTACT CALL-A-MELECIO

## 2022-06-25 NOTE — TELEPHONE ENCOUNTER
Reason for Disposition   [1] Caller requests to speak ONLY to PCP AND [2] URGENT question    Additional Information   Negative: Lab calling with strep throat test results and triager can call in prescription   Negative: Lab calling with urinalysis test results and triager can call in prescription   Negative: Medication questions   Negative: ED call to PCP   Negative: Physician call to PCP   Negative: Call about patient who is currently hospitalized   Negative: [1] Follow-up call from patient regarding patient's clinical status AND [2] information urgent   Negative: [1] Prescription not at pharmacy AND [2] was prescribed today by PCP   Negative: Lab or radiology calling with CRITICAL test results    Protocols used: PCP CALL - NO TRIAGE-A-    Heart, Kidney Transplant - 5/24/2002  (#1)   pt called just took two home covid and both tested pos this evening. fever and chills since yest. denies SOB. rec to speak with MD on call due to pt is heart transplant. Pt warm transferred to speak with  being paged for heart transplant.

## 2022-06-25 NOTE — ED PROVIDER NOTES
SCRIBE #1 NOTE: I, Lele Cesia, am scribing for, and in the presence of, Daja Humphreys MD. I have scribed the entire note.       History     Chief Complaint   Patient presents with    COVID-19 Concerns     Pt states that he took two covid tests at home and both were positive. Pt reports that he has been experiencing weakness and chills and fever x2 days. Pt reports that he is a heart transplant pt.      Review of patient's allergies indicates:   Allergen Reactions    No known drug allergies          History of Present Illness     HPI    2022, 1:28 AM  History obtained from the patient      History of Present Illness: Nigel Albrecht is a 71 y.o. male patient with a PMHx of CKD, T2DM, HTN, HLD who presents to the Emergency Department for evaluation of chills, subjective fever, mild nasal congestion, and a mild cough which onset gradually 2 days pta. Pt states he was in a conference on Wednesday and Thursday and believes he got COVID there. He notes he had 2 positive at-home COVID tests. Symptoms are constant and moderate in severity. No mitigating or exacerbating factors reported. Associated sxs include increased urinary frequency. Patient denies any n/v, CP, SOB, weakness, HA, flank pain, dysuria, diarrhea, abdominal pain, and all other sxs at this time. No prior Tx reported. No further complaints or concerns at this time. Pt notes he has chronic bilateral leg swelling.      Arrival mode: Personal vehicle    PCP: Krystin Zimmerman MD        Past Medical History:  Past Medical History:   Diagnosis Date    Allergic rhinitis 2013    Blood transfusion     Cataract     CKD (chronic kidney disease), stage III 2014    -donor kidney transplant     Diabetes mellitus, type 2 2013    Gout, arthritis 2013    Heart attack     Heart transplanted     Hyperlipidemia 2013    Hypertension     Immunodeficiency due to treatment with immunosuppressive medication     Morbid  obesity 2013    Organ transplant 2002    heart and kidney    Prophylactic immunotherapy     Renal manifestation of secondary diabetes mellitus        Past Surgical History:  Past Surgical History:   Procedure Laterality Date    CATARACT EXTRACTION Bilateral     COLONOSCOPY N/A 10/6/2020    Procedure: COLONOSCOPY;  Surgeon: Conner Redman MD;  Location: [facility];  Service: Endoscopy;  Laterality: N/A;    EYE SURGERY      HEART TRANSPLANT      KIDNEY TRANSPLANT      REVISION TOTAL HIP ARTHROPLASTY           Family History:  Family History   Problem Relation Age of Onset    Stroke Mother     Hyperlipidemia Sister     Diabetes Brother     Early death Brother     Heart disease Brother     Hyperlipidemia Brother     Hypertension Brother     Stroke Brother     Kidney disease Son     Diabetes Maternal Grandmother     Melanoma Neg Hx     Eczema Neg Hx     Lupus Neg Hx     Psoriasis Neg Hx        Social History:  Social History     Tobacco Use    Smoking status: Former Smoker     Packs/day: 1.00     Years: 20.00     Pack years: 20.00     Types: Cigarettes     Quit date: 1984     Years since quittin.9    Smokeless tobacco: Never Used   Substance and Sexual Activity    Alcohol use: Yes     Alcohol/week: 1.0 standard drink     Types: 1 Cans of beer per week     Comment: daily    Drug use: No    Sexual activity: Never        Review of Systems     Review of Systems   Constitutional: Positive for chills and fever (Subjective).   HENT: Positive for congestion. Negative for rhinorrhea and sore throat.    Respiratory: Positive for cough. Negative for shortness of breath.    Cardiovascular: Negative for chest pain and leg swelling.   Gastrointestinal: Negative for abdominal pain, diarrhea, nausea and vomiting.   Genitourinary: Positive for frequency. Negative for dysuria and flank pain.   Musculoskeletal: Negative for back pain.   Skin: Negative for rash.   Neurological: Negative for  "dizziness, weakness, numbness and headaches.   Hematological: Does not bruise/bleed easily.   All other systems reviewed and are negative.     Physical Exam     Initial Vitals [06/24/22 2314]   BP Pulse Resp Temp SpO2   (!) 172/88 90 18 (!) 102.7 °F (39.3 °C) 98 %      MAP       --          Physical Exam  Nursing Notes and Vital Signs Reviewed.  Constitutional: Patient is in no acute distress. Well-developed and well-nourished.  Head: Atraumatic. Normocephalic.  Eyes: PERRL. EOM intact. Conjunctivae are not pale. No scleral icterus.  ENT: Mucous membranes are moist. Oropharynx is clear and symmetric.    Neck: Supple. Full ROM. No lymphadenopathy.  Cardiovascular: Regular rate. Regular rhythm. No murmurs, rubs, or gallops. Distal pulses are 2+ and symmetric.  Pulmonary/Chest: No respiratory distress. Clear to auscultation bilaterally. No wheezing or rales.  Abdominal: Soft and non-distended.  There is no tenderness.  No rebound, guarding, or rigidity. Good bowel sounds.  Genitourinary: No CVA tenderness  Musculoskeletal: Moves all extremities. No obvious deformities. 2+ edema bilateral lower extremity. No calf tenderness.  Skin: Warm and dry.  Neurological:  Alert, awake, and appropriate.  Normal speech.  No acute focal neurological deficits are appreciated.  Psychiatric: Normal affect. Good eye contact. Appropriate in content.     ED Course   Procedures  ED Vital Signs:  Vitals:    06/24/22 2314 06/25/22 0251 06/25/22 0308 06/25/22 0332   BP: (!) 172/88  137/66 122/66   Pulse: 90 82 81 78   Resp: 18 19 20 20   Temp: (!) 102.7 °F (39.3 °C)  98.9 °F (37.2 °C)    TempSrc: Oral  Oral    SpO2: 98% 98% 100% 98%   Weight: 135.2 kg (297 lb 15.2 oz)      Height: 6' 2" (1.88 m)       06/25/22 0347 06/25/22 0425 06/25/22 0440   BP: 120/66  120/66   Pulse: 79 76 76   Resp: (!) 21 20 18   Temp:   98.9 °F (37.2 °C)   TempSrc:      SpO2: 97% 100% 100%   Weight:      Height:          Abnormal Lab Results:  Labs Reviewed   CBC W/ " AUTO DIFFERENTIAL - Abnormal; Notable for the following components:       Result Value    RBC 4.29 (*)     Hemoglobin 11.1 (*)     Hematocrit 35.9 (*)     MCH 25.9 (*)     MCHC 30.9 (*)     RDW 16.6 (*)     Mono # 1.1 (*)     Lymph % 17.6 (*)     Mono % 16.9 (*)     All other components within normal limits   COMPREHENSIVE METABOLIC PANEL - Abnormal; Notable for the following components:    CO2 22 (*)     Creatinine 2.1 (*)     Albumin 3.1 (*)     eGFR if  36 (*)     eGFR if non  31 (*)     All other components within normal limits   C-REACTIVE PROTEIN - Abnormal; Notable for the following components:    CRP 22.3 (*)     All other components within normal limits   TROPONIN I - Abnormal; Notable for the following components:    Troponin I 0.031 (*)     All other components within normal limits   URINALYSIS, REFLEX TO URINE CULTURE - Abnormal; Notable for the following components:    Protein, UA 3+ (*)     Occult Blood UA 1+ (*)     All other components within normal limits    Narrative:     Specimen Source->Urine   SARS-COV-2 RDRP GENE - Abnormal; Notable for the following components:    POC Rapid COVID Positive (*)     All other components within normal limits   FERRITIN   LACTATE DEHYDROGENASE   CK   LACTIC ACID, PLASMA   PROCALCITONIN   URINALYSIS MICROSCOPIC    Narrative:     Specimen Source->Urine        All Lab Results:  Results for orders placed or performed during the hospital encounter of 06/25/22   CBC auto differential   Result Value Ref Range    WBC 6.58 3.90 - 12.70 K/uL    RBC 4.29 (L) 4.60 - 6.20 M/uL    Hemoglobin 11.1 (L) 14.0 - 18.0 g/dL    Hematocrit 35.9 (L) 40.0 - 54.0 %    MCV 84 82 - 98 fL    MCH 25.9 (L) 27.0 - 31.0 pg    MCHC 30.9 (L) 32.0 - 36.0 g/dL    RDW 16.6 (H) 11.5 - 14.5 %    Platelets 202 150 - 450 K/uL    MPV 9.8 9.2 - 12.9 fL    Immature Granulocytes 0.3 0.0 - 0.5 %    Gran # (ANC) 4.2 1.8 - 7.7 K/uL    Immature Grans (Abs) 0.02 0.00 - 0.04 K/uL     Lymph # 1.2 1.0 - 4.8 K/uL    Mono # 1.1 (H) 0.3 - 1.0 K/uL    Eos # 0.0 0.0 - 0.5 K/uL    Baso # 0.04 0.00 - 0.20 K/uL    nRBC 0 0 /100 WBC    Gran % 64.1 38.0 - 73.0 %    Lymph % 17.6 (L) 18.0 - 48.0 %    Mono % 16.9 (H) 4.0 - 15.0 %    Eosinophil % 0.5 0.0 - 8.0 %    Basophil % 0.6 0.0 - 1.9 %    Differential Method Automated    Comprehensive metabolic panel   Result Value Ref Range    Sodium 140 136 - 145 mmol/L    Potassium 4.1 3.5 - 5.1 mmol/L    Chloride 107 95 - 110 mmol/L    CO2 22 (L) 23 - 29 mmol/L    Glucose 107 70 - 110 mg/dL    BUN 17 8 - 23 mg/dL    Creatinine 2.1 (H) 0.5 - 1.4 mg/dL    Calcium 8.9 8.7 - 10.5 mg/dL    Total Protein 7.0 6.0 - 8.4 g/dL    Albumin 3.1 (L) 3.5 - 5.2 g/dL    Total Bilirubin 0.4 0.1 - 1.0 mg/dL    Alkaline Phosphatase 100 55 - 135 U/L    AST 36 10 - 40 U/L    ALT 26 10 - 44 U/L    Anion Gap 11 8 - 16 mmol/L    eGFR if African American 36 (A) >60 mL/min/1.73 m^2    eGFR if non African American 31 (A) >60 mL/min/1.73 m^2   C-Reactive Protein   Result Value Ref Range    CRP 22.3 (H) 0.0 - 8.2 mg/L   Ferritin   Result Value Ref Range    Ferritin 38 20.0 - 300.0 ng/mL   Lactate Dehydrogenase   Result Value Ref Range     110 - 260 U/L   CK   Result Value Ref Range     20 - 200 U/L   Lactic Acid, Plasma   Result Value Ref Range    Lactate (Lactic Acid) 1.3 0.5 - 2.2 mmol/L   Troponin I   Result Value Ref Range    Troponin I 0.031 (H) 0.000 - 0.026 ng/mL   Procalcitonin   Result Value Ref Range    Procalcitonin 0.24 <0.25 ng/mL   Urinalysis, Reflex to Urine Culture Urine, Clean Catch    Specimen: Urine   Result Value Ref Range    Specimen UA Urine, Clean Catch     Color, UA Yellow Yellow, Straw, Honey    Appearance, UA Clear Clear    pH, UA 6.0 5.0 - 8.0    Specific Gravity, UA 1.020 1.005 - 1.030    Protein, UA 3+ (A) Negative    Glucose, UA Negative Negative    Ketones, UA Negative Negative    Bilirubin (UA) Negative Negative    Occult Blood UA 1+ (A) Negative     Nitrite, UA Negative Negative    Urobilinogen, UA Negative <2.0 EU/dL    Leukocytes, UA Negative Negative   Urinalysis Microscopic   Result Value Ref Range    RBC, UA 2 0 - 4 /hpf    WBC, UA 0 0 - 5 /hpf    Bacteria None None-Occ /hpf    Hyaline Casts, UA 0 0-1/lpf /lpf    Microscopic Comment SEE COMMENT    POCT COVID-19 Rapid Screening   Result Value Ref Range    POC Rapid COVID Positive (A) Negative     Acceptable Yes          Imaging Results:  Type of Interpretation: Outside Written Report.  Radiology Procedure Done: Portable CXR.  Interpretation: Minimal airspace opacities noted within the lower lungs which may be infectious in nature  Radiologist: Osei Garzon MD        The EKG was ordered, reviewed, and independently interpreted by the ED provider.  Interpretation time: 01:37  Rate: 90 BPM  Rhythm: normal sinus rhythm  Interpretation: RBBB. No STEMI.       The Emergency Provider reviewed the vital signs and test results, which are outlined above.     ED Discussion       4:15 AM: Reassessed pt at this time. Discussed with pt all pertinent ED information and results. Discussed pt dx and plan of tx. Gave pt all f/u and return to the ED instructions. All questions and concerns were addressed at this time. Pt expresses understanding of information and instructions, and is comfortable with plan to discharge. Pt is stable for discharge.    I discussed with patient and/or family/caretaker that evaluation in the ED does not suggest any emergent or life threatening medical conditions requiring immediate intervention beyond what was provided in the ED, and I believe patient is safe for discharge.  Regardless, an unremarkable evaluation in the ED does not preclude the development or presence of a serious of life threatening condition. As such, patient was instructed to return immediately for any worsening or change in current symptoms.         Medical Decision Making:   Clinical Tests:   Lab Tests:  Ordered and Reviewed  Radiological Study: Ordered and Reviewed  Medical Tests: Ordered and Reviewed           ED Medication(s):  Medications   acetaminophen tablet 650 mg (650 mg Oral Given 6/25/22 0119)       Discharge Medication List as of 6/25/2022  4:17 AM      START taking these medications    Details   pulse oximeter (PULSE OXIMETER) device by Apply Externally route 2 (two) times a day. Use twice daily at 8 AM and 3 PM and record the value in MyChart as directed., Starting Sat 6/25/2022, Normal              Follow-up Information     [facility] - Emergency Dept..    Specialty: Emergency Medicine  Why: As needed, If symptoms worsen  Contact information:  [address] [facility] [address] [phone]                           Scribe Attestation:   Scribe #1: I performed the above scribed service and the documentation accurately describes the services I performed. I attest to the accuracy of the note.     Attending:   Physician Attestation Statement for Scribe #1: I, Daja Humphreys MD, personally performed the services described in this documentation, as scribed by Lele Callaway, in my presence, and it is both accurate and complete.           Clinical Impression       ICD-10-CM ICD-9-CM   1. Transplant recipient  Z94.89 V42.89   2. COVID-19  U07.1 079.89       Disposition:   Disposition: Discharged  Condition: Stable         Daja Humphreys MD  06/25/22 6603

## 2022-06-26 ENCOUNTER — NURSE TRIAGE (OUTPATIENT)
Dept: ADMINISTRATIVE | Facility: CLINIC | Age: 72
End: 2022-06-26
Payer: MEDICARE

## 2022-06-26 NOTE — TELEPHONE ENCOUNTER
Covid Surveillance Enrollment #2:    Pt called for surveillance enrollment. Program overview provided. Pt expresses interest in program but states now is not a good time and requests call back tomorrow. Also states he lives 40 miles from pharmacy. Advised can help to have pulse ox mailed, however pharmacy is closed today so unable to make arrangements. Advised to remind staff when he speaks with them tomorrow to assist with mailing device. Advised to call [facility] with any concerns or questions and will receive call back tomorrow. Verbalized understanding.     Pt also concerned about labs he has ordered in the morning- asking if he can go while he is CV pos or if he needs to reschedule. Advised to call lab in the morning and also would route to transplant coordinator for advisement. Verbalized understanding.     Reason for Disposition   [1] Caller requesting NON-URGENT health information AND [2] PCP's office is the best resource    Additional Information   Negative: RN needs further essential information from caller in order to complete triage    Protocols used: INFORMATION ONLY CALL - NO TRIAGE-A-

## 2022-06-27 ENCOUNTER — NURSE TRIAGE (OUTPATIENT)
Dept: ADMINISTRATIVE | Facility: CLINIC | Age: 72
End: 2022-06-27
Payer: MEDICARE

## 2022-06-27 DIAGNOSIS — Z94.1 HEART TRANSPLANTED: ICD-10-CM

## 2022-06-27 RX ORDER — TACROLIMUS 1 MG/1
CAPSULE ORAL
Qty: 540 CAPSULE | Refills: 3 | Status: ON HOLD | OUTPATIENT
Start: 2022-06-27 | End: 2022-07-08 | Stop reason: SDUPTHER

## 2022-06-27 NOTE — TELEPHONE ENCOUNTER
Spoke to pt this morning. Pt tested positive for Covid over the weekend. Advised pt he needs to quarantine for 21 days per CDC guidelines. Pt stated he will do so. Pt also asked about [place] [facility] [facility] still does not have script. Will send a new script to Dr. Cee to sign.

## 2022-06-27 NOTE — TELEPHONE ENCOUNTER
Pt called for covid surveillance enrollment and he said that his Internet is down and I sent him my chart message for enrollment and he said that he may just go buy a device since its 50 miles to go pick one up. Pt explained that he can call [facility] if any problems. Pt placed in [facility] and will leave his task and he will reach out to [facility] if any problems  Reason for Disposition   Information only question and nurse able to answer    Protocols used: INFORMATION ONLY CALL - NO TRIAGE-A-[place]

## 2022-06-28 ENCOUNTER — TELEPHONE (OUTPATIENT)
Dept: NEPHROLOGY | Facility: CLINIC | Age: 72
End: 2022-06-28
Payer: MEDICARE

## 2022-06-28 DIAGNOSIS — N18.30 STAGE 3 CHRONIC KIDNEY DISEASE, UNSPECIFIED WHETHER STAGE 3A OR 3B CKD: Primary | ICD-10-CM

## 2022-06-28 NOTE — TELEPHONE ENCOUNTER
Rescheduled appt per protocol as pt is txplant and covid + he will quarantine 21 days. 6/28/22/sf

## 2022-06-28 NOTE — TELEPHONE ENCOUNTER
----- Message from Lucila Matthew MD sent at 6/27/2022  4:52 PM CDT -----  Regarding: FW: COVID positive  Hi Cynthia, please see the following message from transplant.  Thanks!    ----- Message -----  From: Dominique Cobb RN  Sent: 6/27/2022   8:10 AM CDT  To: Lucila Matthew MD  Subject: COVID positive                                   Good morning,  Pt tested positive for COVID 19 yesterday. Since he is a transplant pt he must quarantine for 21 days, per CDC guidelines. Please have your staff reschedule his appt after July 18.    Thank you,  Dominique Cobb  [facility] Coordinator

## 2022-06-30 ENCOUNTER — TELEPHONE (OUTPATIENT)
Dept: TRANSPLANT | Facility: CLINIC | Age: 72
End: 2022-06-30
Payer: MEDICARE

## 2022-06-30 DIAGNOSIS — Z94.1 HEART TRANSPLANTED: ICD-10-CM

## 2022-06-30 NOTE — TELEPHONE ENCOUNTER
Pt called to inform me [facility] has not received his tacro. Called [facility] and they had not received the EMR refill from 6/27/22. Called in prescription. It will be expedited and sent to pt.

## 2022-06-30 NOTE — LETTER
June 30, 2022    [facility] [address]  [facility] [address]      Congratulations on the continued success of your kidney transplant!   Your transplant team has determined that the care and monitoring of your kidney can now be maintained by your local nephrologist and health care team.     Please Continue to:  ? PLEASE CONTINUE TO FOLLOW UP WITH HEART TRANSPLANT  ? Drink adequate amounts of water  ? Keep your prescriptions current  ? Take medication on time and dont miss any doses  ? See your local providers routinely  ? Keep up with health maintenance that your primary care provider recommends  ? Report problems to your local doctors in a timely manner  ? Follow your providers recommendations for any health concerns or emergencies    Medication:  ? All Prescriptions, including your transplant medication, will be filled by your local provider     Lab and Office Visits:  ? Routine lab and office visits with transplant are no longer needed  ? We recommend that your local nephrologist continue routine monitoring of your kidney function by checking your bloodwork regularly, including immunosuppression levels  ? Continue to see your local nephrologist, primary care and other providers regularly to quickly address any problems which may arise    We are here for you:  ? The health of you and your kidney are very important to us  ? We are always available for questions that you or your providers may have    Wishing you many more years of health and wellness!  Sincerely,    [facility]  [facility] [place], [place] [id number] [phone]

## 2022-06-30 NOTE — TELEPHONE ENCOUNTER
----- Message from Serena Cortes RN sent at 6/23/2022  1:05 PM CDT -----    ----- Message -----  From: Edy Reese MD  Sent: 6/23/2022  12:25 PM CDT  To: Lucila Matthew MD, #    Sure I understand, thanks for the update  ----- Message -----  From: Lucila Matthew MD  Sent: 6/23/2022  11:47 AM CDT  To: Edy Reese MD, #    Hi Edy, I saw him in Oct 2021, and he has f/u on 6/30. In think you and your team should be aware that out clinic capacity is exteremly limited. There are only 2 of us seeing pts in clinic. I mentioned this to Eunice. Several pts have told me that they received a letter from transplant to inform that that they will no longer by followed by [facility]. We would be happy to see them, but we just don't have as many clinic spots. We'll do what we can, but it's tough.    Lucila  ----- Message -----  From: Edy Reese MD  Sent: 6/22/2022   1:59 PM CDT  To: Lucila Matthew MD, #    Gaytan,    This patient had a kidney and heart transplant almost 20 years ago. I was hoping you could follow up this patient and call us if any transplant related problems. He has some proteinuria which we think is multifactorial including DM, Obesity, hypertension ?less likely sirolimus. He is currently on SERENA blockers. Please let me know if this is acceptable to you. We are always willing to reopen his file if you feel he needs to be seen by us. He is also followed by heart transplant.     Edy

## 2022-07-04 ENCOUNTER — HOSPITAL ENCOUNTER (INPATIENT)
Facility: HOSPITAL | Age: 72
LOS: 4 days | Discharge: HOME | DRG: 177 | End: 2022-07-08
Attending: EMERGENCY MEDICINE | Admitting: STUDENT IN AN ORGANIZED HEALTH CARE EDUCATION/TRAINING PROGRAM
Payer: MEDICARE

## 2022-07-04 ENCOUNTER — NURSE TRIAGE (OUTPATIENT)
Dept: ADMINISTRATIVE | Facility: CLINIC | Age: 72
End: 2022-07-04
Payer: MEDICARE

## 2022-07-04 DIAGNOSIS — R06.02 SOB (SHORTNESS OF BREATH): ICD-10-CM

## 2022-07-04 DIAGNOSIS — R11.10 VOMITING, INTRACTABILITY OF VOMITING NOT SPECIFIED, PRESENCE OF NAUSEA NOT SPECIFIED, UNSPECIFIED VOMITING TYPE: ICD-10-CM

## 2022-07-04 DIAGNOSIS — Z94.0 DECEASED-DONOR KIDNEY TRANSPLANT: ICD-10-CM

## 2022-07-04 DIAGNOSIS — Z79.899 IMMUNOSUPPRESSIVE MANAGEMENT ENCOUNTER FOLLOWING KIDNEY TRANSPLANT: ICD-10-CM

## 2022-07-04 DIAGNOSIS — B19.20 HEPATITIS C TEST POSITIVE: ICD-10-CM

## 2022-07-04 DIAGNOSIS — Z91.89 AT RISK FOR PROLONGED QT INTERVAL SYNDROME: ICD-10-CM

## 2022-07-04 DIAGNOSIS — R19.7 DIARRHEA, UNSPECIFIED TYPE: ICD-10-CM

## 2022-07-04 DIAGNOSIS — U07.1 PNEUMONIA DUE TO COVID-19 VIRUS: Primary | ICD-10-CM

## 2022-07-04 DIAGNOSIS — U07.1 COVID-19: ICD-10-CM

## 2022-07-04 DIAGNOSIS — R05.9 COUGH: ICD-10-CM

## 2022-07-04 DIAGNOSIS — J12.82 PNEUMONIA DUE TO COVID-19 VIRUS: Primary | ICD-10-CM

## 2022-07-04 DIAGNOSIS — N18.32 STAGE 3B CHRONIC KIDNEY DISEASE: ICD-10-CM

## 2022-07-04 DIAGNOSIS — Z94.0 IMMUNOSUPPRESSIVE MANAGEMENT ENCOUNTER FOLLOWING KIDNEY TRANSPLANT: ICD-10-CM

## 2022-07-04 DIAGNOSIS — Z94.1 HEART TRANSPLANTED: ICD-10-CM

## 2022-07-04 LAB
ALBUMIN SERPL BCP-MCNC: 2.6 G/DL (ref 3.5–5.2)
ALP SERPL-CCNC: 93 U/L (ref 55–135)
ALT SERPL W/O P-5'-P-CCNC: 39 U/L (ref 10–44)
ANION GAP SERPL CALC-SCNC: 11 MMOL/L (ref 8–16)
APTT BLDCRRT: 26.3 SEC (ref 21–32)
AST SERPL-CCNC: 54 U/L (ref 10–40)
BASOPHILS # BLD AUTO: 0.01 K/UL (ref 0–0.2)
BASOPHILS NFR BLD: 0.2 % (ref 0–1.9)
BILIRUB SERPL-MCNC: 0.4 MG/DL (ref 0.1–1)
BNP SERPL-MCNC: 140 PG/ML (ref 0–99)
BUN SERPL-MCNC: 17 MG/DL (ref 8–23)
CALCIUM SERPL-MCNC: 8.4 MG/DL (ref 8.7–10.5)
CHLORIDE SERPL-SCNC: 109 MMOL/L (ref 95–110)
CK SERPL-CCNC: 142 U/L (ref 20–200)
CO2 SERPL-SCNC: 21 MMOL/L (ref 23–29)
CREAT SERPL-MCNC: 2.1 MG/DL (ref 0.5–1.4)
CRP SERPL-MCNC: 57.5 MG/L (ref 0–8.2)
CTP QC/QA: YES
D DIMER PPP IA.FEU-MCNC: 1.21 MG/L FEU
DIFFERENTIAL METHOD: ABNORMAL
EOSINOPHIL # BLD AUTO: 0 K/UL (ref 0–0.5)
EOSINOPHIL NFR BLD: 0 % (ref 0–8)
ERYTHROCYTE [DISTWIDTH] IN BLOOD BY AUTOMATED COUNT: 16.4 % (ref 11.5–14.5)
ERYTHROCYTE [SEDIMENTATION RATE] IN BLOOD BY PHOTOMETRIC METHOD: 105 MM/HR (ref 0–23)
EST. GFR  (AFRICAN AMERICAN): 35.3 ML/MIN/1.73 M^2
EST. GFR  (NON AFRICAN AMERICAN): 30.5 ML/MIN/1.73 M^2
FERRITIN SERPL-MCNC: 174 NG/ML (ref 20–300)
GLUCOSE SERPL-MCNC: 105 MG/DL (ref 70–110)
HCT VFR BLD AUTO: 37 % (ref 40–54)
HGB BLD-MCNC: 11.2 G/DL (ref 14–18)
IMM GRANULOCYTES # BLD AUTO: 0.02 K/UL (ref 0–0.04)
IMM GRANULOCYTES NFR BLD AUTO: 0.5 % (ref 0–0.5)
INR PPP: 1 (ref 0.8–1.2)
LACTATE SERPL-SCNC: 1.4 MMOL/L (ref 0.5–2.2)
LDH SERPL L TO P-CCNC: 305 U/L (ref 110–260)
LYMPHOCYTES # BLD AUTO: 1.4 K/UL (ref 1–4.8)
LYMPHOCYTES NFR BLD: 34.7 % (ref 18–48)
MCH RBC QN AUTO: 25.9 PG (ref 27–31)
MCHC RBC AUTO-ENTMCNC: 30.3 G/DL (ref 32–36)
MCV RBC AUTO: 86 FL (ref 82–98)
MONOCYTES # BLD AUTO: 0.3 K/UL (ref 0.3–1)
MONOCYTES NFR BLD: 8.4 % (ref 4–15)
NEUTROPHILS # BLD AUTO: 2.3 K/UL (ref 1.8–7.7)
NEUTROPHILS NFR BLD: 56.2 % (ref 38–73)
NRBC BLD-RTO: 0 /100 WBC
PLATELET # BLD AUTO: 241 K/UL (ref 150–450)
PMV BLD AUTO: 10.2 FL (ref 9.2–12.9)
POTASSIUM SERPL-SCNC: 4.3 MMOL/L (ref 3.5–5.1)
PROCALCITONIN SERPL IA-MCNC: 0.13 NG/ML
PROT SERPL-MCNC: 6.7 G/DL (ref 6–8.4)
PROTHROMBIN TIME: 10.6 SEC (ref 9–12.5)
RBC # BLD AUTO: 4.33 M/UL (ref 4.6–6.2)
SARS-COV-2 RDRP RESP QL NAA+PROBE: POSITIVE
SODIUM SERPL-SCNC: 141 MMOL/L (ref 136–145)
TROPONIN I SERPL DL<=0.01 NG/ML-MCNC: 0.03 NG/ML (ref 0–0.03)
WBC # BLD AUTO: 4.03 K/UL (ref 3.9–12.7)

## 2022-07-04 PROCEDURE — 83615 LACTATE (LD) (LDH) ENZYME: CPT | Performed by: EMERGENCY MEDICINE

## 2022-07-04 PROCEDURE — 82550 ASSAY OF CK (CPK): CPT | Performed by: EMERGENCY MEDICINE

## 2022-07-04 PROCEDURE — 96374 THER/PROPH/DIAG INJ IV PUSH: CPT

## 2022-07-04 PROCEDURE — 93010 EKG 12-LEAD: ICD-10-PCS | Mod: ,,, | Performed by: INTERNAL MEDICINE

## 2022-07-04 PROCEDURE — 84484 ASSAY OF TROPONIN QUANT: CPT | Performed by: EMERGENCY MEDICINE

## 2022-07-04 PROCEDURE — 63600175 PHARM REV CODE 636 W HCPCS: Performed by: FAMILY MEDICINE

## 2022-07-04 PROCEDURE — U0002 COVID-19 LAB TEST NON-CDC: HCPCS | Performed by: EMERGENCY MEDICINE

## 2022-07-04 PROCEDURE — 94761 N-INVAS EAR/PLS OXIMETRY MLT: CPT

## 2022-07-04 PROCEDURE — 85652 RBC SED RATE AUTOMATED: CPT | Performed by: FAMILY MEDICINE

## 2022-07-04 PROCEDURE — 85379 FIBRIN DEGRADATION QUANT: CPT | Performed by: FAMILY MEDICINE

## 2022-07-04 PROCEDURE — 27100098 HC SPACER

## 2022-07-04 PROCEDURE — 27000207 HC ISOLATION

## 2022-07-04 PROCEDURE — 85025 COMPLETE CBC W/AUTO DIFF WBC: CPT | Performed by: EMERGENCY MEDICINE

## 2022-07-04 PROCEDURE — 94640 AIRWAY INHALATION TREATMENT: CPT

## 2022-07-04 PROCEDURE — 96361 HYDRATE IV INFUSION ADD-ON: CPT

## 2022-07-04 PROCEDURE — 99285 PR EMERGENCY DEPT VISIT,LEVEL V: ICD-10-PCS | Mod: CR,CS,, | Performed by: EMERGENCY MEDICINE

## 2022-07-04 PROCEDURE — 25000003 PHARM REV CODE 250: Performed by: EMERGENCY MEDICINE

## 2022-07-04 PROCEDURE — 86140 C-REACTIVE PROTEIN: CPT | Performed by: EMERGENCY MEDICINE

## 2022-07-04 PROCEDURE — 80053 COMPREHEN METABOLIC PANEL: CPT | Performed by: EMERGENCY MEDICINE

## 2022-07-04 PROCEDURE — 99285 EMERGENCY DEPT VISIT HI MDM: CPT | Mod: 25

## 2022-07-04 PROCEDURE — 63600175 PHARM REV CODE 636 W HCPCS

## 2022-07-04 PROCEDURE — 85730 THROMBOPLASTIN TIME PARTIAL: CPT | Performed by: FAMILY MEDICINE

## 2022-07-04 PROCEDURE — 93005 ELECTROCARDIOGRAM TRACING: CPT

## 2022-07-04 PROCEDURE — 86803 HEPATITIS C AB TEST: CPT | Performed by: EMERGENCY MEDICINE

## 2022-07-04 PROCEDURE — 25000242 PHARM REV CODE 250 ALT 637 W/ HCPCS

## 2022-07-04 PROCEDURE — 99223 PR INITIAL HOSPITAL CARE,LEVL III: ICD-10-PCS | Mod: CR,AI,, | Performed by: FAMILY MEDICINE

## 2022-07-04 PROCEDURE — 99223 1ST HOSP IP/OBS HIGH 75: CPT | Mod: CR,AI,, | Performed by: FAMILY MEDICINE

## 2022-07-04 PROCEDURE — 83880 ASSAY OF NATRIURETIC PEPTIDE: CPT | Performed by: EMERGENCY MEDICINE

## 2022-07-04 PROCEDURE — 99285 EMERGENCY DEPT VISIT HI MDM: CPT | Mod: CR,CS,, | Performed by: EMERGENCY MEDICINE

## 2022-07-04 PROCEDURE — 87040 BLOOD CULTURE FOR BACTERIA: CPT | Performed by: EMERGENCY MEDICINE

## 2022-07-04 PROCEDURE — 83605 ASSAY OF LACTIC ACID: CPT | Performed by: EMERGENCY MEDICINE

## 2022-07-04 PROCEDURE — 12000002 HC ACUTE/MED SURGE SEMI-PRIVATE ROOM

## 2022-07-04 PROCEDURE — 80197 ASSAY OF TACROLIMUS: CPT | Performed by: EMERGENCY MEDICINE

## 2022-07-04 PROCEDURE — 25000003 PHARM REV CODE 250

## 2022-07-04 PROCEDURE — 84145 PROCALCITONIN (PCT): CPT | Performed by: EMERGENCY MEDICINE

## 2022-07-04 PROCEDURE — 85610 PROTHROMBIN TIME: CPT | Performed by: FAMILY MEDICINE

## 2022-07-04 PROCEDURE — 93010 ELECTROCARDIOGRAM REPORT: CPT | Mod: ,,, | Performed by: INTERNAL MEDICINE

## 2022-07-04 PROCEDURE — 25000003 PHARM REV CODE 250: Performed by: FAMILY MEDICINE

## 2022-07-04 PROCEDURE — 82728 ASSAY OF FERRITIN: CPT | Performed by: EMERGENCY MEDICINE

## 2022-07-04 RX ORDER — ASCORBIC ACID 500 MG
500 TABLET ORAL 2 TIMES DAILY
Status: DISCONTINUED | OUTPATIENT
Start: 2022-07-04 | End: 2022-07-08 | Stop reason: HOSPADM

## 2022-07-04 RX ORDER — LOPERAMIDE HYDROCHLORIDE 2 MG/1
2 CAPSULE ORAL EVERY 6 HOURS PRN
Status: DISCONTINUED | OUTPATIENT
Start: 2022-07-04 | End: 2022-07-08 | Stop reason: HOSPADM

## 2022-07-04 RX ORDER — MYCOPHENOLATE MOFETIL 250 MG/1
750 CAPSULE ORAL 2 TIMES DAILY
Status: DISCONTINUED | OUTPATIENT
Start: 2022-07-04 | End: 2022-07-04

## 2022-07-04 RX ORDER — DEXAMETHASONE SODIUM PHOSPHATE 4 MG/ML
6 INJECTION, SOLUTION INTRA-ARTICULAR; INTRALESIONAL; INTRAMUSCULAR; INTRAVENOUS; SOFT TISSUE ONCE
Status: COMPLETED | OUTPATIENT
Start: 2022-07-04 | End: 2022-07-04

## 2022-07-04 RX ORDER — AMITRIPTYLINE HYDROCHLORIDE 10 MG/1
10 TABLET, FILM COATED ORAL NIGHTLY
Status: DISCONTINUED | OUTPATIENT
Start: 2022-07-04 | End: 2022-07-08 | Stop reason: HOSPADM

## 2022-07-04 RX ORDER — ASPIRIN 81 MG/1
81 TABLET ORAL DAILY
Status: DISCONTINUED | OUTPATIENT
Start: 2022-07-05 | End: 2022-07-08 | Stop reason: HOSPADM

## 2022-07-04 RX ORDER — AMLODIPINE BESYLATE 5 MG/1
5 TABLET ORAL DAILY
Status: DISCONTINUED | OUTPATIENT
Start: 2022-07-05 | End: 2022-07-05

## 2022-07-04 RX ORDER — ATORVASTATIN CALCIUM 20 MG/1
80 TABLET, FILM COATED ORAL DAILY
Status: DISCONTINUED | OUTPATIENT
Start: 2022-07-05 | End: 2022-07-08 | Stop reason: HOSPADM

## 2022-07-04 RX ORDER — GUAIFENESIN/DEXTROMETHORPHAN 100-10MG/5
10 SYRUP ORAL EVERY 4 HOURS PRN
Status: DISCONTINUED | OUTPATIENT
Start: 2022-07-04 | End: 2022-07-08 | Stop reason: HOSPADM

## 2022-07-04 RX ORDER — TACROLIMUS 1 MG/1
3 CAPSULE ORAL 2 TIMES DAILY
Status: DISCONTINUED | OUTPATIENT
Start: 2022-07-05 | End: 2022-07-07

## 2022-07-04 RX ORDER — ENOXAPARIN SODIUM 150 MG/ML
1 INJECTION SUBCUTANEOUS 2 TIMES DAILY
Status: DISCONTINUED | OUTPATIENT
Start: 2022-07-04 | End: 2022-07-08 | Stop reason: HOSPADM

## 2022-07-04 RX ORDER — ONDANSETRON 2 MG/ML
4 INJECTION INTRAMUSCULAR; INTRAVENOUS EVERY 8 HOURS PRN
Status: DISCONTINUED | OUTPATIENT
Start: 2022-07-04 | End: 2022-07-08 | Stop reason: HOSPADM

## 2022-07-04 RX ORDER — LISINOPRIL 20 MG/1
40 TABLET ORAL
Status: COMPLETED | OUTPATIENT
Start: 2022-07-04 | End: 2022-07-04

## 2022-07-04 RX ORDER — IBUPROFEN 200 MG
16 TABLET ORAL
Status: DISCONTINUED | OUTPATIENT
Start: 2022-07-04 | End: 2022-07-08 | Stop reason: HOSPADM

## 2022-07-04 RX ORDER — ACETAMINOPHEN 500 MG
1000 TABLET ORAL EVERY 8 HOURS PRN
Status: DISCONTINUED | OUTPATIENT
Start: 2022-07-04 | End: 2022-07-08 | Stop reason: HOSPADM

## 2022-07-04 RX ORDER — SODIUM CHLORIDE 0.9 % (FLUSH) 0.9 %
10 SYRINGE (ML) INJECTION
Status: DISCONTINUED | OUTPATIENT
Start: 2022-07-04 | End: 2022-07-08 | Stop reason: HOSPADM

## 2022-07-04 RX ORDER — TORSEMIDE 5 MG/1
5 TABLET ORAL DAILY
Status: DISCONTINUED | OUTPATIENT
Start: 2022-07-05 | End: 2022-07-06

## 2022-07-04 RX ORDER — TALC
6 POWDER (GRAM) TOPICAL NIGHTLY PRN
Status: DISCONTINUED | OUTPATIENT
Start: 2022-07-04 | End: 2022-07-08 | Stop reason: HOSPADM

## 2022-07-04 RX ORDER — ALBUTEROL SULFATE 90 UG/1
2 AEROSOL, METERED RESPIRATORY (INHALATION) EVERY 6 HOURS
Status: DISCONTINUED | OUTPATIENT
Start: 2022-07-05 | End: 2022-07-07

## 2022-07-04 RX ORDER — INSULIN ASPART 100 [IU]/ML
1-10 INJECTION, SOLUTION INTRAVENOUS; SUBCUTANEOUS
Status: DISCONTINUED | OUTPATIENT
Start: 2022-07-04 | End: 2022-07-08 | Stop reason: HOSPADM

## 2022-07-04 RX ORDER — CALCIUM CARBONATE 200(500)MG
1000 TABLET,CHEWABLE ORAL DAILY
Status: DISCONTINUED | OUTPATIENT
Start: 2022-07-05 | End: 2022-07-08 | Stop reason: HOSPADM

## 2022-07-04 RX ORDER — GABAPENTIN 300 MG/1
300 CAPSULE ORAL 2 TIMES DAILY
Status: DISCONTINUED | OUTPATIENT
Start: 2022-07-04 | End: 2022-07-08 | Stop reason: HOSPADM

## 2022-07-04 RX ORDER — ALBUTEROL SULFATE 90 UG/1
4 AEROSOL, METERED RESPIRATORY (INHALATION)
Status: COMPLETED | OUTPATIENT
Start: 2022-07-04 | End: 2022-07-04

## 2022-07-04 RX ORDER — OXYCODONE HYDROCHLORIDE 5 MG/1
5 TABLET ORAL EVERY 6 HOURS PRN
Status: DISCONTINUED | OUTPATIENT
Start: 2022-07-04 | End: 2022-07-08 | Stop reason: HOSPADM

## 2022-07-04 RX ORDER — ALBUTEROL SULFATE 2.5 MG/.5ML
2.5 SOLUTION RESPIRATORY (INHALATION)
Status: DISCONTINUED | OUTPATIENT
Start: 2022-07-04 | End: 2022-07-04

## 2022-07-04 RX ORDER — IBUPROFEN 200 MG
24 TABLET ORAL
Status: DISCONTINUED | OUTPATIENT
Start: 2022-07-04 | End: 2022-07-08 | Stop reason: HOSPADM

## 2022-07-04 RX ORDER — GLUCAGON 1 MG
1 KIT INJECTION
Status: DISCONTINUED | OUTPATIENT
Start: 2022-07-04 | End: 2022-07-08 | Stop reason: HOSPADM

## 2022-07-04 RX ORDER — LISINOPRIL 20 MG/1
40 TABLET ORAL DAILY
Status: DISCONTINUED | OUTPATIENT
Start: 2022-07-05 | End: 2022-07-08 | Stop reason: HOSPADM

## 2022-07-04 RX ADMIN — ALBUTEROL SULFATE 4 PUFF: 108 INHALANT RESPIRATORY (INHALATION) at 07:07

## 2022-07-04 RX ADMIN — GABAPENTIN 300 MG: 300 CAPSULE ORAL at 10:07

## 2022-07-04 RX ADMIN — Medication 500 MG: at 10:07

## 2022-07-04 RX ADMIN — DEXAMETHASONE SODIUM PHOSPHATE 6 MG: 4 INJECTION INTRA-ARTICULAR; INTRALESIONAL; INTRAMUSCULAR; INTRAVENOUS; SOFT TISSUE at 07:07

## 2022-07-04 RX ADMIN — SODIUM CHLORIDE 500 ML: 0.9 INJECTION, SOLUTION INTRAVENOUS at 07:07

## 2022-07-04 RX ADMIN — MYCOPHENOLATE MOFETIL 750 MG: 250 CAPSULE ORAL at 10:07

## 2022-07-04 RX ADMIN — LISINOPRIL 40 MG: 20 TABLET ORAL at 09:07

## 2022-07-04 NOTE — TELEPHONE ENCOUNTER
Heart and kidney transplant 5/24/02  Call warm transferred to [place], for connection to heart transplant MD on call.    Reason for Disposition   [1] Caller requests to speak ONLY to PCP AND [2] URGENT question    Protocols used: PCP CALL - NO TRIAGE-A-

## 2022-07-04 NOTE — ED PROVIDER NOTES
Encounter Date: 2022       History     Chief Complaint   Patient presents with    Covid Positive     Kidney and heart xplant ,    Diarrhea    Shortness of Breath     Patient is a 72 year-old male who presents with shortness of breath, initially present with exertion but now present at rest. He had a positive home COVID test on 2022, and presented to the [facility] ED in [place]. States that he was sent home. Since then, shortness of breath was worsened, and has been accompanied by watery diarrhea 3-4x per day, and vomiting. Patient additionally complains of fevers and chills. He received a kidney and heart transplant in  and is currently taking mycophenolate mofetil and tacrolimus at home. States that he was advised by his transplant physician to present to the [facility] ED in [place].         Allergies: patient denies.    Past Medical History:   Diagnosis Date    Allergic rhinitis 2013    Blood transfusion     Cataract     CKD (chronic kidney disease), stage III 2014    -donor kidney transplant     Diabetes mellitus, type 2 2013    Gout, arthritis 2013    Heart attack     Heart transplanted     Hyperlipidemia 2013    Hypertension     Immunodeficiency due to treatment with immunosuppressive medication     Morbid obesity 2013    Organ transplant 2002    heart and kidney    Prophylactic immunotherapy     Renal manifestation of secondary diabetes mellitus      Past Surgical History:   Procedure Laterality Date    CATARACT EXTRACTION Bilateral     COLONOSCOPY N/A 10/6/2020    Procedure: COLONOSCOPY;  Surgeon: Conner Redman MD;  Location: [facility];  Service: Endoscopy;  Laterality: N/A;    EYE SURGERY      HEART TRANSPLANT      KIDNEY TRANSPLANT      REVISION TOTAL HIP ARTHROPLASTY       Family History   Problem Relation Age of Onset    Stroke Mother     Hyperlipidemia Sister     Diabetes Brother     Early death Brother     Heart  disease Brother     Hyperlipidemia Brother     Hypertension Brother     Stroke Brother     Kidney disease Son     Diabetes Maternal Grandmother     Melanoma Neg Hx     Eczema Neg Hx     Lupus Neg Hx     Psoriasis Neg Hx      Social History     Tobacco Use    Smoking status: Former Smoker     Packs/day: 1.00     Years: 20.00     Pack years: 20.00     Types: Cigarettes     Quit date: 1984     Years since quittin.0    Smokeless tobacco: Never Used   Substance Use Topics    Alcohol use: Yes     Alcohol/week: 1.0 standard drink     Types: 1 Cans of beer per week     Comment: daily    Drug use: No     Review of Systems   Constitutional: Positive for appetite change, chills and fever. Negative for diaphoresis.        Reduced appetite.   HENT: Positive for congestion and sore throat.    Eyes: Negative for visual disturbance.   Respiratory: Positive for cough and shortness of breath. Negative for chest tightness.    Cardiovascular: Negative for chest pain, palpitations and leg swelling.   Gastrointestinal: Positive for abdominal pain, diarrhea and vomiting.   Genitourinary: Negative for difficulty urinating, dysuria and hematuria.   Musculoskeletal: Negative for arthralgias and myalgias.   Skin: Negative for color change, pallor and rash.   Neurological: Positive for dizziness and headaches.        Endorses mild occipital headache.       Physical Exam     Initial Vitals [22 1549]   BP Pulse Resp Temp SpO2   139/67 75 18 99.5 °F (37.5 °C) (!) 94 %      MAP       --         Physical Exam    Nursing note and vitals reviewed.  Constitutional: He appears well-developed and well-nourished. He is cooperative.  Non-toxic appearance.   HENT:   Head: Normocephalic and atraumatic.   Eyes: Right eye exhibits no discharge. Left eye exhibits no discharge. No scleral icterus.   Neck: No tracheal deviation present.   Cardiovascular: Normal rate, regular rhythm, S1 normal and S2 normal. Exam reveals no S3 and no  S4.    No murmur heard.  Pulmonary/Chest: No accessory muscle usage. He has no wheezes. He has no rhonchi. He has no rales.   Abdominal: Abdomen is soft. He exhibits no distension. There is no abdominal tenderness. There is no rebound and no guarding.   Musculoskeletal:         General: No tenderness.     Neurological: He is alert and oriented to person, place, and time. He has normal strength.   Skin: Skin is warm and dry. Capillary refill takes 2 to 3 seconds.         ED Course   Procedures  Labs Reviewed   CBC W/ AUTO DIFFERENTIAL - Abnormal; Notable for the following components:       Result Value    RBC 4.33 (*)     Hemoglobin 11.2 (*)     Hematocrit 37.0 (*)     MCH 25.9 (*)     MCHC 30.3 (*)     RDW 16.4 (*)     All other components within normal limits   COMPREHENSIVE METABOLIC PANEL - Abnormal; Notable for the following components:    CO2 21 (*)     Creatinine 2.1 (*)     Calcium 8.4 (*)     Albumin 2.6 (*)     AST 54 (*)     eGFR if  35.3 (*)     eGFR if non  30.5 (*)     All other components within normal limits   C-REACTIVE PROTEIN - Abnormal; Notable for the following components:    CRP 57.5 (*)     All other components within normal limits   LACTATE DEHYDROGENASE - Abnormal; Notable for the following components:     (*)     All other components within normal limits   TROPONIN I - Abnormal; Notable for the following components:    Troponin I 0.032 (*)     All other components within normal limits   B-TYPE NATRIURETIC PEPTIDE - Abnormal; Notable for the following components:     (*)     All other components within normal limits   SARS-COV-2 RDRP GENE - Abnormal; Notable for the following components:    POC Rapid COVID Positive (*)     All other components within normal limits    Narrative:     This test utilizes isothermal nucleic acid amplification   technology to detect the SARS-CoV-2 RdRp nucleic acid segment.   The analytical sensitivity (limit of  detection) is 125 genome   equivalents/mL.   A POSITIVE result implies infection with the SARS-CoV-2 virus;   the patient is presumed to be contagious.     A NEGATIVE result means that SARS-CoV-2 nucleic acids are not   present above the limit of detection. A NEGATIVE result should be   treated as presumptive. It does not rule out the possibility of   COVID-19 and should not be the sole basis for treatment decisions.   If COVID-19 is strongly suspected based on clinical and exposure   history, re-testing using an alternate molecular assay should be   considered.   This test is only for use under the Food and Drug   Administration s Emergency Use Authorization (EUA).   Commercial kits are provided by [employer].   Performance characteristics of the EUA have been independently   verified by [facility] Department of   Pathology and Laboratory Medicine.   _________________________________________________________________   The authorized Fact Sheet for Healthcare Providers and the authorized Fact   Sheet for Patients of the ID NOW COVID-19 are available on the FDA   website:     https://www.fda.gov/media/263760/download  https://www.fda.gov/media/357519/download           CULTURE, BLOOD   CULTURE, BLOOD   CULTURE, RESPIRATORY   FERRITIN   CK   LACTIC ACID, PLASMA   PROCALCITONIN   HEPATITIS C ANTIBODY   TACROLIMUS LEVEL     EKG Readings: (Independently Interpreted)   Regular rate and rhythm. Right bundle branch block. T wave inversion in V1-3. Unknown if changed from prior EKGs.       Imaging Results          X-Ray Chest AP Portable (Final result)  Result time 07/04/22 18:20:55    Final result by Ke Purvis MD (07/04/22 18:20:55)                 Impression:      Bilateral interstitial opacities.  The findings may represent pneumonitis or pulmonary edema.      Electronically signed by: Ke Purvis MD  Date:    07/04/2022  Time:    18:20             Narrative:    EXAMINATION:  XR CHEST AP  PORTABLE    CLINICAL HISTORY:  COVID-19;    TECHNIQUE:  Single frontal view of the chest was performed.    COMPARISON:  06/25/2022.    FINDINGS:  Monitoring EKG leads are present.  There are postop changes of median sternotomy.  The sternal wires are intact.    The trachea is unremarkable.  The cardiomediastinal silhouette is enlarged.  There are no pleural effusions.  There is no evidence of a pneumothorax.  There is no evidence of pneumomediastinum.  There are bilateral interstitial opacities.  There are degenerative changes in the osseous structures.                              X-Rays:   Independently Interpreted Readings:   Chest X-Ray: No tracheal deviation. Mild cardiomegaly and bilateral infiltrates, left>right. No effusion.     Medications   lisinopriL tablet 40 mg (has no administration in time range)   sodium chloride 0.9% bolus 500 mL (500 mLs Intravenous New Bag 7/4/22 1945)   dexamethasone injection 6 mg (6 mg Intravenous Given 7/4/22 1945)   albuterol inhaler 4 puff (4 puffs Inhalation Given 7/4/22 1903)     Medical Decision Making:   History:   Old Medical Records: I decided to obtain old medical records.  Initial Assessment:   Patient presents with shortness of breath since 6/22/22 following positive home COVID test. Additional vomiting and diarrhea over the same period. Concern for COVID pneumonia.  Differential Diagnosis:   Pneumonia  Congestive Heart Failure  Pulmonary Embolism    Most likely COVID pneumonia due to gastrointestinal symptoms and positive home test. CHF less likely: patient is not fluid overloaded, no S3 on physical. PE less likely as well: no pleuritic chest pain, lower extremity swelling.  Independently Interpreted Test(s):   I have ordered and independently interpreted X-rays - see prior notes.  I have ordered and independently interpreted EKG Reading(s) - see prior notes  Clinical Tests:   Lab Tests: Ordered and Reviewed  Radiological Study: Ordered and Reviewed  Medical Tests:  "Ordered and Reviewed  Sepsis Perfusion Assessment: "I attest a sepsis perfusion exam was performed within 6 hours of sepsis, severe sepsis, or septic shock presentation, following fluid resuscitation."  ED Management:  Evaluation for COVID pneumonia, including CXR, EKG and troponins, CBC, CMP, sputum culture with gram stain, blood cultures, crp, ferritin, procalcitonin. Given 6mg dexamethasone iv x1 and given fluid challenge of 500mL NS.    CBC shows hgb 11.2 and hct 37, which is similar to patient's prior labs 9 days ago, as well as 2 weeks ago. There is no leukocytosis.  CMP shows elevated creatinine, which is unchanged from baseline. May be secondary to chronic kidney disease.  BNP elevated, as well as troponins, however these are similar to the patient's known baseline. Lessened concern for right heart strain, due to similarity to baseline.    Patient will be admitted to Dr. Munoz for shortness of breath and desats with conversation.      Other:   I have discussed this case with another health care provider.       <> Summary of the Discussion: Hospital medicine  Patient's blood pressure elevated 173/92 (at 20:00): gave home medication, due to patient stating that he missed his normal dose (lisinopril 40mg).            Attending Attestation:   Physician Attestation Statement for Resident:  As the supervising MD   Physician Attestation Statement: I have personally seen and examined this patient.   I agree with the above history. -:   As the supervising MD I agree with the above PE.    As the supervising MD I agree with the above treatment, course, plan, and disposition.                       I have reviewed and concur with the resident's history, physical, assessment, and plan.  I have personally interviewed and examined the patient at bedside.   I did supervise any and all procedures and was present for any critical portion, and was always immediately available for help and as a resource.     The above history " physical, review of symptoms, HPI and physical exam reflect my independent interpretation and evaluation.    Complexity: High - level 5    Final diagnoses:  [R06.02] SOB (shortness of breath)  [U07.1] COVID-19  [R19.7] Diarrhea, unspecified type (Primary)  [R11.10] Vomiting, intractability of vomiting not specified, presence of nausea not specified, unspecified vomiting type  [R05.9] Cough     Med Fernandez DO, [facility]  Emergency Staff Physician   Dept of Emergency Medicine   [facility]  Spectralink: [id number]        Disclaimer: This note has been generated using voice-recognition software. There may be typographical errors that have been missed during proof-reading.              Clinical Impression:   Final diagnoses:  [R06.02] SOB (shortness of breath)  [U07.1] COVID-19  [R19.7] Diarrhea, unspecified type (Primary)  [R11.10] Vomiting, intractability of vomiting not specified, presence of nausea not specified, unspecified vomiting type  [R05.9] Cough          ED Disposition Condition    Admit               Fabian Martin DO  Resident  07/04/22 [id number]       Med Fernandez DO  07/05/22 1600

## 2022-07-05 PROBLEM — N18.32 ANEMIA OF CHRONIC RENAL FAILURE, STAGE 3B: Status: ACTIVE | Noted: 2022-07-05

## 2022-07-05 PROBLEM — D84.9 IMMUNOCOMPROMISED PATIENT: Status: ACTIVE | Noted: 2022-07-05

## 2022-07-05 PROBLEM — D63.1 ANEMIA OF CHRONIC RENAL FAILURE, STAGE 3B: Status: ACTIVE | Noted: 2022-07-05

## 2022-07-05 PROBLEM — F33.41 RECURRENT MAJOR DEPRESSIVE DISORDER, IN PARTIAL REMISSION: Status: ACTIVE | Noted: 2022-07-05

## 2022-07-05 LAB
ALBUMIN SERPL BCP-MCNC: 2.2 G/DL (ref 3.5–5.2)
ALP SERPL-CCNC: 87 U/L (ref 55–135)
ALT SERPL W/O P-5'-P-CCNC: 34 U/L (ref 10–44)
ANION GAP SERPL CALC-SCNC: 9 MMOL/L (ref 8–16)
AST SERPL-CCNC: 46 U/L (ref 10–40)
BASOPHILS # BLD AUTO: 0 K/UL (ref 0–0.2)
BASOPHILS NFR BLD: 0 % (ref 0–1.9)
BILIRUB SERPL-MCNC: 0.3 MG/DL (ref 0.1–1)
BUN SERPL-MCNC: 20 MG/DL (ref 8–23)
CALCIUM SERPL-MCNC: 8.2 MG/DL (ref 8.7–10.5)
CHLORIDE SERPL-SCNC: 109 MMOL/L (ref 95–110)
CO2 SERPL-SCNC: 21 MMOL/L (ref 23–29)
CREAT SERPL-MCNC: 2 MG/DL (ref 0.5–1.4)
DIFFERENTIAL METHOD: ABNORMAL
EOSINOPHIL # BLD AUTO: 0 K/UL (ref 0–0.5)
EOSINOPHIL NFR BLD: 0 % (ref 0–8)
ERYTHROCYTE [DISTWIDTH] IN BLOOD BY AUTOMATED COUNT: 16.1 % (ref 11.5–14.5)
EST. GFR  (AFRICAN AMERICAN): 37.4 ML/MIN/1.73 M^2
EST. GFR  (NON AFRICAN AMERICAN): 32.4 ML/MIN/1.73 M^2
GLUCOSE SERPL-MCNC: 225 MG/DL (ref 70–110)
HCT VFR BLD AUTO: 34.3 % (ref 40–54)
HCV AB SERPL QL IA: POSITIVE
HGB BLD-MCNC: 10.4 G/DL (ref 14–18)
IMM GRANULOCYTES # BLD AUTO: 0.01 K/UL (ref 0–0.04)
IMM GRANULOCYTES NFR BLD AUTO: 0.4 % (ref 0–0.5)
LYMPHOCYTES # BLD AUTO: 0.5 K/UL (ref 1–4.8)
LYMPHOCYTES NFR BLD: 17.9 % (ref 18–48)
MAGNESIUM SERPL-MCNC: 1.9 MG/DL (ref 1.6–2.6)
MCH RBC QN AUTO: 26.5 PG (ref 27–31)
MCHC RBC AUTO-ENTMCNC: 30.3 G/DL (ref 32–36)
MCV RBC AUTO: 87 FL (ref 82–98)
MONOCYTES # BLD AUTO: 0.1 K/UL (ref 0.3–1)
MONOCYTES NFR BLD: 3 % (ref 4–15)
NEUTROPHILS # BLD AUTO: 2.1 K/UL (ref 1.8–7.7)
NEUTROPHILS NFR BLD: 78.7 % (ref 38–73)
NRBC BLD-RTO: 0 /100 WBC
PHOSPHATE SERPL-MCNC: 4.2 MG/DL (ref 2.7–4.5)
PLATELET # BLD AUTO: 242 K/UL (ref 150–450)
PMV BLD AUTO: 9.9 FL (ref 9.2–12.9)
POTASSIUM SERPL-SCNC: 4.5 MMOL/L (ref 3.5–5.1)
PROT SERPL-MCNC: 6.2 G/DL (ref 6–8.4)
RBC # BLD AUTO: 3.93 M/UL (ref 4.6–6.2)
SODIUM SERPL-SCNC: 139 MMOL/L (ref 136–145)
TACROLIMUS BLD-MCNC: 2 NG/ML (ref 5–15)
TACROLIMUS BLD-MCNC: 3 NG/ML (ref 5–15)
TROPONIN I SERPL DL<=0.01 NG/ML-MCNC: 0.02 NG/ML (ref 0–0.03)
WBC # BLD AUTO: 2.63 K/UL (ref 3.9–12.7)

## 2022-07-05 PROCEDURE — 94640 AIRWAY INHALATION TREATMENT: CPT

## 2022-07-05 PROCEDURE — 25000242 PHARM REV CODE 250 ALT 637 W/ HCPCS: Performed by: FAMILY MEDICINE

## 2022-07-05 PROCEDURE — 27000221 HC OXYGEN, UP TO 24 HOURS

## 2022-07-05 PROCEDURE — 99900035 HC TECH TIME PER 15 MIN (STAT)

## 2022-07-05 PROCEDURE — 83735 ASSAY OF MAGNESIUM: CPT | Performed by: STUDENT IN AN ORGANIZED HEALTH CARE EDUCATION/TRAINING PROGRAM

## 2022-07-05 PROCEDURE — 63600175 PHARM REV CODE 636 W HCPCS: Performed by: STUDENT IN AN ORGANIZED HEALTH CARE EDUCATION/TRAINING PROGRAM

## 2022-07-05 PROCEDURE — 25000003 PHARM REV CODE 250: Performed by: HOSPITALIST

## 2022-07-05 PROCEDURE — 11000001 HC ACUTE MED/SURG PRIVATE ROOM

## 2022-07-05 PROCEDURE — 25000003 PHARM REV CODE 250: Performed by: FAMILY MEDICINE

## 2022-07-05 PROCEDURE — 94761 N-INVAS EAR/PLS OXIMETRY MLT: CPT

## 2022-07-05 PROCEDURE — C9399 UNCLASSIFIED DRUGS OR BIOLOG: HCPCS | Performed by: FAMILY MEDICINE

## 2022-07-05 PROCEDURE — 80197 ASSAY OF TACROLIMUS: CPT | Performed by: FAMILY MEDICINE

## 2022-07-05 PROCEDURE — 99223 1ST HOSP IP/OBS HIGH 75: CPT | Mod: CR,GC,, | Performed by: INTERNAL MEDICINE

## 2022-07-05 PROCEDURE — 27000207 HC ISOLATION

## 2022-07-05 PROCEDURE — 80053 COMPREHEN METABOLIC PANEL: CPT | Performed by: STUDENT IN AN ORGANIZED HEALTH CARE EDUCATION/TRAINING PROGRAM

## 2022-07-05 PROCEDURE — 63700000 PHARM REV CODE 250 ALT 637 W/O HCPCS: Performed by: STUDENT IN AN ORGANIZED HEALTH CARE EDUCATION/TRAINING PROGRAM

## 2022-07-05 PROCEDURE — 99233 SBSQ HOSP IP/OBS HIGH 50: CPT | Mod: CR,,, | Performed by: STUDENT IN AN ORGANIZED HEALTH CARE EDUCATION/TRAINING PROGRAM

## 2022-07-05 PROCEDURE — 63600175 PHARM REV CODE 636 W HCPCS: Performed by: FAMILY MEDICINE

## 2022-07-05 PROCEDURE — 25000003 PHARM REV CODE 250: Performed by: STUDENT IN AN ORGANIZED HEALTH CARE EDUCATION/TRAINING PROGRAM

## 2022-07-05 PROCEDURE — 99223 PR INITIAL HOSPITAL CARE,LEVL III: ICD-10-PCS | Mod: CR,GC,, | Performed by: INTERNAL MEDICINE

## 2022-07-05 PROCEDURE — 99233 PR SUBSEQUENT HOSPITAL CARE,LEVL III: ICD-10-PCS | Mod: CR,,, | Performed by: STUDENT IN AN ORGANIZED HEALTH CARE EDUCATION/TRAINING PROGRAM

## 2022-07-05 PROCEDURE — 84100 ASSAY OF PHOSPHORUS: CPT | Performed by: STUDENT IN AN ORGANIZED HEALTH CARE EDUCATION/TRAINING PROGRAM

## 2022-07-05 PROCEDURE — 84484 ASSAY OF TROPONIN QUANT: CPT | Performed by: STUDENT IN AN ORGANIZED HEALTH CARE EDUCATION/TRAINING PROGRAM

## 2022-07-05 PROCEDURE — 85025 COMPLETE CBC W/AUTO DIFF WBC: CPT | Performed by: FAMILY MEDICINE

## 2022-07-05 RX ORDER — AZITHROMYCIN 250 MG/1
500 TABLET, FILM COATED ORAL DAILY
Status: DISCONTINUED | OUTPATIENT
Start: 2022-07-05 | End: 2022-07-06

## 2022-07-05 RX ORDER — HYDRALAZINE HYDROCHLORIDE 20 MG/ML
10 INJECTION INTRAMUSCULAR; INTRAVENOUS EVERY 6 HOURS PRN
Status: DISCONTINUED | OUTPATIENT
Start: 2022-07-05 | End: 2022-07-08 | Stop reason: HOSPADM

## 2022-07-05 RX ORDER — CALCIUM CARBONATE 200(500)MG
500 TABLET,CHEWABLE ORAL 3 TIMES DAILY PRN
Status: DISCONTINUED | OUTPATIENT
Start: 2022-07-05 | End: 2022-07-08 | Stop reason: HOSPADM

## 2022-07-05 RX ADMIN — CALCIUM CARBONATE (ANTACID) CHEW TAB 500 MG 500 MG: 500 CHEW TAB at 01:07

## 2022-07-05 RX ADMIN — ALBUTEROL SULFATE 2 PUFF: 108 INHALANT RESPIRATORY (INHALATION) at 12:07

## 2022-07-05 RX ADMIN — CEFTRIAXONE 1 G: 1 INJECTION, SOLUTION INTRAVENOUS at 06:07

## 2022-07-05 RX ADMIN — INSULIN DETEMIR 10 UNITS: 100 INJECTION, SOLUTION SUBCUTANEOUS at 10:07

## 2022-07-05 RX ADMIN — AMITRIPTYLINE HYDROCHLORIDE 10 MG: 10 TABLET, FILM COATED ORAL at 10:07

## 2022-07-05 RX ADMIN — INSULIN DETEMIR 10 UNITS: 100 INJECTION, SOLUTION SUBCUTANEOUS at 09:07

## 2022-07-05 RX ADMIN — TACROLIMUS 3 MG: 1 CAPSULE ORAL at 09:07

## 2022-07-05 RX ADMIN — GABAPENTIN 300 MG: 300 CAPSULE ORAL at 09:07

## 2022-07-05 RX ADMIN — ASPIRIN 81 MG: 81 TABLET, COATED ORAL at 09:07

## 2022-07-05 RX ADMIN — ALBUTEROL SULFATE 2 PUFF: 108 INHALANT RESPIRATORY (INHALATION) at 08:07

## 2022-07-05 RX ADMIN — ENOXAPARIN SODIUM 135 MG: 150 INJECTION SUBCUTANEOUS at 12:07

## 2022-07-05 RX ADMIN — TORSEMIDE 5 MG: 5 TABLET ORAL at 11:07

## 2022-07-05 RX ADMIN — DEXAMETHASONE 6 MG: 4 TABLET ORAL at 09:07

## 2022-07-05 RX ADMIN — REMDESIVIR 200 MG: 100 INJECTION, POWDER, LYOPHILIZED, FOR SOLUTION INTRAVENOUS at 12:07

## 2022-07-05 RX ADMIN — Medication 500 MG: at 09:07

## 2022-07-05 RX ADMIN — AZITHROMYCIN MONOHYDRATE 500 MG: 250 TABLET ORAL at 09:07

## 2022-07-05 RX ADMIN — LISINOPRIL 40 MG: 20 TABLET ORAL at 09:07

## 2022-07-05 RX ADMIN — CALCIUM CARBONATE (ANTACID) CHEW TAB 500 MG 1000 MG: 500 CHEW TAB at 09:07

## 2022-07-05 RX ADMIN — ATORVASTATIN CALCIUM 80 MG: 20 TABLET, FILM COATED ORAL at 09:07

## 2022-07-05 RX ADMIN — AMITRIPTYLINE HYDROCHLORIDE 10 MG: 10 TABLET, FILM COATED ORAL at 12:07

## 2022-07-05 RX ADMIN — THERA TABS 1 TABLET: TAB at 09:07

## 2022-07-05 RX ADMIN — METOPROLOL SUCCINATE 75 MG: 50 TABLET, EXTENDED RELEASE ORAL at 09:07

## 2022-07-05 RX ADMIN — Medication 500 MG: at 10:07

## 2022-07-05 RX ADMIN — NIFEDIPINE 90 MG: 60 TABLET, FILM COATED, EXTENDED RELEASE ORAL at 12:07

## 2022-07-05 RX ADMIN — AMLODIPINE BESYLATE 5 MG: 5 TABLET ORAL at 09:07

## 2022-07-05 RX ADMIN — GABAPENTIN 300 MG: 300 CAPSULE ORAL at 10:07

## 2022-07-05 NOTE — CONSULTS
[facility] - Emergency Dept  Kidney Transplant  Consult Note    Inpatient consult to Kidney/Pancreas Transplant Medicine  Consult performed by: Kye Pruitt Jr., MD  Consult ordered by: Asael Wagner MD  Reason for consult: kidney transplant  Assessment/Recommendations: Hold myco; cont tacro            Subjective:     History of Present Illness:   72 year old male with Kidney Heart Tx  here for shortness of breath, chills, fevers, diarrhea, nausea that started  and progressively worsened. Has been able to drink fluids even though they caused him to have more diarrhea. No change in urination and no edema or missed medications.  In the ED patient afebrile and hemodynamically stable saturating in low mid 90's at rest on room air. CXR with mulitfocal opacities. COVID positive. CRP and LDH elevated. Patient started on decadron and remdesivir and admitted to the care of medicine for further evaluation and management.  KTM consulted for aid with transplanted kidney    Mr. Albrecht is a 72 y.o. year old male who is status post Kidney Transplant - 2002  (#1).    He  his maintenance immunosuppression consists of:   Immunosuppressants (From admission, onward)                Start     Stop Route Frequency Ordered    22 0800  tacrolimus capsule 3 mg         -- Oral 2 times daily 22 2153            Interval History:  Mild improvement in symptoms since admission.    Past Medical and Surgical History: Mr. Albrecht has a past medical history of Allergic rhinitis (2013), Blood transfusion, Cataract, CKD (chronic kidney disease), stage III (2014), -donor kidney transplant, Diabetes mellitus, type 2 (2013), Gout, arthritis (2013), Heart attack, Heart transplanted, Hyperlipidemia (2013), Hypertension, Immunodeficiency due to treatment with immunosuppressive medication, Morbid obesity (2013), Organ transplant (), Prophylactic immunotherapy, and Renal  "manifestation of secondary diabetes mellitus.  He has a past surgical history that includes Kidney transplant; Heart transplant; Revision total hip arthroplasty; Eye surgery; Cataract extraction (Bilateral); and Colonoscopy (N/A, 10/6/2020).    Past Social and Family History: Mr. Albrecht reports that he quit smoking about 38 years ago. His smoking use included cigarettes. He has a 20.00 pack-year smoking history. He has never used smokeless tobacco. He reports current alcohol use of about 1.0 standard drink of alcohol per week. He reports that he does not use drugs.His family history includes Diabetes in his brother and maternal grandmother; Early death in his brother; Heart disease in his brother; Hyperlipidemia in his brother and sister; Hypertension in his brother; Kidney disease in his son; Stroke in his brother and mother.    Intake/Output - Last 3 Shifts         07/03 0700  07/04 0659 07/04 0700  07/05 0659 07/05 0700  07/06 0659    IV Piggyback  500     Total Intake(mL/kg)  500 (3.7)     Urine (mL/kg/hr)   800 (0.5)    Total Output   800    Net  +500 -800                    Review of Systems   Constitutional:  Positive for chills, fatigue and fever.   HENT: Negative.     Eyes: Negative.    Respiratory:  Positive for cough and wheezing.    Cardiovascular: Negative.    Gastrointestinal:  Positive for diarrhea and nausea.   Endocrine: Negative.    Genitourinary: Negative.    Musculoskeletal: Negative.    Skin: Negative.    Neurological: Negative.    Psychiatric/Behavioral: Negative.     Objective:     Vital Signs (Most Recent):  Temp: 97.5 °F (36.4 °C) (07/05/22 0826)  Pulse: 69 (07/05/22 1241)  Resp: 16 (07/05/22 1241)  BP: (!) 150/77 (07/05/22 1240)  SpO2: 100 % (07/05/22 1241)   Vital Signs (24h Range):  Temp:  [97.5 °F (36.4 °C)-99.4 °F (37.4 °C)] 97.5 °F (36.4 °C)  Pulse:  [69-83] 69  Resp:  [16-20] 16  SpO2:  [94 %-100 %] 100 %  BP: (143-209)/(70-96) 150/77     Weight: 133.8 kg (295 lb)  Height: 6' 2" " "(188 cm)  Body mass index is 37.88 kg/m².    Physical Exam  Constitutional:       General: He is not in acute distress.     Appearance: He is obese.   Eyes:      General: No scleral icterus.     Extraocular Movements: Extraocular movements intact.      Pupils: Pupils are equal, round, and reactive to light.   Cardiovascular:      Rate and Rhythm: Normal rate and regular rhythm.      Heart sounds: No murmur heard.  Pulmonary:      Effort: Pulmonary effort is normal. No respiratory distress.   Abdominal:      General: There is no distension.      Palpations: Abdomen is soft.      Tenderness: There is no abdominal tenderness.   Musculoskeletal:      Right lower leg: No edema.      Left lower leg: No edema.   Skin:     Coloration: Skin is not jaundiced.   Neurological:      General: No focal deficit present.      Mental Status: He is alert.       Significant Labs:  Labs within the past 24 hours have been reviewed.    Diagnostics:  Chest X-Ray: No results found. However, due to the size of the patient record, not all encounters were searched. Please check Results Review for a complete set of results.    Assessment/Plan:     * Pneumonia due to COVID-19 virus  Per primary      Anemia of chronic renal failure, stage 3b  Hold iv iron and epo for now      Stage 3b chronic kidney disease  BL sCr 2.0  With CKD-BMD and chronic volume issues  Hold home torsemide for now  Continue home vit d, calcium, semaglutide when able    Chronic immunosuppression with tacrolimus, mycophenolate, and prednisone  At home on tacro 3/3 mycophenolate 750/750   Hold mycophenolate  Continue tacro at home doses for now  Ok to use dexamethasone      -donor kidney transplant   now with BL sCr 2.0  Creatinine at baseline on admission  Continue immunosuppression as per problem "Chronic immunosuppression with tacrolimus, mycophenolate, and prednisone"      History of Heart Transplant       Consult HTM          Kye Pruitt Jr., " MD  Kidney Transplant  [facility] - Emergency Dept

## 2022-07-05 NOTE — ASSESSMENT & PLAN NOTE
- Interval history and physical exam findings as described above  - Vaccination status: vaccinatedx4  - COVID test positive on 6/25/22  - CXR with bilateral interstitial opacities  - Elevated inflammatory markers as documented  - Afebrile since admission, no leukocytosis   - Currently satting well on 2L NC  - Continue coverage with remdesivir and dexamethasone  - Azithromycin and ceftriaxone given concerns for superimposed bacterial PNA   - PRN tylenol for fever  - PRN duonebs and antitussives provided  - Continuous pulse oximetry at bedside  - Incentive spirometry encouraged  - Appropriate isolation and airborne precautions in place  - Continuing to monitor

## 2022-07-05 NOTE — ASSESSMENT & PLAN NOTE
- Heart Transplant medicine following, appreciate recs  - Continue home prograf  - Holding home cellcept for active infection  - Daily prograf levels  - Continue home cardioprudent regimen

## 2022-07-05 NOTE — ASSESSMENT & PLAN NOTE
"2002 now with BL sCr 2.0  Creatinine at baseline on admission  Continue immunosuppression as per problem "Chronic immunosuppression with tacrolimus, mycophenolate, and prednisone"    "

## 2022-07-05 NOTE — PROGRESS NOTES
[facility] - Emergency Dept  [facility] Medicine  Progress Note    Patient Name: Nigel Albrecht  MRN: [id number]  Patient Class: IP- Inpatient   Admission Date: 7/4/2022  Length of Stay: 1 days  Attending Physician: Topher Munoz MD  Primary Care Provider: Krystin Zimmerman MD        Subjective:     Principal Problem:Pneumonia due to COVID-19 virus        HPI:  This is a 71yo male with a past medical history of Heart and Kidney transplant on tacrolimus and cellcept, DM, HTN, HLD, and CKD who presents to the ED with chief complaint of shortness of breath and fevers. Patient tested positive for COVID on 06/22/22. Patient is vaccinated x4. Reports progressively worsening symptoms of exertional dyspnea (now SOB at rest), nausea and vomiting, diarrhea, fevers, chills, and generalized malaise. In the ED patient afebrile and hemodynamically stable saturating in low mid 90's at rest on room air. CXR with mulitfocal opacities. COVID positive. CRP and LDH elevated. Patient started on decadron and remdesivir and admitted to the care of medicine for further evaluation and management.      Overview/Hospital Course:  [place] [facility] Azithromycin and rocephin added to remdesivir given concerns for superimposed bacterial PNA in this immunocompromised patient. Mild symptomatic improvements noted.      Interval History: Pt seen and examined this morning on rounds. LEANN. States he is feeling somewhat better today: breathing less labored, and no episodes of diarrhea since arriving at the hospital. Discussed medications and need for continued monitoring, wife updated at bedside. Care plan reviewed. Otherwise, doing well and with no further complaints at this time.      Objective:     Vital Signs (Most Recent):  Temp: 97.5 °F (36.4 °C) (07/05/22 0826)  Pulse: 70 (07/05/22 0826)  Resp: 16 (07/05/22 0826)  BP: (!) 209/96 (07/05/22 1114)  SpO2: 95 % (07/05/22 0826)   Vital Signs (24h Range):  Temp:  [97.5 °F (36.4 °C)-99.5 °F  (37.5 °C)] 97.5 °F (36.4 °C)  Pulse:  [70-83] 70  Resp:  [16-20] 16  SpO2:  [94 %-97 %] 95 %  BP: (139-209)/(67-96) 209/96     Weight: 133.8 kg (295 lb)  Body mass index is 37.88 kg/m².    Intake/Output Summary (Last 24 hours) at 7/5/2022 1235  Last data filed at 7/4/2022 2110  Gross per 24 hour   Intake 500 ml   Output --   Net 500 ml        Physical Exam  Gen: in NAD, appears stated age; pt is obese  Neuro: AAOx4, CN2-12 grossly intact BL; motor, sensory, and strength grossly intact BL  HEENT: NTNC, EOMI, PERRLA, MMM; no thyromegaly or lymphadenopathy; no JVD appreciated  CVS: RRR, no m/r/g; S1/S2 auscultated with no S3 or S4; capillary refill < 2 sec  Resp: coarse breath sounds and wheezing in all lung fields BL; no belabored breathing or accessory muscle use appreciated   Abd: BS+ in all 4 quadrants; NTND, soft to palpation; no organomegaly appreciated   Extrem: pulses full, equal, and regular over all 4 extremities; no UE or LE edema BL      Significant Labs: All pertinent labs within the past 24 hours have been reviewed.    Significant Imaging: I have reviewed all pertinent imaging results/findings within the past 24 hours.      Assessment/Plan:      * Pneumonia due to COVID-19 virus  - Interval history and physical exam findings as described above  - Vaccination status: vaccinatedx4  - COVID test positive on 6/25/22  - CXR with bilateral interstitial opacities  - Elevated inflammatory markers as documented  - Afebrile since admission, no leukocytosis   - Currently satting well on 2L NC  - Continue coverage with remdesivir and dexamethasone  - Azithromycin and ceftriaxone given concerns for superimposed bacterial PNA   - PRN tylenol for fever  - PRN duonebs and antitussives provided  - Continuous pulse oximetry at bedside  - Incentive spirometry encouraged  - Appropriate isolation and airborne precautions in place  - Continuing to monitor    Heart transplanted  - Heart Transplant medicine following, appreciate  recs  - Continue home prograf  - Holding home cellcept for active infection  - Daily prograf levels  - Continue home cardioprudent regimen    -donor kidney transplant  - KTM medicine, following appreciate recs  - Continue home prograf  - Holding home cellcept for active infection  - Daily prograf levels    Immunocompromised patient  - Kidney and heart transplant services following  - Holding cellcept    Stage 3b chronic kidney disease  - Cr baseline at admission  - Renally dosing all medications  - Avoid nephrotoxins  - Will continue to monitor on daily labs    Anemia of chronic renal failure, stage 3b  - H/H stable near baseline  - Will continue to monitor on daily labs    Hypertension associated with stage 3b chronic kidney disease due to type 2 diabetes mellitus  - BP currently not well-controlled  - Continue home cardioprudent regimen  - Amlodipine changed to nifedipine XR  - PRN hydralazine for SBP>180  - Will continue to monitor and further titrate antihypertensives as clinically indicated     Mixed diabetic hyperlipidemia associated with type 2 diabetes mellitus  - Continue home statin regimen    Type 2 diabetes mellitus with stage 3b chronic kidney disease, with long-term current use of insulin  - Working to optimize BG control  - Levemir 10u BID  - SSI provided for corrective dosing  - DXTs as ordered  - Hypoglycemic protocol in effect  - Continue home gabapentin regimen   - Diabetic diet provided    Recurrent major depressive disorder, in partial remission  - Currently asymptomatic  - Continue home elavil regimen    Class 2 severe obesity due to excess calories with serious comorbidity and body mass index (BMI) of 37.0 to 37.9 in adult  - Body mass index is 37.88 kg/m².  - Needs dietary and lifestyle modifications in relation to health  - Consider dietary/nutrition consult outpatient      VTE Risk Mitigation (From admission, onward)         Ordered     enoxaparin injection 135 mg  2 times daily          07/04/22 2159     IP VTE HIGH RISK PATIENT  Once         07/04/22 2153     Place sequential compression device  Until discontinued         07/04/22 2153                Discharge Planning   CATHRYN: 7/7/2022     Code Status: Full Code   Is the patient medically ready for discharge?: No    Reason for patient still in hospital (select all that apply): Patient trending condition             Topher Munoz MD  Attending Physician  Department of Hospital Medicine  Epic secure chat preferred, or ext. [phone]  7/5/2022

## 2022-07-05 NOTE — HOSPITAL COURSE
Pt admitted to [facility] [place]. Azithromycin and rocephin added to remdesivir given concerns for superimposed bacterial PNA in this immunocompromised patient. Mild symptomatic improvements noted.

## 2022-07-05 NOTE — ASSESSMENT & PLAN NOTE
- Body mass index is 37.88 kg/m².  - Needs dietary and lifestyle modifications in relation to health  - Consider dietary/nutrition consult outpatient

## 2022-07-05 NOTE — ASSESSMENT & PLAN NOTE
COVID-19 Virus Infection  Viral Pneumonia due to COVID-19  - COVID-19 testing:   Patient is identified as Severe COVID-19 based on hypoxemia with O2 saturations <94% on room air or on ambulation and High risk for severe complications of COVID 19 based on COVID risk score of 7   Initiate standard COVID protocols; COVID-19 testing ,Infection Control notification  and isolation- respiratory, contact and droplet per protocol    Diagnostics: (leukopenia, hyponatremia, hyperferritinemia, elevated troponin, elevated d-dimer, age, and comorbidities are significant predictors of poor clinical outcome)  CBC, CMP, Procalcitonin, Ferritin, CRP, LDH, BNP, Troponin and Portable CXR    Management: Initiate targeted therapy with Remdesivir, 200mg IV x1, followed by 100mg IV daily x5 days total and Dexamethasone PO/IV 6mg daily x10 days, Maintain oxygen saturations 92-96% via Nasal Cannula  LPM and monitor with pulse oximetry. , Inhaled bronchodilators as needed for shortness of breath. and Continuous cardiac monitoring.    Advance Care Planning  Current advance care plan has been discussed with patient/family/POA and patient currently wishes Full Code.         Acute Hypoxemic Respiratory Failure    - Order RT consult via Respiratory Communication for COVID Protocols    - Incentive Spirometer Q4h, Flutter Valve Q4h    - Continuous Pulse Oximetry, goal SpO2 92-96%    - Supplemental O2 via LFNC, VentiMask, or HFNC (    - If wheezing, albuterol INH Q6h scheduled & PRN    - Proning Protocol if patient is a candidate    - GCS >13, able to self-prone    - If deterioration, may warrant trial of NIPPV in neg pressure room or immediate ICU consult    *starting Remdesivir and Decadron, therapeutic dose lovenox, holding home cellcept, continue home tacrolimus, HTS and KTM consulted

## 2022-07-05 NOTE — ED TRIAGE NOTES
72 year-old male who presents with shortness of breath, initially present with exertion but now present at rest. He had a positive home COVID test on 6/22/2022, and presented to the [facility] ED in [place]. States that he was sent home. Since then, shortness of breath was worsened, and has been accompanied by watery diarrhea 3-4x per day, and vomiting. Patient additionally complains of fevers and chills. He received a kidney and heart transplant in 2002 and is currently taking mycophenolate mofetil and tacrolimus at home. States that he was advised by his transplant physician to present to the [facility] ED in [place].

## 2022-07-05 NOTE — HPI
This is a 73yo male with a past medical history of Heart and Kidney transplant on tacrolimus and cellcept, DM, HTN, HLD, and CKD who presents to the ED with chief complaint of shortness of breath and fevers. Patient tested positive for COVID on 06/22/22. Patient is vaccinated x4. Reports progressively worsening symptoms of exertional dyspnea (now SOB at rest), nausea and vomiting, diarrhea, fevers, chills, and generalized malaise. In the ED patient afebrile and hemodynamically stable saturating in low mid 90's at rest on room air. CXR with mulitfocal opacities. COVID positive. CRP and LDH elevated. Patient started on decadron and remdesivir and admitted to the care of medicine for further evaluation and management.

## 2022-07-05 NOTE — ED NOTES
Assumed care of pt at this time. VSS, RR even and unlabored. Resting in bed comfortably. No voiced compaints of pain or discomfort at this time. Safety protocols remain, will continue to monitor.     Pt was found lying supine asleep in bed without discomfort. Pt denies c/p or difficulties breathing at this time.

## 2022-07-05 NOTE — ASSESSMENT & PLAN NOTE
BL sCr 2.0  With CKD-BMD and chronic volume issues  Hold home torsemide for now  Continue home vit d, calcium, semaglutide when able

## 2022-07-05 NOTE — ASSESSMENT & PLAN NOTE
- KTM medicine, following appreciate recs  - Continue home prograf  - Holding home cellcept for active infection  - Daily prograf levels

## 2022-07-05 NOTE — ASSESSMENT & PLAN NOTE
- Cr baseline at admission  - Renally dosing all medications  - Avoid nephrotoxins  - Will continue to monitor on daily labs

## 2022-07-05 NOTE — ASSESSMENT & PLAN NOTE
- BP currently not well-controlled  - Continue home cardioprudent regimen  - Amlodipine changed to nifedipine XR  - PRN hydralazine for SBP>180  - Will continue to monitor and further titrate antihypertensives as clinically indicated

## 2022-07-05 NOTE — SUBJECTIVE & OBJECTIVE
Subjective:     History of Present Illness:   72 year old male with KT 2002 here for shortness of breath, chills, fevers, diarrhea, nausea that started  and progressively worsened. Has been able to drink fluids even though they caused him to have more diarrhea. No change in urination and no edema or missed medications.  In the ED patient afebrile and hemodynamically stable saturating in low mid 90's at rest on room air. CXR with mulitfocal opacities. COVID positive. CRP and LDH elevated. Patient started on decadron and remdesivir and admitted to the [facility] of medicine for further evaluation and management.  KTM consulted for aid with transplanted kidney    Mr. Albrecht is a 72 y.o. year old male who is status post Kidney Transplant - 2002  (#1).    He  his maintenance immunosuppression consists of:   Immunosuppressants (From admission, onward)                Start     Stop Route Frequency Ordered    22 0800  tacrolimus capsule 3 mg         -- Oral 2 times daily 22 2153            Interval History:  Mild improvement in symptoms since admission.    Past Medical and Surgical History: Mr. Albrecht has a past medical history of Allergic rhinitis (2013), Blood transfusion, Cataract, CKD (chronic kidney disease), stage III (2014), -donor kidney transplant, Diabetes mellitus, type 2 (2013), Gout, arthritis (2013), Heart attack, Heart transplanted, Hyperlipidemia (2013), Hypertension, Immunodeficiency due to treatment with immunosuppressive medication, Morbid obesity (2013), Organ transplant (), Prophylactic immunotherapy, and Renal manifestation of secondary diabetes mellitus.  He has a past surgical history that includes Kidney transplant; Heart transplant; Revision total hip arthroplasty; Eye surgery; Cataract extraction (Bilateral); and Colonoscopy (N/A, 10/6/2020).    Past Social and Family History: Mr. Albrecht reports that he quit smoking about 38  "years ago. His smoking use included cigarettes. He has a 20.00 pack-year smoking history. He has never used smokeless tobacco. He reports current alcohol use of about 1.0 standard drink of alcohol per week. He reports that he does not use drugs.His family history includes Diabetes in his brother and maternal grandmother; Early death in his brother; Heart disease in his brother; Hyperlipidemia in his brother and sister; Hypertension in his brother; Kidney disease in his son; Stroke in his brother and mother.    Intake/Output - Last 3 Shifts         07/03 0700  07/04 0659 07/04 0700 07/05 0659 07/05 0700  07/06 0659    IV Piggyback  500     Total Intake(mL/kg)  500 (3.7)     Urine (mL/kg/hr)   800 (0.5)    Total Output   800    Net  +500 -800                    Review of Systems   Constitutional:  Positive for chills, fatigue and fever.   HENT: Negative.     Eyes: Negative.    Respiratory:  Positive for cough and wheezing.    Cardiovascular: Negative.    Gastrointestinal:  Positive for diarrhea and nausea.   Endocrine: Negative.    Genitourinary: Negative.    Musculoskeletal: Negative.    Skin: Negative.    Neurological: Negative.    Psychiatric/Behavioral: Negative.     Objective:     Vital Signs (Most Recent):  Temp: 97.5 °F (36.4 °C) (07/05/22 0826)  Pulse: 69 (07/05/22 1241)  Resp: 16 (07/05/22 1241)  BP: (!) 150/77 (07/05/22 1240)  SpO2: 100 % (07/05/22 1241)   Vital Signs (24h Range):  Temp:  [97.5 °F (36.4 °C)-99.4 °F (37.4 °C)] 97.5 °F (36.4 °C)  Pulse:  [69-83] 69  Resp:  [16-20] 16  SpO2:  [94 %-100 %] 100 %  BP: (143-209)/(70-96) 150/77     Weight: 133.8 kg (295 lb)  Height: 6' 2" (188 cm)  Body mass index is 37.88 kg/m².    Physical Exam  Constitutional:       General: He is not in acute distress.     Appearance: He is obese.   Eyes:      General: No scleral icterus.     Extraocular Movements: Extraocular movements intact.      Pupils: Pupils are equal, round, and reactive to light.   Cardiovascular:      " Rate and Rhythm: Normal rate and regular rhythm.      Heart sounds: No murmur heard.  Pulmonary:      Effort: Pulmonary effort is normal. No respiratory distress.   Abdominal:      General: There is no distension.      Palpations: Abdomen is soft.      Tenderness: There is no abdominal tenderness.   Musculoskeletal:      Right lower leg: No edema.      Left lower leg: No edema.   Skin:     Coloration: Skin is not jaundiced.   Neurological:      General: No focal deficit present.      Mental Status: He is alert.       Significant Labs:  Labs within the past 24 hours have been reviewed.    Diagnostics:  Chest X-Ray: No results found. However, due to the size of the patient record, not all encounters were searched. Please check Results Review for a complete set of results.

## 2022-07-05 NOTE — SUBJECTIVE & OBJECTIVE
Interval History: Pt seen and examined this morning on rounds. LEANN. States he is feeling somewhat better today: breathing less labored, and no episodes of diarrhea since arriving at the hospital. Discussed medications and need for continued monitoring, wife updated at bedside. Care plan reviewed. Otherwise, doing well and with no further complaints at this time.      Objective:     Vital Signs (Most Recent):  Temp: 97.5 °F (36.4 °C) (07/05/22 0826)  Pulse: 70 (07/05/22 0826)  Resp: 16 (07/05/22 0826)  BP: (!) 209/96 (07/05/22 1114)  SpO2: 95 % (07/05/22 0826)   Vital Signs (24h Range):  Temp:  [97.5 °F (36.4 °C)-99.5 °F (37.5 °C)] 97.5 °F (36.4 °C)  Pulse:  [70-83] 70  Resp:  [16-20] 16  SpO2:  [94 %-97 %] 95 %  BP: (139-209)/(67-96) 209/96     Weight: 133.8 kg (295 lb)  Body mass index is 37.88 kg/m².    Intake/Output Summary (Last 24 hours) at 7/5/2022 1235  Last data filed at 7/4/2022 2110  Gross per 24 hour   Intake 500 ml   Output --   Net 500 ml        Physical Exam  Gen: in NAD, appears stated age; pt is obese  Neuro: AAOx4, CN2-12 grossly intact BL; motor, sensory, and strength grossly intact BL  HEENT: NTNC, EOMI, PERRLA, MMM; no thyromegaly or lymphadenopathy; no JVD appreciated  CVS: RRR, no m/r/g; S1/S2 auscultated with no S3 or S4; capillary refill < 2 sec  Resp: coarse breath sounds and wheezing in all lung fields BL; no belabored breathing or accessory muscle use appreciated   Abd: BS+ in all 4 quadrants; NTND, soft to palpation; no organomegaly appreciated   Extrem: pulses full, equal, and regular over all 4 extremities; no UE or LE edema BL      Significant Labs: All pertinent labs within the past 24 hours have been reviewed.    Significant Imaging: I have reviewed all pertinent imaging results/findings within the past 24 hours.

## 2022-07-05 NOTE — SUBJECTIVE & OBJECTIVE
Past Medical History:   Diagnosis Date    Allergic rhinitis 2013    Blood transfusion     Cataract     CKD (chronic kidney disease), stage III 2014    -donor kidney transplant     Diabetes mellitus, type 2 2013    Gout, arthritis 2013    Heart attack     Heart transplanted     Hyperlipidemia 2013    Hypertension     Immunodeficiency due to treatment with immunosuppressive medication     Morbid obesity 2013    Organ transplant 2002    heart and kidney    Prophylactic immunotherapy     Renal manifestation of secondary diabetes mellitus        Past Surgical History:   Procedure Laterality Date    CATARACT EXTRACTION Bilateral     COLONOSCOPY N/A 10/6/2020    Procedure: COLONOSCOPY;  Surgeon: Conner Redman MD;  Location: [facility];  Service: Endoscopy;  Laterality: N/A;    EYE SURGERY      HEART TRANSPLANT      KIDNEY TRANSPLANT      REVISION TOTAL HIP ARTHROPLASTY         Review of patient's allergies indicates:   Allergen Reactions    No known drug allergies        No current facility-administered medications on file prior to encounter.     Current Outpatient Medications on File Prior to Encounter   Medication Sig    amitriptyline (ELAVIL) 10 MG tablet Take 1 tablet (10 mg total) by mouth every evening for 7 days, THEN 2 tablets (20 mg total) every evening for 23 days.    amLODIPine (NORVASC) 10 MG tablet Take 0.5 tablets (5 mg total) by mouth once daily.    atorvastatin (LIPITOR) 80 MG tablet TAKE 1 TABLET ONCE DAILY    calcium carbonate (OS-CARRIE) 500 mg calcium (1,250 mg) tablet Take 1 tablet by mouth once daily.    cholecalciferol, vitamin D3, 125 mcg (5,000 unit) Tab Take 5,000 Units by mouth once daily.    fluticasone propionate (FLONASE) 50 mcg/actuation nasal spray 1 spray by Each Nare route as needed for Rhinitis.    FREESTYLE SHREE 2 SENSOR Kit APPLY 1 SENSOR             SUBCUTANEOUSLY EVERY 14    DAYS    gabapentin (NEURONTIN) 300 MG capsule TAKE 1 CAPSULE TWICE DAILY     insulin NPH/Reg human (HUMULIN 70/30 U-100 KWIKPEN) 100 unit/mL (70-30) InPn pen Inject 40 Units into the skin 2 (two) times daily.    lisinopriL (PRINIVIL,ZESTRIL) 40 MG tablet Take 1 tablet (40 mg total) by mouth once daily.    Low-Dose Aspirin 81 mg Tab Take by mouth. 1 Tablet Oral Every day    metoprolol succinate (TOPROL-XL) 25 MG 24 hr tablet TAKE 1 TABLET DAILY ALONG  WITH 50MG TABLET FOR A     TOTAL OF 75MG DAILY    metoprolol succinate (TOPROL-XL) 50 MG 24 hr tablet Take 1 tablet (50 mg total) by mouth once daily.    MULTIVIT-MIN/FA/LYCOPEN/LUTEIN (CENTRUM SILVER MEN ORAL) Take by mouth once daily.    mycophenolate (CELLCEPT) 250 mg Cap Take 3 capsules (750 mg total) by mouth 2 (two) times daily.    pulse oximeter (PULSE OXIMETER) device Use twice daily at 8 AM and 3 PM and [facility].    semaglutide (OZEMPIC) 2 mg/dose (8 mg/3 mL) PnIj Inject 2 mg into the skin once a week.    tacrolimus (PROGRAF) 1 MG Cap Take 3 capsules (3 mg total) by mouth every morning AND 3 capsules (3 mg total) every evening.    torsemide (DEMADEX) 5 MG Tab TAKE 2 TABLETS (10MG TOTAL)ONCE DAILY (Patient taking differently: Take 5 mg by mouth once daily. TAKE 1 TABLETS (10MG TOTAL)ONCE DAILY)     Family History       Problem Relation (Age of Onset)    Diabetes Brother, Maternal Grandmother    Early death Brother    Heart disease Brother    Hyperlipidemia Sister, Brother    Hypertension Brother    Kidney disease Son    Stroke Mother, Brother          Tobacco Use    Smoking status: Former Smoker     Packs/day: 1.00     Years: 20.00     Pack years: 20.00     Types: Cigarettes     Quit date: 1984     Years since quittin.0    Smokeless tobacco: Never Used   Substance and Sexual Activity    Alcohol use: Yes     Alcohol/week: 1.0 standard drink     Types: 1 Cans of beer per week     Comment: daily    Drug use: No    Sexual activity: Never     Review of Systems   Constitutional:  Positive for appetite  change, chills, fatigue and fever.   HENT:  Negative for sore throat and trouble swallowing.    Eyes:  Negative for photophobia and visual disturbance.   Respiratory:  Positive for cough and shortness of breath. Negative for wheezing.    Cardiovascular:  Negative for chest pain, palpitations and leg swelling.   Gastrointestinal:  Positive for diarrhea, nausea and vomiting. Negative for abdominal distention and abdominal pain.   Genitourinary:  Negative for dysuria and hematuria.   Musculoskeletal:  Negative for neck pain and neck stiffness.   Skin:  Negative for rash and wound.   Neurological:  Positive for weakness (generalized). Negative for seizures, syncope, light-headedness and headaches.   Psychiatric/Behavioral:  Negative for confusion and decreased concentration.    Objective:     Vital Signs (Most Recent):  Temp: 99.5 °F (37.5 °C) (07/04/22 1549)  Pulse: 79 (07/04/22 2202)  Resp: 19 (07/04/22 1945)  BP: (!) 164/77 (07/04/22 2202)  SpO2: 96 % (07/04/22 2202)   Vital Signs (24h Range):  Temp:  [99.5 °F (37.5 °C)] 99.5 °F (37.5 °C)  Pulse:  [74-83] 79  Resp:  [18-20] 19  SpO2:  [94 %-97 %] 96 %  BP: (139-175)/(67-87) 164/77     Weight: 133.8 kg (295 lb)  Body mass index is 37.88 kg/m².    Physical Exam  Constitutional:       General: He is not in acute distress.     Appearance: Normal appearance.   HENT:      Head: Normocephalic and atraumatic.      Nose: Nose normal.   Eyes:      General: No scleral icterus.     Extraocular Movements: Extraocular movements intact.      Pupils: Pupils are equal, round, and reactive to light.   Cardiovascular:      Rate and Rhythm: Normal rate and regular rhythm.   Pulmonary:      Effort: Pulmonary effort is normal. No respiratory distress.      Breath sounds: Rales present. No wheezing.   Abdominal:      General: Abdomen is flat. There is no distension.      Palpations: Abdomen is soft.      Tenderness: There is no abdominal tenderness. There is no guarding.   Musculoskeletal:          General: Normal range of motion.      Cervical back: Normal range of motion and neck supple. No rigidity.      Right lower leg: No edema.      Left lower leg: No edema.   Skin:     General: Skin is warm and dry.      Coloration: Skin is not jaundiced.   Neurological:      General: No focal deficit present.      Mental Status: He is alert and oriented to person, place, and time.      Cranial Nerves: No cranial nerve deficit.   Psychiatric:         Mood and Affect: Mood normal.         Behavior: Behavior normal.         CRANIAL NERVES     CN III, IV, VI   Pupils are equal, round, and reactive to light.     Significant Labs: All pertinent labs within the past 24 hours have been reviewed.  CBC:   Recent Labs   Lab 07/04/22 1905   WBC 4.03   HGB 11.2*   HCT 37.0*        CMP:   Recent Labs   Lab 07/04/22 1905      K 4.3      CO2 21*      BUN 17   CREATININE 2.1*   CALCIUM 8.4*   PROT 6.7   ALBUMIN 2.6*   BILITOT 0.4   ALKPHOS 93   AST 54*   ALT 39   ANIONGAP 11   EGFRNONAA 30.5*     Cardiac Markers:   Recent Labs   Lab 07/04/22 1905   *     Troponin:   Recent Labs   Lab 07/04/22 1905   TROPONINI 0.032*       Significant Imaging: I have reviewed all pertinent imaging results/findings within the past 24 hours.

## 2022-07-05 NOTE — ASSESSMENT & PLAN NOTE
- patient reports taking from 15-40 units NPH 70/30 once daily at home. Did not take am of presentation and   - start Basal insulin 10 units BID  - SSI  - diabetic low sodium diet  - hypoglycemic protocol

## 2022-07-05 NOTE — ED NOTES
Patient identifiers for [facility] 72 y.o. male checked and correct.  Chief Complaint   Patient presents with    Covid Positive     Kidney and heart xplant ,    Diarrhea    Shortness of Breath     Past Medical History:   Diagnosis Date    Allergic rhinitis 2013    Blood transfusion     Cataract     CKD (chronic kidney disease), stage III 2014    -donor kidney transplant     Diabetes mellitus, type 2 2013    Gout, arthritis 2013    Heart attack     Heart transplanted     Hyperlipidemia 2013    Hypertension     Immunodeficiency due to treatment with immunosuppressive medication     Morbid obesity 2013    Organ transplant 2002    heart and kidney    Prophylactic immunotherapy     Renal manifestation of secondary diabetes mellitus      Allergies reported:   Review of patient's allergies indicates:   Allergen Reactions    No known drug allergies          LOC: Patient is awake, alert, and aware of environment with an appropriate affect. Patient is oriented x 4 and speaking appropriately.  APPEARANCE: Patient resting comfortably and in no acute distress. Patient is clean and well groomed, patient's clothing is properly fastened. Pt is sleeping comfortably in bed supine.  HEENT: - JVD + midline trach, - bruising or lacerations to face or neck.  SKIN: The skin is warm and dry. Patient has normal skin turgor and moist mucus membranes.   MUSKULOSKELETAL: Patient is moving all extremities well, no obvious deformities noted. Pulses intact.   RESPIRATORY: Airway is open and patent. Respirations are spontaneous and non-labored with normal effort and rate.  CARDIAC: Patient has a normal rate and rhythm. 69 on cardiac monitor. No peripheral edema noted.   ABDOMEN: No distention noted. Soft and non-tender upon palpation.  NEUROLOGICAL: pupils 3 mm, PERRL. Facial expression is symmetrical. Hand grasps are equal bilaterally. Normal sensation in all extremities when  touched with finger.

## 2022-07-05 NOTE — H&P
[facility] - Emergency Dept  Hospital Medicine  History & Physical    Patient Name: Nigel Albrecht  MRN: [id number]  Patient Class: IP- Inpatient  Admission Date: 2022  Attending Physician: Topher Munoz MD   Primary Care Provider: Krystin [facility] MD         Patient information was obtained from patient, relative(s), past medical records and ER records.     Subjective:     Principal Problem:Pneumonia due to COVID-19 virus    Chief Complaint:   Chief Complaint   Patient presents with    Covid Positive     Kidney and heart xplant ,    Diarrhea    Shortness of Breath        HPI: This is a 71yo male with a past medical history of Heart and Kidney transplant on tacrolimus and cellcept, DM, HTN, HLD, and CKD who presents to the ED with chief complaint of shortness of breath and fevers. Patient tested positive for COVID on 22. Patient is vaccinated x4. Reports progressively worsening symptoms of exertional dyspnea (now SOB at rest), nausea and vomiting, diarrhea, fevers, chills, and generalized malaise. In the ED patient afebrile and hemodynamically stable saturating in low mid 90's at rest on room air. CXR with mulitfocal opacities. COVID positive. CRP and LDH elevated. Patient started on decadron and remdesivir and admitted to the care of medicine for further evaluation and management.      Past Medical History:   Diagnosis Date    Allergic rhinitis 2013    Blood transfusion     Cataract     CKD (chronic kidney disease), stage III 2014    -donor kidney transplant     Diabetes mellitus, type 2 2013    Gout, arthritis 2013    Heart attack     Heart transplanted     Hyperlipidemia 2013    Hypertension     Immunodeficiency due to treatment with immunosuppressive medication     Morbid obesity 2013    Organ transplant 2002    heart and kidney    Prophylactic immunotherapy     Renal manifestation of secondary diabetes mellitus        Past Surgical  History:   Procedure Laterality Date    CATARACT EXTRACTION Bilateral     COLONOSCOPY N/A 10/6/2020    Procedure: COLONOSCOPY;  Surgeon: Conner Redman MD;  Location: [facility];  Service: Endoscopy;  Laterality: N/A;    EYE SURGERY      HEART TRANSPLANT      KIDNEY TRANSPLANT      REVISION TOTAL HIP ARTHROPLASTY         Review of patient's allergies indicates:   Allergen Reactions    No known drug allergies        No current facility-administered medications on file prior to encounter.     Current Outpatient Medications on File Prior to Encounter   Medication Sig    amitriptyline (ELAVIL) 10 MG tablet Take 1 tablet (10 mg total) by mouth every evening for 7 days, THEN 2 tablets (20 mg total) every evening for 23 days.    amLODIPine (NORVASC) 10 MG tablet Take 0.5 tablets (5 mg total) by mouth once daily.    atorvastatin (LIPITOR) 80 MG tablet TAKE 1 TABLET ONCE DAILY    calcium carbonate (OS-CARRIE) 500 mg calcium (1,250 mg) tablet Take 1 tablet by mouth once daily.    cholecalciferol, vitamin D3, 125 mcg (5,000 unit) Tab Take 5,000 Units by mouth once daily.    fluticasone propionate (FLONASE) 50 mcg/actuation nasal spray 1 spray by Each Nare route as needed for Rhinitis.    FREESTYLE SHREE 2 SENSOR Kit APPLY 1 SENSOR             SUBCUTANEOUSLY EVERY 14    DAYS    gabapentin (NEURONTIN) 300 MG capsule TAKE 1 CAPSULE TWICE DAILY    insulin NPH/Reg human (HUMULIN 70/30 U-100 KWIKPEN) 100 unit/mL (70-30) InPn pen Inject 40 Units into the skin 2 (two) times daily.    lisinopriL (PRINIVIL,ZESTRIL) 40 MG tablet Take 1 tablet (40 mg total) by mouth once daily.    Low-Dose Aspirin 81 mg Tab Take by mouth. 1 Tablet Oral Every day    metoprolol succinate (TOPROL-XL) 25 MG 24 hr tablet TAKE 1 TABLET DAILY ALONG  WITH 50MG TABLET FOR A     TOTAL OF 75MG DAILY    metoprolol succinate (TOPROL-XL) 50 MG 24 hr tablet Take 1 tablet (50 mg total) by mouth once daily.    MULTIVIT-MIN/FA/LYCOPEN/LUTEIN (CENTRUM  SILVER MEN ORAL) Take by mouth once daily.    mycophenolate (CELLCEPT) 250 mg Cap Take 3 capsules (750 mg total) by mouth 2 (two) times daily.    pulse oximeter (PULSE OXIMETER) device Use twice daily at 8 AM and 3 PM and record the value in MyChart as directed.    semaglutide (OZEMPIC) 2 mg/dose (8 mg/3 mL) PnIj Inject 2 mg into the skin once a week.    tacrolimus (PROGRAF) 1 MG Cap Take 3 capsules (3 mg total) by mouth every morning AND 3 capsules (3 mg total) every evening.    torsemide (DEMADEX) 5 MG Tab TAKE 2 TABLETS (10MG TOTAL)ONCE DAILY (Patient taking differently: Take 5 mg by mouth once daily. TAKE 1 TABLETS (10MG TOTAL)ONCE DAILY)     Family History       Problem Relation (Age of Onset)    Diabetes Brother, Maternal Grandmother    Early death Brother    Heart disease Brother    Hyperlipidemia Sister, Brother    Hypertension Brother    Kidney disease Son    Stroke Mother, Brother          Tobacco Use    Smoking status: Former Smoker     Packs/day: 1.00     Years: 20.00     Pack years: 20.00     Types: Cigarettes     Quit date: 1984     Years since quittin.0    Smokeless tobacco: Never Used   Substance and Sexual Activity    Alcohol use: Yes     Alcohol/week: 1.0 standard drink     Types: 1 Cans of beer per week     Comment: daily    Drug use: No    Sexual activity: Never     Review of Systems   Constitutional:  Positive for appetite change, chills, fatigue and fever.   HENT:  Negative for sore throat and trouble swallowing.    Eyes:  Negative for photophobia and visual disturbance.   Respiratory:  Positive for cough and shortness of breath. Negative for wheezing.    Cardiovascular:  Negative for chest pain, palpitations and leg swelling.   Gastrointestinal:  Positive for diarrhea, nausea and vomiting. Negative for abdominal distention and abdominal pain.   Genitourinary:  Negative for dysuria and hematuria.   Musculoskeletal:  Negative for neck pain and neck stiffness.   Skin:  Negative  for rash and wound.   Neurological:  Positive for weakness (generalized). Negative for seizures, syncope, light-headedness and headaches.   Psychiatric/Behavioral:  Negative for confusion and decreased concentration.    Objective:     Vital Signs (Most Recent):  Temp: 99.5 °F (37.5 °C) (07/04/22 1549)  Pulse: 79 (07/04/22 2202)  Resp: 19 (07/04/22 1945)  BP: (!) 164/77 (07/04/22 2202)  SpO2: 96 % (07/04/22 2202)   Vital Signs (24h Range):  Temp:  [99.5 °F (37.5 °C)] 99.5 °F (37.5 °C)  Pulse:  [74-83] 79  Resp:  [18-20] 19  SpO2:  [94 %-97 %] 96 %  BP: (139-175)/(67-87) 164/77     Weight: 133.8 kg (295 lb)  Body mass index is 37.88 kg/m².    Physical Exam  Constitutional:       General: He is not in acute distress.     Appearance: Normal appearance.   HENT:      Head: Normocephalic and atraumatic.      Nose: Nose normal.   Eyes:      General: No scleral icterus.     Extraocular Movements: Extraocular movements intact.      Pupils: Pupils are equal, round, and reactive to light.   Cardiovascular:      Rate and Rhythm: Normal rate and regular rhythm.   Pulmonary:      Effort: Pulmonary effort is normal. No respiratory distress.      Breath sounds: Rales present. No wheezing.   Abdominal:      General: Abdomen is flat. There is no distension.      Palpations: Abdomen is soft.      Tenderness: There is no abdominal tenderness. There is no guarding.   Musculoskeletal:         General: Normal range of motion.      Cervical back: Normal range of motion and neck supple. No rigidity.      Right lower leg: No edema.      Left lower leg: No edema.   Skin:     General: Skin is warm and dry.      Coloration: Skin is not jaundiced.   Neurological:      General: No focal deficit present.      Mental Status: He is alert and oriented to person, place, and time.      Cranial Nerves: No cranial nerve deficit.   Psychiatric:         Mood and Affect: Mood normal.         Behavior: Behavior normal.         CRANIAL NERVES     CN III, IV,  VI   Pupils are equal, round, and reactive to light.     Significant Labs: All pertinent labs within the past 24 hours have been reviewed.  CBC:   Recent Labs   Lab 07/04/22 1905   WBC 4.03   HGB 11.2*   HCT 37.0*        CMP:   Recent Labs   Lab 07/04/22 1905      K 4.3      CO2 21*      BUN 17   CREATININE 2.1*   CALCIUM 8.4*   PROT 6.7   ALBUMIN 2.6*   BILITOT 0.4   ALKPHOS 93   AST 54*   ALT 39   ANIONGAP 11   EGFRNONAA 30.5*     Cardiac Markers:   Recent Labs   Lab 07/04/22 1905   *     Troponin:   Recent Labs   Lab 07/04/22 1905   TROPONINI 0.032*       Significant Imaging: I have reviewed all pertinent imaging results/findings within the past 24 hours.    Assessment/Plan:     * Pneumonia due to COVID-19 virus    COVID-19 Virus Infection  Viral Pneumonia due to COVID-19  - COVID-19 testing:   Patient is identified as Severe COVID-19 based on hypoxemia with O2 saturations <94% on room air or on ambulation and High risk for severe complications of COVID 19 based on COVID risk score of 7   Initiate standard COVID protocols; COVID-19 testing ,Infection Control notification  and isolation- respiratory, contact and droplet per protocol    Diagnostics: (leukopenia, hyponatremia, hyperferritinemia, elevated troponin, elevated d-dimer, age, and comorbidities are significant predictors of poor clinical outcome)  CBC, CMP, Procalcitonin, Ferritin, CRP, LDH, BNP, Troponin and Portable CXR    Management: Initiate targeted therapy with Remdesivir, 200mg IV x1, followed by 100mg IV daily x5 days total and Dexamethasone PO/IV 6mg daily x10 days, Maintain oxygen saturations 92-96% via Nasal Cannula  LPM and monitor with pulse oximetry. , Inhaled bronchodilators as needed for shortness of breath. and Continuous cardiac monitoring.    Advance Care Planning  Current advance care plan has been discussed with patient/family/POA and patient currently wishes Full Code.        Acute Hypoxemic  Respiratory Failure    - Order RT consult via Respiratory Communication for COVID Protocols    - Incentive Spirometer Q4h, Flutter Valve Q4h    - Continuous Pulse Oximetry, goal SpO2 92-96%    - Supplemental O2 via LFNC, VentiMask, or HFNC (    - If wheezing, albuterol INH Q6h scheduled & PRN    - Proning Protocol if patient is a candidate    - GCS >13, able to self-prone    - If deterioration, may warrant trial of NIPPV in neg pressure room or immediate ICU consult    *starting Remdesivir and Decadron, therapeutic dose lovenox, holding home cellcept, continue home tacrolimus, HTS and KTM consulted        Type 2 diabetes mellitus with diabetic chronic kidney disease  - patient reports taking from 15-40 units NPH 70/30 once daily at home. Did not take am of presentation and   - start Basal insulin 10 units BID  - SSI  - diabetic low sodium diet  - hypoglycemic protocol      CKD (chronic kidney disease), stage III  - Creatinine 2.1 on presentation  ;  Appears to be baseline  - avoid nephrotoxic agents as appropriate  - further management as above      Hyperlipidemia  - continue home statin      Hypertension  - continue home amlodipine, lisinopril, metoprolol      Heart transplanted  - Heart Transplant medicine consulted ; appreciate recs  - continue home prograf  - holding home cellcept for active infection  - daily prograf levels      -donor kidney/ heart transplant performed at [facility] on 2002 - ESRD etiology unknown; ESHF due to IHSS  - Kidney Transplant medicine consulted ; appreciate recs  - continue home prograf  - holding home cellcept for active infection  - daily prograf levels        VTE Risk Mitigation (From admission, onward)         Ordered     enoxaparin injection 135 mg  2 times daily         22     IP VTE HIGH RISK PATIENT  Once         22     Place sequential compression device  Until discontinued         22                   Asael Wagner,  MD  Department of Hospital Medicine   [facility] - Emergency Dept

## 2022-07-05 NOTE — ASSESSMENT & PLAN NOTE
- Kidney Transplant medicine consulted ; appreciate recs  - continue home prograf  - holding home cellcept for active infection  - daily prograf levels

## 2022-07-05 NOTE — HPI
72 year old male with Kidney Heart Tx 2002 here for shortness of breath, chills, fevers, diarrhea, nausea that started 6/24 and progressively worsened. Has been able to drink fluids even though they caused him to have more diarrhea. No change in urination and no edema or missed medications.  In the ED patient afebrile and hemodynamically stable saturating in low mid 90's at rest on room air. CXR with mulitfocal opacities. COVID positive. CRP and LDH elevated. Patient started on decadron and remdesivir and admitted to the care of medicine for further evaluation and management.  KTM consulted for aid with transplanted kidney

## 2022-07-05 NOTE — ASSESSMENT & PLAN NOTE
- Heart Transplant medicine consulted ; appreciate recs  - continue home prograf  - holding home cellcept for active infection  - daily prograf levels

## 2022-07-05 NOTE — ASSESSMENT & PLAN NOTE
- Creatinine 2.1 on presentation  ;  Appears to be baseline  - avoid nephrotoxic agents as appropriate  - further management as above

## 2022-07-05 NOTE — ASSESSMENT & PLAN NOTE
- Working to optimize BG control  - Levemir 10u BID  - SSI provided for corrective dosing  - DXTs as ordered  - Hypoglycemic protocol in effect  - Continue home gabapentin regimen   - Diabetic diet provided

## 2022-07-05 NOTE — ASSESSMENT & PLAN NOTE
At home on tacro 3/3 mycophenolate 750/750 pred 5  Hold mycophenolate  Continue tacro and pred at home doses for now

## 2022-07-06 LAB
ALBUMIN SERPL BCP-MCNC: 2.3 G/DL (ref 3.5–5.2)
ALP SERPL-CCNC: 85 U/L (ref 55–135)
ALT SERPL W/O P-5'-P-CCNC: 33 U/L (ref 10–44)
ANION GAP SERPL CALC-SCNC: 7 MMOL/L (ref 8–16)
AST SERPL-CCNC: 43 U/L (ref 10–40)
BASOPHILS # BLD AUTO: 0 K/UL (ref 0–0.2)
BASOPHILS NFR BLD: 0 % (ref 0–1.9)
BILIRUB SERPL-MCNC: 0.2 MG/DL (ref 0.1–1)
BUN SERPL-MCNC: 30 MG/DL (ref 8–23)
CALCIUM SERPL-MCNC: 8.4 MG/DL (ref 8.7–10.5)
CHLORIDE SERPL-SCNC: 110 MMOL/L (ref 95–110)
CO2 SERPL-SCNC: 23 MMOL/L (ref 23–29)
CREAT SERPL-MCNC: 1.9 MG/DL (ref 0.5–1.4)
DIFFERENTIAL METHOD: ABNORMAL
EOSINOPHIL # BLD AUTO: 0 K/UL (ref 0–0.5)
EOSINOPHIL NFR BLD: 0 % (ref 0–8)
ERYTHROCYTE [DISTWIDTH] IN BLOOD BY AUTOMATED COUNT: 16.1 % (ref 11.5–14.5)
EST. GFR  (AFRICAN AMERICAN): 39.8 ML/MIN/1.73 M^2
EST. GFR  (NON AFRICAN AMERICAN): 34.5 ML/MIN/1.73 M^2
GLUCOSE SERPL-MCNC: 184 MG/DL (ref 70–110)
HCT VFR BLD AUTO: 37.1 % (ref 40–54)
HGB BLD-MCNC: 11.3 G/DL (ref 14–18)
IMM GRANULOCYTES # BLD AUTO: 0.01 K/UL (ref 0–0.04)
IMM GRANULOCYTES NFR BLD AUTO: 0.2 % (ref 0–0.5)
LYMPHOCYTES # BLD AUTO: 1.1 K/UL (ref 1–4.8)
LYMPHOCYTES NFR BLD: 25.9 % (ref 18–48)
MAGNESIUM SERPL-MCNC: 2.1 MG/DL (ref 1.6–2.6)
MCH RBC QN AUTO: 26 PG (ref 27–31)
MCHC RBC AUTO-ENTMCNC: 30.5 G/DL (ref 32–36)
MCV RBC AUTO: 86 FL (ref 82–98)
MONOCYTES # BLD AUTO: 0.4 K/UL (ref 0.3–1)
MONOCYTES NFR BLD: 10.3 % (ref 4–15)
NEUTROPHILS # BLD AUTO: 2.7 K/UL (ref 1.8–7.7)
NEUTROPHILS NFR BLD: 63.6 % (ref 38–73)
NRBC BLD-RTO: 0 /100 WBC
PHOSPHATE SERPL-MCNC: 3.2 MG/DL (ref 2.7–4.5)
PLATELET # BLD AUTO: 291 K/UL (ref 150–450)
PMV BLD AUTO: 10.1 FL (ref 9.2–12.9)
POCT GLUCOSE: 180 MG/DL (ref 70–110)
POCT GLUCOSE: 195 MG/DL (ref 70–110)
POCT GLUCOSE: 218 MG/DL (ref 70–110)
POCT GLUCOSE: 226 MG/DL (ref 70–110)
POTASSIUM SERPL-SCNC: 4.9 MMOL/L (ref 3.5–5.1)
PROT SERPL-MCNC: 6.2 G/DL (ref 6–8.4)
RBC # BLD AUTO: 4.34 M/UL (ref 4.6–6.2)
SODIUM SERPL-SCNC: 140 MMOL/L (ref 136–145)
TACROLIMUS BLD-MCNC: <2 NG/ML (ref 5–15)
WBC # BLD AUTO: 4.28 K/UL (ref 3.9–12.7)

## 2022-07-06 PROCEDURE — 83735 ASSAY OF MAGNESIUM: CPT | Performed by: STUDENT IN AN ORGANIZED HEALTH CARE EDUCATION/TRAINING PROGRAM

## 2022-07-06 PROCEDURE — 25000003 PHARM REV CODE 250: Performed by: STUDENT IN AN ORGANIZED HEALTH CARE EDUCATION/TRAINING PROGRAM

## 2022-07-06 PROCEDURE — 99232 PR SUBSEQUENT HOSPITAL CARE,LEVL II: ICD-10-PCS | Mod: CR,GC,, | Performed by: INTERNAL MEDICINE

## 2022-07-06 PROCEDURE — 80053 COMPREHEN METABOLIC PANEL: CPT | Performed by: STUDENT IN AN ORGANIZED HEALTH CARE EDUCATION/TRAINING PROGRAM

## 2022-07-06 PROCEDURE — 99233 PR SUBSEQUENT HOSPITAL CARE,LEVL III: ICD-10-PCS | Mod: 95,CR,, | Performed by: INTERNAL MEDICINE

## 2022-07-06 PROCEDURE — 94799 UNLISTED PULMONARY SVC/PX: CPT

## 2022-07-06 PROCEDURE — 25000003 PHARM REV CODE 250: Performed by: FAMILY MEDICINE

## 2022-07-06 PROCEDURE — 27000221 HC OXYGEN, UP TO 24 HOURS

## 2022-07-06 PROCEDURE — 94761 N-INVAS EAR/PLS OXIMETRY MLT: CPT

## 2022-07-06 PROCEDURE — 85025 COMPLETE CBC W/AUTO DIFF WBC: CPT | Performed by: STUDENT IN AN ORGANIZED HEALTH CARE EDUCATION/TRAINING PROGRAM

## 2022-07-06 PROCEDURE — 99900035 HC TECH TIME PER 15 MIN (STAT)

## 2022-07-06 PROCEDURE — 63700000 PHARM REV CODE 250 ALT 637 W/O HCPCS: Performed by: STUDENT IN AN ORGANIZED HEALTH CARE EDUCATION/TRAINING PROGRAM

## 2022-07-06 PROCEDURE — 63600175 PHARM REV CODE 636 W HCPCS: Performed by: STUDENT IN AN ORGANIZED HEALTH CARE EDUCATION/TRAINING PROGRAM

## 2022-07-06 PROCEDURE — 94640 AIRWAY INHALATION TREATMENT: CPT

## 2022-07-06 PROCEDURE — 11000001 HC ACUTE MED/SURG PRIVATE ROOM

## 2022-07-06 PROCEDURE — 36415 COLL VENOUS BLD VENIPUNCTURE: CPT | Performed by: FAMILY MEDICINE

## 2022-07-06 PROCEDURE — 93010 ELECTROCARDIOGRAM REPORT: CPT | Mod: ,,, | Performed by: INTERNAL MEDICINE

## 2022-07-06 PROCEDURE — 27000646 HC AEROBIKA DEVICE

## 2022-07-06 PROCEDURE — 63600175 PHARM REV CODE 636 W HCPCS: Performed by: FAMILY MEDICINE

## 2022-07-06 PROCEDURE — 99233 SBSQ HOSP IP/OBS HIGH 50: CPT | Mod: 95,CR,, | Performed by: INTERNAL MEDICINE

## 2022-07-06 PROCEDURE — 80197 ASSAY OF TACROLIMUS: CPT | Performed by: FAMILY MEDICINE

## 2022-07-06 PROCEDURE — 94664 DEMO&/EVAL PT USE INHALER: CPT

## 2022-07-06 PROCEDURE — 84100 ASSAY OF PHOSPHORUS: CPT | Performed by: STUDENT IN AN ORGANIZED HEALTH CARE EDUCATION/TRAINING PROGRAM

## 2022-07-06 PROCEDURE — 27000207 HC ISOLATION

## 2022-07-06 PROCEDURE — 99232 SBSQ HOSP IP/OBS MODERATE 35: CPT | Mod: CR,GC,, | Performed by: INTERNAL MEDICINE

## 2022-07-06 PROCEDURE — 93005 ELECTROCARDIOGRAM TRACING: CPT

## 2022-07-06 PROCEDURE — 93010 EKG 12-LEAD: ICD-10-PCS | Mod: ,,, | Performed by: INTERNAL MEDICINE

## 2022-07-06 RX ORDER — AZITHROMYCIN 250 MG/1
500 TABLET, FILM COATED ORAL DAILY
Status: DISCONTINUED | OUTPATIENT
Start: 2022-07-07 | End: 2022-07-07

## 2022-07-06 RX ORDER — TACROLIMUS 5 MG/1
5 CAPSULE ORAL EVERY EVENING
Status: CANCELLED | OUTPATIENT
Start: 2022-07-06 | End: 2022-07-07

## 2022-07-06 RX ADMIN — INSULIN DETEMIR 10 UNITS: 100 INJECTION, SOLUTION SUBCUTANEOUS at 09:07

## 2022-07-06 RX ADMIN — Medication 500 MG: at 08:07

## 2022-07-06 RX ADMIN — DEXAMETHASONE 6 MG: 4 TABLET ORAL at 08:07

## 2022-07-06 RX ADMIN — THERA TABS 1 TABLET: TAB at 08:07

## 2022-07-06 RX ADMIN — ALBUTEROL SULFATE 2 PUFF: 108 INHALANT RESPIRATORY (INHALATION) at 07:07

## 2022-07-06 RX ADMIN — ALBUTEROL SULFATE 2 PUFF: 108 INHALANT RESPIRATORY (INHALATION) at 02:07

## 2022-07-06 RX ADMIN — ALBUTEROL SULFATE 2 PUFF: 108 INHALANT RESPIRATORY (INHALATION) at 01:07

## 2022-07-06 RX ADMIN — CEFTRIAXONE 1 G: 1 INJECTION, SOLUTION INTRAVENOUS at 06:07

## 2022-07-06 RX ADMIN — GABAPENTIN 300 MG: 300 CAPSULE ORAL at 08:07

## 2022-07-06 RX ADMIN — INSULIN ASPART 2 UNITS: 100 INJECTION, SOLUTION INTRAVENOUS; SUBCUTANEOUS at 08:07

## 2022-07-06 RX ADMIN — REMDESIVIR 100 MG: 100 INJECTION, POWDER, LYOPHILIZED, FOR SOLUTION INTRAVENOUS at 10:07

## 2022-07-06 RX ADMIN — ATORVASTATIN CALCIUM 80 MG: 20 TABLET, FILM COATED ORAL at 08:07

## 2022-07-06 RX ADMIN — AZITHROMYCIN MONOHYDRATE 500 MG: 250 TABLET ORAL at 08:07

## 2022-07-06 RX ADMIN — INSULIN ASPART 2 UNITS: 100 INJECTION, SOLUTION INTRAVENOUS; SUBCUTANEOUS at 12:07

## 2022-07-06 RX ADMIN — ENOXAPARIN SODIUM 135 MG: 150 INJECTION SUBCUTANEOUS at 08:07

## 2022-07-06 RX ADMIN — INSULIN DETEMIR 10 UNITS: 100 INJECTION, SOLUTION SUBCUTANEOUS at 08:07

## 2022-07-06 RX ADMIN — TACROLIMUS 3 MG: 1 CAPSULE ORAL at 08:07

## 2022-07-06 RX ADMIN — CALCIUM CARBONATE (ANTACID) CHEW TAB 500 MG 1000 MG: 500 CHEW TAB at 08:07

## 2022-07-06 RX ADMIN — INSULIN ASPART 4 UNITS: 100 INJECTION, SOLUTION INTRAVENOUS; SUBCUTANEOUS at 04:07

## 2022-07-06 RX ADMIN — NIFEDIPINE 90 MG: 60 TABLET, FILM COATED, EXTENDED RELEASE ORAL at 08:07

## 2022-07-06 RX ADMIN — LISINOPRIL 40 MG: 20 TABLET ORAL at 08:07

## 2022-07-06 RX ADMIN — ASPIRIN 81 MG: 81 TABLET, COATED ORAL at 08:07

## 2022-07-06 RX ADMIN — METOPROLOL SUCCINATE 75 MG: 50 TABLET, EXTENDED RELEASE ORAL at 08:07

## 2022-07-06 RX ADMIN — TACROLIMUS 3 MG: 1 CAPSULE ORAL at 05:07

## 2022-07-06 RX ADMIN — AMITRIPTYLINE HYDROCHLORIDE 10 MG: 10 TABLET, FILM COATED ORAL at 08:07

## 2022-07-06 NOTE — ASSESSMENT & PLAN NOTE
"2002 now with BL sCr 2.0  Creatinine at baseline on admission  Continue immunosuppression as per problem "Chronic immunosuppression with tacrolimus, mycophenolate"    "

## 2022-07-06 NOTE — ASSESSMENT & PLAN NOTE
- Heart Transplant medicine following, appreciate recs  - Continue home prograf  - Holding home cellcept for active infection  - Daily prograf levels  - continue metoprolol, lisinopril  -was prescribed 10 mg torsemide but only takes 5 mg; resume if cleared by KTM for ankle edema

## 2022-07-06 NOTE — SUBJECTIVE & OBJECTIVE
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 Has been able to drink fluids even though they caused him to have more diarrhea. No change in urination and no edema or missed medications.  In the ED patient afebrile and hemodynamically stable saturating in low mid 90's at rest on room air. CXR with mulitfocal opacities. COVID positive. CRP and LDH elevated. Patient started on decadron and remdesivir and admitted to the [facility] of medicine for further evaluation and management.  KTM consulted for aid with transplanted kidney    Mr. Albrecht is a 72 y.o. year old male who is status post Kidney Transplant - 5/24/2002  (#1).    His maintenance immunosuppression consists of:   Immunosuppressants (From admission, onward)                Start     Stop Route Frequency Ordered    07/05/22 0800  tacrolimus capsule 3 mg         -- Oral 2 times daily 07/04/22 2153            Hospital Course:  No notes on file    Interval History:  Significant improvement in symptoms. No diarrhea. Stable kidney function.    Past Medical, Surgical, Family, and Social History:   Unchanged from H&P.    Scheduled Meds:   albuterol  2 puff Inhalation Q6H    amitriptyline  10 mg Oral QHS    ascorbic acid (vitamin C)  500 mg Oral BID    aspirin  81 mg Oral Daily    atorvastatin  80 mg Oral Daily    azithromycin  500 mg Oral Daily    calcium carbonate  1,000 mg Oral Daily    cefTRIAXone (ROCEPHIN) IVPB  1 g Intravenous Q24H    dexAMETHasone  6 mg Oral Daily    enoxaparin  1 mg/kg Subcutaneous BID    gabapentin  300 mg Oral BID    insulin detemir U-100  10 Units Subcutaneous BID    lisinopriL  40 mg Oral Daily    metoprolol succinate  75 mg Oral Daily    multivitamin  1 tablet Oral Daily    NIFEdipine  90 mg Oral Daily    remdesivir infusion  100 mg Intravenous Daily    tacrolimus  3 mg Oral BID  "    Continuous Infusions:  PRN Meds:acetaminophen, calcium carbonate, dextromethorphan-guaiFENesin  mg/5 ml, dextrose 10%, dextrose 10%, glucagon (human recombinant), glucose, glucose, hydrALAZINE, insulin aspart U-100, loperamide, melatonin, ondansetron, oxyCODONE, sodium chloride 0.9%, sodium chloride 0.9%    Intake/Output - Last 3 Shifts         07/04 0700 07/05 0659 07/05 0700 07/06 0659 07/06 0700  07/07 0659    IV Piggyback 500      Total Intake(mL/kg) 500 (3.7)      Urine (mL/kg/hr)  800 (0.2) 500 (0.5)    Total Output  800 500    Net +500 -800 -500                    Review of Systems   Constitutional:  Positive for chills, fatigue and fever.   HENT: Negative.     Eyes: Negative.    Respiratory:  Positive for cough and wheezing.    Cardiovascular: Negative.    Gastrointestinal:  Positive for diarrhea and nausea.   Endocrine: Negative.    Genitourinary: Negative.    Musculoskeletal: Negative.    Skin: Negative.    Neurological: Negative.    Psychiatric/Behavioral: Negative.      Objective:     Vital Signs (Most Recent):  Temp: 97.8 °F (36.6 °C) (07/06/22 1205)  Pulse: 80 (07/06/22 1335)  Resp: 18 (07/06/22 1335)  BP: 114/62 (07/06/22 1205)  SpO2: 96 % (07/06/22 1335) Vital Signs (24h Range):  Temp:  [96.1 °F (35.6 °C)-98.1 °F (36.7 °C)] 97.8 °F (36.6 °C)  Pulse:  [71-87] 80  Resp:  [16-20] 18  SpO2:  [94 %-100 %] 96 %  BP: (114-144)/(62-76) 114/62     Weight: 133.8 kg (295 lb)  Height: 6' 2" (188 cm)  Body mass index is 37.88 kg/m².    Physical Exam  Constitutional:       General: He is not in acute distress.     Appearance: He is obese.   Eyes:      General: No scleral icterus.     Extraocular Movements: Extraocular movements intact.      Pupils: Pupils are equal, round, and reactive to light.   Cardiovascular:      Rate and Rhythm: Normal rate and regular rhythm.      Heart sounds: No murmur heard.  Pulmonary:      Effort: Pulmonary effort is normal. No respiratory distress.   Abdominal:      " General: There is no distension.      Palpations: Abdomen is soft.      Tenderness: There is no abdominal tenderness.   Musculoskeletal:      Right lower leg: No edema.      Left lower leg: No edema.   Skin:     Coloration: Skin is not jaundiced.   Neurological:      General: No focal deficit present.      Mental Status: He is alert.       Laboratory:  Labs within the past 24 hours have been reviewed.    Diagnostic Results:  None

## 2022-07-06 NOTE — PLAN OF CARE
Patient had an uneventful day  Problem: Adult Inpatient Plan of Care  Goal: Plan of Care Review  Outcome: Ongoing, Progressing  Goal: Patient-Specific Goal (Individualized)  Outcome: Ongoing, Progressing  Goal: Absence of Hospital-Acquired Illness or Injury  Outcome: Ongoing, Progressing  Goal: Optimal Comfort and Wellbeing  Outcome: Ongoing, Progressing  Goal: Readiness for Transition of Care  Outcome: Ongoing, Progressing     Problem: Infection  Goal: Absence of Infection Signs and Symptoms  Outcome: Ongoing, Progressing     Problem: Diabetes Comorbidity  Goal: Blood Glucose Level Within Targeted Range  Outcome: Ongoing, Progressing     Problem: Fall Injury Risk  Goal: Absence of Fall and Fall-Related Injury  Outcome: Ongoing, Progressing

## 2022-07-06 NOTE — ASSESSMENT & PLAN NOTE
- Interval history and physical exam findings as described above  - Vaccination status: vaccinated x4  - COVID test positive on 6/25/22  - CXR with bilateral interstitial opacities  - Elevated inflammatory markers as documented  - enoxaparin 1 mg/kg BID; d-dimer elevated  - Afebrile since admission, no leukocytosis   - Currently satting well on 2L NC  - Continue coverage with remdesivir and dexamethasone  - Azithromycin and ceftriaxone given concerns for superimposed bacterial PNA   - PRN tylenol for fever  - PRN duonebs and antitussives provided  - Continuous pulse oximetry at bedside  - Incentive spirometry encouraged  - Appropriate isolation and airborne precautions in place  - Continuing to monitor  - IS and flutter valve ordered

## 2022-07-06 NOTE — PLAN OF CARE
[facility] - Med Surg  Initial Discharge Assessment       Primary Care Provider: Krystin Zimmerman MD    Admission Diagnosis: Cough [R05.9]  SOB (shortness of breath) [R06.02]  Diarrhea, unspecified type [R19.7]  Vomiting, intractability of vomiting not specified, presence of nausea not specified, unspecified vomiting type [R11.10]  COVID-19 [U07.1]    Admission Date: 7/4/2022  Expected Discharge Date: 7/9/2022    Discharge Barriers Identified: None    Payor: MEDICARE / Plan: MEDICARE PART A & B / Product Type: Government /     Extended Emergency Contact Information  Primary Emergency Contact: Jackie Albrecht  Address: [place] [address] [facility]  Home Phone: [phone]  Mobile Phone: [phone]  Relation: Spouse    Discharge Plan A: Home with family  Discharge Plan B: Home Health      [facility] - [place], [place] - [address] AT Portal to Registered [facility]  [address]  [facility] [address]  Phone: [phone] Fax: [phone]    [facility] - [place], [place] - [address]  [address]  [facility] [address]  Phone: [phone] Fax: [phone]    [facility] [address]  [facility] [address]  Phone: [phone] Fax: [phone]      Initial Assessment (most recent)     Adult Discharge Assessment - 07/06/22 1114        Discharge Assessment    Assessment Type Discharge Planning Assessment     Confirmed/corrected address, phone number and insurance Yes     Confirmed Demographics Correct on Facesheet     Source of Information family     Reason For Admission pneumonia due to covid     Lives With spouse     Facility Arrived From: home     Do you expect to return to your current living situation? Yes     Do you have help at home or someone to help you manage your care at home? Yes     Who are your caregiver(s) and their phone number(s)? brenda Albrecht [phone]      Prior to hospitilization cognitive status: Alert/Oriented     Current cognitive status: Alert/Oriented     Walking or Climbing Stairs Difficulty none     Dressing/Bathing Difficulty none     Home Layout Able to live on 1st floor     Equipment Currently Used at Home none     Patient currently being followed by outpatient case management? No     Do you currently have service(s) that help you manage your care at home? No     Who is going to help you get home at discharge? wife     How do you get to doctors appointments? family or friend will provide     Are you on dialysis? No     Do you take coumadin? No     Discharge Plan A Home with family     Discharge Plan B Home Health     DME Needed Upon Discharge  other (see comments)   TBD    Discharge Barriers Identified None               CM called and spoke with patient's wife in 603 for DISCHARGE PLANNING ASSESSMENT. Per wife, pt lives with her in a single family home with 1 steps to porch and point of entry.  Patient was independent with ADLS and DID NOT use DME or in-home assistive equipment.  Pt is not on dialysis or Coumadin, takes medications as prescribed / keeps refilled / has resources for all daily and prescriptive needs.  Preferred pharmacy is [facility] mail in-Agreeable to bedside delivery.  Will have help from wife and other immediate family upon discharge.  All questions addressed.  Unit and CM direct numbers provided.  Will continue to follow for course of hospitalization.    * Pt's wife reports she will provide transport home at time of d/c     Akash Baez RN CM  [phone]  Case Management

## 2022-07-06 NOTE — ASSESSMENT & PLAN NOTE
- BP currently not well-controlled  - Continue home cmeds of lisinopril, nifedipine, metoprolol  - Amlodipine changed to nifedipine XR  - PRN hydralazine for SBP>180  - Will continue to monitor and further titrate antihypertensives as clinically indicated

## 2022-07-06 NOTE — PROGRESS NOTES
[facility] - Med Surg  Kidney Transplant  Progress Note      Reason for Follow-up: Reassessment of Kidney Transplant - 5/24/2002  (#1) recipient and management of immunosuppression.    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 Has been able to drink fluids even though they caused him to have more diarrhea. No change in urination and no edema or missed medications.  In the ED patient afebrile and hemodynamically stable saturating in low mid 90's at rest on room air. CXR with mulitfocal opacities. COVID positive. CRP and LDH elevated. Patient started on decadron and remdesivir and admitted to the care of medicine for further evaluation and management.  KTM consulted for aid with transplanted kidney    Mr. Albrecht is a 72 y.o. year old male who is status post Kidney Transplant - 5/24/2002  (#1).    His maintenance immunosuppression consists of:   Immunosuppressants (From admission, onward)                Start     Stop Route Frequency Ordered    07/05/22 0800  tacrolimus capsule 3 mg         -- Oral 2 times daily 07/04/22 2153            Hospital Course:  No notes on file    Interval History:  Significant improvement in symptoms. No diarrhea. Stable kidney function.    Past Medical, Surgical, Family, and Social History:   Unchanged from H&P.    Scheduled Meds:   albuterol  2 puff Inhalation Q6H    amitriptyline  10 mg Oral QHS    ascorbic acid (vitamin C)  500 mg Oral BID    aspirin  81 mg Oral Daily    atorvastatin  80 mg Oral Daily    azithromycin  500 mg Oral Daily    calcium carbonate  1,000 mg Oral Daily    cefTRIAXone (ROCEPHIN) IVPB  1 g Intravenous Q24H    dexAMETHasone  6 mg Oral Daily    enoxaparin  1 mg/kg Subcutaneous BID    gabapentin  300 mg Oral BID    insulin detemir  "U-100  10 Units Subcutaneous BID    lisinopriL  40 mg Oral Daily    metoprolol succinate  75 mg Oral Daily    multivitamin  1 tablet Oral Daily    NIFEdipine  90 mg Oral Daily    remdesivir infusion  100 mg Intravenous Daily    tacrolimus  3 mg Oral BID     Continuous Infusions:  PRN Meds:acetaminophen, calcium carbonate, dextromethorphan-guaiFENesin  mg/5 ml, dextrose 10%, dextrose 10%, glucagon (human recombinant), glucose, glucose, hydrALAZINE, insulin aspart U-100, loperamide, melatonin, ondansetron, oxyCODONE, sodium chloride 0.9%, sodium chloride 0.9%    Intake/Output - Last 3 Shifts         07/04 0700 07/05 0659 07/05 0700 07/06 0659 07/06 0700 07/07 0659    IV Piggyback 500      Total Intake(mL/kg) 500 (3.7)      Urine (mL/kg/hr)  800 (0.2) 500 (0.5)    Total Output  800 500    Net +500 -800 -500                    Review of Systems   Constitutional:  Positive for chills, fatigue and fever.   HENT: Negative.     Eyes: Negative.    Respiratory:  Positive for cough and wheezing.    Cardiovascular: Negative.    Gastrointestinal:  Positive for diarrhea and nausea.   Endocrine: Negative.    Genitourinary: Negative.    Musculoskeletal: Negative.    Skin: Negative.    Neurological: Negative.    Psychiatric/Behavioral: Negative.      Objective:     Vital Signs (Most Recent):  Temp: 97.8 °F (36.6 °C) (07/06/22 1205)  Pulse: 80 (07/06/22 1335)  Resp: 18 (07/06/22 1335)  BP: 114/62 (07/06/22 1205)  SpO2: 96 % (07/06/22 1335) Vital Signs (24h Range):  Temp:  [96.1 °F (35.6 °C)-98.1 °F (36.7 °C)] 97.8 °F (36.6 °C)  Pulse:  [71-87] 80  Resp:  [16-20] 18  SpO2:  [94 %-100 %] 96 %  BP: (114-144)/(62-76) 114/62     Weight: 133.8 kg (295 lb)  Height: 6' 2" (188 cm)  Body mass index is 37.88 kg/m².    Physical Exam  Constitutional:       General: He is not in acute distress.     Appearance: He is obese.   Eyes:      General: No scleral icterus.     Extraocular Movements: Extraocular movements intact.      " "Pupils: Pupils are equal, round, and reactive to light.   Cardiovascular:      Rate and Rhythm: Normal rate and regular rhythm.      Heart sounds: No murmur heard.  Pulmonary:      Effort: Pulmonary effort is normal. No respiratory distress.   Abdominal:      General: There is no distension.      Palpations: Abdomen is soft.      Tenderness: There is no abdominal tenderness.   Musculoskeletal:      Right lower leg: No edema.      Left lower leg: No edema.   Skin:     Coloration: Skin is not jaundiced.   Neurological:      General: No focal deficit present.      Mental Status: He is alert.       Laboratory:  Labs within the past 24 hours have been reviewed.    Diagnostic Results:  None    Assessment/Plan:     * Pneumonia due to COVID-19 virus  Per primary      Anemia of chronic renal failure, stage 3b  Hold iv iron and epo for now      Stage 3b chronic kidney disease  BL sCr 2.0  With CKD-BMD and chronic volume issues  Continue home vit d, calcium, semaglutide when able    Chronic immunosuppression with tacrolimus, mycophenolate  At home on tacro 3/3 mycophenolate 750/750  Hold mycophenolate for now  Missed last night tacro  Continue home 3/3      -donor kidney transplant   now with BL sCr 2.0  Creatinine at baseline on admission  Continue immunosuppression as per problem "Chronic immunosuppression with tacrolimus, mycophenolate"            Kye Pruitt Jr., MD  Kidney Transplant  [facility]  "

## 2022-07-06 NOTE — ASSESSMENT & PLAN NOTE
At home on tacro 3/3 mycophenolate 750/750  Hold mycophenolate for now  Missed last night tacro  Continue home 3/3

## 2022-07-06 NOTE — RESPIRATORY THERAPY
RAPID RESPONSE RESPIRATORY CHART CHECK       Chart check completed,   instructed to call [phone] for further concerns or assistance.

## 2022-07-06 NOTE — ASSESSMENT & PLAN NOTE
Estimated Creatinine Clearance: 51.1 mL/min (A) (based on SCr of 1.9 mg/dL (H)).  - Cr baseline at admission at 2  - Renally dosing all medications  - Avoid nephrotoxins  - Will continue to monitor on daily labs

## 2022-07-06 NOTE — SUBJECTIVE & OBJECTIVE
This encounter was provided through telemedicine.  Patient was transferred to the telemedicine service on:  07/06/2022   The patient location is: 603/603 A admitted 7/4/2022  5:09 PM.  Present with the patient at the time of the telemed/virtual assessment: Bedside nurse    Interval History/Overnight Events:   Clinical record since admit reviewed.  Patient able to provide history; wife is at bedside.  He feels his symptoms have improved.  He remains with dyspnea on exertion with ambulation in the room.  He has an improving cough with clear sputum.  Diarrhea which he experienced as outpatient has resolved.  He is currently on 2Lby nasal cannula with SpO2 of 95%.  Denies taste/ smell alterations and has a good appetite.  Glucose 226 during visit but patient had started eating prior to glucose check.  He does not mild edema to his ankles since starting decadron.     Remdesivir #2.     Review of Systems   Constitutional:  Positive for activity change and fatigue. Negative for appetite change and fever.   HENT:  Positive for congestion. Negative for sore throat and trouble swallowing.    Respiratory:  Positive for cough and shortness of breath. Negative for wheezing.    Cardiovascular:  Negative for chest pain.   Gastrointestinal:  Negative for diarrhea and vomiting.      Inpatient Medications:  Scheduled Meds:   albuterol  2 puff Inhalation Q6H    amitriptyline  10 mg Oral QHS    ascorbic acid (vitamin C)  500 mg Oral BID    aspirin  81 mg Oral Daily    atorvastatin  80 mg Oral Daily    [START ON 7/7/2022] azithromycin  500 mg Oral Daily    calcium carbonate  1,000 mg Oral Daily    [START ON 7/7/2022] cefTRIAXone (ROCEPHIN) IVPB  1 g Intravenous Q24H    dexAMETHasone  6 mg Oral Daily    enoxaparin  1 mg/kg Subcutaneous BID    gabapentin  300 mg Oral BID    insulin detemir U-100  10 Units Subcutaneous BID    lisinopriL  40 mg Oral Daily    metoprolol succinate  75 mg Oral Daily    multivitamin  1 tablet Oral Daily     NIFEdipine  90 mg Oral Daily    remdesivir infusion  100 mg Intravenous Daily    tacrolimus  3 mg Oral BID     Continuous Infusions:  PRN Meds:.acetaminophen, calcium carbonate, dextromethorphan-guaiFENesin  mg/5 ml, dextrose 10%, dextrose 10%, glucagon (human recombinant), glucose, glucose, hydrALAZINE, insulin aspart U-100, loperamide, melatonin, ondansetron, oxyCODONE, sodium chloride 0.9%, sodium chloride 0.9%      Objective:     Temp:  [96.1 °F (35.6 °C)-98.1 °F (36.7 °C)] 97.9 °F (36.6 °C)  Pulse:  [71-87] 71  Resp:  [16-20] 16  SpO2:  [94 %-100 %] 95 %  BP: (114-144)/(62-76) 118/67      Intake/Output Summary (Last 24 hours) at 7/6/2022 1632  Last data filed at 7/6/2022 0700  Gross per 24 hour   Intake --   Output 1300 ml   Net -1300 ml        Body mass index is 37.88 kg/m².    Physical Exam  Vitals and nursing note reviewed.   Constitutional:       General: He is not in acute distress.     Appearance: Normal appearance. He is normal weight. He is not ill-appearing.   HENT:      Head: Normocephalic and atraumatic.      Right Ear: Hearing normal.      Left Ear: Hearing normal.      Nose: Nose normal.   Eyes:      General: No scleral icterus.        Right eye: No discharge.         Left eye: No discharge.      Extraocular Movements: Extraocular movements intact.   Cardiovascular:      Rate and Rhythm: Normal rate.   Pulmonary:      Effort: Pulmonary effort is normal. No accessory muscle usage or respiratory distress.      Breath sounds: No wheezing (per RN exam).   Neurological:      General: No focal deficit present.      Mental Status: He is alert and oriented to person, place, and time.      Cranial Nerves: No cranial nerve deficit.      Motor: Weakness (generalized) present.   Psychiatric:         Attention and Perception: Attention normal.         Mood and Affect: Mood normal.         Speech: Speech normal.         Behavior: Behavior is cooperative.        Labs:  Recent Results (from the past 24  hour(s))   Phosphorus    Collection Time: 07/06/22  3:39 AM   Result Value Ref Range    Phosphorus 3.2 2.7 - 4.5 mg/dL   Magnesium    Collection Time: 07/06/22  3:39 AM   Result Value Ref Range    Magnesium 2.1 1.6 - 2.6 mg/dL   Comprehensive Metabolic Panel    Collection Time: 07/06/22  3:39 AM   Result Value Ref Range    Sodium 140 136 - 145 mmol/L    Potassium 4.9 3.5 - 5.1 mmol/L    Chloride 110 95 - 110 mmol/L    CO2 23 23 - 29 mmol/L    Glucose 184 (H) 70 - 110 mg/dL    BUN 30 (H) 8 - 23 mg/dL    Creatinine 1.9 (H) 0.5 - 1.4 mg/dL    Calcium 8.4 (L) 8.7 - 10.5 mg/dL    Total Protein 6.2 6.0 - 8.4 g/dL    Albumin 2.3 (L) 3.5 - 5.2 g/dL    Total Bilirubin 0.2 0.1 - 1.0 mg/dL    Alkaline Phosphatase 85 55 - 135 U/L    AST 43 (H) 10 - 40 U/L    ALT 33 10 - 44 U/L    Anion Gap 7 (L) 8 - 16 mmol/L    eGFR if African American 39.8 (A) >60 mL/min/1.73 m^2    eGFR if non  34.5 (A) >60 mL/min/1.73 m^2   Tacrolimus level    Collection Time: 07/06/22  3:40 AM   Result Value Ref Range    Tacrolimus Lvl <2.0 (L) 5.0 - 15.0 ng/mL   CBC Auto Differential    Collection Time: 07/06/22  3:40 AM   Result Value Ref Range    WBC 4.28 3.90 - 12.70 K/uL    RBC 4.34 (L) 4.60 - 6.20 M/uL    Hemoglobin 11.3 (L) 14.0 - 18.0 g/dL    Hematocrit 37.1 (L) 40.0 - 54.0 %    MCV 86 82 - 98 fL    MCH 26.0 (L) 27.0 - 31.0 pg    MCHC 30.5 (L) 32.0 - 36.0 g/dL    RDW 16.1 (H) 11.5 - 14.5 %    Platelets 291 150 - 450 K/uL    MPV 10.1 9.2 - 12.9 fL    Immature Granulocytes 0.2 0.0 - 0.5 %    Gran # (ANC) 2.7 1.8 - 7.7 K/uL    Immature Grans (Abs) 0.01 0.00 - 0.04 K/uL    Lymph # 1.1 1.0 - 4.8 K/uL    Mono # 0.4 0.3 - 1.0 K/uL    Eos # 0.0 0.0 - 0.5 K/uL    Baso # 0.00 0.00 - 0.20 K/uL    nRBC 0 0 /100 WBC    Gran % 63.6 38.0 - 73.0 %    Lymph % 25.9 18.0 - 48.0 %    Mono % 10.3 4.0 - 15.0 %    Eosinophil % 0.0 0.0 - 8.0 %    Basophil % 0.0 0.0 - 1.9 %    Differential Method Automated    POCT glucose    Collection Time: 07/06/22  7:31  AM   Result Value Ref Range    POCT Glucose 180 (H) 70 - 110 mg/dL   POCT glucose    Collection Time: 07/06/22 11:09 AM   Result Value Ref Range    POCT Glucose 195 (H) 70 - 110 mg/dL        Lab Results   Component Value Date    [id number] Positive (A) 07/04/2022       Recent Labs   Lab 07/04/22 1905 07/05/22 0329 07/06/22  0340   WBC 4.03 2.63* 4.28   LYMPH 34.7  1.4 17.9*  0.5* 25.9  1.1   HGB 11.2* 10.4* 11.3*   HCT 37.0* 34.3* 37.1*    242 291     Recent Labs   Lab 07/04/22 1905 07/05/22 0725 07/06/22  0339    139 140   K 4.3 4.5 4.9    109 110   CO2 21* 21* 23   BUN 17 20 30*   CREATININE 2.1* 2.0* 1.9*    225* 184*   CALCIUM 8.4* 8.2* 8.4*   MG  --  1.9 2.1   PHOS  --  4.2 3.2     Recent Labs   Lab 07/04/22 1905 07/04/22 2253 07/05/22 0725 07/06/22  0339   ALKPHOS 93  --  87 85   ALT 39  --  34 33   AST 54*  --  46* 43*   ALBUMIN 2.6*  --  2.2* 2.3*   PROT 6.7  --  6.2 6.2   BILITOT 0.4  --  0.3 0.2   INR  --  1.0  --   --         Recent Labs     07/04/22 1905 07/04/22 2253 07/05/22  0725   DDIMER  --  1.21*  --    FERRITIN 174  --   --    CRP 57.5*  --   --    *  --   --    *  --   --    TROPONINI 0.032*  --  0.017     --   --        All labs within the last 24 hours were reviewed.     Microbiology:  Microbiology Results (last 7 days)       Procedure Component Value Units Date/Time    Blood culture x two cultures. Draw prior to antibiotics. [[id number]] Collected: 07/04/22 1904    Order Status: Completed Specimen: Blood from Peripheral, Forearm, Right Updated: 07/05/22 2022     Blood Culture, Routine No Growth to date      No Growth to date    Narrative:      Aerobic and anaerobic    Blood culture x two cultures. Draw prior to antibiotics. [[id number]] Collected: 07/04/22 1903    Order Status: Completed Specimen: Blood from Peripheral, Antecubital, Right Updated: 07/05/22 2022     Blood Culture, Routine No Growth to date      No Growth to date     Narrative:      Aerobic and anaerobic    Culture, Respiratory with Gram Stain [[id number]]     Order Status: No result Specimen: Respiratory from Sputum, Induced               Imaging  ECG Results              EKG 12-lead (Final result)  Result time 07/04/22 22:35:27      Final result by Interface, Lab In [facility] (07/04/22 22:35:27)                   Narrative:    Test Reason : R06.02,    Vent. Rate : 074 BPM     Atrial Rate : 074 BPM     P-R Int : 164 ms          QRS Dur : 152 ms      QT Int : 438 ms       P-R-T Axes : 031 039 052 degrees     QTc Int : 486 ms    Normal sinus rhythm  Right bundle branch block  Abnormal ECG  When compared with ECG of 25-JUN-2022 01:37,  No significant change was found  Confirmed by SUSSY MORGAN MD ([id number]) on 7/4/2022 10:35:24 PM    Referred By: MARIUSZ   SELF           Confirmed By:SUSSY MORGAN MD                                    No results found for this or any previous visit.      X-Ray Chest AP Portable  Narrative: EXAMINATION:  XR CHEST AP PORTABLE    CLINICAL HISTORY:  COVID-19;    TECHNIQUE:  Single frontal view of the chest was performed.    COMPARISON:  06/25/2022.    FINDINGS:  Monitoring EKG leads are present.  There are postop changes of median sternotomy.  The sternal wires are intact.    The trachea is unremarkable.  The cardiomediastinal silhouette is enlarged.  There are no pleural effusions.  There is no evidence of a pneumothorax.  There is no evidence of pneumomediastinum.  There are bilateral interstitial opacities.  There are degenerative changes in the osseous structures.  Impression: Bilateral interstitial opacities.  The findings may represent pneumonitis or pulmonary edema.    Electronically signed by: Ke Purvis MD  Date:    07/04/2022  Time:    18:20      All imaging within the last 24 hours was reviewed.       Discharge Planning   CATHRYN: 7/9/2022     Code Status: Full Code   Is the patient medically ready for discharge?: No    Reason for patient still in  hospital (select all that apply): Patient trending condition, Treatment, PT / OT recommendations, and Pending disposition  Discharge Plan A: Home with family

## 2022-07-06 NOTE — PROGRESS NOTES
[facility]  Telemedicine Progress Note    Patient Name: Nigel Albrecht  MRN: [id number]  Patient Class: IP- Inpatient   Admission Date: 7/4/2022  Length of Stay: 2 days  Attending Physician: Jessy Tirado MD  Primary Care Provider: Krystin Zimmerman MD          Subjective:     Principal Problem:Pneumonia due to COVID-19 virus        HPI:  This is a 73yo male with a past medical history of Heart and Kidney transplant on tacrolimus and cellcept, DM, HTN, HLD, and CKD who presents to the ED with chief complaint of shortness of breath and fevers. Patient tested positive for COVID on 06/22/22. Patient is vaccinated x4. Reports progressively worsening symptoms of exertional dyspnea (now SOB at rest), nausea and vomiting, diarrhea, fevers, chills, and generalized malaise. In the ED patient afebrile and hemodynamically stable saturating in low mid 90's at rest on room air. CXR with mulitfocal opacities. COVID positive. CRP and LDH elevated. Patient started on decadron and remdesivir and admitted to the [facility] of medicine for further evaluation and management.      Overview/Hospital Course:  Pt admitted to [facility]. Azithromycin and rocephin added to remdesivir given concerns for superimposed bacterial PNA in this immunocompromised patient. Mild symptomatic improvements noted.        This encounter was provided through telemedicine.  Patient was transferred to the telemedicine service on:  07/06/2022   The patient location is: [address] [place] [address] A admitted 7/4/2022  5:09 PM.  Present with the patient at the time of the telemed/virtual assessment: Bedside nurse    Interval History/Overnight Events:   Clinical record since admit reviewed.  Patient able to provide history; wife is at bedside.  He feels his symptoms have improved.  He remains with dyspnea on exertion with ambulation in the room.  He has an improving cough with clear sputum.  Diarrhea which he experienced as outpatient has resolved.  He  is currently on 2Lby nasal cannula with SpO2 of 95%.  Denies taste/ smell alterations and has a good appetite.  Glucose 226 during visit but patient had started eating prior to glucose check.  He does not mild edema to his ankles since starting decadron.     Remdesivir #2.     Review of Systems   Constitutional:  Positive for activity change and fatigue. Negative for appetite change and fever.   HENT:  Positive for congestion. Negative for sore throat and trouble swallowing.    Respiratory:  Positive for cough and shortness of breath. Negative for wheezing.    Cardiovascular:  Negative for chest pain.   Gastrointestinal:  Negative for diarrhea and vomiting.      Inpatient Medications:  Scheduled Meds:   albuterol  2 puff Inhalation Q6H    amitriptyline  10 mg Oral QHS    ascorbic acid (vitamin C)  500 mg Oral BID    aspirin  81 mg Oral Daily    atorvastatin  80 mg Oral Daily    [START ON 7/7/2022] azithromycin  500 mg Oral Daily    calcium carbonate  1,000 mg Oral Daily    [START ON 7/7/2022] cefTRIAXone (ROCEPHIN) IVPB  1 g Intravenous Q24H    dexAMETHasone  6 mg Oral Daily    enoxaparin  1 mg/kg Subcutaneous BID    gabapentin  300 mg Oral BID    insulin detemir U-100  10 Units Subcutaneous BID    lisinopriL  40 mg Oral Daily    metoprolol succinate  75 mg Oral Daily    multivitamin  1 tablet Oral Daily    NIFEdipine  90 mg Oral Daily    remdesivir infusion  100 mg Intravenous Daily    tacrolimus  3 mg Oral BID     Continuous Infusions:  PRN Meds:.acetaminophen, calcium carbonate, dextromethorphan-guaiFENesin  mg/5 ml, dextrose 10%, dextrose 10%, glucagon (human recombinant), glucose, glucose, hydrALAZINE, insulin aspart U-100, loperamide, melatonin, ondansetron, oxyCODONE, sodium chloride 0.9%, sodium chloride 0.9%      Objective:     Temp:  [96.1 °F (35.6 °C)-98.1 °F (36.7 °C)] 97.9 °F (36.6 °C)  Pulse:  [71-87] 71  Resp:  [16-20] 16  SpO2:  [94 %-100 %] 95 %  BP: (114-144)/(62-76)  118/67      Intake/Output Summary (Last 24 hours) at 7/6/2022 1632  Last data filed at 7/6/2022 0700  Gross per 24 hour   Intake --   Output 1300 ml   Net -1300 ml        Body mass index is 37.88 kg/m².    Physical Exam  Vitals and nursing note reviewed.   Constitutional:       General: He is not in acute distress.     Appearance: Normal appearance. He is normal weight. He is not ill-appearing.   HENT:      Head: Normocephalic and atraumatic.      Right Ear: Hearing normal.      Left Ear: Hearing normal.      Nose: Nose normal.   Eyes:      General: No scleral icterus.        Right eye: No discharge.         Left eye: No discharge.      Extraocular Movements: Extraocular movements intact.   Cardiovascular:      Rate and Rhythm: Normal rate.   Pulmonary:      Effort: Pulmonary effort is normal. No accessory muscle usage or respiratory distress.      Breath sounds: No wheezing (per RN exam).   Neurological:      General: No focal deficit present.      Mental Status: He is alert and oriented to person, place, and time.      Cranial Nerves: No cranial nerve deficit.      Motor: Weakness (generalized) present.   Psychiatric:         Attention and Perception: Attention normal.         Mood and Affect: Mood normal.         Speech: Speech normal.         Behavior: Behavior is cooperative.        Labs:  Recent Results (from the past 24 hour(s))   Phosphorus    Collection Time: 07/06/22  3:39 AM   Result Value Ref Range    Phosphorus 3.2 2.7 - 4.5 mg/dL   Magnesium    Collection Time: 07/06/22  3:39 AM   Result Value Ref Range    Magnesium 2.1 1.6 - 2.6 mg/dL   Comprehensive Metabolic Panel    Collection Time: 07/06/22  3:39 AM   Result Value Ref Range    Sodium 140 136 - 145 mmol/L    Potassium 4.9 3.5 - 5.1 mmol/L    Chloride 110 95 - 110 mmol/L    CO2 23 23 - 29 mmol/L    Glucose 184 (H) 70 - 110 mg/dL    BUN 30 (H) 8 - 23 mg/dL    Creatinine 1.9 (H) 0.5 - 1.4 mg/dL    Calcium 8.4 (L) 8.7 - 10.5 mg/dL    Total Protein 6.2  6.0 - 8.4 g/dL    Albumin 2.3 (L) 3.5 - 5.2 g/dL    Total Bilirubin 0.2 0.1 - 1.0 mg/dL    Alkaline Phosphatase 85 55 - 135 U/L    AST 43 (H) 10 - 40 U/L    ALT 33 10 - 44 U/L    Anion Gap 7 (L) 8 - 16 mmol/L    eGFR if African American 39.8 (A) >60 mL/min/1.73 m^2    eGFR if non  34.5 (A) >60 mL/min/1.73 m^2   Tacrolimus level    Collection Time: 07/06/22  3:40 AM   Result Value Ref Range    Tacrolimus Lvl <2.0 (L) 5.0 - 15.0 ng/mL   CBC Auto Differential    Collection Time: 07/06/22  3:40 AM   Result Value Ref Range    WBC 4.28 3.90 - 12.70 K/uL    RBC 4.34 (L) 4.60 - 6.20 M/uL    Hemoglobin 11.3 (L) 14.0 - 18.0 g/dL    Hematocrit 37.1 (L) 40.0 - 54.0 %    MCV 86 82 - 98 fL    MCH 26.0 (L) 27.0 - 31.0 pg    MCHC 30.5 (L) 32.0 - 36.0 g/dL    RDW 16.1 (H) 11.5 - 14.5 %    Platelets 291 150 - 450 K/uL    MPV 10.1 9.2 - 12.9 fL    Immature Granulocytes 0.2 0.0 - 0.5 %    Gran # (ANC) 2.7 1.8 - 7.7 K/uL    Immature Grans (Abs) 0.01 0.00 - 0.04 K/uL    Lymph # 1.1 1.0 - 4.8 K/uL    Mono # 0.4 0.3 - 1.0 K/uL    Eos # 0.0 0.0 - 0.5 K/uL    Baso # 0.00 0.00 - 0.20 K/uL    nRBC 0 0 /100 WBC    Gran % 63.6 38.0 - 73.0 %    Lymph % 25.9 18.0 - 48.0 %    Mono % 10.3 4.0 - 15.0 %    Eosinophil % 0.0 0.0 - 8.0 %    Basophil % 0.0 0.0 - 1.9 %    Differential Method Automated    POCT glucose    Collection Time: 07/06/22  7:31 AM   Result Value Ref Range    POCT Glucose 180 (H) 70 - 110 mg/dL   POCT glucose    Collection Time: 07/06/22 11:09 AM   Result Value Ref Range    POCT Glucose 195 (H) 70 - 110 mg/dL        Lab Results   Component Value Date    [id number] Positive (A) 07/04/2022       Recent Labs   Lab 07/04/22  1905 07/05/22  0329 07/06/22  0340   WBC 4.03 2.63* 4.28   LYMPH 34.7  1.4 17.9*  0.5* 25.9  1.1   HGB 11.2* 10.4* 11.3*   HCT 37.0* 34.3* 37.1*    242 291     Recent Labs   Lab 07/04/22  1905 07/05/22  0725 07/06/22  0339    139 140   K 4.3 4.5 4.9    109 110   CO2 21* 21* 23    BUN 17 20 30*   CREATININE 2.1* 2.0* 1.9*    225* 184*   CALCIUM 8.4* 8.2* 8.4*   MG  --  1.9 2.1   PHOS  --  4.2 3.2     Recent Labs   Lab 07/04/22 1905 07/04/22 2253 07/05/22 0725 07/06/22  0339   ALKPHOS 93  --  87 85   ALT 39  --  34 33   AST 54*  --  46* 43*   ALBUMIN 2.6*  --  2.2* 2.3*   PROT 6.7  --  6.2 6.2   BILITOT 0.4  --  0.3 0.2   INR  --  1.0  --   --         Recent Labs     07/04/22 1905 07/04/22 2253 07/05/22 0725   DDIMER  --  1.21*  --    FERRITIN 174  --   --    CRP 57.5*  --   --    *  --   --    *  --   --    TROPONINI 0.032*  --  0.017     --   --        All labs within the last 24 hours were reviewed.     Microbiology:  Microbiology Results (last 7 days)       Procedure Component Value Units Date/Time    Blood culture x two cultures. Draw prior to antibiotics. [[id number]] Collected: 07/04/22 1904    Order Status: Completed Specimen: Blood from Peripheral, Forearm, Right Updated: 07/05/22 2022     Blood Culture, Routine No Growth to date      No Growth to date    Narrative:      Aerobic and anaerobic    Blood culture x two cultures. Draw prior to antibiotics. [[id number]] Collected: 07/04/22 1903    Order Status: Completed Specimen: Blood from Peripheral, Antecubital, Right Updated: 07/05/22 2022     Blood Culture, Routine No Growth to date      No Growth to date    Narrative:      Aerobic and anaerobic    Culture, Respiratory with Gram Stain [[id number]]     Order Status: No result Specimen: Respiratory from Sputum, Induced               Imaging  ECG Results              EKG 12-lead (Final result)  Result time 07/04/22 22:35:27      Final result by Interface, Lab In [facility] (07/04/22 22:35:27)                   Narrative:    Test Reason : R06.02,    Vent. Rate : 074 BPM     Atrial Rate : 074 BPM     P-R Int : 164 ms          QRS Dur : 152 ms      QT Int : 438 ms       P-R-T Axes : 031 039 052 degrees     QTc Int : 486 ms    Normal sinus rhythm  Right bundle  branch block  Abnormal ECG  When compared with ECG of 25-JUN-2022 01:37,  No significant change was found  Confirmed by SUSSY MORGAN MD ([id number]) on 7/4/2022 10:35:24 PM    Referred By: MARIUSZ   SELF           Confirmed By:SUSSY MORGAN MD                                    No results found for this or any previous visit.      X-Ray Chest AP Portable  Narrative: EXAMINATION:  XR CHEST AP PORTABLE    CLINICAL HISTORY:  COVID-19;    TECHNIQUE:  Single frontal view of the chest was performed.    COMPARISON:  06/25/2022.    FINDINGS:  Monitoring EKG leads are present.  There are postop changes of median sternotomy.  The sternal wires are intact.    The trachea is unremarkable.  The cardiomediastinal silhouette is enlarged.  There are no pleural effusions.  There is no evidence of a pneumothorax.  There is no evidence of pneumomediastinum.  There are bilateral interstitial opacities.  There are degenerative changes in the osseous structures.  Impression: Bilateral interstitial opacities.  The findings may represent pneumonitis or pulmonary edema.    Electronically signed by: Ke Purvis MD  Date:    07/04/2022  Time:    18:20      All imaging within the last 24 hours was reviewed.       Discharge Planning   CATHRYN: 7/9/2022     Code Status: Full Code   Is the patient medically ready for discharge?: No    Reason for patient still in hospital (select all that apply): Patient trending condition, Treatment, PT / OT recommendations, and Pending disposition  Discharge Plan A: Home with family            Assessment/Plan:      * Pneumonia due to COVID-19 virus  - Interval history and physical exam findings as described above  - Vaccination status: vaccinated x4  - COVID test positive on 6/25/22  - CXR with bilateral interstitial opacities  - Elevated inflammatory markers as documented  - enoxaparin 1 mg/kg BID; d-dimer elevated  - Afebrile since admission, no leukocytosis   - Currently satting well on 2L NC  - Continue coverage  with remdesivir and dexamethasone  - Azithromycin and ceftriaxone given concerns for superimposed bacterial PNA   - PRN tylenol for fever  - PRN duonebs and antitussives provided  - Continuous pulse oximetry at bedside  - Incentive spirometry encouraged  - Appropriate isolation and airborne precautions in place  - Continuing to monitor  - IS and flutter valve ordered    Recurrent major depressive disorder, in partial remission  - Currently asymptomatic  - Continue home elavil regimen    Anemia of chronic renal failure, stage 3b  - H/H stable near baseline  - Will continue to monitor on daily labs    Immunocompromised patient  - Kidney and heart transplant services following  - Holding cellcept    Class 2 severe obesity due to excess calories with serious comorbidity and body mass index (BMI) of 37.0 to 37.9 in adult  - Body mass index is 37.88 kg/m².  - Needs dietary and lifestyle modifications in relation to health  - Consider dietary/nutrition consult outpatient    Type 2 diabetes mellitus with stage 3b chronic kidney disease, with long-term current use of insulin  - Working to optimize BG control  - Levemir 10u BID  - SSI provided for corrective dosing  - DXTs as ordered  - Hypoglycemic protocol in effect  - Continue home gabapentin regimen   - Diabetic diet provided    Stage 3b chronic kidney disease  Estimated Creatinine Clearance: 51.1 mL/min (A) (based on SCr of 1.9 mg/dL (H)).  - Cr baseline at admission at 2  - Renally dosing all medications  - Avoid nephrotoxins  - Will continue to monitor on daily labs    Mixed diabetic hyperlipidemia associated with type 2 diabetes mellitus  - Continue home statin regimen    Hypertension associated with stage 3b chronic kidney disease due to type 2 diabetes mellitus  - BP currently not well-controlled  - Continue home cmeds of lisinopril, nifedipine, metoprolol  - Amlodipine changed to nifedipine XR  - PRN hydralazine for SBP>180  - Will continue to monitor and further  titrate antihypertensives as clinically indicated     Chronic immunosuppression with tacrolimus, mycophenolate  Level followed by KTM.  Continue to monitor.  HTS consulted.      Heart transplanted  - Heart Transplant medicine following, appreciate recs  - Continue home prograf  - Holding home cellcept for active infection  - Daily prograf levels  - continue metoprolol, lisinopril  -was prescribed 10 mg torsemide but only takes 5 mg; resume if cleared by KTM for ankle edema    -donor kidney transplant  - KTM medicine, following appreciate recs  - Continue home prograf  - Holding home cellcept for active infection  - Daily prograf levels      VTE Risk Mitigation (From admission, onward)         Ordered     enoxaparin injection 135 mg  2 times daily         22     IP VTE HIGH RISK PATIENT  Once         22     Place sequential compression device  Until discontinued         22                High Risk Conditions:    Patient has a condition that poses threat to life and bodily function: COVID pneumonia in immunocompromised tranplant patient with DM and obesity (Body mass index is 37.88 kg/m².)      I have assessed these findings virtually using a telemed platform and with assistance of the bedside nurse.        The attending portion of this evaluation, treatment, and documentation was performed per Jessy Tirado MD via Telemedicine AudioVisual using the secure [employer] software platform with 2 way audio/video. The provider was located off-site and the patient is located in the hospital. The aforementioned video software was utilized to document the relevant history and physical exam    Jessy Tirado MD  Department of Hospital Medicine   [facility]

## 2022-07-06 NOTE — PLAN OF CARE
Problem: Adult Inpatient Plan of Care  Goal: Plan of Care Review  7/6/2022 0540 by Shana Gray LPN  Outcome: Ongoing, Progressing  7/6/2022 0540 by Shana Gray LPN  Outcome: Ongoing, Progressing  Goal: Patient-Specific Goal (Individualized)  7/6/2022 0540 by Shana Gray LPN  Outcome: Ongoing, Progressing  7/6/2022 0540 by Shana Gray LPN  Outcome: Ongoing, Progressing  Goal: Absence of Hospital-Acquired Illness or Injury  7/6/2022 0540 by Shaan rGay LPN  Outcome: Ongoing, Progressing  7/6/2022 0540 by Shana Gray LPN  Outcome: Ongoing, Progressing  Goal: Optimal Comfort and Wellbeing  7/6/2022 0540 by Shana Gray LPN  Outcome: Ongoing, Progressing  7/6/2022 0540 by Shana Gray LPN  Outcome: Ongoing, Progressing     Problem: Infection  Goal: Absence of Infection Signs and Symptoms  7/6/2022 0540 by Shana Gray LPN  Outcome: Ongoing, Progressing  7/6/2022 0540 by Shana Gray LPN  Outcome: Ongoing, Progressing     Problem: Diabetes Comorbidity  Goal: Blood Glucose Level Within Targeted Range  7/6/2022 0540 by Shana Gray LPN  Outcome: Ongoing, Progressing  7/6/2022 0540 by Shana Gray LPN  Outcome: Ongoing, Progressing     Problem: Fall Injury Risk  Goal: Absence of Fall and Fall-Related Injury  Outcome: Ongoing, Progressing

## 2022-07-06 NOTE — CONSULTS
Thank you for your consult to [facility]. We have reviewed the patient chart. This patient does meet criteria for [facility] service at this time. Will assume care on 07/06/22 at 7AM.    Jessy Tirado MD

## 2022-07-06 NOTE — ASSESSMENT & PLAN NOTE
BL sCr 2.0  With CKD-BMD and chronic volume issues  Continue home vit d, calcium, semaglutide when able

## 2022-07-07 PROBLEM — J96.01 ACUTE HYPOXEMIC RESPIRATORY FAILURE: Status: ACTIVE | Noted: 2022-07-07

## 2022-07-07 PROBLEM — B19.20 HEPATITIS C TEST POSITIVE: Status: ACTIVE | Noted: 2022-07-07

## 2022-07-07 LAB
ALBUMIN SERPL BCP-MCNC: 2.4 G/DL (ref 3.5–5.2)
ALP SERPL-CCNC: 83 U/L (ref 55–135)
ALT SERPL W/O P-5'-P-CCNC: 34 U/L (ref 10–44)
ANION GAP SERPL CALC-SCNC: 6 MMOL/L (ref 8–16)
AST SERPL-CCNC: 38 U/L (ref 10–40)
BASOPHILS # BLD AUTO: 0 K/UL (ref 0–0.2)
BASOPHILS NFR BLD: 0 % (ref 0–1.9)
BILIRUB SERPL-MCNC: 0.3 MG/DL (ref 0.1–1)
BUN SERPL-MCNC: 37 MG/DL (ref 8–23)
CALCIUM SERPL-MCNC: 8.5 MG/DL (ref 8.7–10.5)
CHLORIDE SERPL-SCNC: 110 MMOL/L (ref 95–110)
CO2 SERPL-SCNC: 23 MMOL/L (ref 23–29)
CREAT SERPL-MCNC: 1.8 MG/DL (ref 0.5–1.4)
DIFFERENTIAL METHOD: ABNORMAL
EOSINOPHIL # BLD AUTO: 0 K/UL (ref 0–0.5)
EOSINOPHIL NFR BLD: 0 % (ref 0–8)
ERYTHROCYTE [DISTWIDTH] IN BLOOD BY AUTOMATED COUNT: 16.3 % (ref 11.5–14.5)
EST. GFR  (AFRICAN AMERICAN): 42.5 ML/MIN/1.73 M^2
EST. GFR  (NON AFRICAN AMERICAN): 36.8 ML/MIN/1.73 M^2
GLUCOSE SERPL-MCNC: 167 MG/DL (ref 70–110)
HCT VFR BLD AUTO: 36.2 % (ref 40–54)
HGB BLD-MCNC: 11.5 G/DL (ref 14–18)
IMM GRANULOCYTES # BLD AUTO: 0.01 K/UL (ref 0–0.04)
IMM GRANULOCYTES NFR BLD AUTO: 0.2 % (ref 0–0.5)
LYMPHOCYTES # BLD AUTO: 1.1 K/UL (ref 1–4.8)
LYMPHOCYTES NFR BLD: 17.9 % (ref 18–48)
MAGNESIUM SERPL-MCNC: 2.3 MG/DL (ref 1.6–2.6)
MCH RBC QN AUTO: 26.7 PG (ref 27–31)
MCHC RBC AUTO-ENTMCNC: 31.8 G/DL (ref 32–36)
MCV RBC AUTO: 84 FL (ref 82–98)
MONOCYTES # BLD AUTO: 0.4 K/UL (ref 0.3–1)
MONOCYTES NFR BLD: 7.1 % (ref 4–15)
NEUTROPHILS # BLD AUTO: 4.4 K/UL (ref 1.8–7.7)
NEUTROPHILS NFR BLD: 74.8 % (ref 38–73)
NRBC BLD-RTO: 0 /100 WBC
PHOSPHATE SERPL-MCNC: 3.7 MG/DL (ref 2.7–4.5)
PLATELET # BLD AUTO: 305 K/UL (ref 150–450)
PMV BLD AUTO: 9.5 FL (ref 9.2–12.9)
POCT GLUCOSE: 175 MG/DL (ref 70–110)
POCT GLUCOSE: 219 MG/DL (ref 70–110)
POTASSIUM SERPL-SCNC: 4.5 MMOL/L (ref 3.5–5.1)
PROT SERPL-MCNC: 6.2 G/DL (ref 6–8.4)
RBC # BLD AUTO: 4.3 M/UL (ref 4.6–6.2)
SODIUM SERPL-SCNC: 139 MMOL/L (ref 136–145)
TACROLIMUS BLD-MCNC: 2.3 NG/ML (ref 5–15)
WBC # BLD AUTO: 5.93 K/UL (ref 3.9–12.7)

## 2022-07-07 PROCEDURE — 27000207 HC ISOLATION

## 2022-07-07 PROCEDURE — 63600175 PHARM REV CODE 636 W HCPCS: Performed by: STUDENT IN AN ORGANIZED HEALTH CARE EDUCATION/TRAINING PROGRAM

## 2022-07-07 PROCEDURE — 36415 COLL VENOUS BLD VENIPUNCTURE: CPT | Performed by: INTERNAL MEDICINE

## 2022-07-07 PROCEDURE — 84100 ASSAY OF PHOSPHORUS: CPT | Performed by: STUDENT IN AN ORGANIZED HEALTH CARE EDUCATION/TRAINING PROGRAM

## 2022-07-07 PROCEDURE — 99232 SBSQ HOSP IP/OBS MODERATE 35: CPT | Mod: CR,GC,, | Performed by: INTERNAL MEDICINE

## 2022-07-07 PROCEDURE — 94640 AIRWAY INHALATION TREATMENT: CPT

## 2022-07-07 PROCEDURE — 94761 N-INVAS EAR/PLS OXIMETRY MLT: CPT

## 2022-07-07 PROCEDURE — 11000001 HC ACUTE MED/SURG PRIVATE ROOM

## 2022-07-07 PROCEDURE — 27000221 HC OXYGEN, UP TO 24 HOURS

## 2022-07-07 PROCEDURE — 63600175 PHARM REV CODE 636 W HCPCS: Performed by: INTERNAL MEDICINE

## 2022-07-07 PROCEDURE — 25000003 PHARM REV CODE 250: Performed by: FAMILY MEDICINE

## 2022-07-07 PROCEDURE — 80197 ASSAY OF TACROLIMUS: CPT | Performed by: FAMILY MEDICINE

## 2022-07-07 PROCEDURE — 99233 SBSQ HOSP IP/OBS HIGH 50: CPT | Mod: 95,CR,, | Performed by: INTERNAL MEDICINE

## 2022-07-07 PROCEDURE — 99233 PR SUBSEQUENT HOSPITAL CARE,LEVL III: ICD-10-PCS | Mod: 95,CR,, | Performed by: INTERNAL MEDICINE

## 2022-07-07 PROCEDURE — 99232 PR SUBSEQUENT HOSPITAL CARE,LEVL II: ICD-10-PCS | Mod: CR,GC,, | Performed by: INTERNAL MEDICINE

## 2022-07-07 PROCEDURE — 25000003 PHARM REV CODE 250: Performed by: STUDENT IN AN ORGANIZED HEALTH CARE EDUCATION/TRAINING PROGRAM

## 2022-07-07 PROCEDURE — 99900035 HC TECH TIME PER 15 MIN (STAT)

## 2022-07-07 PROCEDURE — 83735 ASSAY OF MAGNESIUM: CPT | Performed by: STUDENT IN AN ORGANIZED HEALTH CARE EDUCATION/TRAINING PROGRAM

## 2022-07-07 PROCEDURE — 80053 COMPREHEN METABOLIC PANEL: CPT | Performed by: STUDENT IN AN ORGANIZED HEALTH CARE EDUCATION/TRAINING PROGRAM

## 2022-07-07 PROCEDURE — 85025 COMPLETE CBC W/AUTO DIFF WBC: CPT | Performed by: STUDENT IN AN ORGANIZED HEALTH CARE EDUCATION/TRAINING PROGRAM

## 2022-07-07 PROCEDURE — 87522 HEPATITIS C REVRS TRNSCRPJ: CPT | Performed by: INTERNAL MEDICINE

## 2022-07-07 PROCEDURE — 63700000 PHARM REV CODE 250 ALT 637 W/O HCPCS: Performed by: INTERNAL MEDICINE

## 2022-07-07 PROCEDURE — 63600175 PHARM REV CODE 636 W HCPCS: Performed by: FAMILY MEDICINE

## 2022-07-07 RX ORDER — ALBUTEROL SULFATE 90 UG/1
2 AEROSOL, METERED RESPIRATORY (INHALATION) EVERY 6 HOURS
Status: DISCONTINUED | OUTPATIENT
Start: 2022-07-08 | End: 2022-07-08

## 2022-07-07 RX ORDER — AZITHROMYCIN 250 MG/1
500 TABLET, FILM COATED ORAL DAILY
Status: COMPLETED | OUTPATIENT
Start: 2022-07-07 | End: 2022-07-07

## 2022-07-07 RX ORDER — GUAIFENESIN/DEXTROMETHORPHAN 100-10MG/5
10 SYRUP ORAL EVERY 4 HOURS PRN
COMMUNITY
Start: 2022-07-07 | End: 2022-07-18

## 2022-07-07 RX ORDER — TORSEMIDE 5 MG/1
5 TABLET ORAL DAILY
Start: 2022-07-07 | End: 2022-07-08 | Stop reason: SDUPTHER

## 2022-07-07 RX ORDER — TACROLIMUS 5 MG/1
5 CAPSULE ORAL 2 TIMES DAILY
Status: DISCONTINUED | OUTPATIENT
Start: 2022-07-07 | End: 2022-07-08 | Stop reason: HOSPADM

## 2022-07-07 RX ORDER — NIFEDIPINE 90 MG/1
90 TABLET, EXTENDED RELEASE ORAL DAILY
Qty: 30 TABLET | Refills: 11 | Status: ON HOLD | OUTPATIENT
Start: 2022-07-08 | End: 2022-07-27 | Stop reason: HOSPADM

## 2022-07-07 RX ADMIN — ALBUTEROL SULFATE 2 PUFF: 108 INHALANT RESPIRATORY (INHALATION) at 12:07

## 2022-07-07 RX ADMIN — ASPIRIN 81 MG: 81 TABLET, COATED ORAL at 09:07

## 2022-07-07 RX ADMIN — REMDESIVIR 100 MG: 100 INJECTION, POWDER, LYOPHILIZED, FOR SOLUTION INTRAVENOUS at 12:07

## 2022-07-07 RX ADMIN — LISINOPRIL 40 MG: 20 TABLET ORAL at 09:07

## 2022-07-07 RX ADMIN — ENOXAPARIN SODIUM 135 MG: 150 INJECTION SUBCUTANEOUS at 09:07

## 2022-07-07 RX ADMIN — GABAPENTIN 300 MG: 300 CAPSULE ORAL at 09:07

## 2022-07-07 RX ADMIN — INSULIN DETEMIR 10 UNITS: 100 INJECTION, SOLUTION SUBCUTANEOUS at 09:07

## 2022-07-07 RX ADMIN — INSULIN ASPART 2 UNITS: 100 INJECTION, SOLUTION INTRAVENOUS; SUBCUTANEOUS at 09:07

## 2022-07-07 RX ADMIN — ATORVASTATIN CALCIUM 80 MG: 20 TABLET, FILM COATED ORAL at 09:07

## 2022-07-07 RX ADMIN — TACROLIMUS 5 MG: 5 CAPSULE ORAL at 05:07

## 2022-07-07 RX ADMIN — NIFEDIPINE 90 MG: 60 TABLET, FILM COATED, EXTENDED RELEASE ORAL at 09:07

## 2022-07-07 RX ADMIN — CALCIUM CARBONATE (ANTACID) CHEW TAB 500 MG 1000 MG: 500 CHEW TAB at 09:07

## 2022-07-07 RX ADMIN — TACROLIMUS 3 MG: 1 CAPSULE ORAL at 11:07

## 2022-07-07 RX ADMIN — AMITRIPTYLINE HYDROCHLORIDE 10 MG: 10 TABLET, FILM COATED ORAL at 09:07

## 2022-07-07 RX ADMIN — DEXAMETHASONE 6 MG: 4 TABLET ORAL at 09:07

## 2022-07-07 RX ADMIN — Medication 500 MG: at 09:07

## 2022-07-07 RX ADMIN — INSULIN DETEMIR 10 UNITS: 100 INJECTION, SOLUTION SUBCUTANEOUS at 11:07

## 2022-07-07 RX ADMIN — METOPROLOL SUCCINATE 75 MG: 50 TABLET, EXTENDED RELEASE ORAL at 09:07

## 2022-07-07 RX ADMIN — CEFTRIAXONE 1 G: 1 INJECTION, SOLUTION INTRAVENOUS at 05:07

## 2022-07-07 RX ADMIN — THERA TABS 1 TABLET: TAB at 09:07

## 2022-07-07 RX ADMIN — INSULIN ASPART 2 UNITS: 100 INJECTION, SOLUTION INTRAVENOUS; SUBCUTANEOUS at 12:07

## 2022-07-07 RX ADMIN — INSULIN ASPART 4 UNITS: 100 INJECTION, SOLUTION INTRAVENOUS; SUBCUTANEOUS at 05:07

## 2022-07-07 RX ADMIN — ALBUTEROL SULFATE 2 PUFF: 108 INHALANT RESPIRATORY (INHALATION) at 09:07

## 2022-07-07 RX ADMIN — AZITHROMYCIN MONOHYDRATE 500 MG: 250 TABLET ORAL at 09:07

## 2022-07-07 NOTE — SUBJECTIVE & OBJECTIVE
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 Has been able to drink fluids even though they caused him to have more diarrhea. No change in urination and no edema or missed medications.  In the ED patient afebrile and hemodynamically stable saturating in low mid 90's at rest on room air. CXR with mulitfocal opacities. COVID positive. CRP and LDH elevated. Patient started on decadron and remdesivir and admitted to the [facility] of medicine for further evaluation and management.  KTM consulted for aid with transplanted kidney    Mr. Albrecht is a 72 y.o. year old male who is status post Kidney Transplant - 5/24/2002  (#1).    His maintenance immunosuppression consists of:   Immunosuppressants (From admission, onward)                Start     Stop Route Frequency Ordered    07/07/22 1800  tacrolimus capsule 5 mg         -- Oral 2 times daily 07/07/22 1152            Hospital Course:  No notes on file    Interval History:  Significant improvement in symptoms. No new complaints and sitting up on couch eating breakfast when seen with wife by his side.    Past Medical, Surgical, Family, and Social History:   Unchanged from H&P.    Scheduled Meds:   albuterol  2 puff Inhalation Q6H    amitriptyline  10 mg Oral QHS    ascorbic acid (vitamin C)  500 mg Oral BID    aspirin  81 mg Oral Daily    atorvastatin  80 mg Oral Daily    calcium carbonate  1,000 mg Oral Daily    cefTRIAXone (ROCEPHIN) IVPB  1 g Intravenous Q24H    dexAMETHasone  6 mg Oral Daily    enoxaparin  1 mg/kg Subcutaneous BID    gabapentin  300 mg Oral BID    insulin detemir U-100  10 Units Subcutaneous BID    lisinopriL  40 mg Oral Daily    metoprolol succinate  75 mg Oral Daily    multivitamin  1 tablet Oral Daily    NIFEdipine  90 mg Oral Daily    remdesivir infusion  100 mg Intravenous Daily    tacrolimus  5 mg  "Oral BID     Continuous Infusions:  PRN Meds:acetaminophen, calcium carbonate, dextromethorphan-guaiFENesin  mg/5 ml, dextrose 10%, dextrose 10%, glucagon (human recombinant), glucose, glucose, hydrALAZINE, insulin aspart U-100, loperamide, melatonin, ondansetron, oxyCODONE, sodium chloride 0.9%, sodium chloride 0.9%    Intake/Output - Last 3 Shifts         07/05 0700 07/06 0659 07/06 0700 07/07 0659 07/07 0700 07/08 0659    IV Piggyback       Total Intake(mL/kg)       Urine (mL/kg/hr) 800 (0.2) 500 (0.2)     Total Output 800 500     Net -800 -500                     Review of Systems   Constitutional: Negative.    HENT: Negative.     Eyes: Negative.    Respiratory: Negative.     Cardiovascular: Negative.    Gastrointestinal: Negative.    Endocrine: Negative.    Genitourinary: Negative.    Musculoskeletal: Negative.    Skin: Negative.    Neurological: Negative.    Psychiatric/Behavioral: Negative.      Objective:     Vital Signs (Most Recent):  Temp: 97.3 °F (36.3 °C) (07/07/22 1158)  Pulse: 70 (07/07/22 1158)  Resp: 16 (07/07/22 1158)  BP: 114/65 (07/07/22 1158)  SpO2: 97 % (07/07/22 1158) Vital Signs (24h Range):  Temp:  [96.2 °F (35.7 °C)-97.9 °F (36.6 °C)] 97.3 °F (36.3 °C)  Pulse:  [65-77] 70  Resp:  [16-20] 16  SpO2:  [94 %-97 %] 97 %  BP: (114-133)/(64-73) 114/65     Weight: 133.8 kg (295 lb)  Height: 6' 2" (188 cm)  Body mass index is 37.88 kg/m².    Physical Exam  Constitutional:       General: He is not in acute distress.     Appearance: He is obese.   Eyes:      General: No scleral icterus.     Extraocular Movements: Extraocular movements intact.      Pupils: Pupils are equal, round, and reactive to light.   Cardiovascular:      Rate and Rhythm: Normal rate and regular rhythm.      Heart sounds: No murmur heard.  Pulmonary:      Effort: Pulmonary effort is normal. No respiratory distress.   Abdominal:      General: There is no distension.      Palpations: Abdomen is soft.      Tenderness: There " is no abdominal tenderness.   Musculoskeletal:      Right lower leg: No edema.      Left lower leg: No edema.   Skin:     Coloration: Skin is not jaundiced.   Neurological:      General: No focal deficit present.      Mental Status: He is alert.       Laboratory:  Labs within the past 24 hours have been reviewed.    Diagnostic Results:  None

## 2022-07-07 NOTE — ASSESSMENT & PLAN NOTE
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

## 2022-07-07 NOTE — ASSESSMENT & PLAN NOTE
BL sCr 2.0  With CKD-BMD and chronic volume issues  Continue home vit d, calcium, semaglutide when able  Hold torsemide for now

## 2022-07-07 NOTE — SUBJECTIVE & OBJECTIVE
Telemedicine  This service was provided by Virtual Visit.    Patient was transferred to [facility] on:  07/06/2022     Chief Complaint   Patient presents with    Covid Positive     Kidney and heart xplant 2002,    Diarrhea    Shortness of Breath     The patient location is: 603/603 A   Admitted 7/4/2022  5:09 PM  Present with the patient at the time of the telemed/virtual assessment: N/A    Interval History / Events Overnight:   The patient is able to provide adequate history. Additional history was obtained from past medical records. No significant events reported by Nursing.  Patient complains of dyspnea. Symptoms have improved since yesterday. Associated symptoms include: cough and fatigue. Symptoms are decreasing in both severity and frequency.     Lab test(s) reviewed: lymphopenia improving    Review of Systems   Constitutional:  Negative for fever.   Gastrointestinal:  Negative for vomiting.     Objective:     Vital Signs (Most Recent):  Temp: 97.3 °F (36.3 °C) (07/07/22 1158)  Pulse: 70 (07/07/22 1158)  Resp: 16 (07/07/22 1158)  BP: 114/65 (07/07/22 1158)  SpO2: 97 % (07/07/22 1158) Vital Signs (24h Range):  Temp:  [96.2 °F (35.7 °C)-97.4 °F (36.3 °C)] 97.3 °F (36.3 °C)  Pulse:  [65-77] 70  Resp:  [16-20] 16  SpO2:  [94 %-97 %] 97 %  BP: (114-133)/(64-73) 114/65     Weight: 133.8 kg (295 lb)  Body mass index is 37.88 kg/m².  No intake or output data in the 24 hours ending 07/07/22 1627   Physical Exam  Constitutional:       General: He is not in acute distress.     Appearance: Normal appearance.   Eyes:      General: Lids are normal. No scleral icterus.        Right eye: No discharge.         Left eye: No discharge.      Conjunctiva/sclera: Conjunctivae normal.   Neck:      Trachea: Phonation normal.   Cardiovascular:      Rate and Rhythm: Normal rate.      Comments: Monitor / Vital signs reviewed at time of visit  Pulmonary:      Effort: Pulmonary effort is normal. No tachypnea, accessory  muscle usage or respiratory distress.   Abdominal:      General: There is no distension.   Neurological:      Mental Status: He is alert. He is not disoriented.   Psychiatric:         Attention and Perception: Attention normal.         Mood and Affect: Affect normal.         Behavior: Behavior is cooperative.       Significant Labs:   Recent Labs   Lab 02/09/22  0940 06/20/22  0807   HGBA1C 6.5* 5.9*     Recent Labs   Lab 07/06/22 2035 07/07/22  1159 07/07/22  1939   POCTGLUCOSE 218* 175* 219*     Recent Labs   Lab 07/05/22  0329 07/06/22  0340 07/07/22  0520   WBC 2.63* 4.28 5.93   HGB 10.4* 11.3* 11.5*   HCT 34.3* 37.1* 36.2*    291 305     Recent Labs   Lab 07/05/22 0329 07/06/22  0340 07/07/22  0520   GRAN 78.7*  2.1 63.6  2.7 74.8*  4.4   LYMPH 17.9*  0.5* 25.9  1.1 17.9*  1.1   MONO 3.0*  0.1* 10.3  0.4 7.1  0.4   EOS 0.0 0.0 0.0     Recent Labs   Lab 07/05/22 0725 07/06/22  0339 07/07/22  0520    140 139   K 4.5 4.9 4.5    110 110   CO2 21* 23 23   BUN 20 30* 37*   CREATININE 2.0* 1.9* 1.8*   * 184* 167*   CALCIUM 8.2* 8.4* 8.5*   ALBUMIN 2.2* 2.3* 2.4*   MG 1.9 2.1 2.3   PHOS 4.2 3.2 3.7     Recent Labs   Lab 07/04/22 2253 07/05/22 0725 07/06/22  0339 07/07/22  0520   ALKPHOS  --  87 85 83   ALT  --  34 33 34   AST  --  46* 43* 38   PROT  --  6.2 6.2 6.2   BILITOT  --  0.3 0.2 0.3   INR 1.0  --   --   --      Procalcitonin (ng/mL)   Date Value   07/04/2022 0.13   06/25/2022 0.24     Lactate (Lactic Acid) (mmol/L)   Date Value   07/04/2022 1.4   06/25/2022 1.3     BNP (pg/mL)   Date Value   07/04/2022 140 (H)   06/20/2022 269 (H)   06/14/2022 160 (H)   05/04/2022 217 (H)   01/10/2022 144 (H)     CRP (mg/L)   Date Value   07/04/2022 57.5 (H)   06/25/2022 22.3 (H)   06/01/2009 10.9 (H)   05/27/2008 15.3 (H)     Sed Rate (mm/Hr)   Date Value   07/04/2022 105 (H)     D-Dimer (mg/L FEU)   Date Value   07/04/2022 1.21 (H)     Ferritin (ng/mL)   Date Value   07/04/2022 174    06/25/2022 38     LD (U/L)   Date Value   07/04/2022 305 (H)   06/25/2022 216     Troponin I (ng/mL)   Date Value   07/05/2022 0.017   07/04/2022 0.032 (H)   06/25/2022 0.031 (H)     CPK (U/L)   Date Value   07/04/2022 142   06/25/2022 120     SARS-CoV2 (COVID-19) Qualitative PCR (no units)   Date Value   10/03/2020 Not Detected     POC Rapid COVID (no units)   Date Value   07/04/2022 Positive (A)   06/25/2022 Positive (A)   11/23/2021 Negative     ECG Results              EKG 12-lead (Final result)  Result time 07/04/22 22:35:27      Final result by Interface, Lab In [facility] (07/04/22 22:35:27)                   Narrative:    Test Reason : R06.02,    Vent. Rate : 074 BPM     Atrial Rate : 074 BPM     P-R Int : 164 ms          QRS Dur : 152 ms      QT Int : 438 ms       P-R-T Axes : 031 039 052 degrees     QTc Int : 486 ms    Normal sinus rhythm  Right bundle branch block  Abnormal ECG  When compared with ECG of 25-JUN-2022 01:37,  No significant change was found  Confirmed by SUSSY MORGAN MD ([id number]) on 7/4/2022 10:35:24 PM    Referred By: [email or web address]   SELF           Confirmed By:SUSSY MORGAN MD                                  X-Ray Chest AP Portable  Narrative: EXAMINATION:  XR CHEST AP PORTABLE    CLINICAL HISTORY:  COVID-19;    TECHNIQUE:  Single frontal view of the chest was performed.    COMPARISON:  06/25/2022.    FINDINGS:  Monitoring EKG leads are present.  There are postop changes of median sternotomy.  The sternal wires are intact.    The trachea is unremarkable.  The cardiomediastinal silhouette is enlarged.  There are no pleural effusions.  There is no evidence of a pneumothorax.  There is no evidence of pneumomediastinum.  There are bilateral interstitial opacities.  There are degenerative changes in the osseous structures.  Impression: Bilateral interstitial opacities.  The findings may represent pneumonitis or pulmonary edema.    Electronically signed by: Ke Purvis  MD  Date:    07/04/2022  Time:    18:20      Labs and Imaging within the last 24 hours listed above were reviewed.       Diet: Diet diabetic [facility]; 2000 Calorie; Low Sodium,2gm  Significant LDAs:   IV Access Type: Peripheral  Urinary Catheter Indication if present: Patient Does Not Have Urinary Catheter  Other Lines/Tubes/Drains:    HIGH RISK CONDITION(S):   Patient has a condition that poses threat to life and bodily function: Respiratory Distress     Goals of Care:    Previous admission:  6/25/22  Likely prognosis:  Fair  Code Status: Full Code  Comfort Only: No  Hospice: No  Goals at discharge: remain at home, with physician follow-up    Discharge Planning   CATHRYN: 7/8/2022     Code Status: Full Code   Is the patient medically ready for discharge?: No    Reason for patient still in hospital (select all that apply): Patient trending condition and Treatment  Discharge Plan A: Home with family

## 2022-07-07 NOTE — PROGRESS NOTES
[facility] - Med Surg  Kidney Transplant  Progress Note      Reason for Follow-up: Reassessment of Kidney Transplant - 5/24/2002  (#1) recipient and management of immunosuppression.    ORGAN:     Donor Type:     PHS Increased Risk: yes       Subjective:   History of Present Illness:  72 year old male with Kidney Heart Tx 2002 here for shortness of breath, chills, fevers, diarrhea, nausea that started 6/24 and progressively worsened. Has been able to drink fluids even though they caused him to have more diarrhea. No change in urination and no edema or missed medications.  In the ED patient afebrile and hemodynamically stable saturating in low mid 90's at rest on room air. CXR with mulitfocal opacities. COVID positive. CRP and LDH elevated. Patient started on decadron and remdesivir and admitted to the care of medicine for further evaluation and management.  KTM consulted for aid with transplanted kidney    Mr. Albrecht is a 72 y.o. year old male who is status post Kidney Transplant - 5/24/2002  (#1).    His maintenance immunosuppression consists of:   Immunosuppressants (From admission, onward)                Start     Stop Route Frequency Ordered    07/07/22 1800  tacrolimus capsule 5 mg         -- Oral 2 times daily 07/07/22 1152            Hospital Course:  No notes on file    Interval History:  Significant improvement in symptoms. No new complaints and sitting up on couch eating breakfast when seen with wife by his side.    Past Medical, Surgical, Family, and Social History:   Unchanged from H&P.    Scheduled Meds:   albuterol  2 puff Inhalation Q6H    amitriptyline  10 mg Oral QHS    ascorbic acid (vitamin C)  500 mg Oral BID    aspirin  81 mg Oral Daily    atorvastatin  80 mg Oral Daily    calcium carbonate  1,000 mg Oral Daily    cefTRIAXone (ROCEPHIN) IVPB  1 g Intravenous Q24H    dexAMETHasone  6 mg Oral Daily    enoxaparin  1 mg/kg Subcutaneous BID    gabapentin  300 mg Oral BID    insulin  "detemir U-100  10 Units Subcutaneous BID    lisinopriL  40 mg Oral Daily    metoprolol succinate  75 mg Oral Daily    multivitamin  1 tablet Oral Daily    NIFEdipine  90 mg Oral Daily    remdesivir infusion  100 mg Intravenous Daily    tacrolimus  5 mg Oral BID     Continuous Infusions:  PRN Meds:acetaminophen, calcium carbonate, dextromethorphan-guaiFENesin  mg/5 ml, dextrose 10%, dextrose 10%, glucagon (human recombinant), glucose, glucose, hydrALAZINE, insulin aspart U-100, loperamide, melatonin, ondansetron, oxyCODONE, sodium chloride 0.9%, sodium chloride 0.9%    Intake/Output - Last 3 Shifts         07/05 0700 07/06 0659 07/06 0700 07/07 0659 07/07 0700 07/08 0659    IV Piggyback       Total Intake(mL/kg)       Urine (mL/kg/hr) 800 (0.2) 500 (0.2)     Total Output 800 500     Net -800 -500                     Review of Systems   Constitutional: Negative.    HENT: Negative.     Eyes: Negative.    Respiratory: Negative.     Cardiovascular: Negative.    Gastrointestinal: Negative.    Endocrine: Negative.    Genitourinary: Negative.    Musculoskeletal: Negative.    Skin: Negative.    Neurological: Negative.    Psychiatric/Behavioral: Negative.      Objective:     Vital Signs (Most Recent):  Temp: 97.3 °F (36.3 °C) (07/07/22 1158)  Pulse: 70 (07/07/22 1158)  Resp: 16 (07/07/22 1158)  BP: 114/65 (07/07/22 1158)  SpO2: 97 % (07/07/22 1158) Vital Signs (24h Range):  Temp:  [96.2 °F (35.7 °C)-97.9 °F (36.6 °C)] 97.3 °F (36.3 °C)  Pulse:  [65-77] 70  Resp:  [16-20] 16  SpO2:  [94 %-97 %] 97 %  BP: (114-133)/(64-73) 114/65     Weight: 133.8 kg (295 lb)  Height: 6' 2" (188 cm)  Body mass index is 37.88 kg/m².    Physical Exam  Constitutional:       General: He is not in acute distress.     Appearance: He is obese.   Eyes:      General: No scleral icterus.     Extraocular Movements: Extraocular movements intact.      Pupils: Pupils are equal, round, and reactive to light.   Cardiovascular:      Rate and " "Rhythm: Normal rate and regular rhythm.      Heart sounds: No murmur heard.  Pulmonary:      Effort: Pulmonary effort is normal. No respiratory distress.   Abdominal:      General: There is no distension.      Palpations: Abdomen is soft.      Tenderness: There is no abdominal tenderness.   Musculoskeletal:      Right lower leg: No edema.      Left lower leg: No edema.   Skin:     Coloration: Skin is not jaundiced.   Neurological:      General: No focal deficit present.      Mental Status: He is alert.       Laboratory:  Labs within the past 24 hours have been reviewed.    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

## 2022-07-07 NOTE — NURSING
Home Oxygen Evaluation    Date Performed: 2022    1) Patient's Home O2 Sat on room air, while at rest: 99        If O2 sats on room air at rest are 88% or below, patient qualifies. No additional testing needed. Document N/A in steps 2 and 3. If 89% or above, complete steps 2.      2) Patient's O2 Sat on room air while exercisin        If O2 sats on room air while exercising remain 89% or above patient does not qualify, no further testing needed Document N/A in step 3. If O2 sats on room air while exercising are 88% or below, continue to step 3.      3) Patient's O2 Sat while exercising on O2: N/A         (Must show improvement from #2 for patients to qualify)    If O2 sats improve on oxygen, patient qualifies for portable oxygen. If not, the patient does not qualify.         Pt will continue to lose 4-5# per year at a steady rate through CST CHO diet adherence

## 2022-07-07 NOTE — PLAN OF CARE
Uneventful day. Patiewnt sleeping between nursing care.    Problem: Adult Inpatient Plan of Care  Goal: Plan of Care Review  Outcome: Ongoing, Progressing  Goal: Patient-Specific Goal (Individualized)  Outcome: Ongoing, Progressing  Goal: Absence of Hospital-Acquired Illness or Injury  Outcome: Ongoing, Progressing  Goal: Optimal Comfort and Wellbeing  Outcome: Ongoing, Progressing     Problem: Infection  Goal: Absence of Infection Signs and Symptoms  Outcome: Ongoing, Progressing     Problem: Diabetes Comorbidity  Goal: Blood Glucose Level Within Targeted Range  Outcome: Ongoing, Progressing     Problem: Fall Injury Risk  Goal: Absence of Fall and Fall-Related Injury  Outcome: Ongoing, Progressing

## 2022-07-08 VITALS
WEIGHT: 295 LBS | RESPIRATION RATE: 18 BRPM | DIASTOLIC BLOOD PRESSURE: 76 MMHG | SYSTOLIC BLOOD PRESSURE: 146 MMHG | HEIGHT: 74 IN | HEART RATE: 69 BPM | BODY MASS INDEX: 37.86 KG/M2 | TEMPERATURE: 99 F | OXYGEN SATURATION: 96 %

## 2022-07-08 LAB
ALBUMIN SERPL BCP-MCNC: 2.5 G/DL (ref 3.5–5.2)
ALP SERPL-CCNC: 82 U/L (ref 55–135)
ALT SERPL W/O P-5'-P-CCNC: 54 U/L (ref 10–44)
ANION GAP SERPL CALC-SCNC: 8 MMOL/L (ref 8–16)
AST SERPL-CCNC: 64 U/L (ref 10–40)
BASOPHILS # BLD AUTO: 0 K/UL (ref 0–0.2)
BASOPHILS NFR BLD: 0 % (ref 0–1.9)
BILIRUB SERPL-MCNC: 0.3 MG/DL (ref 0.1–1)
BUN SERPL-MCNC: 42 MG/DL (ref 8–23)
CALCIUM SERPL-MCNC: 8.6 MG/DL (ref 8.7–10.5)
CHLORIDE SERPL-SCNC: 111 MMOL/L (ref 95–110)
CO2 SERPL-SCNC: 23 MMOL/L (ref 23–29)
CREAT SERPL-MCNC: 1.8 MG/DL (ref 0.5–1.4)
DIFFERENTIAL METHOD: ABNORMAL
EOSINOPHIL # BLD AUTO: 0 K/UL (ref 0–0.5)
EOSINOPHIL NFR BLD: 0 % (ref 0–8)
ERYTHROCYTE [DISTWIDTH] IN BLOOD BY AUTOMATED COUNT: 16.5 % (ref 11.5–14.5)
EST. GFR  (AFRICAN AMERICAN): 42.5 ML/MIN/1.73 M^2
EST. GFR  (NON AFRICAN AMERICAN): 36.8 ML/MIN/1.73 M^2
GLUCOSE SERPL-MCNC: 168 MG/DL (ref 70–110)
HCT VFR BLD AUTO: 37.7 % (ref 40–54)
HCV RNA SERPL NAA+PROBE-ACNC: NORMAL IU/ML
HGB BLD-MCNC: 11.7 G/DL (ref 14–18)
IMM GRANULOCYTES # BLD AUTO: 0.05 K/UL (ref 0–0.04)
IMM GRANULOCYTES NFR BLD AUTO: 0.9 % (ref 0–0.5)
LYMPHOCYTES # BLD AUTO: 1.3 K/UL (ref 1–4.8)
LYMPHOCYTES NFR BLD: 21.3 % (ref 18–48)
MAGNESIUM SERPL-MCNC: 2.2 MG/DL (ref 1.6–2.6)
MCH RBC QN AUTO: 26.1 PG (ref 27–31)
MCHC RBC AUTO-ENTMCNC: 31 G/DL (ref 32–36)
MCV RBC AUTO: 84 FL (ref 82–98)
MONOCYTES # BLD AUTO: 0.6 K/UL (ref 0.3–1)
MONOCYTES NFR BLD: 10.4 % (ref 4–15)
NEUTROPHILS # BLD AUTO: 4 K/UL (ref 1.8–7.7)
NEUTROPHILS NFR BLD: 67.4 % (ref 38–73)
NRBC BLD-RTO: 0 /100 WBC
PHOSPHATE SERPL-MCNC: 3.9 MG/DL (ref 2.7–4.5)
PLATELET # BLD AUTO: 342 K/UL (ref 150–450)
PMV BLD AUTO: 10.2 FL (ref 9.2–12.9)
POCT GLUCOSE: 158 MG/DL (ref 70–110)
POCT GLUCOSE: 206 MG/DL (ref 70–110)
POTASSIUM SERPL-SCNC: 4.8 MMOL/L (ref 3.5–5.1)
PROT SERPL-MCNC: 6.3 G/DL (ref 6–8.4)
RBC # BLD AUTO: 4.48 M/UL (ref 4.6–6.2)
SODIUM SERPL-SCNC: 142 MMOL/L (ref 136–145)
TACROLIMUS BLD-MCNC: 4.2 NG/ML (ref 5–15)
WBC # BLD AUTO: 5.88 K/UL (ref 3.9–12.7)

## 2022-07-08 PROCEDURE — 36415 COLL VENOUS BLD VENIPUNCTURE: CPT | Performed by: STUDENT IN AN ORGANIZED HEALTH CARE EDUCATION/TRAINING PROGRAM

## 2022-07-08 PROCEDURE — 80053 COMPREHEN METABOLIC PANEL: CPT | Performed by: STUDENT IN AN ORGANIZED HEALTH CARE EDUCATION/TRAINING PROGRAM

## 2022-07-08 PROCEDURE — 99239 HOSP IP/OBS DSCHRG MGMT >30: CPT | Mod: 95,CR,, | Performed by: INTERNAL MEDICINE

## 2022-07-08 PROCEDURE — 84100 ASSAY OF PHOSPHORUS: CPT | Performed by: STUDENT IN AN ORGANIZED HEALTH CARE EDUCATION/TRAINING PROGRAM

## 2022-07-08 PROCEDURE — 63600175 PHARM REV CODE 636 W HCPCS: Performed by: STUDENT IN AN ORGANIZED HEALTH CARE EDUCATION/TRAINING PROGRAM

## 2022-07-08 PROCEDURE — 63600175 PHARM REV CODE 636 W HCPCS: Mod: TB | Performed by: FAMILY MEDICINE

## 2022-07-08 PROCEDURE — 25000003 PHARM REV CODE 250: Performed by: STUDENT IN AN ORGANIZED HEALTH CARE EDUCATION/TRAINING PROGRAM

## 2022-07-08 PROCEDURE — 94761 N-INVAS EAR/PLS OXIMETRY MLT: CPT

## 2022-07-08 PROCEDURE — 99239 PR HOSPITAL DISCHARGE DAY,>30 MIN: ICD-10-PCS | Mod: 95,CR,, | Performed by: INTERNAL MEDICINE

## 2022-07-08 PROCEDURE — 80197 ASSAY OF TACROLIMUS: CPT | Performed by: FAMILY MEDICINE

## 2022-07-08 PROCEDURE — 63600175 PHARM REV CODE 636 W HCPCS: Performed by: INTERNAL MEDICINE

## 2022-07-08 PROCEDURE — 99499 NO LOS: ICD-10-PCS | Mod: CR,,, | Performed by: INTERNAL MEDICINE

## 2022-07-08 PROCEDURE — 83735 ASSAY OF MAGNESIUM: CPT | Performed by: STUDENT IN AN ORGANIZED HEALTH CARE EDUCATION/TRAINING PROGRAM

## 2022-07-08 PROCEDURE — 25000003 PHARM REV CODE 250: Performed by: FAMILY MEDICINE

## 2022-07-08 PROCEDURE — 85025 COMPLETE CBC W/AUTO DIFF WBC: CPT | Performed by: STUDENT IN AN ORGANIZED HEALTH CARE EDUCATION/TRAINING PROGRAM

## 2022-07-08 PROCEDURE — 99499 UNLISTED E&M SERVICE: CPT | Mod: CR,,, | Performed by: INTERNAL MEDICINE

## 2022-07-08 RX ORDER — TACROLIMUS 1 MG/1
CAPSULE ORAL
Qty: 300 CAPSULE | Refills: 11 | Status: ON HOLD | OUTPATIENT
Start: 2022-07-08 | End: 2022-07-20 | Stop reason: HOSPADM

## 2022-07-08 RX ORDER — ALBUTEROL SULFATE 90 UG/1
2 AEROSOL, METERED RESPIRATORY (INHALATION) EVERY 6 HOURS PRN
Status: DISCONTINUED | OUTPATIENT
Start: 2022-07-08 | End: 2022-07-08 | Stop reason: HOSPADM

## 2022-07-08 RX ORDER — TORSEMIDE 5 MG/1
5 TABLET ORAL DAILY
Qty: 90 TABLET | Refills: 3 | Status: SHIPPED | OUTPATIENT
Start: 2022-07-08 | End: 2022-07-18

## 2022-07-08 RX ADMIN — ASPIRIN 81 MG: 81 TABLET, COATED ORAL at 09:07

## 2022-07-08 RX ADMIN — DEXAMETHASONE 6 MG: 4 TABLET ORAL at 09:07

## 2022-07-08 RX ADMIN — CALCIUM CARBONATE (ANTACID) CHEW TAB 500 MG 1000 MG: 500 CHEW TAB at 09:07

## 2022-07-08 RX ADMIN — TACROLIMUS 5 MG: 5 CAPSULE ORAL at 09:07

## 2022-07-08 RX ADMIN — NIFEDIPINE 90 MG: 60 TABLET, FILM COATED, EXTENDED RELEASE ORAL at 09:07

## 2022-07-08 RX ADMIN — INSULIN ASPART 2 UNITS: 100 INJECTION, SOLUTION INTRAVENOUS; SUBCUTANEOUS at 09:07

## 2022-07-08 RX ADMIN — METOPROLOL SUCCINATE 75 MG: 50 TABLET, EXTENDED RELEASE ORAL at 09:07

## 2022-07-08 RX ADMIN — ATORVASTATIN CALCIUM 80 MG: 20 TABLET, FILM COATED ORAL at 09:07

## 2022-07-08 RX ADMIN — LISINOPRIL 40 MG: 20 TABLET ORAL at 09:07

## 2022-07-08 RX ADMIN — INSULIN DETEMIR 10 UNITS: 100 INJECTION, SOLUTION SUBCUTANEOUS at 09:07

## 2022-07-08 RX ADMIN — THERA TABS 1 TABLET: TAB at 09:07

## 2022-07-08 RX ADMIN — CEFTRIAXONE 1 G: 1 INJECTION, SOLUTION INTRAVENOUS at 05:07

## 2022-07-08 RX ADMIN — ENOXAPARIN SODIUM 135 MG: 150 INJECTION SUBCUTANEOUS at 09:07

## 2022-07-08 RX ADMIN — Medication 500 MG: at 09:07

## 2022-07-08 RX ADMIN — GABAPENTIN 300 MG: 300 CAPSULE ORAL at 09:07

## 2022-07-08 RX ADMIN — ALBUTEROL SULFATE 2 PUFF: 90 AEROSOL, METERED RESPIRATORY (INHALATION) at 03:07

## 2022-07-08 RX ADMIN — REMDESIVIR 100 MG: 100 INJECTION, POWDER, LYOPHILIZED, FOR SOLUTION INTRAVENOUS at 09:07

## 2022-07-08 NOTE — ASSESSMENT & PLAN NOTE
- Levemir 10 units BID  - SSI provided for corrective dosing  - Hypoglycemic protocol in effect  - Continue home gabapentin regimen

## 2022-07-08 NOTE — PLAN OF CARE
[facility] - Med Surg  Discharge Final Note    Primary Care Provider: Krystin Zimmerman MD    Expected Discharge Date: 7/8/2022     Future Appointments   Date Time Provider Department Center   7/18/2022 11:00 AM Lexie Liang NP [facility] IM [facility]   9/21/2022  9:55 AM LABORATORY, [facility] LAB [facility]   9/21/2022 10:00 AM SPECIMEN, [facility] SPECLAB [facility]   9/28/2022  2:00 PM Lucila Matthew MD [facility] NEPHRO [facility]   10/26/2022  4:00 PM Jael Garrison, PhD, NP-C [facility] JASWANT [facility]       Final Discharge Note (most recent)     Final Note - 07/08/22 0904        Final Note    Assessment Type Final Discharge Note        Post-Acute Status    Discharge Delays None known at this time                 Important Message from Medicare  Important Message from Medicare regarding Discharge Appeal Rights: Given to patient/caregiver, Explained to patient/caregiver, Signed/date by patient/caregiver, Other (comments) (verbal signature from spouse via phone)     Date IMM was signed: 07/07/22  Time IMM was signed: 1114    Contact Info     Krystin Zimmerman MD   Specialty: Family Medicine   Relationship: PCP - General    [address] [facility] [address]   Phone: [phone]       Next Steps: Follow up on 7/18/2022    Instructions: For discharge from hospital follow up 11:00am    [facility]- Transplant 1st Fl   Specialty: Transplant    [address]  [facility] [address]   Phone: [phone]       Next Steps: Schedule an appointment as soon as possible for a visit in 1 week(s)    Instructions: For discharge from hospital follow up, Repeat labwork

## 2022-07-08 NOTE — ASSESSMENT & PLAN NOTE
- KTM following, appreciate recs  - Continue home Prograf; dose increased for goal level of 4 - 6  - Holding home Cellcept for active infection  - Daily Prograf levels

## 2022-07-08 NOTE — ASSESSMENT & PLAN NOTE
- Heart Transplant medicine following, appreciate recs  - Continue home Prograf  - Holding home Cellcept for active infection  - Daily Prograf levels  - continue metoprolol, lisinopril  - was prescribed 10 mg torsemide but only takes 5 mg; resume as needed at discharge

## 2022-07-08 NOTE — ASSESSMENT & PLAN NOTE
- Vaccination status: vaccinated x4  - COVID test positive on 6/25/22  - CXR with bilateral interstitial opacities  - Elevated inflammatory markers   - enoxaparin 1 mg/kg BID; D-dimer elevated  - Afebrile since admission, no leukocytosis   - SpO2 stable on 2L NC; now weaned to RA at rest  - Continue remdesivir and dexamethasone  - Azithromycin and ceftriaxone given concerns for superimposed bacterial PNA   - PRN duonebs and antitussives provided  - Continuous pulse oximetry at bedside  - Incentive spirometry encouraged  - Appropriate isolation and airborne precautions in place  - Continuing to monitor  - IS and flutter valve ordered  - Walk test with SpO2 of 89% - continues to meet criteria for continued RDV and dexamethasone.

## 2022-07-08 NOTE — ASSESSMENT & PLAN NOTE
- KTM following, appreciate recs  - Continue home Prograf  - Holding home Cellcept for active infection  - Daily Prograf levels

## 2022-07-08 NOTE — PLAN OF CARE
Sw did speak with transplant clinic, they will reach out to spouse and Pt from Dr Irais Olsen MD to schedule 1st available transplant post op follow up.

## 2022-07-08 NOTE — PT/OT/SLP PROGRESS
Physical Therapy      Patient Name:  Nigel Albrecht   MRN:  [id number]    Patient not seen today secondary to Telemedicine computer in room with patient and per conversation with MD patient is without acute care PT needs.  Thank you for the consultation.

## 2022-07-08 NOTE — ASSESSMENT & PLAN NOTE
- BP currently not well-controlled  - Continue home meds of lisinopril, nifedipine, metoprolol  - Amlodipine changed to nifedipine XR  - PRN hydralazine for SBP>180  - continue to monitor and further titrate antihypertensives as clinically indicated

## 2022-07-08 NOTE — NURSING
Pt is AAOx4, VSS, on RA. Skin intact. Discharge orders were placed and given to pt. He verbalized understanding and had no further questions. IV and telemetry were removed before d/c. Pt and wife were brought out once they received a medication to leave with from pharmacy.    No

## 2022-07-08 NOTE — DISCHARGE SUMMARY
[facility] Medicine  Telemedicine Discharge Summary      Patient Name: Nigel Albrecht  MRN: [id number]  Patient Class: IP- Inpatient  Admission Date: 7/4/2022  Hospital Length of Stay: 4 days  Discharge Date and Time:  07/08/2022 10:48 AM  Attending Physician: Jennifer Osborn MD   Discharging Provider: Jennifer Osborn MD  Primary Care Provider: Krystin Zimmerman MD    HPI:   This is a 71yo male with a past medical history of Heart and Kidney transplant on tacrolimus and cellcept, DM, HTN, HLD, and CKD who presents to the ED with chief complaint of shortness of breath and fevers. Patient tested positive for COVID on 06/22/22. Patient is vaccinated x4. Reports progressively worsening symptoms of exertional dyspnea (now SOB at rest), nausea and vomiting, diarrhea, fevers, chills, and generalized malaise. In the ED patient afebrile and hemodynamically stable saturating in low mid 90's at rest on room air. CXR with mulitfocal opacities. COVID positive. CRP and LDH elevated. Patient started on decadron and remdesivir and admitted to the [facility] medicine for further evaluation and management.      * No surgery found *      Hospital Course:   Nigel Albrecht was admitted to Hospital Medicine for treatment of COVID-19 viral infection and was treated with supportive care following a comprehensive physical, radiographic, and lab evaluation tailored to the current standard of care for COVID-19. The patient was treated with remdesivir and dexamethasone. The patient was not administered convalescent plasma; was not treated with tocilizumab or baracitinib.    On the day of discharge, isolation precautions were reviewed at length verbally. The patient was also provided with written isolation guidelines from the [place] Department of Health and Hospitals as well as the CDC, as part of discharge paperwork, which recommends an isolation period of 20 days after first positive for patients hospitalized  with COVID-19. The patient will be enrolled in the COVID-19 Home Symptom Monitoring program or [employer]. The patient was not discharged with home oxygen.    Immunizations Administered as of 7/8/2022     Name Date Dose VIS Date Route Exp Date    COVID-19, MRNA, LN-S, PF (Pfizer) (Purple Cap) 8/16/2021 12:57 PM 0.3 mL 12/12/2020 Intramuscular 10/31/2021    Site: Left deltoid     Given By: Alpa Myrick     : [employer]     Lot: [id number]     COVID-19, MRNA, LN-S, PF (Pfizer) (Purple Cap) 1/31/2021  1:22 PM 0.3 mL 12/12/2020 Intramuscular 4/30/2021    Site: Left deltoid     Given By: Anastacia Fraser LPN     : [employer]     Lot: [id number]     COVID-19, MRNA, LN-S, PF (Pfizer) (Purple Cap) 1/10/2021  1:30 PM 0.3 mL 12/12/2020 Intramuscular 4/30/2021    Site: Left deltoid     Given By: Anastacia Fraser LPN     : [employer]     Lot: [id number]           Additional details of the hospitalization include:    Pt admitted to [facility]. Azithromycin and rocephin added to Remdesivir given concerns for superimposed bacterial PNA in this immunocompromised patient. Mild symptomatic improvements noted.    See Problems listed below for addition details of this hospital stay.      Goals of Care Treatment Preferences:  Code Status: Full Code      Consults:   Consults (From admission, onward)        Status Ordering Provider     Inpatient virtual consult to Hospital Medicine  Once        Provider:  (Not yet assigned)    Completed KT KIRBY     Inpatient consult to Kidney/Pancreas Transplant Medicine  Once        Provider:  (Not yet assigned)    Completed DINESH LEMUS     Inpatient consult to Heart Transplant  Once        Provider:  (Not yet assigned)    Acknowledged DINESH LEMUS          * Pneumonia due to COVID-19 virus  - Vaccination status: vaccinated x4  - COVID test positive on 6/25/22  - CXR with bilateral interstitial opacities  - Elevated inflammatory markers   - enoxaparin 1 mg/kg  BID; D-dimer elevated  - Afebrile since admission, no leukocytosis   - SpO2 stable on 2L NC; now weaned to RA at rest  - Continue remdesivir and dexamethasone  - Azithromycin and ceftriaxone given concerns for superimposed bacterial PNA   - PRN duonebs and antitussives provided  - Continuous pulse oximetry at bedside  - Incentive spirometry encouraged  - Appropriate isolation and airborne precautions in place  - Continuing to monitor  - IS and flutter valve ordered  - Walk test with SpO2 of 89% - continues to meet criteria for continued RDV and dexamethasone.  - discharged after 4th RDV dose    Acute hypoxemic respiratory failure  Due to COVID-19  O2 weaned; no home O2 needed    Hepatitis C test positive  Quantitative RNA ordered  Follow up with Hepatlogy    Recurrent major depressive disorder, in partial remission  - Currently asymptomatic  - Continue home Elavil     Anemia of chronic renal failure, stage 3b  - H/H stable near baseline  - continue to monitor     Immunocompromised patient  - Kidney and heart transplant services following  - Holding Cellcept x one week at discharge    Class 2 severe obesity due to excess calories with serious comorbidity and body mass index (BMI) of 37.0 to 37.9 in adult  - Body mass index is 37.88 kg/m².  - Needs dietary and lifestyle modifications in relation to health    Type 2 diabetes mellitus with stage 3b chronic kidney disease, with long-term current use of insulin  - Levemir 10 units BID  - SSI provided for corrective dosing  - Hypoglycemic protocol in effect  - Continue home gabapentin regimen     Stage 3b chronic kidney disease  Estimated Creatinine Clearance: 53.9 mL/min (A) (based on SCr of 1.8 mg/dL (H)).  - sCr baseline at admission at 2  - Renally dosing all medications  - Avoid nephrotoxins  - continue to monitor     Mixed diabetic hyperlipidemia associated with type 2 diabetes mellitus  - Continue home statin     Hypertension associated with stage 3b chronic kidney  disease due to type 2 diabetes mellitus  - BP currently not well-controlled  - Continue home meds of lisinopril, nifedipine, metoprolol  - Amlodipine changed to nifedipine XR  - PRN hydralazine for SBP>180  - continue to monitor and further titrate antihypertensives as clinically indicated     Chronic immunosuppression with tacrolimus, mycophenolate  Level followed by KTM.  Continue to monitor.  HTS consulted.    Heart transplanted  - Heart Transplant medicine following, appreciate recs  - Continue home Prograf  - Holding home Cellcept for active infection  - Daily Prograf levels  - continue metoprolol, lisinopril  - was prescribed 10 mg torsemide but only takes 5 mg; resume as needed at discharge    -donor kidney transplant  - KTM following, appreciate recs  - Continue home Prograf; dose increased for goal level of 4 - 6  - Holding home Cellcept for active infection  - Daily Prograf levels      Final Active Diagnoses:    Diagnosis Date Noted POA    PRINCIPAL PROBLEM:  Pneumonia due to COVID-19 virus [U07.1, J12.82] 2022 Yes    Hepatitis C test positive [B19.20] 2022 Yes    Acute hypoxemic respiratory failure [J96.01] 2022 Yes    Immunosuppressive management encounter following kidney transplant [Z79.899, Z94.0]  Not Applicable    Immunocompromised patient [D84.9] 2022 Yes    Anemia of chronic renal failure, stage 3b [N18.32, D63.1] 2022 Yes    Recurrent major depressive disorder, in partial remission [F33.41] 2022 Yes    Class 2 severe obesity due to excess calories with serious comorbidity and body mass index (BMI) of 37.0 to 37.9 in adult [E66.01, Z68.37] 2015 Not Applicable    Type 2 diabetes mellitus with stage 3b chronic kidney disease, with long-term current use of insulin [E11.22, N18.32, Z79.4] 2014 Not Applicable    Stage 3b chronic kidney disease [N18.32] 2014 Yes    Mixed diabetic hyperlipidemia associated with type 2 diabetes  mellitus [E11.69, E78.2] 2013 Yes    Heart transplanted [Z94.1]  Not Applicable    -donor kidney transplant [Z94.0]  Not Applicable    Hypertension associated with stage 3b chronic kidney disease due to type 2 diabetes mellitus [E11.22, I12.9, N18.32]  Yes    Chronic immunosuppression with tacrolimus, mycophenolate [Z29.8]  Not Applicable     Chronic      Problems Resolved During this Admission:       Discharged Condition: stable    Disposition: Home or Self Care    Follow Up:    Follow-up Information     Krystin Zimmerman MD Follow up in 2 week(s).    Specialty: Family Medicine  Why: For discharge from hospital follow up  Contact information:  [address] [facility] [address]  [phone]             Mohan mckenna- Transplant [facility]. Schedule an appointment as soon as possible for a visit in 1 week(s).    Specialty: Transplant  Why: For discharge from hospital follow up, Repeat labwork  Contact information:  [address]  [facility] [address] [phone]  Additional information:  Multi-Organ Transplant [facility] - Main Building, 1st floor near Clinic elevator   Please park in [facility] and walk through Clinic elevator hallway                     Future Appointments   Date Time Provider Department Center   2022  9:00 [facility]   2022 11:00 AM Lexie Liang NP [facility]   2022 10:00 AM LABORATORY, [facility] LAB [place]   2022 10:00 AM Jennifer B. Scheuermann, PA [facility] Mohan Zimmerman   2022  9:55 AM LABORATORY, [facility] LAB [facility]   2022 10:00 AM SPECIMEN, [facility] SPECLAB [facility]   2022  2:00 PM Lucila Matthew MD [facility] NEPHRO [facility]   10/26/2022  4:00 PM Jael Garrison, PhD, NP-C [facility]       Patient Instructions:      Ambulatory referral/consult to Outpatient Case Management   Referral Priority: Routine Referral Type: Consultation    Referral Reason: Specialty Services Required   Number of Visits Requested: 1     Ambulatory referral/consult to Hepatology   Standing Status: Future   Referral Priority: Routine Referral Type: Consultation   Referral Reason: Specialty Services Required   Number of Visits Requested: 1     Diet Adult Regular     COVID-19 Surveillance Program     Order Specific Question Answer Comments   Does patient have a smartphone? Yes    Does patient have the [facility] ace on their smartphone? No    While in surveillance program, will patient be using home oxygen? No      Notify your health care provider if you experience any of the following:  temperature >100.4     Notify your health care provider if you experience any of the following:  persistent nausea and vomiting or diarrhea     Notify your health care provider if you experience any of the following:  difficulty breathing or increased cough     Notify your health care provider if you experience any of the following:  persistent dizziness, light-headedness, or visual disturbances     Notify your health care provider if you experience any of the following:  increased confusion or weakness     Notify your health care provider if you experience any of the following:  severe persistent headache     Activity as tolerated       Significant Diagnostic Studies:     Recent Labs   Lab 02/09/22  0940 06/20/22  0807   HGBA1C 6.5* 5.9*     Recent Labs   Lab 07/06/22  0340 07/07/22  0520 07/08/22  0313   WBC 4.28 5.93 5.88   HGB 11.3* 11.5* 11.7*   HCT 37.1* 36.2* 37.7*    305 342     Recent Labs   Lab 07/06/22  0340 07/07/22  0520 07/08/22  0313   GRAN 63.6  2.7 74.8*  4.4 67.4  4.0   LYMPH 25.9  1.1 17.9*  1.1 21.3  1.3   MONO 10.3  0.4 7.1  0.4 10.4  0.6   EOS 0.0 0.0 0.0     Recent Labs   Lab 07/06/22  0339 07/07/22  0520 07/08/22  0313    139 142   K 4.9 4.5 4.8    110 111*   CO2 23 23 23   BUN 30* 37* 42*   CREATININE 1.9* 1.8* 1.8*   * 167* 168*    CALCIUM 8.4* 8.5* 8.6*   ALBUMIN 2.3* 2.4* 2.5*   MG 2.1 2.3 2.2   PHOS 3.2 3.7 3.9     Recent Labs   Lab 07/04/22  2253 07/05/22  0725 07/06/22  0339 07/07/22  0520 07/08/22  0313   ALKPHOS  --    < > 85 83 82   ALT  --    < > 33 34 54*   AST  --    < > 43* 38 64*   PROT  --    < > 6.2 6.2 6.3   BILITOT  --    < > 0.2 0.3 0.3   INR 1.0  --   --   --   --     < > = values in this interval not displayed.     Procalcitonin (ng/mL)   Date Value   07/04/2022 0.13   06/25/2022 0.24     Lactate (Lactic Acid) (mmol/L)   Date Value   07/04/2022 1.4   06/25/2022 1.3     BNP (pg/mL)   Date Value   07/04/2022 140 (H)   06/20/2022 269 (H)   06/14/2022 160 (H)   05/04/2022 217 (H)   01/10/2022 144 (H)     CRP (mg/L)   Date Value   07/04/2022 57.5 (H)   06/25/2022 22.3 (H)   06/01/2009 10.9 (H)   05/27/2008 15.3 (H)     Sed Rate (mm/Hr)   Date Value   07/04/2022 105 (H)     D-Dimer (mg/L FEU)   Date Value   07/04/2022 1.21 (H)     Ferritin (ng/mL)   Date Value   07/04/2022 174   06/25/2022 38     LD (U/L)   Date Value   07/04/2022 305 (H)   06/25/2022 216     Troponin I (ng/mL)   Date Value   07/05/2022 0.017   07/04/2022 0.032 (H)   06/25/2022 0.031 (H)     CPK (U/L)   Date Value   07/04/2022 142   06/25/2022 120     SARS-CoV2 (COVID-19) Qualitative PCR (no units)   Date Value   10/03/2020 Not Detected     POC Rapid COVID (no units)   Date Value   07/04/2022 Positive (A)   06/25/2022 Positive (A)   11/23/2021 Negative     ECG Results          EKG 12-lead (Final result)  Result time 07/04/22 22:35:27    Final result by Interface, Lab In [facility] (07/04/22 22:35:27)                 Narrative:    Test Reason : R06.02,    Vent. Rate : 074 BPM     Atrial Rate : 074 BPM     P-R Int : 164 ms          QRS Dur : 152 ms      QT Int : 438 ms       P-R-T Axes : 031 039 052 degrees     QTc Int : 486 ms    Normal sinus rhythm  Right bundle branch block  Abnormal ECG  When compared with ECG of 25-JUN-2022 01:37,  No significant change was  found  Confirmed by SUSSY MORGAN MD ([id number]) on 7/4/2022 10:35:24 PM    Referred By: [email or web address]   SELF           Confirmed By:SUSSY MORGAN MD                            X-Ray Chest AP Portable  Narrative: EXAMINATION:  XR CHEST AP PORTABLE    CLINICAL HISTORY:  COVID-19;    TECHNIQUE:  Single frontal view of the chest was performed.    COMPARISON:  06/25/2022.    FINDINGS:  Monitoring EKG leads are present.  There are postop changes of median sternotomy.  The sternal wires are intact.    The trachea is unremarkable.  The cardiomediastinal silhouette is enlarged.  There are no pleural effusions.  There is no evidence of a pneumothorax.  There is no evidence of pneumomediastinum.  There are bilateral interstitial opacities.  There are degenerative changes in the osseous structures.  Impression: Bilateral interstitial opacities.  The findings may represent pneumonitis or pulmonary edema.    Electronically signed by: Ke Purvis MD  Date:    07/04/2022  Time:    18:20      Pending Diagnostic Studies:     Procedure Component Value Units Date/Time    Hepatitis C RNA, quantitative, PCR [[id number]] Collected: 07/07/22 0520    Order Status: Sent Lab Status: In process Updated: 07/07/22 0528    Specimen: Blood          Medications:  Reconciled Home Medications:      Medication List      START taking these medications    dextromethorphan-guaiFENesin  mg/5 ml  mg/5 mL liquid  Commonly known as: ROBITUSSIN-DM  Take 10 mLs by mouth every 4 (four) hours as needed (cough).     NIFEdipine 90 MG (OSM) 24 hr tablet  Commonly known as: PROCARDIA-XL  Take 1 tablet (90 mg total) by mouth once daily.        CHANGE how you take these medications    tacrolimus 1 MG Cap  Commonly known as: PROGRAF  Take 5 capsules (5 mg total) by mouth every morning AND 5 capsules (5 mg total) every evening.  What changed: See the new instructions.     torsemide 5 MG Tab  Commonly known as: DEMADEX  Take 1 tablet (5 mg total) by mouth once daily.  As needed  What changed: See the new instructions.        CONTINUE taking these medications    amitriptyline 10 MG tablet  Commonly known as: ELAVIL  Take 1 tablet (10 mg total) by mouth every evening for 7 days, THEN 2 tablets (20 mg total) every evening for 23 days.  Start taking on: May 18, 2022     atorvastatin 80 MG tablet  Commonly known as: LIPITOR  TAKE 1 TABLET ONCE DAILY     calcium carbonate 500 mg calcium (1,250 mg) tablet  Commonly known as: OS-CARRIE  Take 1 tablet by mouth once daily.     CENTRUM SILVER MEN ORAL  Take by mouth once daily.     cholecalciferol (vitamin D3) 125 mcg (5,000 unit) Tab  Take 5,000 Units by mouth once daily.     fluticasone propionate 50 mcg/actuation nasal spray  Commonly known as: FLONASE  1 spray by Each Nare route as needed for Rhinitis.     FREESTYLE SHREE 2 SENSOR Kit  Generic drug: flash glucose sensor  APPLY 1 SENSOR             SUBCUTANEOUSLY EVERY 14    DAYS     gabapentin 300 MG capsule  Commonly known as: NEURONTIN  TAKE 1 CAPSULE TWICE DAILY     HumuLIN 70/30 U-100 KwikPen 100 unit/mL (70-30) Inpn pen  Generic drug: insulin NPH/Reg human  Inject 40 Units into the skin 2 (two) times daily.     lisinopriL 40 MG tablet  Commonly known as: PRINIVIL,ZESTRIL  Take 1 tablet (40 mg total) by mouth once daily.     LOW-DOSE ASPIRIN 81 mg Tab  Generic drug: aspirin  Take by mouth. 1 Tablet Oral Every day     * metoprolol succinate 50 MG 24 hr tablet  Commonly known as: TOPROL-XL  Take 1 tablet (50 mg total) by mouth once daily.     * metoprolol succinate 25 MG 24 hr tablet  Commonly known as: TOPROL-XL  TAKE 1 TABLET DAILY ALONG  WITH 50MG TABLET FOR A     TOTAL OF 75MG DAILY     mycophenolate 250 mg Cap  Commonly known as: CELLCEPT  Take 3 capsules (750 mg total) by mouth 2 (two) times daily.     OZEMPIC 2 mg/dose (8 mg/3 mL) Pnij  Generic drug: semaglutide  Inject 2 mg into the skin once a week.     pulse oximeter device  Commonly known as: pulse oximeter  Use twice daily at  8 AM and 3 PM and record the value in MyChart as directed.         * This list has 2 medication(s) that are the same as other medications prescribed for you. Read the directions carefully, and ask your doctor or other care provider to review them with you.            STOP taking these medications    amLODIPine 10 MG tablet  Commonly known as: NORVASC            Indwelling Lines/Drains at time of discharge:   Lines/Drains/Airways     None               Time spent on the discharge of patient: 45 minutes  This service was provided by Virtual Visit.   Patient was seen and examined on the date of discharge.  Additional time was spent speaking with consultants and case management, reviewing records, and/or discussing the plan of care with patient/family.  The patient location is: 3/3 A  Admitted 7/4/2022  5:09 PM  Present with the patient at the time of the telemed/virtual assessment: n/a    Patient was transferred to [facility] on:  7/06/2022   This document was prepared by chart review and may not directly reflect my personal knowledge of the patient's case, clinical course, or significant events during the hospital stay.    The attending portion of this evaluation, treatment, and documentation was performed per Jennifer Osborn MD via Telemedicine AudioVisual using the secure [employer] software platform with 2 way audio/video. The provider was located off-site and the patient is located in the hospital. The aforementioned video software was utilized to document the relevant history and physical exam    Jennifer Osborn MD  Department of Hospital Medicine  [facility]

## 2022-07-08 NOTE — PLAN OF CARE
Uneventful Night. Patient in bed with ABX infusing. Wife at bedside. Safety precautions in place. Denies any pain or discomfort. Will continue to monitor.     Problem: Adult Inpatient Plan of Care  Goal: Plan of Care Review  Outcome: Ongoing, Progressing  Goal: Patient-Specific Goal (Individualized)  Outcome: Ongoing, Progressing  Goal: Absence of Hospital-Acquired Illness or Injury  Outcome: Ongoing, Progressing  Goal: Optimal Comfort and Wellbeing  Outcome: Ongoing, Progressing     Problem: Infection  Goal: Absence of Infection Signs and Symptoms  Outcome: Ongoing, Progressing     Problem: Diabetes Comorbidity  Goal: Blood Glucose Level Within Targeted Range  Outcome: Ongoing, Progressing     Problem: Fall Injury Risk  Goal: Absence of Fall and Fall-Related Injury  Outcome: Ongoing, Progressing

## 2022-07-08 NOTE — ASSESSMENT & PLAN NOTE
- Body mass index is 37.88 kg/m².  - Needs dietary and lifestyle modifications in relation to health

## 2022-07-08 NOTE — ASSESSMENT & PLAN NOTE
Estimated Creatinine Clearance: 53.9 mL/min (A) (based on SCr of 1.8 mg/dL (H)).  - sCr baseline at admission at 2  - Renally dosing all medications  - Avoid nephrotoxins  - continue to monitor

## 2022-07-08 NOTE — ASSESSMENT & PLAN NOTE
- Vaccination status: vaccinated x4  - COVID test positive on 6/25/22  - CXR with bilateral interstitial opacities  - Elevated inflammatory markers   - enoxaparin 1 mg/kg BID; D-dimer elevated  - Afebrile since admission, no leukocytosis   - SpO2 stable on 2L NC; now weaned to RA at rest  - Continue remdesivir and dexamethasone  - Azithromycin and ceftriaxone given concerns for superimposed bacterial PNA   - PRN duonebs and antitussives provided  - Continuous pulse oximetry at bedside  - Incentive spirometry encouraged  - Appropriate isolation and airborne precautions in place  - Continuing to monitor  - IS and flutter valve ordered  - Walk test with SpO2 of 89% - continues to meet criteria for continued RDV and dexamethasone.  - discharged after 4th RDV dose

## 2022-07-08 NOTE — PROGRESS NOTES
[facility]  Telemedicine Progress Note    Patient Name: Nigel Albrecht  MRN: [id number]  Patient Class: IP- Inpatient   Admission Date: 7/4/2022  Length of Stay: 3 days  Attending Physician: Jennifer Osborn MD  Primary Care Provider: Krystin Zimmerman MD    Subjective:     Principal Problem:Pneumonia due to COVID-19 virus    HPI:  This is a 73yo male with a past medical history of Heart and Kidney transplant on tacrolimus and cellcept, DM, HTN, HLD, and CKD who presents to the ED with chief complaint of shortness of breath and fevers. Patient tested positive for COVID on 06/22/22. Patient is vaccinated x4. Reports progressively worsening symptoms of exertional dyspnea (now SOB at rest), nausea and vomiting, diarrhea, fevers, chills, and generalized malaise. In the ED patient afebrile and hemodynamically stable saturating in low mid 90's at rest on room air. CXR with mulitfocal opacities. COVID positive. CRP and LDH elevated. Patient started on decadron and remdesivir and admitted to the [facility] of medicine for further evaluation and management.      Overview/Hospital Course:  [place] [facility] Azithromycin and rocephin added to remdesivir given concerns for superimposed bacterial PNA in this immunocompromised patient. Mild symptomatic improvements noted.      Telemedicine  This service was provided by Virtual Visit.    Patient was transferred to [facility] on:  07/06/2022     Chief Complaint   Patient presents with    Covid Positive     Kidney and heart xplant 2002,    Diarrhea    Shortness of Breath     The patient location is: 3/3 A   Admitted 7/4/2022  5:09 PM  Present with the patient at the time of the telemed/virtual assessment: N/A    Interval History / Events Overnight:   The patient is able to provide adequate history. Additional history was obtained from past medical records. No significant events reported by Nursing.  Patient complains of  dyspnea. Symptoms have improved since yesterday. Associated symptoms include: cough and fatigue. Symptoms are decreasing in both severity and frequency.     Lab test(s) reviewed: lymphopenia improving    Review of Systems   Constitutional:  Negative for fever.   Gastrointestinal:  Negative for vomiting.     Objective:     Vital Signs (Most Recent):  Temp: 97.3 °F (36.3 °C) (07/07/22 1158)  Pulse: 70 (07/07/22 1158)  Resp: 16 (07/07/22 1158)  BP: 114/65 (07/07/22 1158)  SpO2: 97 % (07/07/22 1158) Vital Signs (24h Range):  Temp:  [96.2 °F (35.7 °C)-97.4 °F (36.3 °C)] 97.3 °F (36.3 °C)  Pulse:  [65-77] 70  Resp:  [16-20] 16  SpO2:  [94 %-97 %] 97 %  BP: (114-133)/(64-73) 114/65     Weight: 133.8 kg (295 lb)  Body mass index is 37.88 kg/m².  No intake or output data in the 24 hours ending 07/07/22 1627   Physical Exam  Constitutional:       General: He is not in acute distress.     Appearance: Normal appearance.   Eyes:      General: Lids are normal. No scleral icterus.        Right eye: No discharge.         Left eye: No discharge.      Conjunctiva/sclera: Conjunctivae normal.   Neck:      Trachea: Phonation normal.   Cardiovascular:      Rate and Rhythm: Normal rate.      Comments: Monitor / Vital signs reviewed at time of visit  Pulmonary:      Effort: Pulmonary effort is normal. No tachypnea, accessory muscle usage or respiratory distress.   Abdominal:      General: There is no distension.   Neurological:      Mental Status: He is alert. He is not disoriented.   Psychiatric:         Attention and Perception: Attention normal.         Mood and Affect: Affect normal.         Behavior: Behavior is cooperative.       Significant Labs:   Recent Labs   Lab 02/09/22  0940 06/20/22  0807   HGBA1C 6.5* 5.9*     Recent Labs   Lab 07/06/22  2035 07/07/22  1159 07/07/22  1939   POCTGLUCOSE 218* 175* 219*     Recent Labs   Lab 07/05/22  0329 07/06/22  0340 07/07/22  0520   WBC 2.63* 4.28 5.93   HGB 10.4* 11.3* 11.5*   HCT 34.3*  37.1* 36.2*    291 305     Recent Labs   Lab 07/05/22  0329 07/06/22  0340 07/07/22  0520   GRAN 78.7*  2.1 63.6  2.7 74.8*  4.4   LYMPH 17.9*  0.5* 25.9  1.1 17.9*  1.1   MONO 3.0*  0.1* 10.3  0.4 7.1  0.4   EOS 0.0 0.0 0.0     Recent Labs   Lab 07/05/22 0725 07/06/22  0339 07/07/22  0520    140 139   K 4.5 4.9 4.5    110 110   CO2 21* 23 23   BUN 20 30* 37*   CREATININE 2.0* 1.9* 1.8*   * 184* 167*   CALCIUM 8.2* 8.4* 8.5*   ALBUMIN 2.2* 2.3* 2.4*   MG 1.9 2.1 2.3   PHOS 4.2 3.2 3.7     Recent Labs   Lab 07/04/22 2253 07/05/22 0725 07/06/22  0339 07/07/22  0520   ALKPHOS  --  87 85 83   ALT  --  34 33 34   AST  --  46* 43* 38   PROT  --  6.2 6.2 6.2   BILITOT  --  0.3 0.2 0.3   INR 1.0  --   --   --      Procalcitonin (ng/mL)   Date Value   07/04/2022 0.13   06/25/2022 0.24     Lactate (Lactic Acid) (mmol/L)   Date Value   07/04/2022 1.4   06/25/2022 1.3     BNP (pg/mL)   Date Value   07/04/2022 140 (H)   06/20/2022 269 (H)   06/14/2022 160 (H)   05/04/2022 217 (H)   01/10/2022 144 (H)     CRP (mg/L)   Date Value   07/04/2022 57.5 (H)   06/25/2022 22.3 (H)   06/01/2009 10.9 (H)   05/27/2008 15.3 (H)     Sed Rate (mm/Hr)   Date Value   07/04/2022 105 (H)     D-Dimer (mg/L FEU)   Date Value   07/04/2022 1.21 (H)     Ferritin (ng/mL)   Date Value   07/04/2022 174   06/25/2022 38     LD (U/L)   Date Value   07/04/2022 305 (H)   06/25/2022 216     Troponin I (ng/mL)   Date Value   07/05/2022 0.017   07/04/2022 0.032 (H)   06/25/2022 0.031 (H)     CPK (U/L)   Date Value   07/04/2022 142   06/25/2022 120     SARS-CoV2 (COVID-19) Qualitative PCR (no units)   Date Value   10/03/2020 Not Detected     POC Rapid COVID (no units)   Date Value   07/04/2022 Positive (A)   06/25/2022 Positive (A)   11/23/2021 Negative     ECG Results              EKG 12-lead (Final result)  Result time 07/04/22 22:35:27      Final result by Interface, Lab In [facility] (07/04/22 22:35:27)                    Narrative:    Test Reason : R06.02,    Vent. Rate : 074 BPM     Atrial Rate : 074 BPM     P-R Int : 164 ms          QRS Dur : 152 ms      QT Int : 438 ms       P-R-T Axes : 031 039 052 degrees     QTc Int : 486 ms    Normal sinus rhythm  Right bundle branch block  Abnormal ECG  When compared with ECG of 25-JUN-2022 01:37,  No significant change was found  Confirmed by SUSSY MORGAN MD ([id number]) on 7/4/2022 10:35:24 PM    Referred By: MARIUSZ   SELF           Confirmed By:SUSSY MORGAN MD                                  X-Ray Chest AP Portable  Narrative: EXAMINATION:  XR CHEST AP PORTABLE    CLINICAL HISTORY:  COVID-19;    TECHNIQUE:  Single frontal view of the chest was performed.    COMPARISON:  06/25/2022.    FINDINGS:  Monitoring EKG leads are present.  There are postop changes of median sternotomy.  The sternal wires are intact.    The trachea is unremarkable.  The cardiomediastinal silhouette is enlarged.  There are no pleural effusions.  There is no evidence of a pneumothorax.  There is no evidence of pneumomediastinum.  There are bilateral interstitial opacities.  There are degenerative changes in the osseous structures.  Impression: Bilateral interstitial opacities.  The findings may represent pneumonitis or pulmonary edema.    Electronically signed by: Ke Purvis MD  Date:    07/04/2022  Time:    18:20      Labs and Imaging within the last 24 hours listed above were reviewed.       Diet: Diet diabetic [facility]; 2000 Calorie; Low Sodium,2gm  Significant LDAs:   IV Access Type: Peripheral  Urinary Catheter Indication if present: Patient Does Not Have Urinary Catheter  Other Lines/Tubes/Drains:    HIGH RISK CONDITION(S):   Patient has a condition that poses threat to life and bodily function: Respiratory Distress     Goals of Care:    Previous admission:  6/25/22  Likely prognosis:  Fair  Code Status: Full Code  Comfort Only: No  Hospice: No  Goals at discharge: remain at home, with physician  follow-up    Discharge Planning   CATHRYN: 7/8/2022     Code Status: Full Code   Is the patient medically ready for discharge?: No    Reason for patient still in hospital (select all that apply): Patient trending condition and Treatment  Discharge Plan A: Home with family        Assessment/Plan:      * Pneumonia due to COVID-19 virus  - Vaccination status: vaccinated x4  - COVID test positive on 6/25/22  - CXR with bilateral interstitial opacities  - Elevated inflammatory markers   - enoxaparin 1 mg/kg BID; D-dimer elevated  - Afebrile since admission, no leukocytosis   - SpO2 stable on 2L NC; now weaned to RA at rest  - Continue remdesivir and dexamethasone  - Azithromycin and ceftriaxone given concerns for superimposed bacterial PNA   - PRN duonebs and antitussives provided  - Continuous pulse oximetry at bedside  - Incentive spirometry encouraged  - Appropriate isolation and airborne precautions in place  - Continuing to monitor  - IS and flutter valve ordered  - Walk test with SpO2 of 89% - continues to meet criteria for continued RDV and dexamethasone.    Hepatitis C test positive  Quantitative RNA ordered  Follow up with Hepatlogy    Recurrent major depressive disorder, in partial remission  - Currently asymptomatic  - Continue home Elavil     Anemia of chronic renal failure, stage 3b  - H/H stable near baseline  - continue to monitor     Immunocompromised patient  - Kidney and heart transplant services following  - Holding Cellcept    Class 2 severe obesity due to excess calories with serious comorbidity and body mass index (BMI) of 37.0 to 37.9 in adult  - Body mass index is 37.88 kg/m².  - Needs dietary and lifestyle modifications in relation to health    Type 2 diabetes mellitus with stage 3b chronic kidney disease, with long-term current use of insulin  - Levemir 10 units BID  - SSI provided for corrective dosing  - Hypoglycemic protocol in effect  - Continue home gabapentin regimen     Stage 3b chronic  kidney disease  Estimated Creatinine Clearance: 53.9 mL/min (A) (based on SCr of 1.8 mg/dL (H)).  - sCr baseline at admission at 2  - Renally dosing all medications  - Avoid nephrotoxins  - continue to monitor     Mixed diabetic hyperlipidemia associated with type 2 diabetes mellitus  - Continue home statin     Hypertension associated with stage 3b chronic kidney disease due to type 2 diabetes mellitus  - BP currently not well-controlled  - Continue home meds of lisinopril, nifedipine, metoprolol  - Amlodipine changed to nifedipine XR  - PRN hydralazine for SBP>180  - continue to monitor and further titrate antihypertensives as clinically indicated     Chronic immunosuppression with tacrolimus, mycophenolate  Level followed by KTM.  Continue to monitor.  HTS consulted.    Heart transplanted  - Heart Transplant medicine following, appreciate recs  - Continue home Prograf  - Holding home Cellcept for active infection  - Daily Prograf levels  - continue metoprolol, lisinopril  - was prescribed 10 mg torsemide but only takes 5 mg; resume if cleared by KTM for ankle edema    -donor kidney transplant  - KTM following, appreciate recs  - Continue home Prograf  - Holding home Cellcept for active infection  - Daily Prograf levels        Active Hospital Problems    Diagnosis  POA    *Pneumonia due to COVID-19 virus [U07.1, J12.82]  Yes    Hepatitis C test positive [B19.20]  Yes    Immunosuppressive management encounter following kidney transplant [Z79.899, Z94.0]  Not Applicable    Immunocompromised patient [D84.9]  Yes    Anemia of chronic renal failure, stage 3b [N18.32, D63.1]  Yes    Recurrent major depressive disorder, in partial remission [F33.41]  Yes    Class 2 severe obesity due to excess calories with serious comorbidity and body mass index (BMI) of 37.0 to 37.9 in adult [E66.01, Z68.37]  Not Applicable    Type 2 diabetes mellitus with stage 3b chronic kidney disease, with long-term current use of  insulin [E11.22, N18.32, Z79.4]  Not Applicable    Stage 3b chronic kidney disease [N18.32]  Yes    Mixed diabetic hyperlipidemia associated with type 2 diabetes mellitus [E11.69, E78.2]  Yes    Heart transplanted [Z94.1]  Not Applicable    -donor kidney transplant [Z94.0]  Not Applicable    Hypertension associated with stage 3b chronic kidney disease due to type 2 diabetes mellitus [E11.22, I12.9, N18.32]  Yes    Chronic immunosuppression with tacrolimus, mycophenolate [Z29.8]  Not Applicable     Chronic      Resolved Hospital Problems   No resolved problems to display.       Inpatient Medications Prescribed for Management of Current Problems:     Scheduled Meds:    [START ON 2022] albuterol  2 puff Inhalation Q6H    amitriptyline  10 mg Oral QHS    ascorbic acid (vitamin C)  500 mg Oral BID    aspirin  81 mg Oral Daily    atorvastatin  80 mg Oral Daily    calcium carbonate  1,000 mg Oral Daily    cefTRIAXone (ROCEPHIN) IVPB  1 g Intravenous Q24H    dexAMETHasone  6 mg Oral Daily    enoxaparin  1 mg/kg Subcutaneous BID    gabapentin  300 mg Oral BID    insulin detemir U-100  10 Units Subcutaneous BID    lisinopriL  40 mg Oral Daily    metoprolol succinate  75 mg Oral Daily    multivitamin  1 tablet Oral Daily    NIFEdipine  90 mg Oral Daily    remdesivir infusion  100 mg Intravenous Daily    tacrolimus  5 mg Oral BID     Continuous Infusions:   As Needed: acetaminophen, calcium carbonate, dextromethorphan-guaiFENesin  mg/5 ml, dextrose 10%, dextrose 10%, glucagon (human recombinant), glucose, glucose, hydrALAZINE, insulin aspart U-100, loperamide, melatonin, ondansetron, oxyCODONE, sodium chloride 0.9%, sodium chloride 0.9%    VTE Risk Mitigation (From admission, onward)         Ordered     enoxaparin injection 135 mg  2 times daily         22     IP VTE HIGH RISK PATIENT  Once         22     Place sequential compression device  Until discontinued          07/04/22 2153              I have assessed these finding virtually using telemed platform and with assistance of bedside nurse     The attending portion of this evaluation, treatment, and documentation was performed per Jennifer Osborn MD via Telemedicine AudioVisual using the secure [employer] software platform with 2 way audio/video. The provider was located off-site and the patient is located in the hospital. The aforementioned video software was utilized to document the relevant history and physical exam.    Jennifer Osborn MD  Department of Hospital Medicine   [facility]

## 2022-07-08 NOTE — PROGRESS NOTES
Patient here for Covid-19 infection symptoms that started 6/24. Felt comfortable to leave yesterday and was thought safe to do so by hospital medicine team today after receiving 5/6 doses of remdesivir. Discussed patient would isolate for an additional week totaling 3 weeks. Discussed his new tacrolimus dose started night 7/7 5mg. Discharged on 5 bid. Discussed follow up tacrolimus trough and labs next week. Instructed to continue to hold mycophenolate and restart if symptoms stayed improved in 1-2 weeks. Message sent to post-kidney transplant nursing pool for follow up.    Kye Pruitt Jr.

## 2022-07-08 NOTE — ASSESSMENT & PLAN NOTE
- Heart Transplant medicine following, appreciate recs  - Continue home Prograf  - Holding home Cellcept for active infection  - Daily Prograf levels  - continue metoprolol, lisinopril  - was prescribed 10 mg torsemide but only takes 5 mg; resume if cleared by KTM for ankle edema

## 2022-07-09 LAB
BACTERIA BLD CULT: NORMAL
BACTERIA BLD CULT: NORMAL

## 2022-07-10 ENCOUNTER — NURSE TRIAGE (OUTPATIENT)
Dept: ADMINISTRATIVE | Facility: CLINIC | Age: 72
End: 2022-07-10
Payer: MEDICARE

## 2022-07-10 NOTE — TELEPHONE ENCOUNTER
1st enrollment call - unable to make phone contact -  left and my chart message sent.     Reason for Disposition   Message left on unidentified voice mail.  Phone number verified.    Protocols used: NO CONTACT OR DUPLICATE CONTACT CALL-A-MELECIO

## 2022-07-10 NOTE — TELEPHONE ENCOUNTER
2nd enrollment call - unable to make phone contact to complete enrollment process. Placed pt in [id number] and task left in place in my chart for surveillance self enrollment.     Reason for Disposition   Message left on identified voice mail    Protocols used: NO CONTACT OR DUPLICATE CONTACT CALL-A-AH

## 2022-07-11 ENCOUNTER — TELEPHONE (OUTPATIENT)
Dept: HEPATOLOGY | Facility: CLINIC | Age: 72
End: 2022-07-11
Payer: MEDICARE

## 2022-07-11 ENCOUNTER — OUTPATIENT CASE MANAGEMENT (OUTPATIENT)
Dept: ADMINISTRATIVE | Facility: OTHER | Age: 72
End: 2022-07-11
Payer: MEDICARE

## 2022-07-11 DIAGNOSIS — R76.8 HEPATITIS C ANTIBODY TEST POSITIVE: Primary | ICD-10-CM

## 2022-07-11 NOTE — PROGRESS NOTES
Attempt #:  1  This LMSW attempted to reach patient/caregiver to provide resource, however no answer. Pt phone busy.

## 2022-07-11 NOTE — LETTER
July 12, 2022    Nigel Albrecht  [address]  [facility] [id number]             [facility] [address] [place] [address] Dear Nigel Albrecht:    I am writing from the Outpatient Complex Care Management Department at [facility].  I received a referral from Jennifer Osborn MD to contact you regarding any needs you may have. I have attempted to contact you  by phone  unsuccessfully.  Please contact the Outpatient Complex Care Management Department at [phone] if you would like to discuss your needs.      Sincerely,         Maricarmen Alicia LMSW

## 2022-07-11 NOTE — TELEPHONE ENCOUNTER
Dr. Jennifer Osborn ordered that patient be scheduled for a hep c consult visit.  Patient antibody positive 7/4/22 but quant negative on 7/7/22.  Please advise.

## 2022-07-12 ENCOUNTER — PATIENT OUTREACH (OUTPATIENT)
Dept: ADMINISTRATIVE | Facility: CLINIC | Age: 72
End: 2022-07-12
Payer: MEDICARE

## 2022-07-12 NOTE — PROGRESS NOTES
C3 nurse spoke with Nigel Albrecht for a TCC post hospital discharge follow up call. The patient has a scheduled [facility] appointment with Lexie Liang NP on 07/18/22 @ 1100.

## 2022-07-12 NOTE — PATIENT INSTRUCTIONS
[facility] teaching reviewed with Nigel Albrecht . He verbalized understanding.    Education was provided based on the patient's discharge diagnosis using the attached [facility] patient education as a reference.

## 2022-07-12 NOTE — PROGRESS NOTES
2nd attempt    This lMSW attempted to reach patient/caregiver to provide resource and left msg requesting a return call.  Letter with contact information was sent via Patient Portal  to patient/caregiver.  Referral source notified.

## 2022-07-13 ENCOUNTER — TELEPHONE (OUTPATIENT)
Dept: TRANSPLANT | Facility: CLINIC | Age: 72
End: 2022-07-13
Payer: MEDICARE

## 2022-07-13 NOTE — TELEPHONE ENCOUNTER
Pt called to see when he needs to have labs drawn. He is finished with quarantine on Thursday. He has MD appt on Monday, scheduled lab appt Monday at [place] [facility].

## 2022-07-13 NOTE — TELEPHONE ENCOUNTER
I spoke with patient.  Lab draw scheduled 8/25/22 and virtual consult visit with PA Scheuermann scheduled 9/2/22; appt reminder notice mailed.

## 2022-07-13 NOTE — TELEPHONE ENCOUNTER
Chart reviewed  S/p kidney / heart transplant 2002  Neg HCVAB - 2014  Pos HCVAB - 7/2022  Neg HCV RNA 7/2022  LFT w/ mild transaminase elevation - while inpt w/ COVID    pls schedule   visit w/ me in ~ 6 weeks  Needs labs done w/in 1 week prior: LFT, HCV RNA    thanks

## 2022-07-18 ENCOUNTER — HOSPITAL ENCOUNTER (OUTPATIENT)
Dept: RADIOLOGY | Facility: HOSPITAL | Age: 72
Discharge: HOME | DRG: 699 | End: 2022-07-18
Attending: NURSE PRACTITIONER
Payer: MEDICARE

## 2022-07-18 ENCOUNTER — TELEPHONE (OUTPATIENT)
Dept: FAMILY MEDICINE | Facility: CLINIC | Age: 72
End: 2022-07-18
Payer: MEDICARE

## 2022-07-18 ENCOUNTER — OFFICE VISIT (OUTPATIENT)
Dept: FAMILY MEDICINE | Facility: CLINIC | Age: 72
DRG: 699 | End: 2022-07-18
Payer: MEDICARE

## 2022-07-18 VITALS
HEART RATE: 79 BPM | SYSTOLIC BLOOD PRESSURE: 110 MMHG | BODY MASS INDEX: 36.42 KG/M2 | OXYGEN SATURATION: 97 % | TEMPERATURE: 99 F | DIASTOLIC BLOOD PRESSURE: 62 MMHG | WEIGHT: 283.81 LBS | HEIGHT: 74 IN

## 2022-07-18 DIAGNOSIS — R79.89 ELEVATED LFTS: ICD-10-CM

## 2022-07-18 DIAGNOSIS — N18.32 ANEMIA OF CHRONIC RENAL FAILURE, STAGE 3B: ICD-10-CM

## 2022-07-18 DIAGNOSIS — N18.32 HYPERTENSION ASSOCIATED WITH STAGE 3B CHRONIC KIDNEY DISEASE DUE TO TYPE 2 DIABETES MELLITUS: ICD-10-CM

## 2022-07-18 DIAGNOSIS — J12.82 PNEUMONIA DUE TO COVID-19 VIRUS: ICD-10-CM

## 2022-07-18 DIAGNOSIS — I12.9 HYPERTENSION ASSOCIATED WITH STAGE 3B CHRONIC KIDNEY DISEASE DUE TO TYPE 2 DIABETES MELLITUS: ICD-10-CM

## 2022-07-18 DIAGNOSIS — U07.1 PNEUMONIA DUE TO COVID-19 VIRUS: ICD-10-CM

## 2022-07-18 DIAGNOSIS — J12.82 PNEUMONIA DUE TO COVID-19 VIRUS: Primary | ICD-10-CM

## 2022-07-18 DIAGNOSIS — D84.9 IMMUNOCOMPROMISED PATIENT: ICD-10-CM

## 2022-07-18 DIAGNOSIS — Z94.1 HEART TRANSPLANTED: ICD-10-CM

## 2022-07-18 DIAGNOSIS — E11.22 HYPERTENSION ASSOCIATED WITH STAGE 3B CHRONIC KIDNEY DISEASE DUE TO TYPE 2 DIABETES MELLITUS: ICD-10-CM

## 2022-07-18 DIAGNOSIS — I77.1 TORTUOUS AORTA: ICD-10-CM

## 2022-07-18 DIAGNOSIS — Z29.89 PROPHYLACTIC IMMUNOTHERAPY: Chronic | ICD-10-CM

## 2022-07-18 DIAGNOSIS — U07.1 PNEUMONIA DUE TO COVID-19 VIRUS: Primary | ICD-10-CM

## 2022-07-18 DIAGNOSIS — D63.1 ANEMIA OF CHRONIC RENAL FAILURE, STAGE 3B: ICD-10-CM

## 2022-07-18 DIAGNOSIS — R26.81 GAIT INSTABILITY: ICD-10-CM

## 2022-07-18 PROCEDURE — 71046 XR CHEST PA AND LATERAL: ICD-10-PCS | Mod: 26,CR,, | Performed by: RADIOLOGY

## 2022-07-18 PROCEDURE — 99215 OFFICE O/P EST HI 40 MIN: CPT | Mod: PBBFAC,25,PO | Performed by: NURSE PRACTITIONER

## 2022-07-18 PROCEDURE — 99999 PR PBB SHADOW E&M-EST. PATIENT-LVL V: ICD-10-PCS | Mod: PBBFAC,CR,, | Performed by: NURSE PRACTITIONER

## 2022-07-18 PROCEDURE — 99495 TRANSJ CARE MGMT MOD F2F 14D: CPT | Mod: S$PBB,CR,, | Performed by: NURSE PRACTITIONER

## 2022-07-18 PROCEDURE — 71046 X-RAY EXAM CHEST 2 VIEWS: CPT | Mod: 26,CR,, | Performed by: RADIOLOGY

## 2022-07-18 PROCEDURE — 99999 PR PBB SHADOW E&M-EST. PATIENT-LVL V: CPT | Mod: PBBFAC,CR,, | Performed by: NURSE PRACTITIONER

## 2022-07-18 PROCEDURE — 99495 TCM SERVICES (MODERATE COMPLEXITY): ICD-10-PCS | Mod: S$PBB,CR,, | Performed by: NURSE PRACTITIONER

## 2022-07-18 PROCEDURE — 71046 X-RAY EXAM CHEST 2 VIEWS: CPT | Mod: TC,PO

## 2022-07-18 NOTE — TELEPHONE ENCOUNTER
Please call pt to schedule a hospital follow up. Kidney transplant, decrease renal function and recent hospital stay. Lexie would like pt to be seen before September.

## 2022-07-18 NOTE — PROGRESS NOTES
Subjective:       Patient ID: Nigel Albrecht is a 72 y.o. male.    Chief Complaint: Hospital Follow Up  Transitional Care Note    Family and/or Caretaker present at visit?  No.  Diagnostic tests reviewed/disposition: I have reviewed all completed as well as pending diagnostic tests at the time of discharge.  Disease/illness education: Covid 19; immunocompromised  Home health/community services discussion/referrals: Patient does not have home health established from hospital visit.  They do need home health.  If needed, we will set up home health for the patient.   Establishment or re-establishment of referral orders for community resources: No other necessary community resources.   Discussion with other health care providers: No discussion with other health care providers necessary.         This is a 73yo male with a past medical history of Heart and Kidney transplant on tacrolimus and cellcept, DM, HTN, HLD, and CKD who presents to the ED with chief complaint of shortness of breath and fevers. Patient tested positive for COVID on 06/22/22. Patient is vaccinated x4. Reports progressively worsening symptoms of exertional dyspnea (now SOB at rest), nausea and vomiting, diarrhea, fevers, chills, and generalized malaise. In the ED patient afebrile and hemodynamically stable saturating in low mid 90's at rest on room air. CXR with mulitfocal opacities. COVID positive. CRP and LDH elevated. Patient started on decadron and remdesivir and admitted to the care of medicine for further evaluation and management.     Pneumonia due to COVID-19 virus  - Vaccination status: vaccinated x4  - COVID test positive on 6/25/22  - CXR with bilateral interstitial opacities  - Elevated inflammatory markers   - enoxaparin 1 mg/kg BID; D-dimer elevated  - Afebrile since admission, no leukocytosis   - SpO2 stable on 2L NC; now weaned to RA at rest  - Continue remdesivir and dexamethasone  - Azithromycin and ceftriaxone given concerns for  superimposed bacterial PNA   - PRN duonebs and antitussives provided  - Continuous pulse oximetry at bedside  - Incentive spirometry encouraged  - Appropriate isolation and airborne precautions in place  - Continuing to monitor  - IS and flutter valve ordered  - Walk test with SpO2 of 89% - continues to meet criteria for continued RDV and dexamethasone.  - discharged after 4th RDV dose     Acute hypoxemic respiratory failure  Due to COVID-19  O2 weaned; no home O2 needed     Hepatitis C test positive  Quantitative RNA ordered  Follow up with Hepatlogy     Recurrent major depressive disorder, in partial remission  - Currently asymptomatic  - Continue home Elavil      Anemia of chronic renal failure, stage 3b  - H/H stable near baseline  - continue to monitor      Immunocompromised patient  - Kidney and heart transplant services following  - Holding Cellcept x one week at discharge     Class 2 severe obesity due to excess calories with serious comorbidity and body mass index (BMI) of 37.0 to 37.9 in adult  - Body mass index is 37.88 kg/m².  - Needs dietary and lifestyle modifications in relation to health     Type 2 diabetes mellitus with stage 3b chronic kidney disease, with long-term current use of insulin  - Levemir 10 units BID  - SSI provided for corrective dosing  - Hypoglycemic protocol in effect  - Continue home gabapentin regimen      Stage 3b chronic kidney disease  Estimated Creatinine Clearance: 53.9 mL/min (A) (based on SCr of 1.8 mg/dL (H)).  - sCr baseline at admission at 2  - Renally dosing all medications  - Avoid nephrotoxins  - continue to monitor      Mixed diabetic hyperlipidemia associated with type 2 diabetes mellitus  - Continue home statin      Hypertension associated with stage 3b chronic kidney disease due to type 2 diabetes mellitus  - BP currently not well-controlled  - Continue home meds of lisinopril, nifedipine, metoprolol  - Amlodipine changed to nifedipine XR  - PRN hydralazine for  SBP>180  - continue to monitor and further titrate antihypertensives as clinically indicated      Chronic immunosuppression with tacrolimus, mycophenolate  Level followed by KTM.  Continue to monitor.  HTS consulted.     Heart transplanted  - Heart Transplant medicine following, appreciate recs  - Continue home Prograf  - Holding home Cellcept for active infection  - Daily Prograf levels  - continue metoprolol, lisinopril  - was prescribed 10 mg torsemide but only takes 5 mg; resume as needed at discharge     -donor kidney transplant  - KTM following, appreciate recs  - Continue home Prograf; dose increased for goal level of 4 - 6  - Holding home Cellcept for active infection  - Daily Prograf levels    Since discharge, pt reports afebrile, no chest pain or SOB.  Notes generalized weakness causing decreased mobility.  Difficulty ambulating in home.  No falls.  FBS: 80's.     Past Medical History:  2013: Allergic rhinitis  No date: Blood transfusion  No date: Cataract  2014: CKD (chronic kidney disease), stage III  No date: -donor kidney transplant  2013: Diabetes mellitus, type 2  2013: Gout, arthritis  No date: Heart attack  No date: Heart transplanted  2013: Hyperlipidemia  No date: Hypertension  No date: Immunodeficiency due to treatment with immunosuppressive   medication  2013: Morbid obesity  : Organ transplant      Comment:  heart and kidney  No date: Prophylactic immunotherapy  No date: Renal manifestation of secondary diabetes mellitus    Review of Systems   Constitutional: Positive for activity change, appetite change and fatigue. Negative for chills and fever.   HENT: Negative.    Respiratory: Negative for cough, shortness of breath and wheezing.    Cardiovascular: Positive for leg swelling. Negative for chest pain and palpitations.   Gastrointestinal: Negative.    Genitourinary: Negative.    Neurological: Negative.    Psychiatric/Behavioral: Negative.         Objective:      Physical Exam  Vitals reviewed.   Constitutional:       General: He is not in acute distress.     Appearance: He is obese.   HENT:      Head: Normocephalic.      Right Ear: External ear normal.      Left Ear: External ear normal.      Mouth/Throat:      Mouth: Mucous membranes are dry.   Eyes:      Extraocular Movements: Extraocular movements intact.   Cardiovascular:      Rate and Rhythm: Normal rate.      Heart sounds: Normal heart sounds.   Pulmonary:      Effort: Pulmonary effort is normal. No respiratory distress.      Breath sounds: Examination of the right-lower field reveals decreased breath sounds. Examination of the left-lower field reveals decreased breath sounds. Decreased breath sounds present.   Musculoskeletal:         General: Normal range of motion.      Cervical back: Normal range of motion.      Right lower le+ Pitting Edema present.      Left lower le+ Pitting Edema present.   Skin:     General: Skin is dry.   Neurological:      General: No focal deficit present.      Mental Status: He is alert and oriented to person, place, and time.   Psychiatric:         Behavior: Behavior normal.         Assessment:       1. Pneumonia due to COVID-19 virus    2. Tortuous aorta    3. Immunocompromised patient    4. Chronic immunosuppression with tacrolimus, mycophenolate    5. Anemia of chronic renal failure, stage 3b    6. Hypertension associated with stage 3b chronic kidney disease due to type 2 diabetes mellitus    7. Heart transplanted    8. Elevated LFTs    9. Gait instability        Plan:   Pneumonia due to COVID-19 virus  -     X-Ray Chest PA And Lateral; Future; Expected date: 2022  -     Ambulatory referral/consult to Home Health; Future; Expected date: 2022  Improving.  Will repeat CXR for monitoring  Tortuous aorta  Stable continue statin therapy and bp control  Immunocompromised patient  Safety precautions discussed. Verbalized understanding   Chronic  immunosuppression with tacrolimus, mycophenolate  Follow up with transplant team.  Anemia of chronic renal failure, stage 3b  -  Hypertension associated with stage 3b chronic kidney disease due to type 2 diabetes mellitus  -follow up appt with nephrology scheduled  Heart transplanted    Elevated LFTs  appt with hepatology scheduled  Gait instability  -home health for PT    No follow-ups on file.

## 2022-07-19 ENCOUNTER — TELEPHONE (OUTPATIENT)
Dept: TRANSPLANT | Facility: CLINIC | Age: 72
End: 2022-07-19
Payer: MEDICARE

## 2022-07-19 ENCOUNTER — HOSPITAL ENCOUNTER (INPATIENT)
Facility: HOSPITAL | Age: 72
LOS: 1 days | Discharge: ANOTHER HOSPITAL | DRG: 699 | End: 2022-07-20
Attending: EMERGENCY MEDICINE | Admitting: INTERNAL MEDICINE
Payer: MEDICARE

## 2022-07-19 DIAGNOSIS — R53.1 WEAKNESS: ICD-10-CM

## 2022-07-19 DIAGNOSIS — E87.8 ELECTROLYTE ABNORMALITY: ICD-10-CM

## 2022-07-19 DIAGNOSIS — N17.9 ACUTE RENAL FAILURE, UNSPECIFIED ACUTE RENAL FAILURE TYPE: Primary | ICD-10-CM

## 2022-07-19 DIAGNOSIS — Z94.0 HISTORY OF KIDNEY TRANSPLANT: ICD-10-CM

## 2022-07-19 PROBLEM — I10 PRIMARY HYPERTENSION: Status: ACTIVE | Noted: 2022-07-19

## 2022-07-19 PROBLEM — E11.9 TYPE 2 DIABETES MELLITUS: Status: ACTIVE | Noted: 2022-07-19

## 2022-07-19 LAB
ALBUMIN SERPL BCP-MCNC: 2 G/DL (ref 3.5–5.2)
ALP SERPL-CCNC: 107 U/L (ref 55–135)
ALT SERPL W/O P-5'-P-CCNC: 31 U/L (ref 10–44)
ANION GAP SERPL CALC-SCNC: 14 MMOL/L (ref 8–16)
ANISOCYTOSIS BLD QL SMEAR: SLIGHT
AST SERPL-CCNC: 30 U/L (ref 10–40)
BACTERIA #/AREA URNS HPF: ABNORMAL /HPF
BASOPHILS # BLD AUTO: 0.06 K/UL (ref 0–0.2)
BASOPHILS NFR BLD: 0.7 % (ref 0–1.9)
BILIRUB SERPL-MCNC: 0.2 MG/DL (ref 0.1–1)
BILIRUB UR QL STRIP: NEGATIVE
BILIRUB UR QL STRIP: NEGATIVE
BUN SERPL-MCNC: 57 MG/DL (ref 8–23)
CALCIUM SERPL-MCNC: 8.6 MG/DL (ref 8.7–10.5)
CHLORIDE SERPL-SCNC: 110 MMOL/L (ref 95–110)
CLARITY UR: CLEAR
CLARITY UR: CLEAR
CO2 SERPL-SCNC: 17 MMOL/L (ref 23–29)
COLOR UR: YELLOW
COLOR UR: YELLOW
CREAT SERPL-MCNC: 12.9 MG/DL (ref 0.5–1.4)
CREAT UR-MCNC: 143.6 MG/DL (ref 23–375)
CTP QC/QA: YES
DIFFERENTIAL METHOD: ABNORMAL
EOSINOPHIL # BLD AUTO: 0.1 K/UL (ref 0–0.5)
EOSINOPHIL NFR BLD: 1 % (ref 0–8)
ERYTHROCYTE [DISTWIDTH] IN BLOOD BY AUTOMATED COUNT: 21.7 % (ref 11.5–14.5)
EST. GFR  (AFRICAN AMERICAN): 4 ML/MIN/1.73 M^2
EST. GFR  (NON AFRICAN AMERICAN): 3 ML/MIN/1.73 M^2
GLUCOSE SERPL-MCNC: 86 MG/DL (ref 70–110)
GLUCOSE UR QL STRIP: NEGATIVE
GLUCOSE UR QL STRIP: NEGATIVE
HCT VFR BLD AUTO: 28.3 % (ref 40–54)
HGB BLD-MCNC: 10 G/DL (ref 14–18)
HGB UR QL STRIP: NEGATIVE
HGB UR QL STRIP: NEGATIVE
HYALINE CASTS #/AREA URNS LPF: 0 /LPF
IMM GRANULOCYTES # BLD AUTO: 0.03 K/UL (ref 0–0.04)
IMM GRANULOCYTES NFR BLD AUTO: 0.3 % (ref 0–0.5)
KETONES UR QL STRIP: NEGATIVE
KETONES UR QL STRIP: NEGATIVE
LEUKOCYTE ESTERASE UR QL STRIP: NEGATIVE
LEUKOCYTE ESTERASE UR QL STRIP: NEGATIVE
LYMPHOCYTES # BLD AUTO: 3.4 K/UL (ref 1–4.8)
LYMPHOCYTES NFR BLD: 36.8 % (ref 18–48)
MAGNESIUM SERPL-MCNC: 2.5 MG/DL (ref 1.6–2.6)
MCH RBC QN AUTO: 27.4 PG (ref 27–31)
MCHC RBC AUTO-ENTMCNC: 31.3 G/DL (ref 32–36)
MCV RBC AUTO: 90 FL (ref 82–98)
MICROSCOPIC COMMENT: ABNORMAL
MONOCYTES # BLD AUTO: 0.7 K/UL (ref 0.3–1)
MONOCYTES NFR BLD: 7.1 % (ref 4–15)
NEUTROPHILS # BLD AUTO: 4.9 K/UL (ref 1.8–7.7)
NEUTROPHILS NFR BLD: 54.1 % (ref 38–73)
NITRITE UR QL STRIP: NEGATIVE
NITRITE UR QL STRIP: NEGATIVE
NRBC BLD-RTO: 0 /100 WBC
OSMOLALITY SERPL: 324 MOSM/KG (ref 280–300)
OSMOLALITY UR: 285 MOSM/KG (ref 50–1200)
PH UR STRIP: 6 [PH] (ref 5–8)
PH UR STRIP: 6 [PH] (ref 5–8)
PLATELET # BLD AUTO: 247 K/UL (ref 150–450)
PLATELET BLD QL SMEAR: ABNORMAL
PMV BLD AUTO: 12.7 FL (ref 9.2–12.9)
POCT GLUCOSE: 127 MG/DL (ref 70–110)
POTASSIUM SERPL-SCNC: 5.5 MMOL/L (ref 3.5–5.1)
PROT SERPL-MCNC: 7 G/DL (ref 6–8.4)
PROT UR QL STRIP: ABNORMAL
PROT UR QL STRIP: ABNORMAL
RBC # BLD AUTO: 3.16 M/UL (ref 4.6–6.2)
RBC #/AREA URNS HPF: 2 /HPF (ref 0–4)
SARS-COV-2 RDRP RESP QL NAA+PROBE: NEGATIVE
SODIUM SERPL-SCNC: 141 MMOL/L (ref 136–145)
SP GR UR STRIP: 1.01 (ref 1–1.03)
SP GR UR STRIP: 1.01 (ref 1–1.03)
URN SPEC COLLECT METH UR: ABNORMAL
URN SPEC COLLECT METH UR: ABNORMAL
UROBILINOGEN UR STRIP-ACNC: NEGATIVE EU/DL
UROBILINOGEN UR STRIP-ACNC: NEGATIVE EU/DL
WBC # BLD AUTO: 9.12 K/UL (ref 3.9–12.7)
WBC #/AREA URNS HPF: 2 /HPF (ref 0–5)
WBC CLUMPS URNS QL MICRO: ABNORMAL

## 2022-07-19 PROCEDURE — 93005 ELECTROCARDIOGRAM TRACING: CPT

## 2022-07-19 PROCEDURE — 80197 ASSAY OF TACROLIMUS: CPT | Performed by: EMERGENCY MEDICINE

## 2022-07-19 PROCEDURE — 85025 COMPLETE CBC W/AUTO DIFF WBC: CPT | Performed by: EMERGENCY MEDICINE

## 2022-07-19 PROCEDURE — 99223 1ST HOSP IP/OBS HIGH 75: CPT | Mod: CR,,, | Performed by: INTERNAL MEDICINE

## 2022-07-19 PROCEDURE — 99291 CRITICAL CARE FIRST HOUR: CPT | Mod: 25

## 2022-07-19 PROCEDURE — 99223 PR INITIAL HOSPITAL CARE,LEVL III: ICD-10-PCS | Mod: CR,,, | Performed by: INTERNAL MEDICINE

## 2022-07-19 PROCEDURE — 83935 ASSAY OF URINE OSMOLALITY: CPT | Performed by: EMERGENCY MEDICINE

## 2022-07-19 PROCEDURE — 51702 INSERT TEMP BLADDER CATH: CPT

## 2022-07-19 PROCEDURE — 63600175 PHARM REV CODE 636 W HCPCS: Performed by: INTERNAL MEDICINE

## 2022-07-19 PROCEDURE — U0002 COVID-19 LAB TEST NON-CDC: HCPCS | Performed by: EMERGENCY MEDICINE

## 2022-07-19 PROCEDURE — 83930 ASSAY OF BLOOD OSMOLALITY: CPT | Performed by: EMERGENCY MEDICINE

## 2022-07-19 PROCEDURE — 83735 ASSAY OF MAGNESIUM: CPT | Performed by: EMERGENCY MEDICINE

## 2022-07-19 PROCEDURE — 25000003 PHARM REV CODE 250: Performed by: INTERNAL MEDICINE

## 2022-07-19 PROCEDURE — 81000 URINALYSIS NONAUTO W/SCOPE: CPT | Performed by: EMERGENCY MEDICINE

## 2022-07-19 PROCEDURE — 80053 COMPREHEN METABOLIC PANEL: CPT | Performed by: EMERGENCY MEDICINE

## 2022-07-19 PROCEDURE — 93010 ELECTROCARDIOGRAM REPORT: CPT | Mod: ,,, | Performed by: INTERNAL MEDICINE

## 2022-07-19 PROCEDURE — 25000003 PHARM REV CODE 250: Performed by: EMERGENCY MEDICINE

## 2022-07-19 PROCEDURE — 93010 EKG 12-LEAD: ICD-10-PCS | Mod: ,,, | Performed by: INTERNAL MEDICINE

## 2022-07-19 PROCEDURE — 21400001 HC TELEMETRY ROOM

## 2022-07-19 PROCEDURE — 82570 ASSAY OF URINE CREATININE: CPT | Performed by: EMERGENCY MEDICINE

## 2022-07-19 RX ORDER — SODIUM CHLORIDE 0.9 % (FLUSH) 0.9 %
10 SYRINGE (ML) INJECTION
Status: DISCONTINUED | OUTPATIENT
Start: 2022-07-19 | End: 2022-07-20 | Stop reason: HOSPADM

## 2022-07-19 RX ORDER — HEPARIN SODIUM 5000 [USP'U]/ML
5000 INJECTION, SOLUTION INTRAVENOUS; SUBCUTANEOUS EVERY 8 HOURS
Status: DISCONTINUED | OUTPATIENT
Start: 2022-07-19 | End: 2022-07-20 | Stop reason: HOSPADM

## 2022-07-19 RX ORDER — IBUPROFEN 200 MG
16 TABLET ORAL
Status: DISCONTINUED | OUTPATIENT
Start: 2022-07-19 | End: 2022-07-20 | Stop reason: HOSPADM

## 2022-07-19 RX ORDER — METOPROLOL SUCCINATE 50 MG/1
50 TABLET, EXTENDED RELEASE ORAL DAILY
Status: ON HOLD | COMMUNITY
End: 2022-07-20 | Stop reason: HOSPADM

## 2022-07-19 RX ORDER — ONDANSETRON 2 MG/ML
4 INJECTION INTRAMUSCULAR; INTRAVENOUS EVERY 8 HOURS PRN
Status: DISCONTINUED | OUTPATIENT
Start: 2022-07-19 | End: 2022-07-20 | Stop reason: HOSPADM

## 2022-07-19 RX ORDER — GLUCAGON 1 MG
1 KIT INJECTION
Status: DISCONTINUED | OUTPATIENT
Start: 2022-07-19 | End: 2022-07-20 | Stop reason: HOSPADM

## 2022-07-19 RX ORDER — TACROLIMUS 1 MG/1
5 CAPSULE ORAL 2 TIMES DAILY
Status: DISCONTINUED | OUTPATIENT
Start: 2022-07-19 | End: 2022-07-20 | Stop reason: HOSPADM

## 2022-07-19 RX ORDER — NAPROXEN SODIUM 220 MG/1
81 TABLET, FILM COATED ORAL DAILY
Status: DISCONTINUED | OUTPATIENT
Start: 2022-07-20 | End: 2022-07-20 | Stop reason: HOSPADM

## 2022-07-19 RX ORDER — MUPIROCIN 20 MG/G
OINTMENT TOPICAL 2 TIMES DAILY
Status: DISCONTINUED | OUTPATIENT
Start: 2022-07-19 | End: 2022-07-20 | Stop reason: HOSPADM

## 2022-07-19 RX ORDER — FAMOTIDINE 20 MG/1
20 TABLET, FILM COATED ORAL DAILY
Status: DISCONTINUED | OUTPATIENT
Start: 2022-07-19 | End: 2022-07-20 | Stop reason: HOSPADM

## 2022-07-19 RX ORDER — ACETAMINOPHEN 325 MG/1
650 TABLET ORAL EVERY 4 HOURS PRN
Status: DISCONTINUED | OUTPATIENT
Start: 2022-07-19 | End: 2022-07-20 | Stop reason: HOSPADM

## 2022-07-19 RX ORDER — HYDRALAZINE HYDROCHLORIDE 20 MG/ML
10 INJECTION INTRAMUSCULAR; INTRAVENOUS EVERY 8 HOURS PRN
Status: DISCONTINUED | OUTPATIENT
Start: 2022-07-19 | End: 2022-07-20 | Stop reason: HOSPADM

## 2022-07-19 RX ORDER — IBUPROFEN 200 MG
24 TABLET ORAL
Status: DISCONTINUED | OUTPATIENT
Start: 2022-07-19 | End: 2022-07-20 | Stop reason: HOSPADM

## 2022-07-19 RX ORDER — INSULIN ASPART 100 [IU]/ML
0-5 INJECTION, SOLUTION INTRAVENOUS; SUBCUTANEOUS
Status: DISCONTINUED | OUTPATIENT
Start: 2022-07-19 | End: 2022-07-20 | Stop reason: HOSPADM

## 2022-07-19 RX ORDER — METOPROLOL SUCCINATE 25 MG/1
25 TABLET, EXTENDED RELEASE ORAL DAILY
Status: ON HOLD | COMMUNITY
End: 2022-07-20 | Stop reason: HOSPADM

## 2022-07-19 RX ORDER — FAMOTIDINE 20 MG/1
20 TABLET, FILM COATED ORAL 2 TIMES DAILY
Status: DISCONTINUED | OUTPATIENT
Start: 2022-07-19 | End: 2022-07-19 | Stop reason: DRUGHIGH

## 2022-07-19 RX ORDER — AMOXICILLIN 250 MG
1 CAPSULE ORAL DAILY PRN
Status: DISCONTINUED | OUTPATIENT
Start: 2022-07-19 | End: 2022-07-20 | Stop reason: HOSPADM

## 2022-07-19 RX ORDER — HYDROCODONE BITARTRATE AND ACETAMINOPHEN 5; 325 MG/1; MG/1
1 TABLET ORAL EVERY 4 HOURS PRN
Status: DISCONTINUED | OUTPATIENT
Start: 2022-07-19 | End: 2022-07-20 | Stop reason: HOSPADM

## 2022-07-19 RX ORDER — GUAIFENESIN 100 MG/5ML
200 SOLUTION ORAL EVERY 4 HOURS PRN
Status: DISCONTINUED | OUTPATIENT
Start: 2022-07-20 | End: 2022-07-20 | Stop reason: HOSPADM

## 2022-07-19 RX ORDER — TORSEMIDE 5 MG/1
20 TABLET ORAL DAILY
Status: ON HOLD | COMMUNITY
End: 2022-07-20 | Stop reason: HOSPADM

## 2022-07-19 RX ORDER — NIFEDIPINE 30 MG/1
90 TABLET, EXTENDED RELEASE ORAL DAILY
Status: DISCONTINUED | OUTPATIENT
Start: 2022-07-20 | End: 2022-07-20 | Stop reason: HOSPADM

## 2022-07-19 RX ORDER — MYCOPHENOLATE MOFETIL 250 MG/1
750 CAPSULE ORAL 2 TIMES DAILY
Status: DISCONTINUED | OUTPATIENT
Start: 2022-07-19 | End: 2022-07-20

## 2022-07-19 RX ADMIN — TACROLIMUS 5 MG: 1 CAPSULE ORAL at 05:07

## 2022-07-19 RX ADMIN — FAMOTIDINE 20 MG: 20 TABLET ORAL at 05:07

## 2022-07-19 RX ADMIN — SODIUM BICARBONATE: 84 INJECTION, SOLUTION INTRAVENOUS at 01:07

## 2022-07-19 RX ADMIN — SODIUM CHLORIDE 1000 ML: 0.9 INJECTION, SOLUTION INTRAVENOUS at 11:07

## 2022-07-19 RX ADMIN — MYCOPHENOLATE MOFETIL 750 MG: 250 CAPSULE ORAL at 09:07

## 2022-07-19 RX ADMIN — GUAIFENESIN 200 MG: 200 SOLUTION ORAL at 11:07

## 2022-07-19 RX ADMIN — SODIUM ZIRCONIUM CYCLOSILICATE 10 G: 5 POWDER, FOR SUSPENSION ORAL at 05:07

## 2022-07-19 RX ADMIN — METHYLPREDNISOLONE SODIUM SUCCINATE 1 G: 1 INJECTION, POWDER, LYOPHILIZED, FOR SOLUTION INTRAMUSCULAR; INTRAVENOUS at 10:07

## 2022-07-19 RX ADMIN — HEPARIN SODIUM 5000 UNITS: 5000 INJECTION INTRAVENOUS; SUBCUTANEOUS at 09:07

## 2022-07-19 NOTE — PROGRESS NOTES
Pharmacist Renal Dose Adjustment Note    Nigel Albrecht is a 72 y.o. male being treated with the medication famotidine    Patient Data:    Vital Signs (Most Recent):  Temp: 97.8 °F (36.6 °C) (07/19/22 1720)  Pulse: 81 (07/19/22 1720)  Resp: 18 (07/19/22 1720)  BP: (!) 173/81 (07/19/22 1720)  SpO2: 97 % (07/19/22 1720)   Vital Signs (72h Range):  Temp:  [97.8 °F (36.6 °C)-98.9 °F (37.2 °C)]   Pulse:  [75-81]   Resp:  [16-22]   BP: (110-173)/(62-83)   SpO2:  [93 %-100 %]      Recent Labs   Lab 07/18/22  0813 07/19/22  1550   CREATININE 11.1* 12.9*     Creatinine clearance cannot be calculated (Unknown ideal weight.)    Medication: famotidine 20mg BID will be changed to famotidine 20mg daiyl for crcl <50ml/min    Pharmacist's Name: Siri Shi  Pharmacist's Extension: [phone]

## 2022-07-19 NOTE — Clinical Note
Diagnosis: Electrolyte abnormality [829485]   Future Attending Provider: BARRIE DELGADO [[id number]]   Admitting Provider:: BARRIE DELGADO [[id number]]

## 2022-07-19 NOTE — HPI
The pt is a 73 yo male with PMHx significant for Heart and Kidney transplant in 2002 on chronic immunosuppressive therapy with tacrolimus and cellcept, DM 2, HTN, HLD, and CKD IIIb, recent COVID 19 virus infection ( 6/25/22) and admitted to the hospital at [facility], [facility]  for COVID pneumonia from 7/4 to 7/8,  treated with remdesivir x 4 doses and dexamethasone. Pt did not qualify for home oxygen and advised to hold Cellcept for 1 week on discharge  which he started back since last week. Today pt was asked by his transplant MD to report to the ED due to abnormal findings noted on routine laboratory tests  that was done yesterday. Pt is noted to have creatinine 11.1 with baseline 1.8 to 2.1,   K 5.6, . Pt noted he has been more short of breath , decreased energy and has not been urinated as much lately. Pt denies  headaches, confusion, chest pain, fever, chills, nausea , vomiting, dark urine, leg swelling , abdominal pain or changes in bowel habit.     Case was discussed with Transplant Surgery at [facility] by ED provider who states that [facility] is at capacity and ED in diversion and recommended  to initiate IVF with bicarb drip at 75 mLs per hour, renal US, and admit to [facility] service. Pt remains on the list to be transferred over once bed is available at [facility].

## 2022-07-19 NOTE — TELEPHONE ENCOUNTER
Reviewed labs with Dr. Chinchilla. Pt's cr 11.1, K 5.6, BNP 6115. Spoke to pt who stated he has no energy and has not urinated very much at all. Advised he go to closest ED for evaluation. He may need dialysis. Pt stated his understanding and will try to go to ED.

## 2022-07-19 NOTE — PHARMACY MED REC
"Admission Medication History     The home medication history was taken by Lenard Romero.    You may go to "Admission" then "Reconcile Home Medications" tabs to review and/or act upon these items.      The home medication list has been updated by the Pharmacy department.    Please read ALL comments highlighted in yellow.    Please address this information as you see fit.     Feel free to contact us if you have any questions or require assistance.      The medications listed below were removed from the home medication list. Please reorder if appropriate:  Patient reports no longer taking the following medication(s):   AMLODIPINE 10 MG TABLET   FLUTICASONE PROPIONATE 50 MCG/ACTUATION NASAL SPRAY    Medications listed below were obtained from: Patient/family, Analytic software- [employer] and Patient's pharmacy  (Not in a hospital admission)      Potential issues to be addressed PRIOR TO DISCHARGE: NONE    Lenard Romero CPhT  Pharmacy Technician Specialist-Medication History  [facility] [phone]  Secure chat preferred       Current Outpatient Medications on File Prior to Encounter   Medication Sig Dispense Refill Last Dose    atorvastatin (LIPITOR) 80 MG tablet TAKE 1 TABLET ONCE DAILY 90 tablet 3 7/18/2022 at Unknown time    calcium carbonate (OS-CARRIE) 500 mg calcium (1,250 mg) tablet Take 1 tablet by mouth once daily.   7/18/2022 at Unknown time    cholecalciferol, vitamin D3, 125 mcg (5,000 unit) Tab Take 5,000 Units by mouth once daily.   7/18/2022 at Unknown time    gabapentin (NEURONTIN) 300 MG capsule TAKE 1 CAPSULE TWICE DAILY 180 capsule 1 7/19/2022 at Unknown time    insulin NPH/Reg human (HUMULIN 70/30 U-100 KWIKPEN) 100 unit/mL (70-30) InPn pen Inject 40 Units into the skin 2 (two) times daily. 75 mL 1 7/18/2022 at Unknown time    lisinopriL (PRINIVIL,ZESTRIL) 40 MG tablet Take 1 tablet (40 mg total) by mouth once daily. 90 tablet 3 7/18/2022 at Unknown time    Low-Dose Aspirin 81 mg Tab Take " 81 mg by mouth once daily.   7/19/2022 at Unknown time    metoprolol succinate (TOPROL-XL) 25 MG 24 hr tablet Take 25 mg by mouth once daily. (Take with 50 mg for a total of 75 mg daily.)   Past Week at Sunday, 07/17/2022    metoprolol succinate (TOPROL-XL) 50 MG 24 hr tablet Take 50 mg by mouth once daily. (Take with 25 mg for a total of 75 mg daily.)   Past Week at Sunday, 07/17/2022    MULTIVIT-MIN/FA/LYCOPEN/LUTEIN (CENTRUM SILVER MEN ORAL) Take by mouth once daily.   7/19/2022 at Unknown time    mycophenolate (CELLCEPT) 250 mg Cap Take 3 capsules (750 mg total) by mouth 2 (two) times daily. 540 capsule 3 7/19/2022 at Unknown time    NIFEdipine (PROCARDIA-XL) 90 MG (OSM) 24 hr tablet Take 1 tablet (90 mg total) by mouth once daily. 30 tablet 11 7/19/2022 at Unknown time    semaglutide (OZEMPIC) 2 mg/dose (8 mg/3 mL) PnIj Inject 2 mg into the skin once a week. 3 mL 3 Past Week at Sunday, 07/17/2022    tacrolimus (PROGRAF) 1 MG Cap Take 5 capsules (5 mg total) by mouth every morning AND 5 capsules (5 mg total) every evening. 300 capsule 11 7/19/2022 at Unknown time    torsemide (DEMADEX) 5 MG Tab Take 20 mg by mouth once daily.   Past Week at Unknown time    FREESTYLE SHREE 2 SENSOR Kit APPLY 1 SENSOR             SUBCUTANEOUSLY EVERY 14    DAYS 6 kit 1 Unknown at Unknown time    [DISCONTINUED] amLODIPine (NORVASC) 10 MG tablet Take 0.5 tablets (5 mg total) by mouth once daily. 30 tablet 5     [DISCONTINUED] fluticasone propionate (FLONASE) 50 mcg/actuation nasal spray 1 spray by Each Nare route as needed for Rhinitis.       [DISCONTINUED] pulse oximeter (PULSE OXIMETER) device Use twice daily at 8 AM and 3 PM and [facility] the [facility] as directed. 1 each 0                                .

## 2022-07-19 NOTE — ED PROVIDER NOTES
SCRIBE #1 NOTE: I, Sharon Chang, am scribing for, and in the presence of, La Hoffman DO. I have scribed the entire note.      History      Chief Complaint   Patient presents with    Abnormal Lab     Told to come to ER for Creatinine of 11. Hx kidney and heart transplant.       Review of patient's allergies indicates:  No Known Allergies     HPI   HPI    2022, 11:10 AM   History obtained from the patient      History of Present Illness: Nigel Albrecht is a 72 y.o. 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 Chinchilla because recent lab work showed that his creatine was 11, his potassium was 5.6, and his BNP was 6115. Pt tested positive for COVID-19 on 2022. From 2022 to 2022, the pt was admitted at [facility] for pneumonia due to COVID-19, was treated with decadron and remdesivir, and had his cell cept held. Now, the pt c/o fatigue. Symptoms are constant and moderate in severity. No mitigating or exacerbating factors reported. Associated sxs include SOB upon exertion, diarrhea, chest pressure upon exertion, and urine decreased. Patient denies any dark urine, N/V, blood in stool, leg swelling, abdominal pain, numbness, headaches, and all other sxs at this time. No prior Tx reported. Pt denies being on dialysis. No further complaints or concerns at this time.          Arrival mode: Personal vehicle    PCP: Krystin Zimmerman MD       Past Medical History:  Past Medical History:   Diagnosis Date    Allergic rhinitis 2013    Blood transfusion     Cataract     CKD (chronic kidney disease), stage III 2014    -donor kidney transplant     Diabetes mellitus, type 2 2013    Gout,  arthritis 2013    Heart attack     Heart transplanted     Hyperlipidemia 2013    Hypertension     Immunodeficiency due to treatment with immunosuppressive medication     Morbid obesity 2013    Organ transplant 2002    heart and kidney    Prophylactic immunotherapy     Renal manifestation of secondary diabetes mellitus        Past Surgical History:  Past Surgical History:   Procedure Laterality Date    CATARACT EXTRACTION Bilateral     COLONOSCOPY N/A 10/6/2020    Procedure: COLONOSCOPY;  Surgeon: Conner Redman MD;  Location: [facility];  Service: Endoscopy;  Laterality: N/A;    EYE SURGERY      HEART TRANSPLANT      KIDNEY TRANSPLANT      REVISION TOTAL HIP ARTHROPLASTY           Family History:  Family History   Problem Relation Age of Onset    Stroke Mother     Hyperlipidemia Sister     Diabetes Brother     Early death Brother     Heart disease Brother     Hyperlipidemia Brother     Hypertension Brother     Stroke Brother     Kidney disease Son     Diabetes Maternal Grandmother     Melanoma Neg Hx     Eczema Neg Hx     Lupus Neg Hx     Psoriasis Neg Hx        Social History:  Social History     Tobacco Use    Smoking status: Former Smoker     Packs/day: 1.00     Years: 20.00     Pack years: 20.00     Types: Cigarettes     Quit date: 1984     Years since quittin.0    Smokeless tobacco: Never Used   Substance and Sexual Activity    Alcohol use: Yes     Alcohol/week: 1.0 standard drink     Types: 1 Cans of beer per week     Comment: daily    Drug use: No    Sexual activity: Never       ROS   Review of Systems   Constitutional: Positive for fatigue. Negative for chills and fever.   HENT: Negative for sore throat.    Respiratory: Positive for shortness of breath (upon exertion).    Cardiovascular: Negative for chest pain and leg swelling.        (+) chest pressure upon exertion   Gastrointestinal: Positive for diarrhea. Negative for abdominal pain, blood in  stool, nausea and vomiting.   Genitourinary: Positive for decreased urine volume. Negative for dysuria.        (-) dark urine   Musculoskeletal: Negative for back pain.   Skin: Negative for rash.   Neurological: Negative for numbness and headaches.   Hematological: Does not bruise/bleed easily.   All other systems reviewed and are negative.    Physical Exam      Initial Vitals [07/19/22 1021]   BP Pulse Resp Temp SpO2   (!) 141/74 80 18 98.9 °F (37.2 °C) 98 %      MAP       --          Physical Exam  Nursing Notes and Vital Signs Reviewed.  Constitutional: Patient is in no acute distress. Well-developed and well-nourished.  Head: Atraumatic. Normocephalic.  Eyes: PERRL. EOM intact. Conjunctivae are not pale. No scleral icterus.  ENT: Mucous membranes are moist. Oropharynx is clear and symmetric.    Neck: Supple. Full ROM. No lymphadenopathy.  Cardiovascular: Regular rate. Regular rhythm. No murmurs, rubs, or gallops. Distal pulses are 2+ and symmetric.  Pulmonary/Chest: No respiratory distress. Clear to auscultation bilaterally. No wheezing or rales.  Abdominal: Soft and non-distended.  There is no tenderness.  No rebound, guarding, or rigidity.   Musculoskeletal: Moves all extremities. No obvious deformities. No edema.  Skin: Warm and dry.  Neurological:  Alert, awake, and appropriate.  Normal speech.  No acute focal neurological deficits are appreciated.  Psychiatric: Normal affect. Good eye contact. Appropriate in content.    ED Course    Critical Care    Date/Time: 7/19/2022 12:49 PM  Performed by: La Hoffman DO  Authorized by: La Hoffman DO   Direct patient critical care time: 17 minutes  Additional history critical care time: 6 minutes  Ordering / reviewing critical care time: 5 minutes  Documentation critical care time: 7 minutes  Consulting other physicians critical care time: 20 minutes  Total critical care time (exclusive of procedural time) : 55 minutes  Critical care time was exclusive of  "separately billable procedures and treating other patients and teaching time.  Critical care was necessary to treat or prevent imminent or life-threatening deterioration of the following conditions: renal failure.  Critical care was time spent personally by me on the following activities: blood draw for specimens, discussions with consultants, development of treatment plan with patient or surrogate, interpretation of cardiac output measurements, evaluation of patient's response to treatment, examination of patient, obtaining history from patient or surrogate, ordering and performing treatments and interventions, ordering and review of laboratory studies, ordering and review of radiographic studies, pulse oximetry, re-evaluation of patient's condition and review of old charts.        ED Vital Signs:  Vitals:    07/19/22 1021 07/19/22 1100 07/19/22 1200 07/19/22 1300   BP: (!) 141/74 (!) 143/77 (!) 150/74 (!) 146/69   Pulse: 80 76 75 76   Resp: 18 (!) 22 20 (!) 22   Temp: 98.9 °F (37.2 °C)      TempSrc: Oral      SpO2: 98% 99% 99% (!) 93%   Weight:       Height:        07/19/22 1400 07/19/22 1500 07/19/22 1600 07/19/22 1720   BP: (!) 151/70 (!) 154/80 (!) 159/83 (!) 173/81   Pulse: 75 77 77 81   Resp: 20 17 16 18   Temp:    97.8 °F (36.6 °C)   TempSrc:    Oral   SpO2: 99% 97% 100% 97%   Weight:       Height:        07/19/22 1740 07/19/22 1943   BP: (!) 160/79 123/66   Pulse: 79 79   Resp:  18   Temp:  97.8 °F (36.6 °C)   TempSrc:  Oral   SpO2:  95%   Weight: 130.7 kg (288 lb 2.3 oz)    Height: 6' 2" (1.88 m)        Abnormal Lab Results:  Labs Reviewed   CBC W/ AUTO DIFFERENTIAL - Abnormal; Notable for the following components:       Result Value    RBC 3.16 (*)     Hemoglobin 10.0 (*)     Hematocrit 28.3 (*)     MCHC 31.3 (*)     RDW 21.7 (*)     All other components within normal limits   URINALYSIS, REFLEX TO URINE CULTURE - Abnormal; Notable for the following components:    Protein, UA 2+ (*)     All other " components within normal limits    Narrative:     Specimen Source->Urine   URINALYSIS, REFLEX TO URINE CULTURE - Abnormal; Notable for the following components:    Protein, UA 2+ (*)     All other components within normal limits    Narrative:     Specimen Source->Urine   COMPREHENSIVE METABOLIC PANEL - Abnormal; Notable for the following components:    Potassium 5.5 (*)     CO2 17 (*)     BUN 57 (*)     Creatinine 12.9 (*)     Calcium 8.6 (*)     Albumin 2.0 (*)     eGFR if  4 (*)     eGFR if non  3 (*)     All other components within normal limits   URINALYSIS MICROSCOPIC - Abnormal; Notable for the following components:    WBC Clumps, UA Occasional (*)     All other components within normal limits    Narrative:     Specimen Source->Urine   CREATININE, URINE, RANDOM    Narrative:     Specimen Source->Urine   MAGNESIUM   SODIUM, URINE, RANDOM   SARS-COV-2 RDRP GENE    Narrative:     This test utilizes isothermal nucleic acid amplification   technology to detect the SARS-CoV-2 RdRp nucleic acid segment.   The analytical sensitivity (limit of detection) is 125 genome   equivalents/mL.   A POSITIVE result implies infection with the SARS-CoV-2 virus;   the patient is presumed to be contagious.     A NEGATIVE result means that SARS-CoV-2 nucleic acids are not   present above the limit of detection. A NEGATIVE result should be   treated as presumptive. It does not rule out the possibility of   COVID-19 and should not be the sole basis for treatment decisions.   If COVID-19 is strongly suspected based on clinical and exposure   history, re-testing using an alternate molecular assay should be   considered.   This test is only for use under the Food and Drug   Administration s Emergency Use Authorization (EUA).   Commercial kits are provided by [employer].   Performance characteristics of the EUA have been independently   verified by [facility] Department of   Pathology and  Laboratory Medicine.   _________________________________________________________________   The authorized Fact Sheet for Healthcare Providers and the authorized Fact   Sheet for Patients of the ID NOW COVID-19 are available on the FDA   website:     https://www.fda.gov/media/292298/download  https://www.fda.gov/media/913883/download                All Lab Results:  Results for orders placed or performed during the hospital encounter of 07/19/22   CBC auto differential   Result Value Ref Range    WBC 9.12 3.90 - 12.70 K/uL    RBC 3.16 (L) 4.60 - 6.20 M/uL    Hemoglobin 10.0 (L) 14.0 - 18.0 g/dL    Hematocrit 28.3 (L) 40.0 - 54.0 %    MCV 90 82 - 98 fL    MCH 27.4 27.0 - 31.0 pg    MCHC 31.3 (L) 32.0 - 36.0 g/dL    RDW 21.7 (H) 11.5 - 14.5 %    Platelets 247 150 - 450 K/uL    MPV 12.7 9.2 - 12.9 fL    Immature Granulocytes 0.3 0.0 - 0.5 %    Gran # (ANC) 4.9 1.8 - 7.7 K/uL    Immature Grans (Abs) 0.03 0.00 - 0.04 K/uL    Lymph # 3.4 1.0 - 4.8 K/uL    Mono # 0.7 0.3 - 1.0 K/uL    Eos # 0.1 0.0 - 0.5 K/uL    Baso # 0.06 0.00 - 0.20 K/uL    nRBC 0 0 /100 WBC    Gran % 54.1 38.0 - 73.0 %    Lymph % 36.8 18.0 - 48.0 %    Mono % 7.1 4.0 - 15.0 %    Eosinophil % 1.0 0.0 - 8.0 %    Basophil % 0.7 0.0 - 1.9 %    Platelet Estimate Appears normal     Aniso Slight     Differential Method Automated    Urinalysis, Reflex to Urine Culture Urine, Clean Catch    Specimen: Urine   Result Value Ref Range    Specimen UA Urine, Catheterized     Color, UA Yellow Yellow, Straw, Honey    Appearance, UA Clear Clear    pH, UA 6.0 5.0 - 8.0    Specific Gravity, UA 1.010 1.005 - 1.030    Protein, UA 2+ (A) Negative    Glucose, UA Negative Negative    Ketones, UA Negative Negative    Bilirubin (UA) Negative Negative    Occult Blood UA Negative Negative    Nitrite, UA Negative Negative    Urobilinogen, UA Negative <2.0 EU/dL    Leukocytes, UA Negative Negative   Urinalysis, Reflex to Urine Culture Urine, Clean Catch    Specimen: Urine   Result  Value Ref Range    Specimen UA Urine, Catheterized     Color, UA Yellow Yellow, Straw, Honey    Appearance, UA Clear Clear    pH, UA 6.0 5.0 - 8.0    Specific Gravity, UA 1.010 1.005 - 1.030    Protein, UA 2+ (A) Negative    Glucose, UA Negative Negative    Ketones, UA Negative Negative    Bilirubin (UA) Negative Negative    Occult Blood UA Negative Negative    Nitrite, UA Negative Negative    Urobilinogen, UA Negative <2.0 EU/dL    Leukocytes, UA Negative Negative   Creatinine, urine, random   Result Value Ref Range    Creatinine, Urine 143.6 23.0 - 375.0 mg/dL   Comprehensive metabolic panel   Result Value Ref Range    Sodium 141 136 - 145 mmol/L    Potassium 5.5 (H) 3.5 - 5.1 mmol/L    Chloride 110 95 - 110 mmol/L    CO2 17 (L) 23 - 29 mmol/L    Glucose 86 70 - 110 mg/dL    BUN 57 (H) 8 - 23 mg/dL    Creatinine 12.9 (H) 0.5 - 1.4 mg/dL    Calcium 8.6 (L) 8.7 - 10.5 mg/dL    Total Protein 7.0 6.0 - 8.4 g/dL    Albumin 2.0 (L) 3.5 - 5.2 g/dL    Total Bilirubin 0.2 0.1 - 1.0 mg/dL    Alkaline Phosphatase 107 55 - 135 U/L    AST 30 10 - 40 U/L    ALT 31 10 - 44 U/L    Anion Gap 14 8 - 16 mmol/L    eGFR if African American 4 (A) >60 mL/min/1.73 m^2    eGFR if non African American 3 (A) >60 mL/min/1.73 m^2   Magnesium   Result Value Ref Range    Magnesium 2.5 1.6 - 2.6 mg/dL   Urinalysis Microscopic   Result Value Ref Range    RBC, UA 2 0 - 4 /hpf    WBC, UA 2 0 - 5 /hpf    WBC Clumps, UA Occasional (A) None-Rare    Bacteria Rare None-Occ /hpf    Hyaline Casts, UA 0 0-1/lpf /lpf    Microscopic Comment SEE COMMENT    POCT COVID-19 Rapid Screening   Result Value Ref Range    POC Rapid COVID Negative Negative     Acceptable Yes    POCT glucose   Result Value Ref Range    POCT Glucose 127 (H) 70 - 110 mg/dL     *Note: Due to a large number of results and/or encounters for the requested time period, some results have not been displayed. A complete set of results can be found in Results Review.         Imaging  Results:  Imaging Results          US Transplant Kidney With Doppler (In process)    Procedure changed from US Retroperitoneal Complete                X-Ray Chest AP Portable (Final result)  Result time 07/19/22 11:44:49    Final result by Jaspal Solo MD (07/19/22 11:44:49)                 Impression:      See findings above.      Electronically signed by: Jaspal Solo MD  Date:    07/19/2022  Time:    11:44             Narrative:    EXAMINATION:  XR CHEST AP PORTABLE    CLINICAL HISTORY:  Weakness    FINDINGS:  Single view of the chest.  Comparison 07/18/2022.    Cardiac silhouette is mildly enlarged but stable.  Aorta demonstrates atherosclerotic disease.  Mild atelectasis right midlung zone.  Patchy infiltrates seen at the lung bases bilaterally could reflect edema or early airspace disease versus aspiration. No evidence of pleural effusion or pneumothorax.  Bones appear intact.                               Note:  Renal ultrasound is currently pending at this time.     The EKG was ordered, reviewed, and independently interpreted by the ED provider.  Interpretation time: 11:22  Rate: 77 BPM  Rhythm: normal sinus rhythm  Interpretation: RBBB. No STEMI.           The Emergency Provider reviewed the vital signs and test results, which are outlined above.    ED Discussion     11:09 AM:  Pt reports that he is agreeable to being transferred if necessary.    11:47 AM: Discussed pt's case with Dr. Karen Wilkerson (Transplant Surgery) who states that [facility] is at capacity and recommends 1 L normal saline, 75 meq sodium bicarb in half normal saline at 75 mLs per hour, and a renal US.  As [facility] is at capacity, she recommends patient be placed in our facility in [place] and monitor creatinine.  If no improvement with fluid administration patient may require transfer to [place].    3:19 PM: Discussed case with Marcelo Villareal NP (Hospital Medicine). Dr. Rocha agrees with current care and management  of pt and accepts admission.   Admitting Service: Hospital Medicine   Admitting Physician: Dr. Rocha  Admit to: Obs Med Tele    3:20 PM: Re-evaluated pt. I have discussed test results, shared treatment plan, and the need for admission with patient and family at bedside. Pt and family express understanding at this time and agree with all information. All questions answered. Pt and family have no further questions or concerns at this time. Pt is ready for admit.    3:20 PM: Discussed pt's case with Dr. Rocha ([facility]) who recommends consulting Nephrology.    3:35 PM: Discussed pt's case with Dr. Matthew (Nephrology) who recommends a kidney biopsy to r/o acute rejection and transfer [facility].  Dr. Matthew,  and Dr.Faiza Wilkerson discussed with one another.  Unfortunately, [facility] is current at capacity and unable to accept patient.      3:43 PM: Discussed pt's case with Tiff Santizo NP ([facility]) who recommends discontinuing the observation order and arranging for ED to ED transfer.    3:48 PM: Discussed pt's case with Dr. Wilkerson (Transplant Surgery) who states that she has spoken with the transfer center who states that [facility] is at capacity and unable to accept the pt.  Therefore, will place here while awaiting bed. HM aware.          ED Course as of 07/19/22 2158 Tue Jul 19, 2022   1110 07/05/22 07:25  BUN: 20  Creatinine: 2.0 (H)    07/06/22 03:39  BUN: 30 (H)  Creatinine: 1.9 (H)    07/07/22 05:20  BUN: 37 (H)  Creatinine: 1.8 (H)    07/08/22 03:13  BUN: 42 (H)  Creatinine: 1.8 (H)    07/18/22 08:13  BUN: 53 (H)  Creatinine: 11.1 (H)      (H): Data is abnormally high [LB]   1110 From 5/2022:  · S/P heart transplantation 5/24/2002  · The left ventricle is normal in size with concentric remodeling and normal systolic function. The estimated ejection fraction is 60%.  · Normal right ventricular size with normal right ventricular systolic function.  · Normal left  ventricular diastolic function.  · The estimated PA systolic pressure is 20 mmHg.  · Normal central venous pressure (3 mmHg).  · The maximal doses of pharmaceutical stress agents were administered.  · The ECG portion of this study is negative for myocardial ischemia.  · The stress echo portion of this study is negative for myocardial ischemia.  · Overall, this study is negative for myocardial ischemia. Sensitivity is impaired due to the patient's failure to achieve target heart rate.     [LB]   1501 Still awaiting labs:   had to be re-drawn. [LB]      ED Course User Index  [LB] La Hoffman,        ED Medication(s):  Medications   sodium bicarbonate 75 mEq in sodium chloride 0.45% 1,075 mL infusion ( Intravenous Rate/Dose Change 7/19/22 2122)   aspirin chewable tablet 81 mg (has no administration in time range)   metoprolol succinate 24 hr tablet 75 mg (has no administration in time range)   mycophenolate capsule 750 mg (750 mg Oral Given 7/19/22 2124)   NIFEdipine 24 hr tablet 90 mg (has no administration in time range)   tacrolimus capsule 5 mg (5 mg Oral Given 7/19/22 1758)   sodium chloride 0.9% flush 10 mL (has no administration in time range)   heparin (porcine) injection 5,000 Units (5,000 Units Subcutaneous Given 7/19/22 2123)   acetaminophen tablet 650 mg (has no administration in time range)   HYDROcodone-acetaminophen 5-325 mg per tablet 1 tablet (has no administration in time range)   senna-docusate 8.6-50 mg per tablet 1 tablet (has no administration in time range)   ondansetron injection 4 mg (has no administration in time range)   mupirocin 2 % ointment (has no administration in time range)   glucose chewable tablet 16 g (has no administration in time range)   glucose chewable tablet 24 g (has no administration in time range)   glucagon (human recombinant) injection 1 mg (has no administration in time range)   dextrose 10% bolus 125 mL (has no administration in time range)   dextrose 10% bolus  250 mL (has no administration in time range)   insulin aspart U-100 pen 0-5 Units (0 Units Subcutaneous Not Given 7/19/22 2130)   hydrALAZINE injection 10 mg (has no administration in time range)   famotidine tablet 20 mg (20 mg Oral Given 7/19/22 1759)   methylPREDNISolone (SOLU-MEDROL) 1 g in dextrose 5 % 100 mL IVPB (has no administration in time range)   sodium chloride 0.9% bolus 1,000 mL (0 mLs Intravenous Stopped 7/19/22 1255)   sodium zirconium cyclosilicate packet 10 g (10 g Oral Given 7/19/22 1758)           Current Discharge Medication List            Medical Decision Making    Medical Decision Making:   Clinical Tests:   Lab Tests: Ordered and Reviewed  Radiological Study: Ordered and Reviewed  Medical Tests: Ordered and Reviewed           Scribe Attestation:   Scribe #1: I performed the above scribed service and the documentation accurately describes the services I performed. I attest to the accuracy of the note.    Attending:   Physician Attestation Statement for Scribe #1: I, La Hoffman DO, personally performed the services described in this documentation, as scribed by Sharon Chang, in my presence, and it is both accurate and complete.          Clinical Impression       ICD-10-CM ICD-9-CM   1. Acute renal failure, unspecified acute renal failure type  N17.9 584.9   2. Electrolyte abnormality  E87.8 276.9   3. Weakness  R53.1 780.79   4. History of kidney transplant  Z94.0 V42.0       Disposition:   Disposition: Placed in Observation  Condition: Fair         La Hoffman DO  07/19/22 0798

## 2022-07-20 ENCOUNTER — HOSPITAL ENCOUNTER (INPATIENT)
Facility: HOSPITAL | Age: 72
LOS: 7 days | Discharge: HOME | DRG: 698 | End: 2022-07-27
Attending: INTERNAL MEDICINE | Admitting: INTERNAL MEDICINE
Payer: MEDICARE

## 2022-07-20 VITALS
WEIGHT: 291 LBS | HEART RATE: 74 BPM | TEMPERATURE: 98 F | OXYGEN SATURATION: 97 % | BODY MASS INDEX: 37.35 KG/M2 | DIASTOLIC BLOOD PRESSURE: 79 MMHG | RESPIRATION RATE: 20 BRPM | HEIGHT: 74 IN | SYSTOLIC BLOOD PRESSURE: 158 MMHG

## 2022-07-20 DIAGNOSIS — N17.9 AKI (ACUTE KIDNEY INJURY): ICD-10-CM

## 2022-07-20 DIAGNOSIS — E11.69 TYPE 2 DIABETES MELLITUS WITH OTHER SPECIFIED COMPLICATION, UNSPECIFIED WHETHER LONG TERM INSULIN USE: ICD-10-CM

## 2022-07-20 DIAGNOSIS — T86.11 ANTIBODY MEDIATED REJECTION OF KIDNEY TRANSPLANT: ICD-10-CM

## 2022-07-20 DIAGNOSIS — T38.0X5A ADRENAL CORTICOSTEROID CAUSING ADVERSE EFFECT IN THERAPEUTIC USE: ICD-10-CM

## 2022-07-20 DIAGNOSIS — D63.1 ANEMIA OF CHRONIC RENAL FAILURE, STAGE 3B: ICD-10-CM

## 2022-07-20 DIAGNOSIS — Z29.89 PROPHYLACTIC IMMUNOTHERAPY: Chronic | ICD-10-CM

## 2022-07-20 DIAGNOSIS — N18.31 TYPE 2 DIABETES MELLITUS WITH STAGE 3A CHRONIC KIDNEY DISEASE, WITH LONG-TERM CURRENT USE OF INSULIN: ICD-10-CM

## 2022-07-20 DIAGNOSIS — N17.0 ACUTE RENAL FAILURE WITH TUBULAR NECROSIS: ICD-10-CM

## 2022-07-20 DIAGNOSIS — N18.32 ANEMIA OF CHRONIC RENAL FAILURE, STAGE 3B: ICD-10-CM

## 2022-07-20 DIAGNOSIS — Z79.4 TYPE 2 DIABETES MELLITUS WITH STAGE 3A CHRONIC KIDNEY DISEASE, WITH LONG-TERM CURRENT USE OF INSULIN: ICD-10-CM

## 2022-07-20 DIAGNOSIS — E11.22 TYPE 2 DIABETES MELLITUS WITH STAGE 3A CHRONIC KIDNEY DISEASE, WITH LONG-TERM CURRENT USE OF INSULIN: ICD-10-CM

## 2022-07-20 DIAGNOSIS — Z94.1 HEART TRANSPLANTED: ICD-10-CM

## 2022-07-20 DIAGNOSIS — Z94.0 DECEASED-DONOR KIDNEY TRANSPLANT: ICD-10-CM

## 2022-07-20 DIAGNOSIS — D84.821 IMMUNOSUPPRESSION DUE TO DRUG THERAPY: ICD-10-CM

## 2022-07-20 DIAGNOSIS — E87.5 HYPERKALEMIA: ICD-10-CM

## 2022-07-20 DIAGNOSIS — Z79.899 IMMUNOSUPPRESSION DUE TO DRUG THERAPY: ICD-10-CM

## 2022-07-20 DIAGNOSIS — N17.9 ACUTE RENAL FAILURE, UNSPECIFIED ACUTE RENAL FAILURE TYPE: ICD-10-CM

## 2022-07-20 DIAGNOSIS — N17.9 ACUTE RENAL FAILURE: ICD-10-CM

## 2022-07-20 LAB
ACANTHOCYTES BLD QL SMEAR: PRESENT
ALBUMIN SERPL BCP-MCNC: 2 G/DL (ref 3.5–5.2)
ALP SERPL-CCNC: 126 U/L (ref 55–135)
ALT SERPL W/O P-5'-P-CCNC: 40 U/L (ref 10–44)
ANION GAP SERPL CALC-SCNC: 13 MMOL/L (ref 8–16)
ANION GAP SERPL CALC-SCNC: 14 MMOL/L (ref 8–16)
AST SERPL-CCNC: 39 U/L (ref 10–40)
BASOPHILS # BLD AUTO: 0.01 K/UL (ref 0–0.2)
BASOPHILS # BLD AUTO: 0.01 K/UL (ref 0–0.2)
BASOPHILS NFR BLD: 0.1 % (ref 0–1.9)
BASOPHILS NFR BLD: 0.2 % (ref 0–1.9)
BILIRUB SERPL-MCNC: 0.1 MG/DL (ref 0.1–1)
BUN SERPL-MCNC: 73 MG/DL (ref 8–23)
BUN SERPL-MCNC: 76 MG/DL (ref 8–23)
BURR CELLS BLD QL SMEAR: ABNORMAL
CALCIUM SERPL-MCNC: 7.7 MG/DL (ref 8.7–10.5)
CALCIUM SERPL-MCNC: 8.3 MG/DL (ref 8.7–10.5)
CHLORIDE SERPL-SCNC: 104 MMOL/L (ref 95–110)
CHLORIDE SERPL-SCNC: 106 MMOL/L (ref 95–110)
CO2 SERPL-SCNC: 17 MMOL/L (ref 23–29)
CO2 SERPL-SCNC: 19 MMOL/L (ref 23–29)
CREAT SERPL-MCNC: 11.9 MG/DL (ref 0.5–1.4)
CREAT SERPL-MCNC: 12.3 MG/DL (ref 0.5–1.4)
CREAT UR-MCNC: 144.6 MG/DL (ref 23–375)
DIFFERENTIAL METHOD: ABNORMAL
DIFFERENTIAL METHOD: ABNORMAL
EOSINOPHIL # BLD AUTO: 0 K/UL (ref 0–0.5)
EOSINOPHIL # BLD AUTO: 0 K/UL (ref 0–0.5)
EOSINOPHIL NFR BLD: 0 % (ref 0–8)
EOSINOPHIL NFR BLD: 0 % (ref 0–8)
ERYTHROCYTE [DISTWIDTH] IN BLOOD BY AUTOMATED COUNT: 16.6 % (ref 11.5–14.5)
ERYTHROCYTE [DISTWIDTH] IN BLOOD BY AUTOMATED COUNT: 16.7 % (ref 11.5–14.5)
EST. GFR  (AFRICAN AMERICAN): 4 ML/MIN/1.73 M^2
EST. GFR  (AFRICAN AMERICAN): 4.3 ML/MIN/1.73 M^2
EST. GFR  (NON AFRICAN AMERICAN): 3.7 ML/MIN/1.73 M^2
EST. GFR  (NON AFRICAN AMERICAN): 4 ML/MIN/1.73 M^2
GLUCOSE SERPL-MCNC: 274 MG/DL (ref 70–110)
GLUCOSE SERPL-MCNC: 287 MG/DL (ref 70–110)
HCT VFR BLD AUTO: 33 % (ref 40–54)
HCT VFR BLD AUTO: 35.5 % (ref 40–54)
HGB BLD-MCNC: 10.4 G/DL (ref 14–18)
HGB BLD-MCNC: 11.2 G/DL (ref 14–18)
IMM GRANULOCYTES # BLD AUTO: 0.01 K/UL (ref 0–0.04)
IMM GRANULOCYTES # BLD AUTO: 0.02 K/UL (ref 0–0.04)
IMM GRANULOCYTES NFR BLD AUTO: 0.2 % (ref 0–0.5)
IMM GRANULOCYTES NFR BLD AUTO: 0.3 % (ref 0–0.5)
LYMPHOCYTES # BLD AUTO: 1.3 K/UL (ref 1–4.8)
LYMPHOCYTES # BLD AUTO: 1.7 K/UL (ref 1–4.8)
LYMPHOCYTES NFR BLD: 21.5 % (ref 18–48)
LYMPHOCYTES NFR BLD: 25.5 % (ref 18–48)
MAGNESIUM SERPL-MCNC: 2.5 MG/DL (ref 1.6–2.6)
MCH RBC QN AUTO: 27.4 PG (ref 27–31)
MCH RBC QN AUTO: 27.5 PG (ref 27–31)
MCHC RBC AUTO-ENTMCNC: 31.5 G/DL (ref 32–36)
MCHC RBC AUTO-ENTMCNC: 31.5 G/DL (ref 32–36)
MCV RBC AUTO: 87 FL (ref 82–98)
MCV RBC AUTO: 87 FL (ref 82–98)
MONOCYTES # BLD AUTO: 0 K/UL (ref 0.3–1)
MONOCYTES # BLD AUTO: 0.1 K/UL (ref 0.3–1)
MONOCYTES NFR BLD: 0.8 % (ref 4–15)
MONOCYTES NFR BLD: 0.9 % (ref 4–15)
NEUTROPHILS # BLD AUTO: 3.7 K/UL (ref 1.8–7.7)
NEUTROPHILS # BLD AUTO: 5.9 K/UL (ref 1.8–7.7)
NEUTROPHILS NFR BLD: 73.3 % (ref 38–73)
NEUTROPHILS NFR BLD: 77.2 % (ref 38–73)
NRBC BLD-RTO: 0 /100 WBC
NRBC BLD-RTO: 0 /100 WBC
PHOSPHATE SERPL-MCNC: 6.7 MG/DL (ref 2.7–4.5)
PLATELET # BLD AUTO: 219 K/UL (ref 150–450)
PLATELET # BLD AUTO: 243 K/UL (ref 150–450)
PLATELET BLD QL SMEAR: ABNORMAL
PMV BLD AUTO: 10.4 FL (ref 9.2–12.9)
PMV BLD AUTO: 11.6 FL (ref 9.2–12.9)
POCT GLUCOSE: 203 MG/DL (ref 70–110)
POCT GLUCOSE: 312 MG/DL (ref 70–110)
POCT GLUCOSE: 320 MG/DL (ref 70–110)
POCT GLUCOSE: 371 MG/DL (ref 70–110)
POIKILOCYTOSIS BLD QL SMEAR: SLIGHT
POTASSIUM SERPL-SCNC: 5.6 MMOL/L (ref 3.5–5.1)
POTASSIUM SERPL-SCNC: 6.1 MMOL/L (ref 3.5–5.1)
PROT SERPL-MCNC: 6.7 G/DL (ref 6–8.4)
RBC # BLD AUTO: 3.78 M/UL (ref 4.6–6.2)
RBC # BLD AUTO: 4.09 M/UL (ref 4.6–6.2)
SODIUM SERPL-SCNC: 136 MMOL/L (ref 136–145)
SODIUM SERPL-SCNC: 137 MMOL/L (ref 136–145)
SODIUM UR-SCNC: 67 MMOL/L (ref 20–250)
TACROLIMUS BLD-MCNC: 24.8 NG/ML (ref 5–15)
WBC # BLD AUTO: 4.99 K/UL (ref 3.9–12.7)
WBC # BLD AUTO: 7.67 K/UL (ref 3.9–12.7)

## 2022-07-20 PROCEDURE — 80053 COMPREHEN METABOLIC PANEL: CPT

## 2022-07-20 PROCEDURE — 85025 COMPLETE CBC W/AUTO DIFF WBC: CPT | Performed by: INTERNAL MEDICINE

## 2022-07-20 PROCEDURE — 86833 HLA CLASS II HIGH DEFIN QUAL: CPT | Performed by: PHYSICIAN ASSISTANT

## 2022-07-20 PROCEDURE — 83735 ASSAY OF MAGNESIUM: CPT

## 2022-07-20 PROCEDURE — 85025 COMPLETE CBC W/AUTO DIFF WBC: CPT | Mod: 91

## 2022-07-20 PROCEDURE — 25000003 PHARM REV CODE 250: Performed by: INTERNAL MEDICINE

## 2022-07-20 PROCEDURE — 99291 CRITICAL CARE FIRST HOUR: CPT | Mod: CR,,, | Performed by: INTERNAL MEDICINE

## 2022-07-20 PROCEDURE — 36415 COLL VENOUS BLD VENIPUNCTURE: CPT | Performed by: PHYSICIAN ASSISTANT

## 2022-07-20 PROCEDURE — 82570 ASSAY OF URINE CREATININE: CPT | Performed by: INTERNAL MEDICINE

## 2022-07-20 PROCEDURE — 63600175 PHARM REV CODE 636 W HCPCS

## 2022-07-20 PROCEDURE — 87799 DETECT AGENT NOS DNA QUANT: CPT | Performed by: PHYSICIAN ASSISTANT

## 2022-07-20 PROCEDURE — 63600175 PHARM REV CODE 636 W HCPCS: Performed by: PHYSICIAN ASSISTANT

## 2022-07-20 PROCEDURE — 63600175 PHARM REV CODE 636 W HCPCS: Performed by: INTERNAL MEDICINE

## 2022-07-20 PROCEDURE — 93010 ELECTROCARDIOGRAM REPORT: CPT | Mod: ,,, | Performed by: INTERNAL MEDICINE

## 2022-07-20 PROCEDURE — 25000003 PHARM REV CODE 250

## 2022-07-20 PROCEDURE — 99223 1ST HOSP IP/OBS HIGH 75: CPT | Mod: ,,,

## 2022-07-20 PROCEDURE — 86977 RBC SERUM PRETX INCUBJ/INHIB: CPT | Performed by: PHYSICIAN ASSISTANT

## 2022-07-20 PROCEDURE — 93005 ELECTROCARDIOGRAM TRACING: CPT

## 2022-07-20 PROCEDURE — 80197 ASSAY OF TACROLIMUS: CPT | Performed by: INTERNAL MEDICINE

## 2022-07-20 PROCEDURE — 20600001 HC STEP DOWN PRIVATE ROOM

## 2022-07-20 PROCEDURE — 36415 COLL VENOUS BLD VENIPUNCTURE: CPT | Performed by: INTERNAL MEDICINE

## 2022-07-20 PROCEDURE — 99291 PR CRITICAL CARE, E/M 30-74 MINUTES: ICD-10-PCS | Mod: CR,,, | Performed by: INTERNAL MEDICINE

## 2022-07-20 PROCEDURE — 25000003 PHARM REV CODE 250: Performed by: NURSE PRACTITIONER

## 2022-07-20 PROCEDURE — 86832 HLA CLASS I HIGH DEFIN QUAL: CPT | Performed by: PHYSICIAN ASSISTANT

## 2022-07-20 PROCEDURE — 84100 ASSAY OF PHOSPHORUS: CPT

## 2022-07-20 PROCEDURE — 99223 PR INITIAL HOSPITAL CARE,LEVL III: ICD-10-PCS | Mod: ,,,

## 2022-07-20 PROCEDURE — 80048 BASIC METABOLIC PNL TOTAL CA: CPT | Performed by: INTERNAL MEDICINE

## 2022-07-20 PROCEDURE — 84300 ASSAY OF URINE SODIUM: CPT | Performed by: INTERNAL MEDICINE

## 2022-07-20 PROCEDURE — 93010 EKG 12-LEAD: ICD-10-PCS | Mod: ,,, | Performed by: INTERNAL MEDICINE

## 2022-07-20 RX ORDER — INSULIN ASPART 100 [IU]/ML
0-5 INJECTION, SOLUTION INTRAVENOUS; SUBCUTANEOUS
Status: DISCONTINUED | OUTPATIENT
Start: 2022-07-20 | End: 2022-07-21

## 2022-07-20 RX ORDER — INSULIN ASPART 100 [IU]/ML
0-5 INJECTION, SOLUTION INTRAVENOUS; SUBCUTANEOUS
Refills: 0
Start: 2022-07-20 | End: 2022-07-27

## 2022-07-20 RX ORDER — ACETAMINOPHEN 325 MG/1
650 TABLET ORAL EVERY 4 HOURS PRN
Refills: 0
Start: 2022-07-20

## 2022-07-20 RX ORDER — SODIUM BICARBONATE 650 MG/1
1300 TABLET ORAL 2 TIMES DAILY
Status: DISCONTINUED | OUTPATIENT
Start: 2022-07-20 | End: 2022-07-22

## 2022-07-20 RX ORDER — METOPROLOL SUCCINATE 50 MG/1
50 TABLET, EXTENDED RELEASE ORAL DAILY
Status: DISCONTINUED | OUTPATIENT
Start: 2022-07-21 | End: 2022-07-25

## 2022-07-20 RX ORDER — NIFEDIPINE 30 MG/1
90 TABLET, EXTENDED RELEASE ORAL DAILY
Status: DISCONTINUED | OUTPATIENT
Start: 2022-07-21 | End: 2022-07-25

## 2022-07-20 RX ORDER — MYCOPHENOLATE MOFETIL 250 MG/1
500 CAPSULE ORAL 2 TIMES DAILY
Qty: 120 CAPSULE | Refills: 11 | Status: ON HOLD | OUTPATIENT
Start: 2022-07-20 | End: 2022-07-27 | Stop reason: SDUPTHER

## 2022-07-20 RX ORDER — MYCOPHENOLATE MOFETIL 250 MG/1
500 CAPSULE ORAL 2 TIMES DAILY
Status: DISCONTINUED | OUTPATIENT
Start: 2022-07-20 | End: 2022-07-20 | Stop reason: HOSPADM

## 2022-07-20 RX ORDER — HEPARIN SODIUM 5000 [USP'U]/ML
5000 INJECTION, SOLUTION INTRAVENOUS; SUBCUTANEOUS EVERY 8 HOURS
Start: 2022-07-20 | End: 2022-11-03

## 2022-07-20 RX ORDER — FUROSEMIDE 10 MG/ML
60 INJECTION INTRAMUSCULAR; INTRAVENOUS ONCE
Status: COMPLETED | OUTPATIENT
Start: 2022-07-20 | End: 2022-07-20

## 2022-07-20 RX ORDER — TACROLIMUS 1 MG/1
5 CAPSULE ORAL 2 TIMES DAILY
Status: DISCONTINUED | OUTPATIENT
Start: 2022-07-20 | End: 2022-07-22

## 2022-07-20 RX ORDER — NAPROXEN SODIUM 220 MG/1
81 TABLET, FILM COATED ORAL DAILY
Refills: 0
Start: 2022-07-21 | End: 2024-02-28

## 2022-07-20 RX ORDER — GLUCAGON 1 MG
1 KIT INJECTION
Status: DISCONTINUED | OUTPATIENT
Start: 2022-07-20 | End: 2022-07-21

## 2022-07-20 RX ORDER — SODIUM CHLORIDE 0.9 % (FLUSH) 0.9 %
10 SYRINGE (ML) INJECTION
Status: DISCONTINUED | OUTPATIENT
Start: 2022-07-20 | End: 2022-07-27 | Stop reason: HOSPADM

## 2022-07-20 RX ORDER — LISINOPRIL 20 MG/1
40 TABLET ORAL DAILY
Status: DISCONTINUED | OUTPATIENT
Start: 2022-07-21 | End: 2022-07-20

## 2022-07-20 RX ORDER — TACROLIMUS 5 MG/1
5 CAPSULE ORAL EVERY 12 HOURS
Start: 2022-07-20 | End: 2022-08-02 | Stop reason: SDUPTHER

## 2022-07-20 RX ORDER — IBUPROFEN 200 MG
24 TABLET ORAL
Status: DISCONTINUED | OUTPATIENT
Start: 2022-07-20 | End: 2022-07-21

## 2022-07-20 RX ORDER — METOPROLOL SUCCINATE 25 MG/1
25 TABLET, EXTENDED RELEASE ORAL DAILY
Status: DISCONTINUED | OUTPATIENT
Start: 2022-07-21 | End: 2022-07-25

## 2022-07-20 RX ORDER — ONDANSETRON 8 MG/1
8 TABLET, ORALLY DISINTEGRATING ORAL EVERY 8 HOURS PRN
Status: DISCONTINUED | OUTPATIENT
Start: 2022-07-20 | End: 2022-07-20

## 2022-07-20 RX ORDER — IBUPROFEN 200 MG
16 TABLET ORAL
Status: DISCONTINUED | OUTPATIENT
Start: 2022-07-20 | End: 2022-07-21

## 2022-07-20 RX ORDER — ATORVASTATIN CALCIUM 20 MG/1
80 TABLET, FILM COATED ORAL DAILY
Status: DISCONTINUED | OUTPATIENT
Start: 2022-07-21 | End: 2022-07-24

## 2022-07-20 RX ORDER — FAMOTIDINE 20 MG/1
20 TABLET, FILM COATED ORAL DAILY
Qty: 30 TABLET | Refills: 11 | Status: SHIPPED | OUTPATIENT
Start: 2022-07-21 | End: 2024-02-28

## 2022-07-20 RX ORDER — METOPROLOL SUCCINATE 25 MG/1
75 TABLET, EXTENDED RELEASE ORAL DAILY
Qty: 90 TABLET | Refills: 11 | Status: SHIPPED | OUTPATIENT
Start: 2022-07-21 | End: 2023-01-20

## 2022-07-20 RX ORDER — ACETAMINOPHEN 325 MG/1
650 TABLET ORAL EVERY 8 HOURS PRN
Status: DISCONTINUED | OUTPATIENT
Start: 2022-07-20 | End: 2022-07-27 | Stop reason: HOSPADM

## 2022-07-20 RX ORDER — MYCOPHENOLATE MOFETIL 250 MG/1
750 CAPSULE ORAL 2 TIMES DAILY
Status: DISCONTINUED | OUTPATIENT
Start: 2022-07-20 | End: 2022-07-25

## 2022-07-20 RX ADMIN — MUPIROCIN: 20 OINTMENT TOPICAL at 09:07

## 2022-07-20 RX ADMIN — MYCOPHENOLATE MOFETIL 750 MG: 250 CAPSULE ORAL at 09:07

## 2022-07-20 RX ADMIN — INSULIN HUMAN 5 UNITS: 100 INJECTION, SOLUTION PARENTERAL at 05:07

## 2022-07-20 RX ADMIN — INSULIN ASPART 4 UNITS: 100 INJECTION, SOLUTION INTRAVENOUS; SUBCUTANEOUS at 05:07

## 2022-07-20 RX ADMIN — METOPROLOL SUCCINATE 75 MG: 50 TABLET, EXTENDED RELEASE ORAL at 09:07

## 2022-07-20 RX ADMIN — NIFEDIPINE 90 MG: 30 TABLET, FILM COATED, EXTENDED RELEASE ORAL at 09:07

## 2022-07-20 RX ADMIN — GUAIFENESIN 200 MG: 200 SOLUTION ORAL at 03:07

## 2022-07-20 RX ADMIN — SODIUM POLYSTYRENE SULFONATE 30 G: 15 SUSPENSION ORAL; RECTAL at 10:07

## 2022-07-20 RX ADMIN — FUROSEMIDE 60 MG: 10 INJECTION, SOLUTION INTRAMUSCULAR; INTRAVENOUS at 10:07

## 2022-07-20 RX ADMIN — SODIUM BICARBONATE: 84 INJECTION, SOLUTION INTRAVENOUS at 09:07

## 2022-07-20 RX ADMIN — TACROLIMUS 5 MG: 1 CAPSULE ORAL at 05:07

## 2022-07-20 RX ADMIN — SODIUM ZIRCONIUM CYCLOSILICATE 10 G: 5 POWDER, FOR SUSPENSION ORAL at 11:07

## 2022-07-20 RX ADMIN — INSULIN ASPART 4 UNITS: 100 INJECTION, SOLUTION INTRAVENOUS; SUBCUTANEOUS at 11:07

## 2022-07-20 RX ADMIN — FAMOTIDINE 20 MG: 20 TABLET ORAL at 09:07

## 2022-07-20 RX ADMIN — SODIUM ZIRCONIUM CYCLOSILICATE 10 G: 5 POWDER, FOR SUSPENSION ORAL at 03:07

## 2022-07-20 RX ADMIN — INSULIN ASPART 2 UNITS: 100 INJECTION, SOLUTION INTRAVENOUS; SUBCUTANEOUS at 09:07

## 2022-07-20 RX ADMIN — HEPARIN SODIUM 5000 UNITS: 5000 INJECTION INTRAVENOUS; SUBCUTANEOUS at 06:07

## 2022-07-20 RX ADMIN — ASPIRIN 81 MG CHEWABLE TABLET 81 MG: 81 TABLET CHEWABLE at 09:07

## 2022-07-20 RX ADMIN — SODIUM BICARBONATE: 84 INJECTION, SOLUTION INTRAVENOUS at 01:07

## 2022-07-20 RX ADMIN — METHYLPREDNISOLONE SODIUM SUCCINATE 1000 MG: 1 INJECTION, POWDER, LYOPHILIZED, FOR SOLUTION INTRAMUSCULAR; INTRAVENOUS at 11:07

## 2022-07-20 RX ADMIN — HEPARIN SODIUM 5000 UNITS: 5000 INJECTION INTRAVENOUS; SUBCUTANEOUS at 03:07

## 2022-07-20 RX ADMIN — INSULIN ASPART 2 UNITS: 100 INJECTION, SOLUTION INTRAVENOUS; SUBCUTANEOUS at 06:07

## 2022-07-20 RX ADMIN — TACROLIMUS 5 MG: 1 CAPSULE ORAL at 09:07

## 2022-07-20 RX ADMIN — SODIUM BICARBONATE 650 MG TABLET 1300 MG: at 10:07

## 2022-07-20 NOTE — ASSESSMENT & PLAN NOTE
AUBREY suspect is related to acute rejection.  Rejection likely occurred due to poor absorption of prograf when pt was experiencing diarrhea   Prograf levels were low.  Noted adjustments in dosing had been made by transplant  Discussed with transplant  Transfer to [place] transplant service was recommended  [facility] [place] is full.  Last prograf level from yesterday was within the therapeutic range  Mild hyperkalemia    Will provide empiric treatment with solumedrol 1 g IV x 1 tonight now  Will re-evaluate in am  Will increase rate of IVF's (1.5 amp bicarb + 1/2 NS)  [facility]  Repeat labs in am

## 2022-07-20 NOTE — ASSESSMENT & PLAN NOTE
- Etiology is unclear   -No h/o volume loss - vomiting or diarrhea   -Case was discussed with Transplant surgery and suggested to initiate gentle hydration and assess response   -Renal U/S  -Nephrology consult   -Hold home medicine Lisinopril and torsemide

## 2022-07-20 NOTE — ASSESSMENT & PLAN NOTE
- Resume Cellcept and Tacrolimus   -Consult Nephrology   7/20-  -Nephrology consult obtained . Rejection suspected. Noted Prograf level was low or subtherapeutic for couple of weeks from late June to early July and pt was off Cellcept during COVID infection in early July, however adjustment was made by transplant team and Prograf level from yesterday is therapeutic. Pt has received Solumedrol 1 gram IV x 1 dose last night

## 2022-07-20 NOTE — SUBJECTIVE & OBJECTIVE
Past Medical History:   Diagnosis Date    Allergic rhinitis 2013    Blood transfusion     Cataract     CKD (chronic kidney disease), stage III 2014    -donor kidney transplant     Diabetes mellitus, type 2 2013    Gout, arthritis 2013    Heart attack     Heart transplanted     Hyperlipidemia 2013    Hypertension     Immunodeficiency due to treatment with immunosuppressive medication     Morbid obesity 2013    Organ transplant 2002    heart and kidney    Prophylactic immunotherapy     Renal manifestation of secondary diabetes mellitus        Past Surgical History:   Procedure Laterality Date    CATARACT EXTRACTION Bilateral     COLONOSCOPY N/A 10/6/2020    Procedure: COLONOSCOPY;  Surgeon: Conner Redman MD;  Location: [facility];  Service: Endoscopy;  Laterality: N/A;    EYE SURGERY      HEART TRANSPLANT      KIDNEY TRANSPLANT      REVISION TOTAL HIP ARTHROPLASTY         Review of patient's allergies indicates:  No Known Allergies    No current facility-administered medications on file prior to encounter.     Current Outpatient Medications on File Prior to Encounter   Medication Sig    atorvastatin (LIPITOR) 80 MG tablet TAKE 1 TABLET ONCE DAILY    calcium carbonate (OS-CARRIE) 500 mg calcium (1,250 mg) tablet Take 1 tablet by mouth once daily.    cholecalciferol, vitamin D3, 125 mcg (5,000 unit) Tab Take 5,000 Units by mouth once daily.    gabapentin (NEURONTIN) 300 MG capsule TAKE 1 CAPSULE TWICE DAILY    insulin NPH/Reg human (HUMULIN 70/30 U-100 KWIKPEN) 100 unit/mL (70-30) InPn pen Inject 40 Units into the skin 2 (two) times daily.    lisinopriL (PRINIVIL,ZESTRIL) 40 MG tablet Take 1 tablet (40 mg total) by mouth once daily.    Low-Dose Aspirin 81 mg Tab Take 81 mg by mouth once daily.    metoprolol succinate (TOPROL-XL) 25 MG 24 hr tablet Take 25 mg by mouth once daily. (Take with 50 mg for a total of 75 mg daily.)    metoprolol succinate (TOPROL-XL) 50 MG 24 hr tablet Take  50 mg by mouth once daily. (Take with 25 mg for a total of 75 mg daily.)    MULTIVIT-MIN/FA/LYCOPEN/LUTEIN (CENTRUM SILVER MEN ORAL) Take by mouth once daily.    mycophenolate (CELLCEPT) 250 mg Cap Take 3 capsules (750 mg total) by mouth 2 (two) times daily.    NIFEdipine (PROCARDIA-XL) 90 MG (OSM) 24 hr tablet Take 1 tablet (90 mg total) by mouth once daily.    semaglutide (OZEMPIC) 2 mg/dose (8 mg/3 mL) PnIj Inject 2 mg into the skin once a week.    tacrolimus (PROGRAF) 1 MG Cap Take 5 capsules (5 mg total) by mouth every morning AND 5 capsules (5 mg total) every evening.    torsemide (DEMADEX) 5 MG Tab Take 20 mg by mouth once daily.    FREESTYLE SHREE 2 SENSOR Kit APPLY 1 SENSOR             SUBCUTANEOUSLY EVERY 14    DAYS    [DISCONTINUED] amLODIPine (NORVASC) 10 MG tablet Take 0.5 tablets (5 mg total) by mouth once daily.    [DISCONTINUED] fluticasone propionate (FLONASE) 50 mcg/actuation nasal spray 1 spray by Each Nare route as needed for Rhinitis.    [DISCONTINUED] pulse oximeter (PULSE OXIMETER) device Use twice daily at 8 AM and 3 PM and record the value in [facility] as directed.     Family History       Problem Relation (Age of Onset)    Diabetes Brother, Maternal Grandmother    Early death Brother    Heart disease Brother    Hyperlipidemia Sister, Brother    Hypertension Brother    Kidney disease Son    Stroke Mother, Brother          Tobacco Use    Smoking status: Former Smoker     Packs/day: 1.00     Years: 20.00     Pack years: 20.00     Types: Cigarettes     Quit date: 1984     Years since quittin.0    Smokeless tobacco: Never Used   Substance and Sexual Activity    Alcohol use: Yes     Alcohol/week: 1.0 standard drink     Types: 1 Cans of beer per week     Comment: daily    Drug use: No    Sexual activity: Never     Review of Systems   Constitutional:  Positive for activity change and fatigue. Negative for appetite change and fever.   HENT:  Negative for sore throat.    Eyes:  Negative for  visual disturbance.   Respiratory:  Positive for shortness of breath. Negative for cough and chest tightness.    Cardiovascular:  Negative for chest pain, palpitations and leg swelling.   Gastrointestinal:  Positive for nausea. Negative for abdominal distention, abdominal pain, constipation, diarrhea and vomiting.   Endocrine: Negative for polyuria.   Genitourinary:  Positive for decreased urine volume. Negative for dysuria, flank pain, frequency and hematuria.   Musculoskeletal:  Negative for back pain and gait problem.   Skin:  Negative for rash.   Neurological:  Positive for weakness (Generalized). Negative for syncope, speech difficulty, light-headedness and headaches.   Psychiatric/Behavioral:  Negative for confusion, hallucinations and sleep disturbance.    Objective:     Vital Signs (Most Recent):  Temp: 97.8 °F (36.6 °C) (07/19/22 1943)  Pulse: 79 (07/19/22 1943)  Resp: 18 (07/19/22 1943)  BP: 123/66 (07/19/22 1943)  SpO2: 95 % (07/19/22 1943) Vital Signs (24h Range):  Temp:  [97.8 °F (36.6 °C)-98.9 °F (37.2 °C)] 97.8 °F (36.6 °C)  Pulse:  [75-81] 79  Resp:  [16-22] 18  SpO2:  [93 %-100 %] 95 %  BP: (123-173)/(66-83) 123/66     Weight: 130.7 kg (288 lb 2.3 oz)  Body mass index is 37 kg/m².    Physical Exam  Constitutional:       General: He is not in acute distress.     Appearance: He is well-developed. He is not diaphoretic.   HENT:      Head: Normocephalic and atraumatic.      Mouth/Throat:      Pharynx: No oropharyngeal exudate.   Eyes:      Conjunctiva/sclera: Conjunctivae normal.      Pupils: Pupils are equal, round, and reactive to light.   Neck:      Thyroid: No thyromegaly.      Vascular: No JVD.   Cardiovascular:      Rate and Rhythm: Normal rate and regular rhythm.      Heart sounds: Normal heart sounds. No murmur heard.  Pulmonary:      Effort: Pulmonary effort is normal. No respiratory distress.      Breath sounds: Examination of the right-lower field reveals rales. Examination of the left-lower  field reveals rales. Rales present. No wheezing.   Chest:      Chest wall: No tenderness.   Abdominal:      General: Bowel sounds are normal. There is no distension.      Palpations: Abdomen is soft.      Tenderness: There is no abdominal tenderness. There is no guarding or rebound.   Musculoskeletal:         General: Normal range of motion.      Cervical back: Normal range of motion and neck supple.   Lymphadenopathy:      Cervical: No cervical adenopathy.   Skin:     General: Skin is warm and dry.      Findings: No rash.   Neurological:      Mental Status: He is alert and oriented to person, place, and time.      Cranial Nerves: No cranial nerve deficit.      Sensory: No sensory deficit.      Deep Tendon Reflexes: Reflexes normal.         CRANIAL NERVES     CN III, IV, VI   Pupils are equal, round, and reactive to light.     Significant Labs:   Results for orders placed or performed during the hospital encounter of 07/19/22   CBC auto differential   Result Value Ref Range    WBC 9.12 3.90 - 12.70 K/uL    RBC 3.16 (L) 4.60 - 6.20 M/uL    Hemoglobin 10.0 (L) 14.0 - 18.0 g/dL    Hematocrit 28.3 (L) 40.0 - 54.0 %    MCV 90 82 - 98 fL    MCH 27.4 27.0 - 31.0 pg    MCHC 31.3 (L) 32.0 - 36.0 g/dL    RDW 21.7 (H) 11.5 - 14.5 %    Platelets 247 150 - 450 K/uL    MPV 12.7 9.2 - 12.9 fL    Immature Granulocytes 0.3 0.0 - 0.5 %    Gran # (ANC) 4.9 1.8 - 7.7 K/uL    Immature Grans (Abs) 0.03 0.00 - 0.04 K/uL    Lymph # 3.4 1.0 - 4.8 K/uL    Mono # 0.7 0.3 - 1.0 K/uL    Eos # 0.1 0.0 - 0.5 K/uL    Baso # 0.06 0.00 - 0.20 K/uL    nRBC 0 0 /100 WBC    Gran % 54.1 38.0 - 73.0 %    Lymph % 36.8 18.0 - 48.0 %    Mono % 7.1 4.0 - 15.0 %    Eosinophil % 1.0 0.0 - 8.0 %    Basophil % 0.7 0.0 - 1.9 %    Platelet Estimate Appears normal     Aniso Slight     Differential Method Automated    Urinalysis, Reflex to Urine Culture Urine, Clean Catch    Specimen: Urine   Result Value Ref Range    Specimen UA Urine, Catheterized     Color, UA  Yellow Yellow, Straw, Honey    Appearance, UA Clear Clear    pH, UA 6.0 5.0 - 8.0    Specific Gravity, UA 1.010 1.005 - 1.030    Protein, UA 2+ (A) Negative    Glucose, UA Negative Negative    Ketones, UA Negative Negative    Bilirubin (UA) Negative Negative    Occult Blood UA Negative Negative    Nitrite, UA Negative Negative    Urobilinogen, UA Negative <2.0 EU/dL    Leukocytes, UA Negative Negative   Urinalysis, Reflex to Urine Culture Urine, Clean Catch    Specimen: Urine   Result Value Ref Range    Specimen UA Urine, Catheterized     Color, UA Yellow Yellow, Straw, Honey    Appearance, UA Clear Clear    pH, UA 6.0 5.0 - 8.0    Specific Gravity, UA 1.010 1.005 - 1.030    Protein, UA 2+ (A) Negative    Glucose, UA Negative Negative    Ketones, UA Negative Negative    Bilirubin (UA) Negative Negative    Occult Blood UA Negative Negative    Nitrite, UA Negative Negative    Urobilinogen, UA Negative <2.0 EU/dL    Leukocytes, UA Negative Negative   Creatinine, urine, random   Result Value Ref Range    Creatinine, Urine 143.6 23.0 - 375.0 mg/dL   Comprehensive metabolic panel   Result Value Ref Range    Sodium 141 136 - 145 mmol/L    Potassium 5.5 (H) 3.5 - 5.1 mmol/L    Chloride 110 95 - 110 mmol/L    CO2 17 (L) 23 - 29 mmol/L    Glucose 86 70 - 110 mg/dL    BUN 57 (H) 8 - 23 mg/dL    Creatinine 12.9 (H) 0.5 - 1.4 mg/dL    Calcium 8.6 (L) 8.7 - 10.5 mg/dL    Total Protein 7.0 6.0 - 8.4 g/dL    Albumin 2.0 (L) 3.5 - 5.2 g/dL    Total Bilirubin 0.2 0.1 - 1.0 mg/dL    Alkaline Phosphatase 107 55 - 135 U/L    AST 30 10 - 40 U/L    ALT 31 10 - 44 U/L    Anion Gap 14 8 - 16 mmol/L    eGFR if African American 4 (A) >60 mL/min/1.73 m^2    eGFR if non African American 3 (A) >60 mL/min/1.73 m^2   Magnesium   Result Value Ref Range    Magnesium 2.5 1.6 - 2.6 mg/dL   Urinalysis Microscopic   Result Value Ref Range    RBC, UA 2 0 - 4 /hpf    WBC, UA 2 0 - 5 /hpf    WBC Clumps, UA Occasional (A) None-Rare    Bacteria Rare None-Occ  /hpf    Hyaline Casts, UA 0 0-1/lpf /lpf    Microscopic Comment SEE COMMENT    POCT COVID-19 Rapid Screening   Result Value Ref Range    POC Rapid COVID Negative Negative     Acceptable Yes    POCT glucose   Result Value Ref Range    POCT Glucose 127 (H) 70 - 110 mg/dL     *Note: Due to a large number of results and/or encounters for the requested time period, some results have not been displayed. A complete set of results can be found in Results Review.         Significant Imaging:   Imaging Results              US Transplant Kidney With Doppler (In process)    Procedure changed from US Retroperitoneal Complete                    X-Ray Chest AP Portable (Final result)  Result time 07/19/22 11:44:49      Final result by Jaspal Solo MD (07/19/22 11:44:49)                   Impression:      See findings above.      Electronically signed by: Jaspal Solo MD  Date:    07/19/2022  Time:    11:44               Narrative:    EXAMINATION:  XR CHEST AP PORTABLE    CLINICAL HISTORY:  Weakness    FINDINGS:  Single view of the chest.  Comparison 07/18/2022.    Cardiac silhouette is mildly enlarged but stable.  Aorta demonstrates atherosclerotic disease.  Mild atelectasis right midlung zone.  Patchy infiltrates seen at the lung bases bilaterally could reflect edema or early airspace disease versus aspiration. No evidence of pleural effusion or pneumothorax.  Bones appear intact.

## 2022-07-20 NOTE — PLAN OF CARE
Problem: Adult Inpatient Plan of Care  Goal: Plan of Care Review  Outcome: Ongoing, Progressing  Goal: Patient-Specific Goal (Individualized)  Outcome: Ongoing, Progressing  Goal: Absence of Hospital-Acquired Illness or Injury  Outcome: Ongoing, Progressing  Goal: Optimal Comfort and Wellbeing  Outcome: Ongoing, Progressing     Problem: Diabetes Comorbidity  Goal: Blood Glucose Level Within Targeted Range  Outcome: Ongoing, Progressing     Problem: Fluid and Electrolyte Imbalance (Acute Kidney Injury/Impairment)  Goal: Fluid and Electrolyte Balance  Outcome: Ongoing, Progressing     Problem: Oral Intake Inadequate (Acute Kidney Injury/Impairment)  Goal: Optimal Nutrition Intake  Outcome: Ongoing, Progressing

## 2022-07-20 NOTE — PROGRESS NOTES
[facility] Hospital Medicine  Progress Note    Patient Name: Nigel Albrecht  MRN: [id number]  Patient Class: IP- Inpatient   Admission Date: 7/19/2022  Length of Stay: 1 days  Attending Physician: Mary Rocha MD  Primary Care Provider: Krystin Zimmerman MD        Subjective:     Principal Problem:AUBREY (acute kidney injury)        HPI:  The pt is a 71 yo male with PMHx significant for Heart and Kidney transplant in 2002 on chronic immunosuppressive therapy with tacrolimus and cellcept, DM 2, HTN, HLD, and CKD IIIb, recent COVID 19 virus infection ( 6/25/22) and admitted to the hospital at [facility]  for COVID pneumonia from 7/4 to 7/8,  treated with remdesivir x 4 doses and dexamethasone. Pt did not qualify for home oxygen and advised to hold Cellcept for 1 week on discharge  which he started back since last week. Today pt was asked by his transplant MD to report to the ED due to abnormal findings noted on routine laboratory tests  that was done yesterday. Pt is noted to have creatinine 11.1 with baseline 1.8 to 2.1,   K 5.6, . Pt noted he has been more short of breath , decreased energy and has not been urinated as much lately. Pt denies  headaches, confusion, chest pain, fever, chills, nausea , vomiting, dark urine, leg swelling , abdominal pain or changes in bowel habit.     Case was discussed with Transplant Surgery at [facility] by ED provider who states that [facility] is at capacity and ED in diversion and recommended  to initiate IVF with bicarb drip at 75 mLs per hour, renal US, and admit to  service. Pt remains on the list to be transferred over once bed is available at [facility].       Overview/Hospital Course:  7/20 - Nephrology consult obtained . Rejection suspected. Noted Prograf level was low or subtherapeutic for couple of weeks from late June to early July and pt was off Cellcept during COVID infection in early July, however adjustment was made  by transplant team and Prograf level from yesterday is therapeutic. Pt has received Solumedrol 1 gram IV x 1 dose last night . IVF increased. Also received Lokelma 10 gram for hyperkalemia . Awaiting transfer to Transplant Medicine in [facility]. .       Interval History:   Rejection suspected.Pt has received Solumedrol 1 gram IV x 1 dose last night . IVF increased      Review of Systems   Constitutional:  Positive for activity change and fatigue. Negative for appetite change and fever.   HENT:  Negative for sore throat.    Eyes:  Negative for visual disturbance.   Respiratory:  Positive for shortness of breath. Negative for cough and chest tightness.    Cardiovascular:  Negative for chest pain, palpitations and leg swelling.   Gastrointestinal:  Positive for nausea. Negative for abdominal distention, abdominal pain, constipation, diarrhea and vomiting.        Two loose stools reported since admission    Endocrine: Negative for polyuria.   Genitourinary:  Positive for decreased urine volume. Negative for dysuria, flank pain, frequency and hematuria.   Musculoskeletal:  Negative for back pain and gait problem.   Skin:  Negative for rash.   Neurological:  Positive for weakness (Generalized). Negative for syncope, speech difficulty, light-headedness and headaches.   Psychiatric/Behavioral:  Negative for confusion, hallucinations and sleep disturbance.    Objective:     Vital Signs (Most Recent):  Temp: 97.7 °F (36.5 °C) (07/20/22 1117)  Pulse: 70 (07/20/22 1330)  Resp: 19 (07/20/22 1117)  BP: (!) 147/75 (07/20/22 1117)  SpO2: 99 % (07/20/22 1117)   Vital Signs (24h Range):  Temp:  [97.7 °F (36.5 °C)-98.9 °F (37.2 °C)] 97.7 °F (36.5 °C)  Pulse:  [70-81] 70  Resp:  [16-19] 19  SpO2:  [95 %-100 %] 99 %  BP: (114-173)/(57-83) 147/75     Weight: 132 kg (291 lb 0.1 oz)  Body mass index is 37.36 kg/m².    Intake/Output Summary (Last 24 hours) at 7/20/2022 1430  Last data filed at 7/20/2022 0800  Gross per 24 hour   Intake  180 ml   Output 250 ml   Net -70 ml      Physical Exam  Constitutional:       General: He is not in acute distress.     Appearance: He is well-developed. He is not diaphoretic.   HENT:      Head: Normocephalic and atraumatic.      Mouth/Throat:      Pharynx: No oropharyngeal exudate.   Eyes:      Conjunctiva/sclera: Conjunctivae normal.      Pupils: Pupils are equal, round, and reactive to light.   Neck:      Thyroid: No thyromegaly.      Vascular: No JVD.   Cardiovascular:      Rate and Rhythm: Normal rate and regular rhythm.      Heart sounds: Normal heart sounds. No murmur heard.  Pulmonary:      Effort: Pulmonary effort is normal. No respiratory distress.      Breath sounds: Examination of the right-lower field reveals rales. Examination of the left-lower field reveals rales. Rales present. No wheezing.   Chest:      Chest wall: No tenderness.   Abdominal:      General: Bowel sounds are normal. There is no distension.      Palpations: Abdomen is soft.      Tenderness: There is no abdominal tenderness. There is no guarding or rebound.   Musculoskeletal:         General: Normal range of motion.      Cervical back: Normal range of motion and neck supple.   Lymphadenopathy:      Cervical: No cervical adenopathy.   Skin:     General: Skin is warm and dry.      Findings: No rash.   Neurological:      Mental Status: He is alert and oriented to person, place, and time.      Cranial Nerves: No cranial nerve deficit.      Sensory: No sensory deficit.      Deep Tendon Reflexes: Reflexes normal.       Significant Labs: All pertinent labs within the past 24 hours have been reviewed.  BMP:   Recent Labs   Lab 07/19/22  1550 07/20/22  0934   GLU 86 274*    137   K 5.5* 6.1*    106   CO2 17* 17*   BUN 57* 76*   CREATININE 12.9* 12.3*   CALCIUM 8.6* 7.7*   MG 2.5  --      CBC:   Recent Labs   Lab 07/19/22  1154 07/20/22  0934   WBC 9.12 4.99   HGB 10.0* 11.2*   HCT 28.3* 35.5*    243     CMP:   Recent Labs    Lab 22  1550 22  0934    137   K 5.5* 6.1*    106   CO2 17* 17*   GLU 86 274*   BUN 57* 76*   CREATININE 12.9* 12.3*   CALCIUM 8.6* 7.7*   PROT 7.0  --    ALBUMIN 2.0*  --    BILITOT 0.2  --    ALKPHOS 107  --    AST 30  --    ALT 31  --    ANIONGAP 14 14   EGFRNONAA 3* 4*       Significant Imaging:       Assessment/Plan:      * AUBREY (acute kidney injury) on CKD, stage IIIb  -  - Etiology is unclear   -No recent h/o volume loss - vomiting or diarrhea , however pt had diarrhea in early July with COVID 19 virus infection   -Case was discussed with Transplant surgery and suggested to initiate gentle hydration and assess response   -Renal U/S  -Nephrology consult   -Hold home medicine Lisinopril and torsemide      -  Nephrology consult obtained . Rejection suspected.  Pt has received Solumedrol 1 gram IV x 1 dose last night . IVF increased. Also received Lokelma 10 gram for hyperkalemia . Awaiting transfer to Transplant Medicine in [facility], [facility] Zimm[facility].       -donor kidney transplant  - Resume Cellcept and Tacrolimus   -Consult Nephrology   -  -Nephrology consult obtained . Rejection suspected. Noted Prograf level was low or subtherapeutic for couple of weeks from late  to early July and pt was off Cellcept during COVID infection in early July, however adjustment was made by transplant team and Prograf level from yesterday is therapeutic. Pt has received Solumedrol 1 gram IV x 1 dose last night     Heart transplanted  - Resume Cellcept and Tacrolimus       Immunosuppression due to drug therapy  - Standard precaution       Primary hypertension  - Review home meds and resume as appropriate   -Hold Lisinopril for now due to AUBREY on CKD      Type 2 diabetes mellitus  - Get HgA1c   -ISS  -Add basal insulin as appropriate       Hepatitis C test positive        Anemia of chronic renal failure, stage 3b  -Monitor H/H and signs of active bleeding   -Currently stable at baseline       Class  "2 severe obesity due to excess calories with serious comorbidity and body mass index (BMI) of 37.0 to 37.9 in adult  Estimated body mass index is 37 kg/m² as calculated from the following:    Height as of this encounter: 6' 2" (1.88 m).    Weight as of this encounter: 130.7 kg (288 lb 2.3 oz).          VTE Risk Mitigation (From admission, onward)         Ordered     heparin (porcine) injection 5,000 Units  Every 8 hours         07/19/22 1717     IP VTE HIGH RISK PATIENT  Once         07/19/22 1717     Place sequential compression device  Until discontinued         07/19/22 1717                Discharge Planning   CATHRYN:      Code Status: Full Code   Is the patient medically ready for discharge?:     Reason for patient still in hospital (select all that apply): Patient trending condition  Discharge Plan A: Home, Home with family                  Mary Rocha MD  Department of Hospital Medicine   '[place] - Telemetry ([facility])  "

## 2022-07-20 NOTE — H&P
[facility] Medicine  History & Physical    Patient Name: Nigel Albrecht  MRN: [id number]  Patient Class: IP- Inpatient  Admission Date: 7/19/2022  Attending Physician: Mary Rocha MD  Primary Care Provider: Krystin Zimmerman MD         Patient information was obtained from patient, past medical records and ER records.     Subjective:     Principal Problem:AUBREY (acute kidney injury)    Chief Complaint:   Chief Complaint   Patient presents with    Abnormal Lab     Told to come to ER for Creatinine of 11. Hx kidney and heart transplant.        HPI: The pt is a 71 yo male with PMHx significant for Heart and Kidney transplant in 2002 on chronic immunosuppressive therapy with tacrolimus and cellcept, DM 2, HTN, HLD, and CKD IIIb, recent COVID 19 virus infection ( 6/25/22) and admitted to the hospital at [facility]  for COVID pneumonia from 7/4 to 7/8,  treated with remdesivir x 4 doses and dexamethasone. Pt did not qualify for home oxygen and advised to hold Cellcept for 1 week on discharge  which he started back since last week. Today pt was asked by his transplant MD to report to the ED due to abnormal findings noted on routine laboratory tests  that was done yesterday. Pt is noted to have creatinine 11.1 with baseline 1.8 to 2.1,   K 5.6, . Pt noted he has been more short of breath , decreased energy and has not been urinated as much lately. Pt denies  headaches, confusion, chest pain, fever, chills, nausea , vomiting, dark urine, leg swelling , abdominal pain or changes in bowel habit.     Case was discussed with Transplant Surgery at [facility] by ED provider who states that [facility] is at capacity and ED in diversion and recommended  to initiate IVF with bicarb drip at 75 mLs per hour, renal US, and admit to  service. Pt remains on the list to be transferred over once bed is available at [facility].       Past Medical History:   Diagnosis Date     Allergic rhinitis 2013    Blood transfusion     Cataract     CKD (chronic kidney disease), stage III 2014    -donor kidney transplant     Diabetes mellitus, type 2 2013    Gout, arthritis 2013    Heart attack     Heart transplanted     Hyperlipidemia 2013    Hypertension     Immunodeficiency due to treatment with immunosuppressive medication     Morbid obesity 2013    Organ transplant 2002    heart and kidney    Prophylactic immunotherapy     Renal manifestation of secondary diabetes mellitus        Past Surgical History:   Procedure Laterality Date    CATARACT EXTRACTION Bilateral     COLONOSCOPY N/A 10/6/2020    Procedure: COLONOSCOPY;  Surgeon: Conner Redman MD;  Location: [facility];  Service: Endoscopy;  Laterality: N/A;    EYE SURGERY      HEART TRANSPLANT      KIDNEY TRANSPLANT      REVISION TOTAL HIP ARTHROPLASTY         Review of patient's allergies indicates:  No Known Allergies    No current facility-administered medications on file prior to encounter.     Current Outpatient Medications on File Prior to Encounter   Medication Sig    atorvastatin (LIPITOR) 80 MG tablet TAKE 1 TABLET ONCE DAILY    calcium carbonate (OS-CARRIE) 500 mg calcium (1,250 mg) tablet Take 1 tablet by mouth once daily.    cholecalciferol, vitamin D3, 125 mcg (5,000 unit) Tab Take 5,000 Units by mouth once daily.    gabapentin (NEURONTIN) 300 MG capsule TAKE 1 CAPSULE TWICE DAILY    insulin NPH/Reg human (HUMULIN 70/30 U-100 KWIKPEN) 100 unit/mL (70-30) InPn pen Inject 40 Units into the skin 2 (two) times daily.    lisinopriL (PRINIVIL,ZESTRIL) 40 MG tablet Take 1 tablet (40 mg total) by mouth once daily.    Low-Dose Aspirin 81 mg Tab Take 81 mg by mouth once daily.    metoprolol succinate (TOPROL-XL) 25 MG 24 hr tablet Take 25 mg by mouth once daily. (Take with 50 mg for a total of 75 mg daily.)    metoprolol succinate (TOPROL-XL) 50 MG 24 hr tablet Take 50 mg by  mouth once daily. (Take with 25 mg for a total of 75 mg daily.)    MULTIVIT-MIN/FA/LYCOPEN/LUTEIN (CENTRUM SILVER MEN ORAL) Take by mouth once daily.    mycophenolate (CELLCEPT) 250 mg Cap Take 3 capsules (750 mg total) by mouth 2 (two) times daily.    NIFEdipine (PROCARDIA-XL) 90 MG (OSM) 24 hr tablet Take 1 tablet (90 mg total) by mouth once daily.    semaglutide (OZEMPIC) 2 mg/dose (8 mg/3 mL) PnIj Inject 2 mg into the skin once a week.    tacrolimus (PROGRAF) 1 MG Cap Take 5 capsules (5 mg total) by mouth every morning AND 5 capsules (5 mg total) every evening.    torsemide (DEMADEX) 5 MG Tab Take 20 mg by mouth once daily.    FREESTYLE SHREE 2 SENSOR Kit APPLY 1 SENSOR             SUBCUTANEOUSLY EVERY 14    DAYS    [DISCONTINUED] amLODIPine (NORVASC) 10 MG tablet Take 0.5 tablets (5 mg total) by mouth once daily.    [DISCONTINUED] fluticasone propionate (FLONASE) 50 mcg/actuation nasal spray 1 spray by Each Nare route as needed for Rhinitis.    [DISCONTINUED] pulse oximeter (PULSE OXIMETER) device Use twice daily at 8 AM and 3 PM and record the value [facility] as directed.     Family History       Problem Relation (Age of Onset)    Diabetes Brother, Maternal Grandmother    Early death Brother    Heart disease Brother    Hyperlipidemia Sister, Brother    Hypertension Brother    Kidney disease Son    Stroke Mother, Brother          Tobacco Use    Smoking status: Former Smoker     Packs/day: 1.00     Years: 20.00     Pack years: 20.00     Types: Cigarettes     Quit date: 1984     Years since quittin.0    Smokeless tobacco: Never Used   Substance and Sexual Activity    Alcohol use: Yes     Alcohol/week: 1.0 standard drink     Types: 1 Cans of beer per week     Comment: daily    Drug use: No    Sexual activity: Never     Review of Systems   Constitutional:  Positive for activity change and fatigue. Negative for appetite change and fever.   HENT:  Negative for sore throat.    Eyes:  Negative  for visual disturbance.   Respiratory:  Positive for shortness of breath. Negative for cough and chest tightness.    Cardiovascular:  Negative for chest pain, palpitations and leg swelling.   Gastrointestinal:  Positive for nausea. Negative for abdominal distention, abdominal pain, constipation, diarrhea and vomiting.   Endocrine: Negative for polyuria.   Genitourinary:  Positive for decreased urine volume. Negative for dysuria, flank pain, frequency and hematuria.   Musculoskeletal:  Negative for back pain and gait problem.   Skin:  Negative for rash.   Neurological:  Positive for weakness (Generalized). Negative for syncope, speech difficulty, light-headedness and headaches.   Psychiatric/Behavioral:  Negative for confusion, hallucinations and sleep disturbance.    Objective:     Vital Signs (Most Recent):  Temp: 97.8 °F (36.6 °C) (07/19/22 1943)  Pulse: 79 (07/19/22 1943)  Resp: 18 (07/19/22 1943)  BP: 123/66 (07/19/22 1943)  SpO2: 95 % (07/19/22 1943) Vital Signs (24h Range):  Temp:  [97.8 °F (36.6 °C)-98.9 °F (37.2 °C)] 97.8 °F (36.6 °C)  Pulse:  [75-81] 79  Resp:  [16-22] 18  SpO2:  [93 %-100 %] 95 %  BP: (123-173)/(66-83) 123/66     Weight: 130.7 kg (288 lb 2.3 oz)  Body mass index is 37 kg/m².    Physical Exam  Constitutional:       General: He is not in acute distress.     Appearance: He is well-developed. He is not diaphoretic.   HENT:      Head: Normocephalic and atraumatic.      Mouth/Throat:      Pharynx: No oropharyngeal exudate.   Eyes:      Conjunctiva/sclera: Conjunctivae normal.      Pupils: Pupils are equal, round, and reactive to light.   Neck:      Thyroid: No thyromegaly.      Vascular: No JVD.   Cardiovascular:      Rate and Rhythm: Normal rate and regular rhythm.      Heart sounds: Normal heart sounds. No murmur heard.  Pulmonary:      Effort: Pulmonary effort is normal. No respiratory distress.      Breath sounds: Examination of the right-lower field reveals rales. Examination of the  left-lower field reveals rales. Rales present. No wheezing.   Chest:      Chest wall: No tenderness.   Abdominal:      General: Bowel sounds are normal. There is no distension.      Palpations: Abdomen is soft.      Tenderness: There is no abdominal tenderness. There is no guarding or rebound.   Musculoskeletal:         General: Normal range of motion.      Cervical back: Normal range of motion and neck supple.   Lymphadenopathy:      Cervical: No cervical adenopathy.   Skin:     General: Skin is warm and dry.      Findings: No rash.   Neurological:      Mental Status: He is alert and oriented to person, place, and time.      Cranial Nerves: No cranial nerve deficit.      Sensory: No sensory deficit.      Deep Tendon Reflexes: Reflexes normal.         CRANIAL NERVES     CN III, IV, VI   Pupils are equal, round, and reactive to light.     Significant Labs:   Results for orders placed or performed during the hospital encounter of 07/19/22   CBC auto differential   Result Value Ref Range    WBC 9.12 3.90 - 12.70 K/uL    RBC 3.16 (L) 4.60 - 6.20 M/uL    Hemoglobin 10.0 (L) 14.0 - 18.0 g/dL    Hematocrit 28.3 (L) 40.0 - 54.0 %    MCV 90 82 - 98 fL    MCH 27.4 27.0 - 31.0 pg    MCHC 31.3 (L) 32.0 - 36.0 g/dL    RDW 21.7 (H) 11.5 - 14.5 %    Platelets 247 150 - 450 K/uL    MPV 12.7 9.2 - 12.9 fL    Immature Granulocytes 0.3 0.0 - 0.5 %    Gran # (ANC) 4.9 1.8 - 7.7 K/uL    Immature Grans (Abs) 0.03 0.00 - 0.04 K/uL    Lymph # 3.4 1.0 - 4.8 K/uL    Mono # 0.7 0.3 - 1.0 K/uL    Eos # 0.1 0.0 - 0.5 K/uL    Baso # 0.06 0.00 - 0.20 K/uL    nRBC 0 0 /100 WBC    Gran % 54.1 38.0 - 73.0 %    Lymph % 36.8 18.0 - 48.0 %    Mono % 7.1 4.0 - 15.0 %    Eosinophil % 1.0 0.0 - 8.0 %    Basophil % 0.7 0.0 - 1.9 %    Platelet Estimate Appears normal     Aniso Slight     Differential Method Automated    Urinalysis, Reflex to Urine Culture Urine, Clean Catch    Specimen: Urine   Result Value Ref Range    Specimen UA Urine, Catheterized      Color, UA Yellow Yellow, Straw, Honey    Appearance, UA Clear Clear    pH, UA 6.0 5.0 - 8.0    Specific Gravity, UA 1.010 1.005 - 1.030    Protein, UA 2+ (A) Negative    Glucose, UA Negative Negative    Ketones, UA Negative Negative    Bilirubin (UA) Negative Negative    Occult Blood UA Negative Negative    Nitrite, UA Negative Negative    Urobilinogen, UA Negative <2.0 EU/dL    Leukocytes, UA Negative Negative   Urinalysis, Reflex to Urine Culture Urine, Clean Catch    Specimen: Urine   Result Value Ref Range    Specimen UA Urine, Catheterized     Color, UA Yellow Yellow, Straw, Honey    Appearance, UA Clear Clear    pH, UA 6.0 5.0 - 8.0    Specific Gravity, UA 1.010 1.005 - 1.030    Protein, UA 2+ (A) Negative    Glucose, UA Negative Negative    Ketones, UA Negative Negative    Bilirubin (UA) Negative Negative    Occult Blood UA Negative Negative    Nitrite, UA Negative Negative    Urobilinogen, UA Negative <2.0 EU/dL    Leukocytes, UA Negative Negative   Creatinine, urine, random   Result Value Ref Range    Creatinine, Urine 143.6 23.0 - 375.0 mg/dL   Comprehensive metabolic panel   Result Value Ref Range    Sodium 141 136 - 145 mmol/L    Potassium 5.5 (H) 3.5 - 5.1 mmol/L    Chloride 110 95 - 110 mmol/L    CO2 17 (L) 23 - 29 mmol/L    Glucose 86 70 - 110 mg/dL    BUN 57 (H) 8 - 23 mg/dL    Creatinine 12.9 (H) 0.5 - 1.4 mg/dL    Calcium 8.6 (L) 8.7 - 10.5 mg/dL    Total Protein 7.0 6.0 - 8.4 g/dL    Albumin 2.0 (L) 3.5 - 5.2 g/dL    Total Bilirubin 0.2 0.1 - 1.0 mg/dL    Alkaline Phosphatase 107 55 - 135 U/L    AST 30 10 - 40 U/L    ALT 31 10 - 44 U/L    Anion Gap 14 8 - 16 mmol/L    eGFR if African American 4 (A) >60 mL/min/1.73 m^2    eGFR if non African American 3 (A) >60 mL/min/1.73 m^2   Magnesium   Result Value Ref Range    Magnesium 2.5 1.6 - 2.6 mg/dL   Urinalysis Microscopic   Result Value Ref Range    RBC, UA 2 0 - 4 /hpf    WBC, UA 2 0 - 5 /hpf    WBC Clumps, UA Occasional (A) None-Rare    Bacteria  Rare None-Occ /hpf    Hyaline Casts, UA 0 0-1/lpf /lpf    Microscopic Comment SEE COMMENT    POCT COVID-19 Rapid Screening   Result Value Ref Range    POC Rapid COVID Negative Negative     Acceptable Yes    POCT glucose   Result Value Ref Range    POCT Glucose 127 (H) 70 - 110 mg/dL     *Note: Due to a large number of results and/or encounters for the requested time period, some results have not been displayed. A complete set of results can be found in Results Review.         Significant Imaging:   Imaging Results              US Transplant Kidney With Doppler (In process)    Procedure changed from US Retroperitoneal Complete                    X-Ray Chest AP Portable (Final result)  Result time 22 11:44:49      Final result by Jaspal Solo MD (22 11:44:49)                   Impression:      See findings above.      Electronically signed by: Jaspal Solo MD  Date:    2022  Time:    11:44               Narrative:    EXAMINATION:  XR CHEST AP PORTABLE    CLINICAL HISTORY:  Weakness    FINDINGS:  Single view of the chest.  Comparison 2022.    Cardiac silhouette is mildly enlarged but stable.  Aorta demonstrates atherosclerotic disease.  Mild atelectasis right midlung zone.  Patchy infiltrates seen at the lung bases bilaterally could reflect edema or early airspace disease versus aspiration. No evidence of pleural effusion or pneumothorax.  Bones appear intact.                                        Assessment/Plan:     * AUBREY (acute kidney injury) on CKD, stage IIIb  - Etiology is unclear   -No h/o volume loss - vomiting or diarrhea   -Case was discussed with Transplant surgery and suggested to initiate gentle hydration and assess response   -Renal U/S  -Nephrology consult   -Hold home medicine Lisinopril and torsemide     -donor kidney transplant  - Resume Cellcept and Tacrolimus   -Consult Nephrology     Heart transplanted  - Resume Cellcept and Tacrolimus  "      Immunosuppression due to drug therapy  - Standard precaution       Primary hypertension  - Review home meds and resume as appropriate   -Hold Lisinopril for now due to AUBREY on CKD      Type 2 diabetes mellitus  - Get HgA1c   -ISS  -Add basal insulin as appropriate       Hepatitis C test positive        Anemia of chronic renal failure, stage 3b  -Monitor H/H and signs of active bleeding   -Currently stable at baseline       Class 2 severe obesity due to excess calories with serious comorbidity and body mass index (BMI) of 37.0 to 37.9 in adult  Estimated body mass index is 37 kg/m² as calculated from the following:    Height as of this encounter: 6' 2" (1.88 m).    Weight as of this encounter: 130.7 kg (288 lb 2.3 oz).          VTE Risk Mitigation (From admission, onward)         Ordered     heparin (porcine) injection 5,000 Units  Every 8 hours         07/19/22 1717     IP VTE HIGH RISK PATIENT  Once         07/19/22 1717     Place sequential compression device  Until discontinued         07/19/22 1717                   Mary Rocha MD  Department of Hospital Medicine   [place] - Telemetry ([facility])  "

## 2022-07-20 NOTE — ASSESSMENT & PLAN NOTE
7/19-  - Etiology is unclear   -No recent h/o volume loss - vomiting or diarrhea , however pt had diarrhea in early July with COVID 19 virus infection   -Case was discussed with Transplant surgery and suggested to initiate gentle hydration and assess response   -Renal U/S  -Nephrology consult   -Hold home medicine Lisinopril and torsemide     7/20 -  Nephrology consult obtained . Rejection suspected.  Pt has received Solumedrol 1 gram IV x 1 dose last night . IVF increased. Also received Lokelma 10 gram for hyperkalemia . Awaiting transfer to Transplant Medicine in [facility], Cristian Zimmerman.

## 2022-07-20 NOTE — HPI
Pt was seen and examined. Labs and meds reviewed. Discussed with other providers. Chart was reviewed. Discussed with ER, hospitalist and kidney transplant in [place]. Pt is a 70 y/o male with a h/o of cadaveric kidney transplant and heart transplant at [facility] on 5/24/02, who presents to hospital after he was called for abnormal labs. To review, the cause of ESRD is not known, and pt was never on dialysis before the transplant. 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 Labs and meds, including the immunosuppressive meds and the doses reviewed with pt. Pt is compliant with all the meds. On his last clinic visit with me in Sept 2021, he was on repamune and mycophenolate. Pt says he was was switched to prograf over 1 month ago. Pt informed me that he had COVID in early July 2022 and experienced pneumonia. Pt was admitted in [place]. Hospital notes reviewed. Pt also had severe diarrhea, specially over 3 days. Pt is noted to have had very low levels of prograf in early to mid July 2022, including a level of < 2.0 on 7/6/22. As a response, transplant in [place] (7/7/22) increased the prograf dose. Prograf level yesterday was within the therapeutic range.

## 2022-07-20 NOTE — SUBJECTIVE & OBJECTIVE
Past Medical History:   Diagnosis Date    Allergic rhinitis 2013    Blood transfusion     Cataract     CKD (chronic kidney disease), stage III 2014    -donor kidney transplant     Diabetes mellitus, type 2 2013    Gout, arthritis 2013    Heart attack     Heart transplanted     Hyperlipidemia 2013    Hypertension     Immunodeficiency due to treatment with immunosuppressive medication     Morbid obesity 2013    Organ transplant 2002    heart and kidney    Prophylactic immunotherapy     Renal manifestation of secondary diabetes mellitus        Past Surgical History:   Procedure Laterality Date    CATARACT EXTRACTION Bilateral     COLONOSCOPY N/A 10/6/2020    Procedure: COLONOSCOPY;  Surgeon: Conner Redman MD;  Location: [facility];  Service: Endoscopy;  Laterality: N/A;    EYE SURGERY      HEART TRANSPLANT      KIDNEY TRANSPLANT      REVISION TOTAL HIP ARTHROPLASTY         Review of patient's allergies indicates:  No Known Allergies  Current Facility-Administered Medications   Medication Frequency    acetaminophen tablet 650 mg Q4H PRN    [START ON 2022] aspirin chewable tablet 81 mg Daily    dextrose 10% bolus 125 mL PRN    dextrose 10% bolus 250 mL PRN    famotidine tablet 20 mg Daily    glucagon (human recombinant) injection 1 mg PRN    glucose chewable tablet 16 g PRN    glucose chewable tablet 24 g PRN    heparin (porcine) injection 5,000 Units Q8H    hydrALAZINE injection 10 mg Q8H PRN    HYDROcodone-acetaminophen 5-325 mg per tablet 1 tablet Q4H PRN    insulin aspart U-100 pen 0-5 Units QID (AC + HS) PRN    methylPREDNISolone (SOLU-MEDROL) 1 g in dextrose 5 % 100 mL IVPB Q24H    [START ON 2022] metoprolol succinate 24 hr tablet 75 mg Daily    mupirocin 2 % ointment BID    mycophenolate capsule 750 mg BID    [START ON 2022] NIFEdipine 24 hr tablet 90 mg Daily    ondansetron injection 4 mg Q8H PRN    senna-docusate 8.6-50 mg per tablet 1 tablet Daily PRN     sodium bicarbonate 75 mEq in sodium chloride 0.45% 1,075 mL infusion Continuous    sodium chloride 0.9% flush 10 mL PRN    tacrolimus capsule 5 mg BID     Family History       Problem Relation (Age of Onset)    Diabetes Brother, Maternal Grandmother    Early death Brother    Heart disease Brother    Hyperlipidemia Sister, Brother    Hypertension Brother    Kidney disease Son    Stroke Mother, Brother          Tobacco Use    Smoking status: Former Smoker     Packs/day: 1.00     Years: 20.00     Pack years: 20.00     Types: Cigarettes     Quit date: 1984     Years since quittin.0    Smokeless tobacco: Never Used   Substance and Sexual Activity    Alcohol use: Yes     Alcohol/week: 1.0 standard drink     Types: 1 Cans of beer per week     Comment: daily    Drug use: No    Sexual activity: Never     Review of Systems   Constitutional: Negative.    HENT: Negative.     Respiratory: Negative.     Cardiovascular: Negative.    Gastrointestinal:  Positive for diarrhea.   Genitourinary: Negative.    Musculoskeletal: Negative.    Neurological: Negative.    Psychiatric/Behavioral: Negative.     Objective:     Vital Signs (Most Recent):  Temp: 97.8 °F (36.6 °C) (22)  Pulse: 79 (22)  Resp: 18 (22)  BP: 123/66 (22)  SpO2: 95 % (22)  O2 Device (Oxygen Therapy): room air (22 1100) Vital Signs (24h Range):  Temp:  [97.8 °F (36.6 °C)-98.9 °F (37.2 °C)] 97.8 °F (36.6 °C)  Pulse:  [75-81] 79  Resp:  [16-22] 18  SpO2:  [93 %-100 %] 95 %  BP: (123-173)/(66-83) 123/66     Weight: 130.7 kg (288 lb 2.3 oz) (22 1740)  Body mass index is 37 kg/m².  Body surface area is 2.61 meters squared.    I/O last 3 completed shifts:  In: -   Out: 250 [Urine:250]    Physical Exam  Vitals and nursing note reviewed.   Constitutional:       Appearance: Normal appearance.   HENT:      Head: Normocephalic and atraumatic.   Cardiovascular:      Rate and Rhythm: Normal rate and regular  rhythm.      Pulses: Normal pulses.      Heart sounds: Normal heart sounds.   Pulmonary:      Effort: Pulmonary effort is normal.      Breath sounds: Normal breath sounds.   Abdominal:      General: Abdomen is flat.      Palpations: Abdomen is soft.      Tenderness: There is no abdominal tenderness.   Musculoskeletal:      Right lower leg: No edema.      Left lower leg: No edema.   Skin:     Findings: No lesion.   Neurological:      General: No focal deficit present.      Mental Status: He is alert and oriented to person, place, and time.   Psychiatric:         Mood and Affect: Mood normal.         Behavior: Behavior normal.       Significant Labs: reviewed  BMP  Lab Results   Component Value Date     07/19/2022    K 5.5 (H) 07/19/2022     07/19/2022    CO2 17 (L) 07/19/2022    BUN 57 (H) 07/19/2022    CREATININE 12.9 (H) 07/19/2022    CALCIUM 8.6 (L) 07/19/2022    ANIONGAP 14 07/19/2022    ESTGFRAFRICA 4 (A) 07/19/2022    EGFRNONAA 3 (A) 07/19/2022     Lab Results   Component Value Date    WBC 9.12 07/19/2022    HGB 10.0 (L) 07/19/2022    HCT 28.3 (L) 07/19/2022    MCV 90 07/19/2022     07/19/2022     Prograf levels in the past few weeks:  Component Ref Range & Units 1 d ago   (7/18/22) 11 d ago   (7/8/22) 12 d ago   (7/7/22) 13 d ago   (7/6/22) 2 wk ago   (7/5/22) 2 wk ago   (7/4/22) 4 wk ago   (6/20/22)   Tacrolimus Lvl 5.0 - 15.0 ng/mL 7.2  4.2 Low  CM  2.3 Low  CM  <2.0 Low  CM  2.0 Low  CM  3.0 Low  CM  3.2 Low  CM          Significant Imaging: reviewed  Renal transplant u/s pending, ordered

## 2022-07-20 NOTE — PLAN OF CARE
Pt remains fall free this shift.  Pt AAOx4, verbal, clear speech.  Skin warm and dry. No new skin issues.  Cardiac monitoring in progress  Patient on RA  Voids per indwelling catheter d/t urinary retention  Assist with transfers.  CBG AC/ HS with PRN SS insulin.  Bed low, side rails up x 2, wheels locked, call light in reach.  Bed alarm maintained for safety.  Patient instructed to call for assistance.  Wife at bedside  Hourly rounding completed.  24 hour chart check completed  POC updated and reviewed with pt. Will continue POC.

## 2022-07-20 NOTE — PLAN OF CARE
[facility] (Hospital)  Initial Discharge Assessment       Primary Care Provider: Krystin Zimmerman MD    Admission Diagnosis: Weakness [R53.1]  Electrolyte abnormality [E87.8]  Acute renal failure, unspecified acute renal failure type [N17.9]    Admission Date: 7/19/2022  Expected Discharge Date:     Discharge Barriers Identified: None    Payor: MEDICARE / Plan: MEDICARE PART A & B / Product Type: Government /     Extended Emergency Contact Information  Primary Emergency Contact: TrenaJackie murrell  Address: [address]            [place], [place] [id number] [place] [address]  Home Phone: [phone]  Mobile Phone: [phone]  Relation: Spouse    Discharge Plan A: Home, Home with family         [facility] [address] AT Portal to Registered [facility] Sites  [address]  [facility] [address]  Phone: [phone] Fax: [phone]    [facility] [address]  [address]  [facility] [address]  Phone: [phone] Fax: [phone]    [facility] [address] [place] [facility] [place] [address]  Phone: [phone] Fax: [phone]      Initial Assessment (most recent)     Adult Discharge Assessment - 07/20/22 1053        Discharge Assessment    Assessment Type Discharge Planning Assessment     Confirmed/corrected address, phone number and insurance Yes     Confirmed Demographics Correct on Facesheet     Source of Information patient;family     Communicated CATHRYN with patient/caregiver Date not available/Unable to determine     Reason For Admission AUBREY     Lives With spouse     Facility Arrived From: Home     Do you expect to return to your current living situation? Yes     Do you have help at home or someone to help you manage your care at home? Yes     Who are your caregiver(s) and their phone number(s)? Pt's spouseJackie [phone]     Prior to hospitilization cognitive status: Alert/Oriented      Current cognitive status: Alert/Oriented     Walking or Climbing Stairs Difficulty ambulation difficulty, requires equipment     Mobility Management cane use PRN     Dressing/Bathing Difficulty bathing difficulty, assistance 1 person     Dressing/Bathing Management wife assists when needed     Readmission within 30 days? Yes     Patient currently being followed by outpatient case management? No     Do you currently have service(s) that help you manage your care at home? No     Do you take prescription medications? Yes     Do you have prescription coverage? Yes     Do you have any problems affording any of your prescribed medications? No     Is the patient taking medications as prescribed? yes     Who is going to help you get home at discharge? Pt's spouse     How do you get to doctors appointments? family or friend will provide     Are you on dialysis? No     Do you take coumadin? No     Discharge Plan A Home;Home with family     DME Needed Upon Discharge  none     Discharge Plan discussed with: Patient;Spouse/sig other     Discharge Barriers Identified None                 Readmission Assessment (most recent)     Readmission Assessment - 07/20/22 1104        Readmission    Why were you hospitalized in the last 30 days? COVID     Why were you readmitted? Told by provider to go to hospital     When you left the hospital how did you feel? Ok     When you left the hospital where did you go? Home with Family     Did patient/caregiver refused recommended DC plan? No     Tell me about what happened between when you left the hospital and the day you returned. Patient notified to come to hospital d/t abnormal labs     Did you have  a follow-up appointment on discharge? Yes     Was this a planned readmission? No                Roe met with patient at the bedside to complete discharge assessment. Patient states that she lives at home with his spouse. Pt is mostly independent with ADLs but requires assistance from spouse  PRN. Pt ambulates with a cane when needed. Pt requires assistance when bathing at times and his spouse assists.     No CM needs for discharge expressed or identified at this time. Swer to f/u for any CM needs.

## 2022-07-20 NOTE — HPI
Mr. Albrecht is a 71 y/o M s/p Kidney/heart transplant (2002). He has a h/o diabetes, hypertension, hyperlipidemia, chronic kidney disease, and recent COVID (COVID positive June 25-admitted to [facility] July 4-8 with COVID pneumonia). He was admitted to [facility] with AUBREY after routine labs showed Cr 11 (from ~ 2) on 7/18/22.  (Tacrolimus level was 7.2). Patient noted more dyspnea and decreased energy along with decreased urine output. No chest pain, headache, confusion, fever, vomiting, or abdominal pain noted. He was seen by Nephrology. With concern for possible rejection, he was empirically treated with IV Solu-Medrol (1 g on July 19). He was treated with IV fluids with bicarbonate along with Lokelma. Urinalysis with 2+ protein, 2 RBC, 2 WBC, occasional WBC clumps, rare bacteria. US showed slightly increased resistive indices; Mild cortical thinning; No loss of corticomedullary distinction; Adequate perfusion; No evidence of hydronephrosis. CXR showed mildly enlarged but stable cardiac silhouette. Aorta demonstrates atherosclerotic disease. Mild atelectasis in the right mid lung zone. Patchy infiltrates at the lung bases bilaterally that could reflect edema or early airspace disease. No evidence of pleural effusion or pneumothorax. EKG had normal sinus rhythm, right bundle-branch block. Vital signs stable. Pt admitted to [facility] for further management. Will obtain 2D echo and kidney biopsy. Pt d/w Dr Wilkerson.

## 2022-07-20 NOTE — ASSESSMENT & PLAN NOTE
73 y/o male with heart and kidney transplants has AUBREY after having CIVID infection:     * AUBREY (acute kidney injury) on CKD, stage IIIb  AUBREY suspect is related to acute rejection.  Rejection likely occurred due to poor absorption of prograf when pt was experiencing diarrhea   Prograf levels were low.  Noted adjustments in dosing had been made by transplant  Discussed with transplant  Transfer to [place] transplant service was recommended  Hospital [place] is full.  Last prograf level from yesterday was within the therapeutic range  Mild hyperkalemia     Will provide empiric treatment with solumedrol 1 g IV x 1 tonight now  Will re-evaluate in am  Will increase rate of IVF's (1.5 amp bicarb + 1/2 NS)  Lokelma for elevated K  Repeat labs in am     Pneumonia due to COVID-19 virus  As reviewed above and in the chart  Noted had 2 vaccines and 2 boosters  Presumably pt had the new sub-variant.   If that is the case, and knowing pt was fully vaccinated, it is note worthy that the new sub-vaariant is capable of serious morbidity.     Heart transplanted  Reviewed the chart and in note above     Type 2 diabetes mellitus  Expect BG will worsen with solumedrol  On ISS           Plans and recommendations:  As discussed above

## 2022-07-20 NOTE — PROVIDER TRANSFER
Outside Transfer Note / Regional Referral Center    Referring facility: [facility]  Referring provider: BARRIE DELGADO  Accepting facility: [facility]  Reason for transfer:  Need KTM  Transfer diagnosis: AUBREY  Transfer specialty requested: Transplant Kidney  Transfer specialty notified: yes  Transfer level: NUMBER 1-5: 2  Bed type requested: stepdown  Isolation status: No active isolations   Admission class or status: Inpatient    Narrative     72-year-old male with a history of heart and kidney transplant in 2002 (on tacrolimus and CellCept), diabetes, hypertension, hyperlipidemia, chronic kidney disease, and recent COVID (COVID positive June 25-admitted to [facility] July 4-8 with COVID pneumonia), admitted to [facility] with acute kidney injury. He had routine outpatient labs performed with a creatinine on July 18 of 11.1 (baseline 1.8-2.1).  Tacrolimus level was 7.2.  Patient noted more dyspnea and decreased energy along with decreased urine output.  No chest pain, headache, confusion, fever, vomiting, or abdominal pain noted.  [facility] was on diversion, and patient was admitted to [facility].  He was seen by Nephrology.  With concern for possible rejection, he was empirically treated with IV Solu-Medrol (1 g on July 19).  He was treated with IV fluids with bicarbonate along with oral Lokelma.  He remains on half-normal saline with bicarbonate along with mycophenolate and tacrolimus.  Referring team spoke with KTM at [facility].  Requesting transfer to Kidney Transplant Service at [facility] for further treatment of acute kidney injury.  Referring provider noted patient is hemodynamically stable with normal mentation.  Referring team is planning to start treatment of his potassium of 6.1 prior to transfer.    July 20:  White blood cells 4.99, hemoglobin 11.2, hematocrit 35.5, platelets 243  Sodium 137, potassium 6.1, chloride 106, CO2  17, BUN 76, creatinine 12.3, glucose 274, calcium 7.7    July 19:  COVID negative, tacrolimus level 24.8 (collected 11:54 a.m.), sodium 141, potassium 5.5, chloride 110, CO2 17, BUN 57, creatinine 12.9 (1.8 on July 8), glucose 86, AST 30, ALT 31, magnesium 2.5, white blood cells 9.12, hemoglobin 10, hematocrit 28.3, platelets 247  Urinalysis with 2+ protein, 2 RBC, 2 WBC, occasional WBC clumps, rare bacteria  -ultrasound of the transplanted kidney with Doppler head slightly increased resistive indices which can be seen with medical renal disease.  Mild cortical thinning.  No loss of corticomedullary distinction.  Adequate perfusion.  No evidence of hydronephrosis.  -chest x-ray had mildly enlarged but stable cardiac silhouette.  Aorta demonstrates atherosclerotic disease.  Mild atelectasis in the right mid lung zone.  Patchy infiltrates at the lung bases bilaterally that could reflect edema or early airspace disease.  No evidence of pleural effusion or pneumothorax.  -EKG had normal sinus rhythm, right bundle-branch block.    Dobutamine stress echocardiogram (May 4, 2022) had EF 60%, normal RV size and systolic function, normal left ventricular diastolic function.  Normal CVP.  ECG portion of the study negative for myocardial ischemia.  Stress echo portion of the study is negative for myocardial ischemia.  He failed to achieve target heart rate.    Objective     Vitals: Temp: 98.4 °F (36.9 °C) (07/20/22 0724)  Pulse: 78 (07/20/22 0724)  Resp: 19 (07/20/22 0724)  BP: (!) 141/70 (07/20/22 0724)  SpO2: 95 % (07/20/22 0724)  Recent Labs:   CBC:   Recent Labs   Lab 07/19/22  1154 07/20/22  0934   WBC 9.12 4.99   HGB 10.0* 11.2*   HCT 28.3* 35.5*    243     CMP:   Recent Labs   Lab 07/19/22  1550 07/20/22  0934    137   K 5.5* 6.1*    106   CO2 17* 17*   GLU 86 274*   BUN 57* 76*   CREATININE 12.9* 12.3*   CALCIUM 8.6* 7.7*   PROT 7.0  --    ALBUMIN 2.0*  --    BILITOT 0.2  --    ALKPHOS 107  --    AST  30  --    ALT 31  --    ANIONGAP 14 14   EGFRNONAA 3* 4*     Recent imaging: see above     IV access:        Peripheral IV - Single Lumen 07/19/22 1155 20 G Anterior;Distal;Right Upper Arm (Active)   Site Assessment Clean;Dry;Intact 07/20/22 0800   Line Status Infusing 07/20/22 0800   Dressing Status Clean;Dry;Intact 07/20/22 0800   Dressing Intervention Integrity maintained 07/20/22 0400            Peripheral IV - Single Lumen 07/19/22 1241 18 G Right;Distal Forearm (Active)   Site Assessment Clean;Dry;Intact 07/20/22 0800   Line Status Flushed;Saline locked 07/20/22 0800   Dressing Status Clean;Dry;Intact 07/20/22 0800   Dressing Intervention Integrity maintained 07/20/22 0400            Peripheral IV - Single Lumen 07/19/22 1613 18 G;1 1/4 in Left Antecubital (Active)   Site Assessment Clean;Dry;Intact 07/20/22 0800   Line Status Saline locked;Flushed 07/20/22 0800   Dressing Status Clean;Dry;Intact 07/20/22 0800   Dressing Intervention Integrity maintained 07/20/22 0400     Allergies: Review of patient's allergies indicates:  No Known Allergies   NPO: No      Anticoagulation:   Anticoagulants     Ordered     Route Frequency Start Stop    07/19/22 1717  heparin (porcine)  (VTE Prophylaxis Orders - High Risk)         SubQ Every 8 hours 07/19/22 2200 --           Instructions    1. Admit to Kidney Transplant Service      Upon patient arrival to floor, please contact KTM provider on call.      ALEXANDRIA Marley MD  Hospital Medicine Staff  Cell: [phone]

## 2022-07-20 NOTE — PROGRESS NOTES
"[facility] - Telemetry ([facility])  Nephrology  Progress Note    Patient Name: Nigel Albrecht  MRN: [id number]  Admission Date: 7/19/2022  Hospital Length of Stay: 1 days  Attending Provider: Mary Rocha MD   Primary Care Physician: Krystin Zimmerman MD  Principal Problem:AUBREY (acute kidney injury)    Subjective:     HPI: Pt was seen and examined. Labs and meds reviewed. Discussed with other providers. Chart was reviewed. Discussed with ER, hospitalist and kidney transplant in [place]. Pt is a 70 y/o male with a h/o of cadaveric kidney transplant and heart transplant at [facility] on 5/24/02, who presents to hospital after he was called for abnormal labs. To review, the cause of ESRD is not known, and pt was never on dialysis before the transplant. 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 Labs and meds, including the immunosuppressive meds and the doses reviewed with pt. Pt is compliant with all the meds. On his last clinic visit with me in Sept 2021, he was on repamune and mycophenolate. Pt says he was was switched to prograf over 1 month ago. Pt informed me that he had COVID in early July 2022 and experienced pneumonia. Pt was admitted in [place]. Hospital notes reviewed. Pt also had severe diarrhea, specially over 3 days. Pt is noted to have had very low levels of prograf in early to mid July 2022, including a level of < 2.0 on 7/6/22. As a response, transplant in [place] (7/7/22) increased the prograf dose. Prograf level yesterday was within the therapeutic range.      Interval History: Pt was seen and examined. Labs and meds reviewed. Discussed with other providers. No new events, reports "new diarrhea", no SOB, no leg swelling.    Review of patient's allergies indicates:  No Known Allergies  Current Facility-Administered Medications   Medication Frequency    acetaminophen tablet 650 mg Q4H PRN    aspirin chewable tablet 81 mg Daily    dextrose 10% bolus 125 mL " PRN    dextrose 10% bolus 250 mL PRN    famotidine tablet 20 mg Daily    glucagon (human recombinant) injection 1 mg PRN    glucose chewable tablet 16 g PRN    glucose chewable tablet 24 g PRN    guaiFENesin 100 mg/5 ml syrup 200 mg Q4H PRN    heparin (porcine) injection 5,000 Units Q8H    hydrALAZINE injection 10 mg Q8H PRN    HYDROcodone-acetaminophen 5-325 mg per tablet 1 tablet Q4H PRN    insulin aspart U-100 pen 0-5 Units QID (AC + HS) PRN    insulin detemir U-100 pen 5 Units QHS    metoprolol succinate 24 hr tablet 75 mg Daily    mupirocin 2 % ointment BID    mycophenolate capsule 750 mg BID    NIFEdipine 24 hr tablet 90 mg Daily    ondansetron injection 4 mg Q8H PRN    senna-docusate 8.6-50 mg per tablet 1 tablet Daily PRN    sodium bicarbonate 75 mEq in sodium chloride 0.45% 1,075 mL infusion Continuous    sodium chloride 0.9% flush 10 mL PRN    sodium zirconium cyclosilicate packet 10 g TID    tacrolimus capsule 5 mg BID       Objective:     Vital Signs (Most Recent):  Temp: 97.7 °F (36.5 °C) (07/20/22 1117)  Pulse: 77 (07/20/22 1117)  Resp: 19 (07/20/22 1117)  BP: (!) 147/75 (07/20/22 1117)  SpO2: 99 % (07/20/22 1117)  O2 Device (Oxygen Therapy): room air (07/19/22 1100)   Vital Signs (24h Range):  Temp:  [97.7 °F (36.5 °C)-98.9 °F (37.2 °C)] 97.7 °F (36.5 °C)  Pulse:  [75-81] 77  Resp:  [16-20] 19  SpO2:  [95 %-100 %] 99 %  BP: (114-173)/(57-83) 147/75     Weight: 132 kg (291 lb 0.1 oz) (07/20/22 0007)  Body mass index is 37.36 kg/m².  Body surface area is 2.63 meters squared.    I/O last 3 completed shifts:  In: -   Out: 250 [Urine:250]    Physical Exam  Vitals and nursing note reviewed.   Constitutional:       Appearance: Normal appearance.   HENT:      Head: Normocephalic and atraumatic.   Cardiovascular:      Rate and Rhythm: Normal rate and regular rhythm.      Pulses: Normal pulses.      Heart sounds: Normal heart sounds.   Pulmonary:      Effort: Pulmonary effort is normal. No  respiratory distress.      Breath sounds: Normal breath sounds. No wheezing or rales.   Abdominal:      General: Abdomen is flat.      Tenderness: There is no abdominal tenderness.   Musculoskeletal:      Right lower leg: No edema.      Left lower leg: No edema.   Skin:     General: Skin is warm and dry.   Neurological:      Mental Status: He is alert and oriented to person, place, and time.   Psychiatric:         Mood and Affect: Mood normal.         Behavior: Behavior normal.       Significant Labs: reviewed  BMP  Lab Results   Component Value Date     07/20/2022    K 6.1 (H) 07/20/2022     07/20/2022    CO2 17 (L) 07/20/2022    BUN 76 (H) 07/20/2022    CREATININE 12.3 (H) 07/20/2022    CALCIUM 7.7 (L) 07/20/2022    ANIONGAP 14 07/20/2022    ESTGFRAFRICA 4 (A) 07/20/2022    EGFRNONAA 4 (A) 07/20/2022     Lab Results   Component Value Date    WBC 4.99 07/20/2022    HGB 11.2 (L) 07/20/2022    HCT 35.5 (L) 07/20/2022    MCV 87 07/20/2022     07/20/2022     Prograf level at 11:54 am (was not a trough level, 4 hours post last dose) 24.8  Prograf (trough) on 7/18 was 7.2    Urine Na 67, urine Cr 145, FENa = 4%    Kidney transplant u/s: increased RI      Significant Imaging: reviewed CXR's, mild pulm edema    Assessment/Plan:     73 y/o male with heart and kidney transplants has AUBREY after having CIVID infection:     * AUBREY (acute kidney injury) on CKD, stage IIIb  s Cr without any change. No response to IVF's overnight  FENa is <<1%, c/w intrinsic renal damage (is not prerenal)  Low prograf levels earlier this month suspect for acute rejection]  Elevated resisitive index on doppler u/s more c/w suspected rejection    Rejection likely occurred due to poor absorption of prograf when pt was experiencing diarrhea   Noted adjustments in dosing had been made by transplant in [facility]  Discussed with transplant. [place] aware of our concerns  Transfer to transplant in [place] being  arranged    Prograf level 2 days ago within the therapeutic range  Prograf level yesterday is supratherapeutic but it was 4 hours after last dose (not a true trough)  No change in prograf dose, continue 5 mg po bid    Hyperkalemia; worse  Due to renal failure  lokelma was ordered  May need acute dialysis    Diarrhea (new) reported  Unsure what the cause is  Will lower mycophenolate dose from 750 mgpo bid to 500 mg po bid     Will provide empiric treatment with 1 more dose solumedrol 1 g IV x 1 today (#2 so far)     Pneumonia due to COVID-19 virus  As reviewed above and in the chart  Had 2 vaccines and 2 boosters  Presumably pt had the new sub-variant or did not mount adequate antibodies to the vaccines     Heart transplanted  So far no sx's of signs of decompensation, but has mild fluid gain  Likely will need prophylactic heart biopsy     Type 2 diabetes mellitus  Expect BG will worsen with solumedrol  On ISS           Plans and recommendations:  As discussed above  Complicated case  Extensive time spent  Total critical care quality time spent 60 minutes including time needed to review the records, the   patient evaluation, documentation, face-to-face discussion with the patient,   more than 50% of the time was spent on coordination of care and counseling.    Multiple commutations with transplant, ER, hospitalist via text and email  Extensive time spent            Lucila Matthew MD  Nephrology  [facility] Tele[facility] ([facility])

## 2022-07-20 NOTE — ASSESSMENT & PLAN NOTE
- Plan for IR consult for kidney biopsy  - Check DSA, BK, CMV PCR  - Will also obtain 2D Echo  - Monitor

## 2022-07-20 NOTE — HOSPITAL COURSE
7/20 - Nephrology consult obtained . Rejection suspected. Noted Prograf level was low or subtherapeutic for couple of weeks from late June to early July and pt was off Cellcept during COVID infection in early July, however adjustment was made by transplant team and Prograf level from yesterday is therapeutic. Pt has received Solumedrol 1 gram IV x 1 dose last night . IVF increased. Also received Lokelma 10 gram for hyperkalemia . Awaiting transfer to Transplant Medicine in [facility], Cristian Zimmerman. .     Nursing was notified bed assignment at [facility], Mohan Zimmerman at 1730 . Discharge order placed.

## 2022-07-20 NOTE — SUBJECTIVE & OBJECTIVE
"Interval History: Pt was seen and examined. Labs and meds reviewed. Discussed with other providers. No new events, reports "new diarrhea", no SOB, no leg swelling.    Review of patient's allergies indicates:  No Known Allergies  Current Facility-Administered Medications   Medication Frequency    acetaminophen tablet 650 mg Q4H PRN    aspirin chewable tablet 81 mg Daily    dextrose 10% bolus 125 mL PRN    dextrose 10% bolus 250 mL PRN    famotidine tablet 20 mg Daily    glucagon (human recombinant) injection 1 mg PRN    glucose chewable tablet 16 g PRN    glucose chewable tablet 24 g PRN    guaiFENesin 100 mg/5 ml syrup 200 mg Q4H PRN    heparin (porcine) injection 5,000 Units Q8H    hydrALAZINE injection 10 mg Q8H PRN    HYDROcodone-acetaminophen 5-325 mg per tablet 1 tablet Q4H PRN    insulin aspart U-100 pen 0-5 Units QID (AC + HS) PRN    insulin detemir U-100 pen 5 Units QHS    metoprolol succinate 24 hr tablet 75 mg Daily    mupirocin 2 % ointment BID    mycophenolate capsule 750 mg BID    NIFEdipine 24 hr tablet 90 mg Daily    ondansetron injection 4 mg Q8H PRN    senna-docusate 8.6-50 mg per tablet 1 tablet Daily PRN    sodium bicarbonate 75 mEq in sodium chloride 0.45% 1,075 mL infusion Continuous    sodium chloride 0.9% flush 10 mL PRN    sodium zirconium cyclosilicate packet 10 g TID    tacrolimus capsule 5 mg BID       Objective:     Vital Signs (Most Recent):  Temp: 97.7 °F (36.5 °C) (07/20/22 1117)  Pulse: 77 (07/20/22 1117)  Resp: 19 (07/20/22 1117)  BP: (!) 147/75 (07/20/22 1117)  SpO2: 99 % (07/20/22 1117)  O2 Device (Oxygen Therapy): room air (07/19/22 1100)   Vital Signs (24h Range):  Temp:  [97.7 °F (36.5 °C)-98.9 °F (37.2 °C)] 97.7 °F (36.5 °C)  Pulse:  [75-81] 77  Resp:  [16-20] 19  SpO2:  [95 %-100 %] 99 %  BP: (114-173)/(57-83) 147/75     Weight: 132 kg (291 lb 0.1 oz) (07/20/22 0007)  Body mass index is 37.36 kg/m².  Body surface area is 2.63 meters squared.    I/O last 3 completed " shifts:  In: -   Out: 250 [Urine:250]    Physical Exam  Vitals and nursing note reviewed.   Constitutional:       Appearance: Normal appearance.   HENT:      Head: Normocephalic and atraumatic.   Cardiovascular:      Rate and Rhythm: Normal rate and regular rhythm.      Pulses: Normal pulses.      Heart sounds: Normal heart sounds.   Pulmonary:      Effort: Pulmonary effort is normal. No respiratory distress.      Breath sounds: Normal breath sounds. No wheezing or rales.   Abdominal:      General: Abdomen is flat.      Tenderness: There is no abdominal tenderness.   Musculoskeletal:      Right lower leg: No edema.      Left lower leg: No edema.   Skin:     General: Skin is warm and dry.   Neurological:      Mental Status: He is alert and oriented to person, place, and time.   Psychiatric:         Mood and Affect: Mood normal.         Behavior: Behavior normal.       Significant Labs: reviewed  BMP  Lab Results   Component Value Date     07/20/2022    K 6.1 (H) 07/20/2022     07/20/2022    CO2 17 (L) 07/20/2022    BUN 76 (H) 07/20/2022    CREATININE 12.3 (H) 07/20/2022    CALCIUM 7.7 (L) 07/20/2022    ANIONGAP 14 07/20/2022    ESTGFRAFRICA 4 (A) 07/20/2022    EGFRNONAA 4 (A) 07/20/2022     Lab Results   Component Value Date    WBC 4.99 07/20/2022    HGB 11.2 (L) 07/20/2022    HCT 35.5 (L) 07/20/2022    MCV 87 07/20/2022     07/20/2022     Prograf level at 11:54 am (was not a trough level, 4 hours post last dose) 24.8  Prograf (trough) on 7/18 was 7.2    Urine Na 67, urine Cr 145, FENa = 4%    Kidney transplant u/s: increased RI      Significant Imaging: reviewed CXR's, mild pulm edema

## 2022-07-20 NOTE — SUBJECTIVE & OBJECTIVE
Interval History:   Rejection suspected.Pt has received Solumedrol 1 gram IV x 1 dose last night . IVF increased      Review of Systems   Constitutional:  Positive for activity change and fatigue. Negative for appetite change and fever.   HENT:  Negative for sore throat.    Eyes:  Negative for visual disturbance.   Respiratory:  Positive for shortness of breath. Negative for cough and chest tightness.    Cardiovascular:  Negative for chest pain, palpitations and leg swelling.   Gastrointestinal:  Positive for nausea. Negative for abdominal distention, abdominal pain, constipation, diarrhea and vomiting.        Two loose stools reported since admission    Endocrine: Negative for polyuria.   Genitourinary:  Positive for decreased urine volume. Negative for dysuria, flank pain, frequency and hematuria.   Musculoskeletal:  Negative for back pain and gait problem.   Skin:  Negative for rash.   Neurological:  Positive for weakness (Generalized). Negative for syncope, speech difficulty, light-headedness and headaches.   Psychiatric/Behavioral:  Negative for confusion, hallucinations and sleep disturbance.    Objective:     Vital Signs (Most Recent):  Temp: 97.7 °F (36.5 °C) (07/20/22 1117)  Pulse: 70 (07/20/22 1330)  Resp: 19 (07/20/22 1117)  BP: (!) 147/75 (07/20/22 1117)  SpO2: 99 % (07/20/22 1117)   Vital Signs (24h Range):  Temp:  [97.7 °F (36.5 °C)-98.9 °F (37.2 °C)] 97.7 °F (36.5 °C)  Pulse:  [70-81] 70  Resp:  [16-19] 19  SpO2:  [95 %-100 %] 99 %  BP: (114-173)/(57-83) 147/75     Weight: 132 kg (291 lb 0.1 oz)  Body mass index is 37.36 kg/m².    Intake/Output Summary (Last 24 hours) at 7/20/2022 1430  Last data filed at 7/20/2022 0800  Gross per 24 hour   Intake 180 ml   Output 250 ml   Net -70 ml      Physical Exam  Constitutional:       General: He is not in acute distress.     Appearance: He is well-developed. He is not diaphoretic.   HENT:      Head: Normocephalic and atraumatic.      Mouth/Throat:      Pharynx:  No oropharyngeal exudate.   Eyes:      Conjunctiva/sclera: Conjunctivae normal.      Pupils: Pupils are equal, round, and reactive to light.   Neck:      Thyroid: No thyromegaly.      Vascular: No JVD.   Cardiovascular:      Rate and Rhythm: Normal rate and regular rhythm.      Heart sounds: Normal heart sounds. No murmur heard.  Pulmonary:      Effort: Pulmonary effort is normal. No respiratory distress.      Breath sounds: Examination of the right-lower field reveals rales. Examination of the left-lower field reveals rales. Rales present. No wheezing.   Chest:      Chest wall: No tenderness.   Abdominal:      General: Bowel sounds are normal. There is no distension.      Palpations: Abdomen is soft.      Tenderness: There is no abdominal tenderness. There is no guarding or rebound.   Musculoskeletal:         General: Normal range of motion.      Cervical back: Normal range of motion and neck supple.   Lymphadenopathy:      Cervical: No cervical adenopathy.   Skin:     General: Skin is warm and dry.      Findings: No rash.   Neurological:      Mental Status: He is alert and oriented to person, place, and time.      Cranial Nerves: No cranial nerve deficit.      Sensory: No sensory deficit.      Deep Tendon Reflexes: Reflexes normal.       Significant Labs: All pertinent labs within the past 24 hours have been reviewed.  BMP:   Recent Labs   Lab 07/19/22  1550 07/20/22  0934   GLU 86 274*    137   K 5.5* 6.1*    106   CO2 17* 17*   BUN 57* 76*   CREATININE 12.9* 12.3*   CALCIUM 8.6* 7.7*   MG 2.5  --      CBC:   Recent Labs   Lab 07/19/22  1154 07/20/22  0934   WBC 9.12 4.99   HGB 10.0* 11.2*   HCT 28.3* 35.5*    243     CMP:   Recent Labs   Lab 07/19/22  1550 07/20/22  0934    137   K 5.5* 6.1*    106   CO2 17* 17*   GLU 86 274*   BUN 57* 76*   CREATININE 12.9* 12.3*   CALCIUM 8.6* 7.7*   PROT 7.0  --    ALBUMIN 2.0*  --    BILITOT 0.2  --    ALKPHOS 107  --    AST 30  --    ALT 31   --    ANIONGAP 14 14   EGFRNONAA 3* 4*       Significant Imaging:

## 2022-07-20 NOTE — CONSULTS
[facility] - Tele[facility] ([facility])  Nephrology  Consult Note    Patient Name: Nigel Albrecht  MRN: [id number]  Admission Date: 2022  Hospital Length of Stay: 0 days  Attending Provider: Mary Rocha MD   Primary Care Physician: Krystin Zimmerman MD  Principal Problem:AUBREY (acute kidney injury)    Reason for consult: AUBREY in a kidney and heart transplant pt    Consults  Subjective:     HPI: Pt was seen and examined. Labs and meds reviewed. Discussed with other providers. Chart was reviewed. Discussed with ER, hospitalist and kidney transplant in [place]. Pt is a 70 y/o male with a h/o of cadaveric kidney transplant and heart transplant at [facility] on 02, who presents to hospital after he was called for abnormal labs. To review, the cause of ESRD is not known, and pt was never on dialysis before the transplant. 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 Labs and meds, including the immunosuppressive meds and the doses reviewed with pt. Pt is compliant with all the meds. On his last clinic visit with me in 2021, he was on repamune and mycophenolate. Pt says he was was switched to prograf over 1 month ago. Pt informed me that he had COVID in early 2022 and experienced pneumonia. Pt was admitted in [place]. Hospital notes reviewed. Pt also had severe diarrhea, specially over 3 days. Pt is noted to have had very low levels of prograf in early to mid 2022, including a level of < 2.0 on 22. As a response, transplant in [place] (22) increased the prograf dose. Prograf level yesterday was within the therapeutic range.      Past Medical History:   Diagnosis Date    Allergic rhinitis 2013    Blood transfusion     Cataract     CKD (chronic kidney disease), stage III 2014    -donor kidney transplant     Diabetes mellitus, type 2 2013    Gout, arthritis 2013    Heart attack     Heart transplanted     Hyperlipidemia  6/26/2013    Hypertension     Immunodeficiency due to treatment with immunosuppressive medication     Morbid obesity 6/26/2013    Organ transplant 2002    heart and kidney    Prophylactic immunotherapy     Renal manifestation of secondary diabetes mellitus        Past Surgical History:   Procedure Laterality Date    CATARACT EXTRACTION Bilateral     COLONOSCOPY N/A 10/6/2020    Procedure: COLONOSCOPY;  Surgeon: Conner Redman MD;  [facility];  Service: Endoscopy;  Laterality: N/A;    EYE SURGERY      HEART TRANSPLANT      KIDNEY TRANSPLANT      REVISION TOTAL HIP ARTHROPLASTY         Review of patient's allergies indicates:  No Known Allergies  Current Facility-Administered Medications   Medication Frequency    acetaminophen tablet 650 mg Q4H PRN    [START ON 7/20/2022] aspirin chewable tablet 81 mg Daily    dextrose 10% bolus 125 mL PRN    dextrose 10% bolus 250 mL PRN    famotidine tablet 20 mg Daily    glucagon (human recombinant) injection 1 mg PRN    glucose chewable tablet 16 g PRN    glucose chewable tablet 24 g PRN    heparin (porcine) injection 5,000 Units Q8H    hydrALAZINE injection 10 mg Q8H PRN    HYDROcodone-acetaminophen 5-325 mg per tablet 1 tablet Q4H PRN    insulin aspart U-100 pen 0-5 Units QID (AC + HS) PRN    methylPREDNISolone (SOLU-MEDROL) 1 g in dextrose 5 % 100 mL IVPB Q24H    [START ON 7/20/2022] metoprolol succinate 24 hr tablet 75 mg Daily    mupirocin 2 % ointment BID    mycophenolate capsule 750 mg BID    [START ON 7/20/2022] NIFEdipine 24 hr tablet 90 mg Daily    ondansetron injection 4 mg Q8H PRN    senna-docusate 8.6-50 mg per tablet 1 tablet Daily PRN    sodium bicarbonate 75 mEq in sodium chloride 0.45% 1,075 mL infusion Continuous    sodium chloride 0.9% flush 10 mL PRN    tacrolimus capsule 5 mg BID     Family History       Problem Relation (Age of Onset)    Diabetes Brother, Maternal Grandmother    Early death Brother    Heart disease  Brother    Hyperlipidemia Sister, Brother    Hypertension Brother    Kidney disease Son    Stroke Mother, Brother          Tobacco Use    Smoking status: Former Smoker     Packs/day: 1.00     Years: 20.00     Pack years: 20.00     Types: Cigarettes     Quit date: 1984     Years since quittin.0    Smokeless tobacco: Never Used   Substance and Sexual Activity    Alcohol use: Yes     Alcohol/week: 1.0 standard drink     Types: 1 Cans of beer per week     Comment: daily    Drug use: No    Sexual activity: Never     Review of Systems   Constitutional: Negative.    HENT: Negative.     Respiratory: Negative.     Cardiovascular: Negative.    Gastrointestinal:  Positive for diarrhea.   Genitourinary: Negative.    Musculoskeletal: Negative.    Neurological: Negative.    Psychiatric/Behavioral: Negative.     Objective:     Vital Signs (Most Recent):  Temp: 97.8 °F (36.6 °C) (22)  Pulse: 79 (22)  Resp: 18 (22)  BP: 123/66 (22)  SpO2: 95 % (22)  O2 Device (Oxygen Therapy): room air (22 1100) Vital Signs (24h Range):  Temp:  [97.8 °F (36.6 °C)-98.9 °F (37.2 °C)] 97.8 °F (36.6 °C)  Pulse:  [75-81] 79  Resp:  [16-22] 18  SpO2:  [93 %-100 %] 95 %  BP: (123-173)/(66-83) 123/66     Weight: 130.7 kg (288 lb 2.3 oz) (22 1740)  Body mass index is 37 kg/m².  Body surface area is 2.61 meters squared.    I/O last 3 completed shifts:  In: -   Out: 250 [Urine:250]    Physical Exam  Vitals and nursing note reviewed.   Constitutional:       Appearance: Normal appearance.   HENT:      Head: Normocephalic and atraumatic.   Cardiovascular:      Rate and Rhythm: Normal rate and regular rhythm.      Pulses: Normal pulses.      Heart sounds: Normal heart sounds.   Pulmonary:      Effort: Pulmonary effort is normal.      Breath sounds: Normal breath sounds.   Abdominal:      General: Abdomen is flat.      Palpations: Abdomen is soft.      Tenderness: There is no abdominal  tenderness.   Musculoskeletal:      Right lower leg: No edema.      Left lower leg: No edema.   Skin:     Findings: No lesion.   Neurological:      General: No focal deficit present.      Mental Status: He is alert and oriented to person, place, and time.   Psychiatric:         Mood and Affect: Mood normal.         Behavior: Behavior normal.       Significant Labs: reviewed  BMP  Lab Results   Component Value Date     07/19/2022    K 5.5 (H) 07/19/2022     07/19/2022    CO2 17 (L) 07/19/2022    BUN 57 (H) 07/19/2022    CREATININE 12.9 (H) 07/19/2022    CALCIUM 8.6 (L) 07/19/2022    ANIONGAP 14 07/19/2022    ESTGFRAFRICA 4 (A) 07/19/2022    EGFRNONAA 3 (A) 07/19/2022     Lab Results   Component Value Date    WBC 9.12 07/19/2022    HGB 10.0 (L) 07/19/2022    HCT 28.3 (L) 07/19/2022    MCV 90 07/19/2022     07/19/2022     Prograf levels in the past few weeks:  Component Ref Range & Units 1 d ago   (7/18/22) 11 d ago   (7/8/22) 12 d ago   (7/7/22) 13 d ago   (7/6/22) 2 wk ago   (7/5/22) 2 wk ago   (7/4/22) 4 wk ago   (6/20/22)   Tacrolimus Lvl 5.0 - 15.0 ng/mL 7.2  4.2 Low  CM  2.3 Low  CM  <2.0 Low  CM  2.0 Low  CM  3.0 Low  CM  3.2 Low  CM          Significant Imaging: reviewed  Renal transplant u/s pending, ordered    Assessment/Plan:     73 y/o male with heart and kidney transplants has AUBREY after having CIVID infection:     * AUBREY (acute kidney injury) on CKD, stage IIIb  AUBREY suspect is related to acute rejection.  Rejection likely occurred due to poor absorption of prograf when pt was experiencing diarrhea   Prograf levels were low.  Noted adjustments in dosing had been made by transplant  Discussed with transplant  Transfer to [place] transplant service was recommended  H[facility] [place] is full.  Last prograf level from yesterday was within the therapeutic range  Mild hyperkalemia    Will provide empiric treatment with solumedrol 1 g IV x 1 tonight now  Will re-evaluate in am  Will  increase rate of IVF's (1.5 amp bicarb + 1/2 NS)  Lokelma for elevated K  Repeat labs in am    Pneumonia due to COVID-19 virus  As reviewed above and in the chart  Noted had 2 vaccines and 2 boosters  Presumably pt had the new sub-variant.   If that is the case, and knowing pt was fully vaccinated, it is note worthy that the new sub-vaariant is capable of serious morbidity.    Heart transplanted  Reviewed the chart and in note above    Type 2 diabetes mellitus  Expect BG will worsen with solumedrol  On ISS        Plans and recommendations:  As discussed above    Lucila Matthew MD   Nephrology  [facility] - Telemetry ([facility])

## 2022-07-20 NOTE — NURSING
Pt transferred to [facility] by [facility]. Report given to Татьяна CHAMBERS.

## 2022-07-20 NOTE — ASSESSMENT & PLAN NOTE
"Estimated body mass index is 37 kg/m² as calculated from the following:    Height as of this encounter: 6' 2" (1.88 m).    Weight as of this encounter: 130.7 kg (288 lb 2.3 oz).      "

## 2022-07-20 NOTE — ASSESSMENT & PLAN NOTE
- Continue prograf. Monitor prograf level daily, monitor for toxic side effects, and adjust for therapeutic dose

## 2022-07-20 NOTE — NURSING
Received call from main campus reporting patient has a critical osmolarity of 324. Message relayed to NP Skye Davidson

## 2022-07-21 PROBLEM — T38.0X5A ADRENAL CORTICOSTEROID CAUSING ADVERSE EFFECT IN THERAPEUTIC USE: Status: ACTIVE | Noted: 2022-07-21

## 2022-07-21 LAB
ALBUMIN SERPL BCP-MCNC: 2.1 G/DL (ref 3.5–5.2)
ALBUMIN/CREAT UR: 342.9 UG/MG (ref 0–30)
ALP SERPL-CCNC: 122 U/L (ref 55–135)
ALT SERPL W/O P-5'-P-CCNC: 40 U/L (ref 10–44)
ANION GAP SERPL CALC-SCNC: 13 MMOL/L (ref 8–16)
AST SERPL-CCNC: 34 U/L (ref 10–40)
AV INDEX (PROSTH): 0.73
AV MEAN GRADIENT: 6 MMHG
AV PEAK GRADIENT: 12 MMHG
AV VALVE AREA: 2.92 CM2
AV VELOCITY RATIO: 0.73
BACTERIA #/AREA URNS AUTO: ABNORMAL /HPF
BASOPHILS # BLD AUTO: 0.01 K/UL (ref 0–0.2)
BASOPHILS NFR BLD: 0.1 % (ref 0–1.9)
BILIRUB SERPL-MCNC: 0.2 MG/DL (ref 0.1–1)
BILIRUB UR QL STRIP: NEGATIVE
BSA FOR ECHO PROCEDURE: 2.61 M2
BUN SERPL-MCNC: 74 MG/DL (ref 8–23)
C3 SERPL-MCNC: 124 MG/DL (ref 50–180)
C4 SERPL-MCNC: 17 MG/DL (ref 11–44)
CALCIUM SERPL-MCNC: 8.1 MG/DL (ref 8.7–10.5)
CHLORIDE SERPL-SCNC: 104 MMOL/L (ref 95–110)
CK SERPL-CCNC: 58 U/L (ref 20–200)
CLARITY UR REFRACT.AUTO: ABNORMAL
CO2 SERPL-SCNC: 19 MMOL/L (ref 23–29)
COLOR UR AUTO: YELLOW
CREAT SERPL-MCNC: 11.9 MG/DL (ref 0.5–1.4)
CREAT UR-MCNC: 70 MG/DL (ref 23–375)
CREAT UR-MCNC: 70 MG/DL (ref 23–375)
CV ECHO LV RWT: 0.41 CM
DIFFERENTIAL METHOD: ABNORMAL
DOP CALC AO PEAK VEL: 1.74 M/S
DOP CALC AO VTI: 39.22 CM
DOP CALC LVOT AREA: 4 CM2
DOP CALC LVOT DIAMETER: 2.25 CM
DOP CALC LVOT PEAK VEL: 1.27 M/S
DOP CALC LVOT STROKE VOLUME: 114.45 CM3
DOP CALCLVOT PEAK VEL VTI: 28.8 CM
E WAVE DECELERATION TIME: 139.18 MSEC
E/A RATIO: 1.53
E/E' RATIO: 9.75 M/S
ECHO LV POSTERIOR WALL: 1.15 CM (ref 0.6–1.1)
EJECTION FRACTION: 60 %
EOSINOPHIL # BLD AUTO: 0 K/UL (ref 0–0.5)
EOSINOPHIL NFR BLD: 0 % (ref 0–8)
ERYTHROCYTE [DISTWIDTH] IN BLOOD BY AUTOMATED COUNT: 16.3 % (ref 11.5–14.5)
EST. GFR  (AFRICAN AMERICAN): 4.3 ML/MIN/1.73 M^2
EST. GFR  (NON AFRICAN AMERICAN): 3.7 ML/MIN/1.73 M^2
FRACTIONAL SHORTENING: 39 % (ref 28–44)
GLUCOSE SERPL-MCNC: 314 MG/DL (ref 70–110)
GLUCOSE UR QL STRIP: ABNORMAL
GRAN CASTS UR QL COMP ASSIST: 5 /LPF
HCT VFR BLD AUTO: 32.8 % (ref 40–54)
HGB BLD-MCNC: 10.2 G/DL (ref 14–18)
HGB UR QL STRIP: ABNORMAL
HYALINE CASTS UR QL AUTO: 7 /LPF
IGA SERPL-MCNC: 260 MG/DL (ref 40–350)
IMM GRANULOCYTES # BLD AUTO: 0.05 K/UL (ref 0–0.04)
IMM GRANULOCYTES NFR BLD AUTO: 0.5 % (ref 0–0.5)
INTERVENTRICULAR SEPTUM: 1.1 CM (ref 0.6–1.1)
KETONES UR QL STRIP: NEGATIVE
LA MAJOR: 5.4 CM
LA MINOR: 5.35 CM
LA WIDTH: 5.51 CM
LEFT ATRIUM SIZE: 4.77 CM
LEFT ATRIUM VOLUME INDEX MOD: 47.8 ML/M2
LEFT ATRIUM VOLUME INDEX: 47.5 ML/M2
LEFT ATRIUM VOLUME MOD: 121 CM3
LEFT ATRIUM VOLUME: 120.08 CM3
LEFT INTERNAL DIMENSION IN SYSTOLE: 3.41 CM (ref 2.1–4)
LEFT VENTRICLE DIASTOLIC VOLUME INDEX: 73.59 ML/M2
LEFT VENTRICLE DIASTOLIC VOLUME: 186.19 ML
LEFT VENTRICLE MASS INDEX: 102 G/M2
LEFT VENTRICLE SYSTOLIC VOLUME INDEX: 18.8 ML/M2
LEFT VENTRICLE SYSTOLIC VOLUME: 47.69 ML
LEFT VENTRICULAR INTERNAL DIMENSION IN DIASTOLE: 5.6 CM (ref 3.5–6)
LEFT VENTRICULAR MASS: 256.96 G
LEUKOCYTE ESTERASE UR QL STRIP: NEGATIVE
LV LATERAL E/E' RATIO: 7.8 M/S
LV SEPTAL E/E' RATIO: 13 M/S
LYMPHOCYTES # BLD AUTO: 1.6 K/UL (ref 1–4.8)
LYMPHOCYTES NFR BLD: 16.8 % (ref 18–48)
MAGNESIUM SERPL-MCNC: 2.6 MG/DL (ref 1.6–2.6)
MCH RBC QN AUTO: 26.7 PG (ref 27–31)
MCHC RBC AUTO-ENTMCNC: 31.1 G/DL (ref 32–36)
MCV RBC AUTO: 86 FL (ref 82–98)
MICROALBUMIN UR DL<=1MG/L-MCNC: 240 UG/ML
MICROSCOPIC COMMENT: ABNORMAL
MONOCYTES # BLD AUTO: 0.2 K/UL (ref 0.3–1)
MONOCYTES NFR BLD: 2.3 % (ref 4–15)
MV PEAK A VEL: 0.51 M/S
MV PEAK E VEL: 0.78 M/S
MV STENOSIS PRESSURE HALF TIME: 40.36 MS
MV VALVE AREA P 1/2 METHOD: 5.45 CM2
NEUTROPHILS # BLD AUTO: 7.7 K/UL (ref 1.8–7.7)
NEUTROPHILS NFR BLD: 80.3 % (ref 38–73)
NITRITE UR QL STRIP: NEGATIVE
NON-SQ EPI CELLS #/AREA URNS AUTO: <1 /HPF
NRBC BLD-RTO: 0 /100 WBC
PH UR STRIP: 5 [PH] (ref 5–8)
PHOSPHATE SERPL-MCNC: 7.2 MG/DL (ref 2.7–4.5)
PLATELET # BLD AUTO: 208 K/UL (ref 150–450)
PMV BLD AUTO: 10.6 FL (ref 9.2–12.9)
POCT GLUCOSE: 230 MG/DL (ref 70–110)
POCT GLUCOSE: 303 MG/DL (ref 70–110)
POCT GLUCOSE: 309 MG/DL (ref 70–110)
POCT GLUCOSE: 319 MG/DL (ref 70–110)
POCT GLUCOSE: 352 MG/DL (ref 70–110)
POTASSIUM SERPL-SCNC: 5.3 MMOL/L (ref 3.5–5.1)
POTASSIUM SERPL-SCNC: 5.5 MMOL/L (ref 3.5–5.1)
PROT SERPL-MCNC: 6.7 G/DL (ref 6–8.4)
PROT UR QL STRIP: ABNORMAL
PROT UR-MCNC: 50 MG/DL (ref 0–15)
PROT/CREAT UR: 0.71 MG/G{CREAT} (ref 0–0.2)
RA MAJOR: 5.64 CM
RA PRESSURE: 8 MMHG
RA WIDTH: 3.94 CM
RBC # BLD AUTO: 3.82 M/UL (ref 4.6–6.2)
RBC #/AREA URNS AUTO: >100 /HPF (ref 0–4)
RIGHT VENTRICULAR END-DIASTOLIC DIMENSION: 4.47 CM
RV TISSUE DOPPLER FREE WALL SYSTOLIC VELOCITY 1 (APICAL 4 CHAMBER VIEW): 9.39 CM/S
SINUS: 3.68 CM
SODIUM SERPL-SCNC: 136 MMOL/L (ref 136–145)
SODIUM UR-SCNC: 71 MMOL/L (ref 20–250)
SP GR UR STRIP: 1.01 (ref 1–1.03)
SQUAMOUS #/AREA URNS AUTO: 2 /HPF
TACROLIMUS BLD-MCNC: 5.9 NG/ML (ref 5–15)
TACROLIMUS BLD-MCNC: 8.1 NG/ML (ref 5–15)
TDI LATERAL: 0.1 M/S
TDI SEPTAL: 0.06 M/S
TDI: 0.08 M/S
TRICUSPID ANNULAR PLANE SYSTOLIC EXCURSION: 1.37 CM
URATE SERPL-MCNC: 9.5 MG/DL (ref 3.4–7)
URN SPEC COLLECT METH UR: ABNORMAL
WBC # BLD AUTO: 9.63 K/UL (ref 3.9–12.7)
WBC #/AREA URNS AUTO: 6 /HPF (ref 0–5)
YEAST UR QL AUTO: ABNORMAL

## 2022-07-21 PROCEDURE — 20600001 HC STEP DOWN PRIVATE ROOM

## 2022-07-21 PROCEDURE — 80053 COMPREHEN METABOLIC PANEL: CPT | Performed by: STUDENT IN AN ORGANIZED HEALTH CARE EDUCATION/TRAINING PROGRAM

## 2022-07-21 PROCEDURE — 25000003 PHARM REV CODE 250: Performed by: PHYSICIAN ASSISTANT

## 2022-07-21 PROCEDURE — 25000003 PHARM REV CODE 250

## 2022-07-21 PROCEDURE — 86255 FLUORESCENT ANTIBODY SCREEN: CPT | Performed by: STUDENT IN AN ORGANIZED HEALTH CARE EDUCATION/TRAINING PROGRAM

## 2022-07-21 PROCEDURE — 25000003 PHARM REV CODE 250: Performed by: STUDENT IN AN ORGANIZED HEALTH CARE EDUCATION/TRAINING PROGRAM

## 2022-07-21 PROCEDURE — 99222 PR INITIAL HOSPITAL CARE,LEVL II: ICD-10-PCS | Mod: ,,, | Performed by: NURSE PRACTITIONER

## 2022-07-21 PROCEDURE — 63600175 PHARM REV CODE 636 W HCPCS: Mod: JG

## 2022-07-21 PROCEDURE — 63600175 PHARM REV CODE 636 W HCPCS: Performed by: STUDENT IN AN ORGANIZED HEALTH CARE EDUCATION/TRAINING PROGRAM

## 2022-07-21 PROCEDURE — 86038 ANTINUCLEAR ANTIBODIES: CPT | Performed by: STUDENT IN AN ORGANIZED HEALTH CARE EDUCATION/TRAINING PROGRAM

## 2022-07-21 PROCEDURE — 82550 ASSAY OF CK (CPK): CPT | Performed by: STUDENT IN AN ORGANIZED HEALTH CARE EDUCATION/TRAINING PROGRAM

## 2022-07-21 PROCEDURE — 82570 ASSAY OF URINE CREATININE: CPT | Performed by: STUDENT IN AN ORGANIZED HEALTH CARE EDUCATION/TRAINING PROGRAM

## 2022-07-21 PROCEDURE — 83516 IMMUNOASSAY NONANTIBODY: CPT | Mod: 59 | Performed by: STUDENT IN AN ORGANIZED HEALTH CARE EDUCATION/TRAINING PROGRAM

## 2022-07-21 PROCEDURE — 99222 1ST HOSP IP/OBS MODERATE 55: CPT | Mod: ,,, | Performed by: NURSE PRACTITIONER

## 2022-07-21 PROCEDURE — 86235 NUCLEAR ANTIGEN ANTIBODY: CPT | Performed by: STUDENT IN AN ORGANIZED HEALTH CARE EDUCATION/TRAINING PROGRAM

## 2022-07-21 PROCEDURE — 63600175 PHARM REV CODE 636 W HCPCS: Performed by: NURSE PRACTITIONER

## 2022-07-21 PROCEDURE — 81001 URINALYSIS AUTO W/SCOPE: CPT | Performed by: STUDENT IN AN ORGANIZED HEALTH CARE EDUCATION/TRAINING PROGRAM

## 2022-07-21 PROCEDURE — 84550 ASSAY OF BLOOD/URIC ACID: CPT | Performed by: STUDENT IN AN ORGANIZED HEALTH CARE EDUCATION/TRAINING PROGRAM

## 2022-07-21 PROCEDURE — 86160 COMPLEMENT ANTIGEN: CPT | Performed by: STUDENT IN AN ORGANIZED HEALTH CARE EDUCATION/TRAINING PROGRAM

## 2022-07-21 PROCEDURE — 83516 IMMUNOASSAY NONANTIBODY: CPT | Performed by: STUDENT IN AN ORGANIZED HEALTH CARE EDUCATION/TRAINING PROGRAM

## 2022-07-21 PROCEDURE — 63600175 PHARM REV CODE 636 W HCPCS: Performed by: PHYSICIAN ASSISTANT

## 2022-07-21 PROCEDURE — 99233 SBSQ HOSP IP/OBS HIGH 50: CPT | Mod: ,,, | Performed by: PHYSICIAN ASSISTANT

## 2022-07-21 PROCEDURE — 85025 COMPLETE CBC W/AUTO DIFF WBC: CPT | Performed by: PHYSICIAN ASSISTANT

## 2022-07-21 PROCEDURE — 86225 DNA ANTIBODY NATIVE: CPT | Performed by: STUDENT IN AN ORGANIZED HEALTH CARE EDUCATION/TRAINING PROGRAM

## 2022-07-21 PROCEDURE — 84100 ASSAY OF PHOSPHORUS: CPT | Performed by: STUDENT IN AN ORGANIZED HEALTH CARE EDUCATION/TRAINING PROGRAM

## 2022-07-21 PROCEDURE — 83520 IMMUNOASSAY QUANT NOS NONAB: CPT | Performed by: STUDENT IN AN ORGANIZED HEALTH CARE EDUCATION/TRAINING PROGRAM

## 2022-07-21 PROCEDURE — 25000003 PHARM REV CODE 250: Performed by: NURSE PRACTITIONER

## 2022-07-21 PROCEDURE — 36415 COLL VENOUS BLD VENIPUNCTURE: CPT | Performed by: PHYSICIAN ASSISTANT

## 2022-07-21 PROCEDURE — 84132 ASSAY OF SERUM POTASSIUM: CPT

## 2022-07-21 PROCEDURE — 84300 ASSAY OF URINE SODIUM: CPT | Performed by: STUDENT IN AN ORGANIZED HEALTH CARE EDUCATION/TRAINING PROGRAM

## 2022-07-21 PROCEDURE — 82784 ASSAY IGA/IGD/IGG/IGM EACH: CPT | Performed by: STUDENT IN AN ORGANIZED HEALTH CARE EDUCATION/TRAINING PROGRAM

## 2022-07-21 PROCEDURE — 86039 ANTINUCLEAR ANTIBODIES (ANA): CPT | Performed by: STUDENT IN AN ORGANIZED HEALTH CARE EDUCATION/TRAINING PROGRAM

## 2022-07-21 PROCEDURE — 80197 ASSAY OF TACROLIMUS: CPT | Performed by: PHYSICIAN ASSISTANT

## 2022-07-21 PROCEDURE — 83735 ASSAY OF MAGNESIUM: CPT | Performed by: PHYSICIAN ASSISTANT

## 2022-07-21 PROCEDURE — 82043 UR ALBUMIN QUANTITATIVE: CPT | Performed by: STUDENT IN AN ORGANIZED HEALTH CARE EDUCATION/TRAINING PROGRAM

## 2022-07-21 PROCEDURE — 86160 COMPLEMENT ANTIGEN: CPT | Mod: 59 | Performed by: STUDENT IN AN ORGANIZED HEALTH CARE EDUCATION/TRAINING PROGRAM

## 2022-07-21 PROCEDURE — 99233 PR SUBSEQUENT HOSPITAL CARE,LEVL III: ICD-10-PCS | Mod: ,,, | Performed by: PHYSICIAN ASSISTANT

## 2022-07-21 RX ORDER — IBUPROFEN 200 MG
24 TABLET ORAL
Status: DISCONTINUED | OUTPATIENT
Start: 2022-07-21 | End: 2022-07-25

## 2022-07-21 RX ORDER — MIDAZOLAM HYDROCHLORIDE 1 MG/ML
INJECTION INTRAMUSCULAR; INTRAVENOUS CODE/TRAUMA/SEDATION MEDICATION
Status: COMPLETED | OUTPATIENT
Start: 2022-07-21 | End: 2022-07-21

## 2022-07-21 RX ORDER — CALCIUM ACETATE 667 MG/1
1334 CAPSULE ORAL
Status: DISCONTINUED | OUTPATIENT
Start: 2022-07-21 | End: 2022-07-27 | Stop reason: HOSPADM

## 2022-07-21 RX ORDER — IBUPROFEN 200 MG
16 TABLET ORAL
Status: DISCONTINUED | OUTPATIENT
Start: 2022-07-21 | End: 2022-07-25

## 2022-07-21 RX ORDER — FUROSEMIDE 10 MG/ML
80 INJECTION INTRAMUSCULAR; INTRAVENOUS ONCE
Status: CANCELLED | OUTPATIENT
Start: 2022-07-21

## 2022-07-21 RX ORDER — HYDRALAZINE HYDROCHLORIDE 20 MG/ML
INJECTION INTRAMUSCULAR; INTRAVENOUS CODE/TRAUMA/SEDATION MEDICATION
Status: COMPLETED | OUTPATIENT
Start: 2022-07-21 | End: 2022-07-21

## 2022-07-21 RX ORDER — FUROSEMIDE 10 MG/ML
80 INJECTION INTRAMUSCULAR; INTRAVENOUS ONCE
Status: COMPLETED | OUTPATIENT
Start: 2022-07-21 | End: 2022-07-21

## 2022-07-21 RX ORDER — GLUCAGON 1 MG
1 KIT INJECTION
Status: DISCONTINUED | OUTPATIENT
Start: 2022-07-21 | End: 2022-07-25

## 2022-07-21 RX ORDER — LIDOCAINE HYDROCHLORIDE 10 MG/ML
INJECTION INFILTRATION; PERINEURAL CODE/TRAUMA/SEDATION MEDICATION
Status: COMPLETED | OUTPATIENT
Start: 2022-07-21 | End: 2022-07-21

## 2022-07-21 RX ORDER — INSULIN ASPART 100 [IU]/ML
0-10 INJECTION, SOLUTION INTRAVENOUS; SUBCUTANEOUS
Status: DISCONTINUED | OUTPATIENT
Start: 2022-07-21 | End: 2022-07-25

## 2022-07-21 RX ORDER — FENTANYL CITRATE 50 UG/ML
INJECTION, SOLUTION INTRAMUSCULAR; INTRAVENOUS CODE/TRAUMA/SEDATION MEDICATION
Status: COMPLETED | OUTPATIENT
Start: 2022-07-21 | End: 2022-07-21

## 2022-07-21 RX ADMIN — INSULIN ASPART 6 UNITS: 100 INJECTION, SOLUTION INTRAVENOUS; SUBCUTANEOUS at 01:07

## 2022-07-21 RX ADMIN — DEXTROSE 500 MG: 50 INJECTION, SOLUTION INTRAVENOUS at 01:07

## 2022-07-21 RX ADMIN — FENTANYL CITRATE 50 MCG: 50 INJECTION, SOLUTION INTRAMUSCULAR; INTRAVENOUS at 04:07

## 2022-07-21 RX ADMIN — MYCOPHENOLATE MOFETIL 750 MG: 250 CAPSULE ORAL at 09:07

## 2022-07-21 RX ADMIN — INSULIN HUMAN 1.4 UNITS/HR: 1 INJECTION, SOLUTION INTRAVENOUS at 11:07

## 2022-07-21 RX ADMIN — ATORVASTATIN CALCIUM 80 MG: 20 TABLET, FILM COATED ORAL at 09:07

## 2022-07-21 RX ADMIN — NIFEDIPINE 90 MG: 30 TABLET, FILM COATED, EXTENDED RELEASE ORAL at 09:07

## 2022-07-21 RX ADMIN — TACROLIMUS 5 MG: 1 CAPSULE ORAL at 08:07

## 2022-07-21 RX ADMIN — DESMOPRESSIN ACETATE 19.56 MCG: 4 SOLUTION INTRAVENOUS at 12:07

## 2022-07-21 RX ADMIN — INSULIN ASPART 6 UNITS: 100 INJECTION, SOLUTION INTRAVENOUS; SUBCUTANEOUS at 11:07

## 2022-07-21 RX ADMIN — MIDAZOLAM HYDROCHLORIDE 1 MG: 1 INJECTION, SOLUTION INTRAMUSCULAR; INTRAVENOUS at 04:07

## 2022-07-21 RX ADMIN — FUROSEMIDE 80 MG: 10 INJECTION, SOLUTION INTRAMUSCULAR; INTRAVENOUS at 11:07

## 2022-07-21 RX ADMIN — METOPROLOL SUCCINATE 50 MG: 50 TABLET, EXTENDED RELEASE ORAL at 09:07

## 2022-07-21 RX ADMIN — LIDOCAINE HYDROCHLORIDE 5 ML: 10 INJECTION, SOLUTION INFILTRATION; PERINEURAL at 04:07

## 2022-07-21 RX ADMIN — SODIUM BICARBONATE 650 MG TABLET 1300 MG: at 09:07

## 2022-07-21 RX ADMIN — HYDRALAZINE HYDROCHLORIDE 10 MG: 20 INJECTION INTRAMUSCULAR; INTRAVENOUS at 04:07

## 2022-07-21 RX ADMIN — TACROLIMUS 5 MG: 1 CAPSULE ORAL at 09:07

## 2022-07-21 RX ADMIN — INSULIN ASPART 8 UNITS: 100 INJECTION, SOLUTION INTRAVENOUS; SUBCUTANEOUS at 09:07

## 2022-07-21 NOTE — CONSULTS
[facility] - Transplant Stepdown  Endocrinology  Diabetes Consult Note    Consult Requested by: Carolina Rodríguez,*   Reason for admit: Acute renal failure    HISTORY OF PRESENT ILLNESS:  Reason for Consult: Management of T2DM, Hyperglycemia     Surgical Procedure and Date: Kidney Transplant 2002    Diabetes diagnosis year: 2002    Home Diabetes Medications: Novolin 70/30 50 units in the morning and 40 units in the evening. (Patient states he will reduce to 30 and 20 if BG < 120 mg/dL; patient states he feels hypoglycemic when BG is in the 80's).     How often checking glucose at home? >4 x day (Georgette 2)  BG readings on regimen: 120-180's may go up to 250's  Hypoglycemia on the regimen?  No  Missed doses on regimen?  No    Diabetes Complications include:     Hyperglycemia and Diabetic peripheral neuropathy     Complicating diabetes co morbidities:   Glucocorticoid use       HPI: Mr. Albrecht is a 71 y/o M s/p Kidney/heart transplant (2002). He has a h/o diabetes, hypertension, hyperlipidemia, chronic kidney disease, and recent COVID (COVID positive June 25-admitted to [facility] July 4-8 with COVID pneumonia). He was admitted to [facility] with AUBREY after routine labs showed Cr 11 (from ~ 2) on 7/18/22.  (Tacrolimus level was 7.2). Patient noted more dyspnea and decreased energy along with decreased urine output. No chest pain, headache, confusion, fever, vomiting, or abdominal pain noted. He was seen by Nephrology. With concern for possible rejection, he was empirically treated with IV Solu-Medrol (1 g on July 19). Endocrine consulted to manage hyperglycemia and type 2 diabetes.     Hemoglobin A1C   Date Value Ref Range Status   06/20/2022 5.9 (H) 4.0 - 5.6 % Final     Comment:     ADA Screening Guidelines:  5.7-6.4%  Consistent with prediabetes  >or=6.5%  Consistent with diabetes    High levels of fetal hemoglobin interfere with the HbA1C  assay. Heterozygous hemoglobin variants (HbS, HgC,  etc)do  not significantly interfere with this assay.   However, presence of multiple variants may affect accuracy.     02/09/2022 6.5 (H) 4.0 - 5.6 % Final     Comment:     ADA Screening Guidelines:  5.7-6.4%  Consistent with prediabetes  >or=6.5%  Consistent with diabetes    High levels of fetal hemoglobin interfere with the HbA1C  assay. Heterozygous hemoglobin variants (HbS, HgC, etc)do  not significantly interfere with this assay.   However, presence of multiple variants may affect accuracy.     10/13/2021 7.1 (H) 4.0 - 5.6 % Final     Comment:     ADA Screening Guidelines:  5.7-6.4%  Consistent with prediabetes  >or=6.5%  Consistent with diabetes    High levels of fetal hemoglobin interfere with the HbA1C  assay. Heterozygous hemoglobin variants (HbS, HgC, etc)do  not significantly interfere with this assay.   However, presence of multiple variants may affect accuracy.                  Interval HPI:   Overnight events: No acute events overnight. Patient on the TSU in room 81529/84244 A. Blood glucose worsening. BG above goal on current insulin regimen (SSI ). Steroid use- Methylprednisolone  500 mg.    Renal function- Abnormal - Creatinine 11.9   Vasopressors-  None       Diet NPO     Eating:   NPO  Nausea: No  Hypoglycemia and intervention: No  Fever: No  TPN and/or TF: No    PMH, PSH, FH, SH updated and reviewed     ROS:  Review of Systems  Constitutional: Negative for weight changes.  Eyes: Negative for visual disturbance.  Respiratory: Negative for cough.   Cardiovascular: Negative for chest pain.  Gastrointestinal: Negative for nausea.  Endocrine: Negative for polyuria, polydipsia.  Musculoskeletal: Negative for back pain.  Skin: Negative for rash.  Neurological: Negative for syncope.  Psychiatric/Behavioral: Negative for depression.    Current Medications and/or Treatments Impacting Glycemic Control  Immunotherapy:    Immunosuppressants           Stop Route Frequency     mycophenolate capsule 750 mg          -- Oral 2 times daily     tacrolimus capsule 5 mg         -- Oral 2 times daily          Steroids:   Hormones (From admission, onward)                Start     Stop Route Frequency Ordered    07/21/22 1115  methylPREDNISolone sodium succinate (SOLU-MEDROL) 500 mg in dextrose 5 % 100 mL IVPB         -- IV Once 07/21/22 1057    07/21/22 1045  desmopressin (DDAVP) 19.56 mcg in sodium chloride 0.9% 50 mL IVPB         -- IV Once 07/21/22 0956          Pressors:    Autonomic Drugs (From admission, onward)                None          Hyperglycemia/Diabetes Medications:   Antihyperglycemics (From admission, onward)                Start     Stop Route Frequency Ordered    07/21/22 1045  insulin regular in 0.9 % NaCl 100 unit/100 mL (1 unit/mL) infusion        Question:  Enter initial dose (Units/hr):  Answer:  0.9    -- IV Continuous 07/21/22 0941    07/21/22 1041  insulin aspart U-100 pen 0-10 Units         -- SubQ As needed (PRN) 07/21/22 0941             PHYSICAL EXAMINATION:  Vitals:    07/21/22 0830   BP: (!) 148/86   Pulse: 77   Resp: 20   Temp:      Body mass index is 36.8 kg/m².    Physical Exam  Constitutional: Well developed, well nourished, NAD.  ENT: External ears no masses with nose patent; normal hearing.  Neck: Supple; trachea midline.  Cardiovascular: Normal heart sounds, no LE edema. DP +2 bilaterally.  Lungs: Normal effort; lungs anterior bilaterally clear to auscultation.  Abdomen: Soft, no masses, no hernias.  MS: No clubbing or cyanosis of nails noted; unable to assess gait.  Skin: No rashes, lesions, or ulcers; no nodules. Injection sites are ok. No lipo hypertropthy or atrophy.  Psychiatric: Good judgement and insight; normal mood and affect.  Neurological: Cranial nerves are grossly intact.   Foot: Nails in good condition, no amputations noted.        Labs Reviewed and Include   Recent Labs   Lab 07/21/22  0727   *   CALCIUM 8.1*   ALBUMIN 2.1*   PROT 6.7      K 5.3*   CO2 19*   CL  104   BUN 74*   CREATININE 11.9*   ALKPHOS 122   ALT 40   AST 34   BILITOT 0.2     Lab Results   Component Value Date    WBC 9.63 07/21/2022    HGB 10.2 (L) 07/21/2022    HCT 32.8 (L) 07/21/2022    MCV 86 07/21/2022     07/21/2022     No results for input(s): TSH, FREET4 in the last 168 hours.  Lab Results   Component Value Date    HGBA1C 5.9 (H) 06/20/2022       Nutritional status:   Body mass index is 36.8 kg/m².  Lab Results   Component Value Date    ALBUMIN 2.1 (L) 07/21/2022    ALBUMIN 2.0 (L) 07/20/2022    ALBUMIN 2.0 (L) 07/19/2022     No results found for: PREALBUMIN    Estimated Creatinine Clearance: 8 mL/min (A) (based on SCr of 11.9 mg/dL (H)).    Accu-Checks  Recent Labs     07/19/22  1942 07/20/22  0444 07/20/22  1115 07/20/22  1303 07/20/22  1610 07/20/22  2147 07/21/22  0812   POCTGLUCOSE 127* 203* 312* 371* 320* 303* 352*        ASSESSMENT and PLAN    * Acute renal failure  Titrate insulin slowly to avoid hypoglycemia as the risk of hypoglycemia increases with decreased creatinine clearance.    Estimated Creatinine Clearance: 8 mL/min (A) (based on SCr of 11.9 mg/dL (H)).        Type 2 diabetes mellitus  Endocrinology consulted for BG management.   BG goal 140-180    - Start transition drip at 1.4 units/hr with 25% step-down parameters. (weight-based at 0.5 units/kg/day)  - Novolog (aspart) insulin [facility] (180/25) SSI Units SQ prn for BG excursions.   - BG checks q4hr while NPO and on clears.   - Hypoglycemia protocol in place      ** Please notify Endocrine for any change and/or advance in diet**  ** Please call Endocrine for any BG related issues **    Discharge Planning:   TBD. Please notify endocrinology prior to discharge.        Adrenal corticosteroid causing adverse effect in therapeutic use  On steroid therapy per transplant team; may elevate BG readings            Plan discussed with patient, family, and RN at bedside.      Tim Mendoza, DNP, FNP  Endocrinology  [facility] - Transplant  Stepdown

## 2022-07-21 NOTE — H&P
Inpatient Radiology Pre-procedure Note    History of Present Illness:  Nigel Albrecht is a 72 y.o. male who presents for AUBREY of Tx kidney () currently on empirical anti-rejection therapy. IR was consulted for renal bx.  Admission H&P reviewed.  Past Medical History:   Diagnosis Date    Allergic rhinitis 2013    Blood transfusion     Cataract     CKD (chronic kidney disease), stage III 2014    -donor kidney transplant     Diabetes mellitus, type 2 2013    Gout, arthritis 2013    Heart attack     Heart transplanted     Hyperlipidemia 2013    Hypertension     Immunodeficiency due to treatment with immunosuppressive medication     Morbid obesity 2013    Organ transplant 2002    heart and kidney    Prophylactic immunotherapy     Renal manifestation of secondary diabetes mellitus      Past Surgical History:   Procedure Laterality Date    CATARACT EXTRACTION Bilateral     COLONOSCOPY N/A 10/6/2020    Procedure: COLONOSCOPY;  Surgeon: Conner Redman MD;  Location: [facility];  Service: Endoscopy;  Laterality: N/A;    EYE SURGERY      HEART TRANSPLANT      KIDNEY TRANSPLANT      REVISION TOTAL HIP ARTHROPLASTY         Review of Systems:   As documented in primary team H&P    Home Meds:   Prior to Admission medications    Medication Sig Start Date End Date Taking? Authorizing Provider   aspirin 81 MG Chew Take 1 tablet (81 mg total) by mouth once daily. 22 Yes Mary Rocha MD   famotidine (PEPCID) 20 MG tablet Take 1 tablet (20 mg total) by mouth once daily. 22 Yes Mary Rocha MD   heparin sodium,porcine (HEPARIN, PORCINE,) 5,000 unit/mL injection Inject 1 mL (5,000 Units total) into the skin every 8 (eight) hours. 22  Yes Mary Rocha MD   metoprolol succinate (TOPROL-XL) 25 MG 24 hr tablet Take 3 tablets (75 mg total) by mouth once daily. 22 Yes Mary Rocha MD   mycophenolate (CELLCEPT) 250 mg Cap  Take 2 capsules (500 mg total) by mouth 2 (two) times daily. 7/20/22 7/20/23 Yes Mary Rocha MD   NIFEdipine (PROCARDIA-XL) 90 MG (OSM) 24 hr tablet Take 1 tablet (90 mg total) by mouth once daily. 7/8/22 7/8/23 Yes Jennifer Osborn MD   tacrolimus (PROGRAF) 5 MG Cap Take 1 capsule (5 mg total) by mouth every 12 (twelve) hours. 7/20/22 7/20/23 Yes Mary Rocha MD   acetaminophen (TYLENOL) 325 MG tablet Take 2 tablets (650 mg total) by mouth every 4 (four) hours as needed. 7/20/22   Mary Rocha MD   FREESTYLE SHREE 2 SENSOR Kit APPLY 1 SENSOR             SUBCUTANEOUSLY EVERY 14    DAYS 4/27/22   Jael Garrison, PhD, NP-C   insulin aspart U-100 (NOVOLOG) 100 unit/mL (3 mL) InPn pen Inject 0-5 Units into the skin before meals and at bedtime as needed (Hyperglycemia). 7/20/22 7/20/23  Mary Rocha MD   insulin detemir U-100 (LEVEMIR FLEXTOUCH) 100 unit/mL (3 mL) SubQ InPn pen Inject 5 Units into the skin every evening. 7/20/22 7/20/23  Mary Rocha MD   sodium zirconium cyclosilicate (LOKELMA) 10 gram packet Take 1 packet (10 g total) by mouth 3 (three) times daily. Mix entire contents of packet(s) into drinking glass containing 3 tablespoons of water; stir well and drink immediately. Add water and repeat until no powder remains to receive entire dose. 7/20/22   Mary Rocha MD   amLODIPine (NORVASC) 10 MG tablet Take 0.5 tablets (5 mg total) by mouth once daily. 6/3/22 7/7/22  Tray Chinchilla MD   atorvastatin (LIPITOR) 80 MG tablet TAKE 1 TABLET ONCE DAILY 3/4/22 7/20/22  Krystin Zimmerman MD   calcium carbonate (OS-CARRIE) 500 mg calcium (1,250 mg) tablet Take 1 tablet by mouth once daily.  7/20/22  Historical Provider   gabapentin (NEURONTIN) 300 MG capsule TAKE 1 CAPSULE TWICE DAILY 4/28/22 7/20/22  Lexie Liang, NP   insulin NPH/Reg human (HUMULIN 70/30 U-100 KWIKPEN) 100 unit/mL (70-30) InPn pen Inject 40 Units into the skin 2 (two) times daily. 6/15/22 7/20/22  Jael  Mainor Garrison, PhD, NP-C   lisinopriL (PRINIVIL,ZESTRIL) 40 MG tablet Take 1 tablet (40 mg total) by mouth once daily. 6/13/22 7/20/22  Krystin Zimmerman MD     Scheduled Meds:    atorvastatin  80 mg Oral Daily    metoprolol succinate  25 mg Oral Daily    metoprolol succinate  50 mg Oral Daily    mycophenolate  750 mg Oral BID    NIFEdipine  90 mg Oral Daily    sodium bicarbonate  1,300 mg Oral BID    tacrolimus  5 mg Oral BID     Continuous Infusions:   PRN Meds:acetaminophen, dextrose 10%, dextrose 10%, glucagon (human recombinant), glucose, glucose, insulin aspart U-100, sodium chloride 0.9%  Anticoagulants/Antiplatelets: no anticoagulation    Allergies: Review of patient's allergies indicates:  No Known Allergies  Sedation Hx: have not been any systemic reactions    Labs:  No results for input(s): INR in the last 168 hours.    Invalid input(s):  PT,  PTT    Recent Labs   Lab 07/21/22 0727   WBC 9.63   HGB 10.2*   HCT 32.8*   MCV 86         Recent Labs   Lab 07/21/22 0727   *      K 5.3*      CO2 19*   BUN 74*   CREATININE 11.9*   CALCIUM 8.1*   MG 2.6   ALT 40   AST 34   ALBUMIN 2.1*   BILITOT 0.2         Vitals:  Temp: 97.5 °F (36.4 °C) (07/21/22 0510)  Pulse: 76 (07/21/22 0510)  Resp: 18 (07/21/22 0510)  BP: 134/63 (07/21/22 0510)  SpO2: (!) 94 % (07/21/22 0510)     Physical Exam:  ASA: 3  Mallampati: 3    General: no acute distress  Mental Status: alert and oriented to person, place and time  HEENT: normocephalic, atraumatic  Chest: unlabored breathing  Heart: regular heart rate  Abdomen: nondistended  Extremity: moves all extremities    Plan: transplant renal bx  Sedation Plan: moderate    Nina Middleton MD PhD  Interventional Radiology  PGY-2

## 2022-07-21 NOTE — PLAN OF CARE
Tx Kidney biopsy complete. Pt tolerated well. VSS. No signs or symptoms of distress noted.  Site CDI.  Pt will be transferred to ROCU bed and report to be given at bedside.

## 2022-07-21 NOTE — PROGRESS NOTES
Admit Note     Met with patient and wife to assess needs. Patient is a 72 y.o.  male, admitted post heart and kidney transplantation on 5/24/2002 for Acute renal failure [N17.9]  AUBREY (acute kidney injury) [N17.9]    Patient admitted from [facility] on 7/20/2022 .  At this time, patient presents as alert and oriented x 4, pleasant, good eye contact, recall good, concentration/judgement good, average intelligence, calm, communicative, cooperative and asking and answering questions appropriately.  At this time, patients caregiver presents as alert and oriented x 4, pleasant, good eye contact, well groomed, recall good, concentration/judgement good, average intelligence, calm, communicative, cooperative and asking and answering questions appropriately.    Household/Family Systems     Patient resides with patient's wife, at [address] [place], [place] [id number].  Support system includes pt's wife, brother Hugo Albrecht ([phone]) living in [place], and son Ernesto living in [place].  Patient does not have dependents that are need of being cared for.     Patients primary caregiver is China Albrecht, patients wife, phone number [phone].  Confirmed patients contact information is [phone] (home);   Telephone Information:   Mobile [phone]   .    During admission, patient's caregiver plans to stay at home.  Confirmed patient and patients caregivers do have access to reliable transportation.    Cognitive Status/Learning     Patient reports reading ability as college and states patient does not have difficulty with N/A.  Patient reports patient learns best by multisensory information.   Needed: No.   Highest education level: Attended College/Technical School    Vocation/Disability   .  Working for Income: No  If no, reason not working: Patient Choice - Retired  Patient is retired and is currently employed part-time as a [employer].    Adherence     Patient reports a  high level of adherence to patients health care regimen.  Adherence counseling and education provided. Patient verbalizes understanding.    Substance Use    Patient reports the following substance usage.    Tobacco: none, patient denies any use.  Alcohol: none, patient denies any use.  Illicit Drugs/Non-prescribed Medications: none, patient denies any use.  Patient states clear understanding of the potential impact of substance use.  Substance abstinence/cessation counseling, education and resources provided and reviewed.     Services Utilizing/ADLS    Infusion Service: Prior to admission, patient utilizing? no  Home Health: Prior to admission, patient utilizing? no  DME: Prior to admission, yes - cane (uses occasionally)  Pulmonary/Cardiac Rehab: Prior to admission, no  Dialysis:  Prior to admission, no  Transplant Specialty Pharmacy:  Prior to admission, yes; [facility].    Prior to admission, patient reports patient was independent with ADLS and was driving.  Patient reports patient is able to care for self at this time..  Patient indicates a willingness to care for self once medically cleared to do so.    Insurance/Medications    Insured by   Payer/Plan Subscr  Sex Relation Sub. Ins. ID Effective Group Num   1. MEDICARE - ME* JOSSIE SIERRA  1950 Male Self [id number] 6/1/15                                    [address]   2. Yo CROSS * JOSSIE SIERRA  1950 Male Self [id number]                                   [address] [address]      Primary Insurance (for UNOS reporting): Public Insurance - Medicare FFS (Fee For Service)  Secondary Insurance (for UNOS reporting): Private Insurance - BCBS via pt's long term benefit    Patient reports patient is able to obtain and afford medications at this time and at time of discharge.    Living Will/Healthcare Power of     Patient states patient does not have a LW and/or HCPA.   provided education regarding LW and HCPA and  the completion of forms.    Coping/Mental Health    Patient is coping adequately with the aid of  family members.  Patient denies mental health difficulties.     Discharge Planning    At time of discharge, patient plans to return to patient's home under the care of self and/or pt's wife.  Patients wife will transport patient.  Per rounds today, expected discharge date has not been medically determined at this time. Patient and patients caregiver  verbalize understanding and are involved in treatment planning and discharge process.    Additional Concerns    Patient's caretaker denies additional needs and/or concerns at this time. Patient is being followed for needs, education, resources, information, emotional support, supportive counseling, and for supportive and skilled discharge plan of care.  providing ongoing psychosocial support, education, resources and d/c planning as needed.  SW remains available.  remains available. Patient's caregiver verbalizes understanding and agreement with information reviewed,  availability and how to access available resources as needed. Patient denies additional needs and/or concerns at this time. Patient verbalizes understanding and agreement with information reviewed, social work availability, and how to access available resources as needed.

## 2022-07-21 NOTE — NURSING
Patient received in MPU bay 5 post-kidney biopsy. Patient is awake and alert. Denies pain. No complaints. VSS. Procedure site dressing is clean and dry. Patient to recover for a total of 2 hour per Dr. Whipple.

## 2022-07-21 NOTE — PLAN OF CARE
Pt is afebrile. Hand washing per nursing guidelines.  Non slip footwear in place. Bed lowered. Rails up x 2. Call bell in easy reach.  Patient is independent. Pt encouraged to turn frequently. Started insulin gtt, bg in 300s. Iv steriod given. Creatinine. [facility]. Clear yellow urine output. Pt ambulates independently. Kidney bx done, awaiting results.

## 2022-07-21 NOTE — ASSESSMENT & PLAN NOTE
- K 5.3 after lasix and kayexalate  - Will give an additional 80 mg IV lasix today   - Monitor with daily labs

## 2022-07-21 NOTE — HPI
Reason for Consult: Management of T2DM, Hyperglycemia     Surgical Procedure and Date: Kidney Transplant 2002    Diabetes diagnosis year: 2002    Home Diabetes Medications: Novolin 70/30 50 units in the morning and 40 units in the evening. (Patient states he will reduce to 30 and 20 if BG < 120 mg/dL; patient states he feels hypoglycemic when BG is in the 80's).     How often checking glucose at home? >4 x day (Georgette 2)  BG readings on regimen: 120-180's may go up to 250's  Hypoglycemia on the regimen?  No  Missed doses on regimen?  No    Diabetes Complications include:     Hyperglycemia and Diabetic peripheral neuropathy     Complicating diabetes co morbidities:   Glucocorticoid use       HPI: Mr. Albrecht is a 73 y/o M s/p Kidney/heart transplant (2002). He has a h/o diabetes, hypertension, hyperlipidemia, chronic kidney disease, and recent COVID (COVID positive June 25-admitted to [facility] July 4-8 with COVID pneumonia). He was admitted to [facility] with AUBREY after routine labs showed Cr 11 (from ~ 2) on 7/18/22.  (Tacrolimus level was 7.2). Patient noted more dyspnea and decreased energy along with decreased urine output. No chest pain, headache, confusion, fever, vomiting, or abdominal pain noted. He was seen by Nephrology. With concern for possible rejection, he was empirically treated with IV Solu-Medrol (1 g on July 19). Endocrine consulted to manage hyperglycemia and type 2 diabetes.     Hemoglobin A1C   Date Value Ref Range Status   06/20/2022 5.9 (H) 4.0 - 5.6 % Final     Comment:     ADA Screening Guidelines:  5.7-6.4%  Consistent with prediabetes  >or=6.5%  Consistent with diabetes    High levels of fetal hemoglobin interfere with the HbA1C  assay. Heterozygous hemoglobin variants (HbS, HgC, etc)do  not significantly interfere with this assay.   However, presence of multiple variants may affect accuracy.     02/09/2022 6.5 (H) 4.0 - 5.6 % Final     Comment:     ADA Screening  Guidelines:  5.7-6.4%  Consistent with prediabetes  >or=6.5%  Consistent with diabetes    High levels of fetal hemoglobin interfere with the HbA1C  assay. Heterozygous hemoglobin variants (HbS, HgC, etc)do  not significantly interfere with this assay.   However, presence of multiple variants may affect accuracy.     10/13/2021 7.1 (H) 4.0 - 5.6 % Final     Comment:     ADA Screening Guidelines:  5.7-6.4%  Consistent with prediabetes  >or=6.5%  Consistent with diabetes    High levels of fetal hemoglobin interfere with the HbA1C  assay. Heterozygous hemoglobin variants (HbS, HgC, etc)do  not significantly interfere with this assay.   However, presence of multiple variants may affect accuracy.

## 2022-07-21 NOTE — DISCHARGE SUMMARY
[facility] - Telemetry ([facility] Medicine  Discharge Summary      Patient Name: Nigel Albrecht  MRN: [id number]  Patient Class: IP- Inpatient  Admission Date: 7/19/2022  Hospital Length of Stay: 1 days  Discharge Date and Time: 7/20/2022  6:48 PM  Attending Physician: No att. providers found   Discharging Provider: Mary Rocha MD  Primary Care Provider: Krystin Zimmerman MD      HPI:   The pt is a 73 yo male with PMHx significant for Heart and Kidney transplant in 2002 on chronic immunosuppressive therapy with tacrolimus and cellcept, DM 2, HTN, HLD, and CKD IIIb, recent COVID 19 virus infection ( 6/25/22) and admitted to the hospital at [facility], [facility]  for COVID pneumonia from 7/4 to 7/8,  treated with remdesivir x 4 doses and dexamethasone. Pt did not qualify for home oxygen and advised to hold Cellcept for 1 week on discharge  which he started back since last week. Today pt was asked by his transplant MD to report to the ED due to abnormal findings noted on routine laboratory tests  that was done yesterday. Pt is noted to have creatinine 11.1 with baseline 1.8 to 2.1,   K 5.6, . Pt noted he has been more short of breath , decreased energy and has not been urinated as much lately. Pt denies  headaches, confusion, chest pain, fever, chills, nausea , vomiting, dark urine, leg swelling , abdominal pain or changes in bowel habit.     Case was discussed with Transplant Surgery at [facility] by ED provider who states that [facility] is at capacity and ED in diversion and recommended  to initiate IVF with bicarb drip at 75 mLs per hour, renal US, and admit to  service. Pt remains on the list to be transferred over once bed is available at [facility].       * No surgery found *      Hospital Course:   7/20 - Nephrology consult obtained . Rejection suspected. Noted Prograf level was low or subtherapeutic for couple of weeks from late June to early July and pt was off Cellcept during  COVID infection in early July, however adjustment was made by transplant team and Prograf level from yesterday is therapeutic. Pt has received Solumedrol 1 gram IV x 1 dose last night . IVF increased. Also received Lokelma 10 gram for hyperkalemia . Awaiting transfer to Transplant Medicine in [facility], Cristian [facility]. .     Nursing was notified bed assignment at [facility], [facility] at 1730 . Discharge order placed.        Goals of Care Treatment Preferences:  Code Status: Full Code      Consults:     No new Assessment & Plan notes have been filed under this hospital service since the last note was generated.  Service: Hospital Medicine    Final Active Diagnoses:    Diagnosis Date Noted POA    PRINCIPAL PROBLEM:  Acute renal failure [N17.9] 2022 Yes    -donor kidney transplant [Z94.0]  Not Applicable    Heart transplanted [Z94.1]  Not Applicable    Immunosuppression due to drug therapy [D84.821, Z79.899] 2018 Not Applicable    Primary hypertension [I10] 2022 Yes    Type 2 diabetes mellitus [E11.9] 2022 Yes    Hepatitis C test positive [B19.20] 2022 Yes    Anemia of chronic renal failure, stage 3b [N18.32, D63.1] 2022 Yes    Pneumonia due to COVID-19 virus [U07.1, J12.82] 2022 Yes    Class 2 severe obesity due to excess calories with serious comorbidity and body mass index (BMI) of 37.0 to 37.9 in adult [E66.01, Z68.37] 2015 Not Applicable      Problems Resolved During this Admission:       Discharged Condition: stable    Disposition: Home or Self Care    Follow Up:    Patient Instructions:      Diet renal     Diet diabetic     Activity as tolerated       Significant Diagnostic Studies: Labs:   BMP:   Recent Labs   Lab 22  1550 22  0934 22  2110   GLU 86 274* 287*    137 136   K 5.5* 6.1* 5.6*    106 104   CO2 17* 17* 19*   BUN 57* 76* 73*   CREATININE 12.9* 12.3* 11.9*   CALCIUM 8.6* 7.7* 8.3*   MG 2.5  --  2.5   , CMP   Recent Labs   Lab  07/19/22  1550 07/20/22  0934 07/20/22  2110    137 136   K 5.5* 6.1* 5.6*    106 104   CO2 17* 17* 19*   GLU 86 274* 287*   BUN 57* 76* 73*   CREATININE 12.9* 12.3* 11.9*   CALCIUM 8.6* 7.7* 8.3*   PROT 7.0  --  6.7   ALBUMIN 2.0*  --  2.0*   BILITOT 0.2  --  0.1   ALKPHOS 107  --  126   AST 30  --  39   ALT 31  --  40   ANIONGAP 14 14 13   ESTGFRAFRICA 4* 4* 4.3*   EGFRNONAA 3* 4* 3.7*    and CBC   Recent Labs   Lab 07/19/22  1154 07/20/22  0934 07/20/22  2111   WBC 9.12 4.99 7.67   HGB 10.0* 11.2* 10.4*   HCT 28.3* 35.5* 33.0*    243 219       Pending Diagnostic Studies:     Procedure Component Value Units Date/Time    HLA Donor Specific Antibodies [[id number]] Collected: 07/20/22 0934    Order Status: Sent Lab Status: In process Updated: 07/20/22 0934    Specimen: Blood     Tacrolimus level [[id number]] Collected: 07/20/22 0934    Order Status: Sent Lab Status: In process Updated: 07/21/22 0044    Specimen: Blood     Narrative:      Collection has been rescheduled by DNP1 at 07/20/2022 07:44 Reason:   Unable to collect         Medications:  Reconciled Home Medications:      Medication List      START taking these medications    acetaminophen 325 MG tablet  Commonly known as: TYLENOL  Take 2 tablets (650 mg total) by mouth every 4 (four) hours as needed.     aspirin 81 MG Chew  Take 1 tablet (81 mg total) by mouth once daily.  Replaces: LOW-DOSE ASPIRIN 81 mg Tab     famotidine 20 MG tablet  Commonly known as: PEPCID  Take 1 tablet (20 mg total) by mouth once daily.     heparin (porcine) 5,000 unit/mL injection  Inject 1 mL (5,000 Units total) into the skin every 8 (eight) hours.     insulin aspart U-100 100 unit/mL (3 mL) Inpn pen  Commonly known as: NovoLOG  Inject 0-5 Units into the skin before meals and at bedtime as needed (Hyperglycemia).     insulin detemir U-100 100 unit/mL (3 mL) Inpn pen  Commonly known as: Levemir FLEXTOUCH  Inject 5 Units into the skin every evening.     sodium  zirconium cyclosilicate 10 gram packet  Commonly known as: Lokelma  Take 1 packet (10 g total) by mouth 3 (three) times daily. Mix entire contents of packet(s) into drinking glass containing 3 tablespoons of water; stir well and drink immediately. Add water and repeat until no powder remains to receive entire dose.        CHANGE how you take these medications    metoprolol succinate 25 MG 24 hr tablet  Commonly known as: TOPROL-XL  Take 3 tablets (75 mg total) by mouth once daily.  What changed:   · how much to take  · additional instructions  · Another medication with the same name was removed. Continue taking this medication, and follow the directions you see here.     mycophenolate 250 mg Cap  Commonly known as: CELLCEPT  Take 2 capsules (500 mg total) by mouth 2 (two) times daily.  What changed: how much to take     tacrolimus 5 MG Cap  Commonly known as: PROGRAF  Take 1 capsule (5 mg total) by mouth every 12 (twelve) hours.  What changed:   · medication strength  · See the new instructions.        CONTINUE taking these medications    FREESTYLE SHREE 2 SENSOR Kit  Generic drug: flash glucose sensor  APPLY 1 SENSOR             SUBCUTANEOUSLY EVERY 14    DAYS     NIFEdipine 90 MG (OSM) 24 hr tablet  Commonly known as: PROCARDIA-XL  Take 1 tablet (90 mg total) by mouth once daily.        STOP taking these medications    amLODIPine 10 MG tablet  Commonly known as: NORVASC     atorvastatin 80 MG tablet  Commonly known as: LIPITOR     calcium carbonate 500 mg calcium (1,250 mg) tablet  Commonly known as: OS-CARRIE     CENTRUM SILVER MEN ORAL     cholecalciferol (vitamin D3) 125 mcg (5,000 unit) Tab     gabapentin 300 MG capsule  Commonly known as: NEURONTIN     HumuLIN 70/30 U-100 KwikPen 100 unit/mL (70-30) Inpn pen  Generic drug: insulin NPH/Reg human     lisinopriL 40 MG tablet  Commonly known as: PRINIVIL,ZESTRIL     LOW-DOSE ASPIRIN 81 mg Tab  Generic drug: aspirin  Replaced by: aspirin 81 MG Chew     OZEMPIC 2  mg/dose (8 mg/3 mL) Pnij  Generic drug: semaglutide     torsemide 5 MG Tab  Commonly known as: DEMADEX            Indwelling Lines/Drains at time of discharge:   Lines/Drains/Airways     Drain  Duration                Urethral Catheter 07/19/22 1517 Latex 16 Fr. 1 day                Time spent on the discharge of patient: 30  minutes         Mary Rocha MD  Department of Hospital Medicine  [facility] - Telemetry ([facility])

## 2022-07-21 NOTE — ASSESSMENT & PLAN NOTE
- IR consulted for kidney biopsy  - DSA, BK, CMV PCR pending   - Will also obtain 2D Echo  - Monitor

## 2022-07-21 NOTE — SUBJECTIVE & OBJECTIVE
Interval HPI:   Overnight events: No acute events overnight. Patient on the TSU in room 72211/79592 A. Blood glucose worsening. BG above goal on current insulin regimen (SSI ). Steroid use- Methylprednisolone  500 mg.    Renal function- Abnormal - Creatinine 11.9   Vasopressors-  None       Diet NPO     Eating:   NPO  Nausea: No  Hypoglycemia and intervention: No  Fever: No  TPN and/or TF: No    PMH, PSH, FH, SH updated and reviewed     ROS:  Review of Systems  Constitutional: Negative for weight changes.  Eyes: Negative for visual disturbance.  Respiratory: Negative for cough.   Cardiovascular: Negative for chest pain.  Gastrointestinal: Negative for nausea.  Endocrine: Negative for polyuria, polydipsia.  Musculoskeletal: Negative for back pain.  Skin: Negative for rash.  Neurological: Negative for syncope.  Psychiatric/Behavioral: Negative for depression.    Current Medications and/or Treatments Impacting Glycemic Control  Immunotherapy:    Immunosuppressants           Stop Route Frequency     mycophenolate capsule 750 mg         -- Oral 2 times daily     tacrolimus capsule 5 mg         -- Oral 2 times daily          Steroids:   Hormones (From admission, onward)                Start     Stop Route Frequency Ordered    07/21/22 1115  methylPREDNISolone sodium succinate (SOLU-MEDROL) 500 mg in dextrose 5 % 100 mL IVPB         -- IV Once 07/21/22 1057    07/21/22 1045  desmopressin (DDAVP) 19.56 mcg in sodium chloride 0.9% 50 mL IVPB         -- IV Once 07/21/22 0956          Pressors:    Autonomic Drugs (From admission, onward)                None          Hyperglycemia/Diabetes Medications:   Antihyperglycemics (From admission, onward)                Start     Stop Route Frequency Ordered    07/21/22 1045  insulin regular in 0.9 % NaCl 100 unit/100 mL (1 unit/mL) infusion        Question:  Enter initial dose (Units/hr):  Answer:  0.9    -- IV Continuous 07/21/22 0941    07/21/22 1041  insulin aspart U-100 pen 0-10  Units         -- SubQ As needed (PRN) 07/21/22 0941             PHYSICAL EXAMINATION:  Vitals:    07/21/22 0830   BP: (!) 148/86   Pulse: 77   Resp: 20   Temp:      Body mass index is 36.8 kg/m².    Physical Exam  Constitutional: Well developed, well nourished, NAD.  ENT: External ears no masses with nose patent; normal hearing.  Neck: Supple; trachea midline.  Cardiovascular: Normal heart sounds, no LE edema. DP +2 bilaterally.  Lungs: Normal effort; lungs anterior bilaterally clear to auscultation.  Abdomen: Soft, no masses, no hernias.  MS: No clubbing or cyanosis of nails noted; unable to assess gait.  Skin: No rashes, lesions, or ulcers; no nodules. Injection sites are ok. No lipo hypertropthy or atrophy.  Psychiatric: Good judgement and insight; normal mood and affect.  Neurological: Cranial nerves are grossly intact.   Foot: Nails in good condition, no amputations noted.

## 2022-07-21 NOTE — H&P
[facility] - Transplant Stepdown  Kidney Transplant  H&P      Subjective:     Chief Complaint/Reason for Admission: hyperkalemia    History of Present Illness:  Mr. Albrecht is a 73 y/o M s/p Kidney/heart transplant (2002). He has a h/o diabetes, hypertension, hyperlipidemia, chronic kidney disease, and recent COVID (COVID positive June 25-admitted to [facility] July 4-8 with COVID pneumonia). He was admitted to [facility] with AUBREY after routine labs showed Cr 11 (from ~ 2) on 7/18/22.  (Tacrolimus level was 7.2). Patient noted more dyspnea and decreased energy along with decreased urine output. No chest pain, headache, confusion, fever, vomiting, or abdominal pain noted. He was seen by Nephrology. With concern for possible rejection, he was empirically treated with IV Solu-Medrol (1 g on July 19). He was treated with IV fluids with bicarbonate along with Lokelma. Urinalysis with 2+ protein, 2 RBC, 2 WBC, occasional WBC clumps, rare bacteria. US showed slightly increased resistive indices; Mild cortical thinning; No loss of corticomedullary distinction; Adequate perfusion; No evidence of hydronephrosis. CXR showed mildly enlarged but stable cardiac silhouette. Aorta demonstrates atherosclerotic disease. Mild atelectasis in the right mid lung zone. Patchy infiltrates at the lung bases bilaterally that could reflect edema or early airspace disease. No evidence of pleural effusion or pneumothorax. EKG had normal sinus rhythm, right bundle-branch block. Vital signs stable. Pt admitted to [facility] for further management. Will obtain 2D echo and kidney biopsy. Pt d/w Dr Wilkerson.       PTA Medications   Medication Sig    [START ON 7/21/2022] aspirin 81 MG Chew Take 1 tablet (81 mg total) by mouth once daily.    [START ON 7/21/2022] famotidine (PEPCID) 20 MG tablet Take 1 tablet (20 mg total) by mouth once daily.    heparin sodium,porcine (HEPARIN, PORCINE,) 5,000 unit/mL injection Inject 1 mL (5,000 Units total)  into the skin every 8 (eight) hours.    [START ON 2022] metoprolol succinate (TOPROL-XL) 25 MG 24 hr tablet Take 3 tablets (75 mg total) by mouth once daily.    mycophenolate (CELLCEPT) 250 mg Cap Take 2 capsules (500 mg total) by mouth 2 (two) times daily.    NIFEdipine (PROCARDIA-XL) 90 MG (OSM) 24 hr tablet Take 1 tablet (90 mg total) by mouth once daily.    tacrolimus (PROGRAF) 5 MG Cap Take 1 capsule (5 mg total) by mouth every 12 (twelve) hours.    acetaminophen (TYLENOL) 325 MG tablet Take 2 tablets (650 mg total) by mouth every 4 (four) hours as needed.    FREESTYLE SHREE 2 SENSOR Kit APPLY 1 SENSOR             SUBCUTANEOUSLY EVERY 14    DAYS    insulin aspart U-100 (NOVOLOG) 100 unit/mL (3 mL) InPn pen Inject 0-5 Units into the skin before meals and at bedtime as needed (Hyperglycemia).    insulin detemir U-100 (LEVEMIR FLEXTOUCH) 100 unit/mL (3 mL) SubQ InPn pen Inject 5 Units into the skin every evening.    sodium zirconium cyclosilicate (LOKELMA) 10 gram packet Take 1 packet (10 g total) by mouth 3 (three) times daily. Mix entire contents of packet(s) into drinking glass containing 3 tablespoons of water; stir well and drink immediately. Add water and repeat until no powder remains to receive entire dose.       Review of patient's allergies indicates:  No Known Allergies    Past Medical History:   Diagnosis Date    Allergic rhinitis 2013    Blood transfusion     Cataract     CKD (chronic kidney disease), stage III 2014    -donor kidney transplant     Diabetes mellitus, type 2 2013    Gout, arthritis 2013    Heart attack     Heart transplanted     Hyperlipidemia 2013    Hypertension     Immunodeficiency due to treatment with immunosuppressive medication     Morbid obesity 2013    Organ transplant 2002    heart and kidney    Prophylactic immunotherapy     Renal manifestation of secondary diabetes mellitus      Past Surgical History:    Procedure Laterality Date    CATARACT EXTRACTION Bilateral     COLONOSCOPY N/A 10/6/2020    Procedure: COLONOSCOPY;  Surgeon: Conner Redman MD;  Location: [facility];  Service: Endoscopy;  Laterality: N/A;    EYE SURGERY      HEART TRANSPLANT      KIDNEY TRANSPLANT      REVISION TOTAL HIP ARTHROPLASTY       Family History       Problem Relation (Age of Onset)    Diabetes Brother, Maternal Grandmother    Early death Brother    Heart disease Brother    Hyperlipidemia Sister, Brother    Hypertension Brother    Kidney disease Son    Stroke Mother, Brother          Tobacco Use    Smoking status: Former Smoker     Packs/day: 1.00     Years: 20.00     Pack years: 20.00     Types: Cigarettes     Quit date: 1984     Years since quittin.0    Smokeless tobacco: Never Used   Substance and Sexual Activity    Alcohol use: Yes     Alcohol/week: 1.0 standard drink     Types: 1 Cans of beer per week     Comment: daily    Drug use: No    Sexual activity: Never        Review of Systems   Constitutional:  Negative for appetite change, fatigue and fever.   HENT:  Negative for sore throat.    Respiratory:  Positive for shortness of breath (on exertion). Negative for cough and wheezing.    Cardiovascular:  Negative for chest pain, palpitations and leg swelling.   Gastrointestinal:  Negative for abdominal pain, diarrhea, nausea and vomiting.   Genitourinary:  Negative for decreased urine volume, dysuria and frequency.        Laura in place for retention since 22   Musculoskeletal:  Negative for arthralgias and back pain.   Skin:  Negative for wound.   Allergic/Immunologic: Positive for immunocompromised state.   Neurological:  Negative for dizziness and weakness.   Psychiatric/Behavioral:  Negative for confusion.    Objective:     Vital Signs (Most Recent):  Temp: 98 °F (36.7 °C) (22)  Pulse: 91 (22)  Resp: 18 (22)  BP: (!) 174/96 (22)  SpO2: 99 % (22  "2040)    Height: 6' 2" (188 cm)  Weight: 130.4 kg (287 lb 7.7 oz)  Body mass index is 36.91 kg/m².     Physical Exam  Constitutional:       General: He is not in acute distress.     Appearance: He is obese.   HENT:      Head: Normocephalic.      Mouth/Throat:      Mouth: Mucous membranes are moist.   Eyes:      Conjunctiva/sclera: Conjunctivae normal.   Cardiovascular:      Rate and Rhythm: Normal rate and regular rhythm.      Pulses: Normal pulses.      Heart sounds: Normal heart sounds.   Pulmonary:      Effort: Pulmonary effort is normal.      Breath sounds: Normal breath sounds.   Abdominal:      General: Bowel sounds are normal. There is no distension.      Palpations: Abdomen is soft.      Tenderness: There is no abdominal tenderness.      Comments: Right previous kidney transplant incision well healed, no SSI    Genitourinary:     Comments: Laura catheter with clear yellow urine   Musculoskeletal:         General: Normal range of motion.      Cervical back: Normal range of motion.      Right lower leg: No edema.      Left lower leg: No edema.   Skin:     General: Skin is warm and dry.   Neurological:      Mental Status: He is alert and oriented to person, place, and time.      Motor: No weakness.   Psychiatric:         Mood and Affect: Mood normal.         Behavior: Behavior normal.         Thought Content: Thought content normal.         Judgment: Judgment normal.       Laboratory  pending    Diagnostic Results:  pending    COVID pending     Assessment/Plan:     * AUBREY (acute kidney injury) on CKD, stage IIIb  - Plan for IR consult for kidney biopsy  - Check DSA, BK, CMV PCR  - Will also obtain 2D Echo  - Monitor      Hyperkalemia  - Plan for lasix and kayexalate  - Monitor with daily labs       Type 2 diabetes mellitus  - Endocrine consulted  - Appreciate their assistance      Anemia of chronic renal failure, stage 3b  - H/H stable  - Monitor with daily cbc      Immunosuppression due to drug therapy  - See " Chronic Immunosuppression      Chronic immunosuppression with tacrolimus, mycophenolate  - Continue prograf. Monitor prograf level daily, monitor for toxic side effects, and adjust for therapeutic dose      Heart transplanted  - Consulted heart transplant  - Plan for 2D Echo      -donor kidney transplant  - See AUBREY Gonzalez PA-C  Kidney Transplant  [facility] - Transplant Stepdown

## 2022-07-21 NOTE — PROCEDURES
"  Pre Op Diagnosis: renla dysfunction  Post Op Diagnosis: Same    Procedure: transplant kidney biopsy    Procedure performed by: Sarabjit    Written Informed Consent Obtained: Yes  Specimen Removed: YES 3 18g cores  Estimated Blood Loss: Minimal    Findings:   Successful US guided biopsy of RLQ renal allograft. Gelfoam pledgets used for tract embolization.     Patient tolerated procedure well.    Ulisses Whipple MD ([facility])  Interventional Radiology  [phone]        "

## 2022-07-21 NOTE — ASSESSMENT & PLAN NOTE
Endocrinology consulted for BG management.   BG goal 140-180    - Start transition drip at 1.4 units/hr with 25% step-down parameters. (weight-based at 0.5 units/kg/day)  - Novolog (aspart) insulin [facility] (180/25) SSI Units SQ prn for BG excursions.   - BG checks q4hr while NPO and on clears.   - Hypoglycemia protocol in place      ** Please notify Endocrine for any change and/or advance in diet**  ** Please call Endocrine for any BG related issues **    Discharge Planning:   TBD. Please notify endocrinology prior to discharge.

## 2022-07-21 NOTE — ASSESSMENT & PLAN NOTE
Titrate insulin slowly to avoid hypoglycemia as the risk of hypoglycemia increases with decreased creatinine clearance.    Estimated Creatinine Clearance: 8 mL/min (A) (based on SCr of 11.9 mg/dL (H)).

## 2022-07-21 NOTE — SUBJECTIVE & OBJECTIVE
Subjective:   History of Present Illness:  Mr. Albrecht is a 73 y/o M s/p Kidney/heart transplant (2002). He has a h/o diabetes, hypertension, hyperlipidemia, chronic kidney disease, and recent COVID (COVID positive June 25-admitted to [facility] July 4-8 with COVID pneumonia). He was admitted to [facility] with AUBREY after routine labs showed Cr 11 (from ~ 2) on 7/18/22.  (Tacrolimus level was 7.2). Patient noted more dyspnea and decreased energy along with decreased urine output. No chest pain, headache, confusion, fever, vomiting, or abdominal pain noted. He was seen by Nephrology. With concern for possible rejection, he was empirically treated with IV Solu-Medrol (1 g on July 19). He was treated with IV fluids with bicarbonate along with Lokelma. Urinalysis with 2+ protein, 2 RBC, 2 WBC, occasional WBC clumps, rare bacteria. US showed slightly increased resistive indices; Mild cortical thinning; No loss of corticomedullary distinction; Adequate perfusion; No evidence of hydronephrosis. CXR showed mildly enlarged but stable cardiac silhouette. Aorta demonstrates atherosclerotic disease. Mild atelectasis in the right mid lung zone. Patchy infiltrates at the lung bases bilaterally that could reflect edema or early airspace disease. No evidence of pleural effusion or pneumothorax. EKG had normal sinus rhythm, right bundle-branch block. Vital signs stable. Pt admitted to [facility] for further management. Will obtain 2D echo and kidney biopsy. Pt d/w Dr Wilkerson.     Mr. Albrecht is a 72 y.o. year old male who is status post Kidney, Heart Transplant - 5/24/2002  (#1).    His maintenance immunosuppression consists of:   Immunosuppressants (From admission, onward)                Start     Stop Route Frequency Ordered    07/20/22 2100  mycophenolate capsule 750 mg         -- Oral 2 times daily 07/20/22 2032 07/20/22 2045  tacrolimus capsule 5 mg         -- Oral 2 times daily 07/20/22 2032            [facility]  Course:  Interval history: No acute events overnight. Cr still elevated at 11.9. IR consulted for kidney biopsy today. DSA's pending. Repeat kidney US ordered. Heart transplant consulted, 2D echo ordered. Pt admitted with elevated K, now 5.3. Will give 80 mg IV lasix. Monitor.      Past Medical, Surgical, Family, and Social History:   Unchanged from H&P.    Scheduled Meds:   atorvastatin  80 mg Oral Daily    calcium acetate(phosphat bind)  1,334 mg Oral TID WM    desmopressin (DDAVP) IVPB  0.15 mcg/kg Intravenous Once    furosemide (LASIX) injection  80 mg Intravenous Once    methylPREDNISolone (SOLU-Medrol) IVPB (doses > 250 mg)  500 mg Intravenous Once    metoprolol succinate  25 mg Oral Daily    metoprolol succinate  50 mg Oral Daily    mycophenolate  750 mg Oral BID    NIFEdipine  90 mg Oral Daily    sodium bicarbonate  1,300 mg Oral BID    tacrolimus  5 mg Oral BID     Continuous Infusions:   insulin regular 1 units/mL infusion orderable (TRANSFER)       PRN Meds:acetaminophen, dextrose 10%, dextrose 10%, glucagon (human recombinant), glucose, glucose, insulin aspart U-100, sodium chloride 0.9%    Intake/Output - Last 3 Shifts         07/19 0700 07/20 0659 07/20 0700 07/21 0659 07/21 0700  07/22 0659    Urine (mL/kg/hr)  650     Total Output  650     Net  -650                     Review of Systems   Constitutional:  Negative for appetite change, fatigue and fever.   HENT:  Negative for sore throat.    Respiratory:  Positive for shortness of breath (on exertion). Negative for cough and wheezing.    Cardiovascular:  Negative for chest pain, palpitations and leg swelling.   Gastrointestinal:  Negative for abdominal pain, diarrhea, nausea and vomiting.   Genitourinary:  Negative for dysuria and frequency.        Laura in place for retention since 7/19/22   Musculoskeletal:  Negative for arthralgias and back pain.   Skin:  Negative for wound.   Allergic/Immunologic: Positive for immunocompromised state.  "  Neurological:  Negative for dizziness and weakness.   Psychiatric/Behavioral:  Negative for confusion.     Objective:     Vital Signs (Most Recent):  Temp: 97.5 °F (36.4 °C) (07/21/22 0510)  Pulse: 77 (07/21/22 0830)  Resp: 20 (07/21/22 0830)  BP: (!) 148/86 (07/21/22 0830)  SpO2: 95 % (07/21/22 0830)   Vital Signs (24h Range):  Temp:  [97.5 °F (36.4 °C)-98 °F (36.7 °C)] 97.5 °F (36.4 °C)  Pulse:  [] 77  Resp:  [17-28] 20  SpO2:  [94 %-99 %] 95 %  BP: (128-174)/(63-96) 148/86     Weight: 130 kg (286 lb 9.6 oz)  Height: 6' 2" (188 cm)  Body mass index is 36.8 kg/m².    Physical Exam  Constitutional:       General: He is not in acute distress.  HENT:      Head: Normocephalic.      Mouth/Throat:      Mouth: Mucous membranes are moist.   Eyes:      Conjunctiva/sclera: Conjunctivae normal.   Cardiovascular:      Rate and Rhythm: Normal rate and regular rhythm.      Pulses: Normal pulses.      Heart sounds: Normal heart sounds.   Pulmonary:      Effort: Pulmonary effort is normal.      Breath sounds: Normal breath sounds.   Abdominal:      General: Bowel sounds are normal. There is no distension.      Palpations: Abdomen is soft.      Tenderness: There is no abdominal tenderness.      Comments: Right previous kidney transplant incision well healed, no SSI    Genitourinary:     Comments: Laura catheter with clear yellow urine   Musculoskeletal:         General: Normal range of motion.      Cervical back: Normal range of motion.      Right lower leg: No edema.      Left lower leg: No edema.   Skin:     General: Skin is warm and dry.   Neurological:      Mental Status: He is alert and oriented to person, place, and time.      Motor: No weakness.   Psychiatric:         Mood and Affect: Mood normal.         Behavior: Behavior normal.         Thought Content: Thought content normal.         Judgment: Judgment normal.       Laboratory:  CBC:   Recent Labs   Lab 07/20/22  0934 07/20/22  2111 07/21/22  0727   WBC 4.99 7.67 " 9.63   RBC 4.09* 3.78* 3.82*   HGB 11.2* 10.4* 10.2*   HCT 35.5* 33.0* 32.8*    219 208   MCV 87 87 86   MCH 27.4 27.5 26.7*   MCHC 31.5* 31.5* 31.1*     BMP:   Recent Labs   Lab 07/20/22  0934 07/20/22  2110 07/21/22  0332 07/21/22  0727   * 287*  --  314*    136  --  136   K 6.1* 5.6* 5.5* 5.3*    104  --  104   CO2 17* 19*  --  19*   BUN 76* 73*  --  74*   CREATININE 12.3* 11.9*  --  11.9*   CALCIUM 7.7* 8.3*  --  8.1*     Labs within the past 24 hours have been reviewed.    Diagnostic Results:  US - Kidney: Results for orders placed during the hospital encounter of 07/19/22    US Transplant Kidney With Doppler    Narrative  EXAMINATION:  US TRANSPLANT KIDNEY WITH DOPPLER    CLINICAL HISTORY:  acute kidney failure;    TECHNIQUE:  Ultrasound Doppler renal artery and vein.    COMPARISON:  2019 at [facility]    FINDINGS:  Kidney transplant: The kidney measures 13.9 cm.  Mild cortical thinning.  No loss of corticomedullary distinction.  There is adequate perfusion.  The resistive indices range from 0.73-0.83 (previously 0.6-0.74) which is slightly elevated.  The main renal artery is patent with a velocity 90 centimeters/second which is stable.  Iliac artery velocity 77 centimeters/second.  Ratio is 1.2 which is within normal limits and not significant for stenosis .  No evidence of pars parvus waveform.  The main renal vein is patent.  No evidence of mass.  No evidence of hydronephrosis.    Impression  Slightly increased resistive indices which can be seen with medical renal disease.  Recommend continued follow-up.      Electronically signed by: Jaspal Solo MD  Date:    07/20/2022  Time:    07:51

## 2022-07-21 NOTE — PROGRESS NOTES
[facility] - Transplant Stepdown  Kidney Transplant  Progress Note      Reason for Follow-up: Reassessment of Kidney, Heart Transplant - 5/24/2002  (#1) recipient and management of immunosuppression.      Subjective:   History of Present Illness:  Mr. Albrecht is a 73 y/o M s/p Kidney/heart transplant (2002). He has a h/o diabetes, hypertension, hyperlipidemia, chronic kidney disease, and recent COVID (COVID positive June 25-admitted to [facility] July 4-8 with COVID pneumonia). He was admitted to [facility] with AUBREY after routine labs showed Cr 11 (from ~ 2) on 7/18/22.  (Tacrolimus level was 7.2). Patient noted more dyspnea and decreased energy along with decreased urine output. No chest pain, headache, confusion, fever, vomiting, or abdominal pain noted. He was seen by Nephrology. With concern for possible rejection, he was empirically treated with IV Solu-Medrol (1 g on July 19). He was treated with IV fluids with bicarbonate along with Lokelma. Urinalysis with 2+ protein, 2 RBC, 2 WBC, occasional WBC clumps, rare bacteria. US showed slightly increased resistive indices; Mild cortical thinning; No loss of corticomedullary distinction; Adequate perfusion; No evidence of hydronephrosis. CXR showed mildly enlarged but stable cardiac silhouette. Aorta demonstrates atherosclerotic disease. Mild atelectasis in the right mid lung zone. Patchy infiltrates at the lung bases bilaterally that could reflect edema or early airspace disease. No evidence of pleural effusion or pneumothorax. EKG had normal sinus rhythm, right bundle-branch block. Vital signs stable. Pt admitted to [facility] for further management. Will obtain 2D echo and kidney biopsy. Pt d/w Dr Wilkerson.     Mr. Albrecht is a 72 y.o. year old male who is status post Kidney, Heart Transplant - 5/24/2002  (#1).    His maintenance immunosuppression consists of:   Immunosuppressants (From admission, onward)                Start     Stop Route Frequency  Ordered    07/20/22 2100  mycophenolate capsule 750 mg         -- Oral 2 times daily 07/20/22 2032 07/20/22 2045  tacrolimus capsule 5 mg         -- Oral 2 times daily 07/20/22 2032            Hospital Course:  Interval history: No acute events overnight. Cr still elevated at 11.9. IR consulted for kidney biopsy today. DSA's pending. Repeat kidney US ordered. Heart transplant consulted, 2D echo ordered. Pt admitted with elevated K, now 5.3. Will give 80 mg IV lasix. Monitor.      Past Medical, Surgical, Family, and Social History:   Unchanged from H&P.    Scheduled Meds:   atorvastatin  80 mg Oral Daily    calcium acetate(phosphat bind)  1,334 mg Oral TID WM    desmopressin (DDAVP) IVPB  0.15 mcg/kg Intravenous Once    furosemide (LASIX) injection  80 mg Intravenous Once    methylPREDNISolone (SOLU-Medrol) IVPB (doses > 250 mg)  500 mg Intravenous Once    metoprolol succinate  25 mg Oral Daily    metoprolol succinate  50 mg Oral Daily    mycophenolate  750 mg Oral BID    NIFEdipine  90 mg Oral Daily    sodium bicarbonate  1,300 mg Oral BID    tacrolimus  5 mg Oral BID     Continuous Infusions:   insulin regular 1 units/mL infusion orderable (TRANSFER)       PRN Meds:acetaminophen, dextrose 10%, dextrose 10%, glucagon (human recombinant), glucose, glucose, insulin aspart U-100, sodium chloride 0.9%    Intake/Output - Last 3 Shifts         07/19 0700 07/20 0659 07/20 0700 07/21 0659 07/21 0700  07/22 0659    Urine (mL/kg/hr)  650     Total Output  650     Net  -650                     Review of Systems   Constitutional:  Negative for appetite change, fatigue and fever.   HENT:  Negative for sore throat.    Respiratory:  Positive for shortness of breath (on exertion). Negative for cough and wheezing.    Cardiovascular:  Negative for chest pain, palpitations and leg swelling.   Gastrointestinal:  Negative for abdominal pain, diarrhea, nausea and vomiting.   Genitourinary:  Negative for dysuria and  "frequency.        Laura in place for retention since 7/19/22   Musculoskeletal:  Negative for arthralgias and back pain.   Skin:  Negative for wound.   Allergic/Immunologic: Positive for immunocompromised state.   Neurological:  Negative for dizziness and weakness.   Psychiatric/Behavioral:  Negative for confusion.     Objective:     Vital Signs (Most Recent):  Temp: 97.5 °F (36.4 °C) (07/21/22 0510)  Pulse: 77 (07/21/22 0830)  Resp: 20 (07/21/22 0830)  BP: (!) 148/86 (07/21/22 0830)  SpO2: 95 % (07/21/22 0830)   Vital Signs (24h Range):  Temp:  [97.5 °F (36.4 °C)-98 °F (36.7 °C)] 97.5 °F (36.4 °C)  Pulse:  [] 77  Resp:  [17-28] 20  SpO2:  [94 %-99 %] 95 %  BP: (128-174)/(63-96) 148/86     Weight: 130 kg (286 lb 9.6 oz)  Height: 6' 2" (188 cm)  Body mass index is 36.8 kg/m².    Physical Exam  Constitutional:       General: He is not in acute distress.  HENT:      Head: Normocephalic.      Mouth/Throat:      Mouth: Mucous membranes are moist.   Eyes:      Conjunctiva/sclera: Conjunctivae normal.   Cardiovascular:      Rate and Rhythm: Normal rate and regular rhythm.      Pulses: Normal pulses.      Heart sounds: Normal heart sounds.   Pulmonary:      Effort: Pulmonary effort is normal.      Breath sounds: Normal breath sounds.   Abdominal:      General: Bowel sounds are normal. There is no distension.      Palpations: Abdomen is soft.      Tenderness: There is no abdominal tenderness.      Comments: Right previous kidney transplant incision well healed, no SSI    Genitourinary:     Comments: Laura catheter with clear yellow urine   Musculoskeletal:         General: Normal range of motion.      Cervical back: Normal range of motion.      Right lower leg: No edema.      Left lower leg: No edema.   Skin:     General: Skin is warm and dry.   Neurological:      Mental Status: He is alert and oriented to person, place, and time.      Motor: No weakness.   Psychiatric:         Mood and Affect: Mood normal.         " Behavior: Behavior normal.         Thought Content: Thought content normal.         Judgment: Judgment normal.       Laboratory:  CBC:   Recent Labs   Lab 07/20/22  0934 07/20/22 2111 07/21/22  0727   WBC 4.99 7.67 9.63   RBC 4.09* 3.78* 3.82*   HGB 11.2* 10.4* 10.2*   HCT 35.5* 33.0* 32.8*    219 208   MCV 87 87 86   MCH 27.4 27.5 26.7*   MCHC 31.5* 31.5* 31.1*     BMP:   Recent Labs   Lab 07/20/22  0934 07/20/22 2110 07/21/22 0332 07/21/22 0727   * 287*  --  314*    136  --  136   K 6.1* 5.6* 5.5* 5.3*    104  --  104   CO2 17* 19*  --  19*   BUN 76* 73*  --  74*   CREATININE 12.3* 11.9*  --  11.9*   CALCIUM 7.7* 8.3*  --  8.1*     Labs within the past 24 hours have been reviewed.    Diagnostic Results:  US - Kidney: Results for orders placed during the hospital encounter of 07/19/22    US Transplant Kidney With Doppler    Narrative  EXAMINATION:  US TRANSPLANT KIDNEY WITH DOPPLER    CLINICAL HISTORY:  acute kidney failure;    TECHNIQUE:  Ultrasound Doppler renal artery and vein.    COMPARISON:  2019 at [facility]    FINDINGS:  Kidney transplant: The kidney measures 13.9 cm.  Mild cortical thinning.  No loss of corticomedullary distinction.  There is adequate perfusion.  The resistive indices range from 0.73-0.83 (previously 0.6-0.74) which is slightly elevated.  The main renal artery is patent with a velocity 90 centimeters/second which is stable.  Iliac artery velocity 77 centimeters/second.  Ratio is 1.2 which is within normal limits and not significant for stenosis .  No evidence of pars parvus waveform.  The main renal vein is patent.  No evidence of mass.  No evidence of hydronephrosis.    Impression  Slightly increased resistive indices which can be seen with medical renal disease.  Recommend continued follow-up.      Electronically signed by: Jaspal Solo MD  Date:    07/20/2022  Time:    07:51    Assessment/Plan:     * Acute renal failure  - IR consulted for kidney  biopsy  - DSA, BK, CMV PCR pending   - Will also obtain 2D Echo  - Monitor      Hyperkalemia  - K 5.3 after lasix and kayexalate  - Will give an additional 80 mg IV lasix today   - Monitor with daily labs       Type 2 diabetes mellitus  - Endocrine consulted  - Appreciate their assistance      Anemia of chronic renal failure, stage 3b  - H/H stable  - Monitor with daily cbc      Immunosuppression due to drug therapy  - See Chronic Immunosuppression      Chronic immunosuppression with tacrolimus, mycophenolate  - Continue prograf. Monitor prograf level daily, monitor for toxic side effects, and adjust for therapeutic dose      Heart transplanted  - Consulted heart transplant  - Plan for 2D Echo      -donor kidney transplant  - See AUBREY      Discharge Planning: Not a candidate for d/c at this time.       Marylou Blackburn PA-C  Kidney Transplant  [facility] - Transplant Stepdown

## 2022-07-21 NOTE — SUBJECTIVE & OBJECTIVE
Subjective:     Chief Complaint/Reason for Admission: hyperkalemia    History of Present Illness:  Mr. Albrecht is a 73 y/o M s/p Kidney/heart transplant (2002). He has a h/o diabetes, hypertension, hyperlipidemia, chronic kidney disease, and recent COVID (COVID positive June 25-admitted to [facility] July 4-8 with COVID pneumonia). He was admitted to [facility] with AUBREY after routine labs showed Cr 11 (from ~ 2) on 7/18/22.  (Tacrolimus level was 7.2). Patient noted more dyspnea and decreased energy along with decreased urine output. No chest pain, headache, confusion, fever, vomiting, or abdominal pain noted. He was seen by Nephrology. With concern for possible rejection, he was empirically treated with IV Solu-Medrol (1 g on July 19). He was treated with IV fluids with bicarbonate along with Lokelma. Urinalysis with 2+ protein, 2 RBC, 2 WBC, occasional WBC clumps, rare bacteria. US showed slightly increased resistive indices; Mild cortical thinning; No loss of corticomedullary distinction; Adequate perfusion; No evidence of hydronephrosis. CXR showed mildly enlarged but stable cardiac silhouette. Aorta demonstrates atherosclerotic disease. Mild atelectasis in the right mid lung zone. Patchy infiltrates at the lung bases bilaterally that could reflect edema or early airspace disease. No evidence of pleural effusion or pneumothorax. EKG had normal sinus rhythm, right bundle-branch block. Vital signs stable. Pt admitted to [facility] for further management. Will obtain 2D echo and kidney biopsy. Pt d/w Dr Wilkerson.       PTA Medications   Medication Sig    [START ON 7/21/2022] aspirin 81 MG Chew Take 1 tablet (81 mg total) by mouth once daily.    [START ON 7/21/2022] famotidine (PEPCID) 20 MG tablet Take 1 tablet (20 mg total) by mouth once daily.    heparin sodium,porcine (HEPARIN, PORCINE,) 5,000 unit/mL injection Inject 1 mL (5,000 Units total) into the skin every 8 (eight) hours.    [START ON  2022] metoprolol succinate (TOPROL-XL) 25 MG 24 hr tablet Take 3 tablets (75 mg total) by mouth once daily.    mycophenolate (CELLCEPT) 250 mg Cap Take 2 capsules (500 mg total) by mouth 2 (two) times daily.    NIFEdipine (PROCARDIA-XL) 90 MG (OSM) 24 hr tablet Take 1 tablet (90 mg total) by mouth once daily.    tacrolimus (PROGRAF) 5 MG Cap Take 1 capsule (5 mg total) by mouth every 12 (twelve) hours.    acetaminophen (TYLENOL) 325 MG tablet Take 2 tablets (650 mg total) by mouth every 4 (four) hours as needed.    FREESTYLE SHREE 2 SENSOR Kit APPLY 1 SENSOR             SUBCUTANEOUSLY EVERY 14    DAYS    insulin aspart U-100 (NOVOLOG) 100 unit/mL (3 mL) InPn pen Inject 0-5 Units into the skin before meals and at bedtime as needed (Hyperglycemia).    insulin detemir U-100 (LEVEMIR FLEXTOUCH) 100 unit/mL (3 mL) SubQ InPn pen Inject 5 Units into the skin every evening.    sodium zirconium cyclosilicate (LOKELMA) 10 gram packet Take 1 packet (10 g total) by mouth 3 (three) times daily. Mix entire contents of packet(s) into drinking glass containing 3 tablespoons of water; stir well and drink immediately. Add water and repeat until no powder remains to receive entire dose.       Review of patient's allergies indicates:  No Known Allergies    Past Medical History:   Diagnosis Date    Allergic rhinitis 2013    Blood transfusion     Cataract     CKD (chronic kidney disease), stage III 2014    -donor kidney transplant     Diabetes mellitus, type 2 2013    Gout, arthritis 2013    Heart attack     Heart transplanted     Hyperlipidemia 2013    Hypertension     Immunodeficiency due to treatment with immunosuppressive medication     Morbid obesity 2013    Organ transplant 2002    heart and kidney    Prophylactic immunotherapy     Renal manifestation of secondary diabetes mellitus      Past Surgical History:   Procedure Laterality Date    CATARACT EXTRACTION  "Bilateral     COLONOSCOPY N/A 10/6/2020    Procedure: COLONOSCOPY;  Surgeon: Conner Redman MD;  Location[facility];  Service: Endoscopy;  Laterality: N/A;    EYE SURGERY      HEART TRANSPLANT      KIDNEY TRANSPLANT      REVISION TOTAL HIP ARTHROPLASTY       Family History       Problem Relation (Age of Onset)    Diabetes Brother, Maternal Grandmother    Early death Brother    Heart disease Brother    Hyperlipidemia Sister, Brother    Hypertension Brother    Kidney disease Son    Stroke Mother, Brother          Tobacco Use    Smoking status: Former Smoker     Packs/day: 1.00     Years: 20.00     Pack years: 20.00     Types: Cigarettes     Quit date: 1984     Years since quittin.0    Smokeless tobacco: Never Used   Substance and Sexual Activity    Alcohol use: Yes     Alcohol/week: 1.0 standard drink     Types: 1 Cans of beer per week     Comment: daily    Drug use: No    Sexual activity: Never        Review of Systems   Constitutional:  Negative for appetite change, fatigue and fever.   HENT:  Negative for sore throat.    Respiratory:  Positive for shortness of breath (on exertion). Negative for cough and wheezing.    Cardiovascular:  Negative for chest pain, palpitations and leg swelling.   Gastrointestinal:  Negative for abdominal pain, diarrhea, nausea and vomiting.   Genitourinary:  Negative for decreased urine volume, dysuria and frequency.        Laura in place for retention since 22   Musculoskeletal:  Negative for arthralgias and back pain.   Skin:  Negative for wound.   Allergic/Immunologic: Positive for immunocompromised state.   Neurological:  Negative for dizziness and weakness.   Psychiatric/Behavioral:  Negative for confusion.    Objective:     Vital Signs (Most Recent):  Temp: 98 °F (36.7 °C) (22)  Pulse: 91 (22)  Resp: 18 (22)  BP: (!) 174/96 (22)  SpO2: 99 % (22)    Height: 6' 2" (188 cm)  Weight: 130.4 kg (287 lb 7.7 " oz)  Body mass index is 36.91 kg/m².     Physical Exam  Constitutional:       General: He is not in acute distress.     Appearance: He is obese.   HENT:      Head: Normocephalic.      Mouth/Throat:      Mouth: Mucous membranes are moist.   Eyes:      Conjunctiva/sclera: Conjunctivae normal.   Cardiovascular:      Rate and Rhythm: Normal rate and regular rhythm.      Pulses: Normal pulses.      Heart sounds: Normal heart sounds.   Pulmonary:      Effort: Pulmonary effort is normal.      Breath sounds: Normal breath sounds.   Abdominal:      General: Bowel sounds are normal. There is no distension.      Palpations: Abdomen is soft.      Tenderness: There is no abdominal tenderness.      Comments: Right previous kidney transplant incision well healed, no SSI    Genitourinary:     Comments: Laura catheter with clear yellow urine   Musculoskeletal:         General: Normal range of motion.      Cervical back: Normal range of motion.      Right lower leg: No edema.      Left lower leg: No edema.   Skin:     General: Skin is warm and dry.   Neurological:      Mental Status: He is alert and oriented to person, place, and time.      Motor: No weakness.   Psychiatric:         Mood and Affect: Mood normal.         Behavior: Behavior normal.         Thought Content: Thought content normal.         Judgment: Judgment normal.       Laboratory  pending    Diagnostic Results:  pending    COVID pending

## 2022-07-21 NOTE — PLAN OF CARE
Pt arrived to IR CT for TX kidney bx. Pt oriented to unit and staff. Plan of care reviewed with patient, patient verbalizes understanding. Comfort measures utilized. Pt safely transferred from stretcher to procedural table. Fall risk reviewed with patient, fall risk interventions maintained. Safety strap applied, positioner pillows utilized to minimize pressure points. Blankets applied. Pt prepped and draped utilizing standard sterile technique. Patient placed on continuous monitoring, as required by sedation policy. Timeouts completed utilizing standard universal time-out, per department and facility policy. RN to remain at bedside, continuous monitoring maintained. Pt resting comfortably. Denies pain/discomfort. Will continue to monitor. See flow sheets for monitoring, medication administration, and updates.

## 2022-07-21 NOTE — HOSPITAL COURSE
Mr Albrecht underwent kidney biopsy which was notable for ABMR, did not meet criteria for vascular or t cell rejection. DSA sent and pending. 2d Echo obtained and reviewed. Pt completed steroid pulse and apheresis consulted for PLEX. Trialysis line placed. He underwent HD on 7/23/22.     Interval history: NAEON. Pt feeling fine without complaint. Laura removed and voiding with out issues. Will start flomax due to h/o prostate issues. Underwent PLEX #3 today. Cr improving daily with good UOP, start lasix today. Trialysis line in place. Pheresis following, appreciate their assistance. BP meds adjusted again. HTS consulted, to see patient today. Cont to monitor.

## 2022-07-22 ENCOUNTER — TELEPHONE (OUTPATIENT)
Dept: TRANSPLANT | Facility: CLINIC | Age: 72
End: 2022-07-22
Payer: MEDICARE

## 2022-07-22 LAB
ALBUMIN SERPL BCP-MCNC: 2.2 G/DL (ref 3.5–5.2)
ALP SERPL-CCNC: 112 U/L (ref 55–135)
ALT SERPL W/O P-5'-P-CCNC: 48 U/L (ref 10–44)
ANA PATTERN 1: NORMAL
ANA PATTERN 2: NORMAL
ANA SER QL IF: POSITIVE
ANA TITER 2: NORMAL
ANA TITR SER IF: NORMAL {TITER}
ANION GAP SERPL CALC-SCNC: 15 MMOL/L (ref 8–16)
AST SERPL-CCNC: 43 U/L (ref 10–40)
BASOPHILS # BLD AUTO: 0 K/UL (ref 0–0.2)
BASOPHILS NFR BLD: 0 % (ref 0–1.9)
BILIRUB SERPL-MCNC: 0.2 MG/DL (ref 0.1–1)
BM IGG SER-ACNC: <0.2 U
BUN SERPL-MCNC: 88 MG/DL (ref 8–23)
CALCIUM SERPL-MCNC: 7.8 MG/DL (ref 8.7–10.5)
CHLORIDE SERPL-SCNC: 105 MMOL/L (ref 95–110)
CO2 SERPL-SCNC: 19 MMOL/L (ref 23–29)
CREAT SERPL-MCNC: 12 MG/DL (ref 0.5–1.4)
DIFFERENTIAL METHOD: ABNORMAL
DSDNA AB SER-ACNC: NORMAL [IU]/ML
EOSINOPHIL # BLD AUTO: 0 K/UL (ref 0–0.5)
EOSINOPHIL NFR BLD: 0 % (ref 0–8)
ERYTHROCYTE [DISTWIDTH] IN BLOOD BY AUTOMATED COUNT: 16.6 % (ref 11.5–14.5)
EST. GFR  (AFRICAN AMERICAN): 4.3 ML/MIN/1.73 M^2
EST. GFR  (NON AFRICAN AMERICAN): 3.7 ML/MIN/1.73 M^2
GLUCOSE SERPL-MCNC: 322 MG/DL (ref 70–110)
HCT VFR BLD AUTO: 29.1 % (ref 40–54)
HGB BLD-MCNC: 9.5 G/DL (ref 14–18)
IMM GRANULOCYTES # BLD AUTO: 0.06 K/UL (ref 0–0.04)
IMM GRANULOCYTES NFR BLD AUTO: 0.6 % (ref 0–0.5)
LYMPHOCYTES # BLD AUTO: 1.2 K/UL (ref 1–4.8)
LYMPHOCYTES NFR BLD: 12 % (ref 18–48)
MAGNESIUM SERPL-MCNC: 2.6 MG/DL (ref 1.6–2.6)
MCH RBC QN AUTO: 27.9 PG (ref 27–31)
MCHC RBC AUTO-ENTMCNC: 32.6 G/DL (ref 32–36)
MCV RBC AUTO: 86 FL (ref 82–98)
MONOCYTES # BLD AUTO: 0.3 K/UL (ref 0.3–1)
MONOCYTES NFR BLD: 2.6 % (ref 4–15)
NEUTROPHILS # BLD AUTO: 8.3 K/UL (ref 1.8–7.7)
NEUTROPHILS NFR BLD: 84.8 % (ref 38–73)
NRBC BLD-RTO: 0 /100 WBC
PHOSPHATE SERPL-MCNC: 8.3 MG/DL (ref 2.7–4.5)
PLATELET # BLD AUTO: 195 K/UL (ref 150–450)
PMV BLD AUTO: 10.4 FL (ref 9.2–12.9)
POCT GLUCOSE: 262 MG/DL (ref 70–110)
POCT GLUCOSE: 269 MG/DL (ref 70–110)
POCT GLUCOSE: 270 MG/DL (ref 70–110)
POCT GLUCOSE: 330 MG/DL (ref 70–110)
POCT GLUCOSE: 374 MG/DL (ref 70–110)
POTASSIUM SERPL-SCNC: 4.6 MMOL/L (ref 3.5–5.1)
PROT SERPL-MCNC: 6.4 G/DL (ref 6–8.4)
PROTEINASE3 IGG SER-ACNC: <0.2 U
RBC # BLD AUTO: 3.4 M/UL (ref 4.6–6.2)
SODIUM SERPL-SCNC: 139 MMOL/L (ref 136–145)
TACROLIMUS BLD-MCNC: 4.4 NG/ML (ref 5–15)
WBC # BLD AUTO: 9.75 K/UL (ref 3.9–12.7)

## 2022-07-22 PROCEDURE — 99233 PR SUBSEQUENT HOSPITAL CARE,LEVL III: ICD-10-PCS | Mod: ,,, | Performed by: PHYSICIAN ASSISTANT

## 2022-07-22 PROCEDURE — 99232 PR SUBSEQUENT HOSPITAL CARE,LEVL II: ICD-10-PCS | Mod: ,,, | Performed by: NURSE PRACTITIONER

## 2022-07-22 PROCEDURE — 63600175 PHARM REV CODE 636 W HCPCS: Performed by: NURSE PRACTITIONER

## 2022-07-22 PROCEDURE — 99232 SBSQ HOSP IP/OBS MODERATE 35: CPT | Mod: ,,, | Performed by: NURSE PRACTITIONER

## 2022-07-22 PROCEDURE — 20600001 HC STEP DOWN PRIVATE ROOM

## 2022-07-22 PROCEDURE — 36415 COLL VENOUS BLD VENIPUNCTURE: CPT | Performed by: PHYSICIAN ASSISTANT

## 2022-07-22 PROCEDURE — 25000003 PHARM REV CODE 250: Performed by: PHYSICIAN ASSISTANT

## 2022-07-22 PROCEDURE — 25000003 PHARM REV CODE 250

## 2022-07-22 PROCEDURE — 63600175 PHARM REV CODE 636 W HCPCS: Performed by: PHYSICIAN ASSISTANT

## 2022-07-22 PROCEDURE — 84100 ASSAY OF PHOSPHORUS: CPT | Performed by: STUDENT IN AN ORGANIZED HEALTH CARE EDUCATION/TRAINING PROGRAM

## 2022-07-22 PROCEDURE — 85025 COMPLETE CBC W/AUTO DIFF WBC: CPT | Performed by: PHYSICIAN ASSISTANT

## 2022-07-22 PROCEDURE — 99233 SBSQ HOSP IP/OBS HIGH 50: CPT | Mod: ,,, | Performed by: PHYSICIAN ASSISTANT

## 2022-07-22 PROCEDURE — 80197 ASSAY OF TACROLIMUS: CPT | Performed by: PHYSICIAN ASSISTANT

## 2022-07-22 PROCEDURE — 80053 COMPREHEN METABOLIC PANEL: CPT | Performed by: STUDENT IN AN ORGANIZED HEALTH CARE EDUCATION/TRAINING PROGRAM

## 2022-07-22 PROCEDURE — 83735 ASSAY OF MAGNESIUM: CPT | Performed by: PHYSICIAN ASSISTANT

## 2022-07-22 RX ORDER — INSULIN ASPART 100 [IU]/ML
10 INJECTION, SOLUTION INTRAVENOUS; SUBCUTANEOUS
Status: DISCONTINUED | OUTPATIENT
Start: 2022-07-22 | End: 2022-07-22

## 2022-07-22 RX ORDER — SODIUM BICARBONATE 650 MG/1
1300 TABLET ORAL 3 TIMES DAILY
Status: DISCONTINUED | OUTPATIENT
Start: 2022-07-22 | End: 2022-07-24

## 2022-07-22 RX ORDER — TACROLIMUS 1 MG/1
7 CAPSULE ORAL 2 TIMES DAILY
Status: DISCONTINUED | OUTPATIENT
Start: 2022-07-22 | End: 2022-07-23

## 2022-07-22 RX ORDER — INSULIN ASPART 100 [IU]/ML
6 INJECTION, SOLUTION INTRAVENOUS; SUBCUTANEOUS
Status: CANCELLED | OUTPATIENT
Start: 2022-07-22

## 2022-07-22 RX ORDER — INSULIN ASPART 100 [IU]/ML
6 INJECTION, SOLUTION INTRAVENOUS; SUBCUTANEOUS
Status: DISCONTINUED | OUTPATIENT
Start: 2022-07-22 | End: 2022-07-23

## 2022-07-22 RX ADMIN — INSULIN ASPART 6 UNITS: 100 INJECTION, SOLUTION INTRAVENOUS; SUBCUTANEOUS at 04:07

## 2022-07-22 RX ADMIN — CALCIUM ACETATE 1334 MG: 667 CAPSULE ORAL at 08:07

## 2022-07-22 RX ADMIN — MYCOPHENOLATE MOFETIL 750 MG: 250 CAPSULE ORAL at 08:07

## 2022-07-22 RX ADMIN — TACROLIMUS 7 MG: 1 CAPSULE ORAL at 05:07

## 2022-07-22 RX ADMIN — CALCIUM ACETATE 1334 MG: 667 CAPSULE ORAL at 04:07

## 2022-07-22 RX ADMIN — INSULIN ASPART 6 UNITS: 100 INJECTION, SOLUTION INTRAVENOUS; SUBCUTANEOUS at 12:07

## 2022-07-22 RX ADMIN — SODIUM BICARBONATE 650 MG TABLET 1300 MG: at 08:07

## 2022-07-22 RX ADMIN — CALCIUM ACETATE 1334 MG: 667 CAPSULE ORAL at 11:07

## 2022-07-22 RX ADMIN — TACROLIMUS 5 MG: 1 CAPSULE ORAL at 08:07

## 2022-07-22 RX ADMIN — INSULIN ASPART 4 UNITS: 100 INJECTION, SOLUTION INTRAVENOUS; SUBCUTANEOUS at 04:07

## 2022-07-22 RX ADMIN — DEXTROSE 323.5 MG: 50 INJECTION, SOLUTION INTRAVENOUS at 11:07

## 2022-07-22 RX ADMIN — SODIUM BICARBONATE 650 MG TABLET 1300 MG: at 02:07

## 2022-07-22 RX ADMIN — INSULIN ASPART 8 UNITS: 100 INJECTION, SOLUTION INTRAVENOUS; SUBCUTANEOUS at 03:07

## 2022-07-22 RX ADMIN — NIFEDIPINE 90 MG: 30 TABLET, FILM COATED, EXTENDED RELEASE ORAL at 08:07

## 2022-07-22 RX ADMIN — INSULIN ASPART 4 UNITS: 100 INJECTION, SOLUTION INTRAVENOUS; SUBCUTANEOUS at 08:07

## 2022-07-22 RX ADMIN — METOPROLOL SUCCINATE 25 MG: 25 TABLET, EXTENDED RELEASE ORAL at 12:07

## 2022-07-22 RX ADMIN — METOPROLOL SUCCINATE 50 MG: 50 TABLET, EXTENDED RELEASE ORAL at 09:07

## 2022-07-22 RX ADMIN — ACETAMINOPHEN 650 MG: 325 TABLET ORAL at 08:07

## 2022-07-22 RX ADMIN — ATORVASTATIN CALCIUM 80 MG: 20 TABLET, FILM COATED ORAL at 08:07

## 2022-07-22 RX ADMIN — INSULIN ASPART 4 UNITS: 100 INJECTION, SOLUTION INTRAVENOUS; SUBCUTANEOUS at 12:07

## 2022-07-22 NOTE — ASSESSMENT & PLAN NOTE
Endocrinology consulted for BG management.   BG goal 140-180    - Start transition drip at 2.0 units/hr with 25% step-down parameters. (weight-based at 0.8 units/kg/day)  - Novolog (aspart) insulin [facility] (180/25) SSI Units SQ prn for BG excursions.   - Start Novolog 10 units TIDWM   - BG checks q4hr   - Hypoglycemia protocol in place      ** Please notify Endocrine for any change and/or advance in diet**  ** Please call Endocrine for any BG related issues **    Discharge Planning:   TBD. Please notify endocrinology prior to discharge.

## 2022-07-22 NOTE — PROCEDURES
Central Venous Catheter Insertion Procedure Note    Procedure: Insertion of Central Venous Catheter - Trialysis Line    Indications:  vascular access, dialysis, PLEX therapy    Procedure Details   Informed consent was obtained for the procedure, including sedation.  Risks of lung perforation, hemorrhage, arrhythmia, and adverse drug reaction were discussed.     Maximum sterile technique was used including antiseptics, cap, gloves, gown, hand hygiene, mask and sheet.    Under sterile conditions the skin of the right neck was prepped with chlorhexadine and covered with a sterile drape. Local anesthesia was applied to the skin and subcutaneous tissues. Under ultrasound guidance, an 18-gauge needle was then inserted into the vein. A guide wire was then passed easily through the needle. There were no arrhythmias. The needle was then withdrawn and the skin and soft tissues were dilated. A 15 cm 13 Arabic trialysis line was then inserted into the vessel over the guide wire and the wire was removed. The catheter was sutured into place at 14 cm.    Findings:  There were no changes to vital signs. Each catheter easily aspirated venous blood and easily flushed with 10cc NS. Patient tolerated procedure well.    Recommendations:  CXR ordered to verify placement.    Juan Norris MD  [employer] [facility]

## 2022-07-22 NOTE — SUBJECTIVE & OBJECTIVE
"Interval HPI:   Overnight events: No acute events overnight. Patient on the TSU in room 27205/86722 A. Blood glucose stable. BG above goal on current insulin regimen (Transition Insulin Drip). Steroid use- None.    Renal function- Abnormal - Creatinine 12.0   Vasopressors-  None       Diet diabetic [facility]; 2000 Calorie; Isolation Tray - Regular [place]     Eatin%  Nausea: No  Hypoglycemia and intervention: No  Fever: No  TPN and/or TF: No    BP (!) 175/85   Pulse 72   Temp 97 °F (36.1 °C) (Oral)   Resp (!) 22   Ht 6' 2" (1.88 m)   Wt 129.3 kg (285 lb 2.6 oz)   SpO2 99%   BMI 36.61 kg/m²     Labs Reviewed and Include    Recent Labs   Lab 22  0632   *   CALCIUM 7.8*   ALBUMIN 2.2*   PROT 6.4      K 4.6   CO2 19*      BUN 88*   CREATININE 12.0*   ALKPHOS 112   ALT 48*   AST 43*   BILITOT 0.2     Lab Results   Component Value Date    WBC 9.75 2022    HGB 9.5 (L) 2022    HCT 29.1 (L) 2022    MCV 86 2022     2022     No results for input(s): TSH, FREET4 in the last 168 hours.  Lab Results   Component Value Date    HGBA1C 5.9 (H) 2022       Nutritional status:   Body mass index is 36.61 kg/m².  Lab Results   Component Value Date    ALBUMIN 2.2 (L) 2022    ALBUMIN 2.1 (L) 2022    ALBUMIN 2.0 (L) 2022     No results found for: PREALBUMIN    Estimated Creatinine Clearance: 8 mL/min (A) (based on SCr of 12 mg/dL (H)).    Accu-Checks  Recent Labs     22  1115 22  1303 22  1610 22  2147 22  0812 22  1155 22  1659 22  2314 22  0345 22  0745   POCTGLUCOSE 312* 371* 320* 303* 352* 309* 230* 319* 374* 330*       Current Medications and/or Treatments Impacting Glycemic Control  Immunotherapy:    Immunosuppressants           Stop Route Frequency     mycophenolate capsule 750 mg         -- Oral 2 times daily     tacrolimus capsule 5 mg         -- Oral 2 times daily      "     Steroids:   Hormones (From admission, onward)                None          Pressors:    Autonomic Drugs (From admission, onward)                None          Hyperglycemia/Diabetes Medications:   Antihyperglycemics (From admission, onward)                Start     Stop Route Frequency Ordered    07/22/22 1130  insulin aspart U-100 pen 10 Units         -- SubQ 3 times daily with meals 07/22/22 0839    07/21/22 1145  insulin regular in 0.9 % NaCl 100 unit/100 mL (1 unit/mL) infusion        Question:  Enter initial dose (Units/hr):  Answer:  1.4    -- IV Continuous 07/21/22 1139    07/21/22 1041  insulin aspart U-100 pen 0-10 Units         -- SubQ As needed (PRN) 07/21/22 0935

## 2022-07-22 NOTE — ASSESSMENT & PLAN NOTE
Titrate insulin slowly to avoid hypoglycemia as the risk of hypoglycemia increases with decreased creatinine clearance.    Estimated Creatinine Clearance: 8 mL/min (A) (based on SCr of 12 mg/dL (H)).

## 2022-07-22 NOTE — SUBJECTIVE & OBJECTIVE
Subjective:   History of Present Illness:  Mr. Albrecht is a 71 y/o M s/p Kidney/heart transplant (2002). He has a h/o diabetes, hypertension, hyperlipidemia, chronic kidney disease, and recent COVID (COVID positive June 25-admitted to [facility] July 4-8 with COVID pneumonia). He was admitted to [facility] with AUBREY after routine labs showed Cr 11 (from ~ 2) on 7/18/22.  (Tacrolimus level was 7.2). Patient noted more dyspnea and decreased energy along with decreased urine output. No chest pain, headache, confusion, fever, vomiting, or abdominal pain noted. He was seen by Nephrology. With concern for possible rejection, he was empirically treated with IV Solu-Medrol (1 g on July 19). He was treated with IV fluids with bicarbonate along with Lokelma. Urinalysis with 2+ protein, 2 RBC, 2 WBC, occasional WBC clumps, rare bacteria. US showed slightly increased resistive indices; Mild cortical thinning; No loss of corticomedullary distinction; Adequate perfusion; No evidence of hydronephrosis. CXR showed mildly enlarged but stable cardiac silhouette. Aorta demonstrates atherosclerotic disease. Mild atelectasis in the right mid lung zone. Patchy infiltrates at the lung bases bilaterally that could reflect edema or early airspace disease. No evidence of pleural effusion or pneumothorax. EKG had normal sinus rhythm, right bundle-branch block. Vital signs stable. Pt admitted to [facility] for further management. Will obtain 2D echo and kidney biopsy. Pt d/w Dr Wilkerson.     Mr. Albrecht is a 72 y.o. year old male who is status post Kidney, Heart Transplant - 5/24/2002  (#1).    His maintenance immunosuppression consists of:   Immunosuppressants (From admission, onward)                Start     Stop Route Frequency Ordered    07/20/22 2100  mycophenolate capsule 750 mg         -- Oral 2 times daily 07/20/22 2032 07/20/22 2045  tacrolimus capsule 5 mg         -- Oral 2 times daily 07/20/22 2032            Interval  history: No acute events overnight. Cr still elevated at 12.0. Had Kidney biopsy yesterday, path pending. DSA, BK, and CMV pending. Repeat kidney US satisfactory. Heart transplant consulted, appreciate their assistance. Echo showed EF 60%. Monitor.    Past Medical, Surgical, Family, and Social History:   Unchanged from H&P.    Scheduled Meds:   atorvastatin  80 mg Oral Daily    calcium acetate(phosphat bind)  1,334 mg Oral TID WM    insulin aspart U-100  10 Units Subcutaneous TIDWM    methylPREDNISolone (SOLU-Medrol) IVPB (doses > 250 mg)  2.5 mg/kg Intravenous Once    metoprolol succinate  25 mg Oral Daily    metoprolol succinate  50 mg Oral Daily    mycophenolate  750 mg Oral BID    NIFEdipine  90 mg Oral Daily    sodium bicarbonate  1,300 mg Oral TID    tacrolimus  5 mg Oral BID     Continuous Infusions:   insulin regular 1 units/mL infusion orderable (TRANSFER) 2 Units/hr (07/22/22 0918)     PRN Meds:acetaminophen, dextrose 10%, dextrose 10%, glucagon (human recombinant), glucose, glucose, insulin aspart U-100, sodium chloride 0.9%    Intake/Output - Last 3 Shifts         07/20 0700  07/21 0659 07/21 0700 07/22 0659 07/22 0700 07/23 0659    Urine (mL/kg/hr) 650 2050 (0.7)     Total Output 650 2050     Net -650 -2050                     Review of Systems   Constitutional:  Negative for appetite change, fatigue and fever.   HENT:  Negative for sore throat.    Respiratory:  Negative for cough, shortness of breath and wheezing.    Cardiovascular:  Negative for chest pain, palpitations and leg swelling.   Gastrointestinal:  Negative for abdominal pain, diarrhea, nausea and vomiting.   Genitourinary:  Negative for dysuria and frequency.        Lauar in place for retention since 7/19/22   Musculoskeletal:  Negative for arthralgias and back pain.   Skin:  Negative for wound.   Allergic/Immunologic: Positive for immunocompromised state.   Neurological:  Negative for dizziness and weakness.   Psychiatric/Behavioral:   "Negative for confusion.     Objective:     Vital Signs (Most Recent):  Temp: 97 °F (36.1 °C) (07/22/22 0801)  Pulse: 72 (07/22/22 0805)  Resp: (!) 22 (07/22/22 0805)  BP: (!) 175/85 (07/22/22 0805)  SpO2: 99 % (07/22/22 0805)   Vital Signs (24h Range):  Temp:  [97 °F (36.1 °C)-98.4 °F (36.9 °C)] 97 °F (36.1 °C)  Pulse:  [68-88] 72  Resp:  [11-22] 22  SpO2:  [93 %-100 %] 99 %  BP: (120-175)/(61-85) 175/85     Weight: 129.3 kg (285 lb 2.6 oz)  Height: 6' 2" (188 cm)  Body mass index is 36.61 kg/m².    Physical Exam  Vitals and nursing note reviewed.   Constitutional:       General: He is not in acute distress.  HENT:      Head: Normocephalic.      Mouth/Throat:      Mouth: Mucous membranes are moist.   Eyes:      Conjunctiva/sclera: Conjunctivae normal.   Cardiovascular:      Rate and Rhythm: Normal rate and regular rhythm.      Pulses: Normal pulses.   Pulmonary:      Effort: Pulmonary effort is normal.      Breath sounds: Normal breath sounds.   Abdominal:      General: Bowel sounds are normal. There is no distension.      Palpations: Abdomen is soft.      Tenderness: There is no abdominal tenderness.      Comments: Right previous kidney transplant incision well healed, no SSI    Genitourinary:     Comments: Laura catheter with clear yellow urine   Musculoskeletal:         General: Normal range of motion.      Cervical back: Normal range of motion.      Right lower leg: No edema.      Left lower leg: No edema.   Skin:     General: Skin is warm and dry.   Neurological:      Mental Status: He is alert and oriented to person, place, and time.      Motor: No weakness.   Psychiatric:         Mood and Affect: Mood normal.         Behavior: Behavior normal.         Thought Content: Thought content normal.         Judgment: Judgment normal.       Laboratory:  CBC:   Recent Labs   Lab 07/20/22  2111 07/21/22  0727 07/22/22  0632   WBC 7.67 9.63 9.75   RBC 3.78* 3.82* 3.40*   HGB 10.4* 10.2* 9.5*   HCT 33.0* 32.8* 29.1*   PLT " 219 208 195   MCV 87 86 86   MCH 27.5 26.7* 27.9   MCHC 31.5* 31.1* 32.6     BMP:   Recent Labs   Lab 07/20/22  2110 07/21/22  0332 07/21/22  0727 07/22/22  0632   *  --  314* 322*     --  136 139   K 5.6* 5.5* 5.3* 4.6     --  104 105   CO2 19*  --  19* 19*   BUN 73*  --  74* 88*   CREATININE 11.9*  --  11.9* 12.0*   CALCIUM 8.3*  --  8.1* 7.8*     Labs within the past 24 hours have been reviewed.    Diagnostic Results:  US - Kidney: Results for orders placed during the hospital encounter of 07/20/22    US Transplant Kidney With Doppler    Narrative  EXAMINATION:  US TRANSPLANT KIDNEY WITH DOPPLER    CLINICAL HISTORY:  elevated Cr;    TECHNIQUE:  Real time gray scale and doppler ultrasound was performed over the patient's renal allograft.    COMPARISON:  U/S transplant kidney with Doppler 07/19/2022    FINDINGS:  Patient is status post renal allograft in the right lower quadrant in 2002.  The allograft measures 12.4 cm. Normal perfusion. No hydronephrosis.  Bladder is decompressed, limiting the evaluation..    No fluid collections.    Vasculature:    Resistive indices of the arteries:    Interlobar: 0.85 with normal waveform, previously 0.84    Upper segment: 0.86 with normal waveform, previously 0.77    Mid segment: 0.80 with normal waveform, previously 0.83    Lower segment: 0.86 with normal waveform, previously 0.90    Main renal artery peak systolic velocity: 82cm/sec with normal waveform, previously cm/sec.    Renal artery/iliac ratio: 0.8.    The main renal vein is patent.    Impression  Persistently elevated renal arterial resistive indices, which can be seen in setting of medical renal disease, rejection, drug toxicity.    Electronically signed by resident: Jimenez Rios  Date:    07/21/2022  Time:    15:26    Electronically signed by: Jaspal Davila Jr  Date:    07/21/2022  Time:    15:52

## 2022-07-22 NOTE — PLAN OF CARE
Patient AAO X 4, VSS. Denies pain, N/V.   Telemonitor in place NS. Skin intact. Laura in place patient output was .... Potassium was 5.3 yesterday and IV lasix given. KUS, kidney bx, and echo was completed. Dose of SM and DDAVP administered. Patient is on insulin gtt going at 1.4units/hr. BS at 2300 319 and then at 0330 was 374. Administered SS, see mar. Up ambulatory with assist, bed locked at lowest setting, yellow socks on, call bell in reach. Remains free from falls.

## 2022-07-22 NOTE — PROGRESS NOTES
[facility] - Transplant Stepdown  Endocrinology  Progress Note    Admit Date: 7/20/2022     Reason for Consult: Management of T2DM, Hyperglycemia     Surgical Procedure and Date: Kidney Transplant 2002    Diabetes diagnosis year: 2002    Home Diabetes Medications: Novolin 70/30 50 units in the morning and 40 units in the evening. (Patient states he will reduce to 30 and 20 if BG < 120 mg/dL; patient states he feels hypoglycemic when BG is in the 80's).     How often checking glucose at home? >4 x day (Georgette 2)  BG readings on regimen: 120-180's may go up to 250's  Hypoglycemia on the regimen?  No  Missed doses on regimen?  No    Diabetes Complications include:     Hyperglycemia and Diabetic peripheral neuropathy     Complicating diabetes co morbidities:   Glucocorticoid use       HPI: Mr. Albrecht is a 73 y/o M s/p Kidney/heart transplant (2002). He has a h/o diabetes, hypertension, hyperlipidemia, chronic kidney disease, and recent COVID (COVID positive June 25-admitted to [facility] July 4-8 with COVID pneumonia). He was admitted to [facility] with AUBREY after routine labs showed Cr 11 (from ~ 2) on 7/18/22.  (Tacrolimus level was 7.2). Patient noted more dyspnea and decreased energy along with decreased urine output. No chest pain, headache, confusion, fever, vomiting, or abdominal pain noted. He was seen by Nephrology. With concern for possible rejection, he was empirically treated with IV Solu-Medrol (1 g on July 19). Endocrine consulted to manage hyperglycemia and type 2 diabetes.     Hemoglobin A1C   Date Value Ref Range Status   06/20/2022 5.9 (H) 4.0 - 5.6 % Final     Comment:     ADA Screening Guidelines:  5.7-6.4%  Consistent with prediabetes  >or=6.5%  Consistent with diabetes    High levels of fetal hemoglobin interfere with the HbA1C  assay. Heterozygous hemoglobin variants (HbS, HgC, etc)do  not significantly interfere with this assay.   However, presence of multiple variants may affect  "accuracy.     2022 6.5 (H) 4.0 - 5.6 % Final     Comment:     ADA Screening Guidelines:  5.7-6.4%  Consistent with prediabetes  >or=6.5%  Consistent with diabetes    High levels of fetal hemoglobin interfere with the HbA1C  assay. Heterozygous hemoglobin variants (HbS, HgC, etc)do  not significantly interfere with this assay.   However, presence of multiple variants may affect accuracy.     10/13/2021 7.1 (H) 4.0 - 5.6 % Final     Comment:     ADA Screening Guidelines:  5.7-6.4%  Consistent with prediabetes  >or=6.5%  Consistent with diabetes    High levels of fetal hemoglobin interfere with the HbA1C  assay. Heterozygous hemoglobin variants (HbS, HgC, etc)do  not significantly interfere with this assay.   However, presence of multiple variants may affect accuracy.                  Interval HPI:   Overnight events: No acute events overnight. Patient on the TSU in room 67579/88720 A. Blood glucose stable. BG above goal on current insulin regimen (Transition Insulin Drip). Steroid use- None.    Renal function- Abnormal - Creatinine 12.0   Vasopressors-  None       Diet diabetic [facility];  Calorie; Isolation Tray - Regular [place]     Eatin%  Nausea: No  Hypoglycemia and intervention: No  Fever: No  TPN and/or TF: No    BP (!) 175/85   Pulse 72   Temp 97 °F (36.1 °C) (Oral)   Resp (!) 22   Ht 6' 2" (1.88 m)   Wt 129.3 kg (285 lb 2.6 oz)   SpO2 99%   BMI 36.61 kg/m²     Labs Reviewed and Include    Recent Labs   Lab 22  0632   *   CALCIUM 7.8*   ALBUMIN 2.2*   PROT 6.4      K 4.6   CO2 19*      BUN 88*   CREATININE 12.0*   ALKPHOS 112   ALT 48*   AST 43*   BILITOT 0.2     Lab Results   Component Value Date    WBC 9.75 2022    HGB 9.5 (L) 2022    HCT 29.1 (L) 2022    MCV 86 2022     2022     No results for input(s): TSH, FREET4 in the last 168 hours.  Lab Results   Component Value Date    HGBA1C 5.9 (H) 2022 "       Nutritional status:   Body mass index is 36.61 kg/m².  Lab Results   Component Value Date    ALBUMIN 2.2 (L) 07/22/2022    ALBUMIN 2.1 (L) 07/21/2022    ALBUMIN 2.0 (L) 07/20/2022     No results found for: PREALBUMIN    Estimated Creatinine Clearance: 8 mL/min (A) (based on SCr of 12 mg/dL (H)).    Accu-Checks  Recent Labs     07/20/22  1115 07/20/22  1303 07/20/22  1610 07/20/22  2147 07/21/22  0812 07/21/22  1155 07/21/22  1659 07/21/22  2314 07/22/22  0345 07/22/22  0745   POCTGLUCOSE 312* 371* 320* 303* 352* 309* 230* 319* 374* 330*       Current Medications and/or Treatments Impacting Glycemic Control  Immunotherapy:    Immunosuppressants           Stop Route Frequency     mycophenolate capsule 750 mg         -- Oral 2 times daily     tacrolimus capsule 5 mg         -- Oral 2 times daily          Steroids:   Hormones (From admission, onward)                None          Pressors:    Autonomic Drugs (From admission, onward)                None          Hyperglycemia/Diabetes Medications:   Antihyperglycemics (From admission, onward)                Start     Stop Route Frequency Ordered    07/22/22 1130  insulin aspart U-100 pen 10 Units         -- SubQ 3 times daily with meals 07/22/22 0839    07/21/22 1145  insulin regular in 0.9 % NaCl 100 unit/100 mL (1 unit/mL) infusion        Question:  Enter initial dose (Units/hr):  Answer:  1.4    -- IV Continuous 07/21/22 1139    07/21/22 1041  insulin aspart U-100 pen 0-10 Units         -- SubQ As needed (PRN) 07/21/22 0941            ASSESSMENT and PLAN    * Acute renal failure  Titrate insulin slowly to avoid hypoglycemia as the risk of hypoglycemia increases with decreased creatinine clearance.    Estimated Creatinine Clearance: 8 mL/min (A) (based on SCr of 12 mg/dL (H)).        Type 2 diabetes mellitus  Endocrinology consulted for BG management.   BG goal 140-180    - Start transition drip at 2.0 units/hr with 25% step-down parameters. (weight-based at 0.8  units/kg/day)  - Novolog (aspart) insulin [facility] (180/25) SSI Units SQ prn for BG excursions.   - Start Novolog 10 units TIDWM   - BG checks q4hr   - Hypoglycemia protocol in place      ** Please notify Endocrine for any change and/or advance in diet**  ** Please call Endocrine for any BG related issues **    Discharge Planning:   TBD. Please notify endocrinology prior to discharge.        Adrenal corticosteroid causing adverse effect in therapeutic use  On steroid therapy per transplant team; may elevate BG readings             Tim Mendoza, DNP, FNP  Endocrinology  [facility] - Transplant Stepdown

## 2022-07-22 NOTE — ASSESSMENT & PLAN NOTE
- Had kidney biopsy 7/21, path pending   - DSA, BK, CMV PCR pending   - 2D Echo, EF 60%  - Monitor

## 2022-07-22 NOTE — PROGRESS NOTES
[facility] - Transplant Stepdown  Kidney Transplant  Progress Note      Reason for Follow-up: Reassessment of Kidney, Heart Transplant - 5/24/2002  (#1) recipient and management of immunosuppression.        Subjective:   History of Present Illness:  Mr. Albrecht is a 73 y/o M s/p Kidney/heart transplant (2002). He has a h/o diabetes, hypertension, hyperlipidemia, chronic kidney disease, and recent COVID (COVID positive June 25-admitted to [facility] July 4-8 with COVID pneumonia). He was admitted to [facility] with AUBREY after routine labs showed Cr 11 (from ~ 2) on 7/18/22.  (Tacrolimus level was 7.2). Patient noted more dyspnea and decreased energy along with decreased urine output. No chest pain, headache, confusion, fever, vomiting, or abdominal pain noted. He was seen by Nephrology. With concern for possible rejection, he was empirically treated with IV Solu-Medrol (1 g on July 19). He was treated with IV fluids with bicarbonate along with Lokelma. Urinalysis with 2+ protein, 2 RBC, 2 WBC, occasional WBC clumps, rare bacteria. US showed slightly increased resistive indices; Mild cortical thinning; No loss of corticomedullary distinction; Adequate perfusion; No evidence of hydronephrosis. CXR showed mildly enlarged but stable cardiac silhouette. Aorta demonstrates atherosclerotic disease. Mild atelectasis in the right mid lung zone. Patchy infiltrates at the lung bases bilaterally that could reflect edema or early airspace disease. No evidence of pleural effusion or pneumothorax. EKG had normal sinus rhythm, right bundle-branch block. Vital signs stable. Pt admitted to [facility] for further management. Will obtain 2D echo and kidney biopsy. Pt d/w Dr Wilkerson.     Mr. Albrecht is a 72 y.o. year old male who is status post Kidney, Heart Transplant - 5/24/2002  (#1).    His maintenance immunosuppression consists of:   Immunosuppressants (From admission, onward)                Start     Stop Route Frequency  Ordered    07/20/22 2100  mycophenolate capsule 750 mg         -- Oral 2 times daily 07/20/22 2032 07/20/22 2045  tacrolimus capsule 5 mg         -- Oral 2 times daily 07/20/22 2032            Interval history: No acute events overnight. Cr still elevated at 12.0. Had Kidney biopsy yesterday, path pending. DSA, BK, and CMV pending. Repeat kidney US satisfactory. Heart transplant consulted, appreciate their assistance. Echo showed EF 60%. Monitor.    Past Medical, Surgical, Family, and Social History:   Unchanged from H&P.    Scheduled Meds:   atorvastatin  80 mg Oral Daily    calcium acetate(phosphat bind)  1,334 mg Oral TID WM    insulin aspart U-100  10 Units Subcutaneous TIDWM    methylPREDNISolone (SOLU-Medrol) IVPB (doses > 250 mg)  2.5 mg/kg Intravenous Once    metoprolol succinate  25 mg Oral Daily    metoprolol succinate  50 mg Oral Daily    mycophenolate  750 mg Oral BID    NIFEdipine  90 mg Oral Daily    sodium bicarbonate  1,300 mg Oral TID    tacrolimus  5 mg Oral BID     Continuous Infusions:   insulin regular 1 units/mL infusion orderable (TRANSFER) 2 Units/hr (07/22/22 0918)     PRN Meds:acetaminophen, dextrose 10%, dextrose 10%, glucagon (human recombinant), glucose, glucose, insulin aspart U-100, sodium chloride 0.9%    Intake/Output - Last 3 Shifts         07/20 0700  07/21 0659 07/21 0700 07/22 0659 07/22 0700 07/23 0659    Urine (mL/kg/hr) 650 2050 (0.7)     Total Output 650 2050     Net -650 -2050                     Review of Systems   Constitutional:  Negative for appetite change, fatigue and fever.   HENT:  Negative for sore throat.    Respiratory:  Negative for cough, shortness of breath and wheezing.    Cardiovascular:  Negative for chest pain, palpitations and leg swelling.   Gastrointestinal:  Negative for abdominal pain, diarrhea, nausea and vomiting.   Genitourinary:  Negative for dysuria and frequency.        Laura in place for retention since 7/19/22   Musculoskeletal:  " Negative for arthralgias and back pain.   Skin:  Negative for wound.   Allergic/Immunologic: Positive for immunocompromised state.   Neurological:  Negative for dizziness and weakness.   Psychiatric/Behavioral:  Negative for confusion.     Objective:     Vital Signs (Most Recent):  Temp: 97 °F (36.1 °C) (07/22/22 0801)  Pulse: 72 (07/22/22 0805)  Resp: (!) 22 (07/22/22 0805)  BP: (!) 175/85 (07/22/22 0805)  SpO2: 99 % (07/22/22 0805)   Vital Signs (24h Range):  Temp:  [97 °F (36.1 °C)-98.4 °F (36.9 °C)] 97 °F (36.1 °C)  Pulse:  [68-88] 72  Resp:  [11-22] 22  SpO2:  [93 %-100 %] 99 %  BP: (120-175)/(61-85) 175/85     Weight: 129.3 kg (285 lb 2.6 oz)  Height: 6' 2" (188 cm)  Body mass index is 36.61 kg/m².    Physical Exam  Vitals and nursing note reviewed.   Constitutional:       General: He is not in acute distress.  HENT:      Head: Normocephalic.      Mouth/Throat:      Mouth: Mucous membranes are moist.   Eyes:      Conjunctiva/sclera: Conjunctivae normal.   Cardiovascular:      Rate and Rhythm: Normal rate and regular rhythm.      Pulses: Normal pulses.   Pulmonary:      Effort: Pulmonary effort is normal.      Breath sounds: Normal breath sounds.   Abdominal:      General: Bowel sounds are normal. There is no distension.      Palpations: Abdomen is soft.      Tenderness: There is no abdominal tenderness.      Comments: Right previous kidney transplant incision well healed, no SSI    Genitourinary:     Comments: Laura catheter with clear yellow urine   Musculoskeletal:         General: Normal range of motion.      Cervical back: Normal range of motion.      Right lower leg: No edema.      Left lower leg: No edema.   Skin:     General: Skin is warm and dry.   Neurological:      Mental Status: He is alert and oriented to person, place, and time.      Motor: No weakness.   Psychiatric:         Mood and Affect: Mood normal.         Behavior: Behavior normal.         Thought Content: Thought content normal.         " Judgment: Judgment normal.       Laboratory:  CBC:   Recent Labs   Lab 07/20/22  2111 07/21/22  0727 07/22/22  0632   WBC 7.67 9.63 9.75   RBC 3.78* 3.82* 3.40*   HGB 10.4* 10.2* 9.5*   HCT 33.0* 32.8* 29.1*    208 195   MCV 87 86 86   MCH 27.5 26.7* 27.9   MCHC 31.5* 31.1* 32.6     BMP:   Recent Labs   Lab 07/20/22 2110 07/21/22 0332 07/21/22  0727 07/22/22  0632   *  --  314* 322*     --  136 139   K 5.6* 5.5* 5.3* 4.6     --  104 105   CO2 19*  --  19* 19*   BUN 73*  --  74* 88*   CREATININE 11.9*  --  11.9* 12.0*   CALCIUM 8.3*  --  8.1* 7.8*     Labs within the past 24 hours have been reviewed.    Diagnostic Results:  US - Kidney: Results for orders placed during the hospital encounter of 07/20/22    US Transplant Kidney With Doppler    Narrative  EXAMINATION:  US TRANSPLANT KIDNEY WITH DOPPLER    CLINICAL HISTORY:  elevated Cr;    TECHNIQUE:  Real time gray scale and doppler ultrasound was performed over the patient's renal allograft.    COMPARISON:  U/S transplant kidney with Doppler 07/19/2022    FINDINGS:  Patient is status post renal allograft in the right lower quadrant in 2002.  The allograft measures 12.4 cm. Normal perfusion. No hydronephrosis.  Bladder is decompressed, limiting the evaluation..    No fluid collections.    Vasculature:    Resistive indices of the arteries:    Interlobar: 0.85 with normal waveform, previously 0.84    Upper segment: 0.86 with normal waveform, previously 0.77    Mid segment: 0.80 with normal waveform, previously 0.83    Lower segment: 0.86 with normal waveform, previously 0.90    Main renal artery peak systolic velocity: 82cm/sec with normal waveform, previously cm/sec.    Renal artery/iliac ratio: 0.8.    The main renal vein is patent.    Impression  Persistently elevated renal arterial resistive indices, which can be seen in setting of medical renal disease, rejection, drug toxicity.    Electronically signed by resident: Jimenez  Blanca  Date:    2022  Time:    15:26    Electronically signed by: Jaspal Davila Jr  Date:    2022  Time:    15:52    Assessment/Plan:     * Acute renal failure  - Had kidney biopsy , path pending   - DSA, BK, CMV PCR pending   - 2D Echo, EF 60%  - Monitor      Hyperkalemia  - Monitor with daily labs       Type 2 diabetes mellitus  - Endocrine consulted  - Appreciate their assistance      Anemia of chronic renal failure, stage 3b  - H/H stable  - Monitor with daily cbc      Immunosuppression due to drug therapy  - See Chronic Immunosuppression      Chronic immunosuppression with tacrolimus, mycophenolate  - Continue prograf. Monitor prograf level daily, monitor for toxic side effects, and adjust for therapeutic dose      Heart transplanted  - Consulted heart transplant  - ECHO, EF 60%      -donor kidney transplant  - See AUBREY      Discharge Planning: Not a candidate for d/c at this time.     Marylou Blackburn PA-C  Kidney Transplant  [facility] - Transplant Stepdown

## 2022-07-23 LAB
ALBUMIN SERPL BCP-MCNC: 2.1 G/DL (ref 3.5–5.2)
ALP SERPL-CCNC: 98 U/L (ref 55–135)
ALT SERPL W/O P-5'-P-CCNC: 57 U/L (ref 10–44)
ANION GAP SERPL CALC-SCNC: 16 MMOL/L (ref 8–16)
AST SERPL-CCNC: 49 U/L (ref 10–40)
BASOPHILS # BLD AUTO: 0 K/UL (ref 0–0.2)
BASOPHILS NFR BLD: 0 % (ref 0–1.9)
BILIRUB SERPL-MCNC: 0.2 MG/DL (ref 0.1–1)
BUN SERPL-MCNC: 97 MG/DL (ref 8–23)
CALCIUM SERPL-MCNC: 7.7 MG/DL (ref 8.7–10.5)
CHLORIDE SERPL-SCNC: 104 MMOL/L (ref 95–110)
CO2 SERPL-SCNC: 22 MMOL/L (ref 23–29)
CREAT SERPL-MCNC: 9.8 MG/DL (ref 0.5–1.4)
DIFFERENTIAL METHOD: ABNORMAL
EOSINOPHIL # BLD AUTO: 0 K/UL (ref 0–0.5)
EOSINOPHIL NFR BLD: 0 % (ref 0–8)
ERYTHROCYTE [DISTWIDTH] IN BLOOD BY AUTOMATED COUNT: 16.4 % (ref 11.5–14.5)
EST. GFR  (AFRICAN AMERICAN): 5.5 ML/MIN/1.73 M^2
EST. GFR  (NON AFRICAN AMERICAN): 4.7 ML/MIN/1.73 M^2
GLUCOSE SERPL-MCNC: 183 MG/DL (ref 70–110)
HCT VFR BLD AUTO: 23.6 % (ref 40–54)
HGB BLD-MCNC: 8.3 G/DL (ref 14–18)
IMM GRANULOCYTES # BLD AUTO: 0.07 K/UL (ref 0–0.04)
IMM GRANULOCYTES NFR BLD AUTO: 0.7 % (ref 0–0.5)
LYMPHOCYTES # BLD AUTO: 1.3 K/UL (ref 1–4.8)
LYMPHOCYTES NFR BLD: 13.5 % (ref 18–48)
MAGNESIUM SERPL-MCNC: 2.3 MG/DL (ref 1.6–2.6)
MCH RBC QN AUTO: 29.4 PG (ref 27–31)
MCHC RBC AUTO-ENTMCNC: 35.2 G/DL (ref 32–36)
MCV RBC AUTO: 84 FL (ref 82–98)
MONOCYTES # BLD AUTO: 0.5 K/UL (ref 0.3–1)
MONOCYTES NFR BLD: 4.9 % (ref 4–15)
NEUTROPHILS # BLD AUTO: 7.6 K/UL (ref 1.8–7.7)
NEUTROPHILS NFR BLD: 80.9 % (ref 38–73)
NRBC BLD-RTO: 0 /100 WBC
PHOSPHATE SERPL-MCNC: 7.4 MG/DL (ref 2.7–4.5)
PLATELET # BLD AUTO: 163 K/UL (ref 150–450)
PMV BLD AUTO: 10.3 FL (ref 9.2–12.9)
POCT GLUCOSE: 165 MG/DL (ref 70–110)
POCT GLUCOSE: 174 MG/DL (ref 70–110)
POCT GLUCOSE: 206 MG/DL (ref 70–110)
POCT GLUCOSE: 214 MG/DL (ref 70–110)
POCT GLUCOSE: 218 MG/DL (ref 70–110)
POCT GLUCOSE: 315 MG/DL (ref 70–110)
POCT GLUCOSE: 317 MG/DL (ref 70–110)
POTASSIUM SERPL-SCNC: 4.3 MMOL/L (ref 3.5–5.1)
PROT SERPL-MCNC: 6.1 G/DL (ref 6–8.4)
RBC # BLD AUTO: 2.82 M/UL (ref 4.6–6.2)
SODIUM SERPL-SCNC: 142 MMOL/L (ref 136–145)
TACROLIMUS BLD-MCNC: 3.2 NG/ML (ref 5–15)
WBC # BLD AUTO: 9.44 K/UL (ref 3.9–12.7)

## 2022-07-23 PROCEDURE — 25000003 PHARM REV CODE 250: Performed by: NURSE PRACTITIONER

## 2022-07-23 PROCEDURE — 20600001 HC STEP DOWN PRIVATE ROOM

## 2022-07-23 PROCEDURE — 83735 ASSAY OF MAGNESIUM: CPT | Performed by: PHYSICIAN ASSISTANT

## 2022-07-23 PROCEDURE — 80197 ASSAY OF TACROLIMUS: CPT | Performed by: PHYSICIAN ASSISTANT

## 2022-07-23 PROCEDURE — 99232 SBSQ HOSP IP/OBS MODERATE 35: CPT | Mod: ,,, | Performed by: NURSE PRACTITIONER

## 2022-07-23 PROCEDURE — 99232 PR SUBSEQUENT HOSPITAL CARE,LEVL II: ICD-10-PCS | Mod: ,,, | Performed by: NURSE PRACTITIONER

## 2022-07-23 PROCEDURE — 84100 ASSAY OF PHOSPHORUS: CPT | Performed by: STUDENT IN AN ORGANIZED HEALTH CARE EDUCATION/TRAINING PROGRAM

## 2022-07-23 PROCEDURE — 63600175 PHARM REV CODE 636 W HCPCS: Performed by: NURSE PRACTITIONER

## 2022-07-23 PROCEDURE — 85025 COMPLETE CBC W/AUTO DIFF WBC: CPT | Performed by: PHYSICIAN ASSISTANT

## 2022-07-23 PROCEDURE — 25000003 PHARM REV CODE 250: Performed by: PHYSICIAN ASSISTANT

## 2022-07-23 PROCEDURE — 63600175 PHARM REV CODE 636 W HCPCS: Performed by: PHYSICIAN ASSISTANT

## 2022-07-23 PROCEDURE — 90935 PR HEMODIALYSIS, ONE EVALUATION: ICD-10-PCS | Mod: ,,, | Performed by: INTERNAL MEDICINE

## 2022-07-23 PROCEDURE — 90935 HEMODIALYSIS ONE EVALUATION: CPT

## 2022-07-23 PROCEDURE — 90935 HEMODIALYSIS ONE EVALUATION: CPT | Mod: ,,, | Performed by: INTERNAL MEDICINE

## 2022-07-23 PROCEDURE — 80053 COMPREHEN METABOLIC PANEL: CPT | Performed by: STUDENT IN AN ORGANIZED HEALTH CARE EDUCATION/TRAINING PROGRAM

## 2022-07-23 RX ORDER — TACROLIMUS 1 MG/1
9 CAPSULE ORAL 2 TIMES DAILY
Status: DISCONTINUED | OUTPATIENT
Start: 2022-07-23 | End: 2022-07-24

## 2022-07-23 RX ORDER — NYSTATIN 100000 [USP'U]/ML
500000 SUSPENSION ORAL
Status: DISCONTINUED | OUTPATIENT
Start: 2022-07-23 | End: 2022-07-27 | Stop reason: HOSPADM

## 2022-07-23 RX ORDER — SODIUM CHLORIDE 9 MG/ML
INJECTION, SOLUTION INTRAVENOUS ONCE
Status: CANCELLED | OUTPATIENT
Start: 2022-07-23 | End: 2022-07-23

## 2022-07-23 RX ORDER — SULFAMETHOXAZOLE AND TRIMETHOPRIM 400; 80 MG/1; MG/1
1 TABLET ORAL DAILY
Status: DISCONTINUED | OUTPATIENT
Start: 2022-07-23 | End: 2022-07-24

## 2022-07-23 RX ORDER — FAMOTIDINE 20 MG/1
20 TABLET, FILM COATED ORAL DAILY
Status: DISCONTINUED | OUTPATIENT
Start: 2022-07-24 | End: 2022-07-27 | Stop reason: HOSPADM

## 2022-07-23 RX ORDER — INSULIN ASPART 100 [IU]/ML
10 INJECTION, SOLUTION INTRAVENOUS; SUBCUTANEOUS
Status: DISCONTINUED | OUTPATIENT
Start: 2022-07-23 | End: 2022-07-24

## 2022-07-23 RX ORDER — MUPIROCIN 20 MG/G
OINTMENT TOPICAL 2 TIMES DAILY
Status: DISCONTINUED | OUTPATIENT
Start: 2022-07-23 | End: 2022-07-27 | Stop reason: HOSPADM

## 2022-07-23 RX ORDER — PREDNISONE 20 MG/1
20 TABLET ORAL DAILY
Status: DISCONTINUED | OUTPATIENT
Start: 2022-07-24 | End: 2022-07-27 | Stop reason: HOSPADM

## 2022-07-23 RX ORDER — TACROLIMUS 1 MG/1
2 CAPSULE ORAL ONCE
Status: COMPLETED | OUTPATIENT
Start: 2022-07-23 | End: 2022-07-23

## 2022-07-23 RX ORDER — ONDANSETRON 2 MG/ML
4 INJECTION INTRAMUSCULAR; INTRAVENOUS EVERY 6 HOURS PRN
Status: DISCONTINUED | OUTPATIENT
Start: 2022-07-23 | End: 2022-07-27 | Stop reason: HOSPADM

## 2022-07-23 RX ORDER — METHYLPREDNISOLONE SOD SUCC 125 MG
1.25 VIAL (EA) INJECTION ONCE
Status: COMPLETED | OUTPATIENT
Start: 2022-07-23 | End: 2022-07-23

## 2022-07-23 RX ADMIN — METOPROLOL SUCCINATE 50 MG: 50 TABLET, EXTENDED RELEASE ORAL at 08:07

## 2022-07-23 RX ADMIN — INSULIN ASPART 10 UNITS: 100 INJECTION, SOLUTION INTRAVENOUS; SUBCUTANEOUS at 01:07

## 2022-07-23 RX ADMIN — ONDANSETRON 4 MG: 2 INJECTION INTRAMUSCULAR; INTRAVENOUS at 05:07

## 2022-07-23 RX ADMIN — NIFEDIPINE 90 MG: 30 TABLET, FILM COATED, EXTENDED RELEASE ORAL at 08:07

## 2022-07-23 RX ADMIN — INSULIN ASPART 10 UNITS: 100 INJECTION, SOLUTION INTRAVENOUS; SUBCUTANEOUS at 05:07

## 2022-07-23 RX ADMIN — MYCOPHENOLATE MOFETIL 750 MG: 250 CAPSULE ORAL at 09:07

## 2022-07-23 RX ADMIN — SODIUM BICARBONATE 650 MG TABLET 1300 MG: at 05:07

## 2022-07-23 RX ADMIN — TACROLIMUS 9 MG: 1 CAPSULE ORAL at 05:07

## 2022-07-23 RX ADMIN — INSULIN ASPART 6 UNITS: 100 INJECTION, SOLUTION INTRAVENOUS; SUBCUTANEOUS at 12:07

## 2022-07-23 RX ADMIN — ATORVASTATIN CALCIUM 80 MG: 20 TABLET, FILM COATED ORAL at 08:07

## 2022-07-23 RX ADMIN — SULFAMETHOXAZOLE AND TRIMETHOPRIM 1 TABLET: 400; 80 TABLET ORAL at 05:07

## 2022-07-23 RX ADMIN — INSULIN ASPART 6 UNITS: 100 INJECTION, SOLUTION INTRAVENOUS; SUBCUTANEOUS at 09:07

## 2022-07-23 RX ADMIN — SODIUM BICARBONATE 650 MG TABLET 1300 MG: at 09:07

## 2022-07-23 RX ADMIN — INSULIN ASPART 2 UNITS: 100 INJECTION, SOLUTION INTRAVENOUS; SUBCUTANEOUS at 04:07

## 2022-07-23 RX ADMIN — CALCIUM ACETATE 1334 MG: 667 CAPSULE ORAL at 08:07

## 2022-07-23 RX ADMIN — METOPROLOL SUCCINATE 25 MG: 25 TABLET, EXTENDED RELEASE ORAL at 08:07

## 2022-07-23 RX ADMIN — TACROLIMUS 2 MG: 1 CAPSULE ORAL at 01:07

## 2022-07-23 RX ADMIN — INSULIN ASPART 2 UNITS: 100 INJECTION, SOLUTION INTRAVENOUS; SUBCUTANEOUS at 05:07

## 2022-07-23 RX ADMIN — INSULIN HUMAN 2 UNITS/HR: 1 INJECTION, SOLUTION INTRAVENOUS at 03:07

## 2022-07-23 RX ADMIN — SODIUM BICARBONATE 650 MG TABLET 1300 MG: at 08:07

## 2022-07-23 RX ADMIN — CALCIUM ACETATE 1334 MG: 667 CAPSULE ORAL at 05:07

## 2022-07-23 RX ADMIN — MYCOPHENOLATE MOFETIL 750 MG: 250 CAPSULE ORAL at 08:07

## 2022-07-23 RX ADMIN — METHYLPREDNISOLONE SODIUM SUCCINATE 162.9 MG: 125 INJECTION, POWDER, FOR SOLUTION INTRAMUSCULAR; INTRAVENOUS at 05:07

## 2022-07-23 RX ADMIN — CALCIUM ACETATE 1334 MG: 667 CAPSULE ORAL at 12:07

## 2022-07-23 RX ADMIN — TACROLIMUS 7 MG: 1 CAPSULE ORAL at 08:07

## 2022-07-23 NOTE — ASSESSMENT & PLAN NOTE
Endocrinology consulted for BG management.   BG goal 140-180    - Transition drip at 2.5 units/hr with 25% step-down parameters. (20% increase due to FBG above goal)   - Novolog (aspart) insulin [facility] (180/25) SSI Units SQ prn for BG excursions.   - Novolog 10 units TIDWM   - BG checks q4hr   - Hypoglycemia protocol in place      ** Please notify Endocrine for any change and/or advance in diet**  ** Please call Endocrine for any BG related issues **    Discharge Planning:   TBD. Please notify endocrinology prior to discharge.

## 2022-07-23 NOTE — SUBJECTIVE & OBJECTIVE
"Interval HPI:   Overnight events: No acute events overnight. Patient on the TSU in room 18310/52100 A. Blood glucose improving. BG at and above goal on current insulin regimen (Transition Insulin Drip). Steroid use- Methylprednisolone  323.2 mg .    Renal function- Abnormal - Creatinine 9.8   Vasopressors-  None       Diet diabetic [facility]; 2000 Calorie; Isolation Tray - Regular [place]     Eatin%  Nausea: No  Hypoglycemia and intervention: No  Fever: No  TPN and/or TF: No      BP (!) 157/83   Pulse 62   Temp 97.7 °F (36.5 °C) (Oral)   Resp 18   Ht 6' 2" (1.88 m)   Wt 130.3 kg (287 lb 4.2 oz)   SpO2 95%   BMI 36.88 kg/m²     Labs Reviewed and Include    Recent Labs   Lab 22  0533   *   CALCIUM 7.7*   ALBUMIN 2.1*   PROT 6.1      K 4.3   CO2 22*      BUN 97*   CREATININE 9.8*   ALKPHOS 98   ALT 57*   AST 49*   BILITOT 0.2     Lab Results   Component Value Date    WBC 9.44 2022    HGB 8.3 (L) 2022    HCT 23.6 (L) 2022    MCV 84 2022     2022     No results for input(s): TSH, FREET4 in the last 168 hours.  Lab Results   Component Value Date    HGBA1C 5.9 (H) 2022       Nutritional status:   Body mass index is 36.88 kg/m².  Lab Results   Component Value Date    ALBUMIN 2.1 (L) 2022    ALBUMIN 2.2 (L) 2022    ALBUMIN 2.1 (L) 2022     No results found for: PREALBUMIN    Estimated Creatinine Clearance: 9.8 mL/min (A) (based on SCr of 9.8 mg/dL (H)).    Accu-Checks  Recent Labs     22  1155 22  1659 22  2314 22  0345 22  0745 22  1150 22  1651 22  2052 22  0002 22  0403   POCTGLUCOSE 309* 230* 319* 374* 330* 270* 269* 262* 315* 214*       Current Medications and/or Treatments Impacting Glycemic Control  Immunotherapy:    Immunosuppressants           Stop Route Frequency     tacrolimus capsule 7 mg         -- Oral 2 times daily     mycophenolate capsule 750 mg "         -- Oral 2 times daily          Steroids:   Hormones (From admission, onward)                None          Pressors:    Autonomic Drugs (From admission, onward)                None          Hyperglycemia/Diabetes Medications:   Antihyperglycemics (From admission, onward)                Start     Stop Route Frequency Ordered    07/23/22 1130  insulin aspart U-100 pen 10 Units         -- SubQ 3 times daily with meals 07/23/22 0954    07/23/22 1000  insulin regular in 0.9 % NaCl 100 unit/100 mL (1 unit/mL) infusion        Question:  Enter initial dose (Units/hr):  Answer:  2.5    -- IV Continuous 07/23/22 0954    07/21/22 1041  insulin aspart U-100 pen 0-10 Units         -- SubQ As needed (PRN) 07/21/22 0941

## 2022-07-23 NOTE — PROGRESS NOTES
Pt arrived via bed. Insulin drip infusing @ 2.5 ml/hr. Pt awake, alert, oriented. Standing weight obtained. Pt VSS. Dialysis consent obtained by MD. Reviewed dialysis process with patient. Pt has no questions at this time. HD started to right IJ without difficulty. NAD

## 2022-07-23 NOTE — PROGRESS NOTES
[facility] - Transplant Stepdown  Endocrinology  Progress Note    Admit Date: 7/20/2022     Reason for Consult: Management of T2DM, Hyperglycemia     Surgical Procedure and Date: Kidney Transplant 2002    Diabetes diagnosis year: 2002    Home Diabetes Medications: Novolin 70/30 50 units in the morning and 40 units in the evening. (Patient states he will reduce to 30 and 20 if BG < 120 mg/dL; patient states he feels hypoglycemic when BG is in the 80's).     How often checking glucose at home? >4 x day (Georgette 2)  BG readings on regimen: 120-180's may go up to 250's  Hypoglycemia on the regimen?  No  Missed doses on regimen?  No    Diabetes Complications include:     Hyperglycemia and Diabetic peripheral neuropathy     Complicating diabetes co morbidities:   Glucocorticoid use       HPI: Mr. Albrecht is a 73 y/o M s/p Kidney/heart transplant (2002). He has a h/o diabetes, hypertension, hyperlipidemia, chronic kidney disease, and recent COVID (COVID positive June 25-admitted to [facility] July 4-8 with COVID pneumonia). He was admitted to [facility] with AUBREY after routine labs showed Cr 11 (from ~ 2) on 7/18/22.  (Tacrolimus level was 7.2). Patient noted more dyspnea and decreased energy along with decreased urine output. No chest pain, headache, confusion, fever, vomiting, or abdominal pain noted. He was seen by Nephrology. With concern for possible rejection, he was empirically treated with IV Solu-Medrol (1 g on July 19). Endocrine consulted to manage hyperglycemia and type 2 diabetes.     Hemoglobin A1C   Date Value Ref Range Status   06/20/2022 5.9 (H) 4.0 - 5.6 % Final     Comment:     ADA Screening Guidelines:  5.7-6.4%  Consistent with prediabetes  >or=6.5%  Consistent with diabetes    High levels of fetal hemoglobin interfere with the HbA1C  assay. Heterozygous hemoglobin variants (HbS, HgC, etc)do  not significantly interfere with this assay.   However, presence of multiple variants may affect  "accuracy.     2022 6.5 (H) 4.0 - 5.6 % Final     Comment:     ADA Screening Guidelines:  5.7-6.4%  Consistent with prediabetes  >or=6.5%  Consistent with diabetes    High levels of fetal hemoglobin interfere with the HbA1C  assay. Heterozygous hemoglobin variants (HbS, HgC, etc)do  not significantly interfere with this assay.   However, presence of multiple variants may affect accuracy.     10/13/2021 7.1 (H) 4.0 - 5.6 % Final     Comment:     ADA Screening Guidelines:  5.7-6.4%  Consistent with prediabetes  >or=6.5%  Consistent with diabetes    High levels of fetal hemoglobin interfere with the HbA1C  assay. Heterozygous hemoglobin variants (HbS, HgC, etc)do  not significantly interfere with this assay.   However, presence of multiple variants may affect accuracy.                  Interval HPI:   Overnight events: No acute events overnight. Patient on the TSU in room 18753/93229 A. Blood glucose improving. BG at and above goal on current insulin regimen (Transition Insulin Drip). Steroid use- Methylprednisolone  323.2 mg .    Renal function- Abnormal - Creatinine 9.8   Vasopressors-  None       Diet diabetic [facility];  Calorie; Isolation Tray - Regular [place]     Eatin%  Nausea: No  Hypoglycemia and intervention: No  Fever: No  TPN and/or TF: No      BP (!) 157/83   Pulse 62   Temp 97.7 °F (36.5 °C) (Oral)   Resp 18   Ht 6' 2" (1.88 m)   Wt 130.3 kg (287 lb 4.2 oz)   SpO2 95%   BMI 36.88 kg/m²     Labs Reviewed and Include    Recent Labs   Lab 22  0533   *   CALCIUM 7.7*   ALBUMIN 2.1*   PROT 6.1      K 4.3   CO2 22*      BUN 97*   CREATININE 9.8*   ALKPHOS 98   ALT 57*   AST 49*   BILITOT 0.2     Lab Results   Component Value Date    WBC 9.44 2022    HGB 8.3 (L) 2022    HCT 23.6 (L) 2022    MCV 84 2022     2022     No results for input(s): TSH, FREET4 in the last 168 hours.  Lab Results   Component Value Date    HGBA1C " 5.9 (H) 06/20/2022       Nutritional status:   Body mass index is 36.88 kg/m².  Lab Results   Component Value Date    ALBUMIN 2.1 (L) 07/23/2022    ALBUMIN 2.2 (L) 07/22/2022    ALBUMIN 2.1 (L) 07/21/2022     No results found for: PREALBUMIN    Estimated Creatinine Clearance: 9.8 mL/min (A) (based on SCr of 9.8 mg/dL (H)).    Accu-Checks  Recent Labs     07/21/22  1155 07/21/22  1659 07/21/22  2314 07/22/22  0345 07/22/22  0745 07/22/22  1150 07/22/22  1651 07/22/22  2052 07/23/22  0002 07/23/22  0403   POCTGLUCOSE 309* 230* 319* 374* 330* 270* 269* 262* 315* 214*       Current Medications and/or Treatments Impacting Glycemic Control  Immunotherapy:    Immunosuppressants           Stop Route Frequency     tacrolimus capsule 7 mg         -- Oral 2 times daily     mycophenolate capsule 750 mg         -- Oral 2 times daily          Steroids:   Hormones (From admission, onward)                None          Pressors:    Autonomic Drugs (From admission, onward)                None          Hyperglycemia/Diabetes Medications:   Antihyperglycemics (From admission, onward)                Start     Stop Route Frequency Ordered    07/23/22 1130  insulin aspart U-100 pen 10 Units         -- SubQ 3 times daily with meals 07/23/22 0954    07/23/22 1000  insulin regular in 0.9 % NaCl 100 unit/100 mL (1 unit/mL) infusion        Question:  Enter initial dose (Units/hr):  Answer:  2.5    -- IV Continuous 07/23/22 0954    07/21/22 1041  insulin aspart U-100 pen 0-10 Units         -- SubQ As needed (PRN) 07/21/22 0941            ASSESSMENT and PLAN    * Acute renal failure  Titrate insulin slowly to avoid hypoglycemia as the risk of hypoglycemia increases with decreased creatinine clearance.    Estimated Creatinine Clearance: 9.8 mL/min (A) (based on SCr of 9.8 mg/dL (H)).        Type 2 diabetes mellitus  Endocrinology consulted for BG management.   BG goal 140-180    - Transition drip at 2.5 units/hr with 25% step-down parameters.  (20% increase due to FBG above goal)   - Novolog (aspart) insulin [facility] (180/25) SSI Units SQ prn for BG excursions.   - Novolog 10 units TIDWM   - BG checks q4hr   - Hypoglycemia protocol in place      ** Please notify Endocrine for any change and/or advance in diet**  ** Please call Endocrine for any BG related issues **    Discharge Planning:   TBD. Please notify endocrinology prior to discharge.        Adrenal corticosteroid causing adverse effect in therapeutic use  On steroid therapy per transplant team; may elevate BG readings             Tim Mendoza, DNP, FNP  Endocrinology  [facility] - Transplant Stepdown

## 2022-07-23 NOTE — PROGRESS NOTES
[facility] - Transplant Stepdown  Kidney TransplantNephrology  Progress Note    Patient Name: Nigel Albrecht  MRN: [id number]  Admission Date: 7/20/2022  Hospital Length of Stay: 3 days  Attending Provider: Carolina Rodríguez,*   Primary Care Physician: Krystin [facility] MD  Principal Problem:Acute renal failure    Subjective:     Interval History: Patient seen today with wife at the bedside. States that he feels better today than he did yesterday. States improvement in his fatigue. Continues to eat and drink well. Does admit to tightness and swelling of his legs. BM earlier today. No diarrhea.     Review of patient's allergies indicates:  No Known Allergies  Current Facility-Administered Medications   Medication Frequency    acetaminophen tablet 650 mg Q8H PRN    atorvastatin tablet 80 mg Daily    calcium acetate(phosphat bind) capsule 1,334 mg TID WM    dextrose 10% bolus 125 mL PRN    dextrose 10% bolus 250 mL PRN    glucagon (human recombinant) injection 1 mg PRN    glucose chewable tablet 16 g PRN    glucose chewable tablet 24 g PRN    insulin aspart U-100 pen 0-10 Units PRN    insulin aspart U-100 pen 10 Units TIDWM    insulin regular in 0.9 % NaCl 100 unit/100 mL (1 unit/mL) infusion Continuous    methylPREDNISolone sodium succinate injection 162.9 mg Once    metoprolol succinate (TOPROL-XL) 24 hr tablet 25 mg Daily    metoprolol succinate (TOPROL-XL) 24 hr tablet 50 mg Daily    mupirocin 2 % ointment BID    mycophenolate capsule 750 mg BID    NIFEdipine 24 hr tablet 90 mg Daily    nystatin 100,000 unit/mL suspension 500,000 Units TID PC    [START ON 7/24/2022] predniSONE tablet 20 mg Daily    sodium bicarbonate tablet 1,300 mg TID    sodium chloride 0.9% flush 10 mL PRN    sulfamethoxazole-trimethoprim 400-80mg per tablet 1 tablet Daily    tacrolimus capsule 7 mg BID       Objective:     Vital Signs (Most Recent):  Temp: 97.5 °F (36.4 °C) (07/23/22 1200)  Pulse: 71 (07/23/22  1427)  Resp: 16 (07/23/22 1200)  BP: 133/65 (07/23/22 1200)  SpO2: 97 % (07/23/22 1200)  O2 Device (Oxygen Therapy): room air (07/23/22 1200) Vital Signs (24h Range):  Temp:  [97.5 °F (36.4 °C)-97.8 °F (36.6 °C)] 97.5 °F (36.4 °C)  Pulse:  [60-72] 71  Resp:  [11-18] 16  SpO2:  [93 %-98 %] 97 %  BP: (133-159)/(65-83) 133/65     Weight: 130.3 kg (287 lb 4.2 oz) (07/23/22 0400)  Body mass index is 36.88 kg/m².  Body surface area is 2.61 meters squared.    I/O last 3 completed shifts:  In: 381.5 [P.O.:310; I.V.:71.5]  Out: 3525 [Urine:3525]    Physical Exam  Vitals reviewed.   Constitutional:       General: He is not in acute distress.     Appearance: Normal appearance. He is not ill-appearing or toxic-appearing.      Comments: Appears stated age   HENT:      Head: Normocephalic and atraumatic.      Right Ear: External ear normal.      Left Ear: External ear normal.      Nose: Nose normal.   Eyes:      General: No scleral icterus.     Conjunctiva/sclera: Conjunctivae normal.   Cardiovascular:      Rate and Rhythm: Normal rate and regular rhythm.      Pulses: Normal pulses.      Heart sounds: Normal heart sounds. No murmur heard.    No friction rub.   Pulmonary:      Effort: Pulmonary effort is normal. No respiratory distress.      Breath sounds: Normal breath sounds. No wheezing or rhonchi.   Abdominal:      General: Bowel sounds are normal. There is no distension.      Palpations: Abdomen is soft.      Tenderness: There is no abdominal tenderness. There is no guarding.   Musculoskeletal:         General: No swelling or deformity. Normal range of motion.      Cervical back: Normal range of motion and neck supple. No tenderness.      Right lower leg: No edema.      Left lower leg: No edema.   Skin:     General: Skin is warm and dry.      Coloration: Skin is not jaundiced.      Findings: No erythema or rash.   Neurological:      General: No focal deficit present.      Mental Status: He is alert and oriented to person,  place, and time.      Motor: No weakness.   Psychiatric:         Mood and Affect: Mood normal.         Behavior: Behavior normal.         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 Patient noted to have significant proteinuria on  with sirolimus stopped and FK started on 22. In the process of getting FK level therapeutic, patient with an infection of COVID end of  with recent hospitalization for it on -. Cellcept 750 mg BID stopped in the interim. Noted on routine labs on 22 to have a creatinine of 11 with K of 5.6 and advised to go to ED. US kidney transplant unremarkable. UA with > 100 WBC, 1+ protein with UPC of 0.7. Unable to transfer with decision made to empirically treat with solumedrol 1 gram x2 followed by taper. 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 However, given the severity of AUBREY and unimproving azotemia, discussed with patient regarding a session of HD treatment for clearance through R temp catheter. Patient in agreement. HD for 2 hrs with lower blood flows   - low FK with adjustments made   - cont on Cellcept 750 mg BID   - on steroids  3) s/p heart transplant   - HTM consulted. Pending recs    - TTE noted to have preserved EF.  4)Anemia   - drop in Hgb noted. Monitor with labs tomorrow   - check LDH and haptoglobin given drop in platelets    - iron studies.  5) Hyperphosphatemia:   - cont phoslo for now   6) metabolic acidosis:   - cont on sodium bicarb 1300 TID   7) type II DM   - appreciate endocrine's assistance. Will defer to them   -  currently on insulin gtt  8) hx of prostate cancer   - currently undergoing surveillance rather than treatment with urology   9) Likely with ZEINAB:   - monitor     HD procedure note:   - indication: AUBREY; uremia    Question Answer Comment   Duration of Treatment Other (please specify) 2 hrs   Dialyzer F160    Dialysate Temperature (C) 37    BFR-As tolerated to a maximum of: 300 mL/min     mL/min    K+ Potassium per Protocol    Ca++ Calcium per Protocol    Na+ Sodium per Protocol    Bicarb Bicarbonate per Protocol    Access Other (please specify) R temp catheter   Fluid Removal (L): 1 L as tolerated    Tubing 8 mm    Dialysate Bath Solution Protocol (DO NOT MODIFY ANSWER) [email or web address] Dialysate Bath Solution Algorithm (formatted with date).pdf          Karen Wilkerson DO  Nephrology  [facility] - Transplant Stepdown

## 2022-07-23 NOTE — ASSESSMENT & PLAN NOTE
Titrate insulin slowly to avoid hypoglycemia as the risk of hypoglycemia increases with decreased creatinine clearance.    Estimated Creatinine Clearance: 9.8 mL/min (A) (based on SCr of 9.8 mg/dL (H)).

## 2022-07-23 NOTE — PROGRESS NOTES
07/23/22 1700   Post-Hemodialysis Assessment   Rinseback Volume (mL) 250 mL   Blood Volume Processed (Liters) 34.4 L   Dialyzer Clearance Lightly streaked   Additional Fluid Intake (mL) 200 mL   Total UF (mL) 1551 mL   Net Fluid Removal 1001   Patient Response to Treatment tolerated well   Post-Treatment Weight 128.8 kg (283 lb 15.2 oz)   Treatment Weight Change -1.5   Post-Hemodialysis Comments see note     HD complete. TX duration 120 mins. Pt tolerated well, VSS, awake, alert and oriented. No complaints at this time. Net removal 1001 ml. Report given to primary nurse. NAD.

## 2022-07-23 NOTE — PLAN OF CARE
Pt aao x3. Vss. No acute distress. Pt to get his 3rd pulse steroid today. Pt currently in dialysis. Pt on an insulin drip at 2.5units/hr. Pt also receiving prandial insulin with sliding scale available. Pt started back on nystatin and bactrim. Pt's prograf dose increased. Blood sugar being monitored q 4 hrs with sliding scale available. See assessment for full chart details. Will continue to monitor, assess and adjsut care as needed.

## 2022-07-24 LAB
ALBUMIN SERPL BCP-MCNC: 2.4 G/DL (ref 3.5–5.2)
ALP SERPL-CCNC: 107 U/L (ref 55–135)
ALT SERPL W/O P-5'-P-CCNC: 76 U/L (ref 10–44)
ANION GAP SERPL CALC-SCNC: 10 MMOL/L (ref 8–16)
AST SERPL-CCNC: 54 U/L (ref 10–40)
BASOPHILS # BLD AUTO: 0 K/UL (ref 0–0.2)
BASOPHILS NFR BLD: 0 % (ref 0–1.9)
BILIRUB SERPL-MCNC: 0.2 MG/DL (ref 0.1–1)
BUN SERPL-MCNC: 72 MG/DL (ref 8–23)
CALCIUM SERPL-MCNC: 8.1 MG/DL (ref 8.7–10.5)
CHLORIDE SERPL-SCNC: 107 MMOL/L (ref 95–110)
CO2 SERPL-SCNC: 26 MMOL/L (ref 23–29)
CREAT SERPL-MCNC: 6.4 MG/DL (ref 0.5–1.4)
DIFFERENTIAL METHOD: ABNORMAL
EOSINOPHIL # BLD AUTO: 0 K/UL (ref 0–0.5)
EOSINOPHIL NFR BLD: 0 % (ref 0–8)
ERYTHROCYTE [DISTWIDTH] IN BLOOD BY AUTOMATED COUNT: 16.3 % (ref 11.5–14.5)
EST. GFR  (AFRICAN AMERICAN): 9.2 ML/MIN/1.73 M^2
EST. GFR  (NON AFRICAN AMERICAN): 7.9 ML/MIN/1.73 M^2
FERRITIN SERPL-MCNC: 186 NG/ML (ref 20–300)
GLUCOSE SERPL-MCNC: 150 MG/DL (ref 70–110)
HAPTOGLOB SERPL-MCNC: 258 MG/DL (ref 30–250)
HCT VFR BLD AUTO: 27.2 % (ref 40–54)
HGB BLD-MCNC: 9.1 G/DL (ref 14–18)
IMM GRANULOCYTES # BLD AUTO: 0.06 K/UL (ref 0–0.04)
IMM GRANULOCYTES NFR BLD AUTO: 0.7 % (ref 0–0.5)
IRON SERPL-MCNC: 71 UG/DL (ref 45–160)
LDH SERPL L TO P-CCNC: 306 U/L (ref 110–260)
LYMPHOCYTES # BLD AUTO: 1.2 K/UL (ref 1–4.8)
LYMPHOCYTES NFR BLD: 14.2 % (ref 18–48)
MAGNESIUM SERPL-MCNC: 2.2 MG/DL (ref 1.6–2.6)
MCH RBC QN AUTO: 28.5 PG (ref 27–31)
MCHC RBC AUTO-ENTMCNC: 33.5 G/DL (ref 32–36)
MCV RBC AUTO: 85 FL (ref 82–98)
MONOCYTES # BLD AUTO: 0.3 K/UL (ref 0.3–1)
MONOCYTES NFR BLD: 4.1 % (ref 4–15)
NEUTROPHILS # BLD AUTO: 6.7 K/UL (ref 1.8–7.7)
NEUTROPHILS NFR BLD: 81 % (ref 38–73)
NRBC BLD-RTO: 0 /100 WBC
PHOSPHATE SERPL-MCNC: 5.3 MG/DL (ref 2.7–4.5)
PLATELET # BLD AUTO: 179 K/UL (ref 150–450)
PMV BLD AUTO: 10.9 FL (ref 9.2–12.9)
POCT GLUCOSE: 117 MG/DL (ref 70–110)
POCT GLUCOSE: 161 MG/DL (ref 70–110)
POCT GLUCOSE: 185 MG/DL (ref 70–110)
POCT GLUCOSE: 193 MG/DL (ref 70–110)
POCT GLUCOSE: 280 MG/DL (ref 70–110)
POCT GLUCOSE: 308 MG/DL (ref 70–110)
POTASSIUM SERPL-SCNC: 4.3 MMOL/L (ref 3.5–5.1)
PROT SERPL-MCNC: 6.5 G/DL (ref 6–8.4)
RBC # BLD AUTO: 3.19 M/UL (ref 4.6–6.2)
SATURATED IRON: 28 % (ref 20–50)
SODIUM SERPL-SCNC: 143 MMOL/L (ref 136–145)
TACROLIMUS BLD-MCNC: 3 NG/ML (ref 5–15)
TOTAL IRON BINDING CAPACITY: 255 UG/DL (ref 250–450)
TRANSFERRIN SERPL-MCNC: 172 MG/DL (ref 200–375)
WBC # BLD AUTO: 8.25 K/UL (ref 3.9–12.7)

## 2022-07-24 PROCEDURE — 63600175 PHARM REV CODE 636 W HCPCS: Performed by: NURSE PRACTITIONER

## 2022-07-24 PROCEDURE — 25000003 PHARM REV CODE 250: Performed by: NURSE PRACTITIONER

## 2022-07-24 PROCEDURE — 99232 PR SUBSEQUENT HOSPITAL CARE,LEVL II: ICD-10-PCS | Mod: ,,, | Performed by: NURSE PRACTITIONER

## 2022-07-24 PROCEDURE — 83735 ASSAY OF MAGNESIUM: CPT | Performed by: PHYSICIAN ASSISTANT

## 2022-07-24 PROCEDURE — 36514 APHERESIS PLASMA: CPT | Mod: ,,, | Performed by: PATHOLOGY

## 2022-07-24 PROCEDURE — 80505 PATH CLIN CONSLTJ HIGH 41-60: CPT | Mod: ,,, | Performed by: PATHOLOGY

## 2022-07-24 PROCEDURE — 63600175 PHARM REV CODE 636 W HCPCS: Performed by: INTERNAL MEDICINE

## 2022-07-24 PROCEDURE — 82728 ASSAY OF FERRITIN: CPT | Performed by: INTERNAL MEDICINE

## 2022-07-24 PROCEDURE — 99232 SBSQ HOSP IP/OBS MODERATE 35: CPT | Mod: ,,, | Performed by: NURSE PRACTITIONER

## 2022-07-24 PROCEDURE — 80505 PR PATH CLINICAL CONSULT, HIGH COMPLEX, 41-60 MIN: ICD-10-PCS | Mod: ,,, | Performed by: PATHOLOGY

## 2022-07-24 PROCEDURE — 83615 LACTATE (LD) (LDH) ENZYME: CPT | Performed by: INTERNAL MEDICINE

## 2022-07-24 PROCEDURE — 25000003 PHARM REV CODE 250: Performed by: PATHOLOGY

## 2022-07-24 PROCEDURE — 63600175 PHARM REV CODE 636 W HCPCS: Performed by: PATHOLOGY

## 2022-07-24 PROCEDURE — 25000003 PHARM REV CODE 250

## 2022-07-24 PROCEDURE — 84466 ASSAY OF TRANSFERRIN: CPT | Performed by: INTERNAL MEDICINE

## 2022-07-24 PROCEDURE — 36514 PR THER APHERESIS,PLASMA PHERESIS: ICD-10-PCS | Mod: ,,, | Performed by: PATHOLOGY

## 2022-07-24 PROCEDURE — 80197 ASSAY OF TACROLIMUS: CPT | Performed by: PHYSICIAN ASSISTANT

## 2022-07-24 PROCEDURE — 20600001 HC STEP DOWN PRIVATE ROOM

## 2022-07-24 PROCEDURE — 80053 COMPREHEN METABOLIC PANEL: CPT | Performed by: STUDENT IN AN ORGANIZED HEALTH CARE EDUCATION/TRAINING PROGRAM

## 2022-07-24 PROCEDURE — P9045 ALBUMIN (HUMAN), 5%, 250 ML: HCPCS | Mod: JG | Performed by: PATHOLOGY

## 2022-07-24 PROCEDURE — 36514 APHERESIS PLASMA: CPT

## 2022-07-24 PROCEDURE — 99232 SBSQ HOSP IP/OBS MODERATE 35: CPT | Mod: ,,, | Performed by: INTERNAL MEDICINE

## 2022-07-24 PROCEDURE — 85025 COMPLETE CBC W/AUTO DIFF WBC: CPT | Performed by: PHYSICIAN ASSISTANT

## 2022-07-24 PROCEDURE — 99232 PR SUBSEQUENT HOSPITAL CARE,LEVL II: ICD-10-PCS | Mod: ,,, | Performed by: INTERNAL MEDICINE

## 2022-07-24 PROCEDURE — 25000003 PHARM REV CODE 250: Performed by: PHYSICIAN ASSISTANT

## 2022-07-24 PROCEDURE — 83010 ASSAY OF HAPTOGLOBIN QUANT: CPT | Performed by: INTERNAL MEDICINE

## 2022-07-24 PROCEDURE — 84100 ASSAY OF PHOSPHORUS: CPT | Performed by: STUDENT IN AN ORGANIZED HEALTH CARE EDUCATION/TRAINING PROGRAM

## 2022-07-24 PROCEDURE — 63600175 PHARM REV CODE 636 W HCPCS: Performed by: PHYSICIAN ASSISTANT

## 2022-07-24 RX ORDER — SULFAMETHOXAZOLE AND TRIMETHOPRIM 400; 80 MG/1; MG/1
1 TABLET ORAL
Status: DISCONTINUED | OUTPATIENT
Start: 2022-07-27 | End: 2022-07-27 | Stop reason: HOSPADM

## 2022-07-24 RX ORDER — CALCIUM GLUCONATE IN 0.9% NACL 2 G/120 ML
2 PLASTIC BAG, INJECTION (ML) INTRAVENOUS ONCE
Status: DISCONTINUED | OUTPATIENT
Start: 2022-07-24 | End: 2022-07-24

## 2022-07-24 RX ORDER — CALCIUM GLUCONATE 94 MG/ML
2 INJECTION, SOLUTION INTRAVENOUS ONCE
Status: DISCONTINUED | OUTPATIENT
Start: 2022-07-24 | End: 2022-07-24

## 2022-07-24 RX ORDER — ATORVASTATIN CALCIUM 20 MG/1
40 TABLET, FILM COATED ORAL DAILY
Status: DISCONTINUED | OUTPATIENT
Start: 2022-07-25 | End: 2022-07-27 | Stop reason: HOSPADM

## 2022-07-24 RX ORDER — HEPARIN SODIUM 1000 [USP'U]/ML
4000 INJECTION, SOLUTION INTRAVENOUS; SUBCUTANEOUS ONCE
Status: COMPLETED | OUTPATIENT
Start: 2022-07-24 | End: 2022-07-24

## 2022-07-24 RX ORDER — INSULIN ASPART 100 [IU]/ML
12 INJECTION, SOLUTION INTRAVENOUS; SUBCUTANEOUS
Status: DISCONTINUED | OUTPATIENT
Start: 2022-07-24 | End: 2022-07-25

## 2022-07-24 RX ORDER — CALCIUM GLUCONATE 20 MG/ML
1 INJECTION, SOLUTION INTRAVENOUS
Status: DISCONTINUED | OUTPATIENT
Start: 2022-07-24 | End: 2022-07-24

## 2022-07-24 RX ORDER — TACROLIMUS 1 MG/1
10 CAPSULE ORAL 2 TIMES DAILY
Status: DISCONTINUED | OUTPATIENT
Start: 2022-07-24 | End: 2022-07-27 | Stop reason: HOSPADM

## 2022-07-24 RX ORDER — HYDRALAZINE HYDROCHLORIDE 20 MG/ML
10 INJECTION INTRAMUSCULAR; INTRAVENOUS EVERY 8 HOURS PRN
Status: DISCONTINUED | OUTPATIENT
Start: 2022-07-24 | End: 2022-07-27 | Stop reason: HOSPADM

## 2022-07-24 RX ORDER — ALBUMIN HUMAN 50 G/1000ML
200 SOLUTION INTRAVENOUS ONCE
Status: COMPLETED | OUTPATIENT
Start: 2022-07-24 | End: 2022-07-24

## 2022-07-24 RX ORDER — TALC
6 POWDER (GRAM) TOPICAL NIGHTLY PRN
Status: DISCONTINUED | OUTPATIENT
Start: 2022-07-24 | End: 2022-07-27 | Stop reason: HOSPADM

## 2022-07-24 RX ORDER — TACROLIMUS 1 MG/1
2 CAPSULE ORAL ONCE
Status: COMPLETED | OUTPATIENT
Start: 2022-07-24 | End: 2022-07-24

## 2022-07-24 RX ADMIN — FAMOTIDINE 20 MG: 20 TABLET ORAL at 09:07

## 2022-07-24 RX ADMIN — PREDNISONE 20 MG: 20 TABLET ORAL at 09:07

## 2022-07-24 RX ADMIN — NYSTATIN 500000 UNITS: 500000 SUSPENSION ORAL at 09:07

## 2022-07-24 RX ADMIN — METOPROLOL SUCCINATE 25 MG: 25 TABLET, EXTENDED RELEASE ORAL at 09:07

## 2022-07-24 RX ADMIN — INSULIN ASPART 10 UNITS: 100 INJECTION, SOLUTION INTRAVENOUS; SUBCUTANEOUS at 09:07

## 2022-07-24 RX ADMIN — TACROLIMUS 2 MG: 1 CAPSULE ORAL at 01:07

## 2022-07-24 RX ADMIN — MYCOPHENOLATE MOFETIL 750 MG: 250 CAPSULE ORAL at 09:07

## 2022-07-24 RX ADMIN — TACROLIMUS 9 MG: 1 CAPSULE ORAL at 09:07

## 2022-07-24 RX ADMIN — CALCIUM ACETATE 1334 MG: 667 CAPSULE ORAL at 06:07

## 2022-07-24 RX ADMIN — HYDRALAZINE HYDROCHLORIDE 10 MG: 20 INJECTION, SOLUTION INTRAMUSCULAR; INTRAVENOUS at 05:07

## 2022-07-24 RX ADMIN — NYSTATIN 500000 UNITS: 500000 SUSPENSION ORAL at 01:07

## 2022-07-24 RX ADMIN — TACROLIMUS 10 MG: 1 CAPSULE ORAL at 06:07

## 2022-07-24 RX ADMIN — HEPARIN SODIUM 4000 UNITS: 1000 INJECTION, SOLUTION INTRAVENOUS; SUBCUTANEOUS at 04:07

## 2022-07-24 RX ADMIN — INSULIN ASPART 6 UNITS: 100 INJECTION, SOLUTION INTRAVENOUS; SUBCUTANEOUS at 11:07

## 2022-07-24 RX ADMIN — NYSTATIN 500000 UNITS: 500000 SUSPENSION ORAL at 06:07

## 2022-07-24 RX ADMIN — NIFEDIPINE 90 MG: 30 TABLET, FILM COATED, EXTENDED RELEASE ORAL at 09:07

## 2022-07-24 RX ADMIN — CALCIUM ACETATE 1334 MG: 667 CAPSULE ORAL at 09:07

## 2022-07-24 RX ADMIN — METOPROLOL SUCCINATE 50 MG: 50 TABLET, EXTENDED RELEASE ORAL at 09:07

## 2022-07-24 RX ADMIN — INSULIN ASPART 6 UNITS: 100 INJECTION, SOLUTION INTRAVENOUS; SUBCUTANEOUS at 01:07

## 2022-07-24 RX ADMIN — Medication 6 MG: at 09:07

## 2022-07-24 RX ADMIN — SODIUM BICARBONATE 650 MG TABLET 1300 MG: at 09:07

## 2022-07-24 RX ADMIN — SULFAMETHOXAZOLE AND TRIMETHOPRIM 1 TABLET: 400; 80 TABLET ORAL at 09:07

## 2022-07-24 RX ADMIN — CALCIUM GLUCONATE: 98 INJECTION, SOLUTION INTRAVENOUS at 04:07

## 2022-07-24 RX ADMIN — INSULIN ASPART 12 UNITS: 100 INJECTION, SOLUTION INTRAVENOUS; SUBCUTANEOUS at 09:07

## 2022-07-24 RX ADMIN — ATORVASTATIN CALCIUM 80 MG: 20 TABLET, FILM COATED ORAL at 09:07

## 2022-07-24 RX ADMIN — INSULIN ASPART 6 UNITS: 100 INJECTION, SOLUTION INTRAVENOUS; SUBCUTANEOUS at 12:07

## 2022-07-24 RX ADMIN — INSULIN ASPART 2 UNITS: 100 INJECTION, SOLUTION INTRAVENOUS; SUBCUTANEOUS at 04:07

## 2022-07-24 RX ADMIN — ALBUMIN (HUMAN) 200 G: 12.5 SOLUTION INTRAVENOUS at 04:07

## 2022-07-24 NOTE — ASSESSMENT & PLAN NOTE
Titrate insulin slowly to avoid hypoglycemia as the risk of hypoglycemia increases with decreased creatinine clearance.    Estimated Creatinine Clearance: 14.9 mL/min (A) (based on SCr of 6.4 mg/dL (H)).

## 2022-07-24 NOTE — PLAN OF CARE
Patient remaining free from injury.  Patient denies pain.  Patient received 1st dose PLEX today and tolerated well.  Patient with > 1 L U/O.  BG < 200 and insulin drip continues at 1.7 units/hr.  No s/s of infection noted.  Central line dressing CDI.  Laura to be removed after company leaves.  No s/s of infection noted.  Patient eating > 25% of meals.

## 2022-07-24 NOTE — PROGRESS NOTES
Notified Dr. Wilkerson of patient's SBP > 170.  Order received for Hydralazine.  Will continue to monitor patient.

## 2022-07-24 NOTE — PROGRESS NOTES
[facility] - Transplant Stepdown  Endocrinology  Progress Note    Admit Date: 7/20/2022     Reason for Consult: Management of T2DM, Hyperglycemia     Surgical Procedure and Date: Kidney Transplant 2002    Diabetes diagnosis year: 2002    Home Diabetes Medications: Novolin 70/30 50 units in the morning and 40 units in the evening. (Patient states he will reduce to 30 and 20 if BG < 120 mg/dL; patient states he feels hypoglycemic when BG is in the 80's).     How often checking glucose at home? >4 x day (Georgette 2)  BG readings on regimen: 120-180's may go up to 250's  Hypoglycemia on the regimen?  No  Missed doses on regimen?  No    Diabetes Complications include:     Hyperglycemia and Diabetic peripheral neuropathy     Complicating diabetes co morbidities:   Glucocorticoid use       HPI: Mr. Albrecht is a 71 y/o M s/p Kidney/heart transplant (2002). He has a h/o diabetes, hypertension, hyperlipidemia, chronic kidney disease, and recent COVID (COVID positive June 25-admitted to [facility] July 4-8 with COVID pneumonia). He was admitted to [facility] with AUBREY after routine labs showed Cr 11 (from ~ 2) on 7/18/22.  (Tacrolimus level was 7.2). Patient noted more dyspnea and decreased energy along with decreased urine output. No chest pain, headache, confusion, fever, vomiting, or abdominal pain noted. He was seen by Nephrology. With concern for possible rejection, he was empirically treated with IV Solu-Medrol (1 g on July 19). Endocrine consulted to manage hyperglycemia and type 2 diabetes.     Hemoglobin A1C   Date Value Ref Range Status   06/20/2022 5.9 (H) 4.0 - 5.6 % Final     Comment:     ADA Screening Guidelines:  5.7-6.4%  Consistent with prediabetes  >or=6.5%  Consistent with diabetes    High levels of fetal hemoglobin interfere with the HbA1C  assay. Heterozygous hemoglobin variants (HbS, HgC, etc)do  not significantly interfere with this assay.   However, presence of multiple variants may affect  "accuracy.     2022 6.5 (H) 4.0 - 5.6 % Final     Comment:     ADA Screening Guidelines:  5.7-6.4%  Consistent with prediabetes  >or=6.5%  Consistent with diabetes    High levels of fetal hemoglobin interfere with the HbA1C  assay. Heterozygous hemoglobin variants (HbS, HgC, etc)do  not significantly interfere with this assay.   However, presence of multiple variants may affect accuracy.     10/13/2021 7.1 (H) 4.0 - 5.6 % Final     Comment:     ADA Screening Guidelines:  5.7-6.4%  Consistent with prediabetes  >or=6.5%  Consistent with diabetes    High levels of fetal hemoglobin interfere with the HbA1C  assay. Heterozygous hemoglobin variants (HbS, HgC, etc)do  not significantly interfere with this assay.   However, presence of multiple variants may affect accuracy.                  Interval HPI:   Overnight events: No acute events overnight. Patient on the TSU in room 80896/60159 A. Blood glucose stable. BG at and above goal on current insulin regimen (Transition Insulin Drip). Steroid use- Prednisone 20 mg.    Renal function- Abnormal - Creatinine 6.4   Vasopressors-  None       Diet diabetic [facility];  Calorie; Isolation Tray - Regular [place]     Eatin%  Nausea: No  Hypoglycemia and intervention: No  Fever: No  TPN and/or TF: No    BP (!) 160/78   Pulse 60   Temp 98 °F (36.7 °C) (Oral)   Resp 12   Ht 6' 2" (1.88 m)   Wt 129.7 kg (285 lb 15 oz)   SpO2 (!) 94%   BMI 36.71 kg/m²     Labs Reviewed and Include    Recent Labs   Lab 22  0543   *   CALCIUM 8.1*   ALBUMIN 2.4*   PROT 6.5      K 4.3   CO2 26      BUN 72*   CREATININE 6.4*   ALKPHOS 107   ALT 76*   AST 54*   BILITOT 0.2     Lab Results   Component Value Date    WBC 8.25 2022    HGB 9.1 (L) 2022    HCT 27.2 (L) 2022    MCV 85 2022     2022     No results for input(s): TSH, FREET4 in the last 168 hours.  Lab Results   Component Value Date    HGBA1C 5.9 (H) 2022 "       Nutritional status:   Body mass index is 36.71 kg/m².  Lab Results   Component Value Date    ALBUMIN 2.4 (L) 07/24/2022    ALBUMIN 2.1 (L) 07/23/2022    ALBUMIN 2.2 (L) 07/22/2022     No results found for: PREALBUMIN    Estimated Creatinine Clearance: 14.9 mL/min (A) (based on SCr of 6.4 mg/dL (H)).    Accu-Checks  Recent Labs     07/23/22  0002 07/23/22  0403 07/23/22  0800 07/23/22  1202 07/23/22  1624 07/23/22  1737 07/23/22  2126 07/24/22  0010 07/24/22  0408 07/24/22  0844   POCTGLUCOSE 315* 214* 165* 174* 218* 206* 317* 280* 185* 117*       Current Medications and/or Treatments Impacting Glycemic Control  Immunotherapy:    Immunosuppressants           Stop Route Frequency     tacrolimus capsule 9 mg         -- Oral 2 times daily     mycophenolate capsule 750 mg         -- Oral 2 times daily          Steroids:   Hormones (From admission, onward)                Start     Stop Route Frequency Ordered    07/24/22 0900  predniSONE tablet 20 mg         -- Oral Daily 07/23/22 1340          Pressors:    Autonomic Drugs (From admission, onward)                None          Hyperglycemia/Diabetes Medications:   Antihyperglycemics (From admission, onward)                Start     Stop Route Frequency Ordered    07/24/22 1130  insulin aspart U-100 pen 12 Units         -- SubQ 3 times daily with meals 07/24/22 0939    07/24/22 0945  insulin regular in 0.9 % NaCl 100 unit/100 mL (1 unit/mL) infusion        Question:  Enter initial dose (Units/hr):  Answer:  1.7    -- IV Continuous 07/24/22 0938    07/21/22 1041  insulin aspart U-100 pen 0-10 Units         -- SubQ As needed (PRN) 07/21/22 0941            ASSESSMENT and PLAN    * AUBREY (acute kidney injury)  Titrate insulin slowly to avoid hypoglycemia as the risk of hypoglycemia increases with decreased creatinine clearance.    Estimated Creatinine Clearance: 14.9 mL/min (A) (based on SCr of 6.4 mg/dL (H)).        Type 2 diabetes mellitus  Endocrinology consulted for BG  management.   BG goal 140-180    - Transition drip at 1.7 units/hr with 20% step-down parameters. (Reduction due to FBG below goal)   - Novolog (aspart) insulin [facility] (180/25) SSI Units SQ prn for BG excursions.   - Novolog 12 units TIDWM (20% increase due to prandial BG above goal)   - BG checks q4hr   - Hypoglycemia protocol in place      ** Please notify Endocrine for any change and/or advance in diet**  ** Please call Endocrine for any BG related issues **    Discharge Planning:   TBD. Please notify endocrinology prior to discharge.        Adrenal corticosteroid causing adverse effect in therapeutic use  On steroid therapy per transplant team; may elevate BG readings             Tim Mendoza, DNP, FNP  Endocrinology  [facility] - Transplant Stepdown

## 2022-07-24 NOTE — SUBJECTIVE & OBJECTIVE
"Interval HPI:   Overnight events: No acute events overnight. Patient on the TSU in room 32755/76422 A. Blood glucose stable. BG at and above goal on current insulin regimen (Transition Insulin Drip). Steroid use- Prednisone 20 mg.    Renal function- Abnormal - Creatinine 6.4   Vasopressors-  None       Diet diabetic [facility]; 2000 Calorie; Isolation Tray - Regular [place]     Eatin%  Nausea: No  Hypoglycemia and intervention: No  Fever: No  TPN and/or TF: No    BP (!) 160/78   Pulse 60   Temp 98 °F (36.7 °C) (Oral)   Resp 12   Ht 6' 2" (1.88 m)   Wt 129.7 kg (285 lb 15 oz)   SpO2 (!) 94%   BMI 36.71 kg/m²     Labs Reviewed and Include    Recent Labs   Lab 22  0543   *   CALCIUM 8.1*   ALBUMIN 2.4*   PROT 6.5      K 4.3   CO2 26      BUN 72*   CREATININE 6.4*   ALKPHOS 107   ALT 76*   AST 54*   BILITOT 0.2     Lab Results   Component Value Date    WBC 8.25 2022    HGB 9.1 (L) 2022    HCT 27.2 (L) 2022    MCV 85 2022     2022     No results for input(s): TSH, FREET4 in the last 168 hours.  Lab Results   Component Value Date    HGBA1C 5.9 (H) 2022       Nutritional status:   Body mass index is 36.71 kg/m².  Lab Results   Component Value Date    ALBUMIN 2.4 (L) 2022    ALBUMIN 2.1 (L) 2022    ALBUMIN 2.2 (L) 2022     No results found for: PREALBUMIN    Estimated Creatinine Clearance: 14.9 mL/min (A) (based on SCr of 6.4 mg/dL (H)).    Accu-Checks  Recent Labs     22  0002 22  0403 22  0800 22  1202 22  1624 22  1737 22  2126 22  0010 22  0408 22  0844   POCTGLUCOSE 315* 214* 165* 174* 218* 206* 317* 280* 185* 117*       Current Medications and/or Treatments Impacting Glycemic Control  Immunotherapy:    Immunosuppressants           Stop Route Frequency     tacrolimus capsule 9 mg         -- Oral 2 times daily     mycophenolate capsule 750 mg         -- Oral " 2 times daily          Steroids:   Hormones (From admission, onward)                Start     Stop Route Frequency Ordered    07/24/22 0900  predniSONE tablet 20 mg         -- Oral Daily 07/23/22 1340          Pressors:    Autonomic Drugs (From admission, onward)                None          Hyperglycemia/Diabetes Medications:   Antihyperglycemics (From admission, onward)                Start     Stop Route Frequency Ordered    07/24/22 1130  insulin aspart U-100 pen 12 Units         -- SubQ 3 times daily with meals 07/24/22 0939    07/24/22 0945  insulin regular in 0.9 % NaCl 100 unit/100 mL (1 unit/mL) infusion        Question:  Enter initial dose (Units/hr):  Answer:  1.7    -- IV Continuous 07/24/22 0938    07/21/22 1041  insulin aspart U-100 pen 0-10 Units         -- SubQ As needed (PRN) 07/21/22 0941

## 2022-07-24 NOTE — ASSESSMENT & PLAN NOTE
Endocrinology consulted for BG management.   BG goal 140-180    - Transition drip at 1.7 units/hr with 20% step-down parameters. (Reduction due to FBG below goal)   - Novolog (aspart) insulin [facility] (180/25) SSI Units SQ prn for BG excursions.   - Novolog 12 units TIDWM (20% increase due to prandial BG above goal)   - BG checks q4hr   - Hypoglycemia protocol in place      ** Please notify Endocrine for any change and/or advance in diet**  ** Please call Endocrine for any BG related issues **    Discharge Planning:   TBD. Please notify endocrinology prior to discharge.

## 2022-07-24 NOTE — PROGRESS NOTES
[facility]plant Step[facility]  Kidney Transplant Nephrology  Progress Note    Patient Name: Nigel Albrecht  MRN: [id number]  Admission Date: 7/20/2022  Hospital Length of Stay: 4 days  Attending Provider: Carolina Rodríguez,*   Primary Care Physician: Krystin Zimmerman MD  Principal Problem:AUBREY (acute kidney injury)    Subjective:     Interval History: patient states that he tolerated HD well. Denies any issues on it. States that he feels better overall. Denies any nausea, vomiting. No diarrhea.     Review of patient's allergies indicates:  No Known Allergies  Current Facility-Administered Medications   Medication Frequency    acetaminophen tablet 650 mg Q8H PRN    albumin human 5% bottle 200 g Once    [START ON 7/25/2022] atorvastatin tablet 40 mg Daily    calcium acetate(phosphat bind) capsule 1,334 mg TID WM    calcium gluconate 2 g in sodium chloride 0.9% 100 mL Once    dextrose 10% bolus 125 mL PRN    dextrose 10% bolus 250 mL PRN    famotidine tablet 20 mg Daily    glucagon (human recombinant) injection 1 mg PRN    glucose chewable tablet 16 g PRN    glucose chewable tablet 24 g PRN    heparin (porcine) injection 4,000 Units Once    insulin aspart U-100 pen 0-10 Units PRN    insulin aspart U-100 pen 12 Units TIDWM    insulin regular in 0.9 % NaCl 100 unit/100 mL (1 unit/mL) infusion Continuous    metoprolol succinate (TOPROL-XL) 24 hr tablet 25 mg Daily    metoprolol succinate (TOPROL-XL) 24 hr tablet 50 mg Daily    mupirocin 2 % ointment BID    mycophenolate capsule 750 mg BID    NIFEdipine 24 hr tablet 90 mg Daily    nystatin 100,000 unit/mL suspension 500,000 Units TID PC    ondansetron injection 4 mg Q6H PRN    predniSONE tablet 20 mg Daily    sodium chloride 0.9% flush 10 mL PRN    [START ON 7/27/2022] sulfamethoxazole-trimethoprim 400-80mg per tablet 1 tablet Once per day on Mon Wed Fri    tacrolimus capsule 10 mg BID       Objective:     Vital Signs (Most  Recent):  Temp: 98.1 °F (36.7 °C) (07/24/22 0826)  Pulse: (!) 59 (07/24/22 0826)  Resp: 16 (07/24/22 0826)  BP: (!) 164/80 (07/24/22 0826)  SpO2: 95 % (07/24/22 0826)  O2 Device (Oxygen Therapy): room air (07/24/22 0400) Vital Signs (24h Range):  Temp:  [98 °F (36.7 °C)-98.1 °F (36.7 °C)] 98.1 °F (36.7 °C)  Pulse:  [59-73] 59  Resp:  [12-20] 16  SpO2:  [92 %-97 %] 95 %  BP: (139-173)/(71-94) 164/80     Weight: 129.7 kg (285 lb 15 oz) (07/24/22 0500)  Body mass index is 36.71 kg/m².  Body surface area is 2.6 meters squared.    I/O last 3 completed shifts:  In: 837.1 [P.O.:550; I.V.:87.1; Other:200]  Out: 5451 [Urine:3850; Emesis/NG output:50; Other:1551]    Physical Exam  Physical Exam  Vitals reviewed.   Constitutional:       General: He is not in acute distress.     Appearance: Normal appearance. He is not ill-appearing or toxic-appearing.      Comments: Appears stated age   HENT:      Head: Normocephalic and atraumatic.      Right Ear: External ear normal.      Left Ear: External ear normal.      Nose: Nose normal.   Eyes:      General: No scleral icterus.     Conjunctiva/sclera: Conjunctivae normal.   Cardiovascular:      Rate and Rhythm: Normal rate and regular rhythm.      Pulses: Normal pulses.      Heart sounds: Normal heart sounds. No murmur heard.    No friction rub.   Pulmonary:      Effort: Pulmonary effort is normal. No respiratory distress.      Breath sounds: Normal breath sounds. No wheezing or rhonchi.   Abdominal:      General: Bowel sounds are normal. There is no distension.      Palpations: Abdomen is soft.      Tenderness: There is no abdominal tenderness. There is no guarding.   Musculoskeletal:         General: No swelling or deformity. Normal range of motion.      Cervical back: Normal range of motion and neck supple. No tenderness.      Right lower leg: No edema.      Left lower leg: No edema.   Skin:     General: Skin is warm and dry.      Coloration: Skin is not jaundiced.      Findings: No  erythema or rash.   Neurological:      General: No focal deficit present.      Mental Status: He is alert and oriented to person, place, and time.      Motor: No weakness.   Psychiatric:         Mood and Affect: Mood normal.         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 Patient noted to have significant proteinuria on  with sirolimus stopped and FK started on 22. In the process of getting FK level therapeutic, patient with an infection of COVID end of  with recent hospitalization for it on -. Cellcept 750 mg BID stopped in the interim. Noted on routine labs on 22 to have a creatinine of 11 with K of 5.6 and advised to go to ED. US kidney transplant unremarkable. UA with > 100 WBC, 1+ protein with UPC of 0.7. Unable to transfer with decision made to empirically treat with solumedrol 1 gram x2 followed by taper. 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 Appears to be isolated ABMR at this time.     - DSA pending    - discussed with blood bank and first session of plex today              - completed steroid pulse   2) s/p  donor kidney transplant              - monitor for improvement in graft function. Last HD on 22               - low FK with adjustments made. Unable to do ketoconazole due to elevated LFT's               - cont on Cellcept 750 mg BID              - on steroids  3) s/p heart transplant              - HTM consulted. Pending recs                        - TTE noted to have preserved EF.  4)Anemia              - drop in Hgb noted. Monitor with labs tomorrow              - iron studies noted. Set up for Epogen   5) Hyperphosphatemia:              - cont phoslo for now    6) metabolic acidosis:              - hold  sodium bicarb 1300 TID   7) type II DM              - appreciate endocrine's assistance. Will defer to them              - currently on insulin gtt  8) hx of prostate cancer              - currently undergoing surveillance rather than treatment with urology   9) Likely with ZEINAB:              - monitor   10) elevated LFT:   - likely to need outpatient hepatology    Karen Wilkerson DO  Nephrology  [facility] - Transplant [facility]down

## 2022-07-25 PROBLEM — T86.11 ANTIBODY MEDIATED REJECTION OF KIDNEY TRANSPLANT: Status: ACTIVE | Noted: 2022-07-25

## 2022-07-25 LAB
ABO + RH BLD: NORMAL
ALBUMIN SERPL BCP-MCNC: 3.6 G/DL (ref 3.5–5.2)
ALP SERPL-CCNC: 46 U/L (ref 55–135)
ALT SERPL W/O P-5'-P-CCNC: 46 U/L (ref 10–44)
ANCA AB TITR SER IF: NORMAL TITER
ANION GAP SERPL CALC-SCNC: 9 MMOL/L (ref 8–16)
AST SERPL-CCNC: 33 U/L (ref 10–40)
BASOPHILS # BLD AUTO: 0 K/UL (ref 0–0.2)
BASOPHILS NFR BLD: 0 % (ref 0–1.9)
BILIRUB SERPL-MCNC: 0.4 MG/DL (ref 0.1–1)
BKV DNA SERPL NAA+PROBE-ACNC: <125 COPIES/ML
BKV DNA SERPL NAA+PROBE-LOG#: <2.1 LOG (10) COPIES/ML
BKV DNA SERPL QL NAA+PROBE: NOT DETECTED
BLD GP AB SCN CELLS X3 SERPL QL: NORMAL
BUN SERPL-MCNC: 69 MG/DL (ref 8–23)
CALCIUM SERPL-MCNC: 8.1 MG/DL (ref 8.7–10.5)
CHLORIDE SERPL-SCNC: 110 MMOL/L (ref 95–110)
CLASS I ANTIBODY COMMENTS - LUMINEX: NORMAL
CLASS II ANTIBODY COMMENTS - LUMINEX: NORMAL
CMV DNA SPEC QL NAA+PROBE: DETECTED
CO2 SERPL-SCNC: 25 MMOL/L (ref 23–29)
CREAT SERPL-MCNC: 5.3 MG/DL (ref 0.5–1.4)
CYTOMEGALOVIRUS LOG (IU/ML): <1.7 LOGIU/ML
CYTOMEGALOVIRUS PCR, QUANT: <50 IU/ML
DIFFERENTIAL METHOD: ABNORMAL
DSA1 TESTING DATE: NORMAL
DSA12 TESTING DATE: NORMAL
DSA2 TESTING DATE: NORMAL
EOSINOPHIL # BLD AUTO: 0 K/UL (ref 0–0.5)
EOSINOPHIL NFR BLD: 0.2 % (ref 0–8)
ERYTHROCYTE [DISTWIDTH] IN BLOOD BY AUTOMATED COUNT: 16.6 % (ref 11.5–14.5)
EST. GFR  (AFRICAN AMERICAN): 11.5 ML/MIN/1.73 M^2
EST. GFR  (NON AFRICAN AMERICAN): 10 ML/MIN/1.73 M^2
GLUCOSE SERPL-MCNC: 146 MG/DL (ref 70–110)
HCT VFR BLD AUTO: 30 % (ref 40–54)
HGB BLD-MCNC: 9.3 G/DL (ref 14–18)
IMM GRANULOCYTES # BLD AUTO: 0.06 K/UL (ref 0–0.04)
IMM GRANULOCYTES NFR BLD AUTO: 0.6 % (ref 0–0.5)
LYMPHOCYTES # BLD AUTO: 2.5 K/UL (ref 1–4.8)
LYMPHOCYTES NFR BLD: 23.7 % (ref 18–48)
MAGNESIUM SERPL-MCNC: 2 MG/DL (ref 1.6–2.6)
MCH RBC QN AUTO: 26.6 PG (ref 27–31)
MCHC RBC AUTO-ENTMCNC: 31 G/DL (ref 32–36)
MCV RBC AUTO: 86 FL (ref 82–98)
MONOCYTES # BLD AUTO: 0.9 K/UL (ref 0.3–1)
MONOCYTES NFR BLD: 8 % (ref 4–15)
MYELOPEROXIDASE AB SER-ACNC: 3 UNITS
NEUTROPHILS # BLD AUTO: 7.2 K/UL (ref 1.8–7.7)
NEUTROPHILS NFR BLD: 67.5 % (ref 38–73)
NRBC BLD-RTO: 0 /100 WBC
P-ANCA TITR SER IF: NORMAL TITER
PHOSPHATE SERPL-MCNC: 4.9 MG/DL (ref 2.7–4.5)
PLATELET # BLD AUTO: 159 K/UL (ref 150–450)
PMV BLD AUTO: 10 FL (ref 9.2–12.9)
POCT GLUCOSE: 190 MG/DL (ref 70–110)
POCT GLUCOSE: 196 MG/DL (ref 70–110)
POCT GLUCOSE: 310 MG/DL (ref 70–110)
POCT GLUCOSE: 90 MG/DL (ref 70–110)
POCT GLUCOSE: 98 MG/DL (ref 70–110)
POTASSIUM SERPL-SCNC: 3.9 MMOL/L (ref 3.5–5.1)
PROT SERPL-MCNC: 5.1 G/DL (ref 6–8.4)
RBC # BLD AUTO: 3.5 M/UL (ref 4.6–6.2)
SERUM COLLECTION DT - LUMINEX CLASS I: NORMAL
SERUM COLLECTION DT - LUMINEX CLASS II: NORMAL
SODIUM SERPL-SCNC: 144 MMOL/L (ref 136–145)
TACROLIMUS BLD-MCNC: 6.7 NG/ML (ref 5–15)
WBC # BLD AUTO: 10.67 K/UL (ref 3.9–12.7)

## 2022-07-25 PROCEDURE — 99233 SBSQ HOSP IP/OBS HIGH 50: CPT | Mod: ,,, | Performed by: PHYSICIAN ASSISTANT

## 2022-07-25 PROCEDURE — 25000003 PHARM REV CODE 250: Performed by: NURSE PRACTITIONER

## 2022-07-25 PROCEDURE — 63600175 PHARM REV CODE 636 W HCPCS: Mod: JG | Performed by: PATHOLOGY

## 2022-07-25 PROCEDURE — 80197 ASSAY OF TACROLIMUS: CPT | Performed by: PHYSICIAN ASSISTANT

## 2022-07-25 PROCEDURE — 99232 PR SUBSEQUENT HOSPITAL CARE,LEVL II: ICD-10-PCS | Mod: ,,, | Performed by: NURSE PRACTITIONER

## 2022-07-25 PROCEDURE — 99233 PR SUBSEQUENT HOSPITAL CARE,LEVL III: ICD-10-PCS | Mod: ,,, | Performed by: PHYSICIAN ASSISTANT

## 2022-07-25 PROCEDURE — 25000003 PHARM REV CODE 250: Performed by: PHYSICIAN ASSISTANT

## 2022-07-25 PROCEDURE — 63600175 PHARM REV CODE 636 W HCPCS: Performed by: PHYSICIAN ASSISTANT

## 2022-07-25 PROCEDURE — 25000003 PHARM REV CODE 250: Performed by: INTERNAL MEDICINE

## 2022-07-25 PROCEDURE — C9399 UNCLASSIFIED DRUGS OR BIOLOG: HCPCS | Performed by: NURSE PRACTITIONER

## 2022-07-25 PROCEDURE — 85025 COMPLETE CBC W/AUTO DIFF WBC: CPT | Performed by: PHYSICIAN ASSISTANT

## 2022-07-25 PROCEDURE — 99232 SBSQ HOSP IP/OBS MODERATE 35: CPT | Mod: ,,, | Performed by: NURSE PRACTITIONER

## 2022-07-25 PROCEDURE — 36514 APHERESIS PLASMA: CPT

## 2022-07-25 PROCEDURE — 36415 COLL VENOUS BLD VENIPUNCTURE: CPT | Performed by: PHYSICIAN ASSISTANT

## 2022-07-25 PROCEDURE — 36514 APHERESIS PLASMA: CPT | Mod: ,,, | Performed by: PATHOLOGY

## 2022-07-25 PROCEDURE — 84100 ASSAY OF PHOSPHORUS: CPT | Performed by: STUDENT IN AN ORGANIZED HEALTH CARE EDUCATION/TRAINING PROGRAM

## 2022-07-25 PROCEDURE — 80053 COMPREHEN METABOLIC PANEL: CPT | Performed by: STUDENT IN AN ORGANIZED HEALTH CARE EDUCATION/TRAINING PROGRAM

## 2022-07-25 PROCEDURE — 86901 BLOOD TYPING SEROLOGIC RH(D): CPT | Performed by: NURSE PRACTITIONER

## 2022-07-25 PROCEDURE — 83735 ASSAY OF MAGNESIUM: CPT | Performed by: PHYSICIAN ASSISTANT

## 2022-07-25 PROCEDURE — 25000003 PHARM REV CODE 250: Performed by: PATHOLOGY

## 2022-07-25 PROCEDURE — 25000003 PHARM REV CODE 250

## 2022-07-25 PROCEDURE — 36514 PR THER APHERESIS,PLASMA PHERESIS: ICD-10-PCS | Mod: ,,, | Performed by: PATHOLOGY

## 2022-07-25 PROCEDURE — 63600175 PHARM REV CODE 636 W HCPCS: Performed by: NURSE PRACTITIONER

## 2022-07-25 PROCEDURE — 20600001 HC STEP DOWN PRIVATE ROOM

## 2022-07-25 PROCEDURE — P9045 ALBUMIN (HUMAN), 5%, 250 ML: HCPCS | Mod: JG | Performed by: PATHOLOGY

## 2022-07-25 PROCEDURE — 63600175 PHARM REV CODE 636 W HCPCS: Performed by: INTERNAL MEDICINE

## 2022-07-25 RX ORDER — IBUPROFEN 200 MG
24 TABLET ORAL
Status: DISCONTINUED | OUTPATIENT
Start: 2022-07-25 | End: 2022-07-27 | Stop reason: HOSPADM

## 2022-07-25 RX ORDER — HEPARIN SODIUM 1000 [USP'U]/ML
2400 INJECTION, SOLUTION INTRAVENOUS; SUBCUTANEOUS ONCE
Status: COMPLETED | OUTPATIENT
Start: 2022-07-26 | End: 2022-07-26

## 2022-07-25 RX ORDER — NIFEDIPINE 30 MG/1
60 TABLET, EXTENDED RELEASE ORAL 2 TIMES DAILY
Status: DISCONTINUED | OUTPATIENT
Start: 2022-07-25 | End: 2022-07-27 | Stop reason: HOSPADM

## 2022-07-25 RX ORDER — CALCIUM GLUCONATE 20 MG/ML
2 INJECTION, SOLUTION INTRAVENOUS ONCE
Status: COMPLETED | OUTPATIENT
Start: 2022-07-26 | End: 2022-07-26

## 2022-07-25 RX ORDER — ALBUMIN HUMAN 50 G/1000ML
25 SOLUTION INTRAVENOUS ONCE
Status: COMPLETED | OUTPATIENT
Start: 2022-07-25 | End: 2022-07-25

## 2022-07-25 RX ORDER — MYCOPHENOLATE MOFETIL 250 MG/1
1000 CAPSULE ORAL 2 TIMES DAILY
Status: DISCONTINUED | OUTPATIENT
Start: 2022-07-25 | End: 2022-07-27 | Stop reason: HOSPADM

## 2022-07-25 RX ORDER — SODIUM CHLORIDE 0.9 % (FLUSH) 0.9 %
10 SYRINGE (ML) INJECTION
Status: CANCELLED | OUTPATIENT
Start: 2022-07-26

## 2022-07-25 RX ORDER — HEPARIN 100 UNIT/ML
500 SYRINGE INTRAVENOUS
Status: CANCELLED | OUTPATIENT
Start: 2022-07-26

## 2022-07-25 RX ORDER — HEPARIN SODIUM 1000 [USP'U]/ML
3000 INJECTION, SOLUTION INTRAVENOUS; SUBCUTANEOUS ONCE
Status: COMPLETED | OUTPATIENT
Start: 2022-07-25 | End: 2022-07-25

## 2022-07-25 RX ORDER — DIPHENHYDRAMINE HCL 25 MG
25 CAPSULE ORAL
Status: CANCELLED | OUTPATIENT
Start: 2022-07-26

## 2022-07-25 RX ORDER — DIPHENHYDRAMINE HYDROCHLORIDE 50 MG/ML
25 INJECTION INTRAMUSCULAR; INTRAVENOUS
Status: CANCELLED | OUTPATIENT
Start: 2022-07-26

## 2022-07-25 RX ORDER — DIPHENHYDRAMINE HYDROCHLORIDE 50 MG/ML
50 INJECTION INTRAMUSCULAR; INTRAVENOUS ONCE AS NEEDED
Status: CANCELLED | OUTPATIENT
Start: 2022-07-26

## 2022-07-25 RX ORDER — CALCIUM GLUCONATE 20 MG/ML
2 INJECTION, SOLUTION INTRAVENOUS ONCE
Status: COMPLETED | OUTPATIENT
Start: 2022-07-25 | End: 2022-07-25

## 2022-07-25 RX ORDER — ALBUMIN HUMAN 50 G/1000ML
200 SOLUTION INTRAVENOUS ONCE
Status: COMPLETED | OUTPATIENT
Start: 2022-07-25 | End: 2022-07-25

## 2022-07-25 RX ORDER — EPINEPHRINE 0.3 MG/.3ML
0.3 INJECTION SUBCUTANEOUS ONCE AS NEEDED
Status: CANCELLED | OUTPATIENT
Start: 2022-07-26

## 2022-07-25 RX ORDER — ACETAMINOPHEN 500 MG
500 TABLET ORAL
Status: CANCELLED | OUTPATIENT
Start: 2022-07-26

## 2022-07-25 RX ORDER — IBUPROFEN 200 MG
16 TABLET ORAL
Status: DISCONTINUED | OUTPATIENT
Start: 2022-07-25 | End: 2022-07-27 | Stop reason: HOSPADM

## 2022-07-25 RX ORDER — INSULIN ASPART 100 [IU]/ML
12 INJECTION, SOLUTION INTRAVENOUS; SUBCUTANEOUS
Status: DISCONTINUED | OUTPATIENT
Start: 2022-07-25 | End: 2022-07-25

## 2022-07-25 RX ORDER — GLUCAGON 1 MG
1 KIT INJECTION
Status: DISCONTINUED | OUTPATIENT
Start: 2022-07-25 | End: 2022-07-27 | Stop reason: HOSPADM

## 2022-07-25 RX ORDER — ALBUMIN HUMAN 50 G/1000ML
225 SOLUTION INTRAVENOUS ONCE
Status: COMPLETED | OUTPATIENT
Start: 2022-07-26 | End: 2022-07-26

## 2022-07-25 RX ORDER — INSULIN ASPART 100 [IU]/ML
1-10 INJECTION, SOLUTION INTRAVENOUS; SUBCUTANEOUS
Status: DISCONTINUED | OUTPATIENT
Start: 2022-07-25 | End: 2022-07-27 | Stop reason: HOSPADM

## 2022-07-25 RX ORDER — INSULIN ASPART 100 [IU]/ML
6-12 INJECTION, SOLUTION INTRAVENOUS; SUBCUTANEOUS
Status: DISCONTINUED | OUTPATIENT
Start: 2022-07-26 | End: 2022-07-27

## 2022-07-25 RX ORDER — HYDRALAZINE HYDROCHLORIDE 25 MG/1
25 TABLET, FILM COATED ORAL EVERY 8 HOURS
Status: DISCONTINUED | OUTPATIENT
Start: 2022-07-25 | End: 2022-07-26

## 2022-07-25 RX ADMIN — ALBUMIN (HUMAN) 25 G: 12.5 SOLUTION INTRAVENOUS at 01:07

## 2022-07-25 RX ADMIN — ATORVASTATIN CALCIUM 40 MG: 20 TABLET, FILM COATED ORAL at 09:07

## 2022-07-25 RX ADMIN — MYCOPHENOLATE MOFETIL 1000 MG: 250 CAPSULE ORAL at 09:07

## 2022-07-25 RX ADMIN — METOPROLOL SUCCINATE 75 MG: 50 TABLET, EXTENDED RELEASE ORAL at 09:07

## 2022-07-25 RX ADMIN — NYSTATIN 500000 UNITS: 500000 SUSPENSION ORAL at 02:07

## 2022-07-25 RX ADMIN — ALBUMIN (HUMAN) 200 G: 12.5 SOLUTION INTRAVENOUS at 12:07

## 2022-07-25 RX ADMIN — NIFEDIPINE 60 MG: 30 TABLET, FILM COATED, EXTENDED RELEASE ORAL at 09:07

## 2022-07-25 RX ADMIN — HYDRALAZINE HYDROCHLORIDE 25 MG: 25 TABLET, FILM COATED ORAL at 09:07

## 2022-07-25 RX ADMIN — HEPARIN SODIUM 2400 UNITS: 1000 INJECTION, SOLUTION INTRAVENOUS; SUBCUTANEOUS at 01:07

## 2022-07-25 RX ADMIN — INSULIN ASPART 8 UNITS: 100 INJECTION, SOLUTION INTRAVENOUS; SUBCUTANEOUS at 09:07

## 2022-07-25 RX ADMIN — PREDNISONE 20 MG: 20 TABLET ORAL at 09:07

## 2022-07-25 RX ADMIN — CALCIUM ACETATE 1334 MG: 667 CAPSULE ORAL at 09:07

## 2022-07-25 RX ADMIN — NIFEDIPINE 90 MG: 30 TABLET, FILM COATED, EXTENDED RELEASE ORAL at 09:07

## 2022-07-25 RX ADMIN — INSULIN ASPART 6 UNITS: 100 INJECTION, SOLUTION INTRAVENOUS; SUBCUTANEOUS at 09:07

## 2022-07-25 RX ADMIN — CALCIUM ACETATE 1334 MG: 667 CAPSULE ORAL at 05:07

## 2022-07-25 RX ADMIN — CALCIUM GLUCONATE 2 G: 20 INJECTION, SOLUTION INTRAVENOUS at 12:07

## 2022-07-25 RX ADMIN — MYCOPHENOLATE MOFETIL 750 MG: 250 CAPSULE ORAL at 09:07

## 2022-07-25 RX ADMIN — INSULIN DETEMIR 32 UNITS: 100 INJECTION, SOLUTION SUBCUTANEOUS at 09:07

## 2022-07-25 RX ADMIN — CALCIUM ACETATE 1334 MG: 667 CAPSULE ORAL at 02:07

## 2022-07-25 RX ADMIN — FAMOTIDINE 20 MG: 20 TABLET ORAL at 09:07

## 2022-07-25 RX ADMIN — TACROLIMUS 10 MG: 1 CAPSULE ORAL at 05:07

## 2022-07-25 RX ADMIN — INSULIN ASPART 2 UNITS: 100 INJECTION, SOLUTION INTRAVENOUS; SUBCUTANEOUS at 04:07

## 2022-07-25 RX ADMIN — TACROLIMUS 10 MG: 1 CAPSULE ORAL at 09:07

## 2022-07-25 RX ADMIN — HYDRALAZINE HYDROCHLORIDE 25 MG: 25 TABLET, FILM COATED ORAL at 02:07

## 2022-07-25 RX ADMIN — Medication 6 MG: at 09:07

## 2022-07-25 RX ADMIN — INSULIN ASPART 2 UNITS: 100 INJECTION, SOLUTION INTRAVENOUS; SUBCUTANEOUS at 02:07

## 2022-07-25 RX ADMIN — HYDRALAZINE HYDROCHLORIDE 10 MG: 20 INJECTION, SOLUTION INTRAMUSCULAR; INTRAVENOUS at 08:07

## 2022-07-25 NOTE — PROGRESS NOTES
4 liter plasma exchange completed by [facility] staff nurse per blood bank protocol.  5% albumin used as replacement fluid.  See flowsheet on chart details.  Tolerated well.  Next tx 07/25/2022.

## 2022-07-25 NOTE — ASSESSMENT & PLAN NOTE
Endocrinology consulted for BG management.   BG goal 140-180    - D/C Transition drip  - Start Levemir 32 units QD (based on insulin needs noted while on the transition drip 30% reduction)  - Novolog (aspart) insulin MDC (150/25) SSI Units SQ prn for BG excursions.   - Novolog 12 units TIDWM   - BG checks AC/HS   - Hypoglycemia protocol in place      ** Please notify Endocrine for any change and/or advance in diet**  ** Please call Endocrine for any BG related issues **    Discharge Planning:   TBD. Please notify endocrinology prior to discharge.

## 2022-07-25 NOTE — CARE UPDATE
Care Update:    Adjusted prandial insulin dosing due to patient's fluctuating appetite.        Tim Mendoza DNP, FNP-C  Department of Endocrinology  Inpatient Glycemic Management

## 2022-07-25 NOTE — PROGRESS NOTES
Patient vasques dc'd per PA order.  Patient tolerated well.  Patient given urinal and instructed to measure all urine output.  Patient off floor via wheelchair to receive PLEX treatment.

## 2022-07-25 NOTE — PROGRESS NOTES
[facility] - Transplant Stepdown  Endocrinology  Progress Note    Admit Date: 7/20/2022     Reason for Consult: Management of T2DM, Hyperglycemia     Surgical Procedure and Date: Kidney Transplant 2002    Diabetes diagnosis year: 2002    Home Diabetes Medications: Novolin 70/30 50 units in the morning and 40 units in the evening. (Patient states he will reduce to 30 and 20 if BG < 120 mg/dL; patient states he feels hypoglycemic when BG is in the 80's).     How often checking glucose at home? >4 x day (Georgette 2)  BG readings on regimen: 120-180's may go up to 250's  Hypoglycemia on the regimen?  No  Missed doses on regimen?  No    Diabetes Complications include:     Hyperglycemia and Diabetic peripheral neuropathy     Complicating diabetes co morbidities:   Glucocorticoid use       HPI: Mr. Albrecht is a 71 y/o M s/p Kidney/heart transplant (2002). He has a h/o diabetes, hypertension, hyperlipidemia, chronic kidney disease, and recent COVID (COVID positive June 25-admitted to [facility] July 4-8 with COVID pneumonia). He was admitted to [facility] with AUBREY after routine labs showed Cr 11 (from ~ 2) on 7/18/22.  (Tacrolimus level was 7.2). Patient noted more dyspnea and decreased energy along with decreased urine output. No chest pain, headache, confusion, fever, vomiting, or abdominal pain noted. He was seen by Nephrology. With concern for possible rejection, he was empirically treated with IV Solu-Medrol (1 g on July 19). Endocrine consulted to manage hyperglycemia and type 2 diabetes.     Hemoglobin A1C   Date Value Ref Range Status   06/20/2022 5.9 (H) 4.0 - 5.6 % Final     Comment:     ADA Screening Guidelines:  5.7-6.4%  Consistent with prediabetes  >or=6.5%  Consistent with diabetes    High levels of fetal hemoglobin interfere with the HbA1C  assay. Heterozygous hemoglobin variants (HbS, HgC, etc)do  not significantly interfere with this assay.   However, presence of multiple variants may affect  "accuracy.     2022 6.5 (H) 4.0 - 5.6 % Final     Comment:     ADA Screening Guidelines:  5.7-6.4%  Consistent with prediabetes  >or=6.5%  Consistent with diabetes    High levels of fetal hemoglobin interfere with the HbA1C  assay. Heterozygous hemoglobin variants (HbS, HgC, etc)do  not significantly interfere with this assay.   However, presence of multiple variants may affect accuracy.     10/13/2021 7.1 (H) 4.0 - 5.6 % Final     Comment:     ADA Screening Guidelines:  5.7-6.4%  Consistent with prediabetes  >or=6.5%  Consistent with diabetes    High levels of fetal hemoglobin interfere with the HbA1C  assay. Heterozygous hemoglobin variants (HbS, HgC, etc)do  not significantly interfere with this assay.   However, presence of multiple variants may affect accuracy.                  Interval HPI:   Overnight events: No acute events overnight. Patient on the TSU in room 15866/13277 A. Blood glucose stable. BG at and above goal on current insulin regimen (Transition Insulin Drip). Steroid use- Prednisone 20 mg.    Renal function- Abnormal - Creatinine 5.3   Vasopressors-  None       Diet diabetic [facility];  Calorie; Isolation Tray - Regular [place]     Eatin%  Nausea: No  Hypoglycemia and intervention: No  Fever: No  TPN and/or TF: No    BP (!) 170/80 (BP Location: Left arm, Patient Position: Lying)   Pulse (!) 58   Temp 97.8 °F (36.6 °C) (Oral)   Resp 20   Ht 6' 2" (1.88 m)   Wt 129.7 kg (285 lb 15 oz)   SpO2 97%   BMI 36.71 kg/m²     Labs Reviewed and Include    Recent Labs   Lab 22  0532   *   CALCIUM 8.1*   ALBUMIN 3.6   PROT 5.1*      K 3.9   CO2 25      BUN 69*   CREATININE 5.3*   ALKPHOS 46*   ALT 46*   AST 33   BILITOT 0.4     Lab Results   Component Value Date    WBC 10.67 2022    HGB 9.3 (L) 2022    HCT 30.0 (L) 2022    MCV 86 2022     2022     No results for input(s): TSH, FREET4 in the last 168 hours.  Lab Results "   Component Value Date    HGBA1C 5.9 (H) 06/20/2022       Nutritional status:   Body mass index is 36.71 kg/m².  Lab Results   Component Value Date    ALBUMIN 3.6 07/25/2022    ALBUMIN 2.4 (L) 07/24/2022    ALBUMIN 2.1 (L) 07/23/2022     No results found for: PREALBUMIN    Estimated Creatinine Clearance: 18 mL/min (A) (based on SCr of 5.3 mg/dL (H)).    Accu-Checks  Recent Labs     07/23/22  2126 07/24/22  0010 07/24/22  0408 07/24/22  0844 07/24/22  1237 07/24/22  1730 07/24/22  2317 07/25/22  0412 07/25/22  0835 07/25/22  0934   POCTGLUCOSE 317* 280* 185* 117* 161* 193* 308* 190* 98 90       Current Medications and/or Treatments Impacting Glycemic Control  Immunotherapy:    Immunosuppressants           Stop Route Frequency     mycophenolate capsule 1,000 mg         -- Oral 2 times daily     tacrolimus capsule 10 mg         -- Oral 2 times daily          Steroids:   Hormones (From admission, onward)                Start     Stop Route Frequency Ordered    07/24/22 2246  melatonin tablet 6 mg         -- Oral Nightly PRN 07/24/22 2146    07/24/22 0900  predniSONE tablet 20 mg         -- Oral Daily 07/23/22 1340          Pressors:    Autonomic Drugs (From admission, onward)                None          Hyperglycemia/Diabetes Medications:   Antihyperglycemics (From admission, onward)                Start     Stop Route Frequency Ordered    07/25/22 1130  insulin aspart U-100 pen 12 Units         -- SubQ 3 times daily with meals 07/25/22 0926    07/25/22 1030  insulin detemir U-100 pen 32 Units         -- SubQ Daily 07/25/22 0925    07/25/22 1025  insulin aspart U-100 pen 1-10 Units         -- SubQ Before meals & nightly PRN 07/25/22 0926            ASSESSMENT and PLAN    * Antibody mediated rejection of kidney transplant  Managed per primary team  Avoid hypoglycemia        Type 2 diabetes mellitus  Endocrinology consulted for BG management.   BG goal 140-180    - D/C Transition drip  - Start Levemir 32 units QD (based  on insulin needs noted while on the transition drip 30% reduction)  - Novolog (aspart) insulin [facility] (150/25) SSI Units SQ prn for BG excursions.   - Novolog 12 units TIDWM   - BG checks AC/HS   - Hypoglycemia protocol in place      ** Please notify Endocrine for any change and/or advance in diet**  ** Please call Endocrine for any BG related issues **    Discharge Planning:   TBD. Please notify endocrinology prior to discharge.        Adrenal corticosteroid causing adverse effect in therapeutic use  On steroid therapy per transplant team; may elevate BG readings        AUBREY (acute kidney injury)  Titrate insulin slowly to avoid hypoglycemia as the risk of hypoglycemia increases with decreased creatinine clearance.    Estimated Creatinine Clearance: 18 mL/min (A) (based on SCr of 5.3 mg/dL (H)).           Tim Mendoza, DNP, FNP  Endocrinology  [facility] - Transplant Stepdown

## 2022-07-25 NOTE — PROGRESS NOTES
[facility] - Transplant Stepdown  Kidney Transplant  Progress Note      Reason for Follow-up: Reassessment of Kidney, Heart Transplant - 5/24/2002  (#1) recipient and management of immunosuppression.    ORGAN:     Donor Type:     Donor CMV Status:   Donor HBcAB:  Donor HCV Status:  Donor HBV DANA: Organ record is missing.  Donor HCV DANA: Organ record is missing.      Subjective:   History of Present Illness:  Mr. Albrecht is a 73 y/o M s/p Kidney/heart transplant (2002). He has a h/o diabetes, hypertension, hyperlipidemia, chronic kidney disease, and recent COVID (COVID positive June 25-admitted to [facility] July 4-8 with COVID pneumonia). He was admitted to [facility] with AUBREY after routine labs showed Cr 11 (from ~ 2) on 7/18/22.  (Tacrolimus level was 7.2). Patient noted more dyspnea and decreased energy along with decreased urine output. No chest pain, headache, confusion, fever, vomiting, or abdominal pain noted. He was seen by Nephrology. With concern for possible rejection, he was empirically treated with IV Solu-Medrol (1 g on July 19). He was treated with IV fluids with bicarbonate along with Lokelma. Urinalysis with 2+ protein, 2 RBC, 2 WBC, occasional WBC clumps, rare bacteria. US showed slightly increased resistive indices; Mild cortical thinning; No loss of corticomedullary distinction; Adequate perfusion; No evidence of hydronephrosis. CXR showed mildly enlarged but stable cardiac silhouette. Aorta demonstrates atherosclerotic disease. Mild atelectasis in the right mid lung zone. Patchy infiltrates at the lung bases bilaterally that could reflect edema or early airspace disease. No evidence of pleural effusion or pneumothorax. EKG had normal sinus rhythm, right bundle-branch block. Vital signs stable. Pt admitted to [facility] for further management. Will obtain 2D echo and kidney biopsy. Pt d/w Dr Wilkerson.   Mr. Albrecht is a 72 y.o. year old male who is status post Kidney, Heart Transplant -  5/24/2002  (#1).    His maintenance immunosuppression consists of:   Immunosuppressants (From admission, onward)                Start     Stop Route Frequency Ordered    07/25/22 2100  mycophenolate capsule 1,000 mg         -- Oral 2 times daily 07/25/22 1014    07/24/22 1800  tacrolimus capsule 10 mg         -- Oral 2 times daily 07/24/22 1143              Hospital Course:  Mr Albrecht underwent kidney biopsy which was notable for ABMR, did not meet criteria for vascular or t cell rejection. DSA sent and pending. 2d Echo obtained and reviewed. Pt completed steroid pulse and apheresis consulted for PLEX. Trialysis line placed. He underwent HD on 7/23/22.     Interval history: NAEON. Pt feeling fine without complaint. Plan for voiding trial today (vasques in place - remove). Plan for PLEX #2 today. Cr improved with great UOP.  Continue to monitor Cr to see if cont improve vs would need outpatient dialysis. Blood pressure regimen adjusted. Cont to monitor.      Past Medical, Surgical, Family, and Social History:   Unchanged from H&P.    Scheduled Meds:   albumin human 5%  200 g Intravenous Once    atorvastatin  40 mg Oral Daily    calcium acetate(phosphat bind)  1,334 mg Oral TID WM    calcium gluconate IVPB  2 g Intravenous Once    famotidine  20 mg Oral Daily    heparin (porcine)  3,000 Units Intravenous Once    hydrALAZINE  25 mg Oral Q8H    insulin aspart U-100  12 Units Subcutaneous TIDWM    insulin detemir U-100  32 Units Subcutaneous Daily    metoprolol succinate  75 mg Oral Daily    mupirocin   Nasal BID    mycophenolate  1,000 mg Oral BID    NIFEdipine  60 mg Oral BID    nystatin  500,000 Units Oral TID PC    predniSONE  20 mg Oral Daily    [START ON 7/27/2022] sulfamethoxazole-trimethoprim 400-80mg  1 tablet Oral Once per day on Mon Wed Fri    tacrolimus  10 mg Oral BID     Continuous Infusions:  PRN Meds:acetaminophen, dextrose 10%, dextrose 10%, glucagon (human recombinant), glucose,  "glucose, hydrALAZINE, insulin aspart U-100, melatonin, ondansetron, sodium chloride 0.9%    Intake/Output - Last 3 Shifts         07/23 0700 07/24 0659 07/24 0700 07/25 0659 07/25 0700 07/26 0659    P.O. 240 690     I.V. (mL/kg) 55.4 (0.4) 42.7 (0.3)     Other 200      Total Intake(mL/kg) 495.4 (3.8) 732.7 (5.6)     Urine (mL/kg/hr) 2300 (0.7) 3025 (1)     Emesis/NG output 50      Other 1551      Stool 0 0     Total Output 3901 3025     Net -3405.6 -2292.3            Stool Occurrence 0 x 1 x              Review of Systems   Constitutional:  Negative for appetite change, fatigue and fever.   HENT:  Negative for sore throat.    Respiratory:  Negative for cough, shortness of breath and wheezing.    Cardiovascular:  Negative for chest pain, palpitations and leg swelling.   Gastrointestinal:  Negative for abdominal pain, diarrhea, nausea and vomiting.   Genitourinary:  Negative for dysuria and frequency.        Laura in place for retention since 7/19/22   Musculoskeletal:  Negative for arthralgias and back pain.   Skin:  Negative for wound.   Allergic/Immunologic: Positive for immunocompromised state.   Neurological:  Negative for dizziness and weakness.   Psychiatric/Behavioral:  Negative for confusion.     Objective:     Vital Signs (Most Recent):  Temp: 97.8 °F (36.6 °C) (07/25/22 0830)  Pulse: (!) 58 (07/25/22 0400)  Resp: 20 (07/25/22 0400)  BP: (!) 170/80 (07/25/22 1030)  SpO2: 97 % (07/25/22 0400)   Vital Signs (24h Range):  Temp:  [97 °F (36.1 °C)-98.5 °F (36.9 °C)] 97.8 °F (36.6 °C)  Pulse:  [58-71] 58  Resp:  [15-20] 20  SpO2:  [95 %-97 %] 97 %  BP: (137-182)/(70-86) 170/80     Weight: 129.7 kg (285 lb 15 oz)  Height: 6' 2" (188 cm)  Body mass index is 36.71 kg/m².    Physical Exam  Vitals and nursing note reviewed.   Constitutional:       General: He is not in acute distress.  HENT:      Head: Normocephalic.      Mouth/Throat:      Mouth: Mucous membranes are moist.   Eyes:      Conjunctiva/sclera: " Conjunctivae normal.   Cardiovascular:      Rate and Rhythm: Normal rate and regular rhythm.      Pulses: Normal pulses.      Comments: R central line in place  Pulmonary:      Effort: Pulmonary effort is normal.      Breath sounds: Normal breath sounds.   Abdominal:      General: Bowel sounds are normal. There is no distension.      Palpations: Abdomen is soft.      Tenderness: There is no abdominal tenderness.      Comments: Right previous kidney transplant incision well healed, no SSI    Genitourinary:     Comments: Laura catheter with clear yellow urine   Musculoskeletal:         General: Normal range of motion.      Cervical back: Normal range of motion.      Right lower leg: No edema.      Left lower leg: No edema.   Skin:     General: Skin is warm and dry.   Neurological:      Mental Status: He is alert and oriented to person, place, and time.      Motor: No weakness.   Psychiatric:         Mood and Affect: Mood normal.         Behavior: Behavior normal.         Thought Content: Thought content normal.         Judgment: Judgment normal.       Laboratory:  CBC:   Recent Labs   Lab 07/23/22  0533 07/24/22  0543 07/25/22  0532   WBC 9.44 8.25 10.67   RBC 2.82* 3.19* 3.50*   HGB 8.3* 9.1* 9.3*   HCT 23.6* 27.2* 30.0*    179 159   MCV 84 85 86   MCH 29.4 28.5 26.6*   MCHC 35.2 33.5 31.0*     CMP:   Recent Labs   Lab 07/23/22  0533 07/24/22  0543 07/25/22  0532   * 150* 146*   CALCIUM 7.7* 8.1* 8.1*   ALBUMIN 2.1* 2.4* 3.6   PROT 6.1 6.5 5.1*    143 144   K 4.3 4.3 3.9   CO2 22* 26 25    107 110   BUN 97* 72* 69*   CREATININE 9.8* 6.4* 5.3*   ALKPHOS 98 107 46*   ALT 57* 76* 46*   AST 49* 54* 33     Labs within the past 24 hours have been reviewed.    Diagnostic Results: Reviewed    Assessment/Plan:     * Antibody mediated rejection of kidney transplant  - Completed SMP  - trialysis line placed  - PLEX #1 7/24, #2 today, appreciate pheresis assistance  - DSA in process      Acute renal  "failure with tubular necrosis  - see "antibody mediated rejection"      Adrenal corticosteroid causing adverse effect in therapeutic use  - endocrine on board, appreciate their assistance      Hyperkalemia  - Improved with lokelma/ bicarb  - Monitor with daily labs       Type 2 diabetes mellitus  - Endocrine consulted  - Appreciate their assistance      AUBREY (acute kidney injury)  - Had kidney biopsy , path notable for antibody mediated rejection  - trialysis line placed, underwent HD on   - Completed SMP.  Apheresis consulted for PLEX, #1 on ,  #2 today  - DSA, BK, CMV PCR pending   - Cr improved with excellent UOP  - 2D Echo, EF 60%  - Monitor      Anemia of chronic renal failure, stage 3b  - H/H stable  - Monitor with daily cbc      Immunosuppression due to drug therapy  - See Chronic Immunosuppression      Chronic immunosuppression with tacrolimus, mycophenolate  - Continue prograf. Monitor prograf level daily, monitor for toxic side effects, and adjust for therapeutic dose      Heart transplanted  - Consulted heart transplant  - ECHO, EF 60%      -donor kidney transplant  - See AUBREY          Discharge Planning: Not a candidate for discharge at this time.      Dania Henriquez PA-C  Kidney Transplant  [facility] - Transplant Stepdown  "

## 2022-07-25 NOTE — PROGRESS NOTES
Pt arrived at out aheresis dept via wheelchair, accompanied by SLAVA Lu, [facility] escort staff.  He is oriented x4, states no pain.  Apheresis tx #2 started at 12:40 without difficulty.  4.5 liter plasma exchange completed via RIJ cath, cath patent, dressing clean, dry and intact.  Replacement fluids 5% albumin.  2 grams of calcium gluconate given over duration of exchange.  Tx ended at 13:49.  Cath flushed and locked.  Pt tolerated tx well.  Next tx 07/26/2022.  Pt exited dept in a wheelchair going back to his room, [id number], accompanied by SLAVA Lu.  Report called to [facility] staff nurse REYES Smith.

## 2022-07-25 NOTE — PLAN OF CARE
Patient remaining free from injury this shift.  Patient caprice onofre'd today.  Central line dressing CDI.  Patient with SBP > 180's this am.  IV hydralazine given and BP meds adjusted.  Patient SBP came down to 150-160's.  PLEX completed with no complications.  Patient creatinine trending down.  Patient eating % of meals with BG variation.   NP aware and meal insulin adjusted to a meal eaten percentage ratio.  BG remains < 200.  Patient denies pain.  Patient to have 3 more sessions of PLEX.

## 2022-07-25 NOTE — ASSESSMENT & PLAN NOTE
- Had kidney biopsy 7/21, path notable for antibody mediated rejection  - trialysis line placed, underwent HD on 7/23  - Completed SMP.  Apheresis consulted for PLEX, #1 on 7/24,  #2 today  - DSA, BK, CMV PCR pending   - Cr improved with excellent UOP  - 2D Echo, EF 60%  - Monitor

## 2022-07-25 NOTE — SUBJECTIVE & OBJECTIVE
Subjective:   History of Present Illness:  Mr. Albrecht is a 73 y/o M s/p Kidney/heart transplant (2002). He has a h/o diabetes, hypertension, hyperlipidemia, chronic kidney disease, and recent COVID (COVID [facility] June [facility]). He was admitted to [facility] with AUBREY after routine labs showed Cr 11 (from ~ 2) on 7/18/22.  (Tacrolimus level was 7.2). Patient noted more dyspnea and decreased energy along with decreased urine output. No chest pain, headache, confusion, fever, vomiting, or abdominal pain noted. He was seen by Nephrology. With concern for possible rejection, he was empirically treated with IV Solu-Medrol (1 g on July 19). He was treated with IV fluids with bicarbonate along with Lokelma. Urinalysis with 2+ protein, 2 RBC, 2 WBC, occasional WBC clumps, rare bacteria. US showed slightly increased resistive indices; Mild cortical thinning; No loss of corticomedullary distinction; Adequate perfusion; No evidence of hydronephrosis. CXR showed mildly enlarged but stable cardiac silhouette. Aorta demonstrates atherosclerotic disease. Mild atelectasis in the right mid lung zone. Patchy infiltrates at the lung bases bilaterally that could reflect edema or early airspace disease. No evidence of pleural effusion or pneumothorax. EKG had normal sinus rhythm, right bundle-branch block. Vital signs stable. Pt admitted to [facility] for further management. Will obtain 2D echo and kidney biopsy. Pt d/w Dr Wilkerson.   Mr. Albrecht is a 72 y.o. year old male who is status post Kidney, Heart Transplant - 5/24/2002  (#1).    His maintenance immunosuppression consists of:   Immunosuppressants (From admission, onward)                Start     Stop Route Frequency Ordered    07/25/22 2100  mycophenolate capsule 1,000 mg         -- Oral 2 times daily 07/25/22 1014    07/24/22 1800  tacrolimus capsule 10 mg         -- Oral 2 times daily 07/24/22 1143               Hospital Course:  Mr Albrecht underwent kidney biopsy which was notable for ABMR, did not meet criteria for vascular or t cell rejection. DSA sent and pending. 2d Echo obtained and reviewed. Pt completed steroid pulse and apheresis consulted for PLEX. Trialysis line placed. He underwent HD on 7/23/22.     Interval history: NAEON. Pt feeling fine without complaint. Plan for voiding trial today (vasques in place - remove). Plan for PLEX #2 today. Cr improved with great UOP.  Continue to monitor Cr to see if cont improve vs would need outpatient dialysis. Blood pressure regimen adjusted. Cont to monitor.      Past Medical, Surgical, Family, and Social History:   Unchanged from H&P.    Scheduled Meds:   albumin human 5%  200 g Intravenous Once    atorvastatin  40 mg Oral Daily    calcium acetate(phosphat bind)  1,334 mg Oral TID WM    calcium gluconate IVPB  2 g Intravenous Once    famotidine  20 mg Oral Daily    heparin (porcine)  3,000 Units Intravenous Once    hydrALAZINE  25 mg Oral Q8H    insulin aspart U-100  12 Units Subcutaneous TIDWM    insulin detemir U-100  32 Units Subcutaneous Daily    metoprolol succinate  75 mg Oral Daily    mupirocin   Nasal BID    mycophenolate  1,000 mg Oral BID    NIFEdipine  60 mg Oral BID    nystatin  500,000 Units Oral TID PC    predniSONE  20 mg Oral Daily    [START ON 7/27/2022] sulfamethoxazole-trimethoprim 400-80mg  1 tablet Oral Once per day on Mon Wed Fri    tacrolimus  10 mg Oral BID     Continuous Infusions:  PRN Meds:acetaminophen, dextrose 10%, dextrose 10%, glucagon (human recombinant), glucose, glucose, hydrALAZINE, insulin aspart U-100, melatonin, ondansetron, sodium chloride 0.9%    Intake/Output - Last 3 Shifts         07/23 0700 07/24 0659 07/24 0700 07/25 0659 07/25 0700 07/26 0659    P.O. 240 690     I.V. (mL/kg) 55.4 (0.4) 42.7 (0.3)     Other 200      Total Intake(mL/kg) 495.4 (3.8) 732.7 (5.6)     Urine (mL/kg/hr) 2300 (0.7) 3025 (1)     Emesis/NG  "output 50      Other 1551      Stool 0 0     Total Output 3901 3025     Net -3405.6 -2292.3            Stool Occurrence 0 x 1 x              Review of Systems   Constitutional:  Negative for appetite change, fatigue and fever.   HENT:  Negative for sore throat.    Respiratory:  Negative for cough, shortness of breath and wheezing.    Cardiovascular:  Negative for chest pain, palpitations and leg swelling.   Gastrointestinal:  Negative for abdominal pain, diarrhea, nausea and vomiting.   Genitourinary:  Negative for dysuria and frequency.        Laura in place for retention since 7/19/22   Musculoskeletal:  Negative for arthralgias and back pain.   Skin:  Negative for wound.   Allergic/Immunologic: Positive for immunocompromised state.   Neurological:  Negative for dizziness and weakness.   Psychiatric/Behavioral:  Negative for confusion.     Objective:     Vital Signs (Most Recent):  Temp: 97.8 °F (36.6 °C) (07/25/22 0830)  Pulse: (!) 58 (07/25/22 0400)  Resp: 20 (07/25/22 0400)  BP: (!) 170/80 (07/25/22 1030)  SpO2: 97 % (07/25/22 0400)   Vital Signs (24h Range):  Temp:  [97 °F (36.1 °C)-98.5 °F (36.9 °C)] 97.8 °F (36.6 °C)  Pulse:  [58-71] 58  Resp:  [15-20] 20  SpO2:  [95 %-97 %] 97 %  BP: (137-182)/(70-86) 170/80     Weight: 129.7 kg (285 lb 15 oz)  Height: 6' 2" (188 cm)  Body mass index is 36.71 kg/m².    Physical Exam  Vitals and nursing note reviewed.   Constitutional:       General: He is not in acute distress.  HENT:      Head: Normocephalic.      Mouth/Throat:      Mouth: Mucous membranes are moist.   Eyes:      Conjunctiva/sclera: Conjunctivae normal.   Cardiovascular:      Rate and Rhythm: Normal rate and regular rhythm.      Pulses: Normal pulses.      Comments: R central line in place  Pulmonary:      Effort: Pulmonary effort is normal.      Breath sounds: Normal breath sounds.   Abdominal:      General: Bowel sounds are normal. There is no distension.      Palpations: Abdomen is soft.      " Tenderness: There is no abdominal tenderness.      Comments: Right previous kidney transplant incision well healed, no SSI    Genitourinary:     Comments: Laura catheter with clear yellow urine   Musculoskeletal:         General: Normal range of motion.      Cervical back: Normal range of motion.      Right lower leg: No edema.      Left lower leg: No edema.   Skin:     General: Skin is warm and dry.   Neurological:      Mental Status: He is alert and oriented to person, place, and time.      Motor: No weakness.   Psychiatric:         Mood and Affect: Mood normal.         Behavior: Behavior normal.         Thought Content: Thought content normal.         Judgment: Judgment normal.       Laboratory:  CBC:   Recent Labs   Lab 07/23/22  0533 07/24/22  0543 07/25/22  0532   WBC 9.44 8.25 10.67   RBC 2.82* 3.19* 3.50*   HGB 8.3* 9.1* 9.3*   HCT 23.6* 27.2* 30.0*    179 159   MCV 84 85 86   MCH 29.4 28.5 26.6*   MCHC 35.2 33.5 31.0*     CMP:   Recent Labs   Lab 07/23/22  0533 07/24/22  0543 07/25/22  0532   * 150* 146*   CALCIUM 7.7* 8.1* 8.1*   ALBUMIN 2.1* 2.4* 3.6   PROT 6.1 6.5 5.1*    143 144   K 4.3 4.3 3.9   CO2 22* 26 25    107 110   BUN 97* 72* 69*   CREATININE 9.8* 6.4* 5.3*   ALKPHOS 98 107 46*   ALT 57* 76* 46*   AST 49* 54* 33     Labs within the past 24 hours have been reviewed.    Diagnostic Results: Reviewed

## 2022-07-25 NOTE — ASSESSMENT & PLAN NOTE
Titrate insulin slowly to avoid hypoglycemia as the risk of hypoglycemia increases with decreased creatinine clearance.    Estimated Creatinine Clearance: 18 mL/min (A) (based on SCr of 5.3 mg/dL (H)).

## 2022-07-25 NOTE — ASSESSMENT & PLAN NOTE
- Completed SMP  - trialysis line placed  - PLEX #1 7/24, #2 today, appreciate pheresis assistance  - DSA in process

## 2022-07-25 NOTE — HPI
Mr. Albrecht is a 72 y.o male s/p kidney and heart transplant that presented to ED due to significant abnormal results of renal function labs.  Patient history of recent change in immunosuppression in May 2022 due to proteinuria and recent COVID-19 infection in June 2022.  Patient was admitted, had kidney biopsy and DSA labs drawn.  Preliminary biopsy results consistent with AMR and DSA are still pending.  The patient underwent dialysis on 7/23/2022 and transfusion medicine was consulted for possible plasmapheresis.

## 2022-07-25 NOTE — SUBJECTIVE & OBJECTIVE
"Interval HPI:   Overnight events: No acute events overnight. Patient on the TSU in room 63910/18305 A. Blood glucose stable. BG at and above goal on current insulin regimen (Transition Insulin Drip). Steroid use- Prednisone 20 mg.    Renal function- Abnormal - Creatinine 5.3   Vasopressors-  None       Diet diabetic [facility]; 2000 Calorie; Isolation Tray - Regular [place]     Eatin%  Nausea: No  Hypoglycemia and intervention: No  Fever: No  TPN and/or TF: No    BP (!) 170/80 (BP Location: Left arm, Patient Position: Lying)   Pulse (!) 58   Temp 97.8 °F (36.6 °C) (Oral)   Resp 20   Ht 6' 2" (1.88 m)   Wt 129.7 kg (285 lb 15 oz)   SpO2 97%   BMI 36.71 kg/m²     Labs Reviewed and Include    Recent Labs   Lab 22  0532   *   CALCIUM 8.1*   ALBUMIN 3.6   PROT 5.1*      K 3.9   CO2 25      BUN 69*   CREATININE 5.3*   ALKPHOS 46*   ALT 46*   AST 33   BILITOT 0.4     Lab Results   Component Value Date    WBC 10.67 2022    HGB 9.3 (L) 2022    HCT 30.0 (L) 2022    MCV 86 2022     2022     No results for input(s): TSH, FREET4 in the last 168 hours.  Lab Results   Component Value Date    HGBA1C 5.9 (H) 2022       Nutritional status:   Body mass index is 36.71 kg/m².  Lab Results   Component Value Date    ALBUMIN 3.6 2022    ALBUMIN 2.4 (L) 2022    ALBUMIN 2.1 (L) 2022     No results found for: PREALBUMIN    Estimated Creatinine Clearance: 18 mL/min (A) (based on SCr of 5.3 mg/dL (H)).    Accu-Checks  Recent Labs     22  2126 22  0010 22  0408 22  0844 22  1237 22  1730 22  2317 22  0412 22  0835 22  0934   POCTGLUCOSE 317* 280* 185* 117* 161* 193* 308* 190* 98 90       Current Medications and/or Treatments Impacting Glycemic Control  Immunotherapy:    Immunosuppressants           Stop Route Frequency     mycophenolate capsule 1,000 mg         -- Oral 2 times daily "     tacrolimus capsule 10 mg         -- Oral 2 times daily          Steroids:   Hormones (From admission, onward)                Start     Stop Route Frequency Ordered    07/24/22 2246  melatonin tablet 6 mg         -- Oral Nightly PRN 07/24/22 2146    07/24/22 0900  predniSONE tablet 20 mg         -- Oral Daily 07/23/22 1340          Pressors:    Autonomic Drugs (From admission, onward)                None          Hyperglycemia/Diabetes Medications:   Antihyperglycemics (From admission, onward)                Start     Stop Route Frequency Ordered    07/25/22 1130  insulin aspart U-100 pen 12 Units         -- SubQ 3 times daily with meals 07/25/22 0926 07/25/22 1030  insulin detemir U-100 pen 32 Units         -- SubQ Daily 07/25/22 0925    07/25/22 1025  insulin aspart U-100 pen 1-10 Units         -- SubQ Before meals & nightly PRN 07/25/22 0926

## 2022-07-25 NOTE — ASSESSMENT & PLAN NOTE
Endocrinology consulted for BG management.   BG goal 140-180      -Levemir 38 units QD  - Novolog (aspart) insulin [facility] (150/25) SSI Units SQ prn for BG excursions.   - Egtbvkb22 units TIDWM    BG checks AC/HS   - Hypoglycemia protocol in place      ** Please notify Endocrine for any change and/or advance in diet**  ** Please call Endocrine for any BG related issues **    Discharge Planning: novolin 70/30 40 units with breakfast and 30 units with dinner

## 2022-07-25 NOTE — PROGRESS NOTES
Patient back to floor from PLEX.  Patient AAOx4 and in NAD.  Patient voided post vasques removal and reminded to measure all output.  Will continue to monitor patient.

## 2022-07-25 NOTE — CONSULTS
[facility] - Transplant Stepdown  Transfusion Medicine  Consult Note    Patient Name: Nigel Albrecht  MRN: [id number]  Admission Date: 2022  Hospital Length of Stay: 4 days  Attending Physician: Carolina Rodríguez,*  Primary Care Provider: Krystin [facility] MD     Consults  Subjective:     Principal Problem:AUBREY (acute kidney injury)    History of Present Illness:  Mr. Albrecht is a 72 y.o male s/p kidney and heart transplant that presented to ED due to significant abnormal results of renal function labs.  Patient history of recent change in immunosuppression in May 2022 due to proteinuria and recent COVID-19 infection in 2022.  Patient was admitted, had kidney biopsy and DSA labs drawn.  Preliminary biopsy results consistent with AMR and DSA are still pending.  The patient underwent dialysis on 2022 and transfusion medicine was consulted for possible plasmapheresis.      PMH and PSH reviewed 2022 and relevant items addressed in HPI.    Review of patient's allergies indicates:  No Known Allergies    All medications reviewed 2022 and ace inhibitors not identified.    Family History       Problem Relation (Age of Onset)    Diabetes Brother, Maternal Grandmother    Early death Brother    Heart disease Brother    Hyperlipidemia Sister, Brother    Hypertension Brother    Kidney disease Son    Stroke Mother, Brother          Tobacco Use    Smoking status: Former Smoker     Packs/day: 1.00     Years: 20.00     Pack years: 20.00     Types: Cigarettes     Quit date: 1984     Years since quittin.0    Smokeless tobacco: Never Used   Substance and Sexual Activity    Alcohol use: Yes     Alcohol/week: 1.0 standard drink     Types: 1 Cans of beer per week     Comment: daily    Drug use: No    Sexual activity: Never     Review of Systems   Unable to perform ROS: Other   Objective:     Vital Signs (Most Recent):  Temp: 98.5 °F (36.9 °C) (22 1600)  Pulse: 66 (22  2159)  Resp: 18 (07/24/22 2159)  BP: (!) 156/83 (07/24/22 2159)  SpO2: 96 % (07/24/22 2159)   Vital Signs (24h Range):  Temp:  [97 °F (36.1 °C)-98.5 °F (36.9 °C)] 98.5 °F (36.9 °C)  Pulse:  [59-67] 66  Resp:  [12-20] 18  SpO2:  [92 %-97 %] 96 %  BP: (139-174)/(70-85) 156/83     Weight: 129.7 kg (285 lb 15 oz)    Physical Exam  Vitals and nursing note reviewed.       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 The TPE plan is as follows: PLEX x5 with 4L of 5% albumin over 5-7 days.      Juvencio Brunner MD  Transfusion Medicine

## 2022-07-25 NOTE — SUBJECTIVE & OBJECTIVE
PMH and PSH reviewed 2022 and relevant items addressed in HPI.    Review of patient's allergies indicates:  No Known Allergies    All medications reviewed 2022 and ace inhibitors not identified.    Family History       Problem Relation (Age of Onset)    Diabetes Brother, Maternal Grandmother    Early death Brother    Heart disease Brother    Hyperlipidemia Sister, Brother    Hypertension Brother    Kidney disease Son    Stroke Mother, Brother          Tobacco Use    Smoking status: Former Smoker     Packs/day: 1.00     Years: 20.00     Pack years: 20.00     Types: Cigarettes     Quit date: 1984     Years since quittin.0    Smokeless tobacco: Never Used   Substance and Sexual Activity    Alcohol use: Yes     Alcohol/week: 1.0 standard drink     Types: 1 Cans of beer per week     Comment: daily    Drug use: No    Sexual activity: Never     Review of Systems   Unable to perform ROS: Other   Objective:     Vital Signs (Most Recent):  Temp: 98.5 °F (36.9 °C) (22 1600)  Pulse: 66 (22)  Resp: 18 (22)  BP: (!) 156/83 (22)  SpO2: 96 % (22)   Vital Signs (24h Range):  Temp:  [97 °F (36.1 °C)-98.5 °F (36.9 °C)] 98.5 °F (36.9 °C)  Pulse:  [59-67] 66  Resp:  [12-20] 18  SpO2:  [92 %-97 %] 96 %  BP: (139-174)/(70-85) 156/83     Weight: 129.7 kg (285 lb 15 oz)    Physical Exam  Vitals and nursing note reviewed.       Significant Labs: CBC:   Recent Labs   Lab 22  0533 22  0543   WBC 9.44 8.25   HGB 8.3* 9.1*   HCT 23.6* 27.2*    179     CMP:   Recent Labs   Lab 22  0533 22  0543    143   K 4.3 4.3    107   CO2 22* 26   * 150*   BUN 97* 72*   CREATININE 9.8* 6.4*   CALCIUM 7.7* 8.1*   PROT 6.1 6.5   ALBUMIN 2.1* 2.4*   BILITOT 0.2 0.2   ALKPHOS 98 107   AST 49* 54*   ALT 57* 76*   ANIONGAP 16 10   EGFRNONAA 4.7* 7.9*

## 2022-07-25 NOTE — PROCEDURES
[facility] - Transplant Stepdown  Transfusion Medicine  Procedure Note    SUMMARY   Therapeutic Plasma Exchange (Apheresis)    Date/Time: 7/24/2022 10:36 PM  Performed by: Juvencio Brunner MD  Authorized by: Juvencio Brunner MD         Date of Procedure: 7/24/2022     Procedure: Plasma Exchange    Provider: Juvencio Brunner MD     Assisting Provider: None    Pre-Procedure Diagnosis: AUBREY (acute kidney injury)    Post-Procedure Diagnosis: AUBREY (acute kidney injury)    Follow-up Assessment: Mr. Albrecht is a 72 y.o. male with heart and kidney transplants that was recently admitted due to significant renal dysfunction and has evidence of AMR seen on kidney biopsy.  DSA testing is pending.  Transfusion medicine was consulted for PLEX.    Today's procedure, #1 of 5, was tolerated without significant complications.  The next PLEX is planned for 07/25/2022.    Pertinent Laboratory Data:   Complete Blood Count:   Lab Results   Component Value Date    HGB 9.1 (L) 07/24/2022    HCT 27.2 (L) 07/24/2022     07/24/2022    WBC 8.25 07/24/2022     Comprehensive Metabolic Panel:   Lab Results   Component Value Date     07/24/2022    K 4.3 07/24/2022     07/24/2022    CO2 26 07/24/2022     (H) 07/24/2022    BUN 72 (H) 07/24/2022    CREATININE 6.4 (H) 07/24/2022    CALCIUM 8.1 (L) 07/24/2022    PROT 6.5 07/24/2022    ALBUMIN 2.4 (L) 07/24/2022    BILITOT 0.2 07/24/2022    ALKPHOS 107 07/24/2022    AST 54 (H) 07/24/2022    ALT 76 (H) 07/24/2022    ANIONGAP 10 07/24/2022    EGFRNONAA 7.9 (A) 07/24/2022       Pertinent Medications: None    Review of patient's allergies indicates:  No Known Allergies    Anesthesia: None     Technical Procedures Used: Plasma Exchange: Volume exchanged - 4.0 Liters; Replacement fluid - Albumin; Number of procedures 1; Date of next procedure 07/25/2022.    Description of the Findings of the Procedure:     Please see Apheresis Nurse flowsheet for details.    The patient was evaluated and  all clinical and laboratory data relevant to the treatment was reviewed, and a decision was made to proceed with the Apheresis procedure.    I was available to the clinical staff throughout the procedure.    Significant Surgical Tasks Conducted by the Assistant(s): Not applicable    Complications: None    Estimated Blood Loss (EBL): None    Implants: None     Specimens: None

## 2022-07-26 LAB
ALBUMIN SERPL BCP-MCNC: 4 G/DL (ref 3.5–5.2)
ALP SERPL-CCNC: 30 U/L (ref 55–135)
ALT SERPL W/O P-5'-P-CCNC: 36 U/L (ref 10–44)
ANION GAP SERPL CALC-SCNC: 8 MMOL/L (ref 8–16)
ANTI SM ANTIBODY: 0.13 RATIO (ref 0–0.99)
ANTI SM/RNP ANTIBODY: 0.11 RATIO (ref 0–0.99)
ANTI-SM INTERPRETATION: NEGATIVE
ANTI-SM/RNP INTERPRETATION: NEGATIVE
ANTI-SSA ANTIBODY: 0.06 RATIO (ref 0–0.99)
ANTI-SSA INTERPRETATION: NEGATIVE
ANTI-SSB ANTIBODY: 0.13 RATIO (ref 0–0.99)
ANTI-SSB INTERPRETATION: NEGATIVE
AST SERPL-CCNC: 26 U/L (ref 10–40)
BASOPHILS # BLD AUTO: 0.01 K/UL (ref 0–0.2)
BASOPHILS NFR BLD: 0.1 % (ref 0–1.9)
BILIRUB SERPL-MCNC: 0.6 MG/DL (ref 0.1–1)
BUN SERPL-MCNC: 62 MG/DL (ref 8–23)
CALCIUM SERPL-MCNC: 8.4 MG/DL (ref 8.7–10.5)
CHLORIDE SERPL-SCNC: 111 MMOL/L (ref 95–110)
CO2 SERPL-SCNC: 24 MMOL/L (ref 23–29)
CREAT SERPL-MCNC: 4.3 MG/DL (ref 0.5–1.4)
DIFFERENTIAL METHOD: ABNORMAL
DSDNA AB SER-ACNC: NORMAL [IU]/ML
EOSINOPHIL # BLD AUTO: 0.1 K/UL (ref 0–0.5)
EOSINOPHIL NFR BLD: 1.2 % (ref 0–8)
ERYTHROCYTE [DISTWIDTH] IN BLOOD BY AUTOMATED COUNT: 16.7 % (ref 11.5–14.5)
EST. GFR  (AFRICAN AMERICAN): 14.8 ML/MIN/1.73 M^2
EST. GFR  (NON AFRICAN AMERICAN): 12.8 ML/MIN/1.73 M^2
GLUCOSE SERPL-MCNC: 255 MG/DL (ref 70–110)
HCT VFR BLD AUTO: 30.2 % (ref 40–54)
HGB BLD-MCNC: 9.6 G/DL (ref 14–18)
IMM GRANULOCYTES # BLD AUTO: 0.05 K/UL (ref 0–0.04)
IMM GRANULOCYTES NFR BLD AUTO: 0.5 % (ref 0–0.5)
LYMPHOCYTES # BLD AUTO: 2.2 K/UL (ref 1–4.8)
LYMPHOCYTES NFR BLD: 22.7 % (ref 18–48)
MAGNESIUM SERPL-MCNC: 1.8 MG/DL (ref 1.6–2.6)
MCH RBC QN AUTO: 26.4 PG (ref 27–31)
MCHC RBC AUTO-ENTMCNC: 31.8 G/DL (ref 32–36)
MCV RBC AUTO: 83 FL (ref 82–98)
MONOCYTES # BLD AUTO: 0.7 K/UL (ref 0.3–1)
MONOCYTES NFR BLD: 7.4 % (ref 4–15)
NEUTROPHILS # BLD AUTO: 6.5 K/UL (ref 1.8–7.7)
NEUTROPHILS NFR BLD: 68.1 % (ref 38–73)
NRBC BLD-RTO: 0 /100 WBC
PHOSPHATE SERPL-MCNC: 4 MG/DL (ref 2.7–4.5)
PLATELET # BLD AUTO: 153 K/UL (ref 150–450)
PMV BLD AUTO: 10.3 FL (ref 9.2–12.9)
POCT GLUCOSE: 230 MG/DL (ref 70–110)
POCT GLUCOSE: 299 MG/DL (ref 70–110)
POCT GLUCOSE: 315 MG/DL (ref 70–110)
POCT GLUCOSE: 327 MG/DL (ref 70–110)
POTASSIUM SERPL-SCNC: 3.8 MMOL/L (ref 3.5–5.1)
PROT SERPL-MCNC: 4.6 G/DL (ref 6–8.4)
RBC # BLD AUTO: 3.64 M/UL (ref 4.6–6.2)
SODIUM SERPL-SCNC: 143 MMOL/L (ref 136–145)
TACROLIMUS BLD-MCNC: 6.3 NG/ML (ref 5–15)
WBC # BLD AUTO: 9.56 K/UL (ref 3.9–12.7)

## 2022-07-26 PROCEDURE — 36514 PR THER APHERESIS,PLASMA PHERESIS: ICD-10-PCS | Mod: ,,, | Performed by: PATHOLOGY

## 2022-07-26 PROCEDURE — 80053 COMPREHEN METABOLIC PANEL: CPT | Performed by: STUDENT IN AN ORGANIZED HEALTH CARE EDUCATION/TRAINING PROGRAM

## 2022-07-26 PROCEDURE — 99233 SBSQ HOSP IP/OBS HIGH 50: CPT | Mod: ,,, | Performed by: PHYSICIAN ASSISTANT

## 2022-07-26 PROCEDURE — 84100 ASSAY OF PHOSPHORUS: CPT | Performed by: STUDENT IN AN ORGANIZED HEALTH CARE EDUCATION/TRAINING PROGRAM

## 2022-07-26 PROCEDURE — 25000003 PHARM REV CODE 250: Performed by: PATHOLOGY

## 2022-07-26 PROCEDURE — 36514 APHERESIS PLASMA: CPT | Mod: ,,, | Performed by: PATHOLOGY

## 2022-07-26 PROCEDURE — 36514 APHERESIS PLASMA: CPT

## 2022-07-26 PROCEDURE — 63600175 PHARM REV CODE 636 W HCPCS: Performed by: PHYSICIAN ASSISTANT

## 2022-07-26 PROCEDURE — 99233 PR SUBSEQUENT HOSPITAL CARE,LEVL III: ICD-10-PCS | Mod: ,,, | Performed by: PHYSICIAN ASSISTANT

## 2022-07-26 PROCEDURE — 63600175 PHARM REV CODE 636 W HCPCS: Mod: JG | Performed by: PATHOLOGY

## 2022-07-26 PROCEDURE — 80197 ASSAY OF TACROLIMUS: CPT | Performed by: PHYSICIAN ASSISTANT

## 2022-07-26 PROCEDURE — 63600175 PHARM REV CODE 636 W HCPCS: Performed by: NURSE PRACTITIONER

## 2022-07-26 PROCEDURE — 20600001 HC STEP DOWN PRIVATE ROOM

## 2022-07-26 PROCEDURE — 25000003 PHARM REV CODE 250

## 2022-07-26 PROCEDURE — 85025 COMPLETE CBC W/AUTO DIFF WBC: CPT | Performed by: PHYSICIAN ASSISTANT

## 2022-07-26 PROCEDURE — 83735 ASSAY OF MAGNESIUM: CPT | Performed by: PHYSICIAN ASSISTANT

## 2022-07-26 PROCEDURE — P9045 ALBUMIN (HUMAN), 5%, 250 ML: HCPCS | Mod: JG | Performed by: PATHOLOGY

## 2022-07-26 PROCEDURE — 25000003 PHARM REV CODE 250: Performed by: INTERNAL MEDICINE

## 2022-07-26 PROCEDURE — 25000003 PHARM REV CODE 250: Performed by: PHYSICIAN ASSISTANT

## 2022-07-26 PROCEDURE — 25000003 PHARM REV CODE 250: Performed by: NURSE PRACTITIONER

## 2022-07-26 PROCEDURE — 63600175 PHARM REV CODE 636 W HCPCS: Performed by: INTERNAL MEDICINE

## 2022-07-26 RX ORDER — TAMSULOSIN HYDROCHLORIDE 0.4 MG/1
0.4 CAPSULE ORAL DAILY
Status: DISCONTINUED | OUTPATIENT
Start: 2022-07-26 | End: 2022-07-27 | Stop reason: HOSPADM

## 2022-07-26 RX ORDER — HEPARIN SODIUM 1000 [USP'U]/ML
2400 INJECTION, SOLUTION INTRAVENOUS; SUBCUTANEOUS ONCE
Status: COMPLETED | OUTPATIENT
Start: 2022-07-27 | End: 2022-07-27

## 2022-07-26 RX ORDER — HYDRALAZINE HYDROCHLORIDE 50 MG/1
50 TABLET, FILM COATED ORAL EVERY 8 HOURS
Status: DISCONTINUED | OUTPATIENT
Start: 2022-07-26 | End: 2022-07-27 | Stop reason: HOSPADM

## 2022-07-26 RX ORDER — FUROSEMIDE 40 MG/1
40 TABLET ORAL 2 TIMES DAILY
Status: DISCONTINUED | OUTPATIENT
Start: 2022-07-26 | End: 2022-07-27 | Stop reason: HOSPADM

## 2022-07-26 RX ORDER — CALCIUM GLUCONATE 20 MG/ML
2 INJECTION, SOLUTION INTRAVENOUS ONCE
Status: COMPLETED | OUTPATIENT
Start: 2022-07-27 | End: 2022-07-27

## 2022-07-26 RX ORDER — ALBUMIN HUMAN 50 G/1000ML
225 SOLUTION INTRAVENOUS ONCE
Status: COMPLETED | OUTPATIENT
Start: 2022-07-27 | End: 2022-07-27

## 2022-07-26 RX ADMIN — FUROSEMIDE 40 MG: 40 TABLET ORAL at 11:07

## 2022-07-26 RX ADMIN — INSULIN ASPART 6 UNITS: 100 INJECTION, SOLUTION INTRAVENOUS; SUBCUTANEOUS at 11:07

## 2022-07-26 RX ADMIN — HYDRALAZINE HYDROCHLORIDE 50 MG: 50 TABLET ORAL at 09:07

## 2022-07-26 RX ADMIN — NIFEDIPINE 60 MG: 30 TABLET, FILM COATED, EXTENDED RELEASE ORAL at 07:07

## 2022-07-26 RX ADMIN — HYDRALAZINE HYDROCHLORIDE 10 MG: 20 INJECTION, SOLUTION INTRAMUSCULAR; INTRAVENOUS at 12:07

## 2022-07-26 RX ADMIN — PREDNISONE 20 MG: 20 TABLET ORAL at 08:07

## 2022-07-26 RX ADMIN — INSULIN ASPART 4 UNITS: 100 INJECTION, SOLUTION INTRAVENOUS; SUBCUTANEOUS at 09:07

## 2022-07-26 RX ADMIN — NIFEDIPINE 60 MG: 30 TABLET, FILM COATED, EXTENDED RELEASE ORAL at 08:07

## 2022-07-26 RX ADMIN — METOPROLOL SUCCINATE 75 MG: 50 TABLET, EXTENDED RELEASE ORAL at 08:07

## 2022-07-26 RX ADMIN — NYSTATIN 500000 UNITS: 500000 SUSPENSION ORAL at 08:07

## 2022-07-26 RX ADMIN — TAMSULOSIN HYDROCHLORIDE 0.4 MG: 0.4 CAPSULE ORAL at 11:07

## 2022-07-26 RX ADMIN — MYCOPHENOLATE MOFETIL 1000 MG: 250 CAPSULE ORAL at 09:07

## 2022-07-26 RX ADMIN — INSULIN ASPART 4 UNITS: 100 INJECTION, SOLUTION INTRAVENOUS; SUBCUTANEOUS at 10:07

## 2022-07-26 RX ADMIN — NYSTATIN 500000 UNITS: 500000 SUSPENSION ORAL at 05:07

## 2022-07-26 RX ADMIN — INSULIN ASPART 8 UNITS: 100 INJECTION, SOLUTION INTRAVENOUS; SUBCUTANEOUS at 06:07

## 2022-07-26 RX ADMIN — INSULIN ASPART 6 UNITS: 100 INJECTION, SOLUTION INTRAVENOUS; SUBCUTANEOUS at 06:07

## 2022-07-26 RX ADMIN — MYCOPHENOLATE MOFETIL 1000 MG: 250 CAPSULE ORAL at 08:07

## 2022-07-26 RX ADMIN — CALCIUM ACETATE 1334 MG: 667 CAPSULE ORAL at 11:07

## 2022-07-26 RX ADMIN — FAMOTIDINE 20 MG: 20 TABLET ORAL at 08:07

## 2022-07-26 RX ADMIN — HEPARIN SODIUM 2400 UNITS: 1000 INJECTION, SOLUTION INTRAVENOUS; SUBCUTANEOUS at 10:07

## 2022-07-26 RX ADMIN — MUPIROCIN: 20 OINTMENT TOPICAL at 08:07

## 2022-07-26 RX ADMIN — CALCIUM ACETATE 1334 MG: 667 CAPSULE ORAL at 05:07

## 2022-07-26 RX ADMIN — NYSTATIN 500000 UNITS: 500000 SUSPENSION ORAL at 01:07

## 2022-07-26 RX ADMIN — HYDRALAZINE HYDROCHLORIDE 25 MG: 25 TABLET, FILM COATED ORAL at 05:07

## 2022-07-26 RX ADMIN — CALCIUM GLUCONATE 2 G: 20 INJECTION, SOLUTION INTRAVENOUS at 09:07

## 2022-07-26 RX ADMIN — TACROLIMUS 10 MG: 1 CAPSULE ORAL at 08:07

## 2022-07-26 RX ADMIN — ATORVASTATIN CALCIUM 40 MG: 20 TABLET, FILM COATED ORAL at 08:07

## 2022-07-26 RX ADMIN — TACROLIMUS 10 MG: 1 CAPSULE ORAL at 05:07

## 2022-07-26 RX ADMIN — ONDANSETRON 4 MG: 2 INJECTION INTRAMUSCULAR; INTRAVENOUS at 08:07

## 2022-07-26 RX ADMIN — HYDRALAZINE HYDROCHLORIDE 50 MG: 50 TABLET ORAL at 02:07

## 2022-07-26 RX ADMIN — ALBUMIN (HUMAN) 225 G: 12.5 SOLUTION INTRAVENOUS at 09:07

## 2022-07-26 RX ADMIN — FUROSEMIDE 40 MG: 40 TABLET ORAL at 05:07

## 2022-07-26 RX ADMIN — Medication 6 MG: at 09:07

## 2022-07-26 RX ADMIN — CALCIUM ACETATE 1334 MG: 667 CAPSULE ORAL at 08:07

## 2022-07-26 RX ADMIN — INSULIN DETEMIR 32 UNITS: 100 INJECTION, SOLUTION SUBCUTANEOUS at 08:07

## 2022-07-26 NOTE — PROGRESS NOTES
[facility] - Transplant Stepdown  Kidney Transplant  Progress Note      Reason for Follow-up: Reassessment of Kidney, Heart Transplant - 5/24/2002  (#1) recipient and management of immunosuppression.    ORGAN:     Donor Type:     Donor CMV Status:   Donor HBcAB:  Donor HCV Status:  Donor HBV DANA: Organ record is missing.  Donor HCV DANA: Organ record is missing.      Subjective:   History of Present Illness:  Mr. Albrecht is a 73 y/o M s/p Kidney/heart transplant (2002). He has a h/o diabetes, hypertension, hyperlipidemia, chronic kidney disease, and recent COVID (COVID positive June 25-admitted to [facility] July 4-8 with COVID pneumonia). He was admitted to [facility] with AUBREY after routine labs showed Cr 11 (from ~ 2) on 7/18/22.  (Tacrolimus level was 7.2). Patient noted more dyspnea and decreased energy along with decreased urine output. No chest pain, headache, confusion, fever, vomiting, or abdominal pain noted. He was seen by Nephrology. With concern for possible rejection, he was empirically treated with IV Solu-Medrol (1 g on July 19). He was treated with IV fluids with bicarbonate along with Lokelma. Urinalysis with 2+ protein, 2 RBC, 2 WBC, occasional WBC clumps, rare bacteria. US showed slightly increased resistive indices; Mild cortical thinning; No loss of corticomedullary distinction; Adequate perfusion; No evidence of hydronephrosis. CXR showed mildly enlarged but stable cardiac silhouette. Aorta demonstrates atherosclerotic disease. Mild atelectasis in the right mid lung zone. Patchy infiltrates at the lung bases bilaterally that could reflect edema or early airspace disease. No evidence of pleural effusion or pneumothorax. EKG had normal sinus rhythm, right bundle-branch block. Vital signs stable. Pt admitted to KTS for further management. Will obtain 2D echo and kidney biopsy. Pt d/w Dr Wilkerson.     Mr. Albrecht is a 72 y.o. year old male who is status post Kidney, Heart Transplant -  5/24/2002  (#1).  His maintenance immunosuppression consists of:   Immunosuppressants (From admission, onward)                Start     Stop Route Frequency Ordered    07/25/22 2100  mycophenolate capsule 1,000 mg         -- Oral 2 times daily 07/25/22 1014    07/24/22 1800  tacrolimus capsule 10 mg         -- Oral 2 times daily 07/24/22 1143          Hospital Course:  Mr Albrecht underwent kidney biopsy which was notable for ABMR, did not meet criteria for vascular or t cell rejection. DSA sent and pending. 2d Echo obtained and reviewed. Pt completed steroid pulse and apheresis consulted for PLEX. Trialysis line placed. He underwent HD on 7/23/22.     Interval history: NAEON. Pt feeling fine without complaint. Laura removed and voiding with out issues. Will start flomax due to h/o prostate issues. Underwent PLEX #3 today. Cr improving daily with good UOP, start lasix today. Trialysis line in place. Pheresis following, appreciate their assistance. BP meds adjusted again. HTS consulted, to see patient today. Cont to monitor.       Past Medical, Surgical, Family, and Social History:   Unchanged from H&P.    Scheduled Meds:   atorvastatin  40 mg Oral Daily    calcium acetate(phosphat bind)  1,334 mg Oral TID WM    famotidine  20 mg Oral Daily    furosemide  40 mg Oral BID    hydrALAZINE  25 mg Oral Q8H    insulin aspart U-100  6-12 Units Subcutaneous TIDWM    insulin detemir U-100  32 Units Subcutaneous Daily    metoprolol succinate  75 mg Oral Daily    mupirocin   Nasal BID    mycophenolate  1,000 mg Oral BID    NIFEdipine  60 mg Oral BID    nystatin  500,000 Units Oral TID PC    predniSONE  20 mg Oral Daily    [START ON 7/27/2022] sulfamethoxazole-trimethoprim 400-80mg  1 tablet Oral Once per day on Mon Wed Fri    tacrolimus  10 mg Oral BID    tamsulosin  0.4 mg Oral Daily     Continuous Infusions:  PRN Meds:acetaminophen, dextrose 10%, dextrose 10%, glucagon (human recombinant), glucose, glucose,  "hydrALAZINE, insulin aspart U-100, melatonin, ondansetron, sodium chloride 0.9%    Intake/Output - Last 3 Shifts         07/24 0700 07/25 0659 07/25 0700 07/26 0659 07/26 0700 07/27 0659    P.O. 690 960     I.V. (mL/kg) 42.7 (0.3)      Blood  4843     Other       Total Intake(mL/kg) 732.7 (5.6) 5803 (44.7)     Urine (mL/kg/hr) 3025 (1) 2220 (0.7)     Emesis/NG output       Other       Stool 0 0     Blood  4814     Total Output 3025 7034     Net -2292.3 -1231            Urine Occurrence  1 x     Stool Occurrence 1 x 1 x              Review of Systems   Constitutional:  Negative for appetite change, fatigue and fever.   HENT:  Negative for sore throat.    Respiratory:  Negative for cough, shortness of breath and wheezing.    Cardiovascular:  Positive for leg swelling. Negative for chest pain and palpitations.   Gastrointestinal:  Negative for abdominal pain, diarrhea, nausea and vomiting.   Genitourinary:  Negative for dysuria and frequency.   Musculoskeletal:  Negative for arthralgias and back pain.   Skin:  Negative for wound.   Allergic/Immunologic: Positive for immunocompromised state.   Neurological:  Negative for dizziness and weakness.   Psychiatric/Behavioral:  Negative for confusion.     Objective:     Vital Signs (Most Recent):  Temp: 97.8 °F (36.6 °C) (07/26/22 0914)  Pulse: 75 (07/26/22 1110)  Resp: 16 (07/26/22 0830)  BP: (!) 155/90 (07/26/22 0949)  SpO2: 98 % (07/26/22 0830)   Vital Signs (24h Range):  Temp:  [97.6 °F (36.4 °C)-98.2 °F (36.8 °C)] 97.8 °F (36.6 °C)  Pulse:  [60-75] 75  Resp:  [16-18] 16  SpO2:  [96 %-98 %] 98 %  BP: (139-174)/(65-97) 155/90     Weight: 129.7 kg (285 lb 15 oz)  Height: 6' 2" (188 cm)  Body mass index is 36.71 kg/m².    Physical Exam  Vitals and nursing note reviewed.   Constitutional:       General: He is not in acute distress.  HENT:      Head: Normocephalic.      Mouth/Throat:      Mouth: Mucous membranes are moist.   Eyes:      Conjunctiva/sclera: Conjunctivae " normal.   Cardiovascular:      Rate and Rhythm: Normal rate and regular rhythm.      Pulses: Normal pulses.      Comments: R central line in place; well healed surgical incision  Pulmonary:      Effort: Pulmonary effort is normal.      Breath sounds: Normal breath sounds.   Abdominal:      General: Bowel sounds are normal. There is no distension.      Palpations: Abdomen is soft.      Tenderness: There is no abdominal tenderness.      Comments: Right previous kidney transplant incision well healed, no SSI    Musculoskeletal:         General: Swelling (trace edema in BLE) present. Normal range of motion.      Cervical back: Normal range of motion.      Right lower leg: Edema present.      Left lower leg: Edema present.   Skin:     General: Skin is warm and dry.   Neurological:      Mental Status: He is alert and oriented to person, place, and time.      Motor: No weakness.   Psychiatric:         Mood and Affect: Mood normal.         Behavior: Behavior normal.       Laboratory:  CBC:   Recent Labs   Lab 07/24/22  0543 07/25/22  0532 07/26/22  0520   WBC 8.25 10.67 9.56   RBC 3.19* 3.50* 3.64*   HGB 9.1* 9.3* 9.6*   HCT 27.2* 30.0* 30.2*    159 153   MCV 85 86 83   MCH 28.5 26.6* 26.4*   MCHC 33.5 31.0* 31.8*     CMP:   Recent Labs   Lab 07/24/22  0543 07/25/22  0532 07/26/22  0520   * 146* 255*   CALCIUM 8.1* 8.1* 8.4*   ALBUMIN 2.4* 3.6 4.0   PROT 6.5 5.1* 4.6*    144 143   K 4.3 3.9 3.8   CO2 26 25 24    110 111*   BUN 72* 69* 62*   CREATININE 6.4* 5.3* 4.3*   ALKPHOS 107 46* 30*   ALT 76* 46* 36   AST 54* 33 26     Labs within the past 24 hours have been reviewed.    Diagnostic Results: reviewed.    Assessment/Plan:     * Antibody mediated rejection of kidney transplant  - Completed SMP  - trialysis line placed  - PLEX #1 7/24, #3 today, appreciate pheresis assistance  - DSA with class II DSAs ([id number])  - plan for PLEX x 5, followed by IVIG +/- velcade    Acute renal failure  - see  ""antibody mediated rejection"      Adrenal corticosteroid causing adverse effect in therapeutic use  - endocrine on board, appreciate their assistance      Hyperkalemia  - Improved with lokelma/ bicarb  - Monitor with daily labs       Type 2 diabetes mellitus  - Endocrine consulted  - Appreciate their assistance      AUBREY (acute kidney injury)  - Had kidney biopsy , path notable for antibody mediated rejection  - trialysis line placed, underwent HD on   - Completed SMP.  Apheresis consulted for PLEX, #1 on ,  #3 today  - DSA, BK, CMV PCR reviewed  - Cr improved with good UOP. Start lasix 40 mg PO BID  - 2D Echo, EF 60%, HTS to see patient today  - Monitor      Anemia of chronic renal failure, stage 3b  - H/H stable  - Monitor with daily cbc      Immunosuppression due to drug therapy  - See Chronic Immunosuppression      Chronic immunosuppression with tacrolimus, mycophenolate  - Continue prograf. Monitor prograf level daily, monitor for toxic side effects, and adjust for therapeutic dose      Heart transplanted  - Consulted heart transplant  - ECHO, EF 60%  - HTS to see patient today    -donor kidney transplant  - See AUBREY          Discharge Planning: Not a candidate for discharge at this time.       Dania Henriquez PA-C  Kidney Transplant  [facility] - Transplant Stepdown  "

## 2022-07-26 NOTE — ASSESSMENT & PLAN NOTE
- Completed SMP  - trialysis line placed  - PLEX #1 7/24, #3 today, appreciate pheresis assistance  - DSA with class II DSAs ([id number])

## 2022-07-26 NOTE — PLAN OF CARE
AAOx4, VSS, Spo2 > 92% on RA.   RIJ trialysis. Dressing CDI.   Plex 3/5 today. Plan for 4/5 tomorrow.   Cr trending down.   Nauseated this am. Well controlled with prn zofran.   BG checks achs. SSI and meal time administered per mar.   No issues this shift.   Up independently. Wears non slip socks when oob. Free from falls/injuries.   Bed in lowest, locked position. Bed rails up x2. Call light and personal belongings within reach.   Pt educated on plan of care. Verbalized understanding.

## 2022-07-26 NOTE — PROGRESS NOTES
[facility] by SLAVA Lu, [facility] escort staff.  He is oriented x4, states no pain.  Apheresis tx #3 started at 09:14 without difficulty.  4.5 liter plasma exchange completed via RIJ cath, cath patent.  Replacement fluids 5% albumin.  2 grams of calcium gluconate given over duration of exchange.  Tx ended at 10:24.  Cath flushed and locked.  Pt tolerated tx well.  Next tx 07/27/2022.  Pt exited dept in a wheelchair going back to his room, [address], accompanied by SLAVA Lu.  Report called to [facility] staff nurse REYES Jiang.

## 2022-07-26 NOTE — DISCHARGE SUMMARY
[facility] - Transplant Stepdown  Kidney Transplant  Discharge Summary    Patient Name: Nigel Albrecht  MRN: [id number]  Admission Date: 7/20/2022  Hospital Length of Stay: 6 days  Discharge Date and Time:  07/27/2022 1:56 PM  Attending Physician: Edy Reese MD   Discharging Provider: Dania Henriquez PA-C  Primary Care Provider: Krystin [facility] MD    HPI:   Mr. Albrecht is a 73 y/o M s/p Kidney/heart transplant (2002). He has a h/o diabetes, hypertension, hyperlipidemia, chronic kidney disease, and recent COVID (COVID positive June 25-admitted to [facility] July 4-8 with COVID pneumonia). He was admitted to [facility] with AUBREY after routine labs showed Cr 11 (from ~ 2) on 7/18/22.  (Tacrolimus level was 7.2). Patient noted more dyspnea and decreased energy along with decreased urine output. No chest pain, headache, confusion, fever, vomiting, or abdominal pain noted. He was seen by Nephrology. With concern for possible rejection, he was empirically treated with IV Solu-Medrol (1 g on July 19). He was treated with IV fluids with bicarbonate along with Lokelma. Urinalysis with 2+ protein, 2 RBC, 2 WBC, occasional WBC clumps, rare bacteria. US showed slightly increased resistive indices; Mild cortical thinning; No loss of corticomedullary distinction; Adequate perfusion; No evidence of hydronephrosis. CXR showed mildly enlarged but stable cardiac silhouette. Aorta demonstrates atherosclerotic disease. Mild atelectasis in the right mid lung zone. Patchy infiltrates at the lung bases bilaterally that could reflect edema or early airspace disease. No evidence of pleural effusion or pneumothorax. EKG had normal sinus rhythm, right bundle-branch block. Vital signs stable. Pt admitted to KTS for further management. Will obtain 2D echo and kidney biopsy. Pt d/w Dr Wilkerson.       * No surgery found *     Hospital Course:    Mr Albrecht underwent kidney biopsy which was notable for ABMR, did  not meet criteria for vascular or t cell rejection. DSA resulted class II DR53 (81,249). 2d Echo obtained and HTS consulted for assistance. Given pt is many years out of transplant, HTS not following given no current issues or specific concerns at this time. Pt completed steroid pulse and apheresis consulted for PLEX. Trialysis line placed. He underwent HD on 22 but has not required any additional dialysis needs. He received PLEX x 4 inpatient. His Cr is now trending down daily with excellent UOP. No further need for HD. He will receive PLEX #5 outpatient on  followed by IVIG and Rituximab. He will discharge with trialysis line in place with plan to have removed following PLEX #5. He is otherwise feeling well without issues. He has met with pharm D and received education. He will follow up to complete rejection treatment, undergo labs and follow up in clinic as scheduled. Pt expressed understanding of discharge instructions and importance of follow up.         Goals of Care Treatment Preferences:  Code Status: Full Code      Final Active Diagnoses:    Diagnosis Date Noted POA    PRINCIPAL PROBLEM:  Antibody mediated rejection of kidney transplant [T86.11] 2022 Yes    Acute renal failure [N17.9]  Yes    Adrenal corticosteroid causing adverse effect in therapeutic use [T38.0X5A] 2022 Yes    Hyperkalemia [E87.5] 2022 Yes    Type 2 diabetes mellitus [E11.9] 2022 Yes    AUBREY (acute kidney injury) [N17.9] 2022 Yes    Anemia of chronic renal failure, stage 3b [N18.32, D63.1] 2022 Yes    Immunosuppression due to drug therapy [D84.821, Z79.899] 2018 Not Applicable    -donor kidney transplant [Z94.0]  Not Applicable    Heart transplanted [Z94.1]  Not Applicable    Chronic immunosuppression with tacrolimus, mycophenolate [Z29.8]  Not Applicable     Chronic      Problems Resolved During this Admission:       Treatments: as above    Consults (From admission,  onward)        Status Ordering Provider     Inpatient consult to [facility]  Once        Provider:  (Not yet assigned)    Acknowledged CONNOR PLAZA     Inpatient consult to Midline team  Once        Provider:  (Not yet assigned)    Completed EVE SHEIKH     Inpatient consult to Midline team  Once        Provider:  (Not yet assigned)    Completed EEV SHEIKH     Inpatient consult to Interventional Radiology  Once        Provider:  (Not yet assigned)    Completed LAURA JACKSON     Inpatient consult to Heart Transplant  Once        Provider:  (Not yet assigned)    Acknowledged LAURA JACKSON     Inpatient consult to Endocrinology  Once        Provider:  (Not yet assigned)    Completed LAURA JACKSON          Pending Diagnostic Studies:     Procedure Component Value Units Date/Time    Specimen to Pathology, Radiology Kidney, [place] biopsy [[id number]] Collected: 07/21/22 1624    Order Status: Sent Lab Status: In process Updated: 07/21/22 1646        Significant Diagnostic Studies: Labs:   CMP   Recent Labs   Lab 07/26/22  0520 07/27/22  0531    142   K 3.8 3.9   * 115*   CO2 24 22*   * 193*   BUN 62* 55*   CREATININE 4.3* 4.2*   CALCIUM 8.4* 8.6*   PROT 4.6* 4.6*   ALBUMIN 4.0 4.2   BILITOT 0.6 0.7   ALKPHOS 30* 24*   AST 26 14   ALT 36 19   ANIONGAP 8 5*   ESTGFRAFRICA 14.8* 15.3*   EGFRNONAA 12.8* 13.2*   , CBC   Recent Labs   Lab 07/26/22  0520 07/27/22  0531   WBC 9.56 9.39   HGB 9.6* 9.5*   HCT 30.2* 30.1*    135*    and All labs within the past 24 hours have been reviewed    Discharged Condition: good    Disposition:     Follow Up:    Patient Instructions:   No discharge procedures on file.  Medications:  Reconciled Home Medications:      Medication List      START taking these medications    calcium acetate(phosphat bind) 667 mg capsule  Commonly known as: PHOSLO  Take 2 capsules (1,334 mg total) by mouth 3 (three) times daily  with meals.     furosemide 40 MG tablet  Commonly known as: LASIX  Take 1 tablet (40 mg total) by mouth 2 (two) times daily.     hydrALAZINE 50 MG tablet  Commonly known as: APRESOLINE  Take 1 tablet (50 mg total) by mouth every 8 (eight) hours.     nystatin 100,000 unit/mL suspension  Commonly known as: MYCOSTATIN  Take 5 mLs (500,000 Units total) by mouth 3 (three) times daily after meals.     predniSONE 5 MG tablet  Commonly known as: DELTASONE  Take by mouth daily :   20mg 7/24-8/23;   15mg 8/24-9/23;   10mg 9/24-10/23/22;   5 mg thereafter.     sulfamethoxazole-trimethoprim 400-80mg 400-80 mg per tablet  Commonly known as: BACTRIM,SEPTRA  Take 1 tablet by mouth every Mon, Wed, Fri. STOP 1/23/23     tamsulosin 0.4 mg Cap  Commonly known as: FLOMAX  Take 1 capsule (0.4 mg total) by mouth once daily.  Start taking on: July 28, 2022        CHANGE how you take these medications    aspirin 81 MG Chew  Take 1 tablet (81 mg total) by mouth once daily.  What changed: Another medication with the same name was removed. Continue taking this medication, and follow the directions you see here.     atorvastatin 40 MG tablet  Commonly known as: LIPITOR  Take 1 tablet (40 mg total) by mouth once daily.  Start taking on: July 28, 2022  What changed:   · medication strength  · how much to take     HumuLIN 70/30 U-100 KwikPen 100 unit/mL (70-30) Inpn pen  Generic drug: insulin NPH/Reg human  Inject 40 Units into the skin daily with breakfast AND 30 Units daily with dinner or evening meal.  What changed: See the new instructions.     metoprolol succinate 25 MG 24 hr tablet  Commonly known as: TOPROL-XL  Take 3 tablets (75 mg total) by mouth once daily.  What changed: Another medication with the same name was removed. Continue taking this medication, and follow the directions you see here.     mycophenolate 250 mg Cap  Commonly known as: CELLCEPT  Take 4 capsules (1,000 mg total) by mouth 2 (two) times daily.  What changed:   · how  much to take  · Another medication with the same name was removed. Continue taking this medication, and follow the directions you see here.     NIFEdipine 60 MG (OSM) 24 hr tablet  Commonly known as: PROCARDIA-XL  Take 1 tablet (60 mg total) by mouth 2 (two) times a day.  What changed:   · medication strength  · how much to take  · when to take this        CONTINUE taking these medications    acetaminophen 325 MG tablet  Commonly known as: TYLENOL  Take 2 tablets (650 mg total) by mouth every 4 (four) hours as needed.     famotidine 20 MG tablet  Commonly known as: PEPCID  Take 1 tablet (20 mg total) by mouth once daily.     FREESTYLE SHREE 2 SENSOR Kit  Generic drug: flash glucose sensor  APPLY 1 SENSOR             SUBCUTANEOUSLY EVERY 14    DAYS     heparin (porcine) 5,000 unit/mL injection  Inject 1 mL (5,000 Units total) into the skin every 8 (eight) hours.     * tacrolimus 5 MG Cap  Commonly known as: PROGRAF  Take 1 capsule (5 mg total) by mouth every 12 (twelve) hours.     * tacrolimus 1 MG Cap  Commonly known as: PROGRAF  Take 5 capsules (5 mg total) by mouth every morning AND 5 capsules (5 mg total) every evening.         * This list has 2 medication(s) that are the same as other medications prescribed for you. Read the directions carefully, and ask your doctor or other care provider to review them with you.            STOP taking these medications    amLODIPine 10 MG tablet  Commonly known as: NORVASC     calcium carbonate 500 mg calcium (1,250 mg) tablet  Commonly known as: OS-CARRIE     CENTRUM SILVER MEN ORAL     cholecalciferol (vitamin D3) 125 mcg (5,000 unit) Tab     gabapentin 300 MG capsule  Commonly known as: NEURONTIN     insulin aspart U-100 100 unit/mL (3 mL) Inpn pen  Commonly known as: NovoLOG     insulin detemir U-100 100 unit/mL (3 mL) Inpn pen  Commonly known as: Levemir FLEXTOUCH     lisinopriL 40 MG tablet  Commonly known as: PRINIVIL,ZESTRIL     OZEMPIC 2 mg/dose (8 mg/3 mL) Pnij  Generic  drug: semaglutide     sodium zirconium cyclosilicate 10 gram packet  Commonly known as: Lokelma     torsemide 5 MG Tab  Commonly known as: DEMADEX          Time spent caring for patient (Greater than 1/2 spent in direct face-to-face contact): > 30 minutes    Dania Henriquez PA-C  Kidney Transplant  [facility] - Transplant Stepdown

## 2022-07-26 NOTE — PROCEDURES
[facility] - Transplant Stepdown  Transfusion Medicine  Procedure Note    SUMMARY   Therapeutic Plasma Exchange (Apheresis)    Date/Time: 7/25/2022 11:04 PM  Performed by: Juvencio Brunner MD  Authorized by: Juvencio Brunner MD       Date of Procedure: 7/25/2022     Procedure: Plasma Exchange    Provider: Juvencio Brunner MD     Assisting Provider: None    Pre-Procedure Diagnosis: Antibody mediated rejection of kidney transplant    Post-Procedure Diagnosis: Antibody mediated rejection of kidney transplant    Follow-up Assessment: Mr. Albrecht is a 72 y.o. male with heart and kidney transplants that was recently admitted due to significant renal dysfunction and has evidence of AMR seen on kidney biopsy.  DSA testing is pending.  Transfusion medicine was consulted for PLEX.    Today's procedure, #2 of 5, was tolerated without significant complications.  The next PLEX is planned for 07/26/2022.    Pertinent Laboratory Data:   Complete Blood Count:   Lab Results   Component Value Date    HGB 9.3 (L) 07/25/2022    HCT 30.0 (L) 07/25/2022     07/25/2022    WBC 10.67 07/25/2022     Comprehensive Metabolic Panel:   Lab Results   Component Value Date     07/25/2022    K 3.9 07/25/2022     07/25/2022    CO2 25 07/25/2022     (H) 07/25/2022    BUN 69 (H) 07/25/2022    CREATININE 5.3 (H) 07/25/2022    CALCIUM 8.1 (L) 07/25/2022    PROT 5.1 (L) 07/25/2022    ALBUMIN 3.6 07/25/2022    BILITOT 0.4 07/25/2022    ALKPHOS 46 (L) 07/25/2022    AST 33 07/25/2022    ALT 46 (H) 07/25/2022    ANIONGAP 9 07/25/2022    EGFRNONAA 10.0 (A) 07/25/2022       Pertinent Medications: None    Review of patient's allergies indicates:  No Known Allergies    Anesthesia: None     Technical Procedures Used: Plasma Exchange: Volume exchanged - 4.5 Liters; Replacement fluid - Albumin; Number of procedures 2; Date of next procedure 07/26/2022.    Description of the Findings of the Procedure:     Please see Apheresis Nurse flowsheet  for details.    The patient was evaluated and all clinical and laboratory data relevant to the treatment was reviewed, and a decision was made to proceed with the Apheresis procedure.    I was available to the clinical staff throughout the procedure.    Significant Surgical Tasks Conducted by the Assistant(s): Not applicable    Complications: None    Estimated Blood Loss (EBL): None    Implants: None     Specimens: None

## 2022-07-26 NOTE — SUBJECTIVE & OBJECTIVE
Subjective:   History of Present Illness:  Mr. Albrecht is a 71 y/o M s/p Kidney/heart transplant (2002). He has a h/o diabetes, hypertension, hyperlipidemia, chronic kidney disease, and recent COVID (COVID positive June 25-admitted to [facility] July 4-8 with COVID pneumonia). He was admitted to [facility] with AUBREY after routine labs showed Cr 11 (from ~ 2) on 7/18/22.  (Tacrolimus level was 7.2). Patient noted more dyspnea and decreased energy along with decreased urine output. No chest pain, headache, confusion, fever, vomiting, or abdominal pain noted. He was seen by Nephrology. With concern for possible rejection, he was empirically treated with IV Solu-Medrol (1 g on July 19). He was treated with IV fluids with bicarbonate along with Lokelma. Urinalysis with 2+ protein, 2 RBC, 2 WBC, occasional WBC clumps, rare bacteria. US showed slightly increased resistive indices; Mild cortical thinning; No loss of corticomedullary distinction; Adequate perfusion; No evidence of hydronephrosis. CXR showed mildly enlarged but stable cardiac silhouette. Aorta demonstrates atherosclerotic disease. Mild atelectasis in the right mid lung zone. Patchy infiltrates at the lung bases bilaterally that could reflect edema or early airspace disease. No evidence of pleural effusion or pneumothorax. EKG had normal sinus rhythm, right bundle-branch block. Vital signs stable. Pt admitted to [facility] for further management. Will obtain 2D echo and kidney biopsy. Pt d/w Dr Wilkerson.     Mr. Albrecht is a 72 y.o. year old male who is status post Kidney, Heart Transplant - 5/24/2002  (#1).  His maintenance immunosuppression consists of:   Immunosuppressants (From admission, onward)                Start     Stop Route Frequency Ordered    07/25/22 2100  mycophenolate capsule 1,000 mg         -- Oral 2 times daily 07/25/22 1014    07/24/22 1800  tacrolimus capsule 10 mg         -- Oral 2 times daily 07/24/22 1143          [facility]  Course:  Mr Albrecht underwent kidney biopsy which was notable for ABMR, did not meet criteria for vascular or t cell rejection. DSA sent and pending. 2d Echo obtained and reviewed. Pt completed steroid pulse and apheresis consulted for PLEX. Trialysis line placed. He underwent HD on 7/23/22.     Interval history: NAEON. Pt feeling fine without complaint. Laura removed and voiding with out issues. Will start flomax due to h/o prostate issues. Underwent PLEX #3 today. Cr improving daily with good UOP, start lasix today. Trialysis line in place. Pheresis following, appreciate their assistance. BP meds adjusted again. HTS consulted, to see patient today. Cont to monitor.       Past Medical, Surgical, Family, and Social History:   Unchanged from H&P.    Scheduled Meds:   atorvastatin  40 mg Oral Daily    calcium acetate(phosphat bind)  1,334 mg Oral TID WM    famotidine  20 mg Oral Daily    furosemide  40 mg Oral BID    hydrALAZINE  25 mg Oral Q8H    insulin aspart U-100  6-12 Units Subcutaneous TIDWM    insulin detemir U-100  32 Units Subcutaneous Daily    metoprolol succinate  75 mg Oral Daily    mupirocin   Nasal BID    mycophenolate  1,000 mg Oral BID    NIFEdipine  60 mg Oral BID    nystatin  500,000 Units Oral TID PC    predniSONE  20 mg Oral Daily    [START ON 7/27/2022] sulfamethoxazole-trimethoprim 400-80mg  1 tablet Oral Once per day on Mon Wed Fri    tacrolimus  10 mg Oral BID    tamsulosin  0.4 mg Oral Daily     Continuous Infusions:  PRN Meds:acetaminophen, dextrose 10%, dextrose 10%, glucagon (human recombinant), glucose, glucose, hydrALAZINE, insulin aspart U-100, melatonin, ondansetron, sodium chloride 0.9%    Intake/Output - Last 3 Shifts         07/24 0700 07/25 0659 07/25 0700 07/26 0659 07/26 0700 07/27 0659    P.O. 690 960     I.V. (mL/kg) 42.7 (0.3)      Blood  4843     Other       Total Intake(mL/kg) 732.7 (5.6) 5803 (44.7)     Urine (mL/kg/hr) 3025 (1) 2220 (0.7)     Emesis/NG output        "Other       Stool 0 0     Blood  4814     Total Output 3025 7098     Net -2292.3 -1231            Urine Occurrence  1 x     Stool Occurrence 1 x 1 x              Review of Systems   Constitutional:  Negative for appetite change, fatigue and fever.   HENT:  Negative for sore throat.    Respiratory:  Negative for cough, shortness of breath and wheezing.    Cardiovascular:  Positive for leg swelling. Negative for chest pain and palpitations.   Gastrointestinal:  Negative for abdominal pain, diarrhea, nausea and vomiting.   Genitourinary:  Negative for dysuria and frequency.   Musculoskeletal:  Negative for arthralgias and back pain.   Skin:  Negative for wound.   Allergic/Immunologic: Positive for immunocompromised state.   Neurological:  Negative for dizziness and weakness.   Psychiatric/Behavioral:  Negative for confusion.     Objective:     Vital Signs (Most Recent):  Temp: 97.8 °F (36.6 °C) (07/26/22 0914)  Pulse: 75 (07/26/22 1110)  Resp: 16 (07/26/22 0830)  BP: (!) 155/90 (07/26/22 0949)  SpO2: 98 % (07/26/22 0830)   Vital Signs (24h Range):  Temp:  [97.6 °F (36.4 °C)-98.2 °F (36.8 °C)] 97.8 °F (36.6 °C)  Pulse:  [60-75] 75  Resp:  [16-18] 16  SpO2:  [96 %-98 %] 98 %  BP: (139-174)/(65-97) 155/90     Weight: 129.7 kg (285 lb 15 oz)  Height: 6' 2" (188 cm)  Body mass index is 36.71 kg/m².    Physical Exam  Vitals and nursing note reviewed.   Constitutional:       General: He is not in acute distress.  HENT:      Head: Normocephalic.      Mouth/Throat:      Mouth: Mucous membranes are moist.   Eyes:      Conjunctiva/sclera: Conjunctivae normal.   Cardiovascular:      Rate and Rhythm: Normal rate and regular rhythm.      Pulses: Normal pulses.      Comments: R central line in place; well healed surgical incision  Pulmonary:      Effort: Pulmonary effort is normal.      Breath sounds: Normal breath sounds.   Abdominal:      General: Bowel sounds are normal. There is no distension.      Palpations: Abdomen is soft.    "   Tenderness: There is no abdominal tenderness.      Comments: Right previous kidney transplant incision well healed, no SSI    Musculoskeletal:         General: Swelling (trace edema in BLE) present. Normal range of motion.      Cervical back: Normal range of motion.      Right lower leg: Edema present.      Left lower leg: Edema present.   Skin:     General: Skin is warm and dry.   Neurological:      Mental Status: He is alert and oriented to person, place, and time.      Motor: No weakness.   Psychiatric:         Mood and Affect: Mood normal.         Behavior: Behavior normal.       Laboratory:  CBC:   Recent Labs   Lab 07/24/22  0543 07/25/22  0532 07/26/22  0520   WBC 8.25 10.67 9.56   RBC 3.19* 3.50* 3.64*   HGB 9.1* 9.3* 9.6*   HCT 27.2* 30.0* 30.2*    159 153   MCV 85 86 83   MCH 28.5 26.6* 26.4*   MCHC 33.5 31.0* 31.8*     CMP:   Recent Labs   Lab 07/24/22  0543 07/25/22  0532 07/26/22  0520   * 146* 255*   CALCIUM 8.1* 8.1* 8.4*   ALBUMIN 2.4* 3.6 4.0   PROT 6.5 5.1* 4.6*    144 143   K 4.3 3.9 3.8   CO2 26 25 24    110 111*   BUN 72* 69* 62*   CREATININE 6.4* 5.3* 4.3*   ALKPHOS 107 46* 30*   ALT 76* 46* 36   AST 54* 33 26     Labs within the past 24 hours have been reviewed.    Diagnostic Results: reviewed.

## 2022-07-26 NOTE — ASSESSMENT & PLAN NOTE
- Had kidney biopsy 7/21, path notable for antibody mediated rejection  - trialysis line placed, underwent HD on 7/23  - Completed SMP.  Apheresis consulted for PLEX, #1 on 7/24,  #3 today  - DSA, BK, CMV PCR reviewed  - Cr improved with good UOP. Start lasix 40 mg PO BID  - 2D Echo, EF 60%, HTS to see patient today  - Monitor

## 2022-07-26 NOTE — PLAN OF CARE
Pt aaox4. VSS on RA. Prn hydralazine administered x1 overnight. Scheduled PO BP medications administered per order. PLEX #2 administered yesterday, plan for #3 today. Pt voiding post vasques removal. NSR to SB on telemetry. Visi monitoring in place. AC/HS accuchecks monitored. VS and assessments in flowsheets.

## 2022-07-26 NOTE — CARE UPDATE
BG goal 140-180    -A1C   Lab Results   Component Value Date    HGBA1C 5.9 (H) 06/20/2022         -HOME REGIMEN: Novolin 70/30 50 units in the morning and 40 units in the evening. (Patient states he will reduce to 30 and 20 if BG < 120 mg/dL; patient states he feels hypoglycemic when BG is in the 80's).     -INPATIENT REGIMEN: levemir 32 units daily and novolog 6-12 units with meals.     -GLUCOSE TREND FOR THE PAST 24HRS:     -NO HYPOGYCEMIAS NOTED     -TOLERATING 25-50% OF PO DIET; timing of meals has been off     -Diet:   Lab Results   Component Value Date    HGBA1C 5.9 (H) 06/20/2022         -Steroids - prednisone 20 mg daily         Remains in TSU, NAEON. BG at or above goal with scheduled insulin and prn correction scale. Meal times are off and therefore insulin administration.       Plan:  Increase to levemir 38 units daily; FBG above goal.  Continue novolog 6-12 units with meals.   BG monitoring ac/hs and moderate dose correction scale.     Discharge planning:tbd     Endocrine to continue to follow    ** Please call Endocrine for any BG related issues **

## 2022-07-27 ENCOUNTER — PATIENT MESSAGE (OUTPATIENT)
Dept: ENDOCRINOLOGY | Facility: HOSPITAL | Age: 72
End: 2022-07-27
Payer: MEDICARE

## 2022-07-27 VITALS
BODY MASS INDEX: 35.03 KG/M2 | RESPIRATION RATE: 16 BRPM | DIASTOLIC BLOOD PRESSURE: 70 MMHG | WEIGHT: 272.94 LBS | HEIGHT: 74 IN | OXYGEN SATURATION: 98 % | TEMPERATURE: 98 F | SYSTOLIC BLOOD PRESSURE: 160 MMHG | HEART RATE: 76 BPM

## 2022-07-27 DIAGNOSIS — Z94.0 KIDNEY REPLACED BY TRANSPLANT: Primary | ICD-10-CM

## 2022-07-27 PROBLEM — T38.0X5A ADRENAL CORTICOSTEROID CAUSING ADVERSE EFFECT IN THERAPEUTIC USE: Status: RESOLVED | Noted: 2022-07-21 | Resolved: 2022-07-27

## 2022-07-27 PROBLEM — E87.5 HYPERKALEMIA: Status: RESOLVED | Noted: 2022-07-20 | Resolved: 2022-07-27

## 2022-07-27 LAB
ALBUMIN SERPL BCP-MCNC: 4.2 G/DL (ref 3.5–5.2)
ALP SERPL-CCNC: 24 U/L (ref 55–135)
ALT SERPL W/O P-5'-P-CCNC: 19 U/L (ref 10–44)
ANION GAP SERPL CALC-SCNC: 5 MMOL/L (ref 8–16)
AST SERPL-CCNC: 14 U/L (ref 10–40)
BASOPHILS # BLD AUTO: 0 K/UL (ref 0–0.2)
BASOPHILS NFR BLD: 0 % (ref 0–1.9)
BILIRUB SERPL-MCNC: 0.7 MG/DL (ref 0.1–1)
BUN SERPL-MCNC: 55 MG/DL (ref 8–23)
CALCIUM SERPL-MCNC: 8.6 MG/DL (ref 8.7–10.5)
CHLORIDE SERPL-SCNC: 115 MMOL/L (ref 95–110)
CO2 SERPL-SCNC: 22 MMOL/L (ref 23–29)
CREAT SERPL-MCNC: 4.2 MG/DL (ref 0.5–1.4)
DIFFERENTIAL METHOD: ABNORMAL
EOSINOPHIL # BLD AUTO: 0.2 K/UL (ref 0–0.5)
EOSINOPHIL NFR BLD: 2.4 % (ref 0–8)
ERYTHROCYTE [DISTWIDTH] IN BLOOD BY AUTOMATED COUNT: 17 % (ref 11.5–14.5)
EST. GFR  (AFRICAN AMERICAN): 15.3 ML/MIN/1.73 M^2
EST. GFR  (NON AFRICAN AMERICAN): 13.2 ML/MIN/1.73 M^2
FINAL PATHOLOGIC DIAGNOSIS: NORMAL
GLUCOSE SERPL-MCNC: 193 MG/DL (ref 70–110)
GROSS: NORMAL
HBV CORE AB SERPL QL IA: NEGATIVE
HBV SURFACE AG SERPL QL IA: NEGATIVE
HCT VFR BLD AUTO: 30.1 % (ref 40–54)
HGB BLD-MCNC: 9.5 G/DL (ref 14–18)
IMM GRANULOCYTES # BLD AUTO: 0.04 K/UL (ref 0–0.04)
IMM GRANULOCYTES NFR BLD AUTO: 0.4 % (ref 0–0.5)
LYMPHOCYTES # BLD AUTO: 2.1 K/UL (ref 1–4.8)
LYMPHOCYTES NFR BLD: 22.7 % (ref 18–48)
Lab: NORMAL
MAGNESIUM SERPL-MCNC: 1.8 MG/DL (ref 1.6–2.6)
MCH RBC QN AUTO: 26.9 PG (ref 27–31)
MCHC RBC AUTO-ENTMCNC: 31.6 G/DL (ref 32–36)
MCV RBC AUTO: 85 FL (ref 82–98)
MONOCYTES # BLD AUTO: 0.7 K/UL (ref 0.3–1)
MONOCYTES NFR BLD: 7.9 % (ref 4–15)
NEUTROPHILS # BLD AUTO: 6.3 K/UL (ref 1.8–7.7)
NEUTROPHILS NFR BLD: 66.6 % (ref 38–73)
NRBC BLD-RTO: 0 /100 WBC
PHOSPHATE SERPL-MCNC: 4.1 MG/DL (ref 2.7–4.5)
PLATELET # BLD AUTO: 135 K/UL (ref 150–450)
PMV BLD AUTO: 11 FL (ref 9.2–12.9)
POCT GLUCOSE: 199 MG/DL (ref 70–110)
POTASSIUM SERPL-SCNC: 3.9 MMOL/L (ref 3.5–5.1)
PROT SERPL-MCNC: 4.6 G/DL (ref 6–8.4)
RBC # BLD AUTO: 3.53 M/UL (ref 4.6–6.2)
SODIUM SERPL-SCNC: 142 MMOL/L (ref 136–145)
TACROLIMUS BLD-MCNC: 7.9 NG/ML (ref 5–15)
WBC # BLD AUTO: 9.39 K/UL (ref 3.9–12.7)

## 2022-07-27 PROCEDURE — 25000003 PHARM REV CODE 250: Performed by: NURSE PRACTITIONER

## 2022-07-27 PROCEDURE — 63600175 PHARM REV CODE 636 W HCPCS: Performed by: NURSE PRACTITIONER

## 2022-07-27 PROCEDURE — 25000003 PHARM REV CODE 250: Performed by: PHYSICIAN ASSISTANT

## 2022-07-27 PROCEDURE — 99239 HOSP IP/OBS DSCHRG MGMT >30: CPT | Mod: ,,, | Performed by: PHYSICIAN ASSISTANT

## 2022-07-27 PROCEDURE — 84100 ASSAY OF PHOSPHORUS: CPT | Performed by: STUDENT IN AN ORGANIZED HEALTH CARE EDUCATION/TRAINING PROGRAM

## 2022-07-27 PROCEDURE — 99239 PR HOSPITAL DISCHARGE DAY,>30 MIN: ICD-10-PCS | Mod: ,,, | Performed by: PHYSICIAN ASSISTANT

## 2022-07-27 PROCEDURE — 25000003 PHARM REV CODE 250: Performed by: INTERNAL MEDICINE

## 2022-07-27 PROCEDURE — 25000003 PHARM REV CODE 250: Performed by: PATHOLOGY

## 2022-07-27 PROCEDURE — 36514 PR THER APHERESIS,PLASMA PHERESIS: ICD-10-PCS | Mod: ,,, | Performed by: PATHOLOGY

## 2022-07-27 PROCEDURE — 63600175 PHARM REV CODE 636 W HCPCS: Performed by: PHYSICIAN ASSISTANT

## 2022-07-27 PROCEDURE — 86704 HEP B CORE ANTIBODY TOTAL: CPT | Performed by: PHYSICIAN ASSISTANT

## 2022-07-27 PROCEDURE — P9045 ALBUMIN (HUMAN), 5%, 250 ML: HCPCS | Mod: JG | Performed by: PATHOLOGY

## 2022-07-27 PROCEDURE — 86644 CMV ANTIBODY: CPT | Performed by: NURSE PRACTITIONER

## 2022-07-27 PROCEDURE — 87340 HEPATITIS B SURFACE AG IA: CPT | Performed by: PHYSICIAN ASSISTANT

## 2022-07-27 PROCEDURE — 36514 APHERESIS PLASMA: CPT

## 2022-07-27 PROCEDURE — 63600175 PHARM REV CODE 636 W HCPCS: Performed by: PATHOLOGY

## 2022-07-27 PROCEDURE — 85025 COMPLETE CBC W/AUTO DIFF WBC: CPT | Performed by: PHYSICIAN ASSISTANT

## 2022-07-27 PROCEDURE — 80197 ASSAY OF TACROLIMUS: CPT | Performed by: PHYSICIAN ASSISTANT

## 2022-07-27 PROCEDURE — 80053 COMPREHEN METABOLIC PANEL: CPT | Performed by: STUDENT IN AN ORGANIZED HEALTH CARE EDUCATION/TRAINING PROGRAM

## 2022-07-27 PROCEDURE — 36514 APHERESIS PLASMA: CPT | Mod: ,,, | Performed by: PATHOLOGY

## 2022-07-27 PROCEDURE — 83735 ASSAY OF MAGNESIUM: CPT | Performed by: PHYSICIAN ASSISTANT

## 2022-07-27 RX ORDER — SIMETHICONE 80 MG
1 TABLET,CHEWABLE ORAL 3 TIMES DAILY PRN
Status: DISCONTINUED | OUTPATIENT
Start: 2022-07-27 | End: 2022-07-27 | Stop reason: HOSPADM

## 2022-07-27 RX ORDER — FUROSEMIDE 40 MG/1
40 TABLET ORAL 2 TIMES DAILY
Qty: 60 TABLET | Refills: 11 | Status: SHIPPED | OUTPATIENT
Start: 2022-07-27 | End: 2022-08-06

## 2022-07-27 RX ORDER — TACROLIMUS 1 MG/1
CAPSULE ORAL
Qty: 300 CAPSULE | Refills: 11 | Status: SHIPPED | OUTPATIENT
Start: 2022-07-27 | End: 2022-08-02

## 2022-07-27 RX ORDER — FAMOTIDINE 10 MG/ML
20 INJECTION INTRAVENOUS
Status: CANCELLED | OUTPATIENT
Start: 2022-07-27

## 2022-07-27 RX ORDER — NYSTATIN 100000 [USP'U]/ML
500000 SUSPENSION ORAL
Qty: 420 ML | Refills: 0 | Status: SHIPPED | OUTPATIENT
Start: 2022-07-27 | End: 2022-08-24

## 2022-07-27 RX ORDER — HYDRALAZINE HYDROCHLORIDE 50 MG/1
50 TABLET, FILM COATED ORAL EVERY 8 HOURS
Qty: 90 TABLET | Refills: 11 | Status: SHIPPED | OUTPATIENT
Start: 2022-07-27 | End: 2022-11-07 | Stop reason: SDUPTHER

## 2022-07-27 RX ORDER — INSULIN HUMAN 100 [IU]/ML
INJECTION, SUSPENSION SUBCUTANEOUS
Qty: 75 ML | Refills: 1
Start: 2022-07-27

## 2022-07-27 RX ORDER — SULFAMETHOXAZOLE AND TRIMETHOPRIM 400; 80 MG/1; MG/1
1 TABLET ORAL
Qty: 12 TABLET | Refills: 5 | Status: SHIPPED | OUTPATIENT
Start: 2022-07-27 | End: 2022-11-04 | Stop reason: SDUPTHER

## 2022-07-27 RX ORDER — ATORVASTATIN CALCIUM 40 MG/1
40 TABLET, FILM COATED ORAL DAILY
Qty: 90 TABLET | Refills: 3 | Status: SHIPPED | OUTPATIENT
Start: 2022-07-28 | End: 2022-11-07 | Stop reason: SDUPTHER

## 2022-07-27 RX ORDER — HEPARIN 100 UNIT/ML
500 SYRINGE INTRAVENOUS
Status: CANCELLED | OUTPATIENT
Start: 2022-07-27

## 2022-07-27 RX ORDER — INSULIN ASPART 100 [IU]/ML
12 INJECTION, SOLUTION INTRAVENOUS; SUBCUTANEOUS
Status: DISCONTINUED | OUTPATIENT
Start: 2022-07-27 | End: 2022-07-27 | Stop reason: HOSPADM

## 2022-07-27 RX ORDER — CALCIUM ACETATE 667 MG/1
1334 CAPSULE ORAL
Qty: 180 CAPSULE | Refills: 11 | Status: SHIPPED | OUTPATIENT
Start: 2022-07-27 | End: 2023-10-27 | Stop reason: SDUPTHER

## 2022-07-27 RX ORDER — MYCOPHENOLATE MOFETIL 250 MG/1
1000 CAPSULE ORAL 2 TIMES DAILY
Qty: 240 CAPSULE | Refills: 11 | Status: SHIPPED | OUTPATIENT
Start: 2022-07-27 | End: 2022-10-04

## 2022-07-27 RX ORDER — NIFEDIPINE 60 MG/1
60 TABLET, EXTENDED RELEASE ORAL 2 TIMES DAILY
Qty: 60 TABLET | Refills: 11 | Status: SHIPPED | OUTPATIENT
Start: 2022-07-27 | End: 2022-11-07 | Stop reason: SDUPTHER

## 2022-07-27 RX ORDER — SODIUM CHLORIDE 0.9 % (FLUSH) 0.9 %
10 SYRINGE (ML) INJECTION
Status: CANCELLED | OUTPATIENT
Start: 2022-07-27

## 2022-07-27 RX ORDER — TAMSULOSIN HYDROCHLORIDE 0.4 MG/1
0.4 CAPSULE ORAL DAILY
Qty: 30 CAPSULE | Refills: 11 | Status: SHIPPED | OUTPATIENT
Start: 2022-07-28 | End: 2023-03-31 | Stop reason: SDUPTHER

## 2022-07-27 RX ORDER — ACETAMINOPHEN 325 MG/1
650 TABLET ORAL
Status: CANCELLED | OUTPATIENT
Start: 2022-07-27

## 2022-07-27 RX ORDER — PREDNISONE 5 MG/1
TABLET ORAL
Qty: 120 TABLET | Refills: 11 | Status: SHIPPED | OUTPATIENT
Start: 2022-07-27 | End: 2022-11-04 | Stop reason: SDUPTHER

## 2022-07-27 RX ADMIN — METOPROLOL SUCCINATE 75 MG: 50 TABLET, EXTENDED RELEASE ORAL at 08:07

## 2022-07-27 RX ADMIN — FAMOTIDINE 20 MG: 20 TABLET ORAL at 08:07

## 2022-07-27 RX ADMIN — INSULIN ASPART 6 UNITS: 100 INJECTION, SOLUTION INTRAVENOUS; SUBCUTANEOUS at 09:07

## 2022-07-27 RX ADMIN — TACROLIMUS 10 MG: 1 CAPSULE ORAL at 08:07

## 2022-07-27 RX ADMIN — MYCOPHENOLATE MOFETIL 1000 MG: 250 CAPSULE ORAL at 08:07

## 2022-07-27 RX ADMIN — TAMSULOSIN HYDROCHLORIDE 0.4 MG: 0.4 CAPSULE ORAL at 08:07

## 2022-07-27 RX ADMIN — CALCIUM GLUCONATE 2 G: 20 INJECTION, SOLUTION INTRAVENOUS at 01:07

## 2022-07-27 RX ADMIN — NIFEDIPINE 60 MG: 30 TABLET, FILM COATED, EXTENDED RELEASE ORAL at 08:07

## 2022-07-27 RX ADMIN — ATORVASTATIN CALCIUM 40 MG: 20 TABLET, FILM COATED ORAL at 08:07

## 2022-07-27 RX ADMIN — HYDRALAZINE HYDROCHLORIDE 50 MG: 50 TABLET ORAL at 05:07

## 2022-07-27 RX ADMIN — FUROSEMIDE 40 MG: 40 TABLET ORAL at 08:07

## 2022-07-27 RX ADMIN — ALBUMIN (HUMAN) 225 G: 12.5 SOLUTION INTRAVENOUS at 01:07

## 2022-07-27 RX ADMIN — CALCIUM ACETATE 1334 MG: 667 CAPSULE ORAL at 08:07

## 2022-07-27 RX ADMIN — NYSTATIN 500000 UNITS: 500000 SUSPENSION ORAL at 08:07

## 2022-07-27 RX ADMIN — SULFAMETHOXAZOLE AND TRIMETHOPRIM 1 TABLET: 400; 80 TABLET ORAL at 08:07

## 2022-07-27 RX ADMIN — ONDANSETRON 4 MG: 2 INJECTION INTRAMUSCULAR; INTRAVENOUS at 11:07

## 2022-07-27 RX ADMIN — SIMETHICONE 80 MG: 80 TABLET, CHEWABLE ORAL at 08:07

## 2022-07-27 RX ADMIN — HEPARIN SODIUM 2400 UNITS: 1000 INJECTION, SOLUTION INTRAVENOUS; SUBCUTANEOUS at 02:07

## 2022-07-27 RX ADMIN — PREDNISONE 20 MG: 20 TABLET ORAL at 08:07

## 2022-07-27 RX ADMIN — INSULIN ASPART 2 UNITS: 100 INJECTION, SOLUTION INTRAVENOUS; SUBCUTANEOUS at 09:07

## 2022-07-27 NOTE — NURSING
Pt met with transplant coordinator and transplant pharmacist. AVS reviewed with pt and wife. Both verbalized understanding of medications, follow up appts, what to call for and who to call. Pt being discharged with [facility] trial in place for PLEX on Friday. He is waiting on transport.

## 2022-07-27 NOTE — PROGRESS NOTES
Pt arrived at out aheresis dept via wheelchair, accompanied by SLAVA Lu, [facility] escort staff.  He is oriented x4, states no pain.  Apheresis tx #4 started at 13:08 without difficulty.  4.5 liter plasma exchange completed via RIJ cath, cath patent.  Replacement fluids 5% albumin.  2 grams of calcium gluconate given over duration of exchange.  Tx ended at 14:20.  Cath flushed and locked.  Pt tolerated tx well.  Next tx 07/29/2022.  Pt exited dept in a wheelchair going back to his room, [address], accompanied by SLAVA Lu.  Report called to [facility] staff nurse REYES Cheung.

## 2022-07-27 NOTE — PROCEDURES
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 Albrecht is a 72 y.o. male with heart and kidney transplants that was recently admitted due to significant renal dysfunction and has evidence of AMR seen on kidney biopsy.  DSA testing is pending.  Transfusion medicine was consulted for PLEX.    Today's procedure, #4 of 5, was tolerated without significant complications.  The patient is planned for discharge and his last PLEX is planned as an outpatient on  07/29/2022.    Pertinent Laboratory Data:   Complete Blood Count:   Lab Results   Component Value Date    HGB 9.5 (L) 07/27/2022    HCT 30.1 (L) 07/27/2022     (L) 07/27/2022    WBC 9.39 07/27/2022     Comprehensive Metabolic Panel:   Lab Results   Component Value Date     07/27/2022    K 3.9 07/27/2022     (H) 07/27/2022    CO2 22 (L) 07/27/2022     (H) 07/27/2022    BUN 55 (H) 07/27/2022    CREATININE 4.2 (H) 07/27/2022    CALCIUM 8.6 (L) 07/27/2022    PROT 4.6 (L) 07/27/2022    ALBUMIN 4.2 07/27/2022    BILITOT 0.7 07/27/2022    ALKPHOS 24 (L) 07/27/2022    AST 14 07/27/2022    ALT 19 07/27/2022    ANIONGAP 5 (L) 07/27/2022    EGFRNONAA 13.2 (A) 07/27/2022       Pertinent Medications: None    Review of patient's allergies indicates:  No Known Allergies    Anesthesia: None     Technical Procedures Used: Plasma Exchange: Volume exchanged - 4.5 Liters; Replacement fluid - Albumin; Number of procedures 4; Date of next procedure 07/29/2022.    Description of the  Findings of the Procedure:     Please see Apheresis Nurse flowsheet for details.    The patient was evaluated and all clinical and laboratory data relevant to the treatment was reviewed, and a decision was made to proceed with the Apheresis procedure.    I was available to the clinical staff throughout the procedure.    Significant Surgical Tasks Conducted by the Assistant(s): Not applicable    Complications: None    Estimated Blood Loss (EBL): None    Implants: None     Specimens: None

## 2022-07-27 NOTE — PROCEDURES
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 Albrecht is a 72 y.o. male with heart and kidney transplants that was recently admitted due to significant renal dysfunction and has evidence of AMR seen on kidney biopsy.  DSA testing is pending.  Transfusion medicine was consulted for PLEX.    Today's procedure, #3 of 5, was tolerated without significant complications.  The next PLEX is planned for 07/27/2022.    Pertinent Laboratory Data:   Complete Blood Count:   Lab Results   Component Value Date    HGB 9.6 (L) 07/26/2022    HCT 30.2 (L) 07/26/2022     07/26/2022    WBC 9.56 07/26/2022     Comprehensive Metabolic Panel:   Lab Results   Component Value Date     07/26/2022    K 3.8 07/26/2022     (H) 07/26/2022    CO2 24 07/26/2022     (H) 07/26/2022    BUN 62 (H) 07/26/2022    CREATININE 4.3 (H) 07/26/2022    CALCIUM 8.4 (L) 07/26/2022    PROT 4.6 (L) 07/26/2022    ALBUMIN 4.0 07/26/2022    BILITOT 0.6 07/26/2022    ALKPHOS 30 (L) 07/26/2022    AST 26 07/26/2022    ALT 36 07/26/2022    ANIONGAP 8 07/26/2022    EGFRNONAA 12.8 (A) 07/26/2022       Pertinent Medications: None    Review of patient's allergies indicates:  No Known Allergies    Anesthesia: None     Technical Procedures Used: Plasma Exchange: Volume exchanged - 4.5 Liters; Replacement fluid - Albumin; Number of procedures 3; Date of next procedure 07/27/2022.    Description of the Findings of the Procedure:     Please see Apheresis Nurse flowsheet for  details.    The patient was evaluated and all clinical and laboratory data relevant to the treatment was reviewed, and a decision was made to proceed with the Apheresis procedure.    I was available to the clinical staff throughout the procedure.    Significant Surgical Tasks Conducted by the Assistant(s): Not applicable    Complications: None    Estimated Blood Loss (EBL): None    Implants: None     Specimens: None

## 2022-07-27 NOTE — SUBJECTIVE & OBJECTIVE
"Interval HPI:   Overnight events: Remains in TSU. NAEON. BG at or above goal with scheduled insulin and prn correction scale. Prednisone 20 mg daily. Tentative discharge today.   Eating:  Diet diabetic [facility]; 2000 Calorie; Isolation Tray - Regular [place]   50%  Nausea: No  Hypoglycemia and intervention: No  Fever: No  TPN and/or TF: No      /77 (BP Location: Left arm, Patient Position: Lying)   Pulse 68   Temp 98 °F (36.7 °C) (Oral)   Resp 10   Ht 6' 2" (1.88 m)   Wt 123.8 kg (272 lb 14.9 oz)   SpO2 98%   BMI 35.04 kg/m²     Labs Reviewed and Include    Recent Labs   Lab 07/27/22  0531   *   CALCIUM 8.6*   ALBUMIN 4.2   PROT 4.6*      K 3.9   CO2 22*   *   BUN 55*   CREATININE 4.2*   ALKPHOS 24*   ALT 19   AST 14   BILITOT 0.7     Lab Results   Component Value Date    WBC 9.39 07/27/2022    HGB 9.5 (L) 07/27/2022    HCT 30.1 (L) 07/27/2022    MCV 85 07/27/2022     (L) 07/27/2022     No results for input(s): TSH, FREET4 in the last 168 hours.  Lab Results   Component Value Date    HGBA1C 5.9 (H) 06/20/2022       Nutritional status:   Body mass index is 35.04 kg/m².  Lab Results   Component Value Date    ALBUMIN 4.2 07/27/2022    ALBUMIN 4.0 07/26/2022    ALBUMIN 3.6 07/25/2022     No results found for: PREALBUMIN    Estimated Creatinine Clearance: 22.2 mL/min (A) (based on SCr of 4.2 mg/dL (H)).    Accu-Checks  Recent Labs     07/25/22  0412 07/25/22  0835 07/25/22  0934 07/25/22  1228 07/25/22  1740 07/25/22  2111 07/26/22  0842 07/26/22  1753 07/26/22  2249 07/27/22  0833   POCTGLUCOSE 190* 98 90 196* 299* 310* 230* 315* 327* 199*       Current Medications and/or Treatments Impacting Glycemic Control  Immunotherapy:    Immunosuppressants           Stop Route Frequency     mycophenolate capsule 1,000 mg         -- Oral 2 times daily     tacrolimus capsule 10 mg         -- Oral 2 times daily          Steroids:   Hormones (From admission, onward)                Start    "  Stop Route Frequency Ordered    07/24/22 2246  melatonin tablet 6 mg         -- Oral Nightly PRN 07/24/22 2146    07/24/22 0900  predniSONE tablet 20 mg         -- Oral Daily 07/23/22 1340          Pressors:    Autonomic Drugs (From admission, onward)                None          Hyperglycemia/Diabetes Medications:   Antihyperglycemics (From admission, onward)                Start     Stop Route Frequency Ordered    07/27/22 1130  insulin aspart U-100 pen 12 Units         -- SubQ 3 times daily with meals 07/27/22 1001    07/27/22 0900  insulin detemir U-100 pen 38 Units         -- SubQ Daily 07/26/22 1216    07/25/22 1025  insulin aspart U-100 pen 1-10 Units         -- SubQ Before meals & nightly PRN 07/25/22 0926

## 2022-07-27 NOTE — PLAN OF CARE
Pt remains AAO x 4 with VSS on Visi monitor, afebrile, sats upper 90s on RA throughout shift  Plex #3 completed 7/26, #4 to be 7/27 - poss d/c after will plan for plex #5 to be outpatient  R IJ Trialysis - CDI, saline locked  Cr 4.3  NSR on tele monitor - HR 70s  CBG ACHS - last  - 4 U SSI given as ordered  Pt up independent and ambulatory, voids in toilet, LBM 7/25  Diabetic diet   Family remains at bedside and attentive to patient needs  Pt remains free from falls and injuries, call light in reach, bed in lowest position, nonskid socks on when OOB, side rails up x2  Will continue to monitor

## 2022-07-27 NOTE — PROGRESS NOTES
Discharge Medication Note:    Hospital CourseTxp History: Admitted for treatment of rejection.  Had previous Covid infection where IMS was held.  Received SMP x3 and PLEX #3.  Will complete PLEX #4 and #5 and IVIG, Ritux as outpatient.       Met with Nigel Albrecht at discharge to review discharge medications and to update the blue medication card.           Medication List      START taking these medications    calcium acetate(phosphat bind) 667 mg capsule  Commonly known as: PHOSLO  Take 2 capsules (1,334 mg total) by mouth 3 (three) times daily with meals.     furosemide 40 MG tablet  Commonly known as: LASIX  Take 1 tablet (40 mg total) by mouth 2 (two) times daily.     hydrALAZINE 50 MG tablet  Commonly known as: APRESOLINE  Take 1 tablet (50 mg total) by mouth every 8 (eight) hours.     nystatin 100,000 unit/mL suspension  Commonly known as: MYCOSTATIN  Take 5 mLs (500,000 Units total) by mouth 3 (three) times daily after meals.     predniSONE 5 MG tablet  Commonly known as: DELTASONE  Take by mouth daily :   20mg 7/24-8/23;   15mg 8/24-9/23;   10mg 9/24-10/23/22;   5 mg thereafter.     sulfamethoxazole-trimethoprim 400-80mg 400-80 mg per tablet  Commonly known as: BACTRIM,SEPTRA  Take 1 tablet by mouth every Mon, Wed, Fri. STOP 1/23/23     tamsulosin 0.4 mg Cap  Commonly known as: FLOMAX  Take 1 capsule (0.4 mg total) by mouth once daily.  Start taking on: July 28, 2022        CHANGE how you take these medications    aspirin 81 MG Chew  Take 1 tablet (81 mg total) by mouth once daily.  What changed: Another medication with the same name was removed. Continue taking this medication, and follow the directions you see here.     atorvastatin 40 MG tablet  Commonly known as: LIPITOR  Take 1 tablet (40 mg total) by mouth once daily.  Start taking on: July 28, 2022  What changed:   · medication strength  · how much to take     HumuLIN 70/30 U-100 KwikPen 100 unit/mL (70-30) Inpn pen  Generic drug: insulin NPH/Reg  human  Inject 40 Units into the skin daily with breakfast AND 30 Units daily with dinner or evening meal.  What changed: See the new instructions.     metoprolol succinate 25 MG 24 hr tablet  Commonly known as: TOPROL-XL  Take 3 tablets (75 mg total) by mouth once daily.  What changed: Another medication with the same name was removed. Continue taking this medication, and follow the directions you see here.     mycophenolate 250 mg Cap  Commonly known as: CELLCEPT  Take 4 capsules (1,000 mg total) by mouth 2 (two) times daily.  What changed:   · how much to take  · Another medication with the same name was removed. Continue taking this medication, and follow the directions you see here.     NIFEdipine 60 MG (OSM) 24 hr tablet  Commonly known as: PROCARDIA-XL  Take 1 tablet (60 mg total) by mouth 2 (two) times a day.  What changed:   · medication strength  · how much to take  · when to take this        CONTINUE taking these medications    acetaminophen 325 MG tablet  Commonly known as: TYLENOL  Take 2 tablets (650 mg total) by mouth every 4 (four) hours as needed.     famotidine 20 MG tablet  Commonly known as: PEPCID  Take 1 tablet (20 mg total) by mouth once daily.     FREESTYLE SHREE 2 SENSOR Kit  Generic drug: flash glucose sensor  APPLY 1 SENSOR             SUBCUTANEOUSLY EVERY 14    DAYS     heparin (porcine) 5,000 unit/mL injection  Inject 1 mL (5,000 Units total) into the skin every 8 (eight) hours.     * tacrolimus 5 MG Cap  Commonly known as: PROGRAF  Take 1 capsule (5 mg total) by mouth every 12 (twelve) hours.     * tacrolimus 1 MG Cap  Commonly known as: PROGRAF  Take 5 capsules (5 mg total) by mouth every morning AND 5 capsules (5 mg total) every evening.         * This list has 2 medication(s) that are the same as other medications prescribed for you. Read the directions carefully, and ask your doctor or other care provider to review them with you.            STOP taking these medications    amLODIPine  10 MG tablet  Commonly known as: NORVASC     calcium carbonate 500 mg calcium (1,250 mg) tablet  Commonly known as: OS-CARRIE     CENTRUM SILVER MEN ORAL     cholecalciferol (vitamin D3) 125 mcg (5,000 unit) Tab     gabapentin 300 MG capsule  Commonly known as: NEURONTIN     insulin aspart U-100 100 unit/mL (3 mL) Inpn pen  Commonly known as: NovoLOG     insulin detemir U-100 100 unit/mL (3 mL) Inpn pen  Commonly known as: Levemir FLEXTOUCH     lisinopriL 40 MG tablet  Commonly known as: PRINIVIL,ZESTRIL     OZEMPIC 2 mg/dose (8 mg/3 mL) Pnij  Generic drug: semaglutide     sodium zirconium cyclosilicate 10 gram packet  Commonly known as: Lokelma     torsemide 5 MG Tab  Commonly known as: DEMADEX           Where to Get Your Medications      These medications were sent to [facility]  [address] [facility] [address]    Hours: Mon-Fri 7a-7p, Sat-Sun 10a-4p Phone: [phone]   · atorvastatin 40 MG tablet  · calcium acetate(phosphat bind) 667 mg capsule  · furosemide 40 MG tablet  · hydrALAZINE 50 MG tablet  · mycophenolate 250 mg Cap  · NIFEdipine 60 MG (OSM) 24 hr tablet  · nystatin 100,000 unit/mL suspension  · predniSONE 5 MG tablet  · sulfamethoxazole-trimethoprim 400-80mg 400-80 mg per tablet  · tacrolimus 1 MG Cap  · tamsulosin 0.4 mg Cap     Information about where to get these medications is not yet available    Ask your nurse or doctor about these medications  · HumuLIN 70/30 U-100 KwikPen 100 unit/mL (70-30) Inpn pen            The following medications have been placed on HOLD and should be restarted in the outpatient setting (when appropriate): none    [facility] verbalized understanding and had the opportunity to ask questions.

## 2022-07-27 NOTE — CARE UPDATE
BG goal 140-180    -A1C   Lab Results   Component Value Date    HGBA1C 5.9 (H) 06/20/2022         -HOME REGIMEN: Novolin 70/30 50 units in the morning and 40 units in the evening. (Patient states he will reduce to 30 and 20 if BG < 120 mg/dL; patient states he feels hypoglycemic when BG is in the 80's).     -INPATIENT REGIMEN: levemir 32 units daily and novolog 6-12 units with meals.     -GLUCOSE TREND FOR THE PAST 24HRS:   -NO HYPOGYCEMIAS NOTED     -TOLERATING 25-50% OF PO DIET    -Diet:   Lab Results   Component Value Date    HGBA1C 5.9 (H) 06/20/2022         -Steroids - prednisone 20 mg daily         Remains in TSU, NAEON. BG at or above goal with scheduled insulin and prn correction scale. Meal times are off and therefore insulin administration.       Plan:  continue levemir 38 units daily   novolog 6-12 units with meals.   BG monitoring ac/hs and moderate dose correction scale.     Discharge planning:patient off of floor before discharge; logs placed at bedside and discussed with bedside RN. novolin 70/30 40 units with breakfast and 30 units with dinner.     Endocrine to continue to follow    ** Please call Endocrine for any BG related issues **

## 2022-07-29 ENCOUNTER — HOSPITAL ENCOUNTER (OUTPATIENT)
Dept: TRANSFUSION MEDICINE | Facility: HOSPITAL | Age: 72
Discharge: HOME | End: 2022-07-29
Attending: INTERNAL MEDICINE
Payer: MEDICARE

## 2022-07-29 ENCOUNTER — TELEPHONE (OUTPATIENT)
Dept: TRANSPLANT | Facility: CLINIC | Age: 72
End: 2022-07-29
Payer: MEDICARE

## 2022-07-29 VITALS
WEIGHT: 272.94 LBS | DIASTOLIC BLOOD PRESSURE: 68 MMHG | HEIGHT: 74 IN | HEART RATE: 74 BPM | SYSTOLIC BLOOD PRESSURE: 120 MMHG | BODY MASS INDEX: 35.03 KG/M2 | TEMPERATURE: 99 F

## 2022-07-29 DIAGNOSIS — T86.11 ANTIBODY MEDIATED REJECTION OF KIDNEY TRANSPLANT: Primary | ICD-10-CM

## 2022-07-29 LAB — CMV IGG SERPL QL IA: REACTIVE

## 2022-07-29 PROCEDURE — 63600175 PHARM REV CODE 636 W HCPCS: Mod: JG | Performed by: PATHOLOGY

## 2022-07-29 PROCEDURE — P9045 ALBUMIN (HUMAN), 5%, 250 ML: HCPCS | Mod: JG | Performed by: PATHOLOGY

## 2022-07-29 PROCEDURE — 25000003 PHARM REV CODE 250: Performed by: INTERNAL MEDICINE

## 2022-07-29 PROCEDURE — 36514 APHERESIS PLASMA: CPT

## 2022-07-29 PROCEDURE — 36514 PR THER APHERESIS,PLASMA PHERESIS: ICD-10-PCS | Mod: ,,, | Performed by: PATHOLOGY

## 2022-07-29 PROCEDURE — 36514 APHERESIS PLASMA: CPT | Mod: ,,, | Performed by: PATHOLOGY

## 2022-07-29 RX ORDER — HEPARIN SODIUM 1000 [USP'U]/ML
2400 INJECTION, SOLUTION INTRAVENOUS; SUBCUTANEOUS ONCE
Status: COMPLETED | OUTPATIENT
Start: 2022-07-29 | End: 2022-07-29

## 2022-07-29 RX ORDER — CALCIUM GLUCONATE 20 MG/ML
2 INJECTION, SOLUTION INTRAVENOUS
Status: DISCONTINUED | OUTPATIENT
Start: 2022-07-29 | End: 2022-07-29 | Stop reason: SDUPTHER

## 2022-07-29 RX ORDER — ALBUMIN HUMAN 50 G/1000ML
225 SOLUTION INTRAVENOUS ONCE
Status: COMPLETED | OUTPATIENT
Start: 2022-07-29 | End: 2022-07-29

## 2022-07-29 RX ORDER — CALCIUM GLUCONATE 20 MG/ML
2 INJECTION, SOLUTION INTRAVENOUS ONCE
Status: COMPLETED | OUTPATIENT
Start: 2022-07-29 | End: 2022-07-29

## 2022-07-29 RX ADMIN — CALCIUM GLUCONATE 2 G: 20 INJECTION, SOLUTION INTRAVENOUS at 09:07

## 2022-07-29 RX ADMIN — ALBUMIN (HUMAN) 225 G: 12.5 INJECTION, SOLUTION INTRAVENOUS at 09:07

## 2022-07-29 RX ADMIN — HEPARIN SODIUM 2400 UNITS: 1000 INJECTION, SOLUTION INTRAVENOUS; SUBCUTANEOUS at 10:07

## 2022-07-29 NOTE — PROGRESS NOTES
RIJ cath removed intact per protocol.  Tolerated well.  Post line removal instructions given verbally, pt expressed understanding.  Pt exited dept in a wheelchair, accompanied by his wife.

## 2022-07-29 NOTE — PROGRESS NOTES
Pt presented to outpt apheresis dept in a wheelchair, accompanied by his wife.  He is oriented x4, states no pain.  Apheresis tx #5 started at 09:13 without difficulty.  4.5 liter plasma exchange completed via RIJ cath, cath patent, dressing clean, dry and intact.  Replacement fluids 5% albumin.  2 grams of calcium gluconate given over duration of exchange.  Tx ended at 10:20.  Cath flushed and locked.  Pt tolerated tx well.  Last tx this series.  Pt exited dept in a wheelchair accompanied by his wife.

## 2022-07-30 NOTE — PROCEDURES
[facility]  Transfusion Medicine  Procedure Note    SUMMARY   Therapeutic Plasma Exchange (Apheresis)    Date/Time: 7/29/2022 10:19 PM  Performed by: Collin King MD  Authorized by: Collin King MD       Date of Procedure: 7/29/2022     Procedure: Plasma Exchange    Provider: Collin King MD     Assisting Provider: None    Pre-Procedure Diagnosis: Antibody-Mediated Rejection of Renal Allograft  Post-Procedure Diagnosis: Antibody-Mediated Rejection of Renal Allograft    Follow-up Assessment: Mr. Albrecht is a 72 y.o. male with heart and kidney transplants that was recently admitted due to significant renal dysfunction and has evidence of AMR seen on kidney biopsy.  DSA testing is pending.  Transfusion medicine was consulted for PLEX.    Today's procedure, #5 of 5, was tolerated without significant complications.  This completes his treatment series.    Pertinent Laboratory Data:   Complete Blood Count:   Lab Results   Component Value Date    HGB 9.5 (L) 07/27/2022    HCT 30.1 (L) 07/27/2022     (L) 07/27/2022    WBC 9.39 07/27/2022     Comprehensive Metabolic Panel:   Lab Results   Component Value Date     07/27/2022    K 3.9 07/27/2022     (H) 07/27/2022    CO2 22 (L) 07/27/2022     (H) 07/27/2022    BUN 55 (H) 07/27/2022    CREATININE 4.2 (H) 07/27/2022    CALCIUM 8.6 (L) 07/27/2022    PROT 4.6 (L) 07/27/2022    ALBUMIN 4.2 07/27/2022    BILITOT 0.7 07/27/2022    ALKPHOS 24 (L) 07/27/2022    AST 14 07/27/2022    ALT 19 07/27/2022    ANIONGAP 5 (L) 07/27/2022    EGFRNONAA 13.2 (A) 07/27/2022       Pertinent Medications: None    Review of patient's allergies indicates:  No Known Allergies    Anesthesia: None     Technical Procedures Used: Plasma Exchange: Volume exchanged - 4.5 Liters; Replacement fluid - Albumin; Number of procedures 5; Date of next procedure NA.    Description of the Findings of the Procedure:     Please see Apheresis Nurse flowsheet for  details.    The patient was evaluated and all clinical and laboratory data relevant to the treatment was reviewed, and a decision was made to proceed with the Apheresis procedure.    I was available to the clinical staff throughout the procedure.    Significant Surgical Tasks Conducted by the Assistant(s): Not applicable    Complications: None    Estimated Blood Loss (EBL): None    Implants: None     Specimens: None

## 2022-08-01 ENCOUNTER — LAB VISIT (OUTPATIENT)
Dept: LAB | Facility: HOSPITAL | Age: 72
End: 2022-08-01
Attending: INTERNAL MEDICINE
Payer: MEDICARE

## 2022-08-01 DIAGNOSIS — Z94.0 KIDNEY REPLACED BY TRANSPLANT: ICD-10-CM

## 2022-08-01 LAB
ALBUMIN SERPL BCP-MCNC: 4.1 G/DL (ref 3.5–5.2)
ANION GAP SERPL CALC-SCNC: 11 MMOL/L (ref 8–16)
BASOPHILS # BLD AUTO: 0.02 K/UL (ref 0–0.2)
BASOPHILS NFR BLD: 0.3 % (ref 0–1.9)
BUN SERPL-MCNC: 52 MG/DL (ref 8–23)
CALCIUM SERPL-MCNC: 9.4 MG/DL (ref 8.7–10.5)
CHLORIDE SERPL-SCNC: 117 MMOL/L (ref 95–110)
CO2 SERPL-SCNC: 20 MMOL/L (ref 23–29)
CREAT SERPL-MCNC: 4.7 MG/DL (ref 0.5–1.4)
DIFFERENTIAL METHOD: ABNORMAL
EOSINOPHIL # BLD AUTO: 0 K/UL (ref 0–0.5)
EOSINOPHIL NFR BLD: 0.4 % (ref 0–8)
ERYTHROCYTE [DISTWIDTH] IN BLOOD BY AUTOMATED COUNT: 18.6 % (ref 11.5–14.5)
EST. GFR  (NO RACE VARIABLE): 12.5 ML/MIN/1.73 M^2
GLUCOSE SERPL-MCNC: 54 MG/DL (ref 70–110)
HCT VFR BLD AUTO: 30.5 % (ref 40–54)
HGB BLD-MCNC: 9.3 G/DL (ref 14–18)
IMM GRANULOCYTES # BLD AUTO: 0.07 K/UL (ref 0–0.04)
IMM GRANULOCYTES NFR BLD AUTO: 0.9 % (ref 0–0.5)
LYMPHOCYTES # BLD AUTO: 1.5 K/UL (ref 1–4.8)
LYMPHOCYTES NFR BLD: 20.5 % (ref 18–48)
MAGNESIUM SERPL-MCNC: 1.7 MG/DL (ref 1.6–2.6)
MCH RBC QN AUTO: 27 PG (ref 27–31)
MCHC RBC AUTO-ENTMCNC: 30.5 G/DL (ref 32–36)
MCV RBC AUTO: 89 FL (ref 82–98)
MONOCYTES # BLD AUTO: 0.6 K/UL (ref 0.3–1)
MONOCYTES NFR BLD: 7.9 % (ref 4–15)
NEUTROPHILS # BLD AUTO: 5.3 K/UL (ref 1.8–7.7)
NEUTROPHILS NFR BLD: 70 % (ref 38–73)
NRBC BLD-RTO: 0 /100 WBC
PHOSPHATE SERPL-MCNC: 4.5 MG/DL (ref 2.7–4.5)
PLATELET # BLD AUTO: 173 K/UL (ref 150–450)
PMV BLD AUTO: 9.8 FL (ref 9.2–12.9)
POTASSIUM SERPL-SCNC: 4.5 MMOL/L (ref 3.5–5.1)
RBC # BLD AUTO: 3.44 M/UL (ref 4.6–6.2)
SODIUM SERPL-SCNC: 148 MMOL/L (ref 136–145)
WBC # BLD AUTO: 7.5 K/UL (ref 3.9–12.7)

## 2022-08-01 PROCEDURE — 80197 ASSAY OF TACROLIMUS: CPT | Performed by: INTERNAL MEDICINE

## 2022-08-01 PROCEDURE — 85025 COMPLETE CBC W/AUTO DIFF WBC: CPT | Performed by: INTERNAL MEDICINE

## 2022-08-01 PROCEDURE — 83735 ASSAY OF MAGNESIUM: CPT | Performed by: INTERNAL MEDICINE

## 2022-08-01 PROCEDURE — 80069 RENAL FUNCTION PANEL: CPT | Performed by: INTERNAL MEDICINE

## 2022-08-01 PROCEDURE — 36415 COLL VENOUS BLD VENIPUNCTURE: CPT | Mod: PO | Performed by: INTERNAL MEDICINE

## 2022-08-02 ENCOUNTER — TELEPHONE (OUTPATIENT)
Dept: TRANSPLANT | Facility: CLINIC | Age: 72
End: 2022-08-02
Payer: MEDICARE

## 2022-08-02 DIAGNOSIS — Z94.1 HEART TRANSPLANTED: ICD-10-CM

## 2022-08-02 DIAGNOSIS — Z94.0 KIDNEY REPLACED BY TRANSPLANT: Primary | ICD-10-CM

## 2022-08-02 LAB
POCT GLUCOSE: 214 MG/DL (ref 70–110)
POCT GLUCOSE: 233 MG/DL (ref 70–110)
TACROLIMUS BLD-MCNC: 5.2 NG/ML (ref 5–15)

## 2022-08-02 NOTE — PROGRESS NOTES
Does he have any appointment to be seen in clinic. We need to see him to assess volume status. See if we can add a BNP (brain natriuretic peptide) and urine lytes (sodium potassium chloride)   I see his sodium is high and he is on lasix. Also he needs to follow up with his local nephrologist.  Will repeat the BMP for completeness

## 2022-08-02 NOTE — TELEPHONE ENCOUNTER
Patient Education   Get your blood drawn today.  Yamilka, my surgery scheduler will contact you to discuss surgery dates.  She will send more preoperative information to your home.        Pelvic Laparoscopy  Laparoscopy is a type of surgery done using very small incisions. This type of surgery is possible because of the laparoscope (a long, slender tool with a camera and light). It lets your surgeon see inside the stomach. To perform the surgery, special instruments are inserted into the stomach through small incisions. Pelvic laparoscopy is often used to diagnose and treat the causes of pelvic problems, such as pain and infertility. Laparoscopy often involves:     Using a laparoscope, the surgeon is able to view images of the inside of the abdomen on a video monitor.   · A short hospital stay. (You can most likely go home the same day.)  · A quick recovery  · Minimal anesthesia  · Small external scars  · Mild to moderate postoperative pain  Getting ready  To prepare for surgery:  · Tell your surgeon about any medicines you take. Include herbs, supplements, and over-the-counter medications. You may need to stop taking certain medicines, such as aspirin, for 7 to 10 days before surgery.  · Do not eat or drink anything after the midnight before surgery.  · Arrange for a ride home after surgery.  Before the procedure  You will most likely be given general anesthesia to make you sleep during the procedure. A catheter may be inserted to drain urine from the bladder.   How pelvic laparoscopy is done  One or more small (quarter- or half-inch) incisions are made near the navel or the pubic hairline, or on either side of the lower abdomen. The laparoscope is inserted through an incision. It sends images to a video screen, allowing the surgeon a close-up view of the organs. Gas is used to inflate the abdomen, allowing the surgeon room to see and work. Depending on what is found, surgery to treat the problem may be done at this  [employer] message sent with below instructions.     ----- Message from Edy Reese MD sent at 8/2/2022  2:47 PM CDT -----  Please increase prograf to 6 mg PO bid      time.  After the procedure  · You’ll be taken to a post-op area to wake up and recover from anesthesia.  · You may feel some shoulder pain. This is due to irritation from the gas used to inflate the abdomen.  · You may have some discharge from the vagina. If so, ask the nurse for a pad.  · You will be asked to walk around to improve breathing and blood flow.  · If you had a catheter, it will most likely be removed before you go home.  · You can go home as soon as you recover from anesthesia and your condition is stable.  Your recovery  Recovery time depends on which procedure was done through the laparoscope. Your recovery from pelvic laparoscopy may take up to 6 weeks. If it was a simple procedure, like tubal ligation, then 2 weeks is reasonable. For laparoscopic hysterectomies, the recovery may be closer to 6 weeks. While you recover, be sure to follow your health care provider’s instructions. During this time:  · Take pain medicine as prescribed.  · Start eating solid food when you feel ready. To avoid constipation, eat fruits, vegetables, and whole grains. Drink plenty of fluids.  · Don’t lift anything heavy until your health care provider says it’s safe.  · Take it easy for a few days. Ask your health care provider when you can return to work, exercise, and sex.  · Arrange for a follow-up visit with your health care provider to discuss the results of the procedure.  Call your health care provider  Contact your health care provider right away if you have any of the following:  · Have chills, or a fever of 100.4°F (38°C) or higher  · Notice that the incision is red, swollen, or draining  · Have heavy, bright-red vaginal bleeding or a smelly discharge  · Have difficulty urinating  · Experience severe abdominal pain or bloating  · Have leg pain, redness, or swelling  · Have persistent nausea or vomiting  · Are not improving daily  · Fainting  · Trouble breathing   © 4815-0038 [employer]. [address] [place] [address]  [place] [address]. All rights reserved. This information is not intended as a substitute for professional medical care. Always follow your healthcare professional's instructions.         Patient Education     Ovarian Cysts    Ovarian cysts are sacs filled with fluid or tissue that form on or inside the ovaries. The ovaries are two small organs located on each side of a woman’s uterus (womb). They are part of the female reproductive system.  Ovarian cysts are common in women, especially during childbearing years. There are different types of cysts. Most are harmless (benign) and go away on their own. They often cause no symptoms. If symptoms do occur, they can include mild pain or pressure in the lower belly (abdomen).  Cysts that are large or break (rupture) may cause more severe pain and symptoms. In these cases, hospital care or treatment such as surgery may be needed. More extensive treatment may also be needed if a cyst causes an ovary to twist (called torsion) or if a cyst is suspected to be cancerous. Keep in mind that most cysts are not cancerous, however.  General care  · To help relieve pain, your healthcare provider may recommend using over-the-counter pain medicine. If needed, stronger pain medicine may be prescribed.  · Depending on the type of cyst you have, your healthcare provider may advise taking birth control pills. These help shrink cysts in certain cases. They may also help prevent new cysts from forming. Be sure to take these medicines as directed if they are prescribed.  · Your healthcare provider may advise you to watch your symptoms over time to see if they go away or worsen. Regular ultrasound tests may also be advised. These can help check if a cyst goes away or grows in size.  Follow-up care  Follow up with your healthcare provider, or as advised.  When to seek medical advice  Call your healthcare provider right away if any of these occur:  · Pain worsens or fails to get better  with home treatment  · Fever of 100.4°F (38°C) or higher (or other fever amount directed by your healthcare provider)  · Nausea and vomiting  · Weakness, dizziness, or fainting  · Abnormal vaginal bleeding  © 5879-9834 [employer]. [address] [place] [address], [place] [address]. All rights reserved. This information is not intended as a substitute for professional medical care. Always follow your healthcare professional's instructions.

## 2022-08-02 NOTE — TELEPHONE ENCOUNTER
[employer] message sent with below instructions. Has labs scheduled for 8/4 will add BNP and urine studies.     ----- Message from Edy Reese MD sent at 8/2/2022  9:25 AM CDT -----  Does he have any appointment to be seen in clinic. We need to see him to assess volume status. See if we can add a BNP (brain natriuretic peptide) and urine lytes (sodium potassium chloride)   I see his sodium is high and he is on lasix. Also he needs to follow up with his local nephrologist.  Will repeat the BMP for completeness

## 2022-08-03 RX ORDER — TACROLIMUS 1 MG/1
CAPSULE ORAL
Qty: 360 CAPSULE | Refills: 11 | Status: SHIPPED | OUTPATIENT
Start: 2022-08-03 | End: 2022-09-06

## 2022-08-04 ENCOUNTER — LAB VISIT (OUTPATIENT)
Dept: LAB | Facility: HOSPITAL | Age: 72
End: 2022-08-04
Attending: INTERNAL MEDICINE
Payer: MEDICARE

## 2022-08-04 ENCOUNTER — PATIENT MESSAGE (OUTPATIENT)
Dept: PULMONOLOGY | Facility: CLINIC | Age: 72
End: 2022-08-04
Payer: MEDICARE

## 2022-08-04 DIAGNOSIS — Z94.0 KIDNEY REPLACED BY TRANSPLANT: ICD-10-CM

## 2022-08-04 LAB
ALBUMIN SERPL BCP-MCNC: 3.6 G/DL (ref 3.5–5.2)
ANION GAP SERPL CALC-SCNC: 10 MMOL/L (ref 8–16)
BASOPHILS # BLD AUTO: 0.01 K/UL (ref 0–0.2)
BASOPHILS NFR BLD: 0.2 % (ref 0–1.9)
BNP SERPL-MCNC: 351 PG/ML (ref 0–99)
BUN SERPL-MCNC: 58 MG/DL (ref 8–23)
CALCIUM SERPL-MCNC: 8.9 MG/DL (ref 8.7–10.5)
CHLORIDE SERPL-SCNC: 113 MMOL/L (ref 95–110)
CO2 SERPL-SCNC: 21 MMOL/L (ref 23–29)
CREAT SERPL-MCNC: 4.1 MG/DL (ref 0.5–1.4)
DIFFERENTIAL METHOD: ABNORMAL
EOSINOPHIL # BLD AUTO: 0 K/UL (ref 0–0.5)
EOSINOPHIL NFR BLD: 0 % (ref 0–8)
ERYTHROCYTE [DISTWIDTH] IN BLOOD BY AUTOMATED COUNT: 17.8 % (ref 11.5–14.5)
EST. GFR  (NO RACE VARIABLE): 14.7 ML/MIN/1.73 M^2
GLUCOSE SERPL-MCNC: 145 MG/DL (ref 70–110)
HCT VFR BLD AUTO: 38.9 % (ref 40–54)
HGB BLD-MCNC: 11.9 G/DL (ref 14–18)
IMM GRANULOCYTES # BLD AUTO: 0.02 K/UL (ref 0–0.04)
IMM GRANULOCYTES NFR BLD AUTO: 0.4 % (ref 0–0.5)
LYMPHOCYTES # BLD AUTO: 1.2 K/UL (ref 1–4.8)
LYMPHOCYTES NFR BLD: 21.2 % (ref 18–48)
MAGNESIUM SERPL-MCNC: 1.9 MG/DL (ref 1.6–2.6)
MCH RBC QN AUTO: 27.8 PG (ref 27–31)
MCHC RBC AUTO-ENTMCNC: 30.6 G/DL (ref 32–36)
MCV RBC AUTO: 91 FL (ref 82–98)
MONOCYTES # BLD AUTO: 0.3 K/UL (ref 0.3–1)
MONOCYTES NFR BLD: 4.8 % (ref 4–15)
NEUTROPHILS # BLD AUTO: 4.1 K/UL (ref 1.8–7.7)
NEUTROPHILS NFR BLD: 73.4 % (ref 38–73)
NRBC BLD-RTO: 0 /100 WBC
PHOSPHATE SERPL-MCNC: 4.6 MG/DL (ref 2.7–4.5)
PLATELET # BLD AUTO: 130 K/UL (ref 150–450)
PMV BLD AUTO: 11.1 FL (ref 9.2–12.9)
POTASSIUM SERPL-SCNC: 4.7 MMOL/L (ref 3.5–5.1)
RBC # BLD AUTO: 4.28 M/UL (ref 4.6–6.2)
SODIUM SERPL-SCNC: 144 MMOL/L (ref 136–145)
WBC # BLD AUTO: 5.57 K/UL (ref 3.9–12.7)

## 2022-08-04 PROCEDURE — 80069 RENAL FUNCTION PANEL: CPT | Performed by: INTERNAL MEDICINE

## 2022-08-04 PROCEDURE — 83735 ASSAY OF MAGNESIUM: CPT | Performed by: INTERNAL MEDICINE

## 2022-08-04 PROCEDURE — 36415 COLL VENOUS BLD VENIPUNCTURE: CPT | Mod: PO | Performed by: INTERNAL MEDICINE

## 2022-08-04 PROCEDURE — 83880 ASSAY OF NATRIURETIC PEPTIDE: CPT | Performed by: INTERNAL MEDICINE

## 2022-08-04 PROCEDURE — 80197 ASSAY OF TACROLIMUS: CPT | Performed by: INTERNAL MEDICINE

## 2022-08-04 PROCEDURE — 85025 COMPLETE CBC W/AUTO DIFF WBC: CPT | Performed by: INTERNAL MEDICINE

## 2022-08-05 ENCOUNTER — HOSPITAL ENCOUNTER (OUTPATIENT)
Facility: HOSPITAL | Age: 72
Discharge: HOME | End: 2022-08-06
Attending: EMERGENCY MEDICINE | Admitting: EMERGENCY MEDICINE
Payer: MEDICARE

## 2022-08-05 ENCOUNTER — TELEPHONE (OUTPATIENT)
Dept: TRANSPLANT | Facility: CLINIC | Age: 72
End: 2022-08-05
Payer: MEDICARE

## 2022-08-05 DIAGNOSIS — Z94.1 HEART TRANSPLANTED: ICD-10-CM

## 2022-08-05 DIAGNOSIS — R06.02 SHORTNESS OF BREATH: ICD-10-CM

## 2022-08-05 DIAGNOSIS — R55 NEAR SYNCOPE: ICD-10-CM

## 2022-08-05 DIAGNOSIS — N18.32 TYPE 2 DIABETES MELLITUS WITH STAGE 3B CHRONIC KIDNEY DISEASE, WITH LONG-TERM CURRENT USE OF INSULIN: Primary | Chronic | ICD-10-CM

## 2022-08-05 DIAGNOSIS — Z79.4 TYPE 2 DIABETES MELLITUS WITH STAGE 3B CHRONIC KIDNEY DISEASE, WITH LONG-TERM CURRENT USE OF INSULIN: Primary | Chronic | ICD-10-CM

## 2022-08-05 DIAGNOSIS — E11.22 TYPE 2 DIABETES MELLITUS WITH STAGE 3B CHRONIC KIDNEY DISEASE, WITH LONG-TERM CURRENT USE OF INSULIN: Primary | Chronic | ICD-10-CM

## 2022-08-05 DIAGNOSIS — R53.83 FATIGUE: ICD-10-CM

## 2022-08-05 DIAGNOSIS — R55 SYNCOPE: ICD-10-CM

## 2022-08-05 PROBLEM — B25.9 CYTOMEGALOVIRUS (CMV) VIREMIA: Status: ACTIVE | Noted: 2022-08-05

## 2022-08-05 LAB
ALBUMIN SERPL BCP-MCNC: 3.5 G/DL (ref 3.5–5.2)
ALLENS TEST: ABNORMAL
ALP SERPL-CCNC: 68 U/L (ref 55–135)
ALT SERPL W/O P-5'-P-CCNC: 52 U/L (ref 10–44)
ANION GAP SERPL CALC-SCNC: 11 MMOL/L (ref 8–16)
AST SERPL-CCNC: 27 U/L (ref 10–40)
BACTERIA #/AREA URNS AUTO: ABNORMAL /HPF
BASOPHILS # BLD AUTO: 0.02 K/UL (ref 0–0.2)
BASOPHILS # BLD AUTO: 0.03 K/UL (ref 0–0.2)
BASOPHILS NFR BLD: 0.3 % (ref 0–1.9)
BASOPHILS NFR BLD: 0.5 % (ref 0–1.9)
BILIRUB SERPL-MCNC: 0.4 MG/DL (ref 0.1–1)
BILIRUB UR QL STRIP: NEGATIVE
BNP SERPL-MCNC: 261 PG/ML (ref 0–99)
BUN SERPL-MCNC: 58 MG/DL (ref 8–23)
CALCIUM SERPL-MCNC: 8.5 MG/DL (ref 8.7–10.5)
CHLORIDE SERPL-SCNC: 108 MMOL/L (ref 95–110)
CLARITY UR REFRACT.AUTO: CLEAR
CO2 SERPL-SCNC: 20 MMOL/L (ref 23–29)
COLOR UR AUTO: YELLOW
CREAT SERPL-MCNC: 3.7 MG/DL (ref 0.5–1.4)
DIFFERENTIAL METHOD: ABNORMAL
DIFFERENTIAL METHOD: ABNORMAL
EOSINOPHIL # BLD AUTO: 0.1 K/UL (ref 0–0.5)
EOSINOPHIL # BLD AUTO: 0.1 K/UL (ref 0–0.5)
EOSINOPHIL NFR BLD: 0.8 % (ref 0–8)
EOSINOPHIL NFR BLD: 1.2 % (ref 0–8)
ERYTHROCYTE [DISTWIDTH] IN BLOOD BY AUTOMATED COUNT: 17.5 % (ref 11.5–14.5)
ERYTHROCYTE [DISTWIDTH] IN BLOOD BY AUTOMATED COUNT: 17.6 % (ref 11.5–14.5)
EST. GFR  (NO RACE VARIABLE): 16.6 ML/MIN/1.73 M^2
GLUCOSE SERPL-MCNC: 235 MG/DL (ref 70–110)
GLUCOSE UR QL STRIP: NEGATIVE
HCO3 UR-SCNC: 21.1 MMOL/L (ref 24–28)
HCT VFR BLD AUTO: 26.1 % (ref 40–54)
HCT VFR BLD AUTO: 26.7 % (ref 40–54)
HGB BLD-MCNC: 8.2 G/DL (ref 14–18)
HGB BLD-MCNC: 8.5 G/DL (ref 14–18)
HGB UR QL STRIP: NEGATIVE
HYALINE CASTS UR QL AUTO: 3 /LPF
IMM GRANULOCYTES # BLD AUTO: 0.02 K/UL (ref 0–0.04)
IMM GRANULOCYTES # BLD AUTO: 0.02 K/UL (ref 0–0.04)
IMM GRANULOCYTES NFR BLD AUTO: 0.3 % (ref 0–0.5)
IMM GRANULOCYTES NFR BLD AUTO: 0.3 % (ref 0–0.5)
KETONES UR QL STRIP: NEGATIVE
LEUKOCYTE ESTERASE UR QL STRIP: NEGATIVE
LYMPHOCYTES # BLD AUTO: 1.2 K/UL (ref 1–4.8)
LYMPHOCYTES # BLD AUTO: 1.3 K/UL (ref 1–4.8)
LYMPHOCYTES NFR BLD: 20.5 % (ref 18–48)
LYMPHOCYTES NFR BLD: 21.2 % (ref 18–48)
MCH RBC QN AUTO: 27.5 PG (ref 27–31)
MCH RBC QN AUTO: 27.9 PG (ref 27–31)
MCHC RBC AUTO-ENTMCNC: 31.4 G/DL (ref 32–36)
MCHC RBC AUTO-ENTMCNC: 31.8 G/DL (ref 32–36)
MCV RBC AUTO: 88 FL (ref 82–98)
MCV RBC AUTO: 88 FL (ref 82–98)
MICROSCOPIC COMMENT: ABNORMAL
MONOCYTES # BLD AUTO: 0.4 K/UL (ref 0.3–1)
MONOCYTES # BLD AUTO: 0.5 K/UL (ref 0.3–1)
MONOCYTES NFR BLD: 6.7 % (ref 4–15)
MONOCYTES NFR BLD: 7.7 % (ref 4–15)
NEUTROPHILS # BLD AUTO: 4.1 K/UL (ref 1.8–7.7)
NEUTROPHILS # BLD AUTO: 4.3 K/UL (ref 1.8–7.7)
NEUTROPHILS NFR BLD: 69.3 % (ref 38–73)
NEUTROPHILS NFR BLD: 71.2 % (ref 38–73)
NITRITE UR QL STRIP: NEGATIVE
NRBC BLD-RTO: 0 /100 WBC
NRBC BLD-RTO: 0 /100 WBC
PCO2 BLDA: 42 MMHG (ref 35–45)
PH SMN: 7.31 [PH] (ref 7.35–7.45)
PH UR STRIP: 5 [PH] (ref 5–8)
PLATELET # BLD AUTO: 158 K/UL (ref 150–450)
PLATELET # BLD AUTO: 171 K/UL (ref 150–450)
PMV BLD AUTO: 10.3 FL (ref 9.2–12.9)
PMV BLD AUTO: 10.7 FL (ref 9.2–12.9)
PO2 BLDA: 28 MMHG (ref 40–60)
POC BE: -5 MMOL/L
POC SATURATED O2: 47 % (ref 95–100)
POC TCO2: 22 MMOL/L (ref 24–29)
POCT GLUCOSE: 213 MG/DL (ref 70–110)
POCT GLUCOSE: 291 MG/DL (ref 70–110)
POTASSIUM SERPL-SCNC: 4 MMOL/L (ref 3.5–5.1)
PROT SERPL-MCNC: 5.3 G/DL (ref 6–8.4)
PROT UR QL STRIP: ABNORMAL
RBC # BLD AUTO: 2.98 M/UL (ref 4.6–6.2)
RBC # BLD AUTO: 3.05 M/UL (ref 4.6–6.2)
RBC #/AREA URNS AUTO: 1 /HPF (ref 0–4)
SAMPLE: ABNORMAL
SITE: ABNORMAL
SODIUM SERPL-SCNC: 139 MMOL/L (ref 136–145)
SP GR UR STRIP: 1.01 (ref 1–1.03)
SQUAMOUS #/AREA URNS AUTO: 0 /HPF
TACROLIMUS BLD-MCNC: 3.8 NG/ML (ref 5–15)
TROPONIN I SERPL DL<=0.01 NG/ML-MCNC: 0.02 NG/ML (ref 0–0.03)
URN SPEC COLLECT METH UR: ABNORMAL
WBC # BLD AUTO: 5.95 K/UL (ref 3.9–12.7)
WBC # BLD AUTO: 6 K/UL (ref 3.9–12.7)
WBC #/AREA URNS AUTO: 4 /HPF (ref 0–5)

## 2022-08-05 PROCEDURE — 63600175 PHARM REV CODE 636 W HCPCS: Performed by: STUDENT IN AN ORGANIZED HEALTH CARE EDUCATION/TRAINING PROGRAM

## 2022-08-05 PROCEDURE — 93010 ELECTROCARDIOGRAM REPORT: CPT | Mod: ,,, | Performed by: INTERNAL MEDICINE

## 2022-08-05 PROCEDURE — G0378 HOSPITAL OBSERVATION PER HR: HCPCS

## 2022-08-05 PROCEDURE — 63600175 PHARM REV CODE 636 W HCPCS: Performed by: EMERGENCY MEDICINE

## 2022-08-05 PROCEDURE — 93005 ELECTROCARDIOGRAM TRACING: CPT

## 2022-08-05 PROCEDURE — 83880 ASSAY OF NATRIURETIC PEPTIDE: CPT | Performed by: EMERGENCY MEDICINE

## 2022-08-05 PROCEDURE — 84484 ASSAY OF TROPONIN QUANT: CPT | Performed by: NURSE PRACTITIONER

## 2022-08-05 PROCEDURE — 85025 COMPLETE CBC W/AUTO DIFF WBC: CPT | Mod: 91 | Performed by: EMERGENCY MEDICINE

## 2022-08-05 PROCEDURE — 96361 HYDRATE IV INFUSION ADD-ON: CPT

## 2022-08-05 PROCEDURE — 99285 PR EMERGENCY DEPT VISIT,LEVEL V: ICD-10-PCS | Mod: ,,, | Performed by: EMERGENCY MEDICINE

## 2022-08-05 PROCEDURE — 80053 COMPREHEN METABOLIC PANEL: CPT | Performed by: NURSE PRACTITIONER

## 2022-08-05 PROCEDURE — 99900035 HC TECH TIME PER 15 MIN (STAT)

## 2022-08-05 PROCEDURE — 99285 EMERGENCY DEPT VISIT HI MDM: CPT | Mod: ,,, | Performed by: EMERGENCY MEDICINE

## 2022-08-05 PROCEDURE — 82803 BLOOD GASES ANY COMBINATION: CPT

## 2022-08-05 PROCEDURE — 99215 OFFICE O/P EST HI 40 MIN: CPT | Mod: GC,,, | Performed by: INTERNAL MEDICINE

## 2022-08-05 PROCEDURE — 85025 COMPLETE CBC W/AUTO DIFF WBC: CPT | Performed by: NURSE PRACTITIONER

## 2022-08-05 PROCEDURE — 99285 EMERGENCY DEPT VISIT HI MDM: CPT | Mod: 25

## 2022-08-05 PROCEDURE — 80197 ASSAY OF TACROLIMUS: CPT | Performed by: EMERGENCY MEDICINE

## 2022-08-05 PROCEDURE — 99215 PR OFFICE/OUTPT VISIT, EST, LEVL V, 40-54 MIN: ICD-10-PCS | Mod: GC,,, | Performed by: INTERNAL MEDICINE

## 2022-08-05 PROCEDURE — 81001 URINALYSIS AUTO W/SCOPE: CPT | Performed by: NURSE PRACTITIONER

## 2022-08-05 PROCEDURE — 93010 EKG 12-LEAD: ICD-10-PCS | Mod: ,,, | Performed by: INTERNAL MEDICINE

## 2022-08-05 RX ORDER — SODIUM CHLORIDE 0.9 % (FLUSH) 0.9 %
10 SYRINGE (ML) INJECTION
Status: DISCONTINUED | OUTPATIENT
Start: 2022-08-05 | End: 2022-08-06 | Stop reason: HOSPADM

## 2022-08-05 RX ORDER — MYCOPHENOLATE MOFETIL 250 MG/1
750 CAPSULE ORAL 2 TIMES DAILY
Status: DISCONTINUED | OUTPATIENT
Start: 2022-08-05 | End: 2022-08-05

## 2022-08-05 RX ORDER — MYCOPHENOLATE MOFETIL 250 MG/1
500 CAPSULE ORAL 2 TIMES DAILY
Status: DISCONTINUED | OUTPATIENT
Start: 2022-08-05 | End: 2022-08-06 | Stop reason: HOSPADM

## 2022-08-05 RX ORDER — TALC
6 POWDER (GRAM) TOPICAL NIGHTLY PRN
Status: DISCONTINUED | OUTPATIENT
Start: 2022-08-05 | End: 2022-08-06 | Stop reason: HOSPADM

## 2022-08-05 RX ADMIN — SODIUM CHLORIDE, SODIUM LACTATE, POTASSIUM CHLORIDE, AND CALCIUM CHLORIDE 500 ML: .6; .31; .03; .02 INJECTION, SOLUTION INTRAVENOUS at 07:08

## 2022-08-05 RX ADMIN — MYCOPHENOLATE MOFETIL 500 MG: 250 CAPSULE ORAL at 10:08

## 2022-08-05 NOTE — ED PROVIDER NOTES
Encounter Date: 2022       History     Chief Complaint   Patient presents with    Fatigue     Got weak and almost passed out hit the floor, denies injury, heart and kidney xplant in      72M with PMH kidney and heart txp in , CKD3, HTN, HLD, T2DM presenting with . Pt reports he was admitted ten days ago for renal failure with Cr from 2 to 11, then improved to 4.3 after 1x HD on . Since discharge pt endorses weakness, b/l LE edema, SOB worse with exertion, lightheadedness worse with ambulation. Today he felt globally weakened and lightheaded while standing and urinating, then syncopized. Does not recall striking head, denying HA, neck or back pain, denies F/C, N/V/D, abd pain, chest pain, cough.             Review of patient's allergies indicates:  No Known Allergies  Past Medical History:   Diagnosis Date    Allergic rhinitis 2013    Blood transfusion     Cataract     CKD (chronic kidney disease), stage III 2014    -donor kidney transplant     Diabetes mellitus, type 2 2013    Gout, arthritis 2013    Heart attack     Heart transplanted     Hyperlipidemia 2013    Hypertension     Immunodeficiency due to treatment with immunosuppressive medication     Morbid obesity 2013    Organ transplant 2002    heart and kidney    Orthostatic syncope 2022    Due to furosemide    Pneumonia due to COVID-19 virus 2022    Prophylactic immunotherapy     Renal manifestation of secondary diabetes mellitus      Past Surgical History:   Procedure Laterality Date    CATARACT EXTRACTION Bilateral     COLONOSCOPY N/A 10/6/2020    Procedure: COLONOSCOPY;  Surgeon: Conner Redman MD;  Location: [facility];  Service: Endoscopy;  Laterality: N/A;    EYE SURGERY      HEART TRANSPLANT      KIDNEY TRANSPLANT      REVISION TOTAL HIP ARTHROPLASTY       Family History   Problem Relation Age of Onset    Stroke Mother     Hyperlipidemia Sister     Diabetes Brother      Early death Brother     Heart disease Brother     Hyperlipidemia Brother     Hypertension Brother     Stroke Brother     Kidney disease Son     Diabetes Maternal Grandmother     Melanoma Neg Hx     Eczema Neg Hx     Lupus Neg Hx     Psoriasis Neg Hx      Social History     Tobacco Use    Smoking status: Former Smoker     Packs/day: 1.00     Years: 20.00     Pack years: 20.00     Types: Cigarettes     Quit date: 1984     Years since quittin.1    Smokeless tobacco: Never Used   Substance Use Topics    Alcohol use: Yes     Alcohol/week: 1.0 standard drink     Types: 1 Cans of beer per week     Comment: daily    Drug use: No     Review of Systems   Constitutional: Positive for fatigue. Negative for chills, diaphoresis and fever.   HENT: Negative for congestion, rhinorrhea and sore throat.    Eyes: Negative for visual disturbance.        Neg vision changes   Respiratory: Positive for shortness of breath. Negative for cough and chest tightness.    Cardiovascular: Negative for chest pain and leg swelling.   Gastrointestinal: Negative for abdominal pain, diarrhea, nausea and vomiting.        Neg changes in stool   Genitourinary: Negative for decreased urine volume, dysuria, flank pain, frequency and urgency.        Neg changes in urination   Musculoskeletal: Negative for arthralgias, back pain, myalgias and neck pain.   Skin: Negative for color change, pallor and wound.   Allergic/Immunologic: Negative for immunocompromised state.   Neurological: Positive for syncope, weakness and light-headedness. Negative for dizziness, speech difficulty and headaches.   Hematological: Does not bruise/bleed easily.   Psychiatric/Behavioral: Negative for confusion.       Physical Exam     Initial Vitals [22 1502]   BP Pulse Resp Temp SpO2   124/63 77 18 98.5 °F (36.9 °C) 98 %      MAP       --         Physical Exam    Nursing note and vitals reviewed.  Constitutional: He appears well-developed and  well-nourished. He is not diaphoretic. No distress.   HENT:   Head: Normocephalic and atraumatic.   Nose: Nose normal.   Eyes: Conjunctivae and EOM are normal. Pupils are equal, round, and reactive to light. No scleral icterus.   Neck: Neck supple.   Normal range of motion.  Cardiovascular: Normal rate and regular rhythm.   No murmur heard.  Pulmonary/Chest: Breath sounds normal. No respiratory distress. He has no wheezes. He has no rhonchi. He has no rales. He exhibits no tenderness.   Abdominal: Abdomen is soft. Bowel sounds are normal. He exhibits no distension. There is no abdominal tenderness.   Musculoskeletal:         General: No edema. Normal range of motion.      Cervical back: Normal range of motion and neck supple.      Comments: No midline CTL TTP     Neurological: He is alert and oriented to person, place, and time. He has normal strength. No cranial nerve deficit or sensory deficit.   No truncal ataxia   Skin: Skin is warm and dry. Capillary refill takes less than 2 seconds. No rash noted. No pallor.   Psychiatric: He has a normal mood and affect. His behavior is normal. Judgment and thought content normal.         ED Course   Procedures  Labs Reviewed   CBC W/ AUTO DIFFERENTIAL - Abnormal; Notable for the following components:       Result Value    RBC 3.05 (*)     Hemoglobin 8.5 (*)     Hematocrit 26.7 (*)     MCHC 31.8 (*)     RDW 17.6 (*)     All other components within normal limits   COMPREHENSIVE METABOLIC PANEL - Abnormal; Notable for the following components:    CO2 20 (*)     Glucose 235 (*)     BUN 58 (*)     Creatinine 3.7 (*)     Calcium 8.5 (*)     Total Protein 5.3 (*)     ALT 52 (*)     eGFR 16.6 (*)     All other components within normal limits   B-TYPE NATRIURETIC PEPTIDE - Abnormal; Notable for the following components:     (*)     All other components within normal limits   CBC W/ AUTO DIFFERENTIAL - Abnormal; Notable for the following components:    RBC 2.98 (*)      Hemoglobin 8.2 (*)     Hematocrit 26.1 (*)     MCHC 31.4 (*)     RDW 17.5 (*)     All other components within normal limits    Narrative:     *please repeat*   POCT GLUCOSE - Abnormal; Notable for the following components:    POCT Glucose 291 (*)     All other components within normal limits   ISTAT PROCEDURE - Abnormal; Notable for the following components:    POC PH 7.309 (*)     POC PO2 28 (*)     POC HCO3 21.1 (*)     POC SATURATED O2 47 (*)     POC TCO2 22 (*)     All other components within normal limits   TROPONIN I        ECG Results          EKG 12-lead (Final result)  Result time 08/06/22 07:45:33    Final result by Interface, Lab In [facility] (08/06/22 07:45:33)                 Narrative:    Test Reason : R55,    Vent. Rate : 077 BPM     Atrial Rate : 077 BPM     P-R Int : 168 ms          QRS Dur : 158 ms      QT Int : 444 ms       P-R-T Axes : -03 009 024 degrees     QTc Int : 502 ms    Normal sinus rhythm  Right bundle branch block  Abnormal ECG  When compared with ECG of 20-JUL-2022 22:22,  No significant change was found  Confirmed by KT GREEN MD ([id number]) on 8/6/2022 7:45:20 AM    Referred By:             Confirmed By:KT GREEN MD                            Imaging Results          CT Head Without Contrast (Final result)  Result time 08/05/22 20:48:14    Final result by Júnior Renee MD (08/05/22 20:48:14)                 Impression:      Allowing for artifact, no evidence of acute intracranial pathology.    Sequela of chronic microvascular ischemic change and senescent change.    Electronically signed by resident: Marcelo Blandon  Date:    08/05/2022  Time:    20:31    Electronically signed by: Júnior Renee MD  Date:    08/05/2022  Time:    20:48             Narrative:    EXAMINATION:  CT HEAD WITHOUT CONTRAST    CLINICAL HISTORY:  Head trauma, minor (Age >= 65y);    TECHNIQUE:  Low dose axial CT images obtained throughout the head without the use of intravenous contrast.  Axial,  sagittal and coronal reconstructions were performed.    COMPARISON:  None.    FINDINGS:  Examination is degraded by patient motion artifact.    There is generalized cerebral volume loss.  There is hypoattenuation in a periventricular fashion, likely sequela of chronic microvascular ischemic change.    No parenchymal mass, hemorrhage, edema or major vascular distribution infarct.  There is sulcal megaly involving the inferior aspect of the left occipital lobe, reflect sequela of previous infarct.    No extra-axial blood or fluid collections.    Skull/extracranial contents (limited evaluation):    No fracture. Mastoid air cells and paranasal sinuses are essentially clear.                               X-Ray Chest AP Portable (Final result)  Result time 08/05/22 16:56:30    Final result by Júnior Renee MD (08/05/22 16:56:30)                 Impression:      1. Interstitial findings are likely accentuated by shallow inspiratory effort and habitus, minimal edema is consideration.  No large focal consolidation.      Electronically signed by: Júnior Renee MD  Date:    08/05/2022  Time:    16:56             Narrative:    EXAMINATION:  XR CHEST AP PORTABLE    CLINICAL HISTORY:  Shortness of breath    TECHNIQUE:  Single frontal view of the chest was performed.    COMPARISON:  07/22/2022    FINDINGS:  The cardiomediastinal silhouette is not enlarged, stable..  There is no pleural effusion.  The trachea is midline.  The lungs are symmetrically expanded bilaterally with mildly coarse interstitial attenuation projected over the bilateral lower lung zones, accentuated by habitus and shallow inspiratory effort..  No large focal consolidation seen.  There is no pneumothorax.  The osseous structures are remarkable for degenerative changes..                                 Medications   lactated ringers bolus 500 mL (0 mLs Intravenous Stopped 8/5/22 2014)   epoetin felipe injection 10,000 Units (10,000 Units Intravenous Given  8/6/22 0527)     Medical Decision Making:   Initial Assessment:   Pt is nontoxic appearing, no FNDs and no signs of trauma, but endorsing global weakness that could be related to pt's prior renal and cardiac txp  Differential Diagnosis:   Electrolyte abnormality, volume overload, hyperkalemia, renal failure, rejection, CHF, ACS, do not suspect ICH or neck fx given no visible trauma or TTP  Independently Interpreted Test(s):   I have ordered and independently interpreted EKG Reading(s) - see summary below       <> Summary of EKG Reading(s): Normal sinus rhythm rate 77, right bundle branch block, no change from prior  Clinical Tests:   Lab Tests: Ordered and Reviewed  Radiological Study: Ordered and Reviewed  Medical Tests: Ordered and Reviewed  ED Management:  Pt seen at bedside by KTM, they recommend admitting for rehydration.  Other:   I have discussed this case with another health care provider.             ED Course as of 08/06/22 1842   Fri Aug 05, 2022   1712 Hemoglobin(!): 8.5  Decreased from 11.9 yesterday, will recheck [JR]   1712 Glucose(!): 235  Did not take insulin [JR]   1712 Creatinine(!): 3.7  improved [JR]   1737 BNP(!): 261  improved [JR]   1737 Troponin I: 0.020 [JR]      ED Course User Index  [JR] Cece Valadez MD             Clinical Impression:   Final diagnoses:  [R55] Near syncope  [R53.83] Fatigue  [R06.02] Shortness of breath  [R55] Syncope          ED Disposition Condition    Observation               Cece Valadez MD  08/06/22 1842

## 2022-08-05 NOTE — FIRST PROVIDER EVALUATION
Emergency Department TeleTriage Encounter Note      CHIEF COMPLAINT    Chief Complaint   Patient presents with    Fatigue     Got weak and almost passed out hit the floor, denies injury, heart and kidney xplant in 2002       VITAL SIGNS   Initial Vitals [08/05/22 1502]   BP Pulse Resp Temp SpO2   124/63 77 18 98.5 °F (36.9 °C) 98 %      MAP       --            ALLERGIES    Review of patient's allergies indicates:  No Known Allergies    PROVIDER TRIAGE NOTE  This is a teletriage evaluation of a 72 y.o. male presenting to the ED complaining of generalized weakness. States around 0930 this morning he was standing in the bathroom when he became weak, fell to the floor, and nearly passed out. Denies head injury and LOC.  Pt states that he continues to feel weak if he walks around but feels normal while sitting in a chair. Denies CP, SOB, headache, focal weakness, and decreased oral intake.     Well-appearing, no distress.     Initial orders will be placed and care will be transferred to an alternate provider when patient is roomed for a full evaluation. Any additional orders and the final disposition will be determined by that provider.           ORDERS  Labs Reviewed   CBC W/ AUTO DIFFERENTIAL   COMPREHENSIVE METABOLIC PANEL   TROPONIN I   URINALYSIS, REFLEX TO URINE CULTURE   POCT GLUCOSE MONITORING CONTINUOUS       ED Orders (720h ago, onward)    Start Ordered     Status Ordering Provider    08/05/22 1600 08/05/22 1515  Vital Signs  Every 2 hours         Ordered MARY NEWSOME N.    08/05/22 1516 08/05/22 1515  Orthostatic vital signs  Once         Ordered MARY NEWSOME N.    08/05/22 1515 08/05/22 1515  CBC auto differential  STAT         Ordered MARY NEWSOME N.    08/05/22 1515 08/05/22 1515  Comprehensive metabolic panel  STAT         Ordered MARY NEWSOME N.    08/05/22 1515 08/05/22 1515  Insert Saline lock IV  Once         Ordered MARY NEWSOME N.    08/05/22 1515  08/05/22 1515  EKG 12-lead  Once         Ordered CARMENOTTIKERE MARY N.    08/05/22 1515 08/05/22 1515  POCT glucose  Once         Ordered CARMENOTTIKERE MARY N.    08/05/22 1515 08/05/22 1515  Cardiac Monitoring - Adult  Continuous        Comments: Notify Physician If:    Ordered CARMENOTTELCARRIEDEONTE MARY N.    08/05/22 1515 08/05/22 1515  Pulse Oximetry Continuous  Continuous         Ordered CARMENOTTIKERDEONTE MARY N.    08/05/22 1515 08/05/22 1515  Troponin I  STAT         Ordered CARMENOTTWINIFREDTTDEONTE MARY N.    08/05/22 1515 08/05/22 1515  Urinalysis, Reflex to Urine Culture Urine, Clean Catch  STAT         Ordered JEROD MARY N.    08/05/22 1505 08/05/22 1505  EKG 12-lead  Once         Completed by KIM JARAMILLO on 8/5/2022 at  3:12 PM [employer]            Virtual Visit Note: The provider triage portion of this emergency department evaluation and documentation was performed via [employer], a HIPAA-compliant telemedicine application, in concert with a tele-presenter in the room. A face to face patient evaluation with one of my colleagues will occur once the patient is placed in an emergency department room.      DISCLAIMER: This note was prepared with [email or web address] voice recognition transcription software. Garbled syntax, mangled pronouns, and other bizarre constructions may be attributed to that software system.

## 2022-08-05 NOTE — TELEPHONE ENCOUNTER
Patient coming to ED for evaluation    ----- Message from Edy Reese MD sent at 8/5/2022  8:12 AM CDT -----  Increase prograf to 7 mg PO bid

## 2022-08-05 NOTE — TELEPHONE ENCOUNTER
Returned patient call. he states that he is weak and shaky and he did fall this morning trying to get up and move around the house. he also feels like he is very winded upon exertion. he has no swelling and he is eating/drinking ok. he did not hit his head when he fell and is urinating normally. Dr. Reese notified and gave instructions to have patient present to the ED. Informed patient to present to [facility] ED if possible. If not, can present to nearest ED. Patient states he would call coordinator back.       ----- Message from Silvia Davenport sent at 8/5/2022 10:06 AM CDT -----  Regarding: Patient update  Patient calling to speak to staff regarding conditions he's experiencing. Patient states he is feeling dehydrated, shaky hands, weakness and he fell this morning. He thinks it's because of the medication.    Patient is also trying to reschedule labs to [facility] location with infusion or make infusion at 9:30AM.       Call: [phone] (Home

## 2022-08-06 VITALS
HEART RATE: 89 BPM | RESPIRATION RATE: 18 BRPM | BODY MASS INDEX: 37.6 KG/M2 | TEMPERATURE: 99 F | DIASTOLIC BLOOD PRESSURE: 60 MMHG | HEIGHT: 74 IN | OXYGEN SATURATION: 95 % | WEIGHT: 293 LBS | SYSTOLIC BLOOD PRESSURE: 125 MMHG

## 2022-08-06 PROBLEM — I10 PRIMARY HYPERTENSION: Chronic | Status: ACTIVE | Noted: 2022-07-19

## 2022-08-06 PROBLEM — J96.01 ACUTE HYPOXEMIC RESPIRATORY FAILURE: Status: RESOLVED | Noted: 2022-07-07 | Resolved: 2022-08-06

## 2022-08-06 PROBLEM — E11.40 DIABETIC NEUROPATHY ASSOCIATED WITH TYPE 2 DIABETES MELLITUS: Chronic | Status: ACTIVE | Noted: 2019-03-13

## 2022-08-06 PROBLEM — J12.82 PNEUMONIA DUE TO COVID-19 VIRUS: Status: RESOLVED | Noted: 2022-07-04 | Resolved: 2022-08-06

## 2022-08-06 PROBLEM — I95.1 ORTHOSTATIC SYNCOPE: Status: ACTIVE | Noted: 2022-08-05

## 2022-08-06 PROBLEM — D63.1 ANEMIA OF CHRONIC RENAL FAILURE, STAGE 3B: Chronic | Status: ACTIVE | Noted: 2022-07-05

## 2022-08-06 PROBLEM — U07.1 PNEUMONIA DUE TO COVID-19 VIRUS: Status: RESOLVED | Noted: 2022-07-04 | Resolved: 2022-08-06

## 2022-08-06 PROBLEM — N18.32 ANEMIA OF CHRONIC RENAL FAILURE, STAGE 3B: Chronic | Status: ACTIVE | Noted: 2022-07-05

## 2022-08-06 PROBLEM — I95.1 ORTHOSTATIC SYNCOPE: Status: RESOLVED | Noted: 2022-08-05 | Resolved: 2022-08-06

## 2022-08-06 LAB
ALBUMIN SERPL BCP-MCNC: 3.1 G/DL (ref 3.5–5.2)
ANION GAP SERPL CALC-SCNC: 8 MMOL/L (ref 8–16)
BASOPHILS # BLD AUTO: 0.02 K/UL (ref 0–0.2)
BASOPHILS NFR BLD: 0.5 % (ref 0–1.9)
BUN SERPL-MCNC: 53 MG/DL (ref 8–23)
CALCIUM SERPL-MCNC: 8.4 MG/DL (ref 8.7–10.5)
CHLORIDE SERPL-SCNC: 112 MMOL/L (ref 95–110)
CO2 SERPL-SCNC: 20 MMOL/L (ref 23–29)
CREAT SERPL-MCNC: 3.5 MG/DL (ref 0.5–1.4)
DIFFERENTIAL METHOD: ABNORMAL
EOSINOPHIL # BLD AUTO: 0.1 K/UL (ref 0–0.5)
EOSINOPHIL NFR BLD: 1.1 % (ref 0–8)
ERYTHROCYTE [DISTWIDTH] IN BLOOD BY AUTOMATED COUNT: 17.5 % (ref 11.5–14.5)
EST. GFR  (NO RACE VARIABLE): 17.8 ML/MIN/1.73 M^2
GLUCOSE SERPL-MCNC: 174 MG/DL (ref 70–110)
HCT VFR BLD AUTO: 27 % (ref 40–54)
HGB BLD-MCNC: 8.3 G/DL (ref 14–18)
IMM GRANULOCYTES # BLD AUTO: 0.02 K/UL (ref 0–0.04)
IMM GRANULOCYTES NFR BLD AUTO: 0.5 % (ref 0–0.5)
LYMPHOCYTES # BLD AUTO: 0.8 K/UL (ref 1–4.8)
LYMPHOCYTES NFR BLD: 17 % (ref 18–48)
MCH RBC QN AUTO: 27 PG (ref 27–31)
MCHC RBC AUTO-ENTMCNC: 30.7 G/DL (ref 32–36)
MCV RBC AUTO: 88 FL (ref 82–98)
MONOCYTES # BLD AUTO: 0.3 K/UL (ref 0.3–1)
MONOCYTES NFR BLD: 7.7 % (ref 4–15)
NEUTROPHILS # BLD AUTO: 3.2 K/UL (ref 1.8–7.7)
NEUTROPHILS NFR BLD: 73.2 % (ref 38–73)
NRBC BLD-RTO: 0 /100 WBC
PHOSPHATE SERPL-MCNC: 4.1 MG/DL (ref 2.7–4.5)
PLATELET # BLD AUTO: 165 K/UL (ref 150–450)
PMV BLD AUTO: 10.5 FL (ref 9.2–12.9)
POCT GLUCOSE: 208 MG/DL (ref 70–110)
POCT GLUCOSE: 260 MG/DL (ref 70–110)
POTASSIUM SERPL-SCNC: 4.5 MMOL/L (ref 3.5–5.1)
RBC # BLD AUTO: 3.07 M/UL (ref 4.6–6.2)
SODIUM SERPL-SCNC: 140 MMOL/L (ref 136–145)
TACROLIMUS BLD-MCNC: 3.8 NG/ML (ref 5–15)
TACROLIMUS BLD-MCNC: 5.5 NG/ML (ref 5–15)
WBC # BLD AUTO: 4.4 K/UL (ref 3.9–12.7)

## 2022-08-06 PROCEDURE — 80197 ASSAY OF TACROLIMUS: CPT | Performed by: STUDENT IN AN ORGANIZED HEALTH CARE EDUCATION/TRAINING PROGRAM

## 2022-08-06 PROCEDURE — 36415 COLL VENOUS BLD VENIPUNCTURE: CPT | Performed by: STUDENT IN AN ORGANIZED HEALTH CARE EDUCATION/TRAINING PROGRAM

## 2022-08-06 PROCEDURE — 80069 RENAL FUNCTION PANEL: CPT | Performed by: HOSPITALIST

## 2022-08-06 PROCEDURE — 96372 THER/PROPH/DIAG INJ SC/IM: CPT | Mod: 59 | Performed by: HOSPITALIST

## 2022-08-06 PROCEDURE — 63600175 PHARM REV CODE 636 W HCPCS: Performed by: HOSPITALIST

## 2022-08-06 PROCEDURE — 99220 PR INITIAL OBSERVATION CARE,LEVL III: ICD-10-PCS | Mod: ,,, | Performed by: HOSPITALIST

## 2022-08-06 PROCEDURE — 99220 PR INITIAL OBSERVATION CARE,LEVL III: CPT | Mod: ,,, | Performed by: HOSPITALIST

## 2022-08-06 PROCEDURE — C9399 UNCLASSIFIED DRUGS OR BIOLOG: HCPCS | Performed by: HOSPITALIST

## 2022-08-06 PROCEDURE — 85025 COMPLETE CBC W/AUTO DIFF WBC: CPT | Performed by: HOSPITALIST

## 2022-08-06 PROCEDURE — 25000003 PHARM REV CODE 250: Performed by: HOSPITALIST

## 2022-08-06 PROCEDURE — 96374 THER/PROPH/DIAG INJ IV PUSH: CPT

## 2022-08-06 PROCEDURE — 63600175 PHARM REV CODE 636 W HCPCS: Mod: JG,EC | Performed by: STUDENT IN AN ORGANIZED HEALTH CARE EDUCATION/TRAINING PROGRAM

## 2022-08-06 PROCEDURE — G0378 HOSPITAL OBSERVATION PER HR: HCPCS

## 2022-08-06 RX ORDER — ACETAMINOPHEN 325 MG/1
650 TABLET ORAL EVERY 6 HOURS PRN
Status: DISCONTINUED | OUTPATIENT
Start: 2022-08-06 | End: 2022-08-06 | Stop reason: HOSPADM

## 2022-08-06 RX ORDER — NAPROXEN SODIUM 220 MG/1
81 TABLET, FILM COATED ORAL DAILY
Status: DISCONTINUED | OUTPATIENT
Start: 2022-08-06 | End: 2022-08-06 | Stop reason: HOSPADM

## 2022-08-06 RX ORDER — HYDRALAZINE HYDROCHLORIDE 50 MG/1
50 TABLET, FILM COATED ORAL EVERY 8 HOURS
Status: DISCONTINUED | OUTPATIENT
Start: 2022-08-06 | End: 2022-08-06

## 2022-08-06 RX ORDER — TAMSULOSIN HYDROCHLORIDE 0.4 MG/1
0.4 CAPSULE ORAL DAILY
Status: DISCONTINUED | OUTPATIENT
Start: 2022-08-06 | End: 2022-08-06 | Stop reason: HOSPADM

## 2022-08-06 RX ORDER — NIFEDIPINE 30 MG/1
60 TABLET, EXTENDED RELEASE ORAL 2 TIMES DAILY
Status: DISCONTINUED | OUTPATIENT
Start: 2022-08-06 | End: 2022-08-06

## 2022-08-06 RX ORDER — GLUCAGON 1 MG
1 KIT INJECTION
Status: DISCONTINUED | OUTPATIENT
Start: 2022-08-06 | End: 2022-08-06 | Stop reason: HOSPADM

## 2022-08-06 RX ORDER — IBUPROFEN 200 MG
24 TABLET ORAL
Status: DISCONTINUED | OUTPATIENT
Start: 2022-08-06 | End: 2022-08-06 | Stop reason: HOSPADM

## 2022-08-06 RX ORDER — PREDNISONE 5 MG/1
5 TABLET ORAL DAILY
Status: DISCONTINUED | OUTPATIENT
Start: 2022-08-06 | End: 2022-08-06 | Stop reason: HOSPADM

## 2022-08-06 RX ORDER — SULFAMETHOXAZOLE AND TRIMETHOPRIM 400; 80 MG/1; MG/1
1 TABLET ORAL
Status: DISCONTINUED | OUTPATIENT
Start: 2022-08-08 | End: 2022-08-06 | Stop reason: HOSPADM

## 2022-08-06 RX ORDER — IBUPROFEN 200 MG
16 TABLET ORAL
Status: DISCONTINUED | OUTPATIENT
Start: 2022-08-06 | End: 2022-08-06 | Stop reason: HOSPADM

## 2022-08-06 RX ORDER — CALCIUM ACETATE 667 MG/1
1334 CAPSULE ORAL
Status: DISCONTINUED | OUTPATIENT
Start: 2022-08-06 | End: 2022-08-06 | Stop reason: HOSPADM

## 2022-08-06 RX ORDER — ATORVASTATIN CALCIUM 20 MG/1
40 TABLET, FILM COATED ORAL DAILY
Status: DISCONTINUED | OUTPATIENT
Start: 2022-08-06 | End: 2022-08-06 | Stop reason: HOSPADM

## 2022-08-06 RX ORDER — INSULIN ASPART 100 [IU]/ML
0-5 INJECTION, SOLUTION INTRAVENOUS; SUBCUTANEOUS
Status: DISCONTINUED | OUTPATIENT
Start: 2022-08-06 | End: 2022-08-06 | Stop reason: HOSPADM

## 2022-08-06 RX ADMIN — ERYTHROPOIETIN 10000 UNITS: 10000 INJECTION, SOLUTION INTRAVENOUS; SUBCUTANEOUS at 05:08

## 2022-08-06 RX ADMIN — TACROLIMUS 7 MG: 5 CAPSULE ORAL at 08:08

## 2022-08-06 RX ADMIN — PREDNISONE 5 MG: 5 TABLET ORAL at 08:08

## 2022-08-06 RX ADMIN — CALCIUM ACETATE 1334 MG: 667 CAPSULE ORAL at 08:08

## 2022-08-06 RX ADMIN — MYCOPHENOLATE MOFETIL 500 MG: 250 CAPSULE ORAL at 10:08

## 2022-08-06 RX ADMIN — TAMSULOSIN HYDROCHLORIDE 0.4 MG: 0.4 CAPSULE ORAL at 08:08

## 2022-08-06 RX ADMIN — ATORVASTATIN CALCIUM 40 MG: 20 TABLET, FILM COATED ORAL at 08:08

## 2022-08-06 RX ADMIN — INSULIN DETEMIR 32 UNITS: 100 INJECTION, SOLUTION SUBCUTANEOUS at 08:08

## 2022-08-06 RX ADMIN — METOPROLOL SUCCINATE 75 MG: 25 TABLET, EXTENDED RELEASE ORAL at 08:08

## 2022-08-06 RX ADMIN — ASPIRIN 81 MG CHEWABLE TABLET 81 MG: 81 TABLET CHEWABLE at 08:08

## 2022-08-06 NOTE — PROGRESS NOTES
Dc paperworks given to the pt along the instructions on appointments and medications, confirmed with provider for any additional instructions, no addictional instructions regarding appointments was received. Pt verbalized understanding. IV lines were removed. Tele removed. Called pharmacy for medications, no medications ordered. Pt left the unit ~1115am via wheelchair with transport.

## 2022-08-06 NOTE — SUBJECTIVE & OBJECTIVE
Subjective:     History of Present Illness:   72 year old male s/p HKT 2002 and recent acute and chronic ABMR who presented to ED after syncopal episode that occurred while urinating. He has associated shortness of breath on exertion. He also reports excessive urination and dry skin and overall dehydration. He says he has the least volume on him that he has had in the last 6 months. He also has significantly increased intention tremor. Started before, but worsened on tacrolimus.   In ED orthostatic with creatinine improved from prior.   Kidney transplant medicine consulted for aid in management of a transplanted kidney in patient with poor orthostatic response.  Past Medical and Surgical History: Mr. Albrecht has a past medical history of Allergic rhinitis (2013), Blood transfusion, Cataract, CKD (chronic kidney disease), stage III (2014), -donor kidney transplant, Diabetes mellitus, type 2 (2013), Gout, arthritis (2013), Heart attack, Heart transplanted, Hyperlipidemia (2013), Hypertension, Immunodeficiency due to treatment with immunosuppressive medication, Morbid obesity (2013), Organ transplant (), Prophylactic immunotherapy, and Renal manifestation of secondary diabetes mellitus.  He has a past surgical history that includes Kidney transplant; Heart transplant; Revision total hip arthroplasty; Eye surgery; Cataract extraction (Bilateral); and Colonoscopy (N/A, 10/6/2020).    Past Social and Family History: Mr. Albrecht reports that he quit smoking about 38 years ago. His smoking use included cigarettes. He has a 20.00 pack-year smoking history. He has never used smokeless tobacco. He reports current alcohol use of about 1.0 standard drink of alcohol per week. He reports that he does not use drugs.His family history includes Diabetes in his brother and maternal grandmother; Early death in his brother; Heart disease in his brother; Hyperlipidemia in his brother  "and sister; Hypertension in his brother; Kidney disease in his son; Stroke in his brother and mother.      Review of Systems   Constitutional:  Positive for activity change.   HENT:  Negative for trouble swallowing.    Respiratory:  Positive for shortness of breath.    Cardiovascular:  Negative for chest pain and leg swelling.   Gastrointestinal:  Negative for abdominal pain, constipation and diarrhea.   Genitourinary:  Negative for decreased urine volume, difficulty urinating, hematuria and urgency.   Neurological:  Positive for tremors, syncope and weakness.   Objective:     Vital Signs (Most Recent):  Temp: 98.5 °F (36.9 °C) (08/05/22 1502)  Pulse: 100 (08/05/22 1726)  Resp: 14 (08/05/22 1620)  BP: (!) 100/59 (08/05/22 1726)  SpO2: 100 % (08/05/22 1726)   Vital Signs (24h Range):  Temp:  [98.5 °F (36.9 °C)] 98.5 °F (36.9 °C)  Pulse:  [] 100  Resp:  [14-18] 14  SpO2:  [98 %-100 %] 100 %  BP: (100-155)/(59-80) 100/59     Weight: 132.9 kg (293 lb)  Height: 6' 2" (188 cm)  Body mass index is 37.62 kg/m².    Physical Exam  Constitutional:       General: He is not in acute distress.     Appearance: He is obese.   Eyes:      General: No scleral icterus.  Cardiovascular:      Rate and Rhythm: Normal rate.   Pulmonary:      Effort: Pulmonary effort is normal. No respiratory distress.   Abdominal:      General: There is no distension.      Palpations: Abdomen is soft.   Musculoskeletal:      Right lower leg: No edema.      Left lower leg: Edema present.   Skin:     Coloration: Skin is not jaundiced.   Neurological:      Mental Status: He is alert.       Significant Labs:  Labs within the past 24 hours have been reviewed.    Diagnostics:  None  "

## 2022-08-06 NOTE — ASSESSMENT & PLAN NOTE
"2002 now with BL sCr 2.0  Continue immunosuppression as per problem "Long-term use of immunosuppressant medication"    "

## 2022-08-06 NOTE — ASSESSMENT & PLAN NOTE
-Syncopal episode in this patient with hx of heart transplant. Occurred when standing and pt. Reports increased urine output and orthostatic symptoms. Pt. Also recently prescribed lasix and restarted on home BP meds hydralazine and nifedipine at discharge despite not reuqiring these while he was admitted recently  -Recent TTE 7/2022 shows no structural abnormalities, will not repeat  -Continue to monitor on telemetry for arrhythmia  -Monitor BP closely, hold home HTN meds nifedipine and hydralazine and consider discontinuing at discharge if not needed  -Cards consult given pt. History of heart transplant

## 2022-08-06 NOTE — PLAN OF CARE
Due to error in medication reconcilation on admission, Mr. Albrecht's tacrolimus dose was documented as still being 6 mg instead of 7 mg as Transplant clinic had increased it to. Nephrology pointed this out. I called Mr. Albrecht's cell phone but it went to voicemail. I called his wife's cell phone and advised her that he should take 7 mg, not 6 mg as it said on his discharge paperwork.

## 2022-08-06 NOTE — HPI
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 He lives in [place], [place]. He is . His primary care physician is Dr. Krystin Zimmerman.    He was hospitalized at [facility] from 2022 to 2022 for acute kidney injury. Kidney biopsy showed acute antibody mediated rejection. He was treated with PLEX. He was prescribed prednisone taper, prophylactic trimethoprim-sulfamethoxazole, tamsulosin, furosemide 40 mg twice daily, hydralazine 50 mg three times daily, calcium acetate with meals, and nystatin oral suspension.    He returned to [facility] Emergency Department on 2022 after feeling lightheaded while standing and passing out. He had been getting lightheaded when standing and had been urinating a lot since starting furosemide and was feeling dehydrated. Orthostatic vital signs showed a drop in blood pressure from 152/80 to 100/59 and increase in heart rate from 78 to 100 between supine and standing positions. He was given 500 mL of lactated Ringer's solution. He was admitted to Hospital Medicine Team A.

## 2022-08-06 NOTE — SUBJECTIVE & OBJECTIVE
Past Medical History:   Diagnosis Date    Allergic rhinitis 2013    Blood transfusion     Cataract     CKD (chronic kidney disease), stage III 2014    -donor kidney transplant     Diabetes mellitus, type 2 2013    Gout, arthritis 2013    Heart attack     Heart transplanted     Hyperlipidemia 2013    Hypertension     Immunodeficiency due to treatment with immunosuppressive medication     Morbid obesity 2013    Organ transplant 2002    heart and kidney    Prophylactic immunotherapy     Renal manifestation of secondary diabetes mellitus        Past Surgical History:   Procedure Laterality Date    CATARACT EXTRACTION Bilateral     COLONOSCOPY N/A 10/6/2020    Procedure: COLONOSCOPY;  Surgeon: Conner Redman MD;  Location: [facility];  Service: Endoscopy;  Laterality: N/A;    EYE SURGERY      HEART TRANSPLANT      KIDNEY TRANSPLANT      REVISION TOTAL HIP ARTHROPLASTY         Review of patient's allergies indicates:  No Known Allergies    No current facility-administered medications on file prior to encounter.     Current Outpatient Medications on File Prior to Encounter   Medication Sig    acetaminophen (TYLENOL) 325 MG tablet Take 2 tablets (650 mg total) by mouth every 4 (four) hours as needed.    aspirin 81 MG Chew Take 1 tablet (81 mg total) by mouth once daily.    atorvastatin (LIPITOR) 40 MG tablet Take 1 tablet (40 mg total) by mouth once daily.    calcium acetate,phosphat bind, (PHOSLO) 667 mg capsule Take 2 capsules (1,334 mg total) by mouth 3 (three) times daily with meals.    famotidine (PEPCID) 20 MG tablet Take 1 tablet (20 mg total) by mouth once daily.    FREESTYLE SHREE 2 SENSOR Kit APPLY 1 SENSOR             SUBCUTANEOUSLY EVERY 14    DAYS    furosemide (LASIX) 40 MG tablet Take 1 tablet (40 mg total) by mouth 2 (two) times daily.    heparin sodium,porcine (HEPARIN, PORCINE,) 5,000 unit/mL injection Inject 1 mL (5,000 Units total) into the skin every 8 (eight)  hours.    hydrALAZINE (APRESOLINE) 50 MG tablet Take 1 tablet (50 mg total) by mouth every 8 (eight) hours.    insulin NPH/Reg human (HUMULIN 70/30 U-100 KWIKPEN) 100 unit/mL (70-30) InPn pen Inject 40 Units into the skin daily with breakfast AND 30 Units daily with dinner or evening meal.    metoprolol succinate (TOPROL-XL) 25 MG 24 hr tablet Take 3 tablets (75 mg total) by mouth once daily.    mycophenolate (CELLCEPT) 250 mg Cap Take 4 capsules (1,000 mg total) by mouth 2 (two) times daily.    NIFEdipine (PROCARDIA-XL) 60 MG (OSM) 24 hr tablet Take 1 tablet (60 mg total) by mouth 2 (two) times a day.    nystatin (MYCOSTATIN) 100,000 unit/mL suspension Take 5 mLs (500,000 Units total) by mouth 3 (three) times daily after meals.    predniSONE (DELTASONE) 5 MG tablet Take by mouth daily :   20mg 7/24-8/23;   15mg 8/24-9/23;   10mg 9/24-10/23/22;   5 mg thereafter.    sulfamethoxazole-trimethoprim 400-80mg (BACTRIM,SEPTRA) 400-80 mg per tablet Take 1 tablet by mouth every Mon, Wed, Fri. STOP 1/23/23    tacrolimus (PROGRAF) 1 MG Cap Take 6 capsules (6 mg total) by mouth every morning AND 6 capsules (6 mg total) every evening.    tamsulosin (FLOMAX) 0.4 mg Cap Take 1 capsule (0.4 mg total) by mouth once daily.    [DISCONTINUED] amLODIPine (NORVASC) 10 MG tablet Take 0.5 tablets (5 mg total) by mouth once daily.    [DISCONTINUED] calcium carbonate (OS-CARRIE) 500 mg calcium (1,250 mg) tablet Take 1 tablet by mouth once daily.    [DISCONTINUED] gabapentin (NEURONTIN) 300 MG capsule TAKE 1 CAPSULE TWICE DAILY    [DISCONTINUED] insulin aspart U-100 (NOVOLOG) 100 unit/mL (3 mL) InPn pen Inject 0-5 Units into the skin before meals and at bedtime as needed (Hyperglycemia).    [DISCONTINUED] insulin detemir U-100 (LEVEMIR FLEXTOUCH) 100 unit/mL (3 mL) SubQ InPn pen Inject 5 Units into the skin every evening.    [DISCONTINUED] lisinopriL (PRINIVIL,ZESTRIL) 40 MG tablet Take 1 tablet (40 mg total) by mouth once daily.     Family  History       Problem Relation (Age of Onset)    Diabetes Brother, Maternal Grandmother    Early death Brother    Heart disease Brother    Hyperlipidemia Sister, Brother    Hypertension Brother    Kidney disease Son    Stroke Mother, Brother          Tobacco Use    Smoking status: Former Smoker     Packs/day: 1.00     Years: 20.00     Pack years: 20.00     Types: Cigarettes     Quit date: 1984     Years since quittin.1    Smokeless tobacco: Never Used   Substance and Sexual Activity    Alcohol use: Yes     Alcohol/week: 1.0 standard drink     Types: 1 Cans of beer per week     Comment: daily    Drug use: No    Sexual activity: Never     Review of Systems   Constitutional:  Negative for activity change, chills, fever and unexpected weight change.   HENT:  Negative for congestion and sore throat.    Respiratory:  Negative for cough, shortness of breath and wheezing.    Cardiovascular:  Negative for chest pain, palpitations and leg swelling.   Gastrointestinal:  Negative for abdominal pain, blood in stool, nausea and vomiting.   Genitourinary:  Negative for dysuria and hematuria.   Musculoskeletal:  Negative for arthralgias and neck pain.   Skin:  Negative for color change and rash.   Neurological:  Positive for syncope and light-headedness. Negative for dizziness, seizures and numbness.   Psychiatric/Behavioral:  Negative for hallucinations and suicidal ideas.    Objective:     Vital Signs (Most Recent):  Temp: 97.9 °F (36.6 °C) (22)  Pulse: 94 (22)  Resp: 18 (22)  BP: 135/75 (22)  SpO2: 98 % (22) Vital Signs (24h Range):  Temp:  [97.9 °F (36.6 °C)-98.5 °F (36.9 °C)] 97.9 °F (36.6 °C)  Pulse:  [] 94  Resp:  [14-18] 18  SpO2:  [98 %-100 %] 98 %  BP: (100-155)/(59-82) 135/75     Weight: 132.9 kg (293 lb)  Body mass index is 37.62 kg/m².    Physical Exam  Vitals reviewed.   Constitutional:       General: He is not in acute distress.     Appearance: He  is well-developed.   HENT:      Head: Normocephalic and atraumatic.   Eyes:      Extraocular Movements: Extraocular movements intact.      Pupils: Pupils are equal, round, and reactive to light.   Neck:      Vascular: No JVD.      Trachea: No tracheal deviation.   Cardiovascular:      Rate and Rhythm: Normal rate and regular rhythm.      Heart sounds: No murmur heard.    No friction rub. No gallop.   Pulmonary:      Effort: No respiratory distress.      Breath sounds: Normal breath sounds. No wheezing or rales.   Abdominal:      General: Bowel sounds are normal. There is no distension.      Palpations: Abdomen is soft. There is no mass.      Tenderness: There is no abdominal tenderness.   Musculoskeletal:         General: No deformity.      Cervical back: Neck supple.      Comments: Trace edema B/L   Lymphadenopathy:      Cervical: No cervical adenopathy.   Skin:     General: Skin is warm and dry.      Findings: No rash.   Neurological:      Mental Status: He is alert and oriented to person, place, and time.         CRANIAL NERVES     CN III, IV, VI   Pupils are equal, round, and reactive to light.     Significant Labs: All pertinent labs within the past 24 hours have been reviewed.    Significant Imaging: I have reviewed all pertinent imaging results/findings within the past 24 hours.

## 2022-08-06 NOTE — HPI
72 year old male s/p HKT 5/2002 and recent acute and chronic ABMR who presented to ED after syncopal episode that occurred while urinating. He has associated shortness of breath on exertion. He also reports excessive urination and dry skin and overall dehydration. He says he has the least volume on him that he has had in the last 6 months. He also has significantly increased intention tremor. Started before, but worsened on tacrolimus.   In ED orthostatic with creatinine improved from prior.   Kidney transplant medicine consulted for aid in management of a transplanted kidney in patient with poor orthostatic response.

## 2022-08-06 NOTE — CONSULTS
[facility] - Emergency Dept  Kidney Transplant  Consult Note    Consults      Subjective:     History of Present Illness:   72 year old male s/p HKT 2002 and recent acute and chronic ABMR who presented to ED after syncopal episode that occurred while urinating. He has associated shortness of breath on exertion. He also reports excessive urination and dry skin and overall dehydration. He says he has the least volume on him that he has had in the last 6 months. He also has significantly increased intention tremor. Started before, but worsened on tacrolimus.   In ED orthostatic with creatinine improved from prior.   Kidney transplant medicine consulted for aid in management of a transplanted kidney in patient with poor orthostatic response.  Past Medical and Surgical History: Mr. Albrecht has a past medical history of Allergic rhinitis (2013), Blood transfusion, Cataract, CKD (chronic kidney disease), stage III (2014), -donor kidney transplant, Diabetes mellitus, type 2 (2013), Gout, arthritis (2013), Heart attack, Heart transplanted, Hyperlipidemia (2013), Hypertension, Immunodeficiency due to treatment with immunosuppressive medication, Morbid obesity (2013), Organ transplant (), Prophylactic immunotherapy, and Renal manifestation of secondary diabetes mellitus.  He has a past surgical history that includes Kidney transplant; Heart transplant; Revision total hip arthroplasty; Eye surgery; Cataract extraction (Bilateral); and Colonoscopy (N/A, 10/6/2020).    Past Social and Family History: Mr. Albrecht reports that he quit smoking about 38 years ago. His smoking use included cigarettes. He has a 20.00 pack-year smoking history. He has never used smokeless tobacco. He reports current alcohol use of about 1.0 standard drink of alcohol per week. He reports that he does not use drugs.His family history includes Diabetes in his brother and maternal grandmother; Early death in  "his brother; Heart disease in his brother; Hyperlipidemia in his brother and sister; Hypertension in his brother; Kidney disease in his son; Stroke in his brother and mother.      Review of Systems   Constitutional:  Positive for activity change.   HENT:  Negative for trouble swallowing.    Respiratory:  Positive for shortness of breath.    Cardiovascular:  Negative for chest pain and leg swelling.   Gastrointestinal:  Negative for abdominal pain, constipation and diarrhea.   Genitourinary:  Negative for decreased urine volume, difficulty urinating, hematuria and urgency.   Neurological:  Positive for tremors, syncope and weakness.   Objective:     Vital Signs (Most Recent):  Temp: 98.5 °F (36.9 °C) (08/05/22 1502)  Pulse: 100 (08/05/22 1726)  Resp: 14 (08/05/22 1620)  BP: (!) 100/59 (08/05/22 1726)  SpO2: 100 % (08/05/22 1726)   Vital Signs (24h Range):  Temp:  [98.5 °F (36.9 °C)] 98.5 °F (36.9 °C)  Pulse:  [] 100  Resp:  [14-18] 14  SpO2:  [98 %-100 %] 100 %  BP: (100-155)/(59-80) 100/59     Weight: 132.9 kg (293 lb)  Height: 6' 2" (188 cm)  Body mass index is 37.62 kg/m².    Physical Exam  Constitutional:       General: He is not in acute distress.     Appearance: He is obese.   Eyes:      General: No scleral icterus.  Cardiovascular:      Rate and Rhythm: Normal rate.   Pulmonary:      Effort: Pulmonary effort is normal. No respiratory distress.   Abdominal:      General: There is no distension.      Palpations: Abdomen is soft.   Musculoskeletal:      Right lower leg: No edema.      Left lower leg: Edema present.   Skin:     Coloration: Skin is not jaundiced.   Neurological:      Mental Status: He is alert.       Significant Labs:  Labs within the past 24 hours have been reviewed.    Diagnostics:  None    Assessment/Plan:     Cytomegalovirus (CMV) viremia  Without disease  Reduced mmf dose to 500  AM CMV level and weekly      Syncope and collapse  While micturating suggests vasovagal though orthostatic " "hypotension as well  Observation on tele   Give 0.5-1L IV fluids and hold lasix and repeat orthostatics in tomorrow  Please consult cardiology for aid with orthostatic hypotension      Antibody mediated rejection of kidney transplant  Bx 22 C4d pos acute and chronic ABMR  Treated with steroids, plex, and pending IVIG    Acute renal failure  Persistent and improving  Probably from rejection      Long-term use of immunosuppressant medication  At home on tacro /, mmf 500/500, pred 5  Was requested to start tacro   Continue tacro , mmf 500, pred 5  Monitor for toxicity      Anemia of chronic renal failure, stage 3b  epo 22    Proteinuria due to type 2 diabetes mellitus  Switched from sirolimus to tacro for proteinuria    Stage 3b chronic kidney disease  BL sCr 2.0  With CKD-BMD and chronic volume issues  Continue home vit d, calcium, semaglutide when able  Hold furosemide for now    Chronic immunosuppression with tacrolimus, mycophenolate  Continue immunosuppression as per problem "Long-term use of immunosuppressant medication"      Heart transplanted  Consult HTS    -donor kidney transplant   now with BL sCr 2.0  Continue immunosuppression as per problem "Long-term use of immunosuppressant medication"            Kye Pruitt Jr., MD  Kidney Transplant  [facility] - Emergency Dept  "

## 2022-08-06 NOTE — DISCHARGE INSTRUCTIONS
You passed out because you were dehydrated because you were urinating too much because you were taking furosemide. Stop taking furosemide (Lasix).

## 2022-08-06 NOTE — ASSESSMENT & PLAN NOTE
Patient's FSGs are controlled on current medication regimen.  Last A1c reviewed-   Lab Results   Component Value Date    HGBA1C 5.9 (H) 06/20/2022     Most recent fingerstick glucose reviewed-   Recent Labs   Lab 08/05/22  1533 08/05/22  2245   POCTGLUCOSE 291* 213*     Current correctional scale  Medium     -Continue levemir 32 units Qam used during recent hospitalization

## 2022-08-06 NOTE — ASSESSMENT & PLAN NOTE
BL sCr 2.0  With CKD-BMD and chronic volume issues  Continue home vit d, calcium, semaglutide when able  Hold furosemide for now

## 2022-08-06 NOTE — PLAN OF CARE
Problem: Adult Inpatient Plan of Care  Goal: Plan of Care Review  Outcome: Ongoing, Progressing     Problem: Adult Inpatient Plan of Care  Goal: Patient-Specific Goal (Individualized)  Outcome: Ongoing, Progressing     Problem: Adult Inpatient Plan of Care  Goal: Absence of Hospital-Acquired Illness or Injury  Outcome: Ongoing, Progressing     Problem: Adult Inpatient Plan of Care  Goal: Optimal Comfort and Wellbeing  Outcome: Ongoing, Progressing   New admit to IMTA.  Pt AAO X 4; able to express needs.  C/o many falls at home.  Wife at the bedside.  Ambulates with cane to bathroom.  Safety maintained.  Bed in low position,  call  light in reach.

## 2022-08-06 NOTE — DISCHARGE SUMMARY
[facility] - Intensive Care ([address] Medicine  Discharge Summary      Patient Name: Nigel Albrecht  MRN: [id number]  Patient Class: OP- Observation  Admission Date: 2022  Hospital Length of Stay: 0 days  Discharge Date and Time: 2022 11:15 AM  Attending Physician: Naseem Veloz MD   Discharging Provider: Naseem Veloz MD  Primary Care Provider: Krystin Zimmerman MD  [facility] Medicine Team: [facility] HOSP MED A Naseem Veloz MD    HPI:   Nigel Albrecht is a 72 year old Black man with obesity, history of end stage heart and kidney failure status post  donor heart and kidney transplants on 2022 (chronically immunosuppressed on tacrolimus and mycophenolate), hypertension, diabetes mellitus type 2 (treated with insulin) with neuropathy, chronic kidney disease stage 3, antibody mediated kidney transplant rejection on 2022, diverticulosis, history of COVID-19 on 2022, history of total hip arthroplasty. He lives in [place], [place]. He is . His primary care physician is Dr. Krystin Zimmerman.    He was hospitalized at [facility] from 2022 to 2022 for acute kidney injury. Kidney biopsy showed acute antibody mediated rejection. He was treated with PLEX. He was prescribed prednisone taper, prophylactic trimethoprim-sulfamethoxazole, tamsulosin, furosemide 40 mg twice daily, hydralazine 50 mg three times daily, calcium acetate with meals, and nystatin oral suspension.    He returned to [facility] Emergency Department on 2022 after feeling lightheaded while standing and passing out. He had been getting lightheaded when standing and had been urinating a lot since starting furosemide and was feeling dehydrated. Orthostatic vital signs showed a drop in blood pressure from 152/80 to 100/59 and increase in heart rate from 78 to 100 between supine and standing positions. He was given 500 mL of  lactated Ringer's solution. He was admitted to Hospital Medicine Team A.        Hospital Course:   Furosemide was stopped. Systolic blood pressures remained above 120s. He was discharged home.       Goals of Care Treatment Preferences:  Code Status: Full Code      Consults:     Final Active Diagnoses:    Diagnosis Date Noted POA    Antibody mediated rejection of kidney transplant [T86.11] 2022 Yes    Primary hypertension [I10] 2022 Yes     Chronic    Long-term use of immunosuppressant medication [Z79.899]  Not Applicable     Chronic    Anemia of chronic renal failure, stage 3b [N18.32, D63.1] 2022 Yes     Chronic    Type 2 diabetes mellitus with stage 3b chronic kidney disease, with long-term current use of insulin [E11.22, N18.32, Z79.4] 2014 Not Applicable     Chronic    Stage 3b chronic kidney disease [N18.32] 2014 Yes     Chronic    -donor kidney transplant [Z94.0]  Not Applicable     Chronic    Heart transplanted [Z94.1]  Not Applicable     Chronic    Chronic immunosuppression with tacrolimus, mycophenolate [Z29.8]  Not Applicable     Chronic      Problems Resolved During this Admission:    Diagnosis Date Noted Date Resolved POA    PRINCIPAL PROBLEM:  Orthostatic syncope [I95.1] 2022 Yes       Discharged Condition: good    Disposition: Home or Self Care    Follow Up:   Follow-up Information     Edy Reese MD Follow up on 2022.    Specialties: Transplant, Nephrology  Why: 11:00 AM  Contact information:  [facility] [address]  [phone]                       Patient Instructions:      Diet diabetic     Notify your health care provider if you experience any of the following:  difficulty breathing or increased cough     Notify your health care provider if you experience any of the following:  persistent dizziness, light-headedness, or visual disturbances     Activity as tolerated       Significant Diagnostic Studies:    X-Ray Chest AP Portable 8/05/22: FINDINGS:   The cardiomediastinal silhouette is not enlarged, stable..  There is no pleural effusion.  The trachea is midline.  The lungs are symmetrically expanded bilaterally with mildly coarse interstitial attenuation projected over the bilateral lower lung zones, accentuated by habitus and shallow inspiratory effort..  No large focal consolidation seen.  There is no pneumothorax.  The osseous structures are remarkable for degenerative changes..   Impression: Interstitial findings are likely accentuated by shallow inspiratory effort and habitus, minimal edema is consideration.  No large focal consolidation.   CT Head Without Contrast 8/05/22: FINDINGS:   Examination is degraded by patient motion artifact.   There is generalized cerebral volume loss.  There is hypoattenuation in a periventricular fashion, likely sequela of chronic microvascular ischemic change.   No parenchymal mass, hemorrhage, edema or major vascular distribution infarct.  There is sulcal megaly involving the inferior aspect of the left occipital lobe, reflect sequela of previous infarct.   No extra-axial blood or fluid collections.   Skull/extracranial contents (limited evaluation):   No fracture. Mastoid air cells and paranasal sinuses are essentially clear.   Impression:  Allowing for artifact, no evidence of acute intracranial pathology.   Sequela of chronic microvascular ischemic change and senescent change.     Pending Diagnostic Studies:     Procedure Component Value Units Date/Time    CMV DNA, quantitative, PCR [[id number]] Collected: 08/06/22 0646    Order Status: Sent Lab Status: In process Updated: 08/06/22 0719    Specimen: Blood     Tacrolimus level [[id number]] Collected: 08/06/22 0646    Order Status: Sent Lab Status: In process Updated: 08/06/22 0719    Specimen: Blood     Tacrolimus level [[id number]] Collected: 08/05/22 1606    Order Status: Sent Lab Status: In process Updated: 08/05/22 1629     Specimen: Blood          Medications:  Reconciled Home Medications:      Medication List      CONTINUE taking these medications    acetaminophen 325 MG tablet  Commonly known as: TYLENOL  Take 2 tablets (650 mg total) by mouth every 4 (four) hours as needed.     aspirin 81 MG Chew  Take 1 tablet (81 mg total) by mouth once daily.     atorvastatin 40 MG tablet  Commonly known as: LIPITOR  Take 1 tablet (40 mg total) by mouth once daily.     calcium acetate(phosphat bind) 667 mg capsule  Commonly known as: PHOSLO  Take 2 capsules (1,334 mg total) by mouth 3 (three) times daily with meals.     famotidine 20 MG tablet  Commonly known as: PEPCID  Take 1 tablet (20 mg total) by mouth once daily.     FREESTYLE SHREE 2 SENSOR Kit  Generic drug: flash glucose sensor  APPLY 1 SENSOR             SUBCUTANEOUSLY EVERY 14    DAYS     heparin (porcine) 5,000 unit/mL injection  Inject 1 mL (5,000 Units total) into the skin every 8 (eight) hours.     HumuLIN 70/30 U-100 KwikPen 100 unit/mL (70-30) Inpn pen  Generic drug: insulin NPH/Reg human  Inject 40 Units into the skin daily with breakfast AND 30 Units daily with dinner or evening meal.     hydrALAZINE 50 MG tablet  Commonly known as: APRESOLINE  Take 1 tablet (50 mg total) by mouth every 8 (eight) hours.     metoprolol succinate 25 MG 24 hr tablet  Commonly known as: TOPROL-XL  Take 3 tablets (75 mg total) by mouth once daily.     mycophenolate 250 mg Cap  Commonly known as: CELLCEPT  Take 4 capsules (1,000 mg total) by mouth 2 (two) times daily.     NIFEdipine 60 MG (OSM) 24 hr tablet  Commonly known as: PROCARDIA-XL  Take 1 tablet (60 mg total) by mouth 2 (two) times a day.     nystatin 100,000 unit/mL suspension  Commonly known as: MYCOSTATIN  Take 5 mLs (500,000 Units total) by mouth 3 (three) times daily after meals.     predniSONE 5 MG tablet  Commonly known as: DELTASONE  Take by mouth daily :   20mg 7/24-8/23;   15mg 8/24-9/23;   10mg 9/24-10/23/22;   5 mg thereafter.      sulfamethoxazole-trimethoprim 400-80mg 400-80 mg per tablet  Commonly known as: BACTRIM,SEPTRA  Take 1 tablet by mouth every Mon, Wed, Fri. STOP 1/23/23     tacrolimus 1 MG Cap  Commonly known as: PROGRAF  Take 6 capsules (6 mg total) by mouth every morning AND 6 capsules (6 mg total) every evening.     tamsulosin 0.4 mg Cap  Commonly known as: FLOMAX  Take 1 capsule (0.4 mg total) by mouth once daily.        STOP taking these medications    amLODIPine 10 MG tablet  Commonly known as: NORVASC     calcium carbonate 500 mg calcium (1,250 mg) tablet  Commonly known as: OS-CARRIE     furosemide 40 MG tablet  Commonly known as: LASIX     gabapentin 300 MG capsule  Commonly known as: NEURONTIN     insulin aspart U-100 100 unit/mL (3 mL) Inpn pen  Commonly known as: NovoLOG     insulin detemir U-100 100 unit/mL (3 mL) Inpn pen  Commonly known as: Levemir FLEXTOUCH     lisinopriL 40 MG tablet  Commonly known as: PRINIVIL,ZESTRIL            Indwelling Lines/Drains at time of discharge: None    Time spent on the discharge of patient: 33 minutes         Naseem Veloz MD  Department of Hospital Medicine  [facility] - Intensive Care ([place])

## 2022-08-06 NOTE — ASSESSMENT & PLAN NOTE
-Cr 3.7 on admit, improving. Continue to monitor and avoid nephrotoxic medications  -HOlding lasix due to suspected hypovolemia, Continue home vit d, calcium

## 2022-08-06 NOTE — H&P
[facility] - Intensive Care ([address] Medicine  History & Physical    Patient Name: Nigel Albrecht  MRN: [id number]  Patient Class: OP- Observation  Admission Date: 2022  Attending Physician: Naseem Veloz MD   Primary Care Provider: Krystin Zimmerman MD         Patient information was obtained from patient, past medical records and ER records.     Subjective:     Principal Problem:Syncope and collapse    Chief Complaint:   Chief Complaint   Patient presents with    Fatigue     Got weak and almost passed out hit the floor, denies injury, heart and kidney xplant in         HPI: 71 yo M with PMHx Kidney/heart transplant () now with CKD4/5, DM2, HTN, and HLD who presents to the ED complaining of syncope. Pt. Reports that he was standing while urinating, felt lightheaded, and then passed out. No reported injury or head trauma. Pt. Was recently admitted here - for decreased urine output and PETERSON. Pt. Kidney function was noted to be worse than usual, and he underwent bipsy which was notable for antibody-meidated rejection. He required HD x1 while admitted, but he was given steroid and PLEX x4 with improvement in his urine output. He was also discharged on lasix 40 mg BID. Since discharge, the patient reports that he has been urinating a lot more and actually feels dehydrated. He reports some lightheadedness when standing and as above mentioned lightheadedness prior to his syncopal episode today. He denies any chest pain, palpitations, fevers, or chills.      Past Medical History:   Diagnosis Date    Allergic rhinitis 2013    Blood transfusion     Cataract     CKD (chronic kidney disease), stage III 2014    -donor kidney transplant     Diabetes mellitus, type 2 2013    Gout, arthritis 2013    Heart attack     Heart transplanted     Hyperlipidemia 2013    Hypertension     Immunodeficiency due to treatment with immunosuppressive  medication     Morbid obesity 6/26/2013    Organ transplant 2002    heart and kidney    Prophylactic immunotherapy     Renal manifestation of secondary diabetes mellitus        Past Surgical History:   Procedure Laterality Date    CATARACT EXTRACTION Bilateral     COLONOSCOPY N/A 10/6/2020    Procedure: COLONOSCOPY;  Surgeon: Conner Redman MD;  [facility];  Service: Endoscopy;  Laterality: N/A;    EYE SURGERY      HEART TRANSPLANT      KIDNEY TRANSPLANT      REVISION TOTAL HIP ARTHROPLASTY         Review of patient's allergies indicates:  No Known Allergies    No current facility-administered medications on file prior to encounter.     Current Outpatient Medications on File Prior to Encounter   Medication Sig    acetaminophen (TYLENOL) 325 MG tablet Take 2 tablets (650 mg total) by mouth every 4 (four) hours as needed.    aspirin 81 MG Chew Take 1 tablet (81 mg total) by mouth once daily.    atorvastatin (LIPITOR) 40 MG tablet Take 1 tablet (40 mg total) by mouth once daily.    calcium acetate,phosphat bind, (PHOSLO) 667 mg capsule Take 2 capsules (1,334 mg total) by mouth 3 (three) times daily with meals.    famotidine (PEPCID) 20 MG tablet Take 1 tablet (20 mg total) by mouth once daily.    FREESTYLE SHREE 2 SENSOR Kit APPLY 1 SENSOR             SUBCUTANEOUSLY EVERY 14    DAYS    furosemide (LASIX) 40 MG tablet Take 1 tablet (40 mg total) by mouth 2 (two) times daily.    heparin sodium,porcine (HEPARIN, PORCINE,) 5,000 unit/mL injection Inject 1 mL (5,000 Units total) into the skin every 8 (eight) hours.    hydrALAZINE (APRESOLINE) 50 MG tablet Take 1 tablet (50 mg total) by mouth every 8 (eight) hours.    insulin NPH/Reg human (HUMULIN 70/30 U-100 KWIKPEN) 100 unit/mL (70-30) InPn pen Inject 40 Units into the skin daily with breakfast AND 30 Units daily with dinner or evening meal.    metoprolol succinate (TOPROL-XL) 25 MG 24 hr tablet Take 3 tablets (75 mg total) by mouth once  daily.    mycophenolate (CELLCEPT) 250 mg Cap Take 4 capsules (1,000 mg total) by mouth 2 (two) times daily.    NIFEdipine (PROCARDIA-XL) 60 MG (OSM) 24 hr tablet Take 1 tablet (60 mg total) by mouth 2 (two) times a day.    nystatin (MYCOSTATIN) 100,000 unit/mL suspension Take 5 mLs (500,000 Units total) by mouth 3 (three) times daily after meals.    predniSONE (DELTASONE) 5 MG tablet Take by mouth daily :   20mg 7/24-8/23;   15mg 8/24-9/23;   10mg 9/24-10/23/22;   5 mg thereafter.    sulfamethoxazole-trimethoprim 400-80mg (BACTRIM,SEPTRA) 400-80 mg per tablet Take 1 tablet by mouth every Mon, Wed, Fri. STOP 1/23/23    tacrolimus (PROGRAF) 1 MG Cap Take 6 capsules (6 mg total) by mouth every morning AND 6 capsules (6 mg total) every evening.    tamsulosin (FLOMAX) 0.4 mg Cap Take 1 capsule (0.4 mg total) by mouth once daily.    [DISCONTINUED] amLODIPine (NORVASC) 10 MG tablet Take 0.5 tablets (5 mg total) by mouth once daily.    [DISCONTINUED] calcium carbonate (OS-CARRIE) 500 mg calcium (1,250 mg) tablet Take 1 tablet by mouth once daily.    [DISCONTINUED] gabapentin (NEURONTIN) 300 MG capsule TAKE 1 CAPSULE TWICE DAILY    [DISCONTINUED] insulin aspart U-100 (NOVOLOG) 100 unit/mL (3 mL) InPn pen Inject 0-5 Units into the skin before meals and at bedtime as needed (Hyperglycemia).    [DISCONTINUED] insulin detemir U-100 (LEVEMIR FLEXTOUCH) 100 unit/mL (3 mL) SubQ InPn pen Inject 5 Units into the skin every evening.    [DISCONTINUED] lisinopriL (PRINIVIL,ZESTRIL) 40 MG tablet Take 1 tablet (40 mg total) by mouth once daily.     Family History       Problem Relation (Age of Onset)    Diabetes Brother, Maternal Grandmother    Early death Brother    Heart disease Brother    Hyperlipidemia Sister, Brother    Hypertension Brother    Kidney disease Son    Stroke Mother, Brother          Tobacco Use    Smoking status: Former Smoker     Packs/day: 1.00     Years: 20.00     Pack years: 20.00     Types: Cigarettes      Quit date: 1984     Years since quittin.1    Smokeless tobacco: Never Used   Substance and Sexual Activity    Alcohol use: Yes     Alcohol/week: 1.0 standard drink     Types: 1 Cans of beer per week     Comment: daily    Drug use: No    Sexual activity: Never     Review of Systems   Constitutional:  Negative for activity change, chills, fever and unexpected weight change.   HENT:  Negative for congestion and sore throat.    Respiratory:  Negative for cough, shortness of breath and wheezing.    Cardiovascular:  Negative for chest pain, palpitations and leg swelling.   Gastrointestinal:  Negative for abdominal pain, blood in stool, nausea and vomiting.   Genitourinary:  Negative for dysuria and hematuria.   Musculoskeletal:  Negative for arthralgias and neck pain.   Skin:  Negative for color change and rash.   Neurological:  Positive for syncope and light-headedness. Negative for dizziness, seizures and numbness.   Psychiatric/Behavioral:  Negative for hallucinations and suicidal ideas.    Objective:     Vital Signs (Most Recent):  Temp: 97.9 °F (36.6 °C) (22)  Pulse: 94 (22)  Resp: 18 (22)  BP: 135/75 (22)  SpO2: 98 % (22) Vital Signs (24h Range):  Temp:  [97.9 °F (36.6 °C)-98.5 °F (36.9 °C)] 97.9 °F (36.6 °C)  Pulse:  [] 94  Resp:  [14-18] 18  SpO2:  [98 %-100 %] 98 %  BP: (100-155)/(59-82) 135/75     Weight: 132.9 kg (293 lb)  Body mass index is 37.62 kg/m².    Physical Exam  Vitals reviewed.   Constitutional:       General: He is not in acute distress.     Appearance: He is well-developed.   HENT:      Head: Normocephalic and atraumatic.   Eyes:      Extraocular Movements: Extraocular movements intact.      Pupils: Pupils are equal, round, and reactive to light.   Neck:      Vascular: No JVD.      Trachea: No tracheal deviation.   Cardiovascular:      Rate and Rhythm: Normal rate and regular rhythm.      Heart sounds: No murmur heard.     No friction rub. No gallop.   Pulmonary:      Effort: No respiratory distress.      Breath sounds: Normal breath sounds. No wheezing or rales.   Abdominal:      General: Bowel sounds are normal. There is no distension.      Palpations: Abdomen is soft. There is no mass.      Tenderness: There is no abdominal tenderness.   Musculoskeletal:         General: No deformity.      Cervical back: Neck supple.      Comments: Trace edema B/L   Lymphadenopathy:      Cervical: No cervical adenopathy.   Skin:     General: Skin is warm and dry.      Findings: No rash.   Neurological:      Mental Status: He is alert and oriented to person, place, and time.         CRANIAL NERVES     CN III, IV, VI   Pupils are equal, round, and reactive to light.     Significant Labs: All pertinent labs within the past 24 hours have been reviewed.    Significant Imaging: I have reviewed all pertinent imaging results/findings within the past 24 hours.    Assessment/Plan:     * Syncope and collapse  -Syncopal episode in this patient with hx of heart transplant. Occurred when standing and pt. Reports increased urine output and orthostatic symptoms. Pt. Also recently prescribed lasix and restarted on home BP meds hydralazine and nifedipine at discharge despite not reuqiring these while he was admitted recently  -Recent TTE 7/2022 shows no structural abnormalities, will not repeat  -Continue to monitor on telemetry for arrhythmia  -Monitor BP closely, hold home HTN meds nifedipine and hydralazine and consider discontinuing at discharge if not needed  -Cards consult given pt. History of heart transplant      Antibody mediated rejection of kidney transplant  -Pt. S/p PLEX last admit, continuing to improve, follow up further KTM recommendations    Long-term use of immunosuppressant medication  -Continue bactrim M,W,F      Anemia of chronic renal failure, stage 3b  -Hgb stable, no acute issues      Type 2 diabetes mellitus with stage 3b chronic kidney  "disease, with long-term current use of insulin  Patient's FSGs are controlled on current medication regimen.  Last A1c reviewed-   Lab Results   Component Value Date    HGBA1C 5.9 (H) 2022     Most recent fingerstick glucose reviewed-   Recent Labs   Lab 22  1533 22  2245   POCTGLUCOSE 291* 213*     Current correctional scale  Medium     -Continue levemir 32 units Qam used during recent hospitalization    Stage 3b chronic kidney disease  -Cr 3.7 on admit, improving. Continue to monitor and avoid nephrotoxic medications  -HOlding lasix due to suspected hypovolemia, Continue home vit d, calcium      Chronic immunosuppression with tacrolimus, mycophenolate        Heart transplanted  -Recent TTE demonstarted no structural disease, see "syncope"      -donor kidney transplant  -Continue home tacro , mmf 500, pred 5        VTE Risk Mitigation (From admission, onward)         Ordered     IP VTE HIGH RISK PATIENT  Once         22     Place sequential compression device  Until discontinued         22                   Teofilo Brown MD  Department of Hospital Medicine   [facility] - Intensive Care ([place] [address])  "

## 2022-08-06 NOTE — ASSESSMENT & PLAN NOTE
While micturating suggests vasovagal though orthostatic hypotension as well  Observation on tele   Give 0.5-1L IV fluids and hold lasix and repeat orthostatics in tomorrow  Please consult cardiology for aid with orthostatic hypotension

## 2022-08-06 NOTE — ASSESSMENT & PLAN NOTE
At home on tacro 6/6, mmf 500/500, pred 5  Was requested to start tacro 7/7  Continue tacro 7/7, mmf 500, pred 5  Monitor for toxicity

## 2022-08-08 ENCOUNTER — INFUSION (OUTPATIENT)
Dept: INFUSION THERAPY | Facility: HOSPITAL | Age: 72
End: 2022-08-08
Attending: INTERNAL MEDICINE
Payer: MEDICARE

## 2022-08-08 VITALS
SYSTOLIC BLOOD PRESSURE: 167 MMHG | HEART RATE: 62 BPM | WEIGHT: 263.44 LBS | TEMPERATURE: 97 F | BODY MASS INDEX: 33.81 KG/M2 | RESPIRATION RATE: 16 BRPM | DIASTOLIC BLOOD PRESSURE: 79 MMHG | OXYGEN SATURATION: 100 % | HEIGHT: 74 IN

## 2022-08-08 DIAGNOSIS — T86.11 ANTIBODY MEDIATED REJECTION OF KIDNEY TRANSPLANT: Primary | ICD-10-CM

## 2022-08-08 LAB
CYTOMEGALOVIRUS LOG (IU/ML): 3.44 LOGIU/ML
CYTOMEGALOVIRUS PCR, QUANT: 2744 IU/ML

## 2022-08-08 PROCEDURE — 25000003 PHARM REV CODE 250: Performed by: INTERNAL MEDICINE

## 2022-08-08 PROCEDURE — 63600175 PHARM REV CODE 636 W HCPCS: Mod: JG | Performed by: INTERNAL MEDICINE

## 2022-08-08 PROCEDURE — 96366 THER/PROPH/DIAG IV INF ADDON: CPT

## 2022-08-08 PROCEDURE — 96365 THER/PROPH/DIAG IV INF INIT: CPT

## 2022-08-08 RX ORDER — DIPHENHYDRAMINE HYDROCHLORIDE 50 MG/ML
50 INJECTION INTRAMUSCULAR; INTRAVENOUS ONCE AS NEEDED
Status: CANCELLED | OUTPATIENT
Start: 2022-08-09

## 2022-08-08 RX ORDER — SODIUM CHLORIDE 9 MG/ML
250 INJECTION, SOLUTION INTRAVENOUS ONCE
Status: COMPLETED | OUTPATIENT
Start: 2022-08-08 | End: 2022-08-08

## 2022-08-08 RX ORDER — SODIUM CHLORIDE 0.9 % (FLUSH) 0.9 %
10 SYRINGE (ML) INJECTION
Status: CANCELLED | OUTPATIENT
Start: 2022-08-09

## 2022-08-08 RX ORDER — DIPHENHYDRAMINE HYDROCHLORIDE 50 MG/ML
25 INJECTION INTRAMUSCULAR; INTRAVENOUS
Status: CANCELLED | OUTPATIENT
Start: 2022-08-09

## 2022-08-08 RX ORDER — ACETAMINOPHEN 500 MG
500 TABLET ORAL
Status: DISCONTINUED | OUTPATIENT
Start: 2022-08-08 | End: 2022-08-08 | Stop reason: HOSPADM

## 2022-08-08 RX ORDER — DIPHENHYDRAMINE HYDROCHLORIDE 50 MG/ML
25 INJECTION INTRAMUSCULAR; INTRAVENOUS
Status: DISCONTINUED | OUTPATIENT
Start: 2022-08-08 | End: 2022-08-08 | Stop reason: HOSPADM

## 2022-08-08 RX ORDER — DIPHENHYDRAMINE HCL 25 MG
25 CAPSULE ORAL
Status: CANCELLED | OUTPATIENT
Start: 2022-08-09

## 2022-08-08 RX ORDER — ACETAMINOPHEN 500 MG
500 TABLET ORAL
Status: CANCELLED | OUTPATIENT
Start: 2022-08-09

## 2022-08-08 RX ORDER — HEPARIN 100 UNIT/ML
500 SYRINGE INTRAVENOUS
Status: CANCELLED | OUTPATIENT
Start: 2022-08-09

## 2022-08-08 RX ORDER — EPINEPHRINE 0.3 MG/.3ML
0.3 INJECTION SUBCUTANEOUS ONCE AS NEEDED
Status: CANCELLED | OUTPATIENT
Start: 2022-08-09

## 2022-08-08 RX ORDER — DIPHENHYDRAMINE HCL 25 MG
25 CAPSULE ORAL
Status: DISCONTINUED | OUTPATIENT
Start: 2022-08-08 | End: 2022-08-08 | Stop reason: HOSPADM

## 2022-08-08 RX ADMIN — SODIUM CHLORIDE 250 ML: 0.9 INJECTION, SOLUTION INTRAVENOUS at 10:08

## 2022-08-08 RX ADMIN — DIPHENHYDRAMINE HYDROCHLORIDE 25 MG: 25 CAPSULE ORAL at 09:08

## 2022-08-08 RX ADMIN — ACETAMINOPHEN 500 MG: 500 TABLET ORAL at 09:08

## 2022-08-08 RX ADMIN — HUMAN IMMUNOGLOBULIN G 80 G: 40 LIQUID INTRAVENOUS at 10:08

## 2022-08-08 NOTE — PLAN OF CARE
Problem: Adult Inpatient Plan of Care  Goal: Plan of Care Review  Outcome: Ongoing, Progressing  Flowsheets (Taken 8/8/2022 1212)  Plan of Care Reviewed With: patient  Goal: Patient-Specific Goal (Individualized)  Outcome: Ongoing, Progressing  Flowsheets (Taken 8/8/2022 1212)  Anxieties, Fears or Concerns: none  Individualized Care Needs: walking cane, warm blanket, Pillow for head and arm with IV, sprite zero  Patient-Specific Goals (Include Timeframe): Patient will tolerate IVIG without events  Goal: Absence of Hospital-Acquired Illness or Injury  Outcome: Ongoing, Progressing  Goal: Optimal Comfort and Wellbeing  Outcome: Ongoing, Progressing  Intervention: Provide Person-Centered Care  Flowsheets (Taken 8/8/2022 1212)  Trust Relationship/Rapport:   care explained   choices provided   empathic listening provided   thoughts/feelings acknowledged   emotional support provided   reassurance provided   questions encouraged   questions answered  Goal: Readiness for Transition of Care  Outcome: Ongoing, Progressing     Problem: Infection  Goal: Absence of Infection Signs and Symptoms  Outcome: Ongoing, Progressing  Intervention: Prevent or Manage Infection  Flowsheets (Taken 8/8/2022 1212)  Infection Management: aseptic technique maintained

## 2022-08-08 NOTE — DISCHARGE INSTRUCTIONS
.THANKS FOR ALLOWING ME TO CARE FOR YOU!!!! ~Hayley          THANKS FOR CHOOSING [facility]!!!        [facility]  [address] [facility].  or  [id number] [place] [address] Drive  [phone] phone     [phone] fax  Hours of Operation: Monday- Friday 8:00am- 5:00pm  After hours phone  [phone]  Hematology / Oncology Physicians on call      Dr. Joey Garcia, BELÉN Alicia NP    Please call with any concerns regarding your appointment today.      .WAYS TO HELP PREVENT INFECTION        WASH YOUR HANDS OFTEN DURING THE DAY, ESPECIALLY BEFORE YOU EAT, AFTER USING THE BATHROOM, AND AFTER TOUCHING ANIMALS    STAY AWAY FROM PEOPLE WHO HAVE ILLNESSES YOU CAN CATCH; SUCH AS COLDS, FLU, CHICKEN POX    TRY TO AVOID CROWDS    STAY AWAY FROM CHILDREN WHO RECENTLY HAVE RECEIVED LIVE VIRUS VACCINES    MAINTAIN GOOD MOUTH CARE    DO NOT SQUEEZE OR SCRATCH PIMPLES    CLEAN CUTS & SCRAPES RIGHT AWAY AND DAILY UNTIL HEALED WITH WARM WATER, SOAP & AN ANTISEPTIC    AVOID CONTACT WITH LITTER BOXES, BIRD CAGES, & FISH TANKS    AVOID STANDING WATER, IE., BIRD BATHS, FLOWER POTS/VASES, OR HUMIDIFIERS    WEAR GLOVES WHEN GARDENING OR CLEANING UP AFTER OTHERS, ESPECIALLY BABIES & SMALL CHILDREN    DO NOT EAT RAW FISH, SEAFOOD, MEAT, OR EGGS        .FALL PREVENTION   Falls often occur due to slipping, tripping or losing your balance. Here are ways to reduce your risk of falling again.   Was there anything that caused your fall that can be fixed, removed or replaced?   Make your home safe by keeping walkways clear of objects you may trip over.   Use non-slip pads under rugs.   Do not walk in poorly lit areas.   Do not stand on chairs or wobbly ladders.   Use caution when reaching overhead or looking upward. This position can cause a loss of balance.   Be sure your shoes fit properly, have non-slip bottoms and are in good condition.   Be cautious when going up and  down stairs, curbs, and when walking on uneven sidewalks.   If your balance is poor, consider using a cane or walker.   If your fall was related to alcohol use, stop or limit alcohol intake.   If your fall was related to use of sleeping medicines, talk to your doctor about this. You may need to reduce your dosage at bedtime if you awaken during the night to go to the bathroom.   To reduce the need for nighttime bathroom trips:   Avoid drinking fluids for several hours before going to bed   Empty your bladder before going to bed   Men can keep a urinal at the bedside   © 2582-2688 [facility], [address] [place] [address], [place] [address]. All rights reserved. This information is not intended as a substitute for professional medical care. Always follow your healthcare professional's instructions.

## 2022-08-08 NOTE — NURSING
Patient to unit today for IVIG. Patient tolerated well. Discharged without distress or complaints.

## 2022-08-09 ENCOUNTER — TELEPHONE (OUTPATIENT)
Dept: TRANSPLANT | Facility: CLINIC | Age: 72
End: 2022-08-09
Payer: MEDICARE

## 2022-08-09 DIAGNOSIS — Z94.0 KIDNEY REPLACED BY TRANSPLANT: Primary | ICD-10-CM

## 2022-08-09 DIAGNOSIS — B25.9 CYTOMEGALOVIRUS (CMV) VIREMIA: ICD-10-CM

## 2022-08-09 DIAGNOSIS — B25.9 CYTOMEGALOVIRUS (CMV) VIREMIA: Primary | ICD-10-CM

## 2022-08-09 RX ORDER — VALGANCICLOVIR 450 MG/1
450 TABLET, FILM COATED ORAL 2 TIMES DAILY
Qty: 60 TABLET | Refills: 5 | Status: SHIPPED | OUTPATIENT
Start: 2022-08-09 | End: 2022-08-09

## 2022-08-09 RX ORDER — VALGANCICLOVIR 450 MG/1
450 TABLET, FILM COATED ORAL 2 TIMES DAILY
Qty: 60 TABLET | Refills: 11 | Status: SHIPPED | OUTPATIENT
Start: 2022-08-09 | End: 2022-09-15 | Stop reason: ALTCHOICE

## 2022-08-09 NOTE — TELEPHONE ENCOUNTER
Patient notified to start valcyte 450mg BID. CMV added to labs weeky.     ----- Message from Kye Pruitt Jr., MD sent at 8/8/2022  4:04 PM CDT -----  Please start treatment dose valcyte after rejection adjusted for gfr. Continue weekly cmv pcr.

## 2022-08-10 ENCOUNTER — INFUSION (OUTPATIENT)
Dept: INFUSION THERAPY | Facility: HOSPITAL | Age: 72
End: 2022-08-10
Attending: INTERNAL MEDICINE
Payer: MEDICARE

## 2022-08-10 VITALS
RESPIRATION RATE: 18 BRPM | HEART RATE: 57 BPM | DIASTOLIC BLOOD PRESSURE: 62 MMHG | TEMPERATURE: 98 F | OXYGEN SATURATION: 100 % | SYSTOLIC BLOOD PRESSURE: 131 MMHG

## 2022-08-10 DIAGNOSIS — T86.11 ANTIBODY MEDIATED REJECTION OF KIDNEY TRANSPLANT: Primary | ICD-10-CM

## 2022-08-10 PROCEDURE — 25000003 PHARM REV CODE 250: Performed by: INTERNAL MEDICINE

## 2022-08-10 PROCEDURE — 96365 THER/PROPH/DIAG IV INF INIT: CPT

## 2022-08-10 PROCEDURE — 96366 THER/PROPH/DIAG IV INF ADDON: CPT

## 2022-08-10 PROCEDURE — 63600175 PHARM REV CODE 636 W HCPCS: Mod: JG | Performed by: INTERNAL MEDICINE

## 2022-08-10 RX ORDER — ACETAMINOPHEN 500 MG
500 TABLET ORAL
Status: DISCONTINUED | OUTPATIENT
Start: 2022-08-10 | End: 2022-08-10 | Stop reason: HOSPADM

## 2022-08-10 RX ORDER — DIPHENHYDRAMINE HCL 25 MG
25 CAPSULE ORAL
Status: DISCONTINUED | OUTPATIENT
Start: 2022-08-10 | End: 2022-08-10 | Stop reason: HOSPADM

## 2022-08-10 RX ORDER — VALGANCICLOVIR 450 MG/1
450 TABLET, FILM COATED ORAL 2 TIMES DAILY
Qty: 60 TABLET | Refills: 5 | Status: SHIPPED | OUTPATIENT
Start: 2022-08-10 | End: 2022-09-15 | Stop reason: ALTCHOICE

## 2022-08-10 RX ORDER — DIPHENHYDRAMINE HYDROCHLORIDE 50 MG/ML
50 INJECTION INTRAMUSCULAR; INTRAVENOUS ONCE AS NEEDED
Status: CANCELLED | OUTPATIENT
Start: 2022-08-11

## 2022-08-10 RX ORDER — HEPARIN 100 UNIT/ML
500 SYRINGE INTRAVENOUS
Status: CANCELLED | OUTPATIENT
Start: 2022-08-11

## 2022-08-10 RX ORDER — DIPHENHYDRAMINE HCL 25 MG
25 CAPSULE ORAL
Status: CANCELLED | OUTPATIENT
Start: 2022-08-11

## 2022-08-10 RX ORDER — ACETAMINOPHEN 500 MG
500 TABLET ORAL
Status: CANCELLED | OUTPATIENT
Start: 2022-08-11

## 2022-08-10 RX ORDER — DIPHENHYDRAMINE HYDROCHLORIDE 50 MG/ML
25 INJECTION INTRAMUSCULAR; INTRAVENOUS
Status: CANCELLED | OUTPATIENT
Start: 2022-08-11

## 2022-08-10 RX ORDER — EPINEPHRINE 0.3 MG/.3ML
0.3 INJECTION SUBCUTANEOUS ONCE AS NEEDED
Status: CANCELLED | OUTPATIENT
Start: 2022-08-11

## 2022-08-10 RX ORDER — SODIUM CHLORIDE 9 MG/ML
250 INJECTION, SOLUTION INTRAVENOUS ONCE
Status: COMPLETED | OUTPATIENT
Start: 2022-08-10 | End: 2022-08-10

## 2022-08-10 RX ORDER — SODIUM CHLORIDE 0.9 % (FLUSH) 0.9 %
10 SYRINGE (ML) INJECTION
Status: CANCELLED | OUTPATIENT
Start: 2022-08-11

## 2022-08-10 RX ADMIN — SODIUM CHLORIDE 250 ML: 0.9 INJECTION, SOLUTION INTRAVENOUS at 09:08

## 2022-08-10 RX ADMIN — ACETAMINOPHEN 500 MG: 500 TABLET ORAL at 09:08

## 2022-08-10 RX ADMIN — HUMAN IMMUNOGLOBULIN G 80 G: 40 LIQUID INTRAVENOUS at 09:08

## 2022-08-10 RX ADMIN — DIPHENHYDRAMINE HYDROCHLORIDE 25 MG: 25 CAPSULE ORAL at 09:08

## 2022-08-10 NOTE — PLAN OF CARE
Problem: Adult Inpatient Plan of Care  Goal: Plan of Care Review  Outcome: Ongoing, Progressing  Flowsheets (Taken 8/10/2022 1345 by Arabella Fermin LPN)  Plan of Care Reviewed With: patient  Goal: Patient-Specific Goal (Individualized)  Outcome: Ongoing, Progressing  Flowsheets (Taken 8/10/2022 1345 by Arabella Fermin LPN)  Anxieties, Fears or Concerns: patient voices no concerns at this time  Individualized Care Needs: pillow and blanket offered for comfort measures  Patient-Specific Goals (Include Timeframe): tolerate treatment without adverse reaction  Goal: Optimal Comfort and Wellbeing  Outcome: Ongoing, Progressing  Intervention: Provide Person-Centered Care  Flowsheets (Taken 8/10/2022 1345 by Arabella Fermin LPN)  Trust Relationship/Rapport:   care explained   empathic listening provided   reassurance provided   choices provided   questions answered   thoughts/feelings acknowledged   emotional support provided   questions encouraged

## 2022-08-11 ENCOUNTER — LAB VISIT (OUTPATIENT)
Dept: LAB | Facility: HOSPITAL | Age: 72
End: 2022-08-11
Attending: INTERNAL MEDICINE
Payer: MEDICARE

## 2022-08-11 DIAGNOSIS — Z94.0 KIDNEY REPLACED BY TRANSPLANT: ICD-10-CM

## 2022-08-11 LAB
ALBUMIN SERPL BCP-MCNC: 2.7 G/DL (ref 3.5–5.2)
ANION GAP SERPL CALC-SCNC: 8 MMOL/L (ref 8–16)
BASOPHILS # BLD AUTO: 0.01 K/UL (ref 0–0.2)
BASOPHILS NFR BLD: 0.3 % (ref 0–1.9)
BUN SERPL-MCNC: 49 MG/DL (ref 8–23)
CALCIUM SERPL-MCNC: 8.7 MG/DL (ref 8.7–10.5)
CHLORIDE SERPL-SCNC: 109 MMOL/L (ref 95–110)
CO2 SERPL-SCNC: 20 MMOL/L (ref 23–29)
CREAT SERPL-MCNC: 3.3 MG/DL (ref 0.5–1.4)
DIFFERENTIAL METHOD: ABNORMAL
EOSINOPHIL # BLD AUTO: 0 K/UL (ref 0–0.5)
EOSINOPHIL NFR BLD: 0.6 % (ref 0–8)
ERYTHROCYTE [DISTWIDTH] IN BLOOD BY AUTOMATED COUNT: 18.1 % (ref 11.5–14.5)
EST. GFR  (NO RACE VARIABLE): 19.1 ML/MIN/1.73 M^2
GLUCOSE SERPL-MCNC: 157 MG/DL (ref 70–110)
HCT VFR BLD AUTO: 26.5 % (ref 40–54)
HGB BLD-MCNC: 8.1 G/DL (ref 14–18)
IMM GRANULOCYTES # BLD AUTO: 0.02 K/UL (ref 0–0.04)
IMM GRANULOCYTES NFR BLD AUTO: 0.6 % (ref 0–0.5)
LYMPHOCYTES # BLD AUTO: 1.4 K/UL (ref 1–4.8)
LYMPHOCYTES NFR BLD: 37.2 % (ref 18–48)
MAGNESIUM SERPL-MCNC: 1.7 MG/DL (ref 1.6–2.6)
MCH RBC QN AUTO: 27.6 PG (ref 27–31)
MCHC RBC AUTO-ENTMCNC: 30.6 G/DL (ref 32–36)
MCV RBC AUTO: 90 FL (ref 82–98)
MONOCYTES # BLD AUTO: 0.4 K/UL (ref 0.3–1)
MONOCYTES NFR BLD: 11 % (ref 4–15)
NEUTROPHILS # BLD AUTO: 1.8 K/UL (ref 1.8–7.7)
NEUTROPHILS NFR BLD: 50.3 % (ref 38–73)
NRBC BLD-RTO: 0 /100 WBC
PHOSPHATE SERPL-MCNC: 4.1 MG/DL (ref 2.7–4.5)
PLATELET # BLD AUTO: 164 K/UL (ref 150–450)
PMV BLD AUTO: 11 FL (ref 9.2–12.9)
POTASSIUM SERPL-SCNC: 4.8 MMOL/L (ref 3.5–5.1)
RBC # BLD AUTO: 2.93 M/UL (ref 4.6–6.2)
SODIUM SERPL-SCNC: 137 MMOL/L (ref 136–145)
WBC # BLD AUTO: 3.63 K/UL (ref 3.9–12.7)

## 2022-08-11 PROCEDURE — 80197 ASSAY OF TACROLIMUS: CPT | Performed by: INTERNAL MEDICINE

## 2022-08-11 PROCEDURE — 36415 COLL VENOUS BLD VENIPUNCTURE: CPT | Mod: PO | Performed by: INTERNAL MEDICINE

## 2022-08-11 PROCEDURE — 83735 ASSAY OF MAGNESIUM: CPT | Performed by: INTERNAL MEDICINE

## 2022-08-11 PROCEDURE — 85025 COMPLETE CBC W/AUTO DIFF WBC: CPT | Performed by: INTERNAL MEDICINE

## 2022-08-11 PROCEDURE — 80069 RENAL FUNCTION PANEL: CPT | Performed by: INTERNAL MEDICINE

## 2022-08-12 ENCOUNTER — INFUSION (OUTPATIENT)
Dept: INFUSION THERAPY | Facility: HOSPITAL | Age: 72
End: 2022-08-12
Attending: INTERNAL MEDICINE
Payer: MEDICARE

## 2022-08-12 VITALS
RESPIRATION RATE: 18 BRPM | SYSTOLIC BLOOD PRESSURE: 148 MMHG | TEMPERATURE: 98 F | DIASTOLIC BLOOD PRESSURE: 72 MMHG | HEART RATE: 74 BPM | OXYGEN SATURATION: 99 %

## 2022-08-12 DIAGNOSIS — T86.11 ANTIBODY MEDIATED REJECTION OF KIDNEY TRANSPLANT: Primary | ICD-10-CM

## 2022-08-12 LAB — TACROLIMUS BLD-MCNC: 7.1 NG/ML (ref 5–15)

## 2022-08-12 RX ORDER — METHYLPREDNISOLONE SOD SUCC 125 MG
100 VIAL (EA) INJECTION
Status: DISCONTINUED | OUTPATIENT
Start: 2022-08-12 | End: 2022-08-12 | Stop reason: HOSPADM

## 2022-08-12 RX ORDER — ACETAMINOPHEN 325 MG/1
650 TABLET ORAL
Status: DISCONTINUED | OUTPATIENT
Start: 2022-08-12 | End: 2022-08-12 | Stop reason: HOSPADM

## 2022-08-12 RX ORDER — HEPARIN 100 UNIT/ML
500 SYRINGE INTRAVENOUS
Status: CANCELLED | OUTPATIENT
Start: 2022-08-26

## 2022-08-12 RX ORDER — FAMOTIDINE 10 MG/ML
20 INJECTION INTRAVENOUS
Status: DISCONTINUED | OUTPATIENT
Start: 2022-08-12 | End: 2022-08-12 | Stop reason: HOSPADM

## 2022-08-12 RX ORDER — SODIUM CHLORIDE 0.9 % (FLUSH) 0.9 %
10 SYRINGE (ML) INJECTION
Status: CANCELLED | OUTPATIENT
Start: 2022-08-26

## 2022-08-12 RX ORDER — FAMOTIDINE 10 MG/ML
20 INJECTION INTRAVENOUS
Status: CANCELLED | OUTPATIENT
Start: 2022-08-26

## 2022-08-12 RX ORDER — DIPHENHYDRAMINE HYDROCHLORIDE 50 MG/ML
25 INJECTION INTRAMUSCULAR; INTRAVENOUS
Status: DISCONTINUED | OUTPATIENT
Start: 2022-08-12 | End: 2022-08-12 | Stop reason: HOSPADM

## 2022-08-12 RX ORDER — ACETAMINOPHEN 325 MG/1
650 TABLET ORAL
Status: CANCELLED | OUTPATIENT
Start: 2022-08-26

## 2022-08-12 NOTE — NURSING
Patient not treated with Rituxan today per MD request. Patient is to keep appointment on 8/29 with Dr Reese for follow up and further instruction on next steps in his treatment process. Patient verbalized understanding. Patient discharged from infusion in [facility].

## 2022-08-15 ENCOUNTER — LAB VISIT (OUTPATIENT)
Dept: LAB | Facility: HOSPITAL | Age: 72
End: 2022-08-15
Attending: INTERNAL MEDICINE
Payer: MEDICARE

## 2022-08-15 DIAGNOSIS — Z94.0 KIDNEY REPLACED BY TRANSPLANT: ICD-10-CM

## 2022-08-15 LAB
ALBUMIN SERPL BCP-MCNC: 2.9 G/DL (ref 3.5–5.2)
ANION GAP SERPL CALC-SCNC: 8 MMOL/L (ref 8–16)
BASOPHILS # BLD AUTO: 0.02 K/UL (ref 0–0.2)
BASOPHILS NFR BLD: 0.4 % (ref 0–1.9)
BUN SERPL-MCNC: 46 MG/DL (ref 8–23)
CALCIUM SERPL-MCNC: 8.9 MG/DL (ref 8.7–10.5)
CHLORIDE SERPL-SCNC: 112 MMOL/L (ref 95–110)
CO2 SERPL-SCNC: 22 MMOL/L (ref 23–29)
CREAT SERPL-MCNC: 3.1 MG/DL (ref 0.5–1.4)
DIFFERENTIAL METHOD: ABNORMAL
EOSINOPHIL # BLD AUTO: 0 K/UL (ref 0–0.5)
EOSINOPHIL NFR BLD: 0.2 % (ref 0–8)
ERYTHROCYTE [DISTWIDTH] IN BLOOD BY AUTOMATED COUNT: 18.6 % (ref 11.5–14.5)
EST. GFR  (NO RACE VARIABLE): 21 ML/MIN/1.73 M^2
GLUCOSE SERPL-MCNC: 65 MG/DL (ref 70–110)
HCT VFR BLD AUTO: 27 % (ref 40–54)
HGB BLD-MCNC: 8.9 G/DL (ref 14–18)
IMM GRANULOCYTES # BLD AUTO: 0.03 K/UL (ref 0–0.04)
IMM GRANULOCYTES NFR BLD AUTO: 0.6 % (ref 0–0.5)
LYMPHOCYTES # BLD AUTO: 1.9 K/UL (ref 1–4.8)
LYMPHOCYTES NFR BLD: 36 % (ref 18–48)
MAGNESIUM SERPL-MCNC: 1.8 MG/DL (ref 1.6–2.6)
MCH RBC QN AUTO: 29.5 PG (ref 27–31)
MCHC RBC AUTO-ENTMCNC: 33 G/DL (ref 32–36)
MCV RBC AUTO: 89 FL (ref 82–98)
MONOCYTES # BLD AUTO: 0.5 K/UL (ref 0.3–1)
MONOCYTES NFR BLD: 8.5 % (ref 4–15)
NEUTROPHILS # BLD AUTO: 2.9 K/UL (ref 1.8–7.7)
NEUTROPHILS NFR BLD: 54.3 % (ref 38–73)
NRBC BLD-RTO: 0 /100 WBC
PHOSPHATE SERPL-MCNC: 4.1 MG/DL (ref 2.7–4.5)
PLATELET # BLD AUTO: 231 K/UL (ref 150–450)
PLATELET BLD QL SMEAR: ABNORMAL
PMV BLD AUTO: 9.9 FL (ref 9.2–12.9)
POTASSIUM SERPL-SCNC: 4.6 MMOL/L (ref 3.5–5.1)
RBC # BLD AUTO: 3.02 M/UL (ref 4.6–6.2)
SODIUM SERPL-SCNC: 142 MMOL/L (ref 136–145)
WBC # BLD AUTO: 5.28 K/UL (ref 3.9–12.7)

## 2022-08-15 PROCEDURE — 36415 COLL VENOUS BLD VENIPUNCTURE: CPT | Mod: PO | Performed by: INTERNAL MEDICINE

## 2022-08-15 PROCEDURE — 80197 ASSAY OF TACROLIMUS: CPT | Performed by: INTERNAL MEDICINE

## 2022-08-15 PROCEDURE — 80069 RENAL FUNCTION PANEL: CPT | Performed by: INTERNAL MEDICINE

## 2022-08-15 PROCEDURE — 83735 ASSAY OF MAGNESIUM: CPT | Performed by: INTERNAL MEDICINE

## 2022-08-15 PROCEDURE — 85025 COMPLETE CBC W/AUTO DIFF WBC: CPT | Performed by: INTERNAL MEDICINE

## 2022-08-16 LAB — TACROLIMUS BLD-MCNC: 8.9 NG/ML (ref 5–15)

## 2022-08-17 LAB
CYTOMEGALOVIRUS LOG (IU/ML): 3.03 LOGIU/ML
CYTOMEGALOVIRUS PCR, QUANT: 1076 IU/ML

## 2022-08-18 ENCOUNTER — LAB VISIT (OUTPATIENT)
Dept: LAB | Facility: HOSPITAL | Age: 72
End: 2022-08-18
Attending: INTERNAL MEDICINE
Payer: MEDICARE

## 2022-08-18 DIAGNOSIS — Z94.0 KIDNEY REPLACED BY TRANSPLANT: ICD-10-CM

## 2022-08-18 LAB
ALBUMIN SERPL BCP-MCNC: 2.9 G/DL (ref 3.5–5.2)
ANION GAP SERPL CALC-SCNC: 10 MMOL/L (ref 8–16)
BASOPHILS # BLD AUTO: 0.01 K/UL (ref 0–0.2)
BASOPHILS NFR BLD: 0.2 % (ref 0–1.9)
BUN SERPL-MCNC: 43 MG/DL (ref 8–23)
CALCIUM SERPL-MCNC: 8.7 MG/DL (ref 8.7–10.5)
CHLORIDE SERPL-SCNC: 112 MMOL/L (ref 95–110)
CO2 SERPL-SCNC: 20 MMOL/L (ref 23–29)
CREAT SERPL-MCNC: 2.8 MG/DL (ref 0.5–1.4)
DIFFERENTIAL METHOD: ABNORMAL
EOSINOPHIL # BLD AUTO: 0 K/UL (ref 0–0.5)
EOSINOPHIL NFR BLD: 0.2 % (ref 0–8)
ERYTHROCYTE [DISTWIDTH] IN BLOOD BY AUTOMATED COUNT: 18.9 % (ref 11.5–14.5)
EST. GFR  (NO RACE VARIABLE): 23.2 ML/MIN/1.73 M^2
GLUCOSE SERPL-MCNC: 82 MG/DL (ref 70–110)
HCT VFR BLD AUTO: 26.3 % (ref 40–54)
HGB BLD-MCNC: 8.6 G/DL (ref 14–18)
IMM GRANULOCYTES # BLD AUTO: 0.04 K/UL (ref 0–0.04)
IMM GRANULOCYTES NFR BLD AUTO: 0.9 % (ref 0–0.5)
LYMPHOCYTES # BLD AUTO: 1.6 K/UL (ref 1–4.8)
LYMPHOCYTES NFR BLD: 34.7 % (ref 18–48)
MAGNESIUM SERPL-MCNC: 1.8 MG/DL (ref 1.6–2.6)
MCH RBC QN AUTO: 29.1 PG (ref 27–31)
MCHC RBC AUTO-ENTMCNC: 32.7 G/DL (ref 32–36)
MCV RBC AUTO: 89 FL (ref 82–98)
MONOCYTES # BLD AUTO: 0.3 K/UL (ref 0.3–1)
MONOCYTES NFR BLD: 6.2 % (ref 4–15)
NEUTROPHILS # BLD AUTO: 2.6 K/UL (ref 1.8–7.7)
NEUTROPHILS NFR BLD: 57.8 % (ref 38–73)
NRBC BLD-RTO: 0 /100 WBC
PHOSPHATE SERPL-MCNC: 4.2 MG/DL (ref 2.7–4.5)
PLATELET # BLD AUTO: 194 K/UL (ref 150–450)
PMV BLD AUTO: 10.5 FL (ref 9.2–12.9)
POTASSIUM SERPL-SCNC: 4.3 MMOL/L (ref 3.5–5.1)
RBC # BLD AUTO: 2.96 M/UL (ref 4.6–6.2)
SODIUM SERPL-SCNC: 142 MMOL/L (ref 136–145)
WBC # BLD AUTO: 4.55 K/UL (ref 3.9–12.7)

## 2022-08-18 PROCEDURE — 36415 COLL VENOUS BLD VENIPUNCTURE: CPT | Mod: PO | Performed by: INTERNAL MEDICINE

## 2022-08-18 PROCEDURE — 80069 RENAL FUNCTION PANEL: CPT | Performed by: INTERNAL MEDICINE

## 2022-08-18 PROCEDURE — 85025 COMPLETE CBC W/AUTO DIFF WBC: CPT | Performed by: INTERNAL MEDICINE

## 2022-08-18 PROCEDURE — 83735 ASSAY OF MAGNESIUM: CPT | Performed by: INTERNAL MEDICINE

## 2022-08-18 PROCEDURE — 80197 ASSAY OF TACROLIMUS: CPT | Performed by: INTERNAL MEDICINE

## 2022-08-19 ENCOUNTER — TELEPHONE (OUTPATIENT)
Dept: TRANSPLANT | Facility: CLINIC | Age: 72
End: 2022-08-19
Payer: MEDICARE

## 2022-08-19 LAB — TACROLIMUS BLD-MCNC: 8 NG/ML (ref 5–15)

## 2022-08-19 NOTE — TELEPHONE ENCOUNTER
Left a message regarding lab restuls that dr. Reese reviewed and no action was needed. Tac level therapeutic.

## 2022-08-22 ENCOUNTER — LAB VISIT (OUTPATIENT)
Dept: LAB | Facility: HOSPITAL | Age: 72
End: 2022-08-22
Attending: INTERNAL MEDICINE
Payer: MEDICARE

## 2022-08-22 DIAGNOSIS — Z94.0 KIDNEY REPLACED BY TRANSPLANT: ICD-10-CM

## 2022-08-22 LAB
ALBUMIN SERPL BCP-MCNC: 2.9 G/DL (ref 3.5–5.2)
ANION GAP SERPL CALC-SCNC: 7 MMOL/L (ref 8–16)
BASOPHILS # BLD AUTO: 0.03 K/UL (ref 0–0.2)
BASOPHILS NFR BLD: 0.6 % (ref 0–1.9)
BUN SERPL-MCNC: 42 MG/DL (ref 8–23)
CALCIUM SERPL-MCNC: 8.6 MG/DL (ref 8.7–10.5)
CHLORIDE SERPL-SCNC: 111 MMOL/L (ref 95–110)
CO2 SERPL-SCNC: 23 MMOL/L (ref 23–29)
CREAT SERPL-MCNC: 2.3 MG/DL (ref 0.5–1.4)
DIFFERENTIAL METHOD: ABNORMAL
EOSINOPHIL # BLD AUTO: 0 K/UL (ref 0–0.5)
EOSINOPHIL NFR BLD: 0.2 % (ref 0–8)
ERYTHROCYTE [DISTWIDTH] IN BLOOD BY AUTOMATED COUNT: 20.2 % (ref 11.5–14.5)
EST. GFR  (NO RACE VARIABLE): 29.4 ML/MIN/1.73 M^2
GLUCOSE SERPL-MCNC: 118 MG/DL (ref 70–110)
HCT VFR BLD AUTO: 26.7 % (ref 40–54)
HGB BLD-MCNC: 8.6 G/DL (ref 14–18)
IMM GRANULOCYTES # BLD AUTO: 0.02 K/UL (ref 0–0.04)
IMM GRANULOCYTES NFR BLD AUTO: 0.4 % (ref 0–0.5)
LYMPHOCYTES # BLD AUTO: 1.9 K/UL (ref 1–4.8)
LYMPHOCYTES NFR BLD: 36.9 % (ref 18–48)
MAGNESIUM SERPL-MCNC: 1.8 MG/DL (ref 1.6–2.6)
MCH RBC QN AUTO: 29.1 PG (ref 27–31)
MCHC RBC AUTO-ENTMCNC: 32.2 G/DL (ref 32–36)
MCV RBC AUTO: 90 FL (ref 82–98)
MONOCYTES # BLD AUTO: 0.3 K/UL (ref 0.3–1)
MONOCYTES NFR BLD: 5 % (ref 4–15)
NEUTROPHILS # BLD AUTO: 2.9 K/UL (ref 1.8–7.7)
NEUTROPHILS NFR BLD: 56.9 % (ref 38–73)
NRBC BLD-RTO: 0 /100 WBC
PHOSPHATE SERPL-MCNC: 3.6 MG/DL (ref 2.7–4.5)
PLATELET # BLD AUTO: 203 K/UL (ref 150–450)
PMV BLD AUTO: 10.5 FL (ref 9.2–12.9)
POTASSIUM SERPL-SCNC: 4.5 MMOL/L (ref 3.5–5.1)
RBC # BLD AUTO: 2.96 M/UL (ref 4.6–6.2)
SODIUM SERPL-SCNC: 141 MMOL/L (ref 136–145)
WBC # BLD AUTO: 5.02 K/UL (ref 3.9–12.7)

## 2022-08-22 PROCEDURE — 83735 ASSAY OF MAGNESIUM: CPT | Performed by: INTERNAL MEDICINE

## 2022-08-22 PROCEDURE — 80069 RENAL FUNCTION PANEL: CPT | Performed by: INTERNAL MEDICINE

## 2022-08-22 PROCEDURE — 80197 ASSAY OF TACROLIMUS: CPT | Performed by: INTERNAL MEDICINE

## 2022-08-22 PROCEDURE — 36415 COLL VENOUS BLD VENIPUNCTURE: CPT | Mod: PO | Performed by: INTERNAL MEDICINE

## 2022-08-22 PROCEDURE — 85025 COMPLETE CBC W/AUTO DIFF WBC: CPT | Performed by: INTERNAL MEDICINE

## 2022-08-23 LAB — TACROLIMUS BLD-MCNC: 8.7 NG/ML (ref 5–15)

## 2022-08-24 LAB
CYTOMEGALOVIRUS LOG (IU/ML): 2.95 LOGIU/ML
CYTOMEGALOVIRUS PCR, QUANT: 882 IU/ML

## 2022-08-25 ENCOUNTER — LAB VISIT (OUTPATIENT)
Dept: LAB | Facility: HOSPITAL | Age: 72
End: 2022-08-25
Attending: INTERNAL MEDICINE
Payer: MEDICARE

## 2022-08-25 DIAGNOSIS — Z94.0 KIDNEY TRANSPLANTED: ICD-10-CM

## 2022-08-25 DIAGNOSIS — Z94.1 HEART REPLACED BY TRANSPLANT: ICD-10-CM

## 2022-08-25 DIAGNOSIS — R76.8 HEPATITIS C ANTIBODY TEST POSITIVE: ICD-10-CM

## 2022-08-25 DIAGNOSIS — N18.30 STAGE 3 CHRONIC KIDNEY DISEASE, UNSPECIFIED WHETHER STAGE 3A OR 3B CKD: ICD-10-CM

## 2022-08-25 PROCEDURE — 80076 HEPATIC FUNCTION PANEL: CPT | Performed by: PHYSICIAN ASSISTANT

## 2022-08-25 PROCEDURE — 85025 COMPLETE CBC W/AUTO DIFF WBC: CPT | Performed by: INTERNAL MEDICINE

## 2022-08-25 PROCEDURE — 80069 RENAL FUNCTION PANEL: CPT | Performed by: INTERNAL MEDICINE

## 2022-08-26 LAB
ALBUMIN SERPL BCP-MCNC: 3.1 G/DL (ref 3.5–5.2)
ALBUMIN SERPL BCP-MCNC: 3.1 G/DL (ref 3.5–5.2)
ALP SERPL-CCNC: 70 U/L (ref 55–135)
ALT SERPL W/O P-5'-P-CCNC: 22 U/L (ref 10–44)
ANION GAP SERPL CALC-SCNC: 9 MMOL/L (ref 8–16)
AST SERPL-CCNC: 21 U/L (ref 10–40)
BASOPHILS # BLD AUTO: 0.03 K/UL (ref 0–0.2)
BASOPHILS NFR BLD: 0.4 % (ref 0–1.9)
BILIRUB DIRECT SERPL-MCNC: 0.2 MG/DL (ref 0.1–0.3)
BILIRUB SERPL-MCNC: 0.5 MG/DL (ref 0.1–1)
BUN SERPL-MCNC: 38 MG/DL (ref 8–23)
CALCIUM SERPL-MCNC: 9 MG/DL (ref 8.7–10.5)
CHLORIDE SERPL-SCNC: 109 MMOL/L (ref 95–110)
CO2 SERPL-SCNC: 22 MMOL/L (ref 23–29)
CREAT SERPL-MCNC: 2.6 MG/DL (ref 0.5–1.4)
DIFFERENTIAL METHOD: ABNORMAL
EOSINOPHIL # BLD AUTO: 0 K/UL (ref 0–0.5)
EOSINOPHIL NFR BLD: 0.1 % (ref 0–8)
ERYTHROCYTE [DISTWIDTH] IN BLOOD BY AUTOMATED COUNT: 20.4 % (ref 11.5–14.5)
EST. GFR  (NO RACE VARIABLE): 25.4 ML/MIN/1.73 M^2
GLUCOSE SERPL-MCNC: 89 MG/DL (ref 70–110)
HCT VFR BLD AUTO: 31 % (ref 40–54)
HGB BLD-MCNC: 9.6 G/DL (ref 14–18)
IMM GRANULOCYTES # BLD AUTO: 0.03 K/UL (ref 0–0.04)
IMM GRANULOCYTES NFR BLD AUTO: 0.4 % (ref 0–0.5)
LYMPHOCYTES # BLD AUTO: 2.6 K/UL (ref 1–4.8)
LYMPHOCYTES NFR BLD: 37.4 % (ref 18–48)
MCH RBC QN AUTO: 29.4 PG (ref 27–31)
MCHC RBC AUTO-ENTMCNC: 31 G/DL (ref 32–36)
MCV RBC AUTO: 95 FL (ref 82–98)
MONOCYTES # BLD AUTO: 0.3 K/UL (ref 0.3–1)
MONOCYTES NFR BLD: 4.1 % (ref 4–15)
NEUTROPHILS # BLD AUTO: 3.9 K/UL (ref 1.8–7.7)
NEUTROPHILS NFR BLD: 57.6 % (ref 38–73)
NRBC BLD-RTO: 0 /100 WBC
PHOSPHATE SERPL-MCNC: 4.1 MG/DL (ref 2.7–4.5)
PLATELET # BLD AUTO: 203 K/UL (ref 150–450)
PMV BLD AUTO: 10.7 FL (ref 9.2–12.9)
POTASSIUM SERPL-SCNC: 4.7 MMOL/L (ref 3.5–5.1)
PROT SERPL-MCNC: 6.7 G/DL (ref 6–8.4)
RBC # BLD AUTO: 3.26 M/UL (ref 4.6–6.2)
SODIUM SERPL-SCNC: 140 MMOL/L (ref 136–145)
WBC # BLD AUTO: 6.85 K/UL (ref 3.9–12.7)

## 2022-08-26 NOTE — PROGRESS NOTES
Kidney/Heart Post-Transplant Assessment    Referring Physician:   Current Nephrologist:     ORGAN:   Donor Type:   PHS Increased Risk:   Cold Ischemia:  mins  Induction Medications:     Subjective:     CC:  Reassessment of renal allograft function and management of chronic immunosuppression.    HPI:  Mr. Albrecht is a 72 y.o. year old Black or  male who received a  heart and kidney transplant on 5/24/02.  He has CKD stage 3 - GFR 30-59 and his baseline creatinine is between 1 to 2 with proteinuria. He takes mycophenolate mofetil, prednisone and tacrolimus for maintenance immunosuppression. He denies any recent hospitalizations or ER visits since his previous clinic visit.    He refers tremors    No chest pain or SOB    No nausea vomit or diarrhea. No abdominal pain.     He is using a cane    Appetite is fine.    He is pleased with the response to treatment.    Patient was scheduled for a rituxan infusion which was cancelled after the new diagnosis of cmv viremia. She still refers some fatigue.             [place] DIAGNOSIS:   KIDNEY (PERCUTANEOUS TRANSPLANT BIOPSY):   1)  MODERATE MICROCIRCULATION INFLAMMATION WITH TRANSVERSE GLOMERULOPATHY AND   C4d POSITIVITY DIAGNOSTIC OF ACUTE AND CHRONIC ANTIBODY-MEDIATED REJECTION.   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            Latest Reference Range & Units 08/25/22 08:09   WBC 3.90 - 12.70 K/uL 6.85   RBC 4.60 - 6.20 M/uL 3.26 (L)   Hemoglobin 14.0 - 18.0 g/dL 9.6 (L)   Hematocrit 40.0 - 54.0 % 31.0 (L)   MCV 82 - 98 fL 95   MCH 27.0 - 31.0 pg 29.4   MCHC 32.0 - 36.0 g/dL 31.0 (L)   RDW 11.5 - 14.5 % 20.4 (H)   Platelets 150 - 450 K/uL 203   (L): Data is abnormally low  (H): Data is abnormally high     Latest Reference Range & Units 08/25/22 08:09   Sodium 136 - 145 mmol/L 140   Potassium 3.5 - 5.1 mmol/L 4.7   Chloride 95 - 110 mmol/L 109   CO2 23 - 29 mmol/L 22 (L)   Anion Gap  "8 - 16 mmol/L 9   BUN 8 - 23 mg/dL 38 (H)   Creatinine 0.5 - 1.4 mg/dL 2.6 (H)   eGFR >60 mL/min/1.73 m^2 25.4 !   Glucose 70 - 110 mg/dL 89   Calcium 8.7 - 10.5 mg/dL 9.0   Phosphorus 2.7 - 4.5 mg/dL 4.1   (L): Data is abnormally low  (H): Data is abnormally high  !: Data is abnormal        Review of Systems     Skin: no skin rash  CNS; no headaches, blurred vision, seizure, or syncope  ENT: No JVD,  Adenopathies,  nasal congestion. No oral lesions  Cardiac: No chest pain, dyspnea, claudication, edema or palpitations  Respiratory: No SOB, cough, hemoptysis   Gastro-intestinal: No diarrhea, constipation, abdominal pain, nausea, vomit. No ascitis  Genitourinary: no hematuria, dysuria, frequency, frequency  Musculoskeletal: joint pain, arthritis or vasculitic changes  Psych: alert awake, oriented, No cranial nerves deficit.      Objective:         Physical Exam     BP (!) 147/72 (BP Location: Right arm, Patient Position: Sitting, BP Method: Large (Automatic))   Pulse 63   Temp 97.2 °F (36.2 °C) (Temporal)   Resp 18   Ht 6' 2" (1.88 m)   Wt 120.3 kg (265 lb 3.4 oz)   SpO2 98%   BMI 34.05 kg/m²       Head: normocephalic  Neck: No JVD, cervical axillary, or femoral adenopathies  Heart: no murmurs, Normal s1 and s2, No gallops, no rubs, No murmurs  Lungs; CTA, good respiratory effort, no crackles  Abdomen: soft, non tender, no splenomegaly or hepatomegaly, no massess, no bruits  Extremities: No edema, skin rash, joint pain  SNC: awake, alert oriented. Cranial nerves are intact, no focalized, sensitivity and strength preserved      Labs:  Lab Results   Component Value Date    WBC 6.85 08/25/2022    HGB 9.6 (L) 08/25/2022    HCT 31.0 (L) 08/25/2022     08/25/2022    K 4.7 08/25/2022     08/25/2022    CO2 22 (L) 08/25/2022    BUN 38 (H) 08/25/2022    CREATININE 2.6 (H) 08/25/2022    EGFRNONAA 13.2 (A) 07/27/2022    CALCIUM 9.0 08/25/2022    PHOS 4.1 08/25/2022    MG 1.8 08/22/2022    ALBUMIN 3.1 (L) " 2022    ALBUMIN 3.1 (L) 2022    AST 21 2022    ALT 22 2022    UTPCR 0.54 (H) 2022    .0 (H) 2022    TACROLIMUS 8.7 2022    CYCLOSPORINE <10 (L) 2011    SIROLIMUSLEV <2.0 (L) 2022       No results found for: EXTANC, EXTWBC, EXTSEGS, EXTPLATELETS, EXTHEMOGLOBI, EXTHEMATOCRI, EXTCREATININ, EXTSODIUM, EXTPOTASSIUM, EXTBUN, EXTCO2, EXTCALCIUM, EXTPHOSPHORU, EXTGLUCOSE, EXTALBUMIN, EXTAST, EXTALT, EXTBILITOTAL, EXTLIPASE, EXTAMYLASE    No results found for: EXTCYCLOSLVL, EXTSIROLIMUS, EXTTACROLVL, EXTPROTCRE, EXTPTHINTACT, EXTPROTEINUA, EXTWBCUA, EXTRBCUA    Labs were reviewed with the patient.    Assessment:     1. Antibody mediated rejection of kidney transplant    2. Anemia of chronic renal failure, stage 3b    3. Class 2 severe obesity due to excess calories with serious comorbidity and body mass index (BMI) of 37.0 to 37.9 in adult    4. -donor kidney transplant    5. Cytomegalovirus (CMV) viremia    6. Heart transplanted    7. Immunocompromised patient    8. Hypertension associated with stage 3b chronic kidney disease due to type 2 diabetes mellitus    9. Mixed diabetic hyperlipidemia associated with type 2 diabetes mellitus    10. Proteinuria due to type 2 diabetes mellitus        Plan:   We discussed in detail immunosuppression in view of CMV infection. Currently on  bid and prograf level 7 to 10. OFF Rapamune  Needs weekly serum CMV preservation  Patient wants to change l;abs form tomorrow to Wednesday. I sent a message to our coordinator   We discussed response to treatment in late rejections and chronic changes, which are less responsive to standard therapy. We discussed risk and benefits of therapy to late rejections. I am pleasantly surpreissed his kidney function improved form the admission creatinine (12.9) to the current creatinine 2.6  We spoke about proteinuria, which is now improved  I explained to the patient that we could  lower prograf dose according with labs on Wednesday .  I advised patient to continue follow up with heart failure and local cardiologist  Also he has an appointment with nephrology (Dr Matthew) in [place]   Extensive education provided  All questions answered     1. CKD stage 3 with AUBREY associated with rejecion. : will continue follow up as per our center guidelines. patient to continue close follow up with the local General nephrologist. Education provided in appropriate fluid intake, potassium intake. Continue with oral hydration.        2. Immunosuppression: prograf level acceptable.  bid due to cmv indfection  Lab Results   Component Value Date    TACROLIMUS 8.7 08/22/2022    TACROLIMUS 8.0 08/18/2022    TACROLIMUS 8.9 08/15/2022     Lab Results   Component Value Date    CYCLOSPORINE <10 (L) 03/01/2011     @   Will closely monitor for toxicities, education provided about adherence to medicines and need to communicate any side effect to the transplant nurse or physician.    3. Allograft Function:stable at baseline for the patient. Continue follow up as per our guidelines and with the local General nephrologist. Communication will be sent today.  Lab Results   Component Value Date    CREATININE 2.6 (H) 08/25/2022    CREATININE 2.3 (H) 08/22/2022    CREATININE 2.8 (H) 08/18/2022     No results found for: AMYLASE, LIPASE    4. Hypertension management:  above target we discussed non immune factors for allograft preservation including BP. Need for HBPM and report readings to his local docs and heart failure team. Continue with home blood pressure monitoring, low salt and healthy life discussed with the patient..    5. Metabolic Bone Disease/Secondary Hyperparathyroidism:calcium and phosphorus level discussed with the patient, patient will continue follow up with the general nephrologist for management of metabolic bone disease   calcium and phosphorus as per our center protocol. Will monitor PTH, Vit D level,  calcium.      Lab Results   Component Value Date    .0 (H) 06/20/2022    CALCIUM 9.0 08/25/2022    PHOS 4.1 08/25/2022    PHOS 3.6 08/22/2022    PHOS 4.2 08/18/2022       6. Electrolytes: reviewed with the patient, essentially within the normal range no need for acute changes today, will monitor as per our center guidelines.     Lab Results   Component Value Date     08/25/2022    K 4.7 08/25/2022     08/25/2022    CO2 22 (L) 08/25/2022    CO2 23 08/22/2022    CO2 20 (L) 08/18/2022       7. Anemia: will continue monitoring as per our center guidelines. No indication for acute intervention today.     Lab Results   Component Value Date    WBC 6.85 08/25/2022    HGB 9.6 (L) 08/25/2022    HCT 31.0 (L) 08/25/2022    MCV 95 08/25/2022     08/25/2022       8.Proteinuria:improved, will continue close monitoring  will continue with pr/cr ratio as per our center guidelines  Lab Results   Component Value Date    PROTEINURINE 70 (H) 08/25/2022    CREATRANDUR 130.0 08/25/2022    UTPCR 0.54 (H) 08/25/2022    UTPCR 0.71 (H) 07/21/2022    UTPCR 1.49 (H) 06/20/2022        9. BK virus infection screening: will continue with urine or blood PCR as per our guidelines to prevent BK virus viremia and allograft dysfunction  Lab Results   Component Value Date    BKVIRUSPCRQB <125 07/20/2022         10. Weight education: provided during the clinic visit. We spoke about wt management. Exercise, and other non immune interventions for allograft presercation.    Body mass index is 34.05 kg/m².       11.Patient safety education regarding immunosuppression including prophylaxis posttransplant for CMV, PCP : Education provided about vaccination and prevention of infections.    12.  Cytopenias: no significant cytopenias will monitor as per our guidelines. Medicine list reviewed including potential causes of drug-induced cytopenias     Lab Results   Component Value Date    WBC 6.85 08/25/2022    HGB 9.6 (L) 08/25/2022    HCT  31.0 (L) 08/25/2022    MCV 95 08/25/2022     08/25/2022       13. Post-transplant Prophylaxis; CMV Infection, PJP and Candida mucosistis and other indicated for this particular patient. Per protocol. On Treatment for cmv infection weekly serum CMV pcr     I spoke with the patient for 30 minutes. More than half dedicated to counseling and education. All questions answered    Edy Reese MD  Transplant Nephrology            Follow-up:   Clinic: return to transplant clinic weekly for the first month after transplant; every 2 weeks during months 2-3; then at 6-, 9-, 12-, 18-, 24-, and 36- months post-transplant to reassess for complications from immunosuppression toxicity and monitor for rejection.  Annually thereafter.    Labs: since patient remains at high risk for rejection and drug-related complications that warrant close monitoring, labs will be ordered as follows: continue twice weekly CBC, renal panel, and drug level for first month; then same labs once weekly through 3rd month post-transplant.  Urine for UA and protein/creatinine ratio monthly.  Serum BK - PCR at 1-, 3-, 6-, 9-, 12-, 18-, 24-, 36- 48-, and 60 months post-transplant.  Hepatic panel at 1-, 2-, 3-, 6-, 9-, 12-, 18-, 24-, and 36- months post-transplant.    Edy Reese MD       Education:   Material provided to the patient.  Patient reminded to call with any health changes since these can be early signs of significant complications.  Also, I advised the patient to be sure any new medications or changes of old medications are discussed with either a pharmacist or physician knowledgeable with transplant to avoid rejection/drug toxicity related to significant drug interactions.    Patient advised that it is recommended that all transplanted patients, and their close contacts and household members receive Covid vaccination.    UNOS Patient Status  Functional Status: 100% - Normal, no complaints, no evidence of disease  Physical Capacity:  No Limitations

## 2022-08-27 LAB — HCV RNA SERPL NAA+PROBE-ACNC: NORMAL IU/ML

## 2022-08-29 ENCOUNTER — OFFICE VISIT (OUTPATIENT)
Dept: TRANSPLANT | Facility: CLINIC | Age: 72
End: 2022-08-29
Payer: MEDICARE

## 2022-08-29 ENCOUNTER — TELEPHONE (OUTPATIENT)
Dept: HEPATOLOGY | Facility: CLINIC | Age: 72
End: 2022-08-29
Payer: MEDICARE

## 2022-08-29 VITALS
WEIGHT: 265.19 LBS | HEART RATE: 63 BPM | DIASTOLIC BLOOD PRESSURE: 72 MMHG | OXYGEN SATURATION: 98 % | BODY MASS INDEX: 34.03 KG/M2 | SYSTOLIC BLOOD PRESSURE: 147 MMHG | HEIGHT: 74 IN | RESPIRATION RATE: 18 BRPM | TEMPERATURE: 97 F

## 2022-08-29 DIAGNOSIS — N18.32 ANEMIA OF CHRONIC RENAL FAILURE, STAGE 3B: Chronic | ICD-10-CM

## 2022-08-29 DIAGNOSIS — E78.2 MIXED DIABETIC HYPERLIPIDEMIA ASSOCIATED WITH TYPE 2 DIABETES MELLITUS: ICD-10-CM

## 2022-08-29 DIAGNOSIS — Z94.0 DECEASED-DONOR KIDNEY TRANSPLANT: Chronic | ICD-10-CM

## 2022-08-29 DIAGNOSIS — D84.9 IMMUNOCOMPROMISED PATIENT: ICD-10-CM

## 2022-08-29 DIAGNOSIS — E66.01 CLASS 2 SEVERE OBESITY DUE TO EXCESS CALORIES WITH SERIOUS COMORBIDITY AND BODY MASS INDEX (BMI) OF 37.0 TO 37.9 IN ADULT: ICD-10-CM

## 2022-08-29 DIAGNOSIS — Z94.1 HEART TRANSPLANTED: Chronic | ICD-10-CM

## 2022-08-29 DIAGNOSIS — E11.29 PROTEINURIA DUE TO TYPE 2 DIABETES MELLITUS: ICD-10-CM

## 2022-08-29 DIAGNOSIS — D63.1 ANEMIA OF CHRONIC RENAL FAILURE, STAGE 3B: Chronic | ICD-10-CM

## 2022-08-29 DIAGNOSIS — R80.9 PROTEINURIA DUE TO TYPE 2 DIABETES MELLITUS: ICD-10-CM

## 2022-08-29 DIAGNOSIS — I12.9 HYPERTENSION ASSOCIATED WITH STAGE 3B CHRONIC KIDNEY DISEASE DUE TO TYPE 2 DIABETES MELLITUS: ICD-10-CM

## 2022-08-29 DIAGNOSIS — B25.9 CYTOMEGALOVIRUS (CMV) VIREMIA: ICD-10-CM

## 2022-08-29 DIAGNOSIS — E11.69 MIXED DIABETIC HYPERLIPIDEMIA ASSOCIATED WITH TYPE 2 DIABETES MELLITUS: ICD-10-CM

## 2022-08-29 DIAGNOSIS — E11.22 HYPERTENSION ASSOCIATED WITH STAGE 3B CHRONIC KIDNEY DISEASE DUE TO TYPE 2 DIABETES MELLITUS: ICD-10-CM

## 2022-08-29 DIAGNOSIS — N18.32 HYPERTENSION ASSOCIATED WITH STAGE 3B CHRONIC KIDNEY DISEASE DUE TO TYPE 2 DIABETES MELLITUS: ICD-10-CM

## 2022-08-29 DIAGNOSIS — T86.11 ANTIBODY MEDIATED REJECTION OF KIDNEY TRANSPLANT: Primary | ICD-10-CM

## 2022-08-29 PROCEDURE — 99215 OFFICE O/P EST HI 40 MIN: CPT | Mod: PBBFAC | Performed by: INTERNAL MEDICINE

## 2022-08-29 PROCEDURE — 99999 PR PBB SHADOW E&M-EST. PATIENT-LVL V: ICD-10-PCS | Mod: PBBFAC,,, | Performed by: INTERNAL MEDICINE

## 2022-08-29 PROCEDURE — 99215 OFFICE O/P EST HI 40 MIN: CPT | Mod: S$PBB,,, | Performed by: INTERNAL MEDICINE

## 2022-08-29 PROCEDURE — 99999 PR PBB SHADOW E&M-EST. PATIENT-LVL V: CPT | Mod: PBBFAC,,, | Performed by: INTERNAL MEDICINE

## 2022-08-29 PROCEDURE — 99215 PR OFFICE/OUTPT VISIT, EST, LEVL V, 40-54 MIN: ICD-10-PCS | Mod: S$PBB,,, | Performed by: INTERNAL MEDICINE

## 2022-08-29 NOTE — PROGRESS NOTES
REFERRING PROVIDER:   Edy Reese MD     REASON FOR VISIT:   S/p kidney/heart transplant follow-up; significant wt loss    PAST MEDICAL HX:   S/p kidney/heart transplant 5/24/2002, HTN, T2DM, HLD     LABS:   Reviewed     WT: 119.5 kg (263 lbs)  BMI 33.82 mkg/m2     NUTRITION HX:   Pt present. Pt reports okay appetite with no N/V/D/C. Per chart review, 10% wt loss x 4 months. Pt confirmed unintentional wt loss 2/2 COVID positive in June and has been having altered taste since then. Wife cooks all meals at home. Encouraged pt to reach out to RD with any further questions.     INTERVENTION/EDUCATION:  Dicussed a verity of seasonings, herbs/spices and other alternatives to adjust food flavors.     Patient voiced understanding of education & goals. Contact information was provided & will f/u as needed at clinic visits.     Consultation Time: 10 minutes

## 2022-08-29 NOTE — Clinical Note
August 29, 2022                     [facility]  [address]  [facility] [address]  Phone: [phone]   Patient: Nigel Albrecht   MR Number: [id number]   YOB: 1950   Date of Visit: 8/29/2022       Dear      Thank you for referring Nigel Albrecht to me for evaluation. Attached you will find relevant portions of my assessment and plan of care.    If you have questions, please do not hesitate to call me. I look forward to following Nigel Albrecht along with you.    Sincerely,    Edy Reese MD    Enclosure    If you would like to receive this communication electronically, please contact [email or web address] or [phone] to request [employer] access.    [employer] is a tool which provides read-only access to select patient information with whom you have a relationship. Its easy to use and provides real time access to review your patients record including encounter summaries, notes, results, and demographic information.    If you feel you have received this communication in error or would no longer like to receive these types of communications, please e-mail [email or web address]

## 2022-08-31 ENCOUNTER — LAB VISIT (OUTPATIENT)
Dept: LAB | Facility: HOSPITAL | Age: 72
End: 2022-08-31
Attending: INTERNAL MEDICINE
Payer: MEDICARE

## 2022-08-31 DIAGNOSIS — Z94.0 KIDNEY REPLACED BY TRANSPLANT: ICD-10-CM

## 2022-08-31 PROCEDURE — 80069 RENAL FUNCTION PANEL: CPT | Performed by: INTERNAL MEDICINE

## 2022-08-31 PROCEDURE — 85025 COMPLETE CBC W/AUTO DIFF WBC: CPT | Performed by: INTERNAL MEDICINE

## 2022-08-31 PROCEDURE — 83735 ASSAY OF MAGNESIUM: CPT | Performed by: INTERNAL MEDICINE

## 2022-08-31 PROCEDURE — 80197 ASSAY OF TACROLIMUS: CPT | Performed by: INTERNAL MEDICINE

## 2022-08-31 PROCEDURE — 36415 COLL VENOUS BLD VENIPUNCTURE: CPT | Mod: PO | Performed by: INTERNAL MEDICINE

## 2022-09-01 LAB
ALBUMIN SERPL BCP-MCNC: 3.1 G/DL (ref 3.5–5.2)
ANION GAP SERPL CALC-SCNC: 8 MMOL/L (ref 8–16)
BASOPHILS # BLD AUTO: 0.01 K/UL (ref 0–0.2)
BASOPHILS NFR BLD: 0.1 % (ref 0–1.9)
BUN SERPL-MCNC: 39 MG/DL (ref 8–23)
CALCIUM SERPL-MCNC: 8.7 MG/DL (ref 8.7–10.5)
CHLORIDE SERPL-SCNC: 110 MMOL/L (ref 95–110)
CO2 SERPL-SCNC: 24 MMOL/L (ref 23–29)
CREAT SERPL-MCNC: 2.7 MG/DL (ref 0.5–1.4)
DIFFERENTIAL METHOD: ABNORMAL
EOSINOPHIL # BLD AUTO: 0 K/UL (ref 0–0.5)
EOSINOPHIL NFR BLD: 0.1 % (ref 0–8)
ERYTHROCYTE [DISTWIDTH] IN BLOOD BY AUTOMATED COUNT: 20.8 % (ref 11.5–14.5)
EST. GFR  (NO RACE VARIABLE): 24.3 ML/MIN/1.73 M^2
GLUCOSE SERPL-MCNC: 115 MG/DL (ref 70–110)
HCT VFR BLD AUTO: 29.9 % (ref 40–54)
HGB BLD-MCNC: 9.2 G/DL (ref 14–18)
IMM GRANULOCYTES # BLD AUTO: 0.03 K/UL (ref 0–0.04)
IMM GRANULOCYTES NFR BLD AUTO: 0.4 % (ref 0–0.5)
LYMPHOCYTES # BLD AUTO: 2.4 K/UL (ref 1–4.8)
LYMPHOCYTES NFR BLD: 34.1 % (ref 18–48)
MAGNESIUM SERPL-MCNC: 1.9 MG/DL (ref 1.6–2.6)
MCH RBC QN AUTO: 29 PG (ref 27–31)
MCHC RBC AUTO-ENTMCNC: 30.8 G/DL (ref 32–36)
MCV RBC AUTO: 94 FL (ref 82–98)
MONOCYTES # BLD AUTO: 0.3 K/UL (ref 0.3–1)
MONOCYTES NFR BLD: 4 % (ref 4–15)
NEUTROPHILS # BLD AUTO: 4.2 K/UL (ref 1.8–7.7)
NEUTROPHILS NFR BLD: 61.3 % (ref 38–73)
NRBC BLD-RTO: 0 /100 WBC
PHOSPHATE SERPL-MCNC: 4 MG/DL (ref 2.7–4.5)
PLATELET # BLD AUTO: 213 K/UL (ref 150–450)
PMV BLD AUTO: 10.5 FL (ref 9.2–12.9)
POTASSIUM SERPL-SCNC: 4.6 MMOL/L (ref 3.5–5.1)
RBC # BLD AUTO: 3.17 M/UL (ref 4.6–6.2)
SODIUM SERPL-SCNC: 142 MMOL/L (ref 136–145)
TACROLIMUS BLD-MCNC: 7.7 NG/ML (ref 5–15)
WBC # BLD AUTO: 6.94 K/UL (ref 3.9–12.7)

## 2022-09-02 ENCOUNTER — OFFICE VISIT (OUTPATIENT)
Dept: HEPATOLOGY | Facility: CLINIC | Age: 72
End: 2022-09-02
Payer: MEDICARE

## 2022-09-02 ENCOUNTER — TELEPHONE (OUTPATIENT)
Dept: HEPATOLOGY | Facility: CLINIC | Age: 72
End: 2022-09-02
Payer: MEDICARE

## 2022-09-02 DIAGNOSIS — B19.20 HEPATITIS C TEST POSITIVE: ICD-10-CM

## 2022-09-02 LAB
CYTOMEGALOVIRUS LOG (IU/ML): 1.7 LOGIU/ML
CYTOMEGALOVIRUS PCR, QUANT: 51 IU/ML

## 2022-09-02 PROCEDURE — 99214 PR OFFICE/OUTPT VISIT, EST, LEVL IV, 30-39 MIN: ICD-10-PCS | Mod: 95,,, | Performed by: PHYSICIAN ASSISTANT

## 2022-09-02 PROCEDURE — 99214 OFFICE O/P EST MOD 30 MIN: CPT | Mod: 95,,, | Performed by: PHYSICIAN ASSISTANT

## 2022-09-02 NOTE — PROGRESS NOTES
"HEPATOLOGY VIDEO VISIT NOTE - HCV clinic  The patient location is: home  Visit type: audiovisual    Each patient to whom he or she provides medical services by telemedicine is:  (1) informed of the relationship between the physician and patient and the respective role of any other health care provider with respect to management of the patient; and (2) notified that he or she may decline to receive medical services by telemedicine and may withdraw from such care at any time.    REFERRING PROVIDER: Jennifer Osborn  CHIEF COMPLAINT: Hepatitis C       HISTORY       This is a 72 y.o. Black or  male w/ PMH of kidney / heart transplant in 2002 being seen for (+) HCVAB first detected while inpt w/ COVID in 7/2022.     Prior HCV screening neg (HCVAB neg 2014).     7/4/22 HCVAB positive - appears this was part of routine ED screening   Subsequent HCV RNA negative 7/7/22 & 8/25/22  Possible false (+) HCVAB given lack of identifiable risks since prior neg screening    Of note has had mild transaminase elevation throughout July (while inpt for several issues: COVID, AUBREY due to rejection, syncopal episode due to dehydration / orthostatic hypotension) but liver e  nzymes have normalized on recent labs 8/25/22    Feels okay today  Still "recovering" from covid w/ some dyspnea        PMH, PSH, SOCIAL HX, FAMILY HX      Reviewed in Epic      ROS:   Review of Systems   Respiratory:  Positive for shortness of breath.    Cardiovascular:  Negative for chest pain.   Gastrointestinal:  Negative for abdominal pain.     PHYSICAL EXAM:  Friendly Black or  male, in no acute distress; alert and oriented to person, place and time  LUNGS: Normal respiratory effort.  NEURO/PSYCH: Memory intact. Thought and speech pattern appropriate. Behavior normal. No depression or anxiety noted.      PERTINENT DIAGNOSTIC RESULTS      Lab Results   Component Value Date    WBC 6.94 08/31/2022    HGB 9.2 (L) 08/31/2022     " 08/31/2022     Lab Results   Component Value Date    INR 1.0 07/04/2022     Lab Results   Component Value Date    AST 21 08/25/2022    ALT 22 08/25/2022    BILITOT 0.5 08/25/2022    ALBUMIN 3.1 (L) 08/31/2022    ALKPHOS 70 08/25/2022    CREATININE 2.7 (H) 08/31/2022    BUN 39 (H) 08/31/2022     08/31/2022    K 4.6 08/31/2022       ASSESSMENT        72 y.o. Black or  male with:  POSITIVE HEPATITIS C ANTIBODY w/ neg HCV RNA  - possible false (+) given prior neg screening & no risks    2.   RECENT MILD TRANSAMINASE ELEVATION WHILE INPT  - likely up due to concomitant issues (covid)  - appears to have normalized on recent labs    3. S/P HEART / KIDNEY TRANSPLANT 2002    PLAN      Labs in 3 months - LFT, HCV RNA  Assuming normal / neg, no further f/u will be needed      __________________________________________________________________    Duration of encounter: 36 min  This includes face-to-face time and non face-to-face time preparing to see the patient (eg, review of tests), obtaining and/or reviewing separately obtained history, documenting clinical information in the electronic or other health record, independently interpreting resultsand communicating results to the patient/family/caregiver, or care coordination.

## 2022-09-02 NOTE — TELEPHONE ENCOUNTER
----- Message from Jennifer B. Scheuermann, PA sent at 9/2/2022 10:26 AM CDT -----  Pls schedule Labs in 3 months - LFT, HCV RNA

## 2022-09-06 ENCOUNTER — PATIENT MESSAGE (OUTPATIENT)
Dept: FAMILY MEDICINE | Facility: CLINIC | Age: 72
End: 2022-09-06
Payer: MEDICARE

## 2022-09-06 ENCOUNTER — LAB VISIT (OUTPATIENT)
Dept: LAB | Facility: HOSPITAL | Age: 72
End: 2022-09-06
Attending: INTERNAL MEDICINE
Payer: MEDICARE

## 2022-09-06 ENCOUNTER — OFFICE VISIT (OUTPATIENT)
Dept: NEPHROLOGY | Facility: CLINIC | Age: 72
End: 2022-09-06
Payer: MEDICARE

## 2022-09-06 VITALS
RESPIRATION RATE: 20 BRPM | HEART RATE: 60 BPM | BODY MASS INDEX: 34.23 KG/M2 | WEIGHT: 266.75 LBS | HEIGHT: 74 IN | SYSTOLIC BLOOD PRESSURE: 140 MMHG | DIASTOLIC BLOOD PRESSURE: 64 MMHG

## 2022-09-06 DIAGNOSIS — Z94.0 KIDNEY REPLACED BY TRANSPLANT: ICD-10-CM

## 2022-09-06 DIAGNOSIS — Z94.0 KIDNEY TRANSPLANTED: Primary | ICD-10-CM

## 2022-09-06 DIAGNOSIS — Z94.1 HEART TRANSPLANTED: ICD-10-CM

## 2022-09-06 LAB
ALBUMIN SERPL BCP-MCNC: 3.1 G/DL (ref 3.5–5.2)
ANION GAP SERPL CALC-SCNC: 7 MMOL/L (ref 8–16)
BASOPHILS # BLD AUTO: 0.02 K/UL (ref 0–0.2)
BASOPHILS NFR BLD: 0.3 % (ref 0–1.9)
BUN SERPL-MCNC: 35 MG/DL (ref 8–23)
CALCIUM SERPL-MCNC: 8.9 MG/DL (ref 8.7–10.5)
CHLORIDE SERPL-SCNC: 107 MMOL/L (ref 95–110)
CO2 SERPL-SCNC: 22 MMOL/L (ref 23–29)
CREAT SERPL-MCNC: 2.5 MG/DL (ref 0.5–1.4)
DIFFERENTIAL METHOD: ABNORMAL
EOSINOPHIL # BLD AUTO: 0 K/UL (ref 0–0.5)
EOSINOPHIL NFR BLD: 0.3 % (ref 0–8)
ERYTHROCYTE [DISTWIDTH] IN BLOOD BY AUTOMATED COUNT: 21.1 % (ref 11.5–14.5)
EST. GFR  (NO RACE VARIABLE): 26.6 ML/MIN/1.73 M^2
GLUCOSE SERPL-MCNC: 141 MG/DL (ref 70–110)
HCT VFR BLD AUTO: 31.8 % (ref 40–54)
HGB BLD-MCNC: 9.7 G/DL (ref 14–18)
IMM GRANULOCYTES # BLD AUTO: 0.02 K/UL (ref 0–0.04)
IMM GRANULOCYTES NFR BLD AUTO: 0.3 % (ref 0–0.5)
LYMPHOCYTES # BLD AUTO: 2.3 K/UL (ref 1–4.8)
LYMPHOCYTES NFR BLD: 34.7 % (ref 18–48)
MAGNESIUM SERPL-MCNC: 1.8 MG/DL (ref 1.6–2.6)
MCH RBC QN AUTO: 29.4 PG (ref 27–31)
MCHC RBC AUTO-ENTMCNC: 30.5 G/DL (ref 32–36)
MCV RBC AUTO: 96 FL (ref 82–98)
MONOCYTES # BLD AUTO: 0.3 K/UL (ref 0.3–1)
MONOCYTES NFR BLD: 4.8 % (ref 4–15)
NEUTROPHILS # BLD AUTO: 3.9 K/UL (ref 1.8–7.7)
NEUTROPHILS NFR BLD: 59.6 % (ref 38–73)
NRBC BLD-RTO: 0 /100 WBC
PHOSPHATE SERPL-MCNC: 3.7 MG/DL (ref 2.7–4.5)
PLATELET # BLD AUTO: 252 K/UL (ref 150–450)
PMV BLD AUTO: 10.4 FL (ref 9.2–12.9)
POTASSIUM SERPL-SCNC: 4.4 MMOL/L (ref 3.5–5.1)
RBC # BLD AUTO: 3.3 M/UL (ref 4.6–6.2)
SODIUM SERPL-SCNC: 136 MMOL/L (ref 136–145)
WBC # BLD AUTO: 6.62 K/UL (ref 3.9–12.7)

## 2022-09-06 PROCEDURE — 80069 RENAL FUNCTION PANEL: CPT | Performed by: INTERNAL MEDICINE

## 2022-09-06 PROCEDURE — 99215 PR OFFICE/OUTPT VISIT, EST, LEVL V, 40-54 MIN: ICD-10-PCS | Mod: S$PBB,,, | Performed by: INTERNAL MEDICINE

## 2022-09-06 PROCEDURE — 85025 COMPLETE CBC W/AUTO DIFF WBC: CPT | Performed by: INTERNAL MEDICINE

## 2022-09-06 PROCEDURE — 99215 OFFICE O/P EST HI 40 MIN: CPT | Mod: S$PBB,,, | Performed by: INTERNAL MEDICINE

## 2022-09-06 PROCEDURE — 99214 OFFICE O/P EST MOD 30 MIN: CPT | Mod: PBBFAC | Performed by: INTERNAL MEDICINE

## 2022-09-06 PROCEDURE — 80197 ASSAY OF TACROLIMUS: CPT | Performed by: INTERNAL MEDICINE

## 2022-09-06 PROCEDURE — 36415 COLL VENOUS BLD VENIPUNCTURE: CPT | Performed by: INTERNAL MEDICINE

## 2022-09-06 PROCEDURE — 83735 ASSAY OF MAGNESIUM: CPT | Performed by: INTERNAL MEDICINE

## 2022-09-06 PROCEDURE — 99999 PR PBB SHADOW E&M-EST. PATIENT-LVL IV: ICD-10-PCS | Mod: PBBFAC,,, | Performed by: INTERNAL MEDICINE

## 2022-09-06 PROCEDURE — 99999 PR PBB SHADOW E&M-EST. PATIENT-LVL IV: CPT | Mod: PBBFAC,,, | Performed by: INTERNAL MEDICINE

## 2022-09-06 RX ORDER — TACROLIMUS 1 MG/1
CAPSULE ORAL
Qty: 420 CAPSULE | Refills: 11 | Status: SHIPPED | OUTPATIENT
Start: 2022-09-06 | End: 2022-09-22 | Stop reason: DRUGHIGH

## 2022-09-06 NOTE — PROGRESS NOTES
Renal clinic f/u note  Date of clinic visit: 9/6/22  Reason for f/u and chief c/o: h/o of kidney transplant and post hospital visit     HPI: Pt is a 72 y/o male with a h/o of cadaveric kidney transplant and heart transplant at [facility] on 5/24/02, who presents for f/u. Pt has had an eventful medical course since June 2022, which will be summarized here. Pt was previously on rapamune, which was switched to prograf in June 2022. A few week later, he caught COVID and experienced pneumonia and large diarrhea. During this period, prograf level was low to undetectable (<2). He was admitted to [facility] in July 2022, with Cr 12.9, supported on IVF's, FENa was 4%. Intrinsic renal damage was suggested, acute rejection was suspected, [place] [facility] [place] 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 No vascular rejection or T-cell rejection were present. Pt received further solumedrol pulse, plasmapheresis, IVIG, and retuximab. Pt was placed on valcyte for positive CMV. S cr improved markedly, but not to the prior baseline. There were no cardiac issues related to the prior heart transplant.    On f/u today, pt feels well, no new c/o's, no discomfort, no SOB, no abd pain, no fever, no diarrhea, has all his meds, does not need any refills. Labs and meds reviewed with pt.    To review, the cause of ESRD is not known, and pt was never on dialysis before the transplant. 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     PAST SURGICAL HISTORY:  He  has a past surgical history that includes Kidney transplant; Heart transplant; Revision total hip arthroplasty; Eye surgery; Cataract extraction (Bilateral); and Colonoscopy (N/A, 10/6/2020).     SOCIAL HISTORY:  He  reports that he quit smoking about 37 years ago. His smoking use included cigarettes. He has a 20.00 pack-year smoking history. He has never used smokeless tobacco. He reports current alcohol use of about 1.0 standard drink of alcohol per week. He reports that he does not use drugs.     FAMILY MEDICAL HISTORY:  His family history includes Diabetes in his brother and maternal grandmother; Early death in his brother; Heart disease in his brother; Hyperlipidemia in his brother and sister; Hypertension in his brother; Kidney disease in his son; Stroke in his brother and mother.          Review of patient's allergies indicates:   Allergen Reactions    No known drug allergies         Meds reviewed    Current Outpatient Medications:     acetaminophen (TYLENOL) 325 MG tablet, Take 2 tablets (650 mg total) by mouth every 4 (four) hours as needed., Disp: , Rfl: 0    aspirin 81 MG Chew, Take 1 tablet (81 mg total) by mouth once daily., Disp: , Rfl: 0    atorvastatin (LIPITOR) 40 MG tablet, Take 1 tablet (40 mg total) by mouth once daily., Disp: 90 tablet, Rfl: 3    calcium acetate,phosphat bind, (PHOSLO) 667 mg capsule, Take 2 capsules (1,334 mg total) by mouth 3 (three) times daily with meals., Disp: 180 capsule, Rfl: 11    famotidine (PEPCID) 20 MG tablet, Take 1 tablet (20 mg total) by mouth once daily., Disp: 30 tablet, Rfl: 11    FREESTYLE SHREE 2 SENSOR Kit, APPLY 1 SENSOR             SUBCUTANEOUSLY EVERY 14    DAYS, Disp: 6 kit, Rfl: 1    hydrALAZINE (APRESOLINE) 50 MG tablet, Take 1 tablet (50 mg total) by mouth every 8 (eight) hours., Disp: 90 tablet, Rfl: 11    insulin NPH/Reg human (HUMULIN 70/30 U-100 KWIKPEN) 100 unit/mL (70-30) InPn pen, Inject 40  "Units into the skin daily with breakfast AND 30 Units daily with dinner or evening meal., Disp: 75 mL, Rfl: 1    metoprolol succinate (TOPROL-XL) 25 MG 24 hr tablet, Take 3 tablets (75 mg total) by mouth once daily., Disp: 90 tablet, Rfl: 11    mycophenolate (CELLCEPT) 250 mg Cap, Take 4 capsules (1,000 mg total) by mouth 2 (two) times daily., Disp: 240 capsule, Rfl: 11    NIFEdipine (PROCARDIA-XL) 60 MG (OSM) 24 hr tablet, Take 1 tablet (60 mg total) by mouth 2 (two) times a day., Disp: 60 tablet, Rfl: 11    predniSONE (DELTASONE) 5 MG tablet, Take by mouth daily :   20mg 7/24-8/23;   15mg 8/24-9/23;   10mg 9/24-10/23/22;   5 mg thereafter., Disp: 120 tablet, Rfl: 11    sulfamethoxazole-trimethoprim 400-80mg (BACTRIM,SEPTRA) 400-80 mg per tablet, Take 1 tablet by mouth every Mon, Wed, Fri. STOP 1/23/23, Disp: 12 tablet, Rfl: 5    tamsulosin (FLOMAX) 0.4 mg Cap, Take 1 capsule (0.4 mg total) by mouth once daily., Disp: 30 capsule, Rfl: 11    valGANciclovir (VALCYTE) 450 mg Tab, Take 1 tablet (450 mg total) by mouth 2 (two) times daily., Disp: 60 tablet, Rfl: 5    heparin sodium,porcine (HEPARIN, PORCINE,) 5,000 unit/mL injection, Inject 1 mL (5,000 Units total) into the skin every 8 (eight) hours. (Patient not taking: Reported on 9/6/2022), Disp: , Rfl:     tacrolimus (PROGRAF) 1 MG Cap, Take 7 capsules (7 mg total) by mouth every morning AND 7 capsules (7 mg total) every evening., Disp: 420 capsule, Rfl: 11    valGANciclovir (VALCYTE) 450 mg Tab, Take 1 tablet (450 mg total) by mouth 2 (two) times daily. (Patient not taking: Reported on 9/6/2022), Disp: 60 tablet, Rfl: 11        REVIEW OF SYSTEMS:  Patient has no fever, fatigue, visual changes, chest pain, edema, cough, dyspnea, nausea, vomiting, constipation, diarrhea, arthralgias, pruritis, dizziness, weakness, depression, confusion.        PHYSICAL EXAM:   Blood pressure (!) 140/64, pulse 60, resp. rate 20, height 6' 2" (1.88 m), weight 121 kg (266 lb 12.1 " oz).  Gen:    WDWN male in no apparent distress  Psych: Normal mood and affect  Skin:    No rashes or ulcers  Neck:   No JVD  Chest:  Clear with no rales, rhonchi, wheezing with normal effort  CV:      Regular with no murmurs, gallops or rubs  Abd:     Soft, nontender, no distension  Ext:      Trace pitting edema     Labs reviewed  BMP  Lab Results   Component Value Date     08/31/2022    K 4.6 08/31/2022     08/31/2022    CO2 24 08/31/2022    BUN 39 (H) 08/31/2022    CREATININE 2.7 (H) 08/31/2022    CALCIUM 8.7 08/31/2022    ANIONGAP 8 08/31/2022    ESTGFRAFRICA 15.3 (A) 07/27/2022    EGFRNONAA 13.2 (A) 07/27/2022     Lab Results   Component Value Date    WBC 6.94 08/31/2022    HGB 9.2 (L) 08/31/2022    HCT 29.9 (L) 08/31/2022    MCV 94 08/31/2022     08/31/2022     Prograf level 7.7 bone week ago, pending today    Component Ref Range & Units 6 d ago   (8/31/22) 2 wk ago   (8/22/22) 3 wk ago   (8/15/22) 1 mo ago   (8/6/22) 1 mo ago   (7/20/22)   Cytomegalovirus PCR, Quant <50 IU/mL 51 Abnormal   882 Abnormal   1076 Abnormal   2744 Abnormal   <50 Abnormal     Cytomegalovirus Log (copies/mL) <1.70 LogIU/mL 1.70 Abnormal   2.95 Abnormal  CM  3.03 Abnormal  CM  3.44 Abnormal  CM  <1.70 Abnormal  CM    Comment: This procedure utilizes a real-time polymerase chain reaction test               Lab Results   Component Value Date     PSA 3.4 08/25/2021     PSA 3.4 06/14/2021     PSA 2.5 08/12/2020            IMPRESSION AND RECOMMENDATIONS:  73 y/o male with h/o of kidney and heart transplant in 2002 who presents for f/u after a recent episode of rejection:     1. Renal: s Cr stable, has a new baseline after experiencing acute rejection  Stable renal function. CKD stage 4  K normal  Na normal  Ca normal  Acid-base stable  Stable proteinuria     2. Immunosuppression: prograf level is within baseline range, takes 7 mg po bid, no change in dose  Other medications and doses reviewed  S/p antibody mediated  rejection in July 2022. No vascular rejection or T-cell rejection were present.   Pt received further solumedrol pulse, plasmapheresis, IVIG, and retuximab.   Pt was placed on valcyte for positive CMV: noted improving CMV quant titers    Previously on Rapamune, switched to prograf in June 2022  H/o of kidney transplant at [facility] in 2002, cause of ESRD not clear, was never on dialysis  H/o of heart transplant at [facility] in 2002, heart failure due to IHSS. Being followed by heart transplant team in [place]     3. HTN: BP controlled  Meds reviewed     4. DM: chart was reviewed. Occurred post transplant.  Proteinuria, may be due to diabetic nephropathy     5. Slowly rising PSA's: no urinary sx's.  PSA still below 4.0  Has appt with urology at the end of this month.     6. Med review: was done    7. Heart transplant: no current issues     Plans and recommendations:  As discussed above  Total time spent 40 minutes including time needed to review the records, the   patient evaluation, documentation, face-to-face discussion with the patient,   more than 50% of the time was spent on coordination of care and counseling.    Level V visit.  RTC 2-3 months     Lucila Matthew MD

## 2022-09-07 LAB
CMV DNA SPEC QL NAA+PROBE: NOT DETECTED
CYTOMEGALOVIRUS LOG (IU/ML): NOT DETECTED LOGIU/ML
CYTOMEGALOVIRUS PCR, QUANT: NOT DETECTED IU/ML
TACROLIMUS BLD-MCNC: 7.1 NG/ML (ref 5–15)

## 2022-09-12 ENCOUNTER — TELEPHONE (OUTPATIENT)
Dept: FAMILY MEDICINE | Facility: CLINIC | Age: 72
End: 2022-09-12
Payer: MEDICARE

## 2022-09-12 ENCOUNTER — PATIENT MESSAGE (OUTPATIENT)
Dept: FAMILY MEDICINE | Facility: CLINIC | Age: 72
End: 2022-09-12
Payer: MEDICARE

## 2022-09-12 VITALS — SYSTOLIC BLOOD PRESSURE: 120 MMHG | DIASTOLIC BLOOD PRESSURE: 69 MMHG

## 2022-09-12 NOTE — TELEPHONE ENCOUNTER
refills   Oral Minoxidil Counseling- I discussed with the patient the risks of oral minoxidil including but not limited to shortness of breath, swelling of the feet or ankles, dizziness, lightheadedness, unwanted hair growth and allergic reaction.  The patient verbalized understanding of the proper use and possible adverse effects of oral minoxidil.  All of the patient's questions and concerns were addressed.

## 2022-09-13 ENCOUNTER — LAB VISIT (OUTPATIENT)
Dept: LAB | Facility: HOSPITAL | Age: 72
End: 2022-09-13
Attending: INTERNAL MEDICINE
Payer: MEDICARE

## 2022-09-13 DIAGNOSIS — Z94.0 KIDNEY REPLACED BY TRANSPLANT: ICD-10-CM

## 2022-09-13 LAB
ALBUMIN SERPL BCP-MCNC: 3 G/DL (ref 3.5–5.2)
ANION GAP SERPL CALC-SCNC: 10 MMOL/L (ref 8–16)
BASOPHILS # BLD AUTO: 0.02 K/UL (ref 0–0.2)
BASOPHILS NFR BLD: 0.3 % (ref 0–1.9)
BUN SERPL-MCNC: 33 MG/DL (ref 8–23)
CALCIUM SERPL-MCNC: 8.8 MG/DL (ref 8.7–10.5)
CHLORIDE SERPL-SCNC: 111 MMOL/L (ref 95–110)
CO2 SERPL-SCNC: 24 MMOL/L (ref 23–29)
CREAT SERPL-MCNC: 2.6 MG/DL (ref 0.5–1.4)
DIFFERENTIAL METHOD: ABNORMAL
EOSINOPHIL # BLD AUTO: 0 K/UL (ref 0–0.5)
EOSINOPHIL NFR BLD: 0.4 % (ref 0–8)
ERYTHROCYTE [DISTWIDTH] IN BLOOD BY AUTOMATED COUNT: 20 % (ref 11.5–14.5)
EST. GFR  (NO RACE VARIABLE): 25.4 ML/MIN/1.73 M^2
GLUCOSE SERPL-MCNC: 100 MG/DL (ref 70–110)
HCT VFR BLD AUTO: 32.3 % (ref 40–54)
HGB BLD-MCNC: 10 G/DL (ref 14–18)
IMM GRANULOCYTES # BLD AUTO: 0.05 K/UL (ref 0–0.04)
IMM GRANULOCYTES NFR BLD AUTO: 0.7 % (ref 0–0.5)
LYMPHOCYTES # BLD AUTO: 2.4 K/UL (ref 1–4.8)
LYMPHOCYTES NFR BLD: 33.4 % (ref 18–48)
MAGNESIUM SERPL-MCNC: 1.7 MG/DL (ref 1.6–2.6)
MCH RBC QN AUTO: 29.7 PG (ref 27–31)
MCHC RBC AUTO-ENTMCNC: 31 G/DL (ref 32–36)
MCV RBC AUTO: 96 FL (ref 82–98)
MONOCYTES # BLD AUTO: 0.5 K/UL (ref 0.3–1)
MONOCYTES NFR BLD: 6.4 % (ref 4–15)
NEUTROPHILS # BLD AUTO: 4.1 K/UL (ref 1.8–7.7)
NEUTROPHILS NFR BLD: 58.8 % (ref 38–73)
NRBC BLD-RTO: 0 /100 WBC
PHOSPHATE SERPL-MCNC: 4.6 MG/DL (ref 2.7–4.5)
PLATELET # BLD AUTO: 236 K/UL (ref 150–450)
PMV BLD AUTO: 10.3 FL (ref 9.2–12.9)
POTASSIUM SERPL-SCNC: 4.8 MMOL/L (ref 3.5–5.1)
RBC # BLD AUTO: 3.37 M/UL (ref 4.6–6.2)
SODIUM SERPL-SCNC: 145 MMOL/L (ref 136–145)
WBC # BLD AUTO: 7.03 K/UL (ref 3.9–12.7)

## 2022-09-13 PROCEDURE — 36415 COLL VENOUS BLD VENIPUNCTURE: CPT | Performed by: INTERNAL MEDICINE

## 2022-09-13 PROCEDURE — 85025 COMPLETE CBC W/AUTO DIFF WBC: CPT | Performed by: INTERNAL MEDICINE

## 2022-09-13 PROCEDURE — 80197 ASSAY OF TACROLIMUS: CPT | Performed by: INTERNAL MEDICINE

## 2022-09-13 PROCEDURE — 80069 RENAL FUNCTION PANEL: CPT | Performed by: INTERNAL MEDICINE

## 2022-09-13 PROCEDURE — 83735 ASSAY OF MAGNESIUM: CPT | Performed by: INTERNAL MEDICINE

## 2022-09-14 LAB — TACROLIMUS BLD-MCNC: 7.5 NG/ML (ref 5–15)

## 2022-09-15 ENCOUNTER — TELEPHONE (OUTPATIENT)
Dept: TRANSPLANT | Facility: CLINIC | Age: 72
End: 2022-09-15
Payer: MEDICARE

## 2022-09-15 LAB
CMV DNA SPEC QL NAA+PROBE: NOT DETECTED
CYTOMEGALOVIRUS LOG (IU/ML): NOT DETECTED LOGIU/ML
CYTOMEGALOVIRUS PCR, QUANT: NOT DETECTED IU/ML

## 2022-09-15 NOTE — TELEPHONE ENCOUNTER
Results reviewed with patient. Patient will stop valcyte and recheck CMV in 2 weeks.     ----- Message from Kye Pruitt Jr., MD sent at 9/15/2022 12:57 PM CDT -----  Yes. Stop valgancyclovir and repeat level in 2 weeks

## 2022-09-18 ENCOUNTER — PATIENT MESSAGE (OUTPATIENT)
Dept: DIABETES | Facility: CLINIC | Age: 72
End: 2022-09-18
Payer: MEDICARE

## 2022-09-21 ENCOUNTER — LAB VISIT (OUTPATIENT)
Dept: LAB | Facility: HOSPITAL | Age: 72
End: 2022-09-21
Attending: INTERNAL MEDICINE
Payer: MEDICARE

## 2022-09-21 DIAGNOSIS — Z94.0 KIDNEY REPLACED BY TRANSPLANT: ICD-10-CM

## 2022-09-21 LAB
ALBUMIN SERPL BCP-MCNC: 3.3 G/DL (ref 3.5–5.2)
ANION GAP SERPL CALC-SCNC: 9 MMOL/L (ref 8–16)
BASOPHILS # BLD AUTO: 0.04 K/UL (ref 0–0.2)
BASOPHILS NFR BLD: 0.5 % (ref 0–1.9)
BUN SERPL-MCNC: 34 MG/DL (ref 8–23)
CALCIUM SERPL-MCNC: 8.7 MG/DL (ref 8.7–10.5)
CHLORIDE SERPL-SCNC: 110 MMOL/L (ref 95–110)
CO2 SERPL-SCNC: 24 MMOL/L (ref 23–29)
CREAT SERPL-MCNC: 2.7 MG/DL (ref 0.5–1.4)
DIFFERENTIAL METHOD: ABNORMAL
EOSINOPHIL # BLD AUTO: 0 K/UL (ref 0–0.5)
EOSINOPHIL NFR BLD: 0.2 % (ref 0–8)
ERYTHROCYTE [DISTWIDTH] IN BLOOD BY AUTOMATED COUNT: 18.6 % (ref 11.5–14.5)
EST. GFR  (NO RACE VARIABLE): 24.3 ML/MIN/1.73 M^2
GLUCOSE SERPL-MCNC: 95 MG/DL (ref 70–110)
HCT VFR BLD AUTO: 34.2 % (ref 40–54)
HGB BLD-MCNC: 10.1 G/DL (ref 14–18)
IMM GRANULOCYTES # BLD AUTO: 0.03 K/UL (ref 0–0.04)
IMM GRANULOCYTES NFR BLD AUTO: 0.4 % (ref 0–0.5)
LYMPHOCYTES # BLD AUTO: 2.5 K/UL (ref 1–4.8)
LYMPHOCYTES NFR BLD: 30.9 % (ref 18–48)
MAGNESIUM SERPL-MCNC: 1.8 MG/DL (ref 1.6–2.6)
MCH RBC QN AUTO: 29.4 PG (ref 27–31)
MCHC RBC AUTO-ENTMCNC: 29.5 G/DL (ref 32–36)
MCV RBC AUTO: 99 FL (ref 82–98)
MONOCYTES # BLD AUTO: 0.6 K/UL (ref 0.3–1)
MONOCYTES NFR BLD: 7.4 % (ref 4–15)
NEUTROPHILS # BLD AUTO: 5 K/UL (ref 1.8–7.7)
NEUTROPHILS NFR BLD: 60.6 % (ref 38–73)
NRBC BLD-RTO: 0 /100 WBC
PHOSPHATE SERPL-MCNC: 4.1 MG/DL (ref 2.7–4.5)
PLATELET # BLD AUTO: 233 K/UL (ref 150–450)
PMV BLD AUTO: 11.3 FL (ref 9.2–12.9)
POTASSIUM SERPL-SCNC: 4.3 MMOL/L (ref 3.5–5.1)
RBC # BLD AUTO: 3.44 M/UL (ref 4.6–6.2)
SODIUM SERPL-SCNC: 143 MMOL/L (ref 136–145)
WBC # BLD AUTO: 8.19 K/UL (ref 3.9–12.7)

## 2022-09-21 PROCEDURE — 80197 ASSAY OF TACROLIMUS: CPT | Performed by: INTERNAL MEDICINE

## 2022-09-21 PROCEDURE — 83735 ASSAY OF MAGNESIUM: CPT | Performed by: INTERNAL MEDICINE

## 2022-09-21 PROCEDURE — 85025 COMPLETE CBC W/AUTO DIFF WBC: CPT | Performed by: INTERNAL MEDICINE

## 2022-09-21 PROCEDURE — 80069 RENAL FUNCTION PANEL: CPT | Performed by: INTERNAL MEDICINE

## 2022-09-22 DIAGNOSIS — Z94.1 HEART TRANSPLANTED: ICD-10-CM

## 2022-09-22 LAB — TACROLIMUS BLD-MCNC: 5.3 NG/ML (ref 5–15)

## 2022-09-22 RX ORDER — TACROLIMUS 1 MG/1
CAPSULE ORAL
Qty: 450 CAPSULE | Refills: 11 | Status: SHIPPED | OUTPATIENT
Start: 2022-09-22 | End: 2022-11-04 | Stop reason: SDUPTHER

## 2022-09-22 NOTE — TELEPHONE ENCOUNTER
Results reviewed with patient. Patient to increase to 8/7. Receiving weekly labs.     ----- Message from Edy Reese MD sent at 9/22/2022  8:22 AM CDT -----  Increase prograf to 8/7

## 2022-09-23 ENCOUNTER — PATIENT MESSAGE (OUTPATIENT)
Dept: UROLOGY | Facility: CLINIC | Age: 72
End: 2022-09-23
Payer: MEDICARE

## 2022-09-23 ENCOUNTER — TELEPHONE (OUTPATIENT)
Dept: UROLOGY | Facility: CLINIC | Age: 72
End: 2022-09-23

## 2022-09-23 DIAGNOSIS — C61 PROSTATE CANCER: Primary | ICD-10-CM

## 2022-09-28 ENCOUNTER — LAB VISIT (OUTPATIENT)
Dept: LAB | Facility: HOSPITAL | Age: 72
End: 2022-09-28
Attending: INTERNAL MEDICINE
Payer: MEDICARE

## 2022-09-28 DIAGNOSIS — Z94.1 STATUS POST HEART TRANSPLANT: ICD-10-CM

## 2022-09-28 DIAGNOSIS — Z94.0 KIDNEY REPLACED BY TRANSPLANT: ICD-10-CM

## 2022-09-28 DIAGNOSIS — R76.8 ELEVATED SERUM IMMUNOGLOBULIN FREE LIGHT CHAIN LEVEL: ICD-10-CM

## 2022-09-28 DIAGNOSIS — C61 PROSTATE CANCER: ICD-10-CM

## 2022-09-28 LAB
ALBUMIN SERPL BCP-MCNC: 3.1 G/DL (ref 3.5–5.2)
ANION GAP SERPL CALC-SCNC: 8 MMOL/L (ref 8–16)
BASOPHILS # BLD AUTO: 0.05 K/UL (ref 0–0.2)
BASOPHILS NFR BLD: 0.7 % (ref 0–1.9)
BUN SERPL-MCNC: 36 MG/DL (ref 8–23)
CALCIUM SERPL-MCNC: 8.3 MG/DL (ref 8.7–10.5)
CHLORIDE SERPL-SCNC: 110 MMOL/L (ref 95–110)
CO2 SERPL-SCNC: 24 MMOL/L (ref 23–29)
CREAT SERPL-MCNC: 2.7 MG/DL (ref 0.5–1.4)
DIFFERENTIAL METHOD: ABNORMAL
EOSINOPHIL # BLD AUTO: 0 K/UL (ref 0–0.5)
EOSINOPHIL NFR BLD: 0.5 % (ref 0–8)
ERYTHROCYTE [DISTWIDTH] IN BLOOD BY AUTOMATED COUNT: 17.3 % (ref 11.5–14.5)
EST. GFR  (NO RACE VARIABLE): 24 ML/MIN/1.73 M^2
GLUCOSE SERPL-MCNC: 110 MG/DL (ref 70–110)
HCT VFR BLD AUTO: 35.5 % (ref 40–54)
HGB BLD-MCNC: 10.9 G/DL (ref 14–18)
IMM GRANULOCYTES # BLD AUTO: 0.02 K/UL (ref 0–0.04)
IMM GRANULOCYTES NFR BLD AUTO: 0.3 % (ref 0–0.5)
LYMPHOCYTES # BLD AUTO: 2.5 K/UL (ref 1–4.8)
LYMPHOCYTES NFR BLD: 32.3 % (ref 18–48)
MAGNESIUM SERPL-MCNC: 2 MG/DL (ref 1.6–2.6)
MCH RBC QN AUTO: 29.4 PG (ref 27–31)
MCHC RBC AUTO-ENTMCNC: 30.7 G/DL (ref 32–36)
MCV RBC AUTO: 96 FL (ref 82–98)
MONOCYTES # BLD AUTO: 0.7 K/UL (ref 0.3–1)
MONOCYTES NFR BLD: 8.6 % (ref 4–15)
NEUTROPHILS # BLD AUTO: 4.4 K/UL (ref 1.8–7.7)
NEUTROPHILS NFR BLD: 57.6 % (ref 38–73)
NRBC BLD-RTO: 0 /100 WBC
PHOSPHATE SERPL-MCNC: 4.7 MG/DL (ref 2.7–4.5)
PLATELET # BLD AUTO: 229 K/UL (ref 150–450)
PMV BLD AUTO: 10.5 FL (ref 9.2–12.9)
POTASSIUM SERPL-SCNC: 4.5 MMOL/L (ref 3.5–5.1)
RBC # BLD AUTO: 3.71 M/UL (ref 4.6–6.2)
SODIUM SERPL-SCNC: 142 MMOL/L (ref 136–145)
WBC # BLD AUTO: 7.58 K/UL (ref 3.9–12.7)

## 2022-09-28 PROCEDURE — 84165 PROTEIN E-PHORESIS SERUM: CPT | Mod: 26,,, | Performed by: PATHOLOGY

## 2022-09-28 PROCEDURE — 86334 IMMUNOFIX E-PHORESIS SERUM: CPT | Performed by: INTERNAL MEDICINE

## 2022-09-28 PROCEDURE — 87799 DETECT AGENT NOS DNA QUANT: CPT | Performed by: INTERNAL MEDICINE

## 2022-09-28 PROCEDURE — 83520 IMMUNOASSAY QUANT NOS NONAB: CPT | Performed by: INTERNAL MEDICINE

## 2022-09-28 PROCEDURE — 84436 ASSAY OF TOTAL THYROXINE: CPT | Performed by: INTERNAL MEDICINE

## 2022-09-28 PROCEDURE — 80197 ASSAY OF TACROLIMUS: CPT | Performed by: INTERNAL MEDICINE

## 2022-09-28 PROCEDURE — 84153 ASSAY OF PSA TOTAL: CPT | Performed by: UROLOGY

## 2022-09-28 PROCEDURE — 85025 COMPLETE CBC W/AUTO DIFF WBC: CPT | Performed by: INTERNAL MEDICINE

## 2022-09-28 PROCEDURE — 84166 PROTEIN E-PHORESIS/URINE/CSF: CPT | Performed by: INTERNAL MEDICINE

## 2022-09-28 PROCEDURE — 83735 ASSAY OF MAGNESIUM: CPT | Performed by: INTERNAL MEDICINE

## 2022-09-28 PROCEDURE — 80069 RENAL FUNCTION PANEL: CPT | Performed by: INTERNAL MEDICINE

## 2022-09-28 PROCEDURE — 84166 PROTEIN E-PHORESIS/URINE/CSF: CPT | Mod: 26,,, | Performed by: PATHOLOGY

## 2022-09-28 PROCEDURE — 86334 IMMUNOFIX E-PHORESIS SERUM: CPT | Mod: 26,,, | Performed by: PATHOLOGY

## 2022-09-28 PROCEDURE — 86334 PATHOLOGIST INTERPRETATION IFE: ICD-10-PCS | Mod: 26,,, | Performed by: PATHOLOGY

## 2022-09-28 PROCEDURE — 84165 PROTEIN E-PHORESIS SERUM: CPT | Performed by: INTERNAL MEDICINE

## 2022-09-28 PROCEDURE — 84166 PATHOLOGIST INTERPRETATION UPE: ICD-10-PCS | Mod: 26,,, | Performed by: PATHOLOGY

## 2022-09-28 PROCEDURE — 84165 PATHOLOGIST INTERPRETATION SPE: ICD-10-PCS | Mod: 26,,, | Performed by: PATHOLOGY

## 2022-09-28 PROCEDURE — 84156 ASSAY OF PROTEIN URINE: CPT | Performed by: INTERNAL MEDICINE

## 2022-09-28 PROCEDURE — 82784 ASSAY IGA/IGD/IGG/IGM EACH: CPT | Performed by: INTERNAL MEDICINE

## 2022-09-29 LAB
ALBUMIN SERPL ELPH-MCNC: 3.16 G/DL (ref 3.35–5.55)
ALPHA1 GLOB SERPL ELPH-MCNC: 0.27 G/DL (ref 0.17–0.41)
ALPHA2 GLOB SERPL ELPH-MCNC: 0.7 G/DL (ref 0.43–0.99)
B-GLOBULIN SERPL ELPH-MCNC: 0.58 G/DL (ref 0.5–1.1)
COMPLEXED PSA SERPL-MCNC: 2.8 NG/ML (ref 0–4)
GAMMA GLOB SERPL ELPH-MCNC: 1 G/DL (ref 0.67–1.58)
IGA SERPL-MCNC: 163 MG/DL (ref 40–350)
IGG SERPL-MCNC: 1122 MG/DL (ref 650–1600)
IGM SERPL-MCNC: 67 MG/DL (ref 50–300)
INTERPRETATION SERPL IFE-IMP: NORMAL
KAPPA LC SER QL IA: 5.08 MG/DL (ref 0.33–1.94)
KAPPA LC/LAMBDA SER IA: 1.83 (ref 0.26–1.65)
LAMBDA LC SER QL IA: 2.77 MG/DL (ref 0.57–2.63)
PATHOLOGIST INTERPRETATION IFE: NORMAL
PATHOLOGIST INTERPRETATION SPE: NORMAL
PATHOLOGIST INTERPRETATION UPE: NORMAL
PROT PATTERN UR ELPH-IMP: NORMAL
PROT SERPL-MCNC: 5.7 G/DL (ref 6–8.4)
PROT UR-MCNC: 106 MG/DL (ref 0–15)
T4 SERPL-MCNC: 6 UG/DL (ref 4.5–11.5)
TACROLIMUS BLD-MCNC: 6.1 NG/ML (ref 5–15)

## 2022-09-30 ENCOUNTER — TELEPHONE (OUTPATIENT)
Dept: UROLOGY | Facility: CLINIC | Age: 72
End: 2022-09-30
Payer: MEDICARE

## 2022-09-30 NOTE — TELEPHONE ENCOUNTER
Left message for pt informing him that his psa looked good and that we would schedule a 6 month followup visit with Dr. Kelly in 6 months.

## 2022-10-03 DIAGNOSIS — Z71.89 COMPLEX CARE COORDINATION: ICD-10-CM

## 2022-10-04 ENCOUNTER — PATIENT MESSAGE (OUTPATIENT)
Dept: TRANSPLANT | Facility: CLINIC | Age: 72
End: 2022-10-04
Payer: MEDICARE

## 2022-10-04 DIAGNOSIS — Z94.0 KIDNEY REPLACED BY TRANSPLANT: Primary | ICD-10-CM

## 2022-10-04 LAB
BKV DNA SERPL NAA+PROBE-ACNC: 549 COPIES/ML
BKV DNA SERPL NAA+PROBE-LOG#: 2.74 LOG (10) COPIES/ML
BKV DNA SERPL QL NAA+PROBE: DETECTED

## 2022-10-04 RX ORDER — MYCOPHENOLATE MOFETIL 250 MG/1
500 CAPSULE ORAL 2 TIMES DAILY
Qty: 120 CAPSULE | Refills: 11 | Status: SHIPPED | OUTPATIENT
Start: 2022-10-04 | End: 2022-11-04 | Stop reason: SDUPTHER

## 2022-10-04 NOTE — TELEPHONE ENCOUNTER
Patient notified via [employer]. Will check BK on 10/12    ----- Message from Dominique Cobb RN sent at 10/4/2022  2:03 PM CDT -----  Dr. Renée Rowley is ok with decreasing MMF to 500 BID.    Dominique Phelps  ----- Message -----  From: Eliane Roy RN  Sent: 10/4/2022  12:58 PM CDT  To: REYES Clemente,   Mr. Manning just popped up BK positive. Is it ok with you guys to drop MMF to 500mg BID?  ----- Message -----  From: Edy Reese MD  Sent: 10/4/2022  12:53 PM CDT  To: [facility]    Let's lower MMF to 500 mg PO bid. What lab are we using for this serum BK  Start serum BK per protocol every 2 weeks   Please verify with heart transplant that this change with MMF is ok from the heart point of view.

## 2022-10-04 NOTE — PROGRESS NOTES
Let's lower MMF to 500 mg PO bid. What lab are we using for this serum BK  Start serum BK per protocol every 2 weeks   Please verify with heart transplant that this change with MMF is ok from the heart point of view.

## 2022-10-05 ENCOUNTER — LAB VISIT (OUTPATIENT)
Dept: LAB | Facility: HOSPITAL | Age: 72
End: 2022-10-05
Attending: INTERNAL MEDICINE
Payer: MEDICARE

## 2022-10-05 DIAGNOSIS — Z94.0 KIDNEY REPLACED BY TRANSPLANT: ICD-10-CM

## 2022-10-05 LAB
ALBUMIN SERPL BCP-MCNC: 3.3 G/DL (ref 3.5–5.2)
ANION GAP SERPL CALC-SCNC: 10 MMOL/L (ref 8–16)
BASOPHILS # BLD AUTO: 0.03 K/UL (ref 0–0.2)
BASOPHILS NFR BLD: 0.4 % (ref 0–1.9)
BUN SERPL-MCNC: 33 MG/DL (ref 8–23)
CALCIUM SERPL-MCNC: 8.8 MG/DL (ref 8.7–10.5)
CHLORIDE SERPL-SCNC: 112 MMOL/L (ref 95–110)
CO2 SERPL-SCNC: 23 MMOL/L (ref 23–29)
CREAT SERPL-MCNC: 2.3 MG/DL (ref 0.5–1.4)
DIFFERENTIAL METHOD: ABNORMAL
EOSINOPHIL # BLD AUTO: 0 K/UL (ref 0–0.5)
EOSINOPHIL NFR BLD: 0.6 % (ref 0–8)
ERYTHROCYTE [DISTWIDTH] IN BLOOD BY AUTOMATED COUNT: 16.2 % (ref 11.5–14.5)
EST. GFR  (NO RACE VARIABLE): 29.4 ML/MIN/1.73 M^2
GLUCOSE SERPL-MCNC: 129 MG/DL (ref 70–110)
HCT VFR BLD AUTO: 35.5 % (ref 40–54)
HGB BLD-MCNC: 10.4 G/DL (ref 14–18)
IMM GRANULOCYTES # BLD AUTO: 0.02 K/UL (ref 0–0.04)
IMM GRANULOCYTES NFR BLD AUTO: 0.3 % (ref 0–0.5)
LYMPHOCYTES # BLD AUTO: 2.1 K/UL (ref 1–4.8)
LYMPHOCYTES NFR BLD: 31.3 % (ref 18–48)
MAGNESIUM SERPL-MCNC: 1.8 MG/DL (ref 1.6–2.6)
MCH RBC QN AUTO: 29.2 PG (ref 27–31)
MCHC RBC AUTO-ENTMCNC: 29.3 G/DL (ref 32–36)
MCV RBC AUTO: 100 FL (ref 82–98)
MONOCYTES # BLD AUTO: 0.5 K/UL (ref 0.3–1)
MONOCYTES NFR BLD: 6.9 % (ref 4–15)
NEUTROPHILS # BLD AUTO: 4.1 K/UL (ref 1.8–7.7)
NEUTROPHILS NFR BLD: 60.5 % (ref 38–73)
NRBC BLD-RTO: 0 /100 WBC
PHOSPHATE SERPL-MCNC: 4.3 MG/DL (ref 2.7–4.5)
PLATELET # BLD AUTO: 194 K/UL (ref 150–450)
PMV BLD AUTO: 11.1 FL (ref 9.2–12.9)
POTASSIUM SERPL-SCNC: 4.1 MMOL/L (ref 3.5–5.1)
RBC # BLD AUTO: 3.56 M/UL (ref 4.6–6.2)
SODIUM SERPL-SCNC: 145 MMOL/L (ref 136–145)
WBC # BLD AUTO: 6.78 K/UL (ref 3.9–12.7)

## 2022-10-05 PROCEDURE — 83735 ASSAY OF MAGNESIUM: CPT | Performed by: INTERNAL MEDICINE

## 2022-10-05 PROCEDURE — 85025 COMPLETE CBC W/AUTO DIFF WBC: CPT | Performed by: INTERNAL MEDICINE

## 2022-10-05 PROCEDURE — 80197 ASSAY OF TACROLIMUS: CPT | Performed by: INTERNAL MEDICINE

## 2022-10-05 PROCEDURE — 80069 RENAL FUNCTION PANEL: CPT | Performed by: INTERNAL MEDICINE

## 2022-10-05 PROCEDURE — 36415 COLL VENOUS BLD VENIPUNCTURE: CPT | Mod: PO | Performed by: INTERNAL MEDICINE

## 2022-10-06 LAB — TACROLIMUS BLD-MCNC: 6.8 NG/ML (ref 5–15)

## 2022-10-12 ENCOUNTER — IMMUNIZATION (OUTPATIENT)
Dept: PRIMARY CARE CLINIC | Facility: CLINIC | Age: 72
End: 2022-10-12
Payer: MEDICARE

## 2022-10-12 ENCOUNTER — LAB VISIT (OUTPATIENT)
Dept: LAB | Facility: HOSPITAL | Age: 72
End: 2022-10-12
Attending: INTERNAL MEDICINE
Payer: MEDICARE

## 2022-10-12 DIAGNOSIS — Z94.0 KIDNEY REPLACED BY TRANSPLANT: ICD-10-CM

## 2022-10-12 DIAGNOSIS — Z23 NEED FOR VACCINATION: Primary | ICD-10-CM

## 2022-10-12 PROCEDURE — 85025 COMPLETE CBC W/AUTO DIFF WBC: CPT | Performed by: INTERNAL MEDICINE

## 2022-10-12 PROCEDURE — 91312 COVID-19, MRNA, LNP-S, BIVALENT BOOSTER, PF, 30 MCG/0.3 ML DOSE: CPT | Mod: PBBFAC | Performed by: FAMILY MEDICINE

## 2022-10-12 PROCEDURE — 87799 DETECT AGENT NOS DNA QUANT: CPT | Performed by: INTERNAL MEDICINE

## 2022-10-12 PROCEDURE — 80069 RENAL FUNCTION PANEL: CPT | Performed by: INTERNAL MEDICINE

## 2022-10-12 PROCEDURE — 0124A COVID-19, MRNA, LNP-S, BIVALENT BOOSTER, PF, 30 MCG/0.3 ML DOSE: CPT | Mod: CV19,PBBFAC | Performed by: FAMILY MEDICINE

## 2022-10-12 PROCEDURE — 80197 ASSAY OF TACROLIMUS: CPT | Performed by: INTERNAL MEDICINE

## 2022-10-12 PROCEDURE — 83735 ASSAY OF MAGNESIUM: CPT | Performed by: INTERNAL MEDICINE

## 2022-10-12 PROCEDURE — 36415 COLL VENOUS BLD VENIPUNCTURE: CPT | Mod: PO | Performed by: INTERNAL MEDICINE

## 2022-10-13 LAB
ALBUMIN SERPL BCP-MCNC: 3.3 G/DL (ref 3.5–5.2)
ANION GAP SERPL CALC-SCNC: 8 MMOL/L (ref 8–16)
BASOPHILS # BLD AUTO: 0.03 K/UL (ref 0–0.2)
BASOPHILS NFR BLD: 0.4 % (ref 0–1.9)
BUN SERPL-MCNC: 31 MG/DL (ref 8–23)
CALCIUM SERPL-MCNC: 8.9 MG/DL (ref 8.7–10.5)
CHLORIDE SERPL-SCNC: 111 MMOL/L (ref 95–110)
CO2 SERPL-SCNC: 25 MMOL/L (ref 23–29)
CREAT SERPL-MCNC: 2.7 MG/DL (ref 0.5–1.4)
DIFFERENTIAL METHOD: ABNORMAL
EOSINOPHIL # BLD AUTO: 0 K/UL (ref 0–0.5)
EOSINOPHIL NFR BLD: 0.5 % (ref 0–8)
ERYTHROCYTE [DISTWIDTH] IN BLOOD BY AUTOMATED COUNT: 15.5 % (ref 11.5–14.5)
EST. GFR  (NO RACE VARIABLE): 24.3 ML/MIN/1.73 M^2
GLUCOSE SERPL-MCNC: 77 MG/DL (ref 70–110)
HCT VFR BLD AUTO: 37.7 % (ref 40–54)
HGB BLD-MCNC: 10.8 G/DL (ref 14–18)
IMM GRANULOCYTES # BLD AUTO: 0.02 K/UL (ref 0–0.04)
IMM GRANULOCYTES NFR BLD AUTO: 0.3 % (ref 0–0.5)
LYMPHOCYTES # BLD AUTO: 2 K/UL (ref 1–4.8)
LYMPHOCYTES NFR BLD: 27.6 % (ref 18–48)
MAGNESIUM SERPL-MCNC: 1.9 MG/DL (ref 1.6–2.6)
MCH RBC QN AUTO: 28.9 PG (ref 27–31)
MCHC RBC AUTO-ENTMCNC: 28.6 G/DL (ref 32–36)
MCV RBC AUTO: 101 FL (ref 82–98)
MONOCYTES # BLD AUTO: 0.5 K/UL (ref 0.3–1)
MONOCYTES NFR BLD: 6.3 % (ref 4–15)
NEUTROPHILS # BLD AUTO: 4.8 K/UL (ref 1.8–7.7)
NEUTROPHILS NFR BLD: 64.9 % (ref 38–73)
NRBC BLD-RTO: 0 /100 WBC
PHOSPHATE SERPL-MCNC: 4.6 MG/DL (ref 2.7–4.5)
PLATELET # BLD AUTO: 200 K/UL (ref 150–450)
PMV BLD AUTO: 10.9 FL (ref 9.2–12.9)
POTASSIUM SERPL-SCNC: 3.8 MMOL/L (ref 3.5–5.1)
RBC # BLD AUTO: 3.74 M/UL (ref 4.6–6.2)
SODIUM SERPL-SCNC: 144 MMOL/L (ref 136–145)
TACROLIMUS BLD-MCNC: 8.6 NG/ML (ref 5–15)
WBC # BLD AUTO: 7.33 K/UL (ref 3.9–12.7)

## 2022-10-17 LAB
BKV DNA SERPL NAA+PROBE-ACNC: 746 COPIES/ML
BKV DNA SERPL NAA+PROBE-LOG#: 2.87 LOG (10) COPIES/ML
BKV DNA SERPL QL NAA+PROBE: DETECTED

## 2022-10-23 ENCOUNTER — PATIENT MESSAGE (OUTPATIENT)
Dept: ADMINISTRATIVE | Facility: OTHER | Age: 72
End: 2022-10-23
Payer: MEDICARE

## 2022-10-23 ENCOUNTER — NURSE TRIAGE (OUTPATIENT)
Dept: ADMINISTRATIVE | Facility: CLINIC | Age: 72
End: 2022-10-23
Payer: MEDICARE

## 2022-10-23 ENCOUNTER — PATIENT MESSAGE (OUTPATIENT)
Dept: ADMINISTRATIVE | Facility: CLINIC | Age: 72
End: 2022-10-23
Payer: MEDICARE

## 2022-10-23 NOTE — TELEPHONE ENCOUNTER
Pt initially escalated due to no response. Pt called. Left message as per below.     This is a registered nurse from [facility] calling from the Covid Surveillance program. I am reaching out to you today because you did not enter your vital signs and symptoms today and  am trying to check on you. Please enter your vitals and symptoms as soon as possible. If you feel that you're are having a medical emergency, please call 911 or go to the nearest Emergency Room. If you wish to speak with a nurse, please call us at [phone]. Thank you.    My chart no response message sent.    Reason for Disposition   Message left on identified voice mail    Additional Information   Negative: Caller has already spoken with the PCP and has no further questions.   Negative: Caller has already spoken with another triager and has no further questions.   Negative: Caller has already spoken with another triager or PCP AND has further questions AND triager able to answer questions.    Protocols used: No Contact or Duplicate Contact Call-A-

## 2022-10-24 ENCOUNTER — PATIENT MESSAGE (OUTPATIENT)
Dept: ADMINISTRATIVE | Facility: CLINIC | Age: 72
End: 2022-10-24
Payer: MEDICARE

## 2022-10-24 ENCOUNTER — NURSE TRIAGE (OUTPATIENT)
Dept: ADMINISTRATIVE | Facility: CLINIC | Age: 72
End: 2022-10-24
Payer: MEDICARE

## 2022-10-24 NOTE — TELEPHONE ENCOUNTER
Pt escalated due to no response. Pt requesting to opt out of surveillance program.No new order for surveillance program noted, but tasks for today noted. Will remove tasks  Reason for Disposition   Information only question and nurse able to answer    Protocols used: Information Only Call - No Triage-A-[place]

## 2022-10-25 ENCOUNTER — TELEPHONE (OUTPATIENT)
Dept: DIABETES | Facility: CLINIC | Age: 72
End: 2022-10-25
Payer: MEDICARE

## 2022-10-25 NOTE — TELEPHONE ENCOUNTER
Called pt to change appointment from in person to virtual. Pt stated that he is unable to make that appointment and requested to cancel appointment and lab work too. Advised pt that he really need to follow up on his appointments and lab work. Pt stated that he will call us back to schedule both appointments.

## 2022-10-27 ENCOUNTER — PATIENT MESSAGE (OUTPATIENT)
Dept: NEPHROLOGY | Facility: CLINIC | Age: 72
End: 2022-10-27
Payer: MEDICARE

## 2022-10-27 NOTE — PROGRESS NOTES
Renal note:  Communication with pt today:    Hi, this is a neurologic issue. Please contact your primary care to see a neurologist. However, prograf that you take for trasnplant can cause tremor too, but your last and recent prograf level was not high.    Dr. Matthew    ===View-only below this line===      ----- Message -----       From:Nigel Albrecht       Sent:10/27/2022  1:30 PM CDT         To:Lucila Matthew MD    Subject:Possible drug sharad tion    Good afternoon. I need to speak to someone about my having problems with shaKing in my hands and trimling so much that I can't sign my name or Lanza legally.  It is almost unbearable as I can't even steadly hold a fork or spoon while eating or glass while drinking.  Please call me.

## 2022-10-28 ENCOUNTER — PATIENT MESSAGE (OUTPATIENT)
Dept: FAMILY MEDICINE | Facility: CLINIC | Age: 72
End: 2022-10-28
Payer: MEDICARE

## 2022-10-28 DIAGNOSIS — R25.1 TREMOR: Primary | ICD-10-CM

## 2022-11-01 ENCOUNTER — LAB VISIT (OUTPATIENT)
Dept: LAB | Facility: HOSPITAL | Age: 72
End: 2022-11-01
Attending: INTERNAL MEDICINE
Payer: MEDICARE

## 2022-11-01 DIAGNOSIS — Z94.0 KIDNEY REPLACED BY TRANSPLANT: ICD-10-CM

## 2022-11-01 PROCEDURE — 36415 COLL VENOUS BLD VENIPUNCTURE: CPT | Mod: PO | Performed by: INTERNAL MEDICINE

## 2022-11-01 PROCEDURE — 87799 DETECT AGENT NOS DNA QUANT: CPT | Performed by: INTERNAL MEDICINE

## 2022-11-02 ENCOUNTER — PATIENT MESSAGE (OUTPATIENT)
Dept: FAMILY MEDICINE | Facility: CLINIC | Age: 72
End: 2022-11-02
Payer: MEDICARE

## 2022-11-04 ENCOUNTER — TELEPHONE (OUTPATIENT)
Dept: TRANSPLANT | Facility: CLINIC | Age: 72
End: 2022-11-04
Payer: MEDICARE

## 2022-11-04 DIAGNOSIS — Z94.0 KIDNEY REPLACED BY TRANSPLANT: ICD-10-CM

## 2022-11-04 DIAGNOSIS — Z94.1 HEART TRANSPLANTED: ICD-10-CM

## 2022-11-04 RX ORDER — SULFAMETHOXAZOLE AND TRIMETHOPRIM 400; 80 MG/1; MG/1
1 TABLET ORAL
Qty: 36 TABLET | Refills: 1 | Status: SHIPPED | OUTPATIENT
Start: 2022-11-04 | End: 2023-05-13

## 2022-11-04 RX ORDER — TACROLIMUS 1 MG/1
CAPSULE ORAL
Qty: 1350 CAPSULE | Refills: 3 | Status: SHIPPED | OUTPATIENT
Start: 2022-11-04 | End: 2023-05-10 | Stop reason: DRUGHIGH

## 2022-11-04 RX ORDER — MYCOPHENOLATE MOFETIL 250 MG/1
500 CAPSULE ORAL 2 TIMES DAILY
Qty: 360 CAPSULE | Refills: 3 | Status: SHIPPED | OUTPATIENT
Start: 2022-11-04 | End: 2023-01-06

## 2022-11-04 RX ORDER — PREDNISONE 5 MG/1
5 TABLET ORAL DAILY
Qty: 90 TABLET | Refills: 3 | Status: SHIPPED | OUTPATIENT
Start: 2022-11-04 | End: 2023-07-27 | Stop reason: SDUPTHER

## 2022-11-04 NOTE — TELEPHONE ENCOUNTER
Sent refills on transplant meds. Patient instructed that all other medications should be refilled by general nephrologist or heart transplant.     ----- Message from Silvia Davenport sent at 11/4/2022 11:49 AM CDT -----  Regarding: Rx to mail order  Patient calling to get Rx to be 90 day and changed to Mail Order.      metoprolol succinate (TOPROL-XL) 25 MG 24 hr tablet  sulfamethoxazole-trimethoprim 400-80mg (BACTRIM,SEPTRA) 400-80 mg per tablet  tamsulosin (FLOMAX) 0.4 mg Cap  hydrALAZINE (APRESOLINE) 50 MG tablet  NIFEdipine (PROCARDIA-XL) 60 MG (OSM) 24 hr tablet  predniSONE (DELTASONE) 5 MG tablet  tacrolimus (PROGRAF) 1 MG Cap  atorvastatin (LIPITOR) 40 MG tablet  mycophenolate (CELLCEPT) 250 mg Cap        [facility] - [place] [facility] - [facility] AT [address] [facility]  [place] [address]  Phone: [phone] Fax: [phone]      Call: [phone] (Mobile

## 2022-11-04 NOTE — TELEPHONE ENCOUNTER
Returned pt's call regarding medication refills. Pt just left home and didn't have which medications he needed refilled. He will call Monday to let me know which medications need refills.

## 2022-11-07 LAB
BKV DNA SERPL NAA+PROBE-ACNC: 296 COPIES/ML
BKV DNA SERPL NAA+PROBE-LOG#: 2.47 LOG (10) COPIES/ML
BKV DNA SERPL QL NAA+PROBE: DETECTED

## 2022-11-07 RX ORDER — NIFEDIPINE 60 MG/1
60 TABLET, EXTENDED RELEASE ORAL 2 TIMES DAILY
Qty: 180 TABLET | Refills: 3 | Status: SHIPPED | OUTPATIENT
Start: 2022-11-07 | End: 2023-07-13

## 2022-11-07 RX ORDER — HYDRALAZINE HYDROCHLORIDE 50 MG/1
50 TABLET, FILM COATED ORAL EVERY 8 HOURS
Qty: 270 TABLET | Refills: 3 | Status: SHIPPED | OUTPATIENT
Start: 2022-11-07 | End: 2023-08-25

## 2022-11-07 RX ORDER — ATORVASTATIN CALCIUM 40 MG/1
40 TABLET, FILM COATED ORAL DAILY
Qty: 90 TABLET | Refills: 3 | Status: SHIPPED | OUTPATIENT
Start: 2022-11-07 | End: 2023-09-12

## 2022-11-14 ENCOUNTER — TELEPHONE (OUTPATIENT)
Dept: NEUROLOGY | Facility: CLINIC | Age: 72
End: 2022-11-14
Payer: MEDICARE

## 2022-11-14 NOTE — TELEPHONE ENCOUNTER
Attempted to reach patient regarding an upcoming appointment being scheduled incorrectly. VM was left.BR

## 2022-11-15 ENCOUNTER — LAB VISIT (OUTPATIENT)
Dept: LAB | Facility: HOSPITAL | Age: 72
End: 2022-11-15
Attending: INTERNAL MEDICINE
Payer: MEDICARE

## 2022-11-15 DIAGNOSIS — Z94.0 KIDNEY REPLACED BY TRANSPLANT: ICD-10-CM

## 2022-11-15 LAB
ALBUMIN SERPL BCP-MCNC: 3.1 G/DL (ref 3.5–5.2)
ANION GAP SERPL CALC-SCNC: 9 MMOL/L (ref 8–16)
BASOPHILS # BLD AUTO: 0.02 K/UL (ref 0–0.2)
BASOPHILS NFR BLD: 0.5 % (ref 0–1.9)
BUN SERPL-MCNC: 38 MG/DL (ref 8–23)
CALCIUM SERPL-MCNC: 9.1 MG/DL (ref 8.7–10.5)
CHLORIDE SERPL-SCNC: 113 MMOL/L (ref 95–110)
CO2 SERPL-SCNC: 22 MMOL/L (ref 23–29)
CREAT SERPL-MCNC: 2.8 MG/DL (ref 0.5–1.4)
DIFFERENTIAL METHOD: ABNORMAL
EOSINOPHIL # BLD AUTO: 0.1 K/UL (ref 0–0.5)
EOSINOPHIL NFR BLD: 1.4 % (ref 0–8)
ERYTHROCYTE [DISTWIDTH] IN BLOOD BY AUTOMATED COUNT: 13.1 % (ref 11.5–14.5)
EST. GFR  (NO RACE VARIABLE): 23.2 ML/MIN/1.73 M^2
GLUCOSE SERPL-MCNC: 78 MG/DL (ref 70–110)
HCT VFR BLD AUTO: 34.8 % (ref 40–54)
HGB BLD-MCNC: 10.4 G/DL (ref 14–18)
IMM GRANULOCYTES # BLD AUTO: 0.01 K/UL (ref 0–0.04)
IMM GRANULOCYTES NFR BLD AUTO: 0.2 % (ref 0–0.5)
LYMPHOCYTES # BLD AUTO: 1.5 K/UL (ref 1–4.8)
LYMPHOCYTES NFR BLD: 34.9 % (ref 18–48)
MAGNESIUM SERPL-MCNC: 1.9 MG/DL (ref 1.6–2.6)
MCH RBC QN AUTO: 28.8 PG (ref 27–31)
MCHC RBC AUTO-ENTMCNC: 29.9 G/DL (ref 32–36)
MCV RBC AUTO: 96 FL (ref 82–98)
MONOCYTES # BLD AUTO: 0.5 K/UL (ref 0.3–1)
MONOCYTES NFR BLD: 12 % (ref 4–15)
NEUTROPHILS # BLD AUTO: 2.2 K/UL (ref 1.8–7.7)
NEUTROPHILS NFR BLD: 51 % (ref 38–73)
NRBC BLD-RTO: 0 /100 WBC
PHOSPHATE SERPL-MCNC: 4.4 MG/DL (ref 2.7–4.5)
PLATELET # BLD AUTO: 246 K/UL (ref 150–450)
PMV BLD AUTO: 10.3 FL (ref 9.2–12.9)
POTASSIUM SERPL-SCNC: 4.2 MMOL/L (ref 3.5–5.1)
RBC # BLD AUTO: 3.61 M/UL (ref 4.6–6.2)
SODIUM SERPL-SCNC: 144 MMOL/L (ref 136–145)
WBC # BLD AUTO: 4.35 K/UL (ref 3.9–12.7)

## 2022-11-15 PROCEDURE — 80069 RENAL FUNCTION PANEL: CPT | Performed by: INTERNAL MEDICINE

## 2022-11-15 PROCEDURE — 85025 COMPLETE CBC W/AUTO DIFF WBC: CPT | Performed by: INTERNAL MEDICINE

## 2022-11-15 PROCEDURE — 87799 DETECT AGENT NOS DNA QUANT: CPT | Performed by: INTERNAL MEDICINE

## 2022-11-15 PROCEDURE — 80197 ASSAY OF TACROLIMUS: CPT | Performed by: INTERNAL MEDICINE

## 2022-11-15 PROCEDURE — 83735 ASSAY OF MAGNESIUM: CPT | Performed by: INTERNAL MEDICINE

## 2022-11-15 PROCEDURE — 36415 COLL VENOUS BLD VENIPUNCTURE: CPT | Mod: PO | Performed by: INTERNAL MEDICINE

## 2022-11-16 ENCOUNTER — TELEPHONE (OUTPATIENT)
Dept: TRANSPLANT | Facility: CLINIC | Age: 72
End: 2022-11-16
Payer: MEDICARE

## 2022-11-16 LAB — TACROLIMUS BLD-MCNC: 5.8 NG/ML (ref 5–15)

## 2022-11-16 NOTE — TELEPHONE ENCOUNTER
----- Message from Edy Reese MD sent at 11/16/2022  3:42 PM CST -----  Yes I know he also had a severe rejection not long ago. We can wait for the pending BK pcr, his last was with low viral load

## 2022-11-16 NOTE — PROGRESS NOTES
Yes I know he also had a severe rejection not long ago. We can wait for the pending BK pcr, his last was with low viral load

## 2022-11-16 NOTE — TELEPHONE ENCOUNTER
----- Message from Edy Reese MD sent at 11/16/2022  9:33 AM CST -----  Please increase prograf to 8 mg PO bid

## 2022-11-19 LAB
BKV DNA SERPL NAA+PROBE-ACNC: 691 COPIES/ML
BKV DNA SERPL NAA+PROBE-LOG#: 2.84 LOG (10) COPIES/ML
BKV DNA SERPL QL NAA+PROBE: DETECTED

## 2022-11-23 ENCOUNTER — TELEPHONE (OUTPATIENT)
Dept: NEUROLOGY | Facility: CLINIC | Age: 72
End: 2022-11-23
Payer: MEDICARE

## 2022-11-23 NOTE — TELEPHONE ENCOUNTER
Spoke with patient regarding getting a sooner appointment than May. I gave the patient the number to the main campus in [place] to see if they can see him sooner for Tremors.-BR

## 2022-11-29 ENCOUNTER — LAB VISIT (OUTPATIENT)
Dept: LAB | Facility: HOSPITAL | Age: 72
End: 2022-11-29
Attending: INTERNAL MEDICINE
Payer: MEDICARE

## 2022-11-29 DIAGNOSIS — Z94.0 KIDNEY REPLACED BY TRANSPLANT: ICD-10-CM

## 2022-11-29 PROCEDURE — 87799 DETECT AGENT NOS DNA QUANT: CPT | Performed by: INTERNAL MEDICINE

## 2022-12-01 ENCOUNTER — PATIENT MESSAGE (OUTPATIENT)
Dept: HEPATOLOGY | Facility: CLINIC | Age: 72
End: 2022-12-01
Payer: MEDICARE

## 2022-12-01 ENCOUNTER — TELEPHONE (OUTPATIENT)
Dept: HEPATOLOGY | Facility: CLINIC | Age: 72
End: 2022-12-01
Payer: MEDICARE

## 2022-12-02 LAB
BKV DNA SERPL NAA+PROBE-ACNC: 253 COPIES/ML
BKV DNA SERPL NAA+PROBE-LOG#: 2.4 LOG (10) COPIES/ML
BKV DNA SERPL QL NAA+PROBE: DETECTED

## 2022-12-13 ENCOUNTER — LAB VISIT (OUTPATIENT)
Dept: LAB | Facility: HOSPITAL | Age: 72
End: 2022-12-13
Attending: INTERNAL MEDICINE
Payer: MEDICARE

## 2022-12-13 DIAGNOSIS — Z94.0 KIDNEY REPLACED BY TRANSPLANT: ICD-10-CM

## 2022-12-13 LAB
ALBUMIN SERPL BCP-MCNC: 3.2 G/DL (ref 3.5–5.2)
ANION GAP SERPL CALC-SCNC: 8 MMOL/L (ref 8–16)
BUN SERPL-MCNC: 26 MG/DL (ref 8–23)
CALCIUM SERPL-MCNC: 8.9 MG/DL (ref 8.7–10.5)
CHLORIDE SERPL-SCNC: 112 MMOL/L (ref 95–110)
CO2 SERPL-SCNC: 22 MMOL/L (ref 23–29)
CREAT SERPL-MCNC: 2.4 MG/DL (ref 0.5–1.4)
EST. GFR  (NO RACE VARIABLE): 28 ML/MIN/1.73 M^2
GLUCOSE SERPL-MCNC: 111 MG/DL (ref 70–110)
MAGNESIUM SERPL-MCNC: 1.9 MG/DL (ref 1.6–2.6)
PHOSPHATE SERPL-MCNC: 3.9 MG/DL (ref 2.7–4.5)
POTASSIUM SERPL-SCNC: 4.1 MMOL/L (ref 3.5–5.1)
SODIUM SERPL-SCNC: 142 MMOL/L (ref 136–145)

## 2022-12-13 PROCEDURE — 80069 RENAL FUNCTION PANEL: CPT | Performed by: INTERNAL MEDICINE

## 2022-12-13 PROCEDURE — 87799 DETECT AGENT NOS DNA QUANT: CPT | Performed by: INTERNAL MEDICINE

## 2022-12-13 PROCEDURE — 83735 ASSAY OF MAGNESIUM: CPT | Performed by: INTERNAL MEDICINE

## 2022-12-13 PROCEDURE — 85025 COMPLETE CBC W/AUTO DIFF WBC: CPT | Performed by: INTERNAL MEDICINE

## 2022-12-13 PROCEDURE — 80197 ASSAY OF TACROLIMUS: CPT | Performed by: INTERNAL MEDICINE

## 2022-12-14 LAB
BASOPHILS # BLD AUTO: 0.03 K/UL (ref 0–0.2)
BASOPHILS NFR BLD: 0.8 % (ref 0–1.9)
DIFFERENTIAL METHOD: ABNORMAL
EOSINOPHIL # BLD AUTO: 0.1 K/UL (ref 0–0.5)
EOSINOPHIL NFR BLD: 1.8 % (ref 0–8)
ERYTHROCYTE [DISTWIDTH] IN BLOOD BY AUTOMATED COUNT: 13.5 % (ref 11.5–14.5)
HCT VFR BLD AUTO: 35.7 % (ref 40–54)
HGB BLD-MCNC: 10.4 G/DL (ref 14–18)
IMM GRANULOCYTES # BLD AUTO: 0.11 K/UL (ref 0–0.04)
IMM GRANULOCYTES NFR BLD AUTO: 2.8 % (ref 0–0.5)
LYMPHOCYTES # BLD AUTO: 1.3 K/UL (ref 1–4.8)
LYMPHOCYTES NFR BLD: 33.2 % (ref 18–48)
MCH RBC QN AUTO: 27.1 PG (ref 27–31)
MCHC RBC AUTO-ENTMCNC: 29.1 G/DL (ref 32–36)
MCV RBC AUTO: 93 FL (ref 82–98)
MONOCYTES # BLD AUTO: 0.4 K/UL (ref 0.3–1)
MONOCYTES NFR BLD: 10.4 % (ref 4–15)
NEUTROPHILS # BLD AUTO: 2 K/UL (ref 1.8–7.7)
NEUTROPHILS NFR BLD: 51 % (ref 38–73)
NRBC BLD-RTO: 0 /100 WBC
PLATELET # BLD AUTO: 212 K/UL (ref 150–450)
PMV BLD AUTO: 10.6 FL (ref 9.2–12.9)
RBC # BLD AUTO: 3.84 M/UL (ref 4.6–6.2)
TACROLIMUS BLD-MCNC: 6.6 NG/ML (ref 5–15)
WBC # BLD AUTO: 3.86 K/UL (ref 3.9–12.7)

## 2022-12-16 LAB
BKV DNA SERPL NAA+PROBE-ACNC: ABNORMAL COPIES/ML
BKV DNA SERPL NAA+PROBE-LOG#: 3.04 LOG (10) COPIES/ML
BKV DNA SERPL QL NAA+PROBE: DETECTED

## 2022-12-19 ENCOUNTER — TELEPHONE (OUTPATIENT)
Dept: NEPHROLOGY | Facility: CLINIC | Age: 72
End: 2022-12-19
Payer: MEDICARE

## 2022-12-19 NOTE — TELEPHONE ENCOUNTER
----- Message from Senthil Arana sent at 12/19/2022 12:13 PM CST -----  Contact: Nigel  .Type:  Sooner Apoointment Request    Caller is requesting a sooner appointment.  Caller declined first available appointment listed below.  Caller will not accept being placed on the waitlist and is requesting a message be sent to doctor.  Name of Caller: Nigel  When is the first available appointment? 3/23  Symptoms: follow up   Would the patient rather a call back or a [facility]? Call   Best Call Back Number:.[phone]    Additional Information: Instructed to follow up by 1/6/2023

## 2022-12-22 DIAGNOSIS — Z94.0 KIDNEY REPLACED BY TRANSPLANT: Primary | ICD-10-CM

## 2022-12-22 NOTE — PROGRESS NOTES
Will continue with serum PCR per protocol. Needs a repeat pcr next week. With his recent rejection will prefer to wait before we lower more IS.   Lets add next week a cylex

## 2022-12-27 ENCOUNTER — LAB VISIT (OUTPATIENT)
Dept: LAB | Facility: HOSPITAL | Age: 72
End: 2022-12-27
Attending: INTERNAL MEDICINE
Payer: MEDICARE

## 2022-12-27 DIAGNOSIS — Z94.0 KIDNEY TRANSPLANTED: ICD-10-CM

## 2022-12-27 DIAGNOSIS — Z94.1 HEART TRANSPLANTED: ICD-10-CM

## 2022-12-27 DIAGNOSIS — Z94.0 KIDNEY REPLACED BY TRANSPLANT: ICD-10-CM

## 2022-12-27 LAB
ALBUMIN SERPL BCP-MCNC: 3.2 G/DL (ref 3.5–5.2)
ANION GAP SERPL CALC-SCNC: 9 MMOL/L (ref 8–16)
BASOPHILS # BLD AUTO: 0.03 K/UL (ref 0–0.2)
BASOPHILS NFR BLD: 0.6 % (ref 0–1.9)
BUN SERPL-MCNC: 24 MG/DL (ref 8–23)
CALCIUM SERPL-MCNC: 8.9 MG/DL (ref 8.7–10.5)
CHLORIDE SERPL-SCNC: 112 MMOL/L (ref 95–110)
CO2 SERPL-SCNC: 23 MMOL/L (ref 23–29)
CREAT SERPL-MCNC: 2.4 MG/DL (ref 0.5–1.4)
DIFFERENTIAL METHOD: ABNORMAL
EOSINOPHIL # BLD AUTO: 0.1 K/UL (ref 0–0.5)
EOSINOPHIL NFR BLD: 1 % (ref 0–8)
ERYTHROCYTE [DISTWIDTH] IN BLOOD BY AUTOMATED COUNT: 14.2 % (ref 11.5–14.5)
EST. GFR  (NO RACE VARIABLE): 28 ML/MIN/1.73 M^2
GLUCOSE SERPL-MCNC: 86 MG/DL (ref 70–110)
HCT VFR BLD AUTO: 35.9 % (ref 40–54)
HGB BLD-MCNC: 10.6 G/DL (ref 14–18)
IMM GRANULOCYTES # BLD AUTO: 0.01 K/UL (ref 0–0.04)
IMM GRANULOCYTES NFR BLD AUTO: 0.2 % (ref 0–0.5)
LYMPHOCYTES # BLD AUTO: 1.7 K/UL (ref 1–4.8)
LYMPHOCYTES NFR BLD: 32.4 % (ref 18–48)
MCH RBC QN AUTO: 27 PG (ref 27–31)
MCHC RBC AUTO-ENTMCNC: 29.5 G/DL (ref 32–36)
MCV RBC AUTO: 91 FL (ref 82–98)
MONOCYTES # BLD AUTO: 0.4 K/UL (ref 0.3–1)
MONOCYTES NFR BLD: 8.5 % (ref 4–15)
NEUTROPHILS # BLD AUTO: 3 K/UL (ref 1.8–7.7)
NEUTROPHILS NFR BLD: 57.3 % (ref 38–73)
NRBC BLD-RTO: 0 /100 WBC
PHOSPHATE SERPL-MCNC: 4.1 MG/DL (ref 2.7–4.5)
PLATELET # BLD AUTO: 230 K/UL (ref 150–450)
PMV BLD AUTO: 11.1 FL (ref 9.2–12.9)
POTASSIUM SERPL-SCNC: 4 MMOL/L (ref 3.5–5.1)
RBC # BLD AUTO: 3.93 M/UL (ref 4.6–6.2)
SODIUM SERPL-SCNC: 144 MMOL/L (ref 136–145)
WBC # BLD AUTO: 5.19 K/UL (ref 3.9–12.7)

## 2022-12-27 PROCEDURE — 87799 DETECT AGENT NOS DNA QUANT: CPT | Performed by: INTERNAL MEDICINE

## 2022-12-27 PROCEDURE — 85025 COMPLETE CBC W/AUTO DIFF WBC: CPT | Performed by: INTERNAL MEDICINE

## 2022-12-27 PROCEDURE — 36415 COLL VENOUS BLD VENIPUNCTURE: CPT | Mod: PO | Performed by: INTERNAL MEDICINE

## 2022-12-27 PROCEDURE — 86352 CELL FUNCTION ASSAY W/STIM: CPT | Performed by: INTERNAL MEDICINE

## 2022-12-27 PROCEDURE — 80069 RENAL FUNCTION PANEL: CPT | Performed by: INTERNAL MEDICINE

## 2022-12-29 LAB — IMMUNKNOW (STIMULATED): 233 NG/ML (ref 226–524)

## 2023-01-06 ENCOUNTER — OFFICE VISIT (OUTPATIENT)
Dept: NEPHROLOGY | Facility: CLINIC | Age: 73
End: 2023-01-06
Payer: MEDICARE

## 2023-01-06 VITALS
HEART RATE: 72 BPM | BODY MASS INDEX: 34.52 KG/M2 | WEIGHT: 268.94 LBS | SYSTOLIC BLOOD PRESSURE: 156 MMHG | RESPIRATION RATE: 20 BRPM | HEIGHT: 74 IN | DIASTOLIC BLOOD PRESSURE: 70 MMHG

## 2023-01-06 DIAGNOSIS — Z94.0 KIDNEY REPLACED BY TRANSPLANT: ICD-10-CM

## 2023-01-06 DIAGNOSIS — Z94.0 KIDNEY TRANSPLANTED: Primary | ICD-10-CM

## 2023-01-06 DIAGNOSIS — Z94.1 HEART TRANSPLANTED: ICD-10-CM

## 2023-01-06 PROCEDURE — 99999 PR PBB SHADOW E&M-EST. PATIENT-LVL IV: CPT | Mod: PBBFAC,,, | Performed by: INTERNAL MEDICINE

## 2023-01-06 PROCEDURE — 99215 OFFICE O/P EST HI 40 MIN: CPT | Mod: S$PBB,,, | Performed by: INTERNAL MEDICINE

## 2023-01-06 PROCEDURE — 99214 OFFICE O/P EST MOD 30 MIN: CPT | Mod: PBBFAC | Performed by: INTERNAL MEDICINE

## 2023-01-06 PROCEDURE — 99999 PR PBB SHADOW E&M-EST. PATIENT-LVL IV: ICD-10-PCS | Mod: PBBFAC,,, | Performed by: INTERNAL MEDICINE

## 2023-01-06 PROCEDURE — 99215 PR OFFICE/OUTPT VISIT, EST, LEVL V, 40-54 MIN: ICD-10-PCS | Mod: S$PBB,,, | Performed by: INTERNAL MEDICINE

## 2023-01-06 RX ORDER — MYCOPHENOLATE MOFETIL 250 MG/1
250 CAPSULE ORAL 2 TIMES DAILY
Qty: 90 CAPSULE | Refills: 3 | Status: SHIPPED | OUTPATIENT
Start: 2023-01-06 | End: 2023-05-31 | Stop reason: SDUPTHER

## 2023-01-06 NOTE — PROGRESS NOTES
Renal clinic f/u note  Date of clinic visit: 1/6/23  Reason for f/u and chief c/o: h/o of kidney transplant      HPI: Pt is a 73 y/o male with a h/o of cadaveric kidney transplant and heart transplant at [facility] on 5/24/02, who presents for f/u. Pt has nonew c/o's, feels well today, no abd pain, no fever, no SOB. Has all the prescribed meds and is taking them.    Pt has had an eventful medical course since June 2022, which will be summarized here. Pt was previously on rapamune, which was switched to prograf in June 2022. A few week later, he caught COVID and experienced pneumonia and large diarrhea. During this period, prograf level was low to undetectable (<2). He was admitted to [facility] in July 2022, with Cr 12.9, supported on IVF's, FENa was 4%. Intrinsic renal damage was suggested, acute rejection was suspected, [place] [facility] [place]. Per review of [facility] records, pt had a graft biopsy which showed antibody mediated rejection. No vascular rejection or T-cell rejection were present. Pt received further solumedrol pulse, plasmapheresis, IVIG, and retuximab. Pt was placed on valcyte for positive CMV. S cr improved markedly, but not to the prior baseline. There were no cardiac issues related to the prior heart transplant.     To review, the cause of ESRD is not known, and pt was never on dialysis before the transplant. 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     PAST SURGICAL HISTORY:  He  has a past surgical history that  includes Kidney transplant; Heart transplant; Revision total hip arthroplasty; Eye surgery; Cataract extraction (Bilateral); and Colonoscopy (N/A, 10/6/2020).     SOCIAL HISTORY:  He  reports that he quit smoking about 37 years ago. His smoking use included cigarettes. He has a 20.00 pack-year smoking history. He has never used smokeless tobacco. He reports current alcohol use of about 1.0 standard drink of alcohol per week. He reports that he does not use drugs.     FAMILY MEDICAL HISTORY:  His family history includes Diabetes in his brother and maternal grandmother; Early death in his brother; Heart disease in his brother; Hyperlipidemia in his brother and sister; Hypertension in his brother; Kidney disease in his son; Stroke in his brother and mother.             Review of patient's allergies indicates:   Allergen Reactions    No known drug allergies         Meds reviewed    Current Outpatient Medications:     acetaminophen (TYLENOL) 325 MG tablet, Take 2 tablets (650 mg total) by mouth every 4 (four) hours as needed., Disp: , Rfl: 0    aspirin 81 MG Chew, Take 1 tablet (81 mg total) by mouth once daily., Disp: , Rfl: 0    atorvastatin (LIPITOR) 40 MG tablet, Take 1 tablet (40 mg total) by mouth once daily., Disp: 90 tablet, Rfl: 3    calcium acetate,phosphat bind, (PHOSLO) 667 mg capsule, Take 2 capsules (1,334 mg total) by mouth 3 (three) times daily with meals., Disp: 180 capsule, Rfl: 11    famotidine (PEPCID) 20 MG tablet, Take 1 tablet (20 mg total) by mouth once daily., Disp: 30 tablet, Rfl: 11    flash glucose sensor (FREESTYLE SHREE 2 SENSOR) Kit, APPLY 1 SENSOR EVERY 14    DAYS, Disp: 6 kit, Rfl: 3    hydrALAZINE (APRESOLINE) 50 MG tablet, Take 1 tablet (50 mg total) by mouth every 8 (eight) hours., Disp: 270 tablet, Rfl: 3    insulin NPH/Reg human (HUMULIN 70/30 U-100 KWIKPEN) 100 unit/mL (70-30) InPn pen, Inject 40 Units into the skin daily with breakfast AND 30 Units daily with dinner or evening  meal., Disp: 75 mL, Rfl: 1    metoprolol succinate (TOPROL-XL) 25 MG 24 hr tablet, Take 3 tablets (75 mg total) by mouth once daily., Disp: 90 tablet, Rfl: 11    NIFEdipine (PROCARDIA-XL) 60 MG (OSM) 24 hr tablet, Take 1 tablet (60 mg total) by mouth 2 (two) times a day., Disp: 180 tablet, Rfl: 3    predniSONE (DELTASONE) 5 MG tablet, Take 1 tablet (5 mg total) by mouth once daily., Disp: 90 tablet, Rfl: 3    sulfamethoxazole-trimethoprim 400-80mg (BACTRIM,SEPTRA) 400-80 mg per tablet, Take 1 tablet by mouth every Mon, Wed, Fri. STOP 1/23/23, Disp: 36 tablet, Rfl: 1    tacrolimus (PROGRAF) 1 MG Cap, Take 8 capsules (8 mg total) by mouth every morning AND 7 capsules (7 mg total) every evening., Disp: 1350 capsule, Rfl: 3    tamsulosin (FLOMAX) 0.4 mg Cap, Take 1 capsule (0.4 mg total) by mouth once daily., Disp: 30 capsule, Rfl: 11    mycophenolate (CELLCEPT) 250 mg Cap, Take 1 capsule (250 mg total) by mouth 2 (two) times daily., Disp: 90 capsule, Rfl: 3        REVIEW OF SYSTEMS:  Patient has no fever, fatigue, visual changes, chest pain, edema, cough, dyspnea, nausea, vomiting, constipation, diarrhea, arthralgias, pruritis, dizziness, weakness, depression, confusion.        PHYSICAL EXAM:   Blood pressure 156/70 , pulse 72, weight 122 Kg, from 121 kg.  Gen:    WDWN male in no apparent distress  Psych: Normal mood and affect  Skin:    No rashes or ulcers  Neck:   No JVD  Chest:  Clear with no rales, rhonchi, wheezing with normal effort  CV:      Regular with no murmurs, gallops or rubs  Abd:     Soft, nontender, no distension  Ext:      Trace pitting edema     Labs reviewed  BMP  Lab Results   Component Value Date     12/27/2022    K 4.0 12/27/2022     (H) 12/27/2022    CO2 23 12/27/2022    BUN 24 (H) 12/27/2022    CREATININE 2.4 (H) 12/27/2022    CALCIUM 8.9 12/27/2022    ANIONGAP 9 12/27/2022    EGFRNORACEVR 28.0 (A) 12/27/2022     Lab Results   Component Value Date    WBC 5.19 12/27/2022    HGB 10.6  (L) 12/27/2022    HCT 35.9 (L) 12/27/2022    MCV 91 12/27/2022     12/27/2022      Prograf level 6.6 (3 weeks ago)    BK Virus DNA, Blood Not detected DETECTED Abnormal   DETECTED Abnormal   DETECTED Abnormal   DETECTED Abnormal   DETECTED Abnormal   DETECTED Abnormal   DETECTED Abnormal     BK Virus DNA PCR, Quant, Blood <125 Copies/mL 746 High   1,093 High   253 High   691 High   296 High   746 High   549 High     Log BKV Copies/mL <2.10 Log (10) Copies/mL 2.87 High   3.04 High  CM  2.40 High  CM  2.84 High  CM  2.47 High  CM  2.87 High  CM  2.74 High  CM                     IMPRESSION AND RECOMMENDATIONS:  71 y/o male with h/o of kidney and heart transplant in 2002 who presents for f/u. Pt had acute rejection in the past year:     1. Renal: s Cr stable, has a new baseline after experiencing acute rejection  Stable renal function. CKD stage 4  K normal  Na normal  Ca normal  Acid-base stable  Stable proteinuria     2. Immunosuppression: prograf level was within baseline range 3 weeks ago  Will repeat dose next week  Pt takes 8 mg po q am and 7 mg po q pm bid  Other medications and doses reviewed  S/p antibody mediated rejection in July 2022. No vascular rejection or T-cell rejection were present.   Pt received further solumedrol pulse, plasmapheresis, IVIG, and retuximab.   S/p valcyte for positive CMV: CMV quant titers improved    VK viremia:  Level remains detectible  Will lower mycophenoate from 500 mg to 250 mg po bid     Previously on Rapamune, switched to prograf in June 2022  H/o of kidney transplant at [facility] in 2002, cause of ESRD not clear, was never on dialysis  H/o of heart transplant at [facility] in 2002, heart failure due to IHSS. Being followed by heart transplant team [place]     3. HTN: BP controlled previously. Slightly high today, says has not taken the mid-day dose of hydralazine yet today  Meds reviewed     4. DM: chart was reviewed. Occurred post transplant.  Proteinuria, may be due to  diabetic nephropathy     5. Slowly rising PSA's: no urinary sx's.  PSA still below 4.0  Has appt with urology at the end of this month.     6. Med review: was done     7. Heart transplant: no current issues     Plans and recommendations:  As discussed above  Total time spent 40 minutes including time needed to review the records, the   patient evaluation, documentation, face-to-face discussion with the patient,   more than 50% of the time was spent on coordination of care and counseling.    Level V visit.  RTC 3 months    Lucila Matthew MD

## 2023-01-10 ENCOUNTER — LAB VISIT (OUTPATIENT)
Dept: LAB | Facility: HOSPITAL | Age: 73
End: 2023-01-10
Attending: INTERNAL MEDICINE
Payer: MEDICARE

## 2023-01-10 DIAGNOSIS — Z94.0 KIDNEY TRANSPLANTED: ICD-10-CM

## 2023-01-10 DIAGNOSIS — Z94.0 KIDNEY REPLACED BY TRANSPLANT: Primary | ICD-10-CM

## 2023-01-10 PROCEDURE — 80197 ASSAY OF TACROLIMUS: CPT | Performed by: INTERNAL MEDICINE

## 2023-01-10 PROCEDURE — 36415 COLL VENOUS BLD VENIPUNCTURE: CPT | Mod: PO | Performed by: INTERNAL MEDICINE

## 2023-01-11 LAB — TACROLIMUS BLD-MCNC: 8.1 NG/ML (ref 5–15)

## 2023-01-12 ENCOUNTER — PATIENT MESSAGE (OUTPATIENT)
Dept: TRANSPLANT | Facility: CLINIC | Age: 73
End: 2023-01-12
Payer: MEDICARE

## 2023-01-13 ENCOUNTER — LAB VISIT (OUTPATIENT)
Dept: LAB | Facility: HOSPITAL | Age: 73
End: 2023-01-13
Attending: INTERNAL MEDICINE
Payer: MEDICARE

## 2023-01-13 DIAGNOSIS — Z94.0 KIDNEY REPLACED BY TRANSPLANT: ICD-10-CM

## 2023-01-13 PROCEDURE — 87799 DETECT AGENT NOS DNA QUANT: CPT | Performed by: INTERNAL MEDICINE

## 2023-01-13 PROCEDURE — 36415 COLL VENOUS BLD VENIPUNCTURE: CPT | Mod: PO | Performed by: INTERNAL MEDICINE

## 2023-01-16 LAB
BKV DNA SERPL NAA+PROBE-ACNC: 344 COPIES/ML
BKV DNA SERPL NAA+PROBE-LOG#: 2.54 LOG (10) COPIES/ML
BKV DNA SERPL QL NAA+PROBE: DETECTED

## 2023-01-20 ENCOUNTER — PES CALL (OUTPATIENT)
Dept: ADMINISTRATIVE | Facility: CLINIC | Age: 73
End: 2023-01-20
Payer: MEDICARE

## 2023-01-23 ENCOUNTER — PATIENT OUTREACH (OUTPATIENT)
Dept: ADMINISTRATIVE | Facility: HOSPITAL | Age: 73
End: 2023-01-23
Payer: MEDICARE

## 2023-01-27 ENCOUNTER — TELEPHONE (OUTPATIENT)
Dept: TRANSPLANT | Facility: CLINIC | Age: 73
End: 2023-01-27
Payer: MEDICARE

## 2023-01-27 DIAGNOSIS — Z94.1 HEART TRANSPLANTED: Primary | Chronic | ICD-10-CM

## 2023-01-27 RX ORDER — METOPROLOL SUCCINATE 50 MG/1
TABLET, EXTENDED RELEASE ORAL
Qty: 90 TABLET | Refills: 3 | Status: SHIPPED | OUTPATIENT
Start: 2023-01-27 | End: 2023-02-16

## 2023-01-27 NOTE — TELEPHONE ENCOUNTER
Patient requesting refill for Lasix. Stated he takes 10mg daily as needed. Will follow up with Dr Olsen.----- Message from Rufina Rai sent at 1/27/2023 12:16 PM CST -----  Regarding: clarify medication  The patient called asking to clarify on his medication he is taking furosemide or turosemide      Patient can be contacted @# [phone] (home) [phone]

## 2023-01-27 NOTE — TELEPHONE ENCOUNTER
Pt called stating he saw neuro yesterday and the neurologist would like to switch metoprolol to propanolol. Discuss with Dr. Chinchilla and advised it will be safe to switch to propanolol. Recommended neurology prescribe medication and to monitor any side effects.   Pt also needs a refill of metorprolol today for the weekend, until he receives propanolol from mail order pharmacy.

## 2023-01-30 ENCOUNTER — LAB VISIT (OUTPATIENT)
Dept: LAB | Facility: HOSPITAL | Age: 73
End: 2023-01-30
Attending: INTERNAL MEDICINE
Payer: MEDICARE

## 2023-01-30 DIAGNOSIS — Z94.0 KIDNEY REPLACED BY TRANSPLANT: ICD-10-CM

## 2023-01-30 PROCEDURE — 87799 DETECT AGENT NOS DNA QUANT: CPT | Performed by: INTERNAL MEDICINE

## 2023-01-30 PROCEDURE — 36415 COLL VENOUS BLD VENIPUNCTURE: CPT | Mod: PO | Performed by: INTERNAL MEDICINE

## 2023-02-02 LAB
BKV DNA SERPL NAA+PROBE-ACNC: 332 COPIES/ML
BKV DNA SERPL NAA+PROBE-LOG#: 2.52 LOG (10) COPIES/ML
BKV DNA SERPL QL NAA+PROBE: DETECTED

## 2023-02-13 ENCOUNTER — LAB VISIT (OUTPATIENT)
Dept: LAB | Facility: HOSPITAL | Age: 73
End: 2023-02-13
Attending: INTERNAL MEDICINE
Payer: MEDICARE

## 2023-02-13 DIAGNOSIS — Z94.0 KIDNEY REPLACED BY TRANSPLANT: ICD-10-CM

## 2023-02-13 PROCEDURE — 36415 COLL VENOUS BLD VENIPUNCTURE: CPT | Mod: PO | Performed by: INTERNAL MEDICINE

## 2023-02-13 PROCEDURE — 87799 DETECT AGENT NOS DNA QUANT: CPT | Performed by: INTERNAL MEDICINE

## 2023-02-14 ENCOUNTER — TELEPHONE (OUTPATIENT)
Dept: ADMINISTRATIVE | Facility: CLINIC | Age: 73
End: 2023-02-14
Payer: MEDICARE

## 2023-02-14 ENCOUNTER — PATIENT MESSAGE (OUTPATIENT)
Dept: ADMINISTRATIVE | Facility: CLINIC | Age: 73
End: 2023-02-14
Payer: MEDICARE

## 2023-02-16 ENCOUNTER — TELEPHONE (OUTPATIENT)
Dept: ADMINISTRATIVE | Facility: CLINIC | Age: 73
End: 2023-02-16
Payer: MEDICARE

## 2023-02-16 ENCOUNTER — OFFICE VISIT (OUTPATIENT)
Dept: HOME HEALTH SERVICES | Facility: CLINIC | Age: 73
End: 2023-02-16
Payer: MEDICARE

## 2023-02-16 DIAGNOSIS — D84.9 IMMUNOCOMPROMISED PATIENT: ICD-10-CM

## 2023-02-16 DIAGNOSIS — E21.3 HYPERPARATHYROIDISM, UNSPECIFIED: ICD-10-CM

## 2023-02-16 DIAGNOSIS — E66.01 CLASS 2 SEVERE OBESITY DUE TO EXCESS CALORIES WITH SERIOUS COMORBIDITY AND BODY MASS INDEX (BMI) OF 37.0 TO 37.9 IN ADULT: ICD-10-CM

## 2023-02-16 DIAGNOSIS — Z91.89 AT RISK FOR OSTEOPOROSIS: ICD-10-CM

## 2023-02-16 DIAGNOSIS — R26.9 ABNORMALITY OF GAIT AND MOBILITY: ICD-10-CM

## 2023-02-16 DIAGNOSIS — R63.4 WEIGHT LOSS, ABNORMAL: ICD-10-CM

## 2023-02-16 DIAGNOSIS — Z00.00 ENCOUNTER FOR PREVENTIVE HEALTH EXAMINATION: Primary | ICD-10-CM

## 2023-02-16 DIAGNOSIS — N25.81 SECONDARY HYPERPARATHYROIDISM OF RENAL ORIGIN: ICD-10-CM

## 2023-02-16 DIAGNOSIS — F33.41 MAJOR DEPRESSIVE DISORDER, RECURRENT, IN PARTIAL REMISSION: ICD-10-CM

## 2023-02-16 DIAGNOSIS — I77.1 TORTUOUS AORTA: ICD-10-CM

## 2023-02-16 DIAGNOSIS — C61 PROSTATE CANCER: ICD-10-CM

## 2023-02-16 DIAGNOSIS — Z94.1 HEART TRANSPLANTED: Chronic | ICD-10-CM

## 2023-02-16 LAB
BKV DNA SERPL NAA+PROBE-ACNC: 697 COPIES/ML
BKV DNA SERPL NAA+PROBE-LOG#: 2.84 LOG (10) COPIES/ML
BKV DNA SERPL QL NAA+PROBE: DETECTED

## 2023-02-16 PROCEDURE — G0439 PPPS, SUBSEQ VISIT: HCPCS | Mod: 95,,, | Performed by: NURSE PRACTITIONER

## 2023-02-16 PROCEDURE — G0439 PR MEDICARE ANNUAL WELLNESS SUBSEQUENT VISIT: ICD-10-PCS | Mod: 95,,, | Performed by: NURSE PRACTITIONER

## 2023-02-16 RX ORDER — PROPRANOLOL HYDROCHLORIDE 20 MG/1
TABLET ORAL
COMMUNITY
Start: 2023-01-31 | End: 2023-05-31 | Stop reason: SDUPTHER

## 2023-02-16 NOTE — PROGRESS NOTES
The patient location is: [place]  The chief complaint leading to consultation is: awv    Visit type: audiovisual    Face to Face time with patient: 60  60 minutes of total time spent on the encounter, which includes face to face time and non-face to face time preparing to see the patient (eg, review of tests), Obtaining and/or reviewing separately obtained history, Documenting clinical information in the electronic or other health record, Independently interpreting results (not separately reported) and communicating results to the patient/family/caregiver, or Care coordination (not separately reported).         Each patient to whom he or she provides medical services by telemedicine is:  (1) informed of the relationship between the physician and patient and the respective role of any other health care provider with respect to management of the patient; and (2) notified that he or she may decline to receive medical services by telemedicine and may withdraw from such care at any time.    Notes:   Review for Opioid Screening: Pt does not have Rx for Opioids    Review for Substance Use Disorders: Patient does not use substance        [facility] presented for a  Medicare AWV and comprehensive Health Risk Assessment today. The following components were reviewed and updated:    Medical history  Family History  Social history  Allergies and Current Medications  Health Risk Assessment  Health Maintenance  Care Team         ** See Completed Assessments for Annual Wellness Visit within the encounter summary.**         The following assessments were completed:  Living Situation  CAGE  Depression Screening  Fall Risk Assessment (MACH 10)  Hearing Assessment(HHI)  Cognitive Function Screening  Nutrition Screening  ADL Screening  PAQ Screening      There were no vitals filed for this visit.  There is no height or weight on file to calculate BMI.  Physical Exam  Constitutional:       Appearance: Normal appearance.    Neurological:      Mental Status: He is alert.   Psychiatric:         Attention and Perception: Attention and perception normal.         Mood and Affect: Mood and affect normal.         Speech: Speech normal.         Behavior: Behavior normal. Behavior is cooperative.         Thought Content: Thought content normal.         Cognition and Memory: Cognition and memory normal.         Judgment: Judgment normal.             Diagnoses and health risks identified today and associated recommendations/orders:    1. Encounter for preventive health examination  Stable, followed by provider    2. Prostate cancer  Stable, followed by urology, not currently doing treatment     3. Secondary hyperparathyroidism of renal origin  Stable, followed by provider, pt had kidney transplant     4. Class 2 severe obesity due to excess calories with serious comorbidity and body mass index (BMI) of 37.0 to 37.9 in adult  Stable, followed by provider, pt states he has lost 30 pounds since august 2022    5. Immunocompromised patient  Stable, followed by provider, pt takes prograf, prednisone, and cellcept     6. Major depressive disorder, recurrent, in partial remission  Stable, followed by provider, denies depression at this time, not taking any medication currently     7. Tortuous aorta  Stable, followed by provider, takes aspirin, atorvastatin, hydralazine, nifedipine, propranolol    8. Abnormality of gait and mobility  Stable, followed by provider, uses cane sometimes for mobility     9. Weight loss, abnormal  Stable, followed by provider, pt states he has lost 30 pounds since august 2022    10. Heart transplanted  Stable, followed by provider      Provided Nigel with a 5-10 year written screening schedule and personal prevention plan. Recommendations were developed using the USPSTF age appropriate recommendations. Education, counseling, and referrals were provided as needed. After Visit Summary printed and given to patient which includes  a list of additional screenings\tests needed.    Follow up in about 1 year (around 2/16/2024) for your next annual wellness visit.    Willi Alonso NP  I offered to discuss advanced care planning, including how to pick a person who would make decisions for you if you were unable to make them for yourself, called a health care power of , and what kind of decisions you might make such as use of life sustaining treatments such as ventilators and tube feeding when faced with a life limiting illness recorded on a living will that they will need to know. (How you want to be cared for as you near the end of your natural life)     X Patient is interested in learning more about how to make advanced directives.  I provided them paperwork and offered to discuss this with them.  I offered to discuss advanced care planning, including how to pick a person who would make decisions for you if you were unable to make them for yourself, called a health care power of , and what kind of decisions you might make such as use of life sustaining treatments such as ventilators and tube feeding when faced with a life limiting illness recorded on a living will that they will need to know. (How you want to be cared for as you near the end of your natural life)     X Patient is interested in learning more about how to make advanced directives.  I provided them paperwork and offered to discuss this with them.

## 2023-02-16 NOTE — PATIENT INSTRUCTIONS
Counseling and Referral of Other Preventative  (Italic type indicates deductible and co-insurance are waived)    Patient Name: Nigel Albrecht  Today's Date: 2/16/2023    Health Maintenance       Date Due Completion Date    DEXA Scan 05/16/2021 5/16/2018    Eye Exam 11/10/2022 11/10/2021    Override on 8/10/2017: Done (Per Aminata Perry)    Override on 2/18/2016: Done (Please see media for report from Aminata Perry # [id number] or [phone])    Override on 7/1/2014: Done    Hemoglobin A1c 03/21/2023 9/21/2022    Lipid Panel 05/04/2023 5/4/2022    Foot Exam 06/15/2023 6/15/2022    Override on 8/22/2018: Done    Override on 5/7/2018: Done    Override on 3/5/2018: Done    Override on 9/11/2017: Done    Override on 5/30/2017: Done    Override on 2/19/2017: Done    Override on 2/17/2017: Done    Override on 2/18/2015: Done    Override on 11/18/2014: Done    Override on 8/18/2014: Done    Diabetes Urine Screening 07/21/2023 7/21/2022    PROSTATE-SPECIFIC ANTIGEN 09/28/2023 9/28/2022    Low Dose Statin 01/06/2024 1/6/2023    TETANUS VACCINE 09/02/2030 9/2/2020        Orders Placed This Encounter   Procedures    DXA Bone Density Appendicular Skeleton         The following information is provided to all patients.  This information is to help you find resources for any of the problems found today that may be affecting your health:                Living healthy guide: www.[facility].[place].gov      Understanding Diabetes: www.diabetes.org      Eating healthy: www.cdc.gov/healthyweight      CDC home safety checklist: www.cdc.gov/steadi/patient.html      Agency on Aging: www.goea.[place].gov      Alcoholics anonymous (AA): www.aa.org      Physical Activity: www.jaswant.nih.gov/bc4hevb      Tobacco use: www.quitwithusla.org     Counseling and Referral of Other Preventative  (Italic type indicates deductible and co-insurance are waived)    Patient Name: Nigel Szymanski's Date: 2/16/2023    Health Maintenance        Date Due Completion Date    DEXA Scan 05/16/2021 5/16/2018    Eye Exam 11/10/2022 11/10/2021    Override on 8/10/2017: Done (Per Aminata Perry)    Override on 2/18/2016: Done (Please see media for report from Coates Allepricilla # [id number] or [phone])    Override on 7/1/2014: Done    Hemoglobin A1c 03/21/2023 9/21/2022    Lipid Panel 05/04/2023 5/4/2022    Foot Exam 06/15/2023 6/15/2022    Override on 8/22/2018: Done    Override on 5/7/2018: Done    Override on 3/5/2018: Done    Override on 9/11/2017: Done    Override on 5/30/2017: Done    Override on 2/19/2017: Done    Override on 2/17/2017: Done    Override on 2/18/2015: Done    Override on 11/18/2014: Done    Override on 8/18/2014: Done    Diabetes Urine Screening 07/21/2023 7/21/2022    PROSTATE-SPECIFIC ANTIGEN 09/28/2023 9/28/2022    Low Dose Statin 01/06/2024 1/6/2023    TETANUS VACCINE 09/02/2030 9/2/2020        Orders Placed This Encounter   Procedures    DXA Bone Density Appendicular Skeleton         The following information is provided to all patients.  This information is to help you find resources for any of the problems found today that may be affecting your health:                Living healthy guide: www.[facility].[place].gov      Understanding Diabetes: www.diabetes.org      Eating healthy: www.cdc.gov/healthyweight      CDC home safety checklist: www.cdc.gov/steadi/patient.html      Agency on Aging: www.goea.[place].[facility]      Alcoholics anonymous (AA): www.aa.org      Physical Activity: www.jaswant.nih.gov/wz5kcgp      Tobacco use: www.quitwithusla.org

## 2023-02-22 ENCOUNTER — PATIENT MESSAGE (OUTPATIENT)
Dept: NEPHROLOGY | Facility: CLINIC | Age: 73
End: 2023-02-22
Payer: MEDICARE

## 2023-02-23 RX ORDER — TORSEMIDE 20 MG/1
20 TABLET ORAL DAILY
Qty: 90 TABLET | Refills: 3 | Status: SHIPPED | OUTPATIENT
Start: 2023-02-23 | End: 2023-04-27

## 2023-02-23 NOTE — PROGRESS NOTES
Renal note:  Pt called to say he ran out of torsemide. He has been out for a while. He called his cardiologist who deferred the issue to renal.  Pt was called. Advised pt his Cr is 2.4, GFR 28 ml/min,  Advised pt that, based on this GFR, he needs to drink water less, as he woould be retaining water with advanced CKD  Torsemide 20 mg po qd was ordered

## 2023-02-27 ENCOUNTER — LAB VISIT (OUTPATIENT)
Dept: LAB | Facility: HOSPITAL | Age: 73
End: 2023-02-27
Attending: INTERNAL MEDICINE
Payer: MEDICARE

## 2023-02-27 DIAGNOSIS — Z94.0 KIDNEY REPLACED BY TRANSPLANT: ICD-10-CM

## 2023-02-27 PROCEDURE — 36415 COLL VENOUS BLD VENIPUNCTURE: CPT | Mod: PO | Performed by: INTERNAL MEDICINE

## 2023-02-27 PROCEDURE — 87799 DETECT AGENT NOS DNA QUANT: CPT | Performed by: INTERNAL MEDICINE

## 2023-03-03 LAB
BKV DNA SERPL NAA+PROBE-ACNC: 193 COPIES/ML
BKV DNA SERPL NAA+PROBE-LOG#: 2.29 LOG (10) COPIES/ML
BKV DNA SERPL QL NAA+PROBE: DETECTED

## 2023-03-28 ENCOUNTER — PATIENT OUTREACH (OUTPATIENT)
Dept: ADMINISTRATIVE | Facility: HOSPITAL | Age: 73
End: 2023-03-28
Payer: MEDICARE

## 2023-03-28 NOTE — PROGRESS NOTES
HTN REPORT: Called and spoke with patient. He declined to schedule annual with PCP at this time. He stated he has too many other appointments at this time. He does take at home b/p readings. Will to take one tomorrow. Asked that I call back around 11:40 tomorrow.

## 2023-03-29 ENCOUNTER — PATIENT MESSAGE (OUTPATIENT)
Dept: ADMINISTRATIVE | Facility: HOSPITAL | Age: 73
End: 2023-03-29
Payer: MEDICARE

## 2023-03-29 NOTE — PROGRESS NOTES
Called patient. His reading is a little elevated today. 145/73. He states he is normally always in the 130s. He will try to take it again later this evening or tomorrow and reply to my mychart message with reading. Discussed if still elevated we would want him to come in for a visit.

## 2023-03-30 ENCOUNTER — TELEPHONE (OUTPATIENT)
Dept: FAMILY MEDICINE | Facility: CLINIC | Age: 73
End: 2023-03-30
Payer: MEDICARE

## 2023-03-30 VITALS — DIASTOLIC BLOOD PRESSURE: 79 MMHG | SYSTOLIC BLOOD PRESSURE: 133 MMHG

## 2023-03-30 NOTE — TELEPHONE ENCOUNTER
"You  Nigel Albrecht Just now (10:17 AM)     WMACKENZIE  Thank you! That blood pressure is much better! I will update your chart with it.     This Patient Portal message has not been read.     Nigel Albrecht  You 20 minutes ago (9:57 AM)       133/73  pulse 59       You  Nigel Albrecht 22 hours ago (11:57 AM)     WMACKENZIE Ruiz Mr. Manning, it's Caylae, regarding your blood pressure. Either this evening or tomorrow when you have a chance to take the b/p reading again, just respond to this message for me. It will come straight to me. Just remember before taking your blood pressure, sit feet flat on the ground and relax. Arm around heart level, typically relaxing on table. And make sure you have been "relaxing" for about 10 mins prior to reading. Thank you again.      Ana Luisa CONTRERAS LPN   [facility]   "

## 2023-03-31 ENCOUNTER — OFFICE VISIT (OUTPATIENT)
Dept: UROLOGY | Facility: CLINIC | Age: 73
End: 2023-03-31
Payer: MEDICARE

## 2023-03-31 VITALS
HEIGHT: 74 IN | TEMPERATURE: 97 F | BODY MASS INDEX: 33.42 KG/M2 | SYSTOLIC BLOOD PRESSURE: 165 MMHG | WEIGHT: 260.38 LBS | HEART RATE: 51 BPM | DIASTOLIC BLOOD PRESSURE: 73 MMHG

## 2023-03-31 DIAGNOSIS — N40.1 BENIGN PROSTATIC HYPERPLASIA WITH URINARY FREQUENCY: ICD-10-CM

## 2023-03-31 DIAGNOSIS — C61 PROSTATE CANCER: Primary | ICD-10-CM

## 2023-03-31 DIAGNOSIS — N52.9 ERECTILE DYSFUNCTION, UNSPECIFIED ERECTILE DYSFUNCTION TYPE: ICD-10-CM

## 2023-03-31 DIAGNOSIS — R35.0 BENIGN PROSTATIC HYPERPLASIA WITH URINARY FREQUENCY: ICD-10-CM

## 2023-03-31 PROCEDURE — 99214 PR OFFICE/OUTPT VISIT, EST, LEVL IV, 30-39 MIN: ICD-10-PCS | Mod: S$PBB,,, | Performed by: UROLOGY

## 2023-03-31 PROCEDURE — 99999 PR PBB SHADOW E&M-EST. PATIENT-LVL III: CPT | Mod: PBBFAC,,, | Performed by: UROLOGY

## 2023-03-31 PROCEDURE — 99213 OFFICE O/P EST LOW 20 MIN: CPT | Mod: PBBFAC | Performed by: UROLOGY

## 2023-03-31 PROCEDURE — 99214 OFFICE O/P EST MOD 30 MIN: CPT | Mod: S$PBB,,, | Performed by: UROLOGY

## 2023-03-31 PROCEDURE — 99999 PR PBB SHADOW E&M-EST. PATIENT-LVL III: ICD-10-PCS | Mod: PBBFAC,,, | Performed by: UROLOGY

## 2023-03-31 RX ORDER — TAMSULOSIN HYDROCHLORIDE 0.4 MG/1
0.4 CAPSULE ORAL DAILY
Qty: 90 CAPSULE | Refills: 3 | Status: SHIPPED | OUTPATIENT
Start: 2023-03-31 | End: 2024-01-25

## 2023-03-31 NOTE — PROGRESS NOTES
Chief Complaint:   Encounter Diagnoses   Name Primary?    Prostate cancer Yes    Erectile dysfunction, unspecified erectile dysfunction type        HPI:   3/31/23- it has been an extended time since we have seen him, he determined to pursue active surveillance after seeing Radiation Oncology.  PSA appears to be stable, current transplanted kidney is improving function.  Erections are currently not an issue.  Patient is otherwise voiding well on tamsulosin.    3/30/16: 66 yo man s/p renal transplant in 2002 here with nocturia x3, was x0-1 4-5 months ago.  No hesitancy.  Some dribble when done putting things away.Urgency.  Some double voiding.  No abd/pelvic pain and no exac/rel factors.  No hematuria.  No urolithiasis.  No urinary bother.  No  history.  Normal sexual function.  Not usually constipated.  Viagra for ED rx but not used but once.  No BPH meds.    Allergies:  No known drug allergies    Medications:  has a current medication list which includes the following prescription(s): amlodipine, atorvastatin, calcium carbonate, cholecalciferol (vitamin d3), freestyle jackie 2 sensor, fluticasone propionate, gabapentin, humulin 70/30 u-100 kwikpen, lisinopril, low-dose aspirin, metoprolol succinate, metoprolol succinate, multivit-min/fa/lycopen/lutein, mycophenolate, oxycodone-acetaminophen, ozempic, sirolimus, solifenacin, torsemide, and diazepam.    Review of Systems:  General: No fever, chills, fatigability, or weight loss.  Skin: No rashes, itching, or changes in color or texture of skin.  Chest: Denies PETERSON, cyanosis, wheezing, cough, and sputum production.  Abdomen: Appetite fine. No weight loss. Denies diarrhea, abdominal pain, hematemesis, or blood in stool.  Musculoskeletal: No joint stiffness or swelling. Some back pain.  : As above.  All other review of systems negative.    PMH:   has a past medical history of Allergic rhinitis (6/26/2013), Blood transfusion, Cataract, CKD (chronic kidney disease),  stage III (2014), -donor kidney transplant, Diabetes mellitus, type 2 (2013), Gout, arthritis (2013), Heart attack, Heart transplanted, Hyperlipidemia (2013), Hypertension, Immunodeficiency due to treatment with immunosuppressive medication, Morbid obesity (2013), Organ transplant (), Prophylactic immunotherapy, and Renal manifestation of secondary diabetes mellitus.    PSH:   has a past surgical history that includes Kidney transplant; Heart transplant; Revision total hip arthroplasty; Eye surgery; Cataract extraction (Bilateral); and Colonoscopy (N/A, 10/6/2020).    FamHx: family history includes Diabetes in his brother and maternal grandmother; Early death in his brother; Heart disease in his brother; Hyperlipidemia in his brother and sister; Hypertension in his brother; Kidney disease in his son; Stroke in his brother and mother.    SocHx:  reports that he quit smoking about 37 years ago. His smoking use included cigarettes. He has a 20.00 pack-year smoking history. He has never used smokeless tobacco. He reports current alcohol use of about 1.0 standard drink of alcohol per week. He reports that he does not use drugs.      Physical Exam:  Vitals:    22 0855   BP: (!) 154/83   Pulse: 67   Temp: 98.1 °F (36.7 °C)     General: A&Ox3, no apparent distress, no deformities  Neck: No masses, normal thyroid  Lungs: normal inspiration, no use of accessory muscles  Heart: normal pulse, no arrhythmias  Abdomen: Soft, NT, ND  Skin: The skin is warm and dry. No jaundice.  Ext: No c/c/e.  MARISOL: - 25g no nodules or fixation, otherwise benign.    Labs/Studies:   pnbx Gl 6 38.9g 22  PSA 2.8   PSA 3.1   PSA 4.0   PSA 3.4   PSA 2.5   PSA 2.1     Impression/Plan:         1. Erectile dysfunction-  Patient was using TriMix, this is not an issue though.    2. Prostate cancer- after seeing Radiation Oncology he would like to continue active surveillance.  PSA  appears to be stable, repeat today and see me in 6 months with a PSA.    3. BPH- tamsulosin has improved symptoms and will continue for now.

## 2023-04-04 ENCOUNTER — PATIENT OUTREACH (OUTPATIENT)
Dept: ADMINISTRATIVE | Facility: HOSPITAL | Age: 73
End: 2023-04-04
Payer: MEDICARE

## 2023-04-04 ENCOUNTER — PATIENT MESSAGE (OUTPATIENT)
Dept: DIABETES | Facility: CLINIC | Age: 73
End: 2023-04-04
Payer: MEDICARE

## 2023-04-04 DIAGNOSIS — E11.69 MIXED DIABETIC HYPERLIPIDEMIA ASSOCIATED WITH TYPE 2 DIABETES MELLITUS: Primary | ICD-10-CM

## 2023-04-04 DIAGNOSIS — E78.2 MIXED DIABETIC HYPERLIPIDEMIA ASSOCIATED WITH TYPE 2 DIABETES MELLITUS: Primary | ICD-10-CM

## 2023-04-04 NOTE — PROGRESS NOTES
HTN REPORT: Per last out reach in March, 2023 patient sent in remote b/p reading. It was updated in chart/vital signs.

## 2023-04-12 DIAGNOSIS — Z94.0 KIDNEY REPLACED BY TRANSPLANT: Primary | ICD-10-CM

## 2023-04-17 ENCOUNTER — PATIENT OUTREACH (OUTPATIENT)
Dept: ADMINISTRATIVE | Facility: HOSPITAL | Age: 73
End: 2023-04-17
Payer: MEDICARE

## 2023-04-20 ENCOUNTER — LAB VISIT (OUTPATIENT)
Dept: LAB | Facility: HOSPITAL | Age: 73
End: 2023-04-20
Attending: INTERNAL MEDICINE
Payer: MEDICARE

## 2023-04-20 DIAGNOSIS — Z94.0 KIDNEY TRANSPLANTED: ICD-10-CM

## 2023-04-20 DIAGNOSIS — E78.2 MIXED DIABETIC HYPERLIPIDEMIA ASSOCIATED WITH TYPE 2 DIABETES MELLITUS: ICD-10-CM

## 2023-04-20 DIAGNOSIS — E11.69 MIXED DIABETIC HYPERLIPIDEMIA ASSOCIATED WITH TYPE 2 DIABETES MELLITUS: ICD-10-CM

## 2023-04-20 DIAGNOSIS — C61 PROSTATE CANCER: ICD-10-CM

## 2023-04-20 LAB
ALBUMIN SERPL BCP-MCNC: 3.4 G/DL (ref 3.5–5.2)
ALBUMIN SERPL BCP-MCNC: 3.4 G/DL (ref 3.5–5.2)
ALBUMIN/CREAT UR: 1260.4 UG/MG (ref 0–30)
ALP SERPL-CCNC: 70 U/L (ref 55–135)
ALT SERPL W/O P-5'-P-CCNC: 17 U/L (ref 10–44)
ANION GAP SERPL CALC-SCNC: 11 MMOL/L (ref 8–16)
ANION GAP SERPL CALC-SCNC: 11 MMOL/L (ref 8–16)
AST SERPL-CCNC: 23 U/L (ref 10–40)
BASOPHILS # BLD AUTO: 0.04 K/UL (ref 0–0.2)
BASOPHILS NFR BLD: 0.7 % (ref 0–1.9)
BILIRUB SERPL-MCNC: 0.4 MG/DL (ref 0.1–1)
BUN SERPL-MCNC: 39 MG/DL (ref 8–23)
BUN SERPL-MCNC: 39 MG/DL (ref 8–23)
CALCIUM SERPL-MCNC: 8.9 MG/DL (ref 8.7–10.5)
CALCIUM SERPL-MCNC: 8.9 MG/DL (ref 8.7–10.5)
CHLORIDE SERPL-SCNC: 108 MMOL/L (ref 95–110)
CHLORIDE SERPL-SCNC: 108 MMOL/L (ref 95–110)
CHOLEST SERPL-MCNC: 166 MG/DL (ref 120–199)
CHOLEST/HDLC SERPL: 3.4 {RATIO} (ref 2–5)
CO2 SERPL-SCNC: 23 MMOL/L (ref 23–29)
CO2 SERPL-SCNC: 23 MMOL/L (ref 23–29)
COMPLEXED PSA SERPL-MCNC: 1.6 NG/ML (ref 0–4)
CREAT SERPL-MCNC: 2.8 MG/DL (ref 0.5–1.4)
CREAT SERPL-MCNC: 2.8 MG/DL (ref 0.5–1.4)
CREAT UR-MCNC: 101 MG/DL (ref 23–375)
DIFFERENTIAL METHOD: ABNORMAL
EOSINOPHIL # BLD AUTO: 0 K/UL (ref 0–0.5)
EOSINOPHIL NFR BLD: 0.7 % (ref 0–8)
ERYTHROCYTE [DISTWIDTH] IN BLOOD BY AUTOMATED COUNT: 14.8 % (ref 11.5–14.5)
EST. GFR  (NO RACE VARIABLE): 23.2 ML/MIN/1.73 M^2
EST. GFR  (NO RACE VARIABLE): 23.2 ML/MIN/1.73 M^2
ESTIMATED AVG GLUCOSE: 126 MG/DL (ref 68–131)
GLUCOSE SERPL-MCNC: 126 MG/DL (ref 70–110)
GLUCOSE SERPL-MCNC: 126 MG/DL (ref 70–110)
HBA1C MFR BLD: 6 % (ref 4–5.6)
HCT VFR BLD AUTO: 36.2 % (ref 40–54)
HDLC SERPL-MCNC: 49 MG/DL (ref 40–75)
HDLC SERPL: 29.5 % (ref 20–50)
HGB BLD-MCNC: 10.9 G/DL (ref 14–18)
IMM GRANULOCYTES # BLD AUTO: 0.01 K/UL (ref 0–0.04)
IMM GRANULOCYTES NFR BLD AUTO: 0.2 % (ref 0–0.5)
LDLC SERPL CALC-MCNC: 87.6 MG/DL (ref 63–159)
LYMPHOCYTES # BLD AUTO: 2.3 K/UL (ref 1–4.8)
LYMPHOCYTES NFR BLD: 39.9 % (ref 18–48)
MCH RBC QN AUTO: 27.5 PG (ref 27–31)
MCHC RBC AUTO-ENTMCNC: 30.1 G/DL (ref 32–36)
MCV RBC AUTO: 91 FL (ref 82–98)
MICROALBUMIN UR DL<=1MG/L-MCNC: 1273 UG/ML
MONOCYTES # BLD AUTO: 0.4 K/UL (ref 0.3–1)
MONOCYTES NFR BLD: 7.7 % (ref 4–15)
NEUTROPHILS # BLD AUTO: 2.9 K/UL (ref 1.8–7.7)
NEUTROPHILS NFR BLD: 50.8 % (ref 38–73)
NONHDLC SERPL-MCNC: 117 MG/DL
NRBC BLD-RTO: 0 /100 WBC
PHOSPHATE SERPL-MCNC: 4.7 MG/DL (ref 2.7–4.5)
PLATELET # BLD AUTO: 232 K/UL (ref 150–450)
PMV BLD AUTO: 11.6 FL (ref 9.2–12.9)
POTASSIUM SERPL-SCNC: 4 MMOL/L (ref 3.5–5.1)
POTASSIUM SERPL-SCNC: 4 MMOL/L (ref 3.5–5.1)
PROT SERPL-MCNC: 6.6 G/DL (ref 6–8.4)
RBC # BLD AUTO: 3.97 M/UL (ref 4.6–6.2)
SODIUM SERPL-SCNC: 142 MMOL/L (ref 136–145)
SODIUM SERPL-SCNC: 142 MMOL/L (ref 136–145)
TRIGL SERPL-MCNC: 147 MG/DL (ref 30–150)
WBC # BLD AUTO: 5.71 K/UL (ref 3.9–12.7)

## 2023-04-20 PROCEDURE — 80061 LIPID PANEL: CPT | Performed by: NURSE PRACTITIONER

## 2023-04-20 PROCEDURE — 80197 ASSAY OF TACROLIMUS: CPT | Performed by: INTERNAL MEDICINE

## 2023-04-20 PROCEDURE — 82570 ASSAY OF URINE CREATININE: CPT | Performed by: NURSE PRACTITIONER

## 2023-04-20 PROCEDURE — 85025 COMPLETE CBC W/AUTO DIFF WBC: CPT | Performed by: INTERNAL MEDICINE

## 2023-04-20 PROCEDURE — 83036 HEMOGLOBIN GLYCOSYLATED A1C: CPT | Performed by: NURSE PRACTITIONER

## 2023-04-20 PROCEDURE — 87799 DETECT AGENT NOS DNA QUANT: CPT | Performed by: INTERNAL MEDICINE

## 2023-04-20 PROCEDURE — 36415 COLL VENOUS BLD VENIPUNCTURE: CPT | Mod: PO | Performed by: UROLOGY

## 2023-04-20 PROCEDURE — 84100 ASSAY OF PHOSPHORUS: CPT | Performed by: INTERNAL MEDICINE

## 2023-04-20 PROCEDURE — 84153 ASSAY OF PSA TOTAL: CPT | Performed by: UROLOGY

## 2023-04-20 PROCEDURE — 80053 COMPREHEN METABOLIC PANEL: CPT | Performed by: NURSE PRACTITIONER

## 2023-04-21 LAB — TACROLIMUS BLD-MCNC: 7.2 NG/ML (ref 5–15)

## 2023-04-24 LAB
BKV DNA SERPL NAA+PROBE-ACNC: 294 COPIES/ML
BKV DNA SERPL NAA+PROBE-LOG#: 2.47 LOG (10) COPIES/ML
BKV DNA SERPL QL NAA+PROBE: DETECTED

## 2023-04-27 ENCOUNTER — OFFICE VISIT (OUTPATIENT)
Dept: NEPHROLOGY | Facility: CLINIC | Age: 73
End: 2023-04-27
Payer: MEDICARE

## 2023-04-27 VITALS
DIASTOLIC BLOOD PRESSURE: 66 MMHG | SYSTOLIC BLOOD PRESSURE: 158 MMHG | BODY MASS INDEX: 33.86 KG/M2 | WEIGHT: 263.88 LBS | HEIGHT: 74 IN | HEART RATE: 58 BPM | RESPIRATION RATE: 18 BRPM

## 2023-04-27 DIAGNOSIS — Z94.0 KIDNEY TRANSPLANTED: Primary | ICD-10-CM

## 2023-04-27 PROCEDURE — 99214 OFFICE O/P EST MOD 30 MIN: CPT | Mod: PBBFAC | Performed by: INTERNAL MEDICINE

## 2023-04-27 PROCEDURE — 99215 PR OFFICE/OUTPT VISIT, EST, LEVL V, 40-54 MIN: ICD-10-PCS | Mod: S$PBB,,, | Performed by: INTERNAL MEDICINE

## 2023-04-27 PROCEDURE — 99999 PR PBB SHADOW E&M-EST. PATIENT-LVL IV: CPT | Mod: PBBFAC,,, | Performed by: INTERNAL MEDICINE

## 2023-04-27 PROCEDURE — 99999 PR PBB SHADOW E&M-EST. PATIENT-LVL IV: ICD-10-PCS | Mod: PBBFAC,,, | Performed by: INTERNAL MEDICINE

## 2023-04-27 PROCEDURE — 99215 OFFICE O/P EST HI 40 MIN: CPT | Mod: S$PBB,,, | Performed by: INTERNAL MEDICINE

## 2023-04-27 RX ORDER — TORSEMIDE 20 MG/1
40 TABLET ORAL DAILY
Qty: 180 TABLET | Refills: 3 | Status: SHIPPED | OUTPATIENT
Start: 2023-04-27 | End: 2024-04-26

## 2023-04-27 NOTE — PROGRESS NOTES
Renal clinic f/u note  Date of clinic visit: 4/27/23  Reason for f/u and chief c/o: h/o of kidney transplant      HPI: Pt is a 73 y/o male with a h/o of cadaveric kidney transplant and heart transplant at [facility] on 5/24/02, who presents for f/u. Pt was last seen in renal clinic in Jan 2023. Chart was reviewed. Pt has no new c/o's, feels well today, no abd pain, no fever, no SOB. Legs welling is not worse. Has all the prescribed meds and is taking them. Labs, meds, and immunosuppressive meds reviewed with pt.       Pt has had an eventful medical course since June 2022, which will be summarized here. Pt was previously on rapamune, which was switched to prograf in June 2022. A few week later, he caught COVID and experienced pneumonia and large diarrhea. During this period, prograf level was low to undetectable (<2). He was admitted to [facility] in July 2022, with Cr 12.9, supported on IVF's, FENa was 4%. Intrinsic renal damage was suggested, acute rejection was suspected, [place] [facility] [place]. 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 No vascular rejection or T-cell rejection were present. Pt received further solumedrol pulse, plasmapheresis, IVIG, and retuximab. Pt was placed on valcyte for positive CMV. S cr improved markedly, but not to the prior baseline. There were no cardiac issues related to the prior heart transplant.     To review, the cause of ESRD is not known, and pt was never on dialysis before the transplant. 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     PAST SURGICAL HISTORY:  He  has a past surgical history that includes Kidney transplant; Heart transplant; Revision total hip arthroplasty; Eye surgery; Cataract extraction (Bilateral); and Colonoscopy (N/A, 10/6/2020).     SOCIAL HISTORY:  He  reports that he quit smoking about 37 years ago. His smoking use included cigarettes. He has a 20.00 pack-year smoking history. He has never used smokeless tobacco. He reports current alcohol use of about 1.0 standard drink of alcohol per week. He reports that he does not use drugs.     FAMILY MEDICAL HISTORY:  His family history includes Diabetes in his brother and maternal grandmother; Early death in his brother; Heart disease in his brother; Hyperlipidemia in his brother and sister; Hypertension in his brother; Kidney disease in his son; Stroke in his brother and mother.             Review of patient's allergies indicates:   Allergen Reactions    No known drug allergies         Meds reviewed     Current Outpatient Medications:     acetaminophen (TYLENOL) 325 MG tablet, Take 2 tablets (650 mg total) by mouth every 4 (four) hours as needed., Disp: , Rfl: 0    aspirin 81 MG Chew, Take 1 tablet (81 mg total) by mouth once daily., Disp: , Rfl: 0    atorvastatin (LIPITOR) 40 MG tablet, Take 1 tablet (40 mg total) by mouth once daily., Disp: 90 tablet, Rfl: 3    calcium acetate,phosphat bind, (PHOSLO) 667 mg capsule, Take 2 capsules (1,334 mg total) by mouth 3 (three) times daily with meals., Disp: 180 capsule, Rfl: 11    flash glucose sensor (FREESTYLE SHREE 2 SENSOR) Kit, APPLY 1 SENSOR EVERY 14    DAYS, Disp: 6 kit, Rfl: 3    hydrALAZINE (APRESOLINE) 50 MG tablet, Take 1 tablet (50 mg total) by mouth every 8 (eight) hours., Disp: 270 tablet, Rfl: 3    insulin NPH/Reg human (HUMULIN 70/30 U-100 KWIKPEN) 100 unit/mL (70-30) InPn pen, Inject 40 Units into the skin daily with breakfast AND 30  Units daily with dinner or evening meal., Disp: 75 mL, Rfl: 1    mycophenolate (CELLCEPT) 250 mg Cap, Take 1 capsule (250 mg total) by mouth 2 (two) times daily., Disp: 90 capsule, Rfl: 3    NIFEdipine (PROCARDIA-XL) 60 MG (OSM) 24 hr tablet, Take 1 tablet (60 mg total) by mouth 2 (two) times a day., Disp: 180 tablet, Rfl: 3    predniSONE (DELTASONE) 5 MG tablet, Take 1 tablet (5 mg total) by mouth once daily., Disp: 90 tablet, Rfl: 3    propranoloL (INDERAL) 20 MG tablet, TAKE 1 TABLET BY MOUTH TWICE DAILY FOR 14 DAYS THEN TAKE 2 TABLETS TWICE DAILY AS DIRECTED, Disp: , Rfl:     tacrolimus (PROGRAF) 1 MG Cap, Take 8 capsules (8 mg total) by mouth every morning AND 7 capsules (7 mg total) every evening., Disp: 1350 capsule, Rfl: 3    tamsulosin (FLOMAX) 0.4 mg Cap, Take 1 capsule (0.4 mg total) by mouth once daily., Disp: 90 capsule, Rfl: 3    famotidine (PEPCID) 20 MG tablet, Take 1 tablet (20 mg total) by mouth once daily. (Patient not taking: Reported on 4/27/2023), Disp: 30 tablet, Rfl: 11    sulfamethoxazole-trimethoprim 400-80mg (BACTRIM,SEPTRA) 400-80 mg per tablet, Take 1 tablet by mouth every Mon, Wed, Fri. STOP 1/23/23 (Patient not taking: Reported on 4/27/2023), Disp: 36 tablet, Rfl: 1    torsemide (DEMADEX) 20 MG Tab, Take 2 tablets (40 mg total) by mouth once daily., Disp: 180 tablet, Rfl: 3        REVIEW OF SYSTEMS:  Patient has no fever, fatigue, visual changes, chest pain, edema, cough, dyspnea, nausea, vomiting, constipation, diarrhea, arthralgias, pruritis, dizziness, weakness, depression, confusion.        PHYSICAL EXAM:   Blood pressure 158/66, pulse 72, weight 119.7 Kg, from 122 Kg, from 121 kg.  Gen:    WDWN male in no apparent distress  Psych: Normal mood and affect  Skin:    No rashes or ulcers  Neck:   No JVD  Chest:  Clear with no rales, rhonchi, wheezing with normal effort  CV:      Regular with no murmurs, gallops or rubs  Abd:     Soft, nontender, no distension  Ext:      2+ pitting  edema     Labs reviewed  BMP  Lab Results   Component Value Date     04/20/2023     04/20/2023    K 4.0 04/20/2023    K 4.0 04/20/2023     04/20/2023     04/20/2023    CO2 23 04/20/2023    CO2 23 04/20/2023    BUN 39 (H) 04/20/2023    BUN 39 (H) 04/20/2023    CREATININE 2.8 (H) 04/20/2023    CREATININE 2.8 (H) 04/20/2023    CALCIUM 8.9 04/20/2023    CALCIUM 8.9 04/20/2023    ANIONGAP 11 04/20/2023    ANIONGAP 11 04/20/2023    EGFRNORACEVR 23.2 (A) 04/20/2023    EGFRNORACEVR 23.2 (A) 04/20/2023     Lab Results   Component Value Date    WBC 5.71 04/20/2023    HGB 10.9 (L) 04/20/2023    HCT 36.2 (L) 04/20/2023    MCV 91 04/20/2023     04/20/2023       Lab Results   Component Value Date    .0 (H) 06/20/2022    CALCIUM 8.9 04/20/2023    CALCIUM 8.9 04/20/2023    PHOS 4.7 (H) 04/20/2023           Prograf level 7.2     BK Virus DNA, Blood Not detected DETECTED Abnormal   DETECTED Abnormal   DETECTED Abnormal   DETECTED Abnormal   DETECTED Abnormal   DETECTED Abnormal   DETECTED Abnormal     BK Virus DNA PCR, Quant, Blood <125 Copies/mL 294 High   193 High   697 High   332 High   344 High   746 High   1,093 High     Log BKV Copies/mL <2.10 Log (10) Copies/mL 2.47 High   2.29 High  CM  2.84 High  CM  2.52 High  CM  2.54 High                          IMPRESSION AND RECOMMENDATIONS:  71 y/o male with h/o of kidney and heart transplant in 2002 who presents for f/u. Pt had acute rejection in the past year:     1. Renal: s Cr has slowly worsened, has a new baseline after experiencing acute rejection  Stable renal function. CKD stage 4  Likely has chronic rejection  K normal  Na normal  Ca normal  Acid-base stable  Stable proteinuria     2. Immunosuppression: prograf level is within baseline range   Pt takes 8 mg po q am and 7 mg po q pm bid  No change in dose  Other medications and doses reviewed  S/p antibody mediated rejection in July 2022. No vascular rejection or T-cell rejection were  present.   Pt received further solumedrol pulse, plasmapheresis, IVIG, and retuximab.   S/p valcyte for positive CMV: CMV quant titers improved     BK viremia:  Level remains detectible  Mycophenoate dose was previously reduced from 500 mg to 250 mg po bid  Continue the same until pt will see kidney transplant in [place] on 5/15/23     Previously on Rapamune, switched to prograf in June 2022  H/o of kidney transplant at [facility] in 2002, cause of ESRD not clear, was never on dialysis  H/o of heart transplant at [facility] in 2002, heart failure due to IHSS. Being followed by heart transplant team in [place]     3. HTN: BP not controlled previously. Due to volume overload, vascular congestion  Meds reviewed  Will increase torsemide from 20 to 60 mg po qd x 4 days then 40 mg po qd      4. DM: chart was reviewed. Occurred post transplant.  Proteinuria, may be due to diabetic nephropathy     5. Slowly rising PSA's: no urinary sx's.  PSA still below 4.0  Has appt with urology at the end of this month.     6. Med review: was done     7. Heart transplant: no current issues     Plans and recommendations:  As discussed above  Total time spent 40 minutes including time needed to review the records, the   patient evaluation, documentation, face-to-face discussion with the patient,   more than 50% of the time was spent on coordination of care and counseling.    Level V visit.  RTC 3 months     Lucila Matthew MD

## 2023-05-03 DIAGNOSIS — Z71.89 COMPLEX CARE COORDINATION: ICD-10-CM

## 2023-05-08 ENCOUNTER — LAB VISIT (OUTPATIENT)
Dept: LAB | Facility: HOSPITAL | Age: 73
End: 2023-05-08
Attending: INTERNAL MEDICINE
Payer: MEDICARE

## 2023-05-08 DIAGNOSIS — Z94.0 KIDNEY REPLACED BY TRANSPLANT: ICD-10-CM

## 2023-05-08 LAB
ALBUMIN SERPL BCP-MCNC: 3.2 G/DL (ref 3.5–5.2)
ALBUMIN SERPL BCP-MCNC: 3.2 G/DL (ref 3.5–5.2)
ALP SERPL-CCNC: 71 U/L (ref 55–135)
ALT SERPL W/O P-5'-P-CCNC: 14 U/L (ref 10–44)
ANION GAP SERPL CALC-SCNC: 9 MMOL/L (ref 8–16)
AST SERPL-CCNC: 20 U/L (ref 10–40)
BACTERIA #/AREA URNS AUTO: ABNORMAL /HPF
BASOPHILS # BLD AUTO: 0.03 K/UL (ref 0–0.2)
BASOPHILS NFR BLD: 0.6 % (ref 0–1.9)
BILIRUB DIRECT SERPL-MCNC: 0.2 MG/DL (ref 0.1–0.3)
BILIRUB SERPL-MCNC: 0.4 MG/DL (ref 0.1–1)
BILIRUB UR QL STRIP: NEGATIVE
BUN SERPL-MCNC: 35 MG/DL (ref 8–23)
CALCIUM SERPL-MCNC: 8.8 MG/DL (ref 8.7–10.5)
CHLORIDE SERPL-SCNC: 107 MMOL/L (ref 95–110)
CLARITY UR REFRACT.AUTO: CLEAR
CO2 SERPL-SCNC: 26 MMOL/L (ref 23–29)
COLOR UR AUTO: YELLOW
CREAT SERPL-MCNC: 2.7 MG/DL (ref 0.5–1.4)
CREAT UR-MCNC: 154 MG/DL (ref 23–375)
DIFFERENTIAL METHOD: ABNORMAL
EOSINOPHIL # BLD AUTO: 0.1 K/UL (ref 0–0.5)
EOSINOPHIL NFR BLD: 0.9 % (ref 0–8)
ERYTHROCYTE [DISTWIDTH] IN BLOOD BY AUTOMATED COUNT: 14.8 % (ref 11.5–14.5)
EST. GFR  (NO RACE VARIABLE): 24.3 ML/MIN/1.73 M^2
GLUCOSE SERPL-MCNC: 110 MG/DL (ref 70–110)
GLUCOSE UR QL STRIP: NEGATIVE
HCT VFR BLD AUTO: 36.4 % (ref 40–54)
HGB BLD-MCNC: 11.1 G/DL (ref 14–18)
HGB UR QL STRIP: NEGATIVE
HYALINE CASTS UR QL AUTO: 4 /LPF
IMM GRANULOCYTES # BLD AUTO: 0.01 K/UL (ref 0–0.04)
IMM GRANULOCYTES NFR BLD AUTO: 0.2 % (ref 0–0.5)
KETONES UR QL STRIP: NEGATIVE
LEUKOCYTE ESTERASE UR QL STRIP: ABNORMAL
LYMPHOCYTES # BLD AUTO: 2.2 K/UL (ref 1–4.8)
LYMPHOCYTES NFR BLD: 41.2 % (ref 18–48)
MAGNESIUM SERPL-MCNC: 1.9 MG/DL (ref 1.6–2.6)
MCH RBC QN AUTO: 27.2 PG (ref 27–31)
MCHC RBC AUTO-ENTMCNC: 30.5 G/DL (ref 32–36)
MCV RBC AUTO: 89 FL (ref 82–98)
MICROSCOPIC COMMENT: ABNORMAL
MONOCYTES # BLD AUTO: 0.4 K/UL (ref 0.3–1)
MONOCYTES NFR BLD: 6.9 % (ref 4–15)
NEUTROPHILS # BLD AUTO: 2.7 K/UL (ref 1.8–7.7)
NEUTROPHILS NFR BLD: 50.2 % (ref 38–73)
NITRITE UR QL STRIP: NEGATIVE
NRBC BLD-RTO: 0 /100 WBC
PH UR STRIP: 5 [PH] (ref 5–8)
PHOSPHATE SERPL-MCNC: 4.3 MG/DL (ref 2.7–4.5)
PLATELET # BLD AUTO: 220 K/UL (ref 150–450)
PMV BLD AUTO: 11.8 FL (ref 9.2–12.9)
POTASSIUM SERPL-SCNC: 4.3 MMOL/L (ref 3.5–5.1)
PROT SERPL-MCNC: 6.2 G/DL (ref 6–8.4)
PROT UR QL STRIP: ABNORMAL
PROT UR-MCNC: 195 MG/DL (ref 0–15)
PROT/CREAT UR: 1.27 MG/G{CREAT} (ref 0–0.2)
RBC # BLD AUTO: 4.08 M/UL (ref 4.6–6.2)
RBC #/AREA URNS AUTO: 0 /HPF (ref 0–4)
SODIUM SERPL-SCNC: 142 MMOL/L (ref 136–145)
SP GR UR STRIP: 1.01 (ref 1–1.03)
URN SPEC COLLECT METH UR: ABNORMAL
WBC # BLD AUTO: 5.37 K/UL (ref 3.9–12.7)
WBC #/AREA URNS AUTO: 31 /HPF (ref 0–5)

## 2023-05-08 PROCEDURE — 85025 COMPLETE CBC W/AUTO DIFF WBC: CPT | Performed by: INTERNAL MEDICINE

## 2023-05-08 PROCEDURE — 82570 ASSAY OF URINE CREATININE: CPT | Performed by: INTERNAL MEDICINE

## 2023-05-08 PROCEDURE — 84075 ASSAY ALKALINE PHOSPHATASE: CPT | Performed by: INTERNAL MEDICINE

## 2023-05-08 PROCEDURE — 87799 DETECT AGENT NOS DNA QUANT: CPT | Performed by: INTERNAL MEDICINE

## 2023-05-08 PROCEDURE — 36415 COLL VENOUS BLD VENIPUNCTURE: CPT | Mod: PO | Performed by: INTERNAL MEDICINE

## 2023-05-08 PROCEDURE — 80197 ASSAY OF TACROLIMUS: CPT | Performed by: INTERNAL MEDICINE

## 2023-05-08 PROCEDURE — 80069 RENAL FUNCTION PANEL: CPT | Performed by: INTERNAL MEDICINE

## 2023-05-08 PROCEDURE — 81001 URINALYSIS AUTO W/SCOPE: CPT | Performed by: INTERNAL MEDICINE

## 2023-05-08 PROCEDURE — 83735 ASSAY OF MAGNESIUM: CPT | Performed by: INTERNAL MEDICINE

## 2023-05-09 ENCOUNTER — TELEPHONE (OUTPATIENT)
Dept: PAIN MEDICINE | Facility: CLINIC | Age: 73
End: 2023-05-09
Payer: MEDICARE

## 2023-05-09 DIAGNOSIS — Z94.0 KIDNEY REPLACED BY TRANSPLANT: ICD-10-CM

## 2023-05-09 DIAGNOSIS — Z94.1 HEART TRANSPLANTED: ICD-10-CM

## 2023-05-09 LAB — TACROLIMUS BLD-MCNC: 8.8 NG/ML (ref 5–15)

## 2023-05-09 NOTE — TELEPHONE ENCOUNTER
Message sent to patient. Tacro dose to 7/6. Patient follows with Dr Matthew, nephrology. Last seen 4/27/23. Labs scheduled 5/29/23.----- Message from Edy Reese MD sent at 5/9/2023 11:05 AM CDT -----  Please make sure he is following with his nephrologist for management of CKD stage 4  Please lower prograf to 7/6  Repeat a tacrolimus level in 3 weeks

## 2023-05-09 NOTE — TELEPHONE ENCOUNTER
----- Message from Aye Tirado sent at 5/9/2023 12:10 PM CDT -----  Regarding: please advise  Who Called:JOSSIE SIERRA [[id number]]         What is the reqeust in detail: Requesting call back to discuss rx for a compound. Please advise         Can the clinic reply by [facility] Call Back Number:[phone]           Additional Information:

## 2023-05-09 NOTE — PROGRESS NOTES
Please make sure he is following with his nephrologist for management of CKD stage 4  Please lower prograf to 7/6  Repeat a tacrolimus level in 3 weeks

## 2023-05-09 NOTE — TELEPHONE ENCOUNTER
Reached out to patient to schedule appointment from messages. Apt has been made.   Pt understand. All questions answered.     Eder Pollock  Medical Assistant

## 2023-05-10 ENCOUNTER — PATIENT MESSAGE (OUTPATIENT)
Dept: TRANSPLANT | Facility: CLINIC | Age: 73
End: 2023-05-10
Payer: MEDICARE

## 2023-05-10 RX ORDER — TACROLIMUS 1 MG/1
CAPSULE ORAL
Qty: 1170 CAPSULE | Refills: 3 | Status: SHIPPED | OUTPATIENT
Start: 2023-05-10 | End: 2023-07-07

## 2023-05-11 LAB
BKV DNA SERPL NAA+PROBE-ACNC: <125 COPIES/ML
BKV DNA SERPL NAA+PROBE-LOG#: <2.1 LOG (10) COPIES/ML
BKV DNA SERPL QL NAA+PROBE: DETECTED

## 2023-05-13 ENCOUNTER — OFFICE VISIT (OUTPATIENT)
Dept: DIABETES | Facility: CLINIC | Age: 73
End: 2023-05-13
Payer: MEDICARE

## 2023-05-13 DIAGNOSIS — E11.43 DIABETIC AUTONOMIC NEUROPATHY ASSOCIATED WITH TYPE 2 DIABETES MELLITUS: Primary | Chronic | ICD-10-CM

## 2023-05-13 PROCEDURE — 99442 PR PHYSICIAN TELEPHONE EVALUATION 11-20 MIN: ICD-10-PCS | Mod: 95,,, | Performed by: NURSE PRACTITIONER

## 2023-05-13 PROCEDURE — 99442 PR PHYSICIAN TELEPHONE EVALUATION 11-20 MIN: CPT | Mod: 95,,, | Performed by: NURSE PRACTITIONER

## 2023-05-13 NOTE — PROGRESS NOTES
Established Patient - Audio Only Telehealth Visit     The patient location is: [place]  The chief complaint leading to consultation is: Type 2 Diabetes  Visit type: Virtual visit with audio only ( telephone )  Total time spent with patient: 10 minutes    The reason for the audio only service rather than synchronous audio and video virtual visit was related to technical difficulties or patient preference/necessity.     Each patient to whom I provide medical services by telemedicine is:  (1) informed of the relationship between the physician and patient and the respective role of any other health care provider with respect to management of the patient; and (2) notified that they may decline to receive medical services by telemedicine and may withdraw from such care at any time. Patient verbally consented to receive this service via voice-only telephone call.    HISTORY OF PRESENT ILLNESS: 72 year old  male presenting for diabetes follow up. Patient has had diabetes for several years with the following complications: neuropathy, stage IV CKD. Other pertinent conditions: HTN, HLD, cardiac + renal transplant in 2002 on immunosuppressants. He has attended diabetes education in the past.     In July and August of 2022, patient was hospitalized after getting COVID.  During that time, he experienced acute renal failure and several medications including Ozempic were discontinued.  Since then, renal function has improved some, but he still has stage IV CKD.     Patient uses Freestyle Georgette 2 CGM to monitor blood sugars. Due to virtual nature of visit, I am unable to download device. Per patient, fasting BGs have stabilized and range between 100  - 130 s.  In fact, he has lost over 30 pounds, and no longer requires insulin twice a day.  He has only been taking mixed insulin before evening meal.     DM MEDICATIONS:  Humulin 70 / 30, 15 - 25 units in the evening    Review of Systems   Eyes:  Negative for visual  disturbance.   Gastrointestinal:  Negative for diarrhea, nausea and vomiting.   Endocrine: Negative for polydipsia, polyphagia and polyuria.     Diabetes Management Status  Statin: Taking  ACE/ARB: Not taking    Screening or Prevention Patient's value Goal Complete/Controlled?   HgA1C Testing and Control   Lab Results   Component Value Date    HGBA1C 6.0 (H) 04/20/2023      Annually/Less than 8% Yes   Lipid profile : 04/20/2023 Annually Yes   LDL control Lab Results   Component Value Date    LDLCALC 87.6 04/20/2023    Annually/Less than 100 mg/dl  Yes   Nephropathy screening Lab Results   Component Value Date    LABMICR 1273.0 04/20/2023     Lab Results   Component Value Date    PROTEINUA 3+ (A) 05/08/2023     Lab Results   Component Value Date    UTPCR 1.27 (H) 05/08/2023      Annually Yes   Blood pressure BP Readings from Last 1 Encounters:   04/27/23 (!) 158/66    Less than 140/90 No   Dilated retinal exam : 11/10/2021 Annually No   Foot exam   : 06/15/2022 Annually Yes     Assessment and plan:     Diabetic autonomic neuropathy associated with type 2 diabetes mellitus    -   Continue [place] CGM for monitoring of blood sugars. Since Fall 2022, patient has lost greater than 30 pounds and blood sugars have stabilized with just one INJ of mixed insulin in the evening.  He is interested in medication alternative options. Currently, he is not taking Ozempic, but we discussed possibly restarting low dose of 0.5 mg weekly and D/C insulin altogether.  He is scheduled to see nephrology provider next week and will ask about this plan.      -  In meantime, continue Humulin 70/30, 15 - 25 units before dinner.  RTC in 3 months for follow up.     Jael Garrison, BELÉN-C, [facility]    This service was not originating from a related E/M service provided within the previous 7 days nor will  to an E/M service or procedure within the next 24 hours or my soonest available appointment.  Prevailing standard of care was able to  be met in this audio-only visit.

## 2023-05-15 ENCOUNTER — OFFICE VISIT (OUTPATIENT)
Dept: TRANSPLANT | Facility: CLINIC | Age: 73
End: 2023-05-15
Payer: MEDICARE

## 2023-05-15 VITALS
DIASTOLIC BLOOD PRESSURE: 61 MMHG | SYSTOLIC BLOOD PRESSURE: 128 MMHG | TEMPERATURE: 97 F | HEIGHT: 74 IN | OXYGEN SATURATION: 97 % | RESPIRATION RATE: 16 BRPM | HEART RATE: 57 BPM | BODY MASS INDEX: 33.62 KG/M2 | WEIGHT: 261.94 LBS

## 2023-05-15 DIAGNOSIS — Z29.89 PROPHYLACTIC IMMUNOTHERAPY: Chronic | ICD-10-CM

## 2023-05-15 DIAGNOSIS — Z79.4 TYPE 2 DIABETES MELLITUS WITH STAGE 3B CHRONIC KIDNEY DISEASE, WITH LONG-TERM CURRENT USE OF INSULIN: Chronic | ICD-10-CM

## 2023-05-15 DIAGNOSIS — N18.4 BENIGN HYPERTENSION WITH CKD (CHRONIC KIDNEY DISEASE) STAGE IV: ICD-10-CM

## 2023-05-15 DIAGNOSIS — Z94.1 HEART TRANSPLANTED: Chronic | ICD-10-CM

## 2023-05-15 DIAGNOSIS — E11.22 TYPE 2 DIABETES MELLITUS WITH STAGE 3B CHRONIC KIDNEY DISEASE, WITH LONG-TERM CURRENT USE OF INSULIN: Chronic | ICD-10-CM

## 2023-05-15 DIAGNOSIS — E66.09 CLASS 1 OBESITY DUE TO EXCESS CALORIES WITH SERIOUS COMORBIDITY AND BODY MASS INDEX (BMI) OF 33.0 TO 33.9 IN ADULT: ICD-10-CM

## 2023-05-15 DIAGNOSIS — B34.8 BK VIREMIA: Chronic | ICD-10-CM

## 2023-05-15 DIAGNOSIS — N25.81 SECONDARY HYPERPARATHYROIDISM OF RENAL ORIGIN: ICD-10-CM

## 2023-05-15 DIAGNOSIS — I12.9 BENIGN HYPERTENSION WITH CKD (CHRONIC KIDNEY DISEASE) STAGE IV: ICD-10-CM

## 2023-05-15 DIAGNOSIS — Z94.0 DECEASED-DONOR KIDNEY TRANSPLANT: Primary | Chronic | ICD-10-CM

## 2023-05-15 DIAGNOSIS — N18.32 TYPE 2 DIABETES MELLITUS WITH STAGE 3B CHRONIC KIDNEY DISEASE, WITH LONG-TERM CURRENT USE OF INSULIN: Chronic | ICD-10-CM

## 2023-05-15 PROBLEM — N17.9 AKI (ACUTE KIDNEY INJURY): Status: RESOLVED | Noted: 2022-07-19 | Resolved: 2023-05-15

## 2023-05-15 PROCEDURE — 99215 PR OFFICE/OUTPT VISIT, EST, LEVL V, 40-54 MIN: ICD-10-PCS | Mod: S$PBB,,, | Performed by: NURSE PRACTITIONER

## 2023-05-15 PROCEDURE — 99215 OFFICE O/P EST HI 40 MIN: CPT | Mod: S$PBB,,, | Performed by: NURSE PRACTITIONER

## 2023-05-15 PROCEDURE — 99999 PR PBB SHADOW E&M-EST. PATIENT-LVL V: CPT | Mod: PBBFAC,,, | Performed by: NURSE PRACTITIONER

## 2023-05-15 PROCEDURE — 99215 OFFICE O/P EST HI 40 MIN: CPT | Mod: PBBFAC | Performed by: NURSE PRACTITIONER

## 2023-05-15 PROCEDURE — 99999 PR PBB SHADOW E&M-EST. PATIENT-LVL V: ICD-10-PCS | Mod: PBBFAC,,, | Performed by: NURSE PRACTITIONER

## 2023-05-15 NOTE — PROGRESS NOTES
Kidney Post-Transplant Assessment    Referring Physician:   Current Nephrologist: Laith Wilkesron    ORGAN:   Donor Type:   PHS Increased Risk:   Cold Ischemia:  mins  Induction Medications:     Subjective:     CC:  Reassessment of renal allograft function and management of chronic immunosuppression.    HPI:  Mr. Albrecht is a 72 y.o. year old Black or  male who received a  heart and kidney transplant on 5/24/02.  He has CKD stage 3 - GFR 30-59 and his baseline creatinine is between mid 2.0s . He takes mycophenolate mofetil and sirolimus for maintenance immunosuppression. He denies any recent hospitalizations or ER visits since his previous clinic visit.    Hospitalization/ ED visits   Rejection:     7/21/22: KIDNEY (PERCUTANEOUS TRANSPLANT BIOPSY):   1)  MODERATE MICROCIRCULATION INFLAMMATION WITH TRANSVERSE GLOMERULOPATHY AND   C4d POSITIVITY DIAGNOSTIC OF ACUTE AND CHRONIC ANTIBODY-MEDIATED REJECTION.   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  patient was treated with 5 doses of PLEX and 2 doses of IVIG. 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    Interval HX:   Overall feels well. No health concerns today.   Denies chest pain, SOB, leg pain, abdominal pain or LUTs.  Last seen by kidney txp on 5/4/22    General Nephrologist: following with Dr. Matthew    Heart Transplant: Dr. Saavedra       Prostate biopsy 2/2022  Noting adenocarcinoma   Prostate cancer-- Under active surveillance with urology,  mgmt deferred     Intake~  reports adequate hydration   UOP-no problems reported      BP Readings from Last 3 Encounters:   05/15/23 128/61   04/27/23 (!) 158/66   03/31/23 (!) 165/73     PCP: BELÉN Garrison: management of DM   DM controlled   Lab Results   Component Value Date    HGBA1C 6.0 (H) 04/20/2023         Lab /diagnostic results reviewed with patient today.      Review of Systems   Constitutional:  Negative for appetite change, chills,  "fatigue and fever.   HENT:  Negative for trouble swallowing.    Eyes:  Positive for visual disturbance.        Wears glasses    Respiratory:  Negative for cough, chest tightness, shortness of breath and wheezing.    Cardiovascular:  Positive for leg swelling. Negative for chest pain and palpitations.   Gastrointestinal:  Positive for abdominal distention. Negative for abdominal pain, constipation, diarrhea and nausea.   Genitourinary:  Negative for difficulty urinating, frequency and urgency.   Musculoskeletal:  Positive for arthralgias (HX Gout arthritis ). Negative for myalgias.   Skin:  Negative for rash.   Allergic/Immunologic: Positive for immunocompromised state.   Neurological:  Negative for dizziness, weakness, light-headedness and headaches.         HX neuropathy    Psychiatric/Behavioral:  Negative for sleep disturbance.      Objective:   Blood pressure 128/61, pulse (!) 57, temperature 97.3 °F (36.3 °C), temperature source Temporal, resp. rate 16, height 6' 2" (1.88 m), weight 118.8 kg (261 lb 14.5 oz), SpO2 97 %.body mass index is 33.63 kg/m².    Physical Exam  Constitutional:       General: He is not in acute distress.     Appearance: He is well-developed. He is obese. He is not diaphoretic.   Cardiovascular:      Rate and Rhythm: Normal rate and regular rhythm.      Heart sounds: Normal heart sounds. No murmur heard.    No friction rub. No gallop.   Pulmonary:      Effort: Pulmonary effort is normal. No respiratory distress.      Breath sounds: Normal breath sounds. No wheezing or rales.   Abdominal:      General: Bowel sounds are normal. There is distension.      Palpations: Abdomen is soft.      Tenderness: There is no abdominal tenderness.   Musculoskeletal:         General: No tenderness. Normal range of motion.      Right lower leg: Edema present.      Left lower leg: Edema present.   Skin:     General: Skin is warm and dry.      Findings: No rash.      Nails: There is no clubbing.   Neurological: "      Mental Status: He is alert and oriented to person, place, and time.   Psychiatric:         Behavior: Behavior normal.       Labs:  Lab Results   Component Value Date    WBC 5.37 2023    HGB 11.1 (L) 2023    HCT 36.4 (L) 2023     2023    K 4.3 2023     2023    CO2 26 2023    BUN 35 (H) 2023    CREATININE 2.7 (H) 2023    EGFRNONAA 13.2 (A) 2022    CALCIUM 8.8 2023    PHOS 4.3 2023    MG 1.9 2023    ALBUMIN 3.2 (L) 2023    ALBUMIN 3.2 (L) 2023    AST 20 2023    ALT 14 2023       No results found for: EXTANC, EXTWBC, EXTSEGS, EXTPLATELETS, EXTHEMOGLOBI, EXTHEMATOCRI, EXTCREATININ, EXTSODIUM, EXTPOTASSIUM, EXTBUN, EXTCO2, EXTCALCIUM, EXTPHOSPHORU, EXTGLUCOSE, EXTALBUMIN, EXTAST, EXTALT, EXTBILITOTAL, EXTLIPASE, EXTAMYLASE    No results found for: EXTCYCLOSLVL, EXTSIROLIMUS, EXTTACROLVL, EXTPROTCRE, EXTPTHINTACT, EXTPROTEINUA, EXTWBCUA, EXTRBCUA    Labs were reviewed with the patient.    Assessment:     1. -donor kidney transplant    2. Heart transplanted    3. Chronic immunosuppression with tacrolimus, mycophenolate    4. Benign hypertension with CKD (chronic kidney disease) stage IV    5. Type 2 diabetes mellitus with stage 3b chronic kidney disease, with long-term current use of insulin    6. Secondary hyperparathyroidism of renal origin    7. BK viremia    8. Class 1 obesity due to excess calories with serious comorbidity and body mass index (BMI) of 33.0 to 33.9 in adult          Plan:     Current issues:  prostate adenocarcinoma-->active Under surveillance,  mgmt deferred to urology    Cont f/u with general nephrology, Dr Matthew for CKD care    Repeat trough pending     1. CKD stage 4: will continue follow up as per our center guidelines. patient to continue close follow up with the local General nephrologist. Education provided in appropriate fluid intake, potassium intake. Continue with  oral hydration.      2. Immunosuppression:   dose lowered on 5/9/23 to cont prograf 7/6, ,  Mg BID , prednisone 5 mg QD--IS mgmt deferred heart txp   Will closely monitor for toxicities, education provided about adherence to medicines and need to communicate any side effect to the transplant nurse or physician.        5/8/2023  20yr 11mo   Creatinine 0.5 - 1.4 mg/dL 2.7 (A)       5/8/2023  20yr 11mo   eGFR >60 mL/min/1.73 m^2 24.3 (A)         3. Allograft Function:stable at baseline for the patient. Continue follow up as per our guidelines and with the local General nephrologist. Communication will be sent today.      Lab Results   Component Value Date    HGBA1C 6.0 (H) 04/20/2023          4. Hypertension management:  Continue with home blood pressure monitoring, low salt and healthy life discussed with the patient..  BP Readings from Last 3 Encounters:   05/15/23 128/61   04/27/23 (!) 158/66   03/31/23 (!) 165/73   Takes nifedipine,  inderal, hydralazine,   flomax,   torsemide  --deferred management to Heart TXP/Cardiology      5. Metabolic Bone Disease/Secondary Hyperparathyroidism:calcium and phosphorus level discussed with the patient, patient will continue follow up with the general nephrologist for management of metabolic bone disease  calcium and phosphorus as per our center protocol. Will monitor PTH, Vit D level, calcium.         Lab Results   Component Value Date    .0 (H) 06/20/2022    CALCIUM 8.8 05/08/2023    PHOS 4.3 05/08/2023    PHOS 4.7 (H) 04/20/2023    PHOS 4.1 12/27/2022   Elevated Phos: cont  phos Lo as prescribed --deferred management to General Nephrology      6. Electrolytes: reviewed with the patient, essentially within the normal range no need for acute changes today, will monitor as per our center guidelines.    -->MGMT deferred to general nephrology    Lab Results   Component Value Date     05/08/2023    K 4.3 05/08/2023     05/08/2023    CO2 26 05/08/2023    CO2  23 04/20/2023    CO2 23 04/20/2023       7. Anemia: will continue monitoring as per our center guidelines. No indication for acute intervention today.  -->MGMT deferred to general nephrology    Lab Results   Component Value Date    WBC 5.37 05/08/2023    HGB 11.1 (L) 05/08/2023    HCT 36.4 (L) 05/08/2023    MCV 89 05/08/2023     05/08/2023       8.Proteinuria: will continue with pr/cr ratio as per our center guidelines  -->MGMT deferred to general nephrology    Lab Results   Component Value Date    PROTEINURINE 195 (H) 05/08/2023    CREATRANDUR 154.0 05/08/2023    UTPCR 1.27 (H) 05/08/2023    UTPCR 0.54 (H) 08/25/2022    UTPCR 0.71 (H) 07/21/2022        9. BK virus infection screening: will continue with urine or blood PCR as per our guidelines to prevent BK virus viremia and allograft dysfunction     BK + since 9/28/22--no viral load noted --no intervention --will cont to monitor /guidelines   5/8/2023  20yr 11mo   BK Virus DNA, Blood Not detected DETECTED (A)         10. Weight education: provided during the clinic visit.   Body mass index is 33.63 kg/m².       11.Patient safety education regarding immunosuppression including prophylaxis posttransplant for CMV, PCP : Education provided about vaccination and prevention of infections.    12.  Cytopenias: no significant cytopenias will monitor as per our guidelines. Medicine list reviewed including potential causes of drug-induced cytopenias  Lab Results   Component Value Date    WBC 5.37 05/08/2023    HGB 11.1 (L) 05/08/2023    HCT 36.4 (L) 05/08/2023    MCV 89 05/08/2023     05/08/2023       Follow-up:   Annual follow-up with kidney transplant clinic with repeat labs, including renal function panel with UA, urine protein/creatinine ratio, and drug trough level q3 months.  Patient also reminded to follow-up with general nephrologist.    Labs: since patient remains at high risk for rejection and drug-related complications that warrant close monitoring,  labs will be ordered as follows: continue twice weekly CBC, renal panel, and drug level for first month; then same labs once weekly through 3rd month post-transplant.  Urine for UA and protein/creatinine ratio monthly.  Serum BK - PCR at 1-, 3-, 6-, 9-, 12-, 18-, 24-, 36-, 48-, and 60 months post-transplant.  Hepatic panel at 1-, 2-, 3-, 6-, 9-, 12-, 18-, 24-, and 36- months post-transplant.    Education:   Material provided to the patient.  Patient reminded to call with any health changes since these can be early signs of significant complications.  Also, I advised the patient to be sure any new medications or changes of old medications are discussed with either a pharmacist or physician knowledgeable with transplant to avoid rejection/drug toxicity related to significant drug interactions.    Exercise: reminded Nigel of the importance of regular exercise for weight management, blood sugar and blood pressure management.  I also explained exercise has been shown to improve cardiovascular health, energy level, and sleep hygiene.  Lastly, I advised him that cardiovascular complications are leading cause of death for renal transplant recipients, and regular exercise can help lower this risk.    I spoke with the patient for 30 minutes. More than half dedicated to counseling and education. All questions answered    Malou Conn NP-C  Transplant Nephrology    [facility] Patient Status  Functional Status: 80% - Normal activity with effort: some symptoms of disease  Physical Capacity: No Limitations

## 2023-05-15 NOTE — LETTER
May 15, 2023                     [facility] 1st Fl  [address]  [facility] [address]  Phone: [phone]   Patient: Nigel Albrecht   MR Number: [id number]   YOB: 1950   Date of Visit: 5/15/2023       Dear      Thank you for referring Nigel Albrecht to me for evaluation. Attached you will find relevant portions of my assessment and plan of care.    If you have questions, please do not hesitate to call me. I look forward to following Nigel Albrecht along with you.    Sincerely,    Malou Conn, NP    Enclosure    If you would like to receive this communication electronically, please contact [email or web address] or [phone] to request [employer] access.    [employer] is a tool which provides read-only access to select patient information with whom you have a relationship. Its easy to use and provides real time access to review your patients record including encounter summaries, notes, results, and demographic information.    If you feel you have received this communication in error or would no longer like to receive these types of communications, please e-mail [email or web address]

## 2023-05-16 ENCOUNTER — OFFICE VISIT (OUTPATIENT)
Dept: NEUROLOGY | Facility: CLINIC | Age: 73
End: 2023-05-16
Payer: MEDICARE

## 2023-05-16 VITALS
OXYGEN SATURATION: 99 % | HEART RATE: 53 BPM | SYSTOLIC BLOOD PRESSURE: 138 MMHG | HEIGHT: 74 IN | BODY MASS INDEX: 33.37 KG/M2 | WEIGHT: 260 LBS | RESPIRATION RATE: 16 BRPM | DIASTOLIC BLOOD PRESSURE: 72 MMHG

## 2023-05-16 DIAGNOSIS — J30.9 ALLERGIC RHINITIS, UNSPECIFIED SEASONALITY, UNSPECIFIED TRIGGER: ICD-10-CM

## 2023-05-16 DIAGNOSIS — E11.43 DIABETIC AUTONOMIC NEUROPATHY ASSOCIATED WITH TYPE 2 DIABETES MELLITUS: Chronic | ICD-10-CM

## 2023-05-16 DIAGNOSIS — E11.22 TYPE 2 DIABETES MELLITUS WITH STAGE 3B CHRONIC KIDNEY DISEASE, WITH LONG-TERM CURRENT USE OF INSULIN: Chronic | ICD-10-CM

## 2023-05-16 DIAGNOSIS — Z94.0 DECEASED-DONOR KIDNEY TRANSPLANT: Chronic | ICD-10-CM

## 2023-05-16 DIAGNOSIS — C61 PROSTATE CANCER: ICD-10-CM

## 2023-05-16 DIAGNOSIS — I77.1 TORTUOUS AORTA: ICD-10-CM

## 2023-05-16 DIAGNOSIS — Z94.1 HEART TRANSPLANTED: Chronic | ICD-10-CM

## 2023-05-16 DIAGNOSIS — B34.8 BK VIREMIA: Chronic | ICD-10-CM

## 2023-05-16 DIAGNOSIS — N18.32 TYPE 2 DIABETES MELLITUS WITH STAGE 3B CHRONIC KIDNEY DISEASE, WITH LONG-TERM CURRENT USE OF INSULIN: Chronic | ICD-10-CM

## 2023-05-16 DIAGNOSIS — Z86.010 HISTORY OF COLON POLYPS: ICD-10-CM

## 2023-05-16 DIAGNOSIS — N18.4 BENIGN HYPERTENSION WITH CKD (CHRONIC KIDNEY DISEASE) STAGE IV: ICD-10-CM

## 2023-05-16 DIAGNOSIS — T86.11 ANTIBODY MEDIATED REJECTION OF KIDNEY TRANSPLANT: ICD-10-CM

## 2023-05-16 DIAGNOSIS — N25.81 SECONDARY HYPERPARATHYROIDISM OF RENAL ORIGIN: ICD-10-CM

## 2023-05-16 DIAGNOSIS — E66.09 CLASS 1 OBESITY DUE TO EXCESS CALORIES WITH SERIOUS COMORBIDITY AND BODY MASS INDEX (BMI) OF 33.0 TO 33.9 IN ADULT: ICD-10-CM

## 2023-05-16 DIAGNOSIS — F33.41 RECURRENT MAJOR DEPRESSIVE DISORDER, IN PARTIAL REMISSION: ICD-10-CM

## 2023-05-16 DIAGNOSIS — M10.9 GOUT, ARTHRITIS: ICD-10-CM

## 2023-05-16 DIAGNOSIS — R80.9 PROTEINURIA DUE TO TYPE 2 DIABETES MELLITUS: ICD-10-CM

## 2023-05-16 DIAGNOSIS — T86.290 VASCULOPATHY OF CARDIAC ALLOGRAFT: ICD-10-CM

## 2023-05-16 DIAGNOSIS — I12.9 BENIGN HYPERTENSION WITH CKD (CHRONIC KIDNEY DISEASE) STAGE IV: ICD-10-CM

## 2023-05-16 DIAGNOSIS — I10 PRIMARY HYPERTENSION: Chronic | ICD-10-CM

## 2023-05-16 DIAGNOSIS — Z29.89 PROPHYLACTIC IMMUNOTHERAPY: Chronic | ICD-10-CM

## 2023-05-16 DIAGNOSIS — E78.2 MIXED DIABETIC HYPERLIPIDEMIA ASSOCIATED WITH TYPE 2 DIABETES MELLITUS: ICD-10-CM

## 2023-05-16 DIAGNOSIS — B25.9 CYTOMEGALOVIRUS (CMV) VIREMIA: ICD-10-CM

## 2023-05-16 DIAGNOSIS — N18.32 ANEMIA OF CHRONIC RENAL FAILURE, STAGE 3B: Chronic | ICD-10-CM

## 2023-05-16 DIAGNOSIS — N18.4 CKD (CHRONIC KIDNEY DISEASE), STAGE IV: ICD-10-CM

## 2023-05-16 DIAGNOSIS — E11.69 MIXED DIABETIC HYPERLIPIDEMIA ASSOCIATED WITH TYPE 2 DIABETES MELLITUS: ICD-10-CM

## 2023-05-16 DIAGNOSIS — N52.9 ERECTILE DYSFUNCTION, UNSPECIFIED ERECTILE DYSFUNCTION TYPE: ICD-10-CM

## 2023-05-16 DIAGNOSIS — D63.1 ANEMIA OF CHRONIC RENAL FAILURE, STAGE 3B: Chronic | ICD-10-CM

## 2023-05-16 DIAGNOSIS — B19.20 HEPATITIS C TEST POSITIVE: ICD-10-CM

## 2023-05-16 DIAGNOSIS — E11.29 PROTEINURIA DUE TO TYPE 2 DIABETES MELLITUS: ICD-10-CM

## 2023-05-16 DIAGNOSIS — Z79.4 TYPE 2 DIABETES MELLITUS WITH STAGE 3B CHRONIC KIDNEY DISEASE, WITH LONG-TERM CURRENT USE OF INSULIN: Chronic | ICD-10-CM

## 2023-05-16 DIAGNOSIS — G25.0 DISABLING ESSENTIAL TREMOR: Primary | ICD-10-CM

## 2023-05-16 DIAGNOSIS — Z79.60 LONG-TERM USE OF IMMUNOSUPPRESSANT MEDICATION: Chronic | ICD-10-CM

## 2023-05-16 DIAGNOSIS — K57.30 DIVERTICULOSIS OF COLON: Chronic | ICD-10-CM

## 2023-05-16 PROBLEM — R35.0 BENIGN PROSTATIC HYPERPLASIA WITH URINARY FREQUENCY: Status: RESOLVED | Noted: 2023-03-31 | Resolved: 2023-05-16

## 2023-05-16 PROBLEM — N40.1 BENIGN PROSTATIC HYPERPLASIA WITH URINARY FREQUENCY: Status: RESOLVED | Noted: 2023-03-31 | Resolved: 2023-05-16

## 2023-05-16 PROBLEM — D84.821 IMMUNOSUPPRESSION DUE TO DRUG THERAPY: Status: RESOLVED | Noted: 2018-05-16 | Resolved: 2023-05-16

## 2023-05-16 PROBLEM — Z79.899 IMMUNOSUPPRESSION DUE TO DRUG THERAPY: Status: RESOLVED | Noted: 2018-05-16 | Resolved: 2023-05-16

## 2023-05-16 PROBLEM — R97.20 ELEVATED PSA: Status: RESOLVED | Noted: 2021-09-22 | Resolved: 2023-05-16

## 2023-05-16 PROBLEM — D84.9 IMMUNOCOMPROMISED PATIENT: Status: RESOLVED | Noted: 2022-07-05 | Resolved: 2023-05-16

## 2023-05-16 PROCEDURE — 99205 PR OFFICE/OUTPT VISIT, NEW, LEVL V, 60-74 MIN: ICD-10-PCS | Mod: S$PBB,,, | Performed by: PSYCHIATRY & NEUROLOGY

## 2023-05-16 PROCEDURE — 99999 PR PBB SHADOW E&M-EST. PATIENT-LVL V: CPT | Mod: PBBFAC,,, | Performed by: PSYCHIATRY & NEUROLOGY

## 2023-05-16 PROCEDURE — 99205 OFFICE O/P NEW HI 60 MIN: CPT | Mod: S$PBB,,, | Performed by: PSYCHIATRY & NEUROLOGY

## 2023-05-16 PROCEDURE — 99215 OFFICE O/P EST HI 40 MIN: CPT | Mod: PBBFAC | Performed by: PSYCHIATRY & NEUROLOGY

## 2023-05-16 PROCEDURE — 99999 PR PBB SHADOW E&M-EST. PATIENT-LVL V: ICD-10-PCS | Mod: PBBFAC,,, | Performed by: PSYCHIATRY & NEUROLOGY

## 2023-05-16 RX ORDER — PRIMIDONE 50 MG/1
100 TABLET ORAL NIGHTLY
Qty: 14 TABLET | Refills: 0 | Status: SHIPPED | OUTPATIENT
Start: 2023-05-16 | End: 2023-06-08 | Stop reason: SDUPTHER

## 2023-05-16 RX ORDER — PRIMIDONE 50 MG/1
100 TABLET ORAL NIGHTLY
Qty: 180 TABLET | Refills: 3 | Status: SHIPPED | OUTPATIENT
Start: 2023-05-16 | End: 2024-02-29

## 2023-05-16 NOTE — PROGRESS NOTES
Subjective:       Patient ID: Nigel Albrecht is a 72 y.o. male.    Chief Complaint: Tremors          HPI      The patient is presenting with tremors that became disabling in 2022. The tremors started insidiously and progressed slowly over time. The tremors are mainly in both arms symmetrically (minor asymmetry). The tremors do emerge during action like writing or holding things. No rest tremors. No neck tremors. No leg tremors upon standing. No voice tremors. No muscle rigidity of muscle stiffness. No postural instability. No memory loss or dementia. Cannot tell if alcohol improves the tremors. Anxiety and emotional stress exacerbates the tremor. No significant diurnal variation in the tremors. No history of strokes. No head injury. No known history of hyperthyroidism. On steroids (S/P renal and cardiac transplants). On 08- CTH NL. On 01- TSH NL. He's on Propranolol (Inderal) 40 mg BID which helps a little bit.         Review of Systems   Constitutional:  Positive for fatigue. Negative for appetite change.   HENT:  Negative for hearing loss and tinnitus.    Eyes:  Positive for visual disturbance. Negative for photophobia.   Respiratory:  Negative for apnea and shortness of breath.    Cardiovascular:  Negative for chest pain and palpitations.   Gastrointestinal:  Negative for nausea and vomiting.   Endocrine: Negative for cold intolerance and heat intolerance.   Genitourinary:  Positive for difficulty urinating. Negative for urgency.   Musculoskeletal:  Positive for arthralgias. Negative for back pain, gait problem, joint swelling, myalgias, neck pain and neck stiffness.   Skin:  Negative for color change and rash.   Allergic/Immunologic: Positive for immunocompromised state. Negative for environmental allergies.   Neurological:  Positive for tremors and numbness. Negative for dizziness, seizures, syncope, facial asymmetry, speech difficulty, weakness, light-headedness and headaches.        PHN    Hematological:  Negative for adenopathy. Does not bruise/bleed easily.   Psychiatric/Behavioral:  Negative for agitation, behavioral problems, confusion, decreased concentration, dysphoric mood, hallucinations, self-injury, sleep disturbance and suicidal ideas. The patient is not hyperactive.                Current Outpatient Medications:     acetaminophen (TYLENOL) 325 MG tablet, Take 2 tablets (650 mg total) by mouth every 4 (four) hours as needed., Disp: , Rfl: 0    aspirin 81 MG Chew, Take 1 tablet (81 mg total) by mouth once daily., Disp: , Rfl: 0    atorvastatin (LIPITOR) 40 MG tablet, Take 1 tablet (40 mg total) by mouth once daily., Disp: 90 tablet, Rfl: 3    calcium acetate,phosphat bind, (PHOSLO) 667 mg capsule, Take 2 capsules (1,334 mg total) by mouth 3 (three) times daily with meals., Disp: 180 capsule, Rfl: 11    famotidine (PEPCID) 20 MG tablet, Take 1 tablet (20 mg total) by mouth once daily., Disp: 30 tablet, Rfl: 11    flash glucose sensor (FREESTYLE SHREE 2 SENSOR) Kit, APPLY 1 SENSOR EVERY 14    DAYS, Disp: 6 kit, Rfl: 3    hydrALAZINE (APRESOLINE) 50 MG tablet, Take 1 tablet (50 mg total) by mouth every 8 (eight) hours., Disp: 270 tablet, Rfl: 3    insulin NPH/Reg human (HUMULIN 70/30 U-100 KWIKPEN) 100 unit/mL (70-30) InPn pen, Inject 40 Units into the skin daily with breakfast AND 30 Units daily with dinner or evening meal., Disp: 75 mL, Rfl: 1    mycophenolate (CELLCEPT) 250 mg Cap, Take 1 capsule (250 mg total) by mouth 2 (two) times daily., Disp: 90 capsule, Rfl: 3    NIFEdipine (PROCARDIA-XL) 60 MG (OSM) 24 hr tablet, Take 1 tablet (60 mg total) by mouth 2 (two) times a day., Disp: 180 tablet, Rfl: 3    predniSONE (DELTASONE) 5 MG tablet, Take 1 tablet (5 mg total) by mouth once daily., Disp: 90 tablet, Rfl: 3    propranoloL (INDERAL) 20 MG tablet, TAKE 1 TABLET BY MOUTH TWICE DAILY FOR 14 DAYS THEN TAKE 2 TABLETS TWICE DAILY AS DIRECTED, Disp: , Rfl:     tacrolimus (PROGRAF) 1 MG Cap,  Take 7 capsules (7 mg total) by mouth every morning AND 6 capsules (6 mg total) every evening., Disp: 1170 capsule, Rfl: 3    tamsulosin (FLOMAX) 0.4 mg Cap, Take 1 capsule (0.4 mg total) by mouth once daily., Disp: 90 capsule, Rfl: 3    torsemide (DEMADEX) 20 MG Tab, Take 2 tablets (40 mg total) by mouth once daily., Disp: 180 tablet, Rfl: 3  Past Medical History:   Diagnosis Date    Allergic rhinitis 2013    Blood transfusion     Cataract     CKD (chronic kidney disease), stage III 2014    -donor kidney transplant     Diabetes mellitus, type 2 2013    Gout, arthritis 2013    Heart attack     Heart transplanted     Hyperlipidemia 2013    Hypertension     Immunodeficiency due to treatment with immunosuppressive medication     Morbid obesity 2013    Organ transplant 2002    heart and kidney    Orthostatic syncope 2022    Due to furosemide    Pneumonia due to COVID-19 virus 2022    Prophylactic immunotherapy     Prostate cancer 2023    Renal manifestation of secondary diabetes mellitus      Past Surgical History:   Procedure Laterality Date    CATARACT EXTRACTION Bilateral     COLONOSCOPY N/A 10/6/2020    Procedure: COLONOSCOPY;  Surgeon: Conner Redman MD;  Location: [facility];  Service: Endoscopy;  Laterality: N/A;    EYE SURGERY      HEART TRANSPLANT      KIDNEY TRANSPLANT      REVISION TOTAL HIP ARTHROPLASTY       Social History     Socioeconomic History    Marital status:    Occupational History     Employer: Retired   Tobacco Use    Smoking status: Former     Packs/day: 1.00     Years: 20.00     Pack years: 20.00     Types: Cigarettes     Quit date: 1984     Years since quittin.8    Smokeless tobacco: Never   Substance and Sexual Activity    Alcohol use: Yes     Alcohol/week: 1.0 standard drink     Types: 1 Cans of beer per week     Comment: daily    Drug use: No    Sexual activity: Not Currently     Partners: Female             Past/Current  Medical/Surgical History, Past/Current Social History, Past/Current Family History and Past/Current Medications were reviewed in detail.        Objective:           VITAL SIGNS WERE REVIEWED      GENERAL APPEARANCE:     The patient looks comfortable.    BMI 33.38    No signs of respiratory distress.    Normal breathing pattern.    No dysmorphic features    Normal eye contact.       GENERAL MEDICAL EXAM:    HEENT:  Head is atraumatic normocephalic.     FUNDOSCOPIC (OPHTHALMOSCOPIC) EXAMINATION showed no disc edema.      NECK: No JVD. No visible lesions or goiters.     CHEST-CARDIOPULMONARY: No cyanosis. No tachypnea. Normal respiratory effort.    [id number]: No jaundice. No stomas or lesions. No visible hernias. No catheters.     SKIN, HAIR, NAILS: No pathognomonic skin rash.No neurofibromatosis. No visible lesions.No stigmata of autoimmune disease. No clubbing.    LIMBS: No varicose veins. No visible swelling.    MUSCULOSKELETAL: No visible deformities.No visible lesions.           Neurologic Exam     Mental Status   Oriented to person, place, and time.   Follows 3 step commands.   Attention: normal. Concentration: normal.   Speech: speech is normal   Level of consciousness: alert  Able to name object. Able to repeat. Normal comprehension.     Cranial Nerves   Cranial nerves II through XII intact.     CN II   Visual fields full to confrontation.   Visual acuity: decreased  Right visual field deficit: none  Left visual field deficit: none     CN III, IV, VI   Pupils are equal, round, and reactive to light.  Extraocular motions are normal.   Right pupil: Size: 2 mm. Shape: regular. Reactivity: brisk. Consensual response: intact. Accommodation: intact.   Left pupil: Size: 2 mm. Shape: regular. Reactivity: brisk. Consensual response: intact. Accommodation: intact.   CN III: no CN III palsy  CN VI: no CN VI palsy  Nystagmus: none   Diplopia: none  Ophthalmoparesis: none  Upgaze: normal  Downgaze:  normal  Conjugate gaze: present  Vestibulo-ocular reflex: present    CN V   Facial sensation intact.   Right facial sensation deficit: none  Left facial sensation deficit: none  Jaw jerk: normal    CN VII   Facial expression full, symmetric.   Right facial weakness: none  Left facial weakness: none    CN VIII   CN VIII normal.   Hearing: intact    CN IX, X   CN IX normal.   CN X normal.   Palate: symmetric    CN XI   CN XI normal.   Right sternocleidomastoid strength: normal  Left sternocleidomastoid strength: normal  Right trapezius strength: normal  Left trapezius strength: normal    CN XII   CN XII normal.   Tongue: not atrophic  Fasciculations: absent  Tongue deviation: none    Motor Exam   Muscle bulk: normal  Overall muscle tone: normal  Right arm tone: normal  Left arm tone: normal  Right arm pronator drift: absent  Left arm pronator drift: absent  Right leg tone: normal  Left leg tone: normal    Strength   Right neck flexion: 5/5  Left neck flexion: 5/5  Right neck extension: 5/5  Left neck extension: 5/5  Right deltoid: 5/5  Left deltoid: 5/5  Right biceps: 5/5  Left biceps: 5/5  Right triceps: 5/5  Left triceps: 5/5  Right wrist flexion: 5/5  Left wrist flexion: 5/5  Right wrist extension: 5/5  Left wrist extension: 5/5  Right interossei: 5/5  Left interossei: 5/5  Right iliopsoas: 5/5  Left iliopsoas: 5/5  Right quadriceps: 5/5  Left quadriceps: 5/5  Right hamstrin/5  Left hamstrin/5  Right glutei: 5/5  Left glutei: 5/5  Right anterior tibial: 5/5  Left anterior tibial: 5/5  Right posterior tibial: 5/5  Left posterior tibial: 5/5  Right peroneal: 5/5  Left peroneal: 5/5  Right gastroc: 5/5  Left gastroc: 5/5    Sensory Exam   Right arm light touch: normal  Left arm light touch: normal  Right leg light touch: decreased from ankle  Left leg light touch: decreased from ankle  Right arm vibration: normal  Left arm vibration: normal  Right leg vibration: decreased from toes  Left leg vibration:  decreased from toes  Right arm proprioception: normal  Left arm proprioception: normal  Right leg proprioception: decreased from toes  Left leg proprioception: decreased from toes  Left arm pinprick: normal  Right leg pinprick: decreased from ankle  Left leg pinprick: decreased from ankle  Graphesthesia: normal  Stereognosis: normal    Gait, Coordination, and Reflexes     Gait  Gait: normal    Coordination   Romberg: negative  Finger to nose coordination: normal  Heel to shin coordination: normal    Tremor   Resting tremor: absent  Intention tremor: present  Action tremor: left arm and right arm    Reflexes   Right brachioradialis: 2+  Left brachioradialis: 2+  Right biceps: 2+  Left biceps: 2+  Right triceps: 2+  Left triceps: 2+  Right patellar: 1+  Left patellar: 1+  Right achilles: 0  Left achilles: 0  Right plantar: normal  Left plantar: normal  Right Licona: absent  Left Licona: absent  Right ankle clonus: absent  Left ankle clonus: absent  Right pendular knee jerk: absent  Left pendular knee jerk: absent  BUE AP 10 Hz Tremors    Severe    RT>LT     Lab Results   Component Value Date    WBC 5.37 05/08/2023    HGB 11.1 (L) 05/08/2023    HCT 36.4 (L) 05/08/2023    MCV 89 05/08/2023     05/08/2023     Sodium   Date Value Ref Range Status   05/08/2023 142 136 - 145 mmol/L Final     Potassium   Date Value Ref Range Status   05/08/2023 4.3 3.5 - 5.1 mmol/L Final     Chloride   Date Value Ref Range Status   05/08/2023 107 95 - 110 mmol/L Final     CO2   Date Value Ref Range Status   05/08/2023 26 23 - 29 mmol/L Final     Glucose   Date Value Ref Range Status   05/08/2023 110 70 - 110 mg/dL Final     BUN   Date Value Ref Range Status   05/08/2023 35 (H) 8 - 23 mg/dL Final     Creatinine   Date Value Ref Range Status   05/08/2023 2.7 (H) 0.5 - 1.4 mg/dL Final     Calcium   Date Value Ref Range Status   05/08/2023 8.8 8.7 - 10.5 mg/dL Final     Total Protein   Date Value Ref Range Status   05/08/2023 6.2 6.0 -  8.4 g/dL Final     Albumin   Date Value Ref Range Status   2023 3.2 (L) 3.5 - 5.2 g/dL Final   2023 3.2 (L) 3.5 - 5.2 g/dL Final     Total Bilirubin   Date Value Ref Range Status   2023 0.4 0.1 - 1.0 mg/dL Final     Comment:     For infants and newborns, interpretation of results should be based  on gestational age, weight and in agreement with clinical  observations.    Premature Infant recommended reference ranges:  Up to 24 hours.............<8.0 mg/dL  Up to 48 hours............<12.0 mg/dL  3-5 days..................<15.0 mg/dL  6-29 days.................<15.0 mg/dL       Alkaline Phosphatase   Date Value Ref Range Status   2023 71 55 - 135 U/L Final     AST   Date Value Ref Range Status   2023 20 10 - 40 U/L Final     ALT   Date Value Ref Range Status   2023 14 10 - 44 U/L Final     Anion Gap   Date Value Ref Range Status   2023 9 8 - 16 mmol/L Final     eGFR if    Date Value Ref Range Status   2022 15.3 (A) >60 mL/min/1.73 m^2 Final     eGFR if non    Date Value Ref Range Status   2022 13.2 (A) >60 mL/min/1.73 m^2 Final     Comment:     Calculation used to obtain the estimated glomerular filtration  rate (eGFR) is the CKD-EPI equation.        No results found for: [id number]  Lab Results   Component Value Date    TSH 3.196 2023    X9MVASC 87 2015    K8GPCDR 6.0 2022    FREET4 0.83 2022    [facility] NL        2023    TSH NL       Reviewed the neuroimaging independently       Assessment:       1. Disabling essential tremor    2. -donor kidney transplant    3. Heart transplanted    4. Chronic immunosuppression with tacrolimus, mycophenolate    5. Type 2 diabetes mellitus with stage 3b chronic kidney disease, with long-term current use of insulin    6. Diverticulosis of colon (without mention of hemorrhage)    7. Diabetic autonomic neuropathy associated with type 2 diabetes  mellitus    8. Anemia of chronic renal failure, stage 3b    9. Long-term use of immunosuppressant medication    10. Primary hypertension    11. BK viremia    12. Benign hypertension with CKD (chronic kidney disease) stage IV    13. Mixed diabetic hyperlipidemia associated with type 2 diabetes mellitus    14. Gout, arthritis    15. Allergic rhinitis, unspecified seasonality, unspecified trigger    16. Vasculopathy of cardiac allograft    17. CKD (chronic kidney disease), stage IV    18. Class 1 obesity due to excess calories with serious comorbidity and body mass index (BMI) of 33.0 to 33.9 in adult    19. History of colon polyps    20. Tortuous aorta    21. Erectile dysfunction, unspecified erectile dysfunction type    22. Proteinuria due to type 2 diabetes mellitus    23. Recurrent major depressive disorder, in partial remission    24. Hepatitis C test positive    25. Antibody mediated rejection of kidney transplant    26. Cytomegalovirus (CMV) viremia    27. Prostate cancer    28. Secondary hyperparathyroidism of renal origin        Plan:           ESSENTIAL TREMOR (ET), BILATERAL UPPER EXTREMITIES (BUE), SEVERE AND DISABLING         MANAGEMENT     Avoid stimulants like caffeine, nicotineetc.    Cut down steroids if possible    Avoid hot drinks, drink from half empty glasses.    Wear heavy bracelet/watch.    Use heavy pens to write with.     Use heavy utensils to eat with.    Use heavy plates to carry food with.     I also counseled the patient about the fact the medication decreases the amplitude and not the rate of the tremor and less effective for titubition.  I also counseled the patient that the disease is slowly progressive.       NEURO PHARMACOTHERAPY     Continue Propranolol (Inderal) 40 mg BID as a maximum dose (HR 53).    Add Primidone and titrate slowly to 100 mg QHS as a starting dose (Maximum 250-300 mg) The main side effect is sedation and was communicated with the patient. Sexual dysfunction and  depression could happen as well. The patient verbalized understanding.      NEXT OPTIONS     Topiramate (Topamax)TPM titration to  mg BID which can cause mental slowing, transient tingling, kidney stones, weight loss, cleft lip and palate and rarely glaucoma and visual field defects . The patient was encouraged to drink a lot of fluids.     Zonisamide (Zonegran) -400 mg QHS is a good alternative to TPM in case of AE/SE.       Methazolamide (Neptazane) 50 mg TID (100 mg BID) which can cause sedation, numbness, gastrointestinal upset and rarely kidney stones. Safety during pregnancy is unknown.    Gabapentin (Neurontin)  mg TID up 600 mg TID which can cause significant sedation.          INTERVENTIONAL OPTIONS:    FROM LEAST INVASIVE TO MOST INVASIVE     Neuravive (MRI guided high intensity focused ultrasound (MRIgFUS) of the Thalamic ViM nucleus.    Gamma Knife of the Thalamic ViM nucleus.    Deep Brain Stimulation (DBS) of the Thalamic ViM vs. cZI (caudal adelina incerta)         MEDICAL/SURGICAL COMORBIDITIES     All relevant medical comorbidities noted and managed by primary care physician and medical care team.          HEALTHY LIFESTYLE AND PREVENTATIVE CARE    The patient to adhere to the age-appropriate health maintenance guidelines including screening tests and vaccinations. The patient to adhere to  healthy lifestyle, optimal weight, exercise, healthy diet, good sleep hygiene and avoiding drugs including smoking, alcohol and recreational drugs.          Jessica Monroy MD, FAAN    Attending Neurologist/Epileptologist         Diplomate, American Board of Psychiatry and Neurology    Diplomate, American Board of Clinical Neurophysiology     Fellow, American Academy of Neurology         I spent a total of 61 minutes on the day of the visit.  This includes face to face time and non-face to face time preparing to see the patient (eg, review of tests), obtaining and/or reviewing separately obtained  history, documenting clinical information in the electronic or other health record, independently interpreting results and communicating results to the patient/family/caregiver, or care coordinator.

## 2023-05-16 NOTE — PATIENT INSTRUCTIONS
MANAGEMENT     Avoid stimulants like caffeine, nicotineetc.    Cut down steroids if possible    Avoid hot drinks, drink from half empty glasses.    Wear heavy bracelet/watch.    Use heavy pens to write with.     Use heavy utensils to eat with.    Use heavy plates to carry food with.     I also counseled the patient about the fact the medication decreases the amplitude and not the rate of the tremor and less effective for titubition.  I also counseled the patient that the disease is slowly progressive.       NEURO PHARMACOTHERAPY       Primidone and titrate slowly to 50 mg QHS as a starting dose (Maximum 250-300 mg) The main side effect is sedation and was communicated with the patient. Sexual dysfunction and depression could happen as well. The patient verbalized understanding.      NEXT OPTIONS     Topiramate (Topamax)TPM titration to  mg BID which can cause mental slowing, transient tingling, kidney stones, weight loss, cleft lip and palate and rarely glaucoma and visual field defects . The patient was encouraged to drink a lot of fluids.     Zonisamide (Zonegran) -400 mg QHS is a good alternative to TPM in case of AE/SE.       Methazolamide (Neptazane) 50 mg TID (100 mg BID) which can cause sedation, numbness, gastrointestinal upset and rarely kidney stones. Safety during pregnancy is unknown.    Gabapentin (Neurontin)  mg TID up 600 mg TID which can cause significant sedation.          INTERVENTIONAL OPTIONS:    FROM LEAST INVASIVE TO MOST INVASIVE     Neuravive (MRI guided high intensity focused ultrasound (MRIgFUS) of the Thalamic ViM nucleus.    Gamma Knife of the Thalamic ViM nucleus.    Deep Brain Stimulation (DBS) of the Thalamic ViM vs. cZI (caudal adelina incerta)

## 2023-05-29 ENCOUNTER — LAB VISIT (OUTPATIENT)
Dept: LAB | Facility: HOSPITAL | Age: 73
End: 2023-05-29
Attending: INTERNAL MEDICINE
Payer: MEDICARE

## 2023-05-29 DIAGNOSIS — Z94.0 KIDNEY REPLACED BY TRANSPLANT: ICD-10-CM

## 2023-05-29 PROCEDURE — 36415 COLL VENOUS BLD VENIPUNCTURE: CPT | Mod: PO | Performed by: INTERNAL MEDICINE

## 2023-05-29 PROCEDURE — 80197 ASSAY OF TACROLIMUS: CPT | Performed by: INTERNAL MEDICINE

## 2023-05-30 DIAGNOSIS — Z94.1 STATUS POST HEART TRANSPLANT: Primary | ICD-10-CM

## 2023-05-30 LAB — TACROLIMUS BLD-MCNC: 4.5 NG/ML (ref 5–15)

## 2023-05-31 ENCOUNTER — PATIENT MESSAGE (OUTPATIENT)
Dept: NEUROLOGY | Facility: CLINIC | Age: 73
End: 2023-05-31
Payer: MEDICARE

## 2023-05-31 DIAGNOSIS — Z94.0 KIDNEY REPLACED BY TRANSPLANT: ICD-10-CM

## 2023-05-31 DIAGNOSIS — Z94.1 HEART TRANSPLANTED: ICD-10-CM

## 2023-05-31 RX ORDER — MYCOPHENOLATE MOFETIL 250 MG/1
250 CAPSULE ORAL 2 TIMES DAILY
Qty: 90 CAPSULE | Refills: 3 | Status: SHIPPED | OUTPATIENT
Start: 2023-05-31 | End: 2023-10-27 | Stop reason: SDUPTHER

## 2023-05-31 RX ORDER — PROPRANOLOL HYDROCHLORIDE 20 MG/1
20 TABLET ORAL 2 TIMES DAILY
Qty: 180 TABLET | Refills: 0 | Status: SHIPPED | OUTPATIENT
Start: 2023-05-31 | End: 2023-07-13

## 2023-05-31 NOTE — TELEPHONE ENCOUNTER
LOV:05/16/2023     Patients needs Propanolol 20 mg and he is taking it twice daily. Was prescribed by another neurologist but is no longer seeing them.

## 2023-06-08 ENCOUNTER — OFFICE VISIT (OUTPATIENT)
Dept: PAIN MEDICINE | Facility: CLINIC | Age: 73
End: 2023-06-08
Payer: MEDICARE

## 2023-06-08 VITALS — RESPIRATION RATE: 17 BRPM

## 2023-06-08 DIAGNOSIS — M79.2 NEUROPATHIC PAIN: ICD-10-CM

## 2023-06-08 DIAGNOSIS — B02.29 POST HERPETIC NEURALGIA: Primary | ICD-10-CM

## 2023-06-08 PROCEDURE — 99214 PR OFFICE/OUTPT VISIT, EST, LEVL IV, 30-39 MIN: ICD-10-PCS | Mod: 95,,, | Performed by: PHYSICIAN ASSISTANT

## 2023-06-08 PROCEDURE — 99214 OFFICE O/P EST MOD 30 MIN: CPT | Mod: 95,,, | Performed by: PHYSICIAN ASSISTANT

## 2023-06-08 NOTE — PROGRESS NOTES
Established Patient - TeleHealth Visit    The patient location is: [place]  The chief complaint leading to consultation is: chronic pain     Visit type: audiovisual    Face to Face time with patient: 10-15 minutes  20 minutes of total time spent on the encounter, which includes face to face time and non-face to face time preparing to see the patient (eg, review of tests), Obtaining and/or reviewing separately obtained history, Documenting clinical information in the electronic or other health record, Independently interpreting results (not separately reported) and communicating results to the patient/family/caregiver, or Care coordination (not separately reported).     Each patient to whom he or she provides medical services by telemedicine is:  (1) informed of the relationship between the physician and patient and the respective role of any other health care provider with respect to management of the patient; and (2) notified that he or she may decline to receive medical services by telemedicine and may withdraw from such care at any time.      Established Patient Chronic Pain Note     Referring Physician: Sabino Zimmerman*    PCP: Krystin Zimmerman MD    Chief Complaint:   Abdominal pain     SUBJECTIVE:    Interval History (6/8/2023):  Nigel Albrecht presents today for follow-up on telemedicine visit.  Patient was last seen about a year ago. At that visit, the plan was to start Elavil at night, which he is no longer taking. He has been doing good with the cream, which had been helping until he ran out. Patient reports pain as 6/10 today.    Interval history 05/18/2022  Today patient presents for 3 month follow-up.  He reports overall pain is well controlled.  Today pain is reported to out of 10. Patient reports he was unable to tolerate nortriptyline secondary to legs trembling.  .  Patient has discontinued this medication.  Patient has continued judicious use of compound cream, however does report  this has not been covered by insurance and is looking for an over-the-counter alternative.  Patient denies any lower extremity weakness, bowel or bladder incontinence or saddle anesthesia.  Overall patient reports there are more good days than bad days.    HPI 02/23/2022  Nigel Albrecht is a 71 y.o. male with past medical history significant for type 2 diabetes complicated by polyneuropathy, coronary artery disease status post myocardial infarction, history of orthotopic heart transplant, hyperlipidemia, hypertension, stage 3 chronic kidney disease status post orthotopic kidney transplant 2002 on immunosuppression, obesity, gout who presents to the clinic for the evaluation of right-sided lower abdominal pain.  Today patient reports prior history of shingles infection, approximately 3 years prior.  Today patient reports he had an eruption approximately from the dermatomal levels of T6 through L1 on the right side anteriorly and posteriorly.  In this time patient has had constant pain.  Pain is described as burning and stabbing in nature.  Patient denies any left-sided symptoms.  Patient denies weakness in the upper lower extremities.  Pain in this territories exacerbated with sweating and exertion.  Pain is improved with lying in the left lateral decubitus position, with heat in the shower and mildly with lidocaine patches.  Patient is currently taking gabapentin 300 mg twice daily.  Patient reports no significant improvement in his symptoms on this medication.  Patient has not been able to increase this dose secondary to feeling like a zombie.  Medication review reveals patient has trialed amitriptyline in the past with shaking side effect.    Patient reports loss of sensations.  Patient denies night fever/night sweats, urinary incontinence, bowel incontinence, significant weight loss and significant motor weakness.    Non-Pharmacologic Treatments:  Physical Therapy/Home Exercise: no  Ice/Heat:yes  TENS:  no  Acupuncture: no  Massage: no  Chiropractic: no    Other: no      Pain Medications:  - Opioids: Percocet (Oxycodone/Acetaminophen)  - Adjuvant Medications: Diazepam (Valium) and Neurontin (Gabapentin)    Pain Procedures:   None      Review of Systems:  GENERAL:  No weight loss, malaise or fevers.  HEENT:   No recent changes in vision or hearing  NECK:  Negative for lumps, no difficulty with swallowing.  RESPIRATORY:  Negative for cough, wheezing or shortness of breath, patient denies any recent URI.  CARDIOVASCULAR:  Negative for chest pain, leg swelling or palpitations.  GI:  Negative for abdominal discomfort, blood in stools or black stools or change in bowel habits.  MUSCULOSKELETAL:  See HPI.  SKIN:  Negative for lesions, rash, and itching.  PSYCH:  No mood disorder or recent psychosocial stressors.   HEMATOLOGY/LYMPHOLOGY:  Negative for prolonged bleeding, bruising easily or swollen nodes.    NEURO:   No history of headaches, syncope, paralysis, seizures or tremors.  All other reviewed and negative other than HPI.        OBJECTIVE:    Telemedicine Exam  Vitals:    06/08/23 0706   Resp: 17     There is no height or weight on file to calculate BMI.   (reviewed on 6/8/2023)     GENERAL: Well appearing, in no acute distress, alert and oriented x3.  Cooperative.  PSYCH:  Mood and affect appropriate.  SKIN: Skin color & texture with no obvious abnormalities.    HEAD/FACE:  Normocephalic, atraumatic.    PULM:  No difficulty breathing. No nasal flaring. No obvious wheezing.  EXTREMITIES: No obvious deformities. Moving all extremities well, appears to have symmetric strength throughout.  MUSCULOSKELETAL: No obvious atrophy abnormalities are noted.   NEURO: No obvious neurologic deficit.   GAIT: sitting.     Physical Exam: last in clinic visit:  GENERAL: Well appearing, in no acute distress, alert and oriented x3.  PSYCH:  Mood and affect appropriate.  SKIN: Skin color, texture, turgor normal, no rashes or  lesions.  HEAD/FACE:  Normocephalic, atraumatic. Cranial nerves grossly intact.    CV: RRR with palpation of the radial artery.  PULM: No evidence of respiratory difficulty, symmetric chest rise.  GI:  Soft and non-tender.  THORAX:  Sternal keloid formation.  Hyperpigmentation from prior shingles rash right lower quadrant, right lower back.  Decreased sensation to alcohol wipe from thoracic dermatome T6 through L1 anteriorly and posteriorly on right  BACK:  No pain to palpation over the facet joints of the lumbar spine or spinous processes. Normal range of motion without pain reproduction.  EXTREMITIES: Peripheral joint ROM is full and pain free without obvious instability or laxity in all four extremities. No deformities, edema, or skin discoloration. Good capillary refill.  RIGHT Lower extremity: Hip flexion 5/5, Hip Abduction 5/5, Hip Adduction 5/5, Knee extension 5/5, Knee flexion 5/5, Ankle dorsiflexion5/5, Extensor hallucis longus 5/5, Ankle plantarflexion 5/5  LEFT Lower extremity:  Hip flexion 5/5, Hip Abduction 5/5,Hip Adduction 5/5, Knee extension 5/5, Knee flexion 5/5, Ankle dorsiflexion 5/5, Extensor hallucis longus 5/5, Ankle plantarflexion 5/5    NEURO: Bilateral upper and lower extremity coordination and muscle stretch reflexes are physiologic and symmetric. No loss of sensation is noted.  GAIT: normal.    Imaging:   X-ray chest 05/06/2021  FINDINGS:  Patient has had a cardiac transplant and postoperative changes are noted.  Lungs are clear with no infiltrate vascular congestion or pleural effusion.        ASSESSMENT: 72 y.o. year old male with right-sided lower thoracic pain, consistent with     1. Post herpetic neuralgia        2. Neuropathic pain  cream base no.171, bulk, Crea              PLAN:   - Interventions:    - We have previously discussed considering a multilevel thoracic transforaminal injection to see if this helps with neuropathic pain.  Explained the risks and benefits of the procedure  in detail with the patient today in clinic along with alternative treatment options.  We will continue with pharmacologic management.  -we have also briefly discussed the pathophysiology behind spinal cord stimulation as a treatment for his symptoms.    - Anticoagulation use: no no anticoagulation      - Medications:  -D/c Elavil 10 mg QHS -- no longer taking, unsure if he ever started.  Previously failed nortriptyline secondary to intolerance.      --Refill prescription compound cream (KETAMINE/CAPSAICIN/LIDOCAINE 5/0.075/5%) topically 2-4x per day - as this helps with his pain.  Will fax to [facility] pharmacy in [place] as [facility] is no longer handling compound Rx.     - Previously also recommended over-the-counter capsaicin cream to the patient as a more affordable alternative.     report:  Reviewed and consistent with medication use as prescribed.    - Diagnostic/ Imaging: No new imaging ordered. Previous imaging reviewed.     - Follow up visit: PRN     - This condition does not require this patient to take time off of work, and the primary goal of our Pain Management services is to improve the patient's functional capacity.   - I discussed the risks, benefits, and alternatives to potential treatment options. All questions and concerns were fully addressed today in clinic.         Stephanie Merino PA-C  Interventional Pain Management - [facility]    Disclaimer:  This note was prepared using voice recognition system and is likely to have sound alike errors that may have been overlooked even after proof reading.  Please call me with any questions.

## 2023-07-06 ENCOUNTER — LAB VISIT (OUTPATIENT)
Dept: LAB | Facility: HOSPITAL | Age: 73
End: 2023-07-06
Attending: INTERNAL MEDICINE
Payer: MEDICARE

## 2023-07-06 DIAGNOSIS — Z94.0 KIDNEY TRANSPLANTED: ICD-10-CM

## 2023-07-06 LAB
ALBUMIN SERPL BCP-MCNC: 3.1 G/DL (ref 3.5–5.2)
ANION GAP SERPL CALC-SCNC: 12 MMOL/L (ref 8–16)
BASOPHILS # BLD AUTO: 0.03 K/UL (ref 0–0.2)
BASOPHILS NFR BLD: 0.4 % (ref 0–1.9)
BUN SERPL-MCNC: 29 MG/DL (ref 8–23)
CALCIUM SERPL-MCNC: 9.3 MG/DL (ref 8.7–10.5)
CHLORIDE SERPL-SCNC: 104 MMOL/L (ref 95–110)
CO2 SERPL-SCNC: 25 MMOL/L (ref 23–29)
CREAT SERPL-MCNC: 2.3 MG/DL (ref 0.5–1.4)
DIFFERENTIAL METHOD: ABNORMAL
EOSINOPHIL # BLD AUTO: 0 K/UL (ref 0–0.5)
EOSINOPHIL NFR BLD: 0.4 % (ref 0–8)
ERYTHROCYTE [DISTWIDTH] IN BLOOD BY AUTOMATED COUNT: 15.1 % (ref 11.5–14.5)
EST. GFR  (NO RACE VARIABLE): 29.3 ML/MIN/1.73 M^2
GLUCOSE SERPL-MCNC: 95 MG/DL (ref 70–110)
HCT VFR BLD AUTO: 36.5 % (ref 40–54)
HGB BLD-MCNC: 11.3 G/DL (ref 14–18)
IMM GRANULOCYTES # BLD AUTO: 0.02 K/UL (ref 0–0.04)
IMM GRANULOCYTES NFR BLD AUTO: 0.3 % (ref 0–0.5)
LYMPHOCYTES # BLD AUTO: 1.7 K/UL (ref 1–4.8)
LYMPHOCYTES NFR BLD: 23.5 % (ref 18–48)
MCH RBC QN AUTO: 27.9 PG (ref 27–31)
MCHC RBC AUTO-ENTMCNC: 31 G/DL (ref 32–36)
MCV RBC AUTO: 90 FL (ref 82–98)
MONOCYTES # BLD AUTO: 0.6 K/UL (ref 0.3–1)
MONOCYTES NFR BLD: 9.1 % (ref 4–15)
NEUTROPHILS # BLD AUTO: 4.7 K/UL (ref 1.8–7.7)
NEUTROPHILS NFR BLD: 66.3 % (ref 38–73)
NRBC BLD-RTO: 0 /100 WBC
PHOSPHATE SERPL-MCNC: 3.8 MG/DL (ref 2.7–4.5)
PLATELET # BLD AUTO: 192 K/UL (ref 150–450)
PMV BLD AUTO: 11.1 FL (ref 9.2–12.9)
POTASSIUM SERPL-SCNC: 4 MMOL/L (ref 3.5–5.1)
RBC # BLD AUTO: 4.05 M/UL (ref 4.6–6.2)
SODIUM SERPL-SCNC: 141 MMOL/L (ref 136–145)
WBC # BLD AUTO: 7.07 K/UL (ref 3.9–12.7)

## 2023-07-06 PROCEDURE — 80069 RENAL FUNCTION PANEL: CPT | Performed by: INTERNAL MEDICINE

## 2023-07-06 PROCEDURE — 80197 ASSAY OF TACROLIMUS: CPT | Performed by: INTERNAL MEDICINE

## 2023-07-06 PROCEDURE — 36415 COLL VENOUS BLD VENIPUNCTURE: CPT | Mod: PO | Performed by: INTERNAL MEDICINE

## 2023-07-06 PROCEDURE — 87799 DETECT AGENT NOS DNA QUANT: CPT | Performed by: INTERNAL MEDICINE

## 2023-07-06 PROCEDURE — 85025 COMPLETE CBC W/AUTO DIFF WBC: CPT | Performed by: INTERNAL MEDICINE

## 2023-07-07 DIAGNOSIS — Z94.0 KIDNEY REPLACED BY TRANSPLANT: ICD-10-CM

## 2023-07-07 DIAGNOSIS — Z94.1 HEART TRANSPLANTED: ICD-10-CM

## 2023-07-07 LAB — TACROLIMUS BLD-MCNC: 4.1 NG/ML (ref 5–15)

## 2023-07-07 RX ORDER — TACROLIMUS 1 MG/1
CAPSULE ORAL
Qty: 1260 CAPSULE | Refills: 3 | Status: ACTIVE | OUTPATIENT
Start: 2023-07-07 | End: 2023-07-13 | Stop reason: SDUPTHER

## 2023-07-07 NOTE — PROGRESS NOTES
Renal note:  Prograf level is 4.1, slightly sub-therapeutic.  Pt was called . Dose was increased from 7/6 to 7 mg po bid.

## 2023-07-13 ENCOUNTER — OFFICE VISIT (OUTPATIENT)
Dept: NEPHROLOGY | Facility: CLINIC | Age: 73
End: 2023-07-13
Payer: MEDICARE

## 2023-07-13 VITALS
HEART RATE: 63 BPM | DIASTOLIC BLOOD PRESSURE: 66 MMHG | SYSTOLIC BLOOD PRESSURE: 120 MMHG | WEIGHT: 244.5 LBS | RESPIRATION RATE: 18 BRPM | BODY MASS INDEX: 31.38 KG/M2 | HEIGHT: 74 IN

## 2023-07-13 DIAGNOSIS — Z94.0 KIDNEY REPLACED BY TRANSPLANT: ICD-10-CM

## 2023-07-13 DIAGNOSIS — Z94.1 HEART TRANSPLANTED: ICD-10-CM

## 2023-07-13 PROCEDURE — 99215 PR OFFICE/OUTPT VISIT, EST, LEVL V, 40-54 MIN: ICD-10-PCS | Mod: S$PBB,,, | Performed by: INTERNAL MEDICINE

## 2023-07-13 PROCEDURE — 99215 OFFICE O/P EST HI 40 MIN: CPT | Mod: S$PBB,,, | Performed by: INTERNAL MEDICINE

## 2023-07-13 PROCEDURE — 99999 PR PBB SHADOW E&M-EST. PATIENT-LVL IV: ICD-10-PCS | Mod: PBBFAC,,, | Performed by: INTERNAL MEDICINE

## 2023-07-13 PROCEDURE — 99999 PR PBB SHADOW E&M-EST. PATIENT-LVL IV: CPT | Mod: PBBFAC,,, | Performed by: INTERNAL MEDICINE

## 2023-07-13 PROCEDURE — 99214 OFFICE O/P EST MOD 30 MIN: CPT | Mod: PBBFAC | Performed by: INTERNAL MEDICINE

## 2023-07-13 RX ORDER — TACROLIMUS 1 MG/1
CAPSULE ORAL
Qty: 1260 CAPSULE | Refills: 3 | Status: ACTIVE | OUTPATIENT
Start: 2023-07-13 | End: 2023-12-01 | Stop reason: SDUPTHER

## 2023-07-13 RX ORDER — PROPRANOLOL HYDROCHLORIDE 20 MG/1
TABLET ORAL
Qty: 180 TABLET | Refills: 3 | Status: SHIPPED | OUTPATIENT
Start: 2023-07-13

## 2023-07-13 RX ORDER — NIFEDIPINE 60 MG/1
TABLET, EXTENDED RELEASE ORAL
Qty: 180 TABLET | Refills: 3 | Status: SHIPPED | OUTPATIENT
Start: 2023-07-13

## 2023-07-13 NOTE — PROGRESS NOTES
Renal clinic f/u note  Date of clinic visit: 7/13/23  Reason for f/u and chief c/o: h/o of kidney transplant      HPI: Pt is a 72 y/o male with a h/o of cadaveric kidney transplant and heart transplant at [facility] on 5/24/02, who presents for f/u. Pt was last seen in renal clinic in April 2023. Chart was reviewed. Pt has no new c/o's, feels well today, no abd pain, no fever, no SOB. Legs swelling is not worse. Has all the prescribed meds and is taking them. Labs, meds, and immunosuppressive meds reviewed with pt.        Pt has had an eventful medical course since June 2022, which will be summarized here. Pt was previously on rapamune, which was switched to prograf in June 2022. A few week later, he caught COVID and experienced pneumonia and large diarrhea. During this period, prograf level was low to undetectable (<2). He was admitted to [facility] in July 2022, with Cr 12.9, supported on IVF's, FENa was 4%. Intrinsic renal damage was suggested, acute rejection was suspected, he was then transferred to [facility] in July 2022. 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 No vascular rejection or T-cell rejection were present. Pt received further solumedrol pulse, plasmapheresis, IVIG, and retuximab. Pt was placed on valcyte for positive CMV. S cr improved markedly, but not to the prior baseline. There were no cardiac issues related to the prior heart transplant.     To review, the cause of ESRD is not known, and pt was never on dialysis before the transplant. 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     PAST SURGICAL HISTORY:  He  has a past surgical history that includes Kidney transplant; Heart transplant; Revision total hip arthroplasty; Eye surgery; Cataract extraction (Bilateral); and Colonoscopy (N/A, 10/6/2020).     SOCIAL HISTORY:  He  reports that he quit smoking about 37 years ago. His smoking use included cigarettes. He has a 20.00 pack-year smoking history. He has never used smokeless tobacco. He reports current alcohol use of about 1.0 standard drink of alcohol per week. He reports that he does not use drugs.     FAMILY MEDICAL HISTORY:  His family history includes Diabetes in his brother and maternal grandmother; Early death in his brother; Heart disease in his brother; Hyperlipidemia in his brother and sister; Hypertension in his brother; Kidney disease in his son; Stroke in his brother and mother.             Review of patient's allergies indicates:   Allergen Reactions    No known drug allergies         Meds reviewed     Current Outpatient Medications:     acetaminophen (TYLENOL) 325 MG tablet, Take 2 tablets (650 mg total) by mouth every 4 (four) hours as needed., Disp: , Rfl: 0    aspirin 81 MG Chew, Take 1 tablet (81 mg total) by mouth once daily., Disp: , Rfl: 0    atorvastatin (LIPITOR) 40 MG tablet, Take 1 tablet (40 mg total) by mouth once daily., Disp: 90 tablet, Rfl: 3    calcium acetate,phosphat bind, (PHOSLO) 667 mg capsule, Take 2 capsules (1,334 mg total) by mouth 3 (three) times daily with meals., Disp: 180 capsule, Rfl: 11    cream base no.171, bulk, Crea, 2 g by Misc.(Non-Drug; Combo Route) route 4 (four) times daily as needed (pain)., Disp: 240 g, Rfl: 6    flash glucose sensor (FREESTYLE SHREE 2 SENSOR) Kit, APPLY 1 SENSOR EVERY 14    DAYS, Disp: 6 kit, Rfl: 3    hydrALAZINE (APRESOLINE) 50 MG tablet, Take 1 tablet (50 mg total) by mouth every 8 (eight) hours., Disp: 270 tablet,  Rfl: 3    insulin NPH/Reg human (HUMULIN 70/30 U-100 KWIKPEN) 100 unit/mL (70-30) InPn pen, Inject 40 Units into the skin daily with breakfast AND 30 Units daily with dinner or evening meal., Disp: 75 mL, Rfl: 1    mycophenolate (CELLCEPT) 250 mg Cap, Take 1 capsule (250 mg total) by mouth 2 (two) times daily., Disp: 90 capsule, Rfl: 3    NIFEdipine (PROCARDIA-XL) 60 MG (OSM) 24 hr tablet, TAKE 1 TABLET TWICE A DAY, Disp: 180 tablet, Rfl: 3    predniSONE (DELTASONE) 5 MG tablet, Take 1 tablet (5 mg total) by mouth once daily., Disp: 90 tablet, Rfl: 3    primidone (MYSOLINE) 50 MG Tab, Take 2 tablets (100 mg total) by mouth every evening., Disp: 180 tablet, Rfl: 3    tamsulosin (FLOMAX) 0.4 mg Cap, Take 1 capsule (0.4 mg total) by mouth once daily., Disp: 90 capsule, Rfl: 3    torsemide (DEMADEX) 20 MG Tab, Take 2 tablets (40 mg total) by mouth once daily., Disp: 180 tablet, Rfl: 3    famotidine (PEPCID) 20 MG tablet, Take 1 tablet (20 mg total) by mouth once daily. (Patient not taking: Reported on 7/13/2023), Disp: 30 tablet, Rfl: 11    propranoloL (INDERAL) 20 MG tablet, TAKE 1 TABLET TWICE A DAY, Disp: 180 tablet, Rfl: 3    tacrolimus (PROGRAF) 1 MG Cap, Take 7 capsules (7 mg total) by mouth every morning AND 7 capsules (7 mg total) every evening., Disp: 1260 capsule, Rfl: 3        REVIEW OF SYSTEMS:  Patient has no fever, fatigue, visual changes, chest pain, edema, cough, dyspnea, nausea, vomiting, constipation, diarrhea, arthralgias, pruritis, dizziness, weakness, depression, confusion.        PHYSICAL EXAM:   Blood pressure 120/66, pulse 63, weight 110.9 Kg, from 119.7 Kg, from 122 Kg.  Gen:    WDWN male in no apparent distress  Psych: Normal mood and affect  Skin:    No rashes or ulcers  Neck:   No JVD  Chest:  Clear with no rales, rhonchi, wheezing with normal effort  CV:      Regular with no murmurs, gallops or rubs  Abd:     Soft, nontender, no distension  Ext:      2+ pitting edema     Labs reviewed    BMP  Lab Results   Component Value Date     07/06/2023    K 4.0 07/06/2023     07/06/2023    CO2 25 07/06/2023    BUN 29 (H) 07/06/2023    CREATININE 2.3 (H) 07/06/2023    CALCIUM 9.3 07/06/2023    ANIONGAP 12 07/06/2023    EGFRNORACEVR 29.3 (A) 07/06/2023     Lab Results   Component Value Date    WBC 7.07 07/06/2023    HGB 11.3 (L) 07/06/2023    HCT 36.5 (L) 07/06/2023    MCV 90 07/06/2023     07/06/2023       Lab Results   Component Value Date    .0 (H) 06/20/2022    CALCIUM 9.3 07/06/2023    PHOS 3.8 07/06/2023            Prograf level 4.1     Component Ref Range & Units 7 d ago  (7/6/23) 2 mo ago  (5/8/23) 2 mo ago  (4/20/23) 4 mo ago  (2/27/23) 5 mo ago  (2/13/23) 5 mo ago  (1/30/23) 6 mo ago  (1/13/23)   BK Virus DNA, Blood Not detected DETECTED Abnormal   DETECTED Abnormal   DETECTED Abnormal   DETECTED Abnormal   DETECTED Abnormal   DETECTED Abnormal   DETECTED Abnormal     BK Virus DNA PCR, Quant, Blood <125 Copies/mL <125  <125  294 High   193 High   697 High   332 High   344 High     Log BKV Copies/mL <2.10 Log (10) Copies/mL <2.10  <2.10 CM  2.47 High  CM  2.29 High  CM  2.84 High  CM  2.52                            IMPRESSION AND RECOMMENDATIONS:  72 y/o male with h/o of kidney and heart transplant in 2002 who presents for f/u. Pt had acute rejection in the past year:     1. Renal: s Cr stable, with baseline range. Overall, worse since experiencing acute rejection  Stable renal function. CKD stage 4  Likely has chronic rejection  K normal  Na normal  Ca normal  Acid-base stable  Stable proteinuria     2. Immunosuppression: prograf level is below therapeutic range   Pt takes 6 mg po q am and 7 mg po q pm bid  Dose was increased to 7 mg po bid  Other medications and doses reviewed  S/p antibody mediated rejection in July 2022. No vascular rejection or T-cell rejection were present.   Pt received further solumedrol pulse, plasmapheresis, IVIG, and retuximab.   S/p valcyte for  positive CMV: CMV quant titers improved     BK viremia:  Level remains detectible, but lower than before twice in a row  Mycophenoate dose was previously reduced from 500 mg to 250 mg po bid  Continue the same      Previously on Rapamune, switched to prograf in June 2022  H/o of kidney transplant at [facility] in 2002, cause of ESRD not clear, was never on dialysis  H/o of heart transplant at [facility] in 2002, heart failure due to IHSS. Being followed by heart transplant team in [place]     3. HTN: BP better controlled after increasing dose of diuretics last visit  Meds reviewed    4. DM: chart was reviewed. Occurred post transplant.  Proteinuria, may be due to diabetic nephropathy     5. Med review: was done     6. Heart transplant: no current issues: has f/u in [place] in Aug 2023.     Plans and recommendations:  As discussed above  Total time spent 40 minutes including time needed to review the records, the   patient evaluation, documentation, face-to-face discussion with the patient,   more than 50% of the time was spent on coordination of care and counseling.    Level V visit.  RTC 4 months     Lucila Matthew MD

## 2023-07-20 ENCOUNTER — OFFICE VISIT (OUTPATIENT)
Dept: TRANSPLANT | Facility: CLINIC | Age: 73
End: 2023-07-20
Payer: MEDICARE

## 2023-07-20 ENCOUNTER — HOSPITAL ENCOUNTER (OUTPATIENT)
Dept: RADIOLOGY | Facility: HOSPITAL | Age: 73
Discharge: HOME | End: 2023-07-20
Attending: INTERNAL MEDICINE
Payer: MEDICARE

## 2023-07-20 ENCOUNTER — HOSPITAL ENCOUNTER (OUTPATIENT)
Dept: CARDIOLOGY | Facility: HOSPITAL | Age: 73
Discharge: HOME | End: 2023-07-20
Attending: INTERNAL MEDICINE
Payer: MEDICARE

## 2023-07-20 ENCOUNTER — HOSPITAL ENCOUNTER (OUTPATIENT)
Dept: RADIOLOGY | Facility: CLINIC | Age: 73
Discharge: HOME | End: 2023-07-20
Attending: INTERNAL MEDICINE
Payer: MEDICARE

## 2023-07-20 VITALS
RESPIRATION RATE: 18 BRPM | SYSTOLIC BLOOD PRESSURE: 164 MMHG | HEIGHT: 74 IN | WEIGHT: 243 LBS | DIASTOLIC BLOOD PRESSURE: 74 MMHG | BODY MASS INDEX: 31.18 KG/M2

## 2023-07-20 VITALS
BODY MASS INDEX: 30.98 KG/M2 | SYSTOLIC BLOOD PRESSURE: 127 MMHG | OXYGEN SATURATION: 99 % | HEIGHT: 74 IN | WEIGHT: 241.38 LBS | DIASTOLIC BLOOD PRESSURE: 88 MMHG | HEART RATE: 86 BPM

## 2023-07-20 DIAGNOSIS — E11.69 MIXED DIABETIC HYPERLIPIDEMIA ASSOCIATED WITH TYPE 2 DIABETES MELLITUS: ICD-10-CM

## 2023-07-20 DIAGNOSIS — I12.9 BENIGN HYPERTENSION WITH CKD (CHRONIC KIDNEY DISEASE) STAGE IV: ICD-10-CM

## 2023-07-20 DIAGNOSIS — Z29.89 PROPHYLACTIC IMMUNOTHERAPY: Chronic | ICD-10-CM

## 2023-07-20 DIAGNOSIS — Z94.1 STATUS POST HEART TRANSPLANT: ICD-10-CM

## 2023-07-20 DIAGNOSIS — T86.11 ANTIBODY MEDIATED REJECTION OF KIDNEY TRANSPLANT: ICD-10-CM

## 2023-07-20 DIAGNOSIS — Z94.1 HEART TRANSPLANTED: Chronic | ICD-10-CM

## 2023-07-20 DIAGNOSIS — Z94.0 DECEASED-DONOR KIDNEY TRANSPLANT: Chronic | ICD-10-CM

## 2023-07-20 DIAGNOSIS — E78.2 MIXED DIABETIC HYPERLIPIDEMIA ASSOCIATED WITH TYPE 2 DIABETES MELLITUS: ICD-10-CM

## 2023-07-20 DIAGNOSIS — I10 PRIMARY HYPERTENSION: Chronic | ICD-10-CM

## 2023-07-20 DIAGNOSIS — T86.290 VASCULOPATHY OF CARDIAC ALLOGRAFT: ICD-10-CM

## 2023-07-20 DIAGNOSIS — N18.4 BENIGN HYPERTENSION WITH CKD (CHRONIC KIDNEY DISEASE) STAGE IV: ICD-10-CM

## 2023-07-20 DIAGNOSIS — E11.43 DIABETIC AUTONOMIC NEUROPATHY ASSOCIATED WITH TYPE 2 DIABETES MELLITUS: Primary | Chronic | ICD-10-CM

## 2023-07-20 LAB
ASCENDING AORTA: 3.64 CM
BSA FOR ECHO PROCEDURE: 2.4 M2
CV ECHO LV RWT: 0.46 CM
CV STRESS BASE HR: 62 BPM
DIASTOLIC BLOOD PRESSURE: 100 MMHG
DOP CALC LVOT AREA: 5.3 CM2
DOP CALC LVOT DIAMETER: 2.59 CM
DOP CALC LVOT PEAK VEL: 0.78 M/S
DOP CALC LVOT STROKE VOLUME: 88.73 CM3
DOP CALCLVOT PEAK VEL VTI: 16.85 CM
E WAVE DECELERATION TIME: 344.09 MSEC
E/A RATIO: 1.15
E/E' RATIO: 6.11 M/S
ECHO LV POSTERIOR WALL: 1.11 CM (ref 0.6–1.1)
FRACTIONAL SHORTENING: 35 % (ref 28–44)
INTERVENTRICULAR SEPTUM: 0.65 CM (ref 0.6–1.1)
IVRT: 102.76 MSEC
LEFT INTERNAL DIMENSION IN SYSTOLE: 3.17 CM (ref 2.1–4)
LEFT VENTRICLE DIASTOLIC VOLUME INDEX: 46.36 ML/M2
LEFT VENTRICLE DIASTOLIC VOLUME: 109.42 ML
LEFT VENTRICLE MASS INDEX: 62 G/M2
LEFT VENTRICLE SYSTOLIC VOLUME INDEX: 16.9 ML/M2
LEFT VENTRICLE SYSTOLIC VOLUME: 39.9 ML
LEFT VENTRICULAR INTERNAL DIMENSION IN DIASTOLE: 4.84 CM (ref 3.5–6)
LEFT VENTRICULAR MASS: 145.46 G
LV LATERAL E/E' RATIO: 5 M/S
LV SEPTAL E/E' RATIO: 7.86 M/S
MV A" WAVE DURATION": 6.85 MSEC
MV PEAK A VEL: 0.48 M/S
MV PEAK E VEL: 0.55 M/S
MV STENOSIS PRESSURE HALF TIME: 99.78 MS
MV VALVE AREA P 1/2 METHOD: 2.2 CM2
OHS CV CPX 1 MINUTE RECOVERY HEART RATE: 76 BPM
OHS CV CPX 85 PERCENT MAX PREDICTED HEART RATE MALE: 125
OHS CV CPX MAX PREDICTED HEART RATE: 147
OHS CV CPX PATIENT IS FEMALE: 0
OHS CV CPX PATIENT IS MALE: 1
OHS CV CPX PEAK DIASTOLIC BLOOD PRESSURE: 64 MMHG
OHS CV CPX PEAK HEAR RATE: 75 BPM
OHS CV CPX PEAK RATE PRESSURE PRODUCT: ABNORMAL
OHS CV CPX PEAK SYSTOLIC BLOOD PRESSURE: 259 MMHG
OHS CV CPX PERCENT MAX PREDICTED HEART RATE ACHIEVED: 51
OHS CV CPX RATE PRESSURE PRODUCT PRESENTING: ABNORMAL
PISA TR MAX VEL: 1.97 M/S
PULM VEIN S/D RATIO: 1.02
PV PEAK D VEL: 0.45 M/S
PV PEAK S VEL: 0.46 M/S
RA PRESSURE: 3 MMHG
RIGHT VENTRICULAR END-DIASTOLIC DIMENSION: 3.85 CM
SINUS: 3.38 CM
STJ: 3.13 CM
SYSTOLIC BLOOD PRESSURE: 177 MMHG
TDI LATERAL: 0.11 M/S
TDI SEPTAL: 0.07 M/S
TDI: 0.09 M/S
TR MAX PG: 16 MMHG
TRICUSPID ANNULAR PLANE SYSTOLIC EXCURSION: 1.27 CM
TV REST PULMONARY ARTERY PRESSURE: 19 MMHG

## 2023-07-20 PROCEDURE — 77080 DXA BONE DENSITY AXIAL SKELETON 1 OR MORE SITES: ICD-10-PCS | Mod: 26,,, | Performed by: INTERNAL MEDICINE

## 2023-07-20 PROCEDURE — 99214 PR OFFICE/OUTPT VISIT, EST, LEVL IV, 30-39 MIN: ICD-10-PCS | Mod: S$PBB,,, | Performed by: INTERNAL MEDICINE

## 2023-07-20 PROCEDURE — 99999 PR PBB SHADOW E&M-EST. PATIENT-LVL IV: ICD-10-PCS | Mod: PBBFAC,,, | Performed by: INTERNAL MEDICINE

## 2023-07-20 PROCEDURE — 99999 PR PBB SHADOW E&M-EST. PATIENT-LVL IV: CPT | Mod: PBBFAC,,, | Performed by: INTERNAL MEDICINE

## 2023-07-20 PROCEDURE — 71046 X-RAY EXAM CHEST 2 VIEWS: CPT | Mod: 26,,, | Performed by: RADIOLOGY

## 2023-07-20 PROCEDURE — 99214 OFFICE O/P EST MOD 30 MIN: CPT | Mod: S$PBB,,, | Performed by: INTERNAL MEDICINE

## 2023-07-20 PROCEDURE — 93351 STRESS TTE COMPLETE: CPT | Mod: 26,,, | Performed by: INTERNAL MEDICINE

## 2023-07-20 PROCEDURE — 71046 XR CHEST PA AND LATERAL: ICD-10-PCS | Mod: 26,,, | Performed by: RADIOLOGY

## 2023-07-20 PROCEDURE — 63600150 PHARM REV CODE 636: Performed by: INTERNAL MEDICINE

## 2023-07-20 PROCEDURE — 93351 STRESS TTE COMPLETE: CPT

## 2023-07-20 PROCEDURE — 71046 X-RAY EXAM CHEST 2 VIEWS: CPT | Mod: TC,FY

## 2023-07-20 PROCEDURE — 77080 DXA BONE DENSITY AXIAL: CPT | Mod: TC

## 2023-07-20 PROCEDURE — 99214 OFFICE O/P EST MOD 30 MIN: CPT | Mod: PBBFAC,25 | Performed by: INTERNAL MEDICINE

## 2023-07-20 PROCEDURE — 77080 DXA BONE DENSITY AXIAL: CPT | Mod: 26,,, | Performed by: INTERNAL MEDICINE

## 2023-07-20 PROCEDURE — 93351 STRESS ECHO (CUPID ONLY): ICD-10-PCS | Mod: 26,,, | Performed by: INTERNAL MEDICINE

## 2023-07-20 RX ORDER — SEMAGLUTIDE 1.34 MG/ML
1 INJECTION, SOLUTION SUBCUTANEOUS
COMMUNITY

## 2023-07-20 RX ORDER — DOBUTAMINE HYDROCHLORIDE 100 MG/100ML
10 INJECTION INTRAVENOUS
Status: COMPLETED | OUTPATIENT
Start: 2023-07-20 | End: 2023-07-20

## 2023-07-20 RX ADMIN — DOBUTAMINE HYDROCHLORIDE 10 MCG/KG/MIN: 100 INJECTION INTRAVENOUS at 04:07

## 2023-07-20 NOTE — PROGRESS NOTES
Subjective:   Mr. Albrecht is a 73 y.o. year old Black or  male who received a  heart and kidney transplant on 5/24/02.      CMV status: Unknown    74 YO M w/ PMH OHTx 5/24/02 and kidney tx 5/25/02 at [facility],  HTN, CKD, DM, HLD here for his 21st annual follow up.    Seen in clinic last 1 year prior. Subsequent to that visit, in July 2022 he had an episode of AUBREY due to [facility] and was admitted to the hospital, treated with steroids and PLEX. Had a single session of HD but did not receive any additional sessions. He completed PLEX and got IV IG as outpatient.    He presents today for follow-up.  Overall says that he is doing well.  Notes some occasional shortness of breath.  He says that at times when he walks more than 50 or 60 m then he had some shortness of breath, but this is not consistent.  Notes chronic lower extremity swelling which is not getting worse.  Denies any exertional chest discomfort, palpitations, presyncope, syncope, PND, or orthopnea.  Continues to follow-up in Nephrology, renal function has also remained stable.    Immunosuppression: tac MMF  Immuno goal levels: 5-8  Immuno history (intolerance, finance, allergy, etc.): Rap DC         Previous MCS:No  High Risk Donor: No  CMV status: unknown    Rejection status: low  Positive Cross Match? : uknown  Induction:  No   Rejections:    Last Biopsy : date  result      HLA/ DSA:7/19/2022 DR53(19093)--  C1Q pend  5/4/2022 DSA DR53 (99628) C1Q Invalid  1/10/2022 DR53 (37373) C1Q invalid  6/14/2021 DR53 (82,760) C1Q DR53 ((26318)  6/3/2020 DR53(16367)   C1Q:  DR53(01385)   9/11/17 DSA DR53(58724) C1Q:DR53(14573)      Last Echo: 7/21/2022 60%  DSE 5/4/2022 60% No myocardia ischemia     Last LHC: Dr. Clemons  07/19/2013 Coronaries normal         Infections: none  Prior or present malignancies:   Other complications:         Health Maintenance:   Cscope- 10/4/20  Derm 6/9/2020 normal findings  CXR 7/20/2023 - normal findings  Last CT chest  "06/22/2016, Stable middle lobe pulmonary micronodule. No further surveillance.    Review of Systems   Constitutional: Negative for chills and fever.   HENT:  Negative for hearing loss.    Eyes:  Negative for visual disturbance.   Cardiovascular:  Positive for dyspnea on exertion and leg swelling. Negative for chest pain, irregular heartbeat, orthopnea, palpitations, paroxysmal nocturnal dyspnea and syncope.   Respiratory:  Negative for cough and shortness of breath.    Musculoskeletal:  Negative for muscle weakness.   Gastrointestinal:  Negative for diarrhea, nausea and vomiting.   Neurological:  Negative for focal weakness.   Psychiatric/Behavioral:  Negative for memory loss.      Objective:   Blood pressure 127/88, pulse 86, height 6' 2" (1.88 m), weight 109.5 kg (241 lb 6.5 oz), SpO2 99 %.body mass index is 30.99 kg/m².    Physical Exam  Vitals reviewed.   Constitutional:       General: He is not in acute distress.  HENT:      Head: Atraumatic.   Eyes:      Extraocular Movements: Extraocular movements intact.   Cardiovascular:      Rate and Rhythm: Normal rate and regular rhythm.      Heart sounds: Normal heart sounds.   Pulmonary:      Breath sounds: Normal breath sounds.   Abdominal:      Palpations: Abdomen is soft.      Tenderness: There is no abdominal tenderness.   Musculoskeletal:         General: Normal range of motion.      Right lower leg: Edema present.      Left lower leg: Edema present.   Neurological:      General: No focal deficit present.      Mental Status: He is alert and oriented to person, place, and time.       Lab Results   Component Value Date    WBC 5.40 07/20/2023    HGB 11.6 (L) 07/20/2023    HCT 37.6 (L) 07/20/2023    MCV 89 07/20/2023     07/20/2023    CO2 28 07/20/2023    CREATININE 2.2 (H) 07/20/2023    CALCIUM 9.0 07/20/2023    ALBUMIN 3.2 (L) 07/20/2023    AST 19 07/20/2023     (H) 07/20/2023    ALT 10 07/20/2023    .0 (H) 06/20/2022       Lab Results "   Component Value Date    INR 1.0 07/04/2022    INR 1.0 04/30/2019    INR 0.9 06/22/2016       BNP   Date Value Ref Range Status   07/20/2023 345 (H) 0 - 99 pg/mL Final     Comment:     Values of less than 100 pg/ml are consistent with non-CHF populations.   08/05/2022 261 (H) 0 - 99 pg/mL Final     Comment:     Values of less than 100 pg/ml are consistent with non-CHF populations.   08/04/2022 351 (H) 0 - 99 pg/mL Final     Comment:     Values of less than 100 pg/ml are consistent with non-CHF populations.       LD   Date Value Ref Range Status   07/24/2022 306 (H) 110 - 260 U/L Final     Comment:     Results are increased in hemolyzed samples.   07/04/2022 305 (H) 110 - 260 U/L Final     Comment:     Results are increased in hemolyzed samples.   06/25/2022 216 110 - 260 U/L Final     Comment:     Results are increased in hemolyzed samples.       Tacrolimus Lvl   Date Value Ref Range Status   07/20/2023 7.0 5.0 - 15.0 ng/mL Final     Comment:     Testing performed by a chemiluminescent microparticle   immunoassay on the [employer] i System.    CAUTION: No firm therapeutic range exists for tacrolimus in whole   blood. The   complexity of the clinical state, individual differences in   sensitivity to   immunosuppressive and nephrotoxic effects of tacrolimus,   co-administration   of other immunosuppressants, type of transplant, time post-transplant   and a   number of other factors contribute to different requirements for   optimal   blood levels of tacrolimus. Therefore, individual tacrolimus values   cannot   be used as the sole indicator for making changes in treatment regimen   and   each patient should be thoroughly evaluated clinically before changes   in   treatment regimens are made. Each user must establish his or her own   ranges   based on clinical experience.  Therapeutic ranges vary according to the commercial test used, and   therefore   should be established for each commercial test. Values  obtained with   different assay methods cannot be used interchangeably due to   differences in   assay methods and cross-reactivity with metabolites, nor should   correction   factors be applied. Therefore, consistent use of one assay for   individual   patients is recommended.       No results found for: SIROLIMUS  Cyclosporine, LC/MS   Date Value Ref Range Status   2011 <10 (L) 100 - 400 ng/ml Final     Comment:     Reference Normals:  For Kidney Transplants: 100-300 ng/mL       No results found for this or any previous visit.    No results found for this or any previous visit.      Labs were reviewed with the patient.    Assessment:     1. Diabetic autonomic neuropathy associated with type 2 diabetes mellitus    2. Heart transplanted    3. Primary hypertension    4. Benign hypertension with CKD (chronic kidney disease) stage IV    5. Mixed diabetic hyperlipidemia associated with type 2 diabetes mellitus    6. Vasculopathy of cardiac allograft    7. -donor kidney transplant    8. Antibody mediated rejection of kidney transplant    9. Chronic immunosuppression with tacrolimus, mycophenolate        Plan:     - presents today for follow-up.  Overall doing well, minimal cardiovascular complaints.  Has chronic bilateral trace lower extremity edema which does not appear to be getting worse.  - is scheduled to undergo dobutamine stress echocardiogram later today.  We will follow the results of this.  - continue other medications unchanged.  Renal function appears to have stabilized after recent rejection of his transplanted kidney.    Return instructions as set forth by post transplant schedule or as needed:    Clinic: Return for labs and/or biopsy weekly the first month, every two weeks during month 2 and then monthly for the first year at the provider or coordinator's discretion. During the second year, return to clinic every 3 months. Post transplant year 3-5 return every 6 months. There will be a  comprehensive post transplant evaluation every year that may include LHC/RHC/biopsy, stress test, echo, CXR, and other health screening exams.    In addition to the clinical assessment, I have ordered Allomap testing for this patient to assist in identification of moderate/severe acute cellular rejection (ACR) in a pt with stable Allograft function instead of endomyocardial biopsy.     Patient is reminded to call with any health changes as these can be early signs of transplant complications. Patient is advised to make sure any new medications or changes of old medications are discussed with a pharmacist or physician knowledgeable with transplant to avoid rejection/drug toxicity related to significant drug interactions.    Patient advised that it is recommended that all transplanted patients, and their close contacts and household members receive Covid vaccination.    UNOS Patient Status  Functional Status: 80% - Normal activity with effort: some symptoms of disease  Physical Capacity: No Limitations  Working for Income: No  If no, reason not working: Patient Choice - Retired    Yoan Aguirre MD

## 2023-07-21 ENCOUNTER — PATIENT MESSAGE (OUTPATIENT)
Dept: TRANSPLANT | Facility: CLINIC | Age: 73
End: 2023-07-21
Payer: MEDICARE

## 2023-07-25 DIAGNOSIS — Z94.1 HEART TRANSPLANTED: ICD-10-CM

## 2023-07-25 DIAGNOSIS — Z94.0 KIDNEY REPLACED BY TRANSPLANT: ICD-10-CM

## 2023-07-27 DIAGNOSIS — Z94.1 HEART TRANSPLANTED: ICD-10-CM

## 2023-07-27 DIAGNOSIS — Z94.0 KIDNEY REPLACED BY TRANSPLANT: ICD-10-CM

## 2023-07-27 RX ORDER — PREDNISONE 5 MG/1
5 TABLET ORAL DAILY
Qty: 90 TABLET | Refills: 3 | Status: SHIPPED | OUTPATIENT
Start: 2023-07-27 | End: 2024-07-26

## 2023-07-27 NOTE — TELEPHONE ENCOUNTER
----- Message from Irma Crespo sent at 7/27/2023  4:24 PM CDT -----  Regarding: Refill      Name Of Caller: Nigel       Stanislaw Caceres?:   [phone]      What is the nature of the call?:    Requesting a refill for Prednisone. He will run out in two weeks. Please sent to [facility] at fax #: [phone] or call phone #: [phone].

## 2023-08-02 DIAGNOSIS — Z79.4 TYPE 2 DIABETES MELLITUS WITH STAGE 3B CHRONIC KIDNEY DISEASE, WITH LONG-TERM CURRENT USE OF INSULIN: ICD-10-CM

## 2023-08-02 DIAGNOSIS — E11.22 TYPE 2 DIABETES MELLITUS WITH STAGE 3B CHRONIC KIDNEY DISEASE, WITH LONG-TERM CURRENT USE OF INSULIN: ICD-10-CM

## 2023-08-02 DIAGNOSIS — N18.32 TYPE 2 DIABETES MELLITUS WITH STAGE 3B CHRONIC KIDNEY DISEASE, WITH LONG-TERM CURRENT USE OF INSULIN: ICD-10-CM

## 2023-08-03 RX ORDER — FLASH GLUCOSE SENSOR
KIT MISCELLANEOUS
Qty: 6 KIT | Refills: 3 | Status: SHIPPED | OUTPATIENT
Start: 2023-08-03

## 2023-08-07 RX ORDER — PREDNISONE 5 MG/1
TABLET ORAL
Qty: 90 TABLET | Refills: 3 | OUTPATIENT
Start: 2023-08-07

## 2023-08-22 NOTE — PROGRESS NOTES
Established Patient - Audio Only Telehealth Visit     The patient location is: [place]  The chief complaint leading to consultation is: Type 2 Diabetes  Visit type: Virtual visit with audio only ( telephone )  Total time spent with patient: 12 minutes    The reason for the audio only service rather than synchronous audio and video virtual visit was related to technical difficulties or patient preference/necessity.     Each patient to whom I provide medical services by telemedicine is:  (1) informed of the relationship between the physician and patient and the respective role of any other health care provider with respect to management of the patient; and (2) notified that they may decline to receive medical services by telemedicine and may withdraw from such care at any time. Patient verbally consented to receive this service via voice-only telephone call.    HISTORY OF PRESENT ILLNESS: 73 year old  male presenting  ( via telephone ) for diabetes follow up. Patient has had diabetes for several years with the following complications: neuropathy, stage IV CKD. Other pertinent conditions: HTN, HLD, cardiac + renal transplant in 2002 on immunosuppressants. He has attended diabetes education in the past.     Past tried / failed medications: Novolog 70/30 insulin was discontinued because BGs normalized.  He continues to take Ozempic without any problems. Since his last appointment in May, patient has not been hospitalized or in the emergency room.     Patient uses Freestyle Georgette 2 CGM to monitor blood sugars. Due to virtual nature of visit, I am unable to download . Per patient, fasting BGs mostly range between 80 - 110 s.  Occasionally, he has BG < 70 which he treats with sugary foods. He has lost over 50 pounds in the past 6 months, and no longer requires insulin.     DM MEDICATIONS:  Ozempic 1 mg weekly ( only takes 18 clicks )    Review of Systems   Eyes:  Negative for visual disturbance.    Gastrointestinal:  Negative for diarrhea, nausea and vomiting.   Endocrine: Negative for polydipsia, polyphagia and polyuria.     Diabetes Management Status  Statin: Taking  ACE/ARB: Not taking    Screening or Prevention Patient's value Goal Complete/Controlled?   HgA1C Testing and Control   Lab Results   Component Value Date    HGBA1C 5.8 (H) 07/20/2023      Annually/Less than 8% Yes   Lipid profile : 07/20/2023 Annually Yes   LDL control Lab Results   Component Value Date    LDLCALC 68.8 07/20/2023    Annually/Less than 100 mg/dl  Yes   Nephropathy screening Lab Results   Component Value Date    LABMICR 1273.0 04/20/2023     Lab Results   Component Value Date    PROTEINUA 3+ (A) 05/08/2023     Lab Results   Component Value Date    UTPCR 1.27 (H) 05/08/2023      Annually Yes   Blood pressure BP Readings from Last 1 Encounters:   07/20/23 (!) 164/74    Less than 140/90 No   Dilated retinal exam : 05/22/2023 Annually Yes   Foot exam   : 06/15/2022 Annually Yes     Assessment and plan:     Type 2 diabetes mellitus with stage 3b chronic kidney disease, with long-term current use of insulin    -   Continue Freestyle Georgette CGM for monitoring of blood sugars. Since Fall 2022, patient has lost greater than 50 pounds and blood sugars have stabilized with just weekly Ozempic ( only takes 17 clicks ).  Blood sugars have stabilized and he no longer needs insulin. RTC in 6 months for follow up.     Jael Garrison, RUBYC, [facility] [place]    This service was not originating from a related E/M service provided within the previous 7 days nor will  to an E/M service or procedure within the next 24 hours or my soonest available appointment.  Prevailing standard of care was able to be met in this audio-only visit.

## 2023-08-23 ENCOUNTER — OFFICE VISIT (OUTPATIENT)
Dept: DIABETES | Facility: CLINIC | Age: 73
End: 2023-08-23
Payer: MEDICARE

## 2023-08-23 DIAGNOSIS — E11.22 TYPE 2 DIABETES MELLITUS WITH STAGE 3B CHRONIC KIDNEY DISEASE, WITH LONG-TERM CURRENT USE OF INSULIN: Primary | Chronic | ICD-10-CM

## 2023-08-23 DIAGNOSIS — Z79.4 TYPE 2 DIABETES MELLITUS WITH STAGE 3B CHRONIC KIDNEY DISEASE, WITH LONG-TERM CURRENT USE OF INSULIN: Primary | Chronic | ICD-10-CM

## 2023-08-23 DIAGNOSIS — N18.32 TYPE 2 DIABETES MELLITUS WITH STAGE 3B CHRONIC KIDNEY DISEASE, WITH LONG-TERM CURRENT USE OF INSULIN: Primary | Chronic | ICD-10-CM

## 2023-08-23 DIAGNOSIS — E11.43 DIABETIC AUTONOMIC NEUROPATHY ASSOCIATED WITH TYPE 2 DIABETES MELLITUS: Chronic | ICD-10-CM

## 2023-08-23 PROCEDURE — 99442 PR PHYSICIAN TELEPHONE EVALUATION 11-20 MIN: CPT | Mod: 95,,, | Performed by: NURSE PRACTITIONER

## 2023-08-23 PROCEDURE — 99442 PR PHYSICIAN TELEPHONE EVALUATION 11-20 MIN: ICD-10-PCS | Mod: 95,,, | Performed by: NURSE PRACTITIONER

## 2023-08-25 RX ORDER — HYDRALAZINE HYDROCHLORIDE 50 MG/1
50 TABLET, FILM COATED ORAL EVERY 8 HOURS
Qty: 270 TABLET | Refills: 3 | Status: SHIPPED | OUTPATIENT
Start: 2023-08-25

## 2023-09-11 ENCOUNTER — LAB VISIT (OUTPATIENT)
Dept: LAB | Facility: HOSPITAL | Age: 73
End: 2023-09-11
Attending: UROLOGY
Payer: MEDICARE

## 2023-09-11 DIAGNOSIS — C61 PROSTATE CANCER: ICD-10-CM

## 2023-09-11 PROCEDURE — 84153 ASSAY OF PSA TOTAL: CPT | Performed by: UROLOGY

## 2023-09-11 PROCEDURE — 36415 COLL VENOUS BLD VENIPUNCTURE: CPT | Mod: PO | Performed by: UROLOGY

## 2023-09-12 LAB — COMPLEXED PSA SERPL-MCNC: 2.7 NG/ML (ref 0–4)

## 2023-09-12 RX ORDER — ATORVASTATIN CALCIUM 40 MG/1
40 TABLET, FILM COATED ORAL
Qty: 90 TABLET | Refills: 3 | Status: SHIPPED | OUTPATIENT
Start: 2023-09-12

## 2023-09-27 ENCOUNTER — OFFICE VISIT (OUTPATIENT)
Dept: UROLOGY | Facility: CLINIC | Age: 73
End: 2023-09-27
Payer: MEDICARE

## 2023-09-27 VITALS
HEART RATE: 64 BPM | BODY MASS INDEX: 31.18 KG/M2 | TEMPERATURE: 99 F | SYSTOLIC BLOOD PRESSURE: 126 MMHG | DIASTOLIC BLOOD PRESSURE: 70 MMHG | RESPIRATION RATE: 14 BRPM | HEIGHT: 74 IN | WEIGHT: 243 LBS

## 2023-09-27 DIAGNOSIS — N52.9 ERECTILE DYSFUNCTION, UNSPECIFIED ERECTILE DYSFUNCTION TYPE: ICD-10-CM

## 2023-09-27 DIAGNOSIS — R35.0 BENIGN PROSTATIC HYPERPLASIA WITH URINARY FREQUENCY: ICD-10-CM

## 2023-09-27 DIAGNOSIS — N40.1 BENIGN PROSTATIC HYPERPLASIA WITH URINARY FREQUENCY: ICD-10-CM

## 2023-09-27 DIAGNOSIS — C61 PROSTATE CANCER: Primary | ICD-10-CM

## 2023-09-27 LAB
BILIRUB UR QL STRIP: NEGATIVE
GLUCOSE UR QL STRIP: NEGATIVE
KETONES UR QL STRIP: NEGATIVE
LEUKOCYTE ESTERASE UR QL STRIP: NEGATIVE
PH, POC UA: 5.5
POC BLOOD, URINE: NEGATIVE
POC NITRATES, URINE: NEGATIVE
PROT UR QL STRIP: NEGATIVE
SP GR UR STRIP: 1.02 (ref 1–1.03)
UROBILINOGEN UR STRIP-ACNC: 0.2 (ref 0.3–2.2)

## 2023-09-27 PROCEDURE — 99999PBSHW POCT URINALYSIS, DIPSTICK, AUTOMATED, W/O SCOPE: ICD-10-PCS | Mod: PBBFAC,,,

## 2023-09-27 PROCEDURE — 99214 OFFICE O/P EST MOD 30 MIN: CPT | Mod: S$PBB,,, | Performed by: UROLOGY

## 2023-09-27 PROCEDURE — 99215 OFFICE O/P EST HI 40 MIN: CPT | Mod: PBBFAC | Performed by: UROLOGY

## 2023-09-27 PROCEDURE — 99999 PR PBB SHADOW E&M-EST. PATIENT-LVL V: ICD-10-PCS | Mod: PBBFAC,,, | Performed by: UROLOGY

## 2023-09-27 PROCEDURE — 99999PBSHW POCT URINALYSIS, DIPSTICK, AUTOMATED, W/O SCOPE: Mod: PBBFAC,,,

## 2023-09-27 PROCEDURE — 81003 URINALYSIS AUTO W/O SCOPE: CPT | Mod: PBBFAC | Performed by: UROLOGY

## 2023-09-27 PROCEDURE — 99214 PR OFFICE/OUTPT VISIT, EST, LEVL IV, 30-39 MIN: ICD-10-PCS | Mod: S$PBB,,, | Performed by: UROLOGY

## 2023-09-27 PROCEDURE — 99999 PR PBB SHADOW E&M-EST. PATIENT-LVL V: CPT | Mod: PBBFAC,,, | Performed by: UROLOGY

## 2023-09-27 NOTE — PROGRESS NOTES
Chief Complaint:   Encounter Diagnoses   Name Primary?    Prostate cancer Yes    Benign prostatic hyperplasia with urinary frequency     Erectile dysfunction, unspecified erectile dysfunction type        HPI:   9/27/23- patient would like to continue active surveillance, he is voiding well on tamsulosin, currently erections are not an issue.    3/31/23- it has been an extended time since we have seen him, he determined to pursue active surveillance after seeing Radiation Oncology.  PSA appears to be stable, current transplanted kidney is improving function.  Erections are currently not an issue.  Patient is otherwise voiding well on tamsulosin.    3/30/16: 66 yo man s/p renal transplant in 2002 here with nocturia x3, was x0-1 4-5 months ago.  No hesitancy.  Some dribble when done putting things away.Urgency.  Some double voiding.  No abd/pelvic pain and no exac/rel factors.  No hematuria.  No urolithiasis.  No urinary bother.  No  history.  Normal sexual function.  Not usually constipated.  Viagra for ED rx but not used but once.  No BPH meds.    Allergies:  No known drug allergies    Medications:  has a current medication list which includes the following prescription(s): amlodipine, atorvastatin, calcium carbonate, cholecalciferol (vitamin d3), freestyle jackie 2 sensor, fluticasone propionate, gabapentin, humulin 70/30 u-100 kwikpen, lisinopril, low-dose aspirin, metoprolol succinate, metoprolol succinate, multivit-min/fa/lycopen/lutein, mycophenolate, oxycodone-acetaminophen, ozempic, sirolimus, solifenacin, torsemide, and diazepam.    Review of Systems:  General: No fever, chills, fatigability, or weight loss.  Skin: No rashes, itching, or changes in color or texture of skin.  Chest: Denies PETERSON, cyanosis, wheezing, cough, and sputum production.  Abdomen: Appetite fine. No weight loss. Denies diarrhea, abdominal pain, hematemesis, or blood in stool.  Musculoskeletal: No joint stiffness or swelling. Some back  pain.  : As above.  All other review of systems negative.    PMH:   has a past medical history of Allergic rhinitis (2013), Blood transfusion, Cataract, CKD (chronic kidney disease), stage III (2014), -donor kidney transplant, Diabetes mellitus, type 2 (2013), Gout, arthritis (2013), Heart attack, Heart transplanted, Hyperlipidemia (2013), Hypertension, Immunodeficiency due to treatment with immunosuppressive medication, Morbid obesity (2013), Organ transplant (), Prophylactic immunotherapy, and Renal manifestation of secondary diabetes mellitus.    PSH:   has a past surgical history that includes Kidney transplant; Heart transplant; Revision total hip arthroplasty; Eye surgery; Cataract extraction (Bilateral); and Colonoscopy (N/A, 10/6/2020).    FamHx: family history includes Diabetes in his brother and maternal grandmother; Early death in his brother; Heart disease in his brother; Hyperlipidemia in his brother and sister; Hypertension in his brother; Kidney disease in his son; Stroke in his brother and mother.    SocHx:  reports that he quit smoking about 37 years ago. His smoking use included cigarettes. He has a 20.00 pack-year smoking history. He has never used smokeless tobacco. He reports current alcohol use of about 1.0 standard drink of alcohol per week. He reports that he does not use drugs.      Physical Exam:  Vitals:    22 0855   BP: (!) 154/83   Pulse: 67   Temp: 98.1 °F (36.7 °C)     General: A&Ox3, no apparent distress, no deformities  Neck: No masses, normal thyroid  Lungs: normal inspiration, no use of accessory muscles  Heart: normal pulse, no arrhythmias  Abdomen: Soft, NT, ND  Skin: The skin is warm and dry. No jaundice.  Ext: No c/c/e.  MARISOL: - 25g no nodules or fixation, otherwise benign.    Labs/Studies:    protein   pnbx Gl 6 38.9g 22  PSA 2.7   PSA 2.8   PSA 3.1   PSA 4.0   PSA 3.4   PSA 2.5   PSA  2.1 8/19    Impression/Plan:         1. Erectile dysfunction-  Patient was using TriMix, this is not an issue currently.    2. Prostate cancer- patient would like to continue active surveillance.  PSA appears to be stable, see me in 6 months with a PSA.    3. BPH- currently voiding well on tamsulosin, call when he needs a refill.

## 2023-10-12 ENCOUNTER — OFFICE VISIT (OUTPATIENT)
Dept: NEUROLOGY | Facility: CLINIC | Age: 73
End: 2023-10-12
Payer: MEDICARE

## 2023-10-12 VITALS
BODY MASS INDEX: 29.03 KG/M2 | SYSTOLIC BLOOD PRESSURE: 157 MMHG | HEIGHT: 74 IN | DIASTOLIC BLOOD PRESSURE: 76 MMHG | WEIGHT: 226.19 LBS | HEART RATE: 60 BPM | RESPIRATION RATE: 16 BRPM

## 2023-10-12 DIAGNOSIS — G25.0 DISABLING ESSENTIAL TREMOR: Primary | ICD-10-CM

## 2023-10-12 DIAGNOSIS — G25.0 BENIGN ESSENTIAL TREMOR: ICD-10-CM

## 2023-10-12 DIAGNOSIS — E11.43 DIABETIC AUTONOMIC NEUROPATHY ASSOCIATED WITH TYPE 2 DIABETES MELLITUS: Chronic | ICD-10-CM

## 2023-10-12 PROCEDURE — 99215 OFFICE O/P EST HI 40 MIN: CPT | Mod: PBBFAC | Performed by: NURSE PRACTITIONER

## 2023-10-12 PROCEDURE — 99999 PR PBB SHADOW E&M-EST. PATIENT-LVL V: ICD-10-PCS | Mod: PBBFAC,,, | Performed by: NURSE PRACTITIONER

## 2023-10-12 PROCEDURE — 99999 PR PBB SHADOW E&M-EST. PATIENT-LVL V: CPT | Mod: PBBFAC,,, | Performed by: NURSE PRACTITIONER

## 2023-10-12 PROCEDURE — 99214 PR OFFICE/OUTPT VISIT, EST, LEVL IV, 30-39 MIN: ICD-10-PCS | Mod: S$PBB,,, | Performed by: NURSE PRACTITIONER

## 2023-10-12 PROCEDURE — 99214 OFFICE O/P EST MOD 30 MIN: CPT | Mod: S$PBB,,, | Performed by: NURSE PRACTITIONER

## 2023-10-12 NOTE — PROGRESS NOTES
Subjective:       Patient ID: Nigel Albrecht is a 73 y.o. male.    Chief Complaint: Disabling essential tremor           HPI      The patient is presenting with tremors that became disabling in 2022. The tremors started insidiously and progressed slowly over time. The tremors are mainly in both arms symmetrically (minor asymmetry). The tremors do emerge during action like writing or holding things. No rest tremors. No neck tremors. No leg tremors upon standing. No voice tremors. No muscle rigidity of muscle stiffness. No postural instability. No memory loss or dementia. Cannot tell if alcohol improves the tremors. Anxiety and emotional stress exacerbates the tremor. No significant diurnal variation in the tremors. No history of strokes. No head injury. No known history of hyperthyroidism. On steroids (S/P renal and cardiac transplants). On 08- CTH NL. On 01- TSH NL. He's on Propranolol (Inderal) 40 mg BID which helps a little bit.         INTERVAL HISTORY 10-: Patient is new to me but known to Dr. Monroy. Present for ET managmnet. Patient is doing well. ET has lessened. Patient is able to feed self, he reports having better control since the last changes to his ET regimen. Tolerating Propranolol (Inderal) 40 mg BID and  Primidone 100 mg QHS, no adverse reactions noted. Patient decline any need for changes in regimen at this time.     Review of Systems   Constitutional:  Positive for fatigue. Negative for appetite change.   HENT:  Negative for hearing loss and tinnitus.    Eyes:  Positive for visual disturbance. Negative for photophobia.   Respiratory:  Negative for apnea and shortness of breath.    Cardiovascular:  Negative for chest pain and palpitations.   Gastrointestinal:  Negative for nausea and vomiting.   Endocrine: Negative for cold intolerance and heat intolerance.   Genitourinary:  Positive for difficulty urinating. Negative for urgency.   Musculoskeletal:  Positive for arthralgias.  Negative for back pain, gait problem, joint swelling, myalgias, neck pain and neck stiffness.   Skin:  Negative for color change and rash.   Allergic/Immunologic: Positive for immunocompromised state. Negative for environmental allergies.   Neurological:  Positive for tremors and numbness. Negative for dizziness, seizures, syncope, facial asymmetry, speech difficulty, weakness, light-headedness and headaches.        PHN   Hematological:  Negative for adenopathy. Does not bruise/bleed easily.   Psychiatric/Behavioral:  Negative for agitation, behavioral problems, confusion, decreased concentration, dysphoric mood, hallucinations, self-injury, sleep disturbance and suicidal ideas. The patient is not hyperactive.                  Current Outpatient Medications:     acetaminophen (TYLENOL) 325 MG tablet, Take 2 tablets (650 mg total) by mouth every 4 (four) hours as needed., Disp: , Rfl: 0    atorvastatin (LIPITOR) 40 MG tablet, TAKE 1 TABLET ONCE DAILY, Disp: 90 tablet, Rfl: 3    cream base no.171, bulk, Crea, 2 g by Misc.(Non-Drug; Combo Route) route 4 (four) times daily as needed (pain)., Disp: 240 g, Rfl: 6    FREESTYLE SHREE 2 SENSOR Kit, APPLY 1 SENSOR EVERY 14    DAYS, Disp: 6 kit, Rfl: 3    hydrALAZINE (APRESOLINE) 50 MG tablet, TAKE 1 TABLET EVERY 8 HOURS, Disp: 270 tablet, Rfl: 3    insulin NPH/Reg human (HUMULIN 70/30 U-100 KWIKPEN) 100 unit/mL (70-30) InPn pen, Inject 40 Units into the skin daily with breakfast AND 30 Units daily with dinner or evening meal., Disp: 75 mL, Rfl: 1    mycophenolate (CELLCEPT) 250 mg Cap, Take 1 capsule (250 mg total) by mouth 2 (two) times daily., Disp: 90 capsule, Rfl: 3    NIFEdipine (PROCARDIA-XL) 60 MG (OSM) 24 hr tablet, TAKE 1 TABLET TWICE A DAY, Disp: 180 tablet, Rfl: 3    predniSONE (DELTASONE) 5 MG tablet, Take 1 tablet (5 mg total) by mouth once daily., Disp: 90 tablet, Rfl: 3    primidone (MYSOLINE) 50 MG Tab, Take 2 tablets (100 mg total) by mouth every evening.,  Disp: 180 tablet, Rfl: 3    propranoloL (INDERAL) 20 MG tablet, TAKE 1 TABLET TWICE A DAY, Disp: 180 tablet, Rfl: 3    semaglutide (OZEMPIC) 1 mg/dose (2 mg/1.5 mL) PnIj, Inject 1 mg into the skin every 7 days. 50 units every 7 days, Disp: , Rfl:     tacrolimus (PROGRAF) 1 MG Cap, Take 7 capsules (7 mg total) by mouth every morning AND 7 capsules (7 mg total) every evening., Disp: 1260 capsule, Rfl: 3    tamsulosin (FLOMAX) 0.4 mg Cap, Take 1 capsule (0.4 mg total) by mouth once daily., Disp: 90 capsule, Rfl: 3    torsemide (DEMADEX) 20 MG Tab, Take 2 tablets (40 mg total) by mouth once daily., Disp: 180 tablet, Rfl: 3    aspirin 81 MG Chew, Take 1 tablet (81 mg total) by mouth once daily., Disp: , Rfl: 0    calcium acetate,phosphat bind, (PHOSLO) 667 mg capsule, Take 2 capsules (1,334 mg total) by mouth 3 (three) times daily with meals. (Patient taking differently: Take 1,334 mg by mouth once.), Disp: 180 capsule, Rfl: 11    famotidine (PEPCID) 20 MG tablet, Take 1 tablet (20 mg total) by mouth once daily., Disp: 30 tablet, Rfl: 11  Past Medical History:   Diagnosis Date    Allergic rhinitis 2013    Blood transfusion     Cataract     CKD (chronic kidney disease), stage III 2014    -donor kidney transplant     Diabetes mellitus, type 2 2013    Gout, arthritis 2013    Heart attack     Heart transplanted     Hyperlipidemia 2013    Hypertension     Immunodeficiency due to treatment with immunosuppressive medication     Morbid obesity 2013    Organ transplant 2002    heart and kidney    Orthostatic syncope 2022    Due to furosemide    Pneumonia due to COVID-19 virus 2022    Prophylactic immunotherapy     Prostate cancer 2023    Renal manifestation of secondary diabetes mellitus      Past Surgical History:   Procedure Laterality Date    CATARACT EXTRACTION Bilateral     COLONOSCOPY N/A 10/6/2020    Procedure: COLONOSCOPY;  Surgeon: Conner Redman MD;  Location: [facility]  ENDO;  Service: Endoscopy;  Laterality: N/A;    EYE SURGERY      HEART TRANSPLANT      KIDNEY TRANSPLANT      REVISION TOTAL HIP ARTHROPLASTY       Social History     Socioeconomic History    Marital status:    Occupational History     Employer: Retired   Tobacco Use    Smoking status: Former     Current packs/day: 0.00     Average packs/day: 1 pack/day for 20.0 years (20.0 ttl pk-yrs)     Types: Cigarettes     Start date: 1964     Quit date: 1984     Years since quittin.2    Smokeless tobacco: Never   Substance and Sexual Activity    Alcohol use: Yes     Alcohol/week: 1.0 standard drink of alcohol     Types: 1 Cans of beer per week     Comment: daily    Drug use: No    Sexual activity: Not Currently     Partners: Female             Past/Current Medical/Surgical History, Past/Current Social History, Past/Current Family History and Past/Current Medications were reviewed in detail.        Objective:           VITAL SIGNS WERE REVIEWED      GENERAL APPEARANCE:     The patient looks comfortable.    BMI 33.38    No signs of respiratory distress.    Normal breathing pattern.    No dysmorphic features    Normal eye contact.       GENERAL MEDICAL EXAM:    HEENT:  Head is atraumatic normocephalic.     FUNDOSCOPIC (OPHTHALMOSCOPIC) EXAMINATION showed no disc edema.      NECK: No JVD. No visible lesions or goiters.     CHEST-CARDIOPULMONARY: No cyanosis. No tachypnea. Normal respiratory effort.    [id number]-TSYYRZORFU: No jaundice. No stomas or lesions. No visible hernias. No catheters.     SKIN, HAIR, NAILS: No pathognomonic skin rash.No neurofibromatosis. No visible lesions.No stigmata of autoimmune disease. No clubbing.    LIMBS: No varicose veins. No visible swelling.    MUSCULOSKELETAL: No visible deformities.No visible lesions.           Neurologic Exam     Mental Status   Oriented to person, place, and time.   Follows 3 step commands.   Attention: normal. Concentration: normal.    Speech: speech is normal   Level of consciousness: alert  Able to name object. Able to repeat. Normal comprehension.     Cranial Nerves   Cranial nerves II through XII intact.     CN II   Visual fields full to confrontation.   Visual acuity: decreased  Right visual field deficit: none  Left visual field deficit: none     CN III, IV, VI   Pupils are equal, round, and reactive to light.  Extraocular motions are normal.   Right pupil: Size: 2 mm. Shape: regular. Reactivity: brisk. Consensual response: intact. Accommodation: intact.   Left pupil: Size: 2 mm. Shape: regular. Reactivity: brisk. Consensual response: intact. Accommodation: intact.   CN III: no CN III palsy  CN VI: no CN VI palsy  Nystagmus: none   Diplopia: none  Ophthalmoparesis: none  Upgaze: normal  Downgaze: normal  Conjugate gaze: present  Vestibulo-ocular reflex: present    CN V   Facial sensation intact.   Right facial sensation deficit: none  Left facial sensation deficit: none  Jaw jerk: normal    CN VII   Facial expression full, symmetric.   Right facial weakness: none  Left facial weakness: none    CN VIII   CN VIII normal.   Hearing: intact    CN IX, X   CN IX normal.   CN X normal.   Palate: symmetric    CN XI   CN XI normal.   Right sternocleidomastoid strength: normal  Left sternocleidomastoid strength: normal  Right trapezius strength: normal  Left trapezius strength: normal    CN XII   CN XII normal.   Tongue: not atrophic  Fasciculations: absent  Tongue deviation: none    Motor Exam   Muscle bulk: normal  Overall muscle tone: normal  Right arm tone: normal  Left arm tone: normal  Right arm pronator drift: absent  Left arm pronator drift: absent  Right leg tone: normal  Left leg tone: normal    Strength   Right neck flexion: 5/5  Left neck flexion: 5/5  Right neck extension: 5/5  Left neck extension: 5/5  Right deltoid: 5/5  Left deltoid: 5/5  Right biceps: 5/5  Left biceps: 5/5  Right triceps: 5/5  Left triceps: 5/5  Right wrist flexion:    Left wrist flexion:   Right wrist extension:   Left wrist extension:   Right interossei:   Left interossei:   Right iliopsoas:   Left iliopsoas:   Right quadriceps:   Left quadriceps:   Right hamstrin/5  Left hamstrin/5  Right glutei:   Left glutei:   Right anterior tibial:   Left anterior tibial:   Right posterior tibial:   Left posterior tibial:   Right peroneal:   Left peroneal:   Right gastroc:   Left gastroc:     Sensory Exam   Right arm light touch: normal  Left arm light touch: normal  Right leg light touch: decreased from ankle  Left leg light touch: decreased from ankle  Right arm vibration: normal  Left arm vibration: normal  Right leg vibration: decreased from toes  Left leg vibration: decreased from toes  Right arm proprioception: normal  Left arm proprioception: normal  Right leg proprioception: decreased from toes  Left leg proprioception: decreased from toes  Left arm pinprick: normal  Right leg pinprick: decreased from ankle  Left leg pinprick: decreased from ankle  Graphesthesia: normal  Stereognosis: normal    Gait, Coordination, and Reflexes     Gait  Gait: normal    Coordination   Romberg: negative  Finger to nose coordination: normal  Heel to shin coordination: normal    Tremor   Resting tremor: absent  Intention tremor: present  Action tremor: left arm and right arm    Reflexes   Right brachioradialis: 2+  Left brachioradialis: 2+  Right biceps: 2+  Left biceps: 2+  Right triceps: 2+  Left triceps: 2+  Right patellar: 1+  Left patellar: 1+  Right achilles: 0  Left achilles: 0  Right plantar: normal  Left plantar: normal  Right Licona: absent  Left Licona: absent  Right ankle clonus: absent  Left ankle clonus: absent  Right pendular knee jerk: absent  Left pendular knee jerk: absent  BUE AP 10 Hz Tremors    Severe    RT>LT       Lab Results   Component Value Date    WBC 5.40 2023    HGB 11.6 (L) 2023    HCT 37.6  (L) 07/20/2023    MCV 89 07/20/2023     07/20/2023     Sodium   Date Value Ref Range Status   07/20/2023 141 136 - 145 mmol/L Final     Potassium   Date Value Ref Range Status   07/20/2023 3.9 3.5 - 5.1 mmol/L Final     Chloride   Date Value Ref Range Status   07/20/2023 105 95 - 110 mmol/L Final     CO2   Date Value Ref Range Status   07/20/2023 28 23 - 29 mmol/L Final     Glucose   Date Value Ref Range Status   07/20/2023 99 70 - 110 mg/dL Final     BUN   Date Value Ref Range Status   07/20/2023 32 (H) 8 - 23 mg/dL Final     Creatinine   Date Value Ref Range Status   07/20/2023 2.2 (H) 0.5 - 1.4 mg/dL Final     Calcium   Date Value Ref Range Status   07/20/2023 9.0 8.7 - 10.5 mg/dL Final     Total Protein   Date Value Ref Range Status   07/20/2023 6.5 6.0 - 8.4 g/dL Final     Albumin   Date Value Ref Range Status   07/20/2023 3.2 (L) 3.5 - 5.2 g/dL Final     Total Bilirubin   Date Value Ref Range Status   07/20/2023 0.4 0.1 - 1.0 mg/dL Final     Comment:     For infants and newborns, interpretation of results should be based  on gestational age, weight and in agreement with clinical  observations.    Premature Infant recommended reference ranges:  Up to 24 hours.............<8.0 mg/dL  Up to 48 hours............<12.0 mg/dL  3-5 days..................<15.0 mg/dL  6-29 days.................<15.0 mg/dL       Alkaline Phosphatase   Date Value Ref Range Status   07/20/2023 72 55 - 135 U/L Final     AST   Date Value Ref Range Status   07/20/2023 19 10 - 40 U/L Final     ALT   Date Value Ref Range Status   07/20/2023 10 10 - 44 U/L Final     Anion Gap   Date Value Ref Range Status   07/20/2023 8 8 - 16 mmol/L Final     eGFR if    Date Value Ref Range Status   07/27/2022 15.3 (A) >60 mL/min/1.73 m^2 Final     eGFR if non    Date Value Ref Range Status   07/27/2022 13.2 (A) >60 mL/min/1.73 m^2 Final     Comment:     Calculation used to obtain the estimated glomerular filtration  rate  "(eGFR) is the CKD-EPI equation.        No results found for: "RRAMTAKV43"  Lab Results   Component Value Date    TSH 2.600 07/20/2023    J0RWKNL 87 06/22/2015    L1SGCHS 6.0 09/28/2022    FREET4 1.01 07/20/2023 08-    [facility] NL        01-    TSH NL       Reviewed the neuroimaging independently       Assessment:       1. Disabling essential tremor    2. Diabetic autonomic neuropathy associated with type 2 diabetes mellitus    3. Benign essential tremor          Plan:           ESSENTIAL TREMOR (ET), BILATERAL UPPER EXTREMITIES (BUE), MODERATE TO SEVERE - DISABLING       MANAGEMENT     Avoid stimulants like caffeine, nicotineetc.    Cut down steroids if possible    Avoid hot drinks, drink from half empty glasses.    Wear heavy bracelet/watch.    Use heavy pens to write with.     Use heavy utensils to eat with.    Use heavy plates to carry food with.     I also counseled the patient about the fact the medication decreases the amplitude and not the rate of the tremor and less effective for titubition.  I also counseled the patient that the disease is slowly progressive.       NEURO PHARMACOTHERAPY     Continue Propranolol (Inderal) 40 mg BID as a maximum dose (HR 60).    Continue Primidone 100 mg QHS.    (Maximum 250-300 mg) The main side effect is sedation and was communicated with the patient. Sexual dysfunction and depression could happen as well. The patient verbalized understanding.      NEXT OPTIONS     Topiramate (Topamax)TPM titration to  mg BID which can cause mental slowing, transient tingling, kidney stones, weight loss, cleft lip and palate and rarely glaucoma and visual field defects . The patient was encouraged to drink a lot of fluids.     Zonisamide (Zonegran) -400 mg QHS is a good alternative to TPM in case of AE/SE.       Methazolamide (Neptazane) 50 mg TID (100 mg BID) which can cause sedation, numbness, gastrointestinal upset and rarely kidney stones. Safety during " pregnancy is unknown.    Gabapentin (Neurontin)  mg TID up 600 mg TID which can cause significant sedation.          INTERVENTIONAL OPTIONS:    FROM LEAST INVASIVE TO MOST INVASIVE     Neuravive (MRI guided high intensity focused ultrasound (MRIgFUS) of the Thalamic ViM nucleus.    Gamma Knife of the Thalamic ViM nucleus.    Deep Brain Stimulation (DBS) of the Thalamic ViM vs. cZI (caudal adelina incerta)         MEDICAL/SURGICAL COMORBIDITIES     All relevant medical comorbidities noted and managed by primary care physician and medical care team.          HEALTHY LIFESTYLE AND PREVENTATIVE CARE    The patient to adhere to the age-appropriate health maintenance guidelines including screening tests and vaccinations. The patient to adhere to  healthy lifestyle, optimal weight, exercise, healthy diet, good sleep hygiene and avoiding drugs including smoking, alcohol and recreational drugs.           Darryn Marie, MSN NP      Collaborating Provider: Jessica Monroy MD, FAAN Neurologist/Epileptologist    I spent a total of 30 minutes on the day of the visit.  This includes face to face time and non-face to face time preparing to see the patient (eg, review of tests), obtaining and/or reviewing separately obtained history, documenting clinical information in the electronic or other health record, independently interpreting results and communicating results to the patient/family/caregiver, or care coordinator.

## 2023-10-27 ENCOUNTER — PATIENT MESSAGE (OUTPATIENT)
Dept: NEPHROLOGY | Facility: CLINIC | Age: 73
End: 2023-10-27
Payer: MEDICARE

## 2023-10-27 DIAGNOSIS — Z94.0 KIDNEY REPLACED BY TRANSPLANT: ICD-10-CM

## 2023-10-27 DIAGNOSIS — Z94.1 HEART TRANSPLANTED: ICD-10-CM

## 2023-10-27 NOTE — TELEPHONE ENCOUNTER
"Patient is asking for a refill Phoslo 90 day supply to [facility]. Also he would like a new prescription of Mycophenolate "90 days" instead of 45 days due to insurance prices.    "

## 2023-10-30 RX ORDER — MYCOPHENOLATE MOFETIL 250 MG/1
250 CAPSULE ORAL 2 TIMES DAILY
Qty: 90 CAPSULE | Refills: 3 | Status: SHIPPED | OUTPATIENT
Start: 2023-10-30 | End: 2023-12-21 | Stop reason: SDUPTHER

## 2023-10-30 RX ORDER — CALCIUM ACETATE 667 MG/1
1334 CAPSULE ORAL
Qty: 180 CAPSULE | Refills: 11 | Status: SHIPPED | OUTPATIENT
Start: 2023-10-30 | End: 2024-10-29

## 2023-11-21 ENCOUNTER — LAB VISIT (OUTPATIENT)
Dept: LAB | Facility: HOSPITAL | Age: 73
End: 2023-11-21
Attending: INTERNAL MEDICINE
Payer: MEDICARE

## 2023-11-21 DIAGNOSIS — Z94.0 KIDNEY REPLACED BY TRANSPLANT: ICD-10-CM

## 2023-11-21 LAB
ALBUMIN SERPL BCP-MCNC: 3.1 G/DL (ref 3.5–5.2)
ANION GAP SERPL CALC-SCNC: 11 MMOL/L (ref 8–16)
BASOPHILS # BLD AUTO: 0.02 K/UL (ref 0–0.2)
BASOPHILS NFR BLD: 0.4 % (ref 0–1.9)
BUN SERPL-MCNC: 32 MG/DL (ref 8–23)
CALCIUM SERPL-MCNC: 8.8 MG/DL (ref 8.7–10.5)
CHLORIDE SERPL-SCNC: 107 MMOL/L (ref 95–110)
CO2 SERPL-SCNC: 26 MMOL/L (ref 23–29)
CREAT SERPL-MCNC: 2.2 MG/DL (ref 0.5–1.4)
DIFFERENTIAL METHOD: ABNORMAL
EOSINOPHIL # BLD AUTO: 0.1 K/UL (ref 0–0.5)
EOSINOPHIL NFR BLD: 1.5 % (ref 0–8)
ERYTHROCYTE [DISTWIDTH] IN BLOOD BY AUTOMATED COUNT: 13.7 % (ref 11.5–14.5)
EST. GFR  (NO RACE VARIABLE): 30.9 ML/MIN/1.73 M^2
GLUCOSE SERPL-MCNC: 117 MG/DL (ref 70–110)
HCT VFR BLD AUTO: 38.1 % (ref 40–54)
HGB BLD-MCNC: 11.8 G/DL (ref 14–18)
IMM GRANULOCYTES # BLD AUTO: 0.01 K/UL (ref 0–0.04)
IMM GRANULOCYTES NFR BLD AUTO: 0.2 % (ref 0–0.5)
LYMPHOCYTES # BLD AUTO: 2 K/UL (ref 1–4.8)
LYMPHOCYTES NFR BLD: 42.7 % (ref 18–48)
MCH RBC QN AUTO: 28.6 PG (ref 27–31)
MCHC RBC AUTO-ENTMCNC: 31 G/DL (ref 32–36)
MCV RBC AUTO: 92 FL (ref 82–98)
MONOCYTES # BLD AUTO: 0.4 K/UL (ref 0.3–1)
MONOCYTES NFR BLD: 8.1 % (ref 4–15)
NEUTROPHILS # BLD AUTO: 2.2 K/UL (ref 1.8–7.7)
NEUTROPHILS NFR BLD: 47.1 % (ref 38–73)
NRBC BLD-RTO: 0 /100 WBC
PHOSPHATE SERPL-MCNC: 3.6 MG/DL (ref 2.7–4.5)
PLATELET # BLD AUTO: 209 K/UL (ref 150–450)
PMV BLD AUTO: 11.4 FL (ref 9.2–12.9)
POTASSIUM SERPL-SCNC: 4.7 MMOL/L (ref 3.5–5.1)
RBC # BLD AUTO: 4.13 M/UL (ref 4.6–6.2)
SODIUM SERPL-SCNC: 144 MMOL/L (ref 136–145)
WBC # BLD AUTO: 4.57 K/UL (ref 3.9–12.7)

## 2023-11-21 PROCEDURE — 36415 COLL VENOUS BLD VENIPUNCTURE: CPT | Mod: PN | Performed by: INTERNAL MEDICINE

## 2023-11-21 PROCEDURE — 80069 RENAL FUNCTION PANEL: CPT | Performed by: INTERNAL MEDICINE

## 2023-11-21 PROCEDURE — 87799 DETECT AGENT NOS DNA QUANT: CPT | Performed by: INTERNAL MEDICINE

## 2023-11-21 PROCEDURE — 80197 ASSAY OF TACROLIMUS: CPT | Performed by: INTERNAL MEDICINE

## 2023-11-21 PROCEDURE — 85025 COMPLETE CBC W/AUTO DIFF WBC: CPT | Performed by: INTERNAL MEDICINE

## 2023-11-22 LAB — TACROLIMUS BLD-MCNC: 23.7 NG/ML (ref 5–15)

## 2023-11-27 ENCOUNTER — TELEPHONE (OUTPATIENT)
Dept: TRANSPLANT | Facility: CLINIC | Age: 73
End: 2023-11-27
Payer: MEDICARE

## 2023-11-27 DIAGNOSIS — Z94.1 STATUS POST HEART TRANSPLANT: ICD-10-CM

## 2023-11-27 DIAGNOSIS — Z79.899 ENCOUNTER FOR LONG-TERM (CURRENT) USE OF MEDICATIONS: ICD-10-CM

## 2023-11-27 DIAGNOSIS — T86.20 COMPLICATION OF HEART TRANSPLANT, UNSPECIFIED COMPLICATION: Primary | ICD-10-CM

## 2023-11-27 LAB
BKV DNA SERPL NAA+PROBE-ACNC: <125 COPIES/ML
BKV DNA SERPL NAA+PROBE-LOG#: <2.1 LOG (10) COPIES/ML
BKV DNA SERPL QL NAA+PROBE: NOT DETECTED

## 2023-11-27 NOTE — TELEPHONE ENCOUNTER
Left a message on pt's mobile number regarding his tacrolimus level of 23 and timing of Medication and lab draw. Gave my direct phone number [phone] to call back.

## 2023-11-27 NOTE — TELEPHONE ENCOUNTER
Pt called back and stated that he did take his medication prior to lab draw and would like to repeat in the AM at the lab in [place].

## 2023-11-27 NOTE — PROGRESS NOTES
Renal note for labs:  Noted prograf level is 23.7. Pt was called by me. Pt says that he took his prograf at 6 am. Blood work was done at 9:36 am.  Ct is at baseline and unchanged.    A/P prograf level is not a trough level  Will re-order and repeat.  Collecting blood work correctly again explained to pt  Has clinic f/u on 11/30/23.    Lucila Matthew MD

## 2023-11-28 ENCOUNTER — LAB VISIT (OUTPATIENT)
Dept: LAB | Facility: HOSPITAL | Age: 73
End: 2023-11-28
Attending: INTERNAL MEDICINE
Payer: MEDICARE

## 2023-11-28 DIAGNOSIS — Z94.1 STATUS POST HEART TRANSPLANT: ICD-10-CM

## 2023-11-28 DIAGNOSIS — T86.20 COMPLICATION OF HEART TRANSPLANT, UNSPECIFIED COMPLICATION: ICD-10-CM

## 2023-11-28 DIAGNOSIS — Z79.899 ENCOUNTER FOR LONG-TERM (CURRENT) USE OF MEDICATIONS: ICD-10-CM

## 2023-11-28 LAB
ESTIMATED AVG GLUCOSE: 120 MG/DL (ref 68–131)
HBA1C MFR BLD: 5.8 % (ref 4–5.6)

## 2023-11-28 PROCEDURE — 83036 HEMOGLOBIN GLYCOSYLATED A1C: CPT | Performed by: INTERNAL MEDICINE

## 2023-11-28 PROCEDURE — 80197 ASSAY OF TACROLIMUS: CPT | Performed by: INTERNAL MEDICINE

## 2023-11-28 PROCEDURE — 36415 COLL VENOUS BLD VENIPUNCTURE: CPT | Mod: PN | Performed by: INTERNAL MEDICINE

## 2023-11-29 ENCOUNTER — TELEPHONE (OUTPATIENT)
Dept: TRANSPLANT | Facility: CLINIC | Age: 73
End: 2023-11-29
Payer: MEDICARE

## 2023-11-29 DIAGNOSIS — Z94.1 HEART TRANSPLANTED: Primary | Chronic | ICD-10-CM

## 2023-11-29 LAB — TACROLIMUS BLD-MCNC: 3.8 NG/ML (ref 5–15)

## 2023-11-29 NOTE — TELEPHONE ENCOUNTER
Reviewed repeat tac level with Dr. Cee. Level is 3.8, goal is 5-8. Previous level is 23.7. Will repeat tomorrow and adjust tac accordingly.

## 2023-11-30 ENCOUNTER — LAB VISIT (OUTPATIENT)
Dept: LAB | Facility: HOSPITAL | Age: 73
End: 2023-11-30
Attending: INTERNAL MEDICINE
Payer: MEDICARE

## 2023-11-30 ENCOUNTER — OFFICE VISIT (OUTPATIENT)
Dept: NEPHROLOGY | Facility: CLINIC | Age: 73
End: 2023-11-30
Payer: MEDICARE

## 2023-11-30 VITALS
HEART RATE: 56 BPM | BODY MASS INDEX: 29.73 KG/M2 | HEIGHT: 74 IN | WEIGHT: 231.69 LBS | DIASTOLIC BLOOD PRESSURE: 84 MMHG | SYSTOLIC BLOOD PRESSURE: 152 MMHG

## 2023-11-30 DIAGNOSIS — Z94.1 HEART TRANSPLANTED: Chronic | ICD-10-CM

## 2023-11-30 DIAGNOSIS — Z94.0 KIDNEY TRANSPLANTED: Primary | ICD-10-CM

## 2023-11-30 DIAGNOSIS — Z79.899 ENCOUNTER FOR LONG-TERM (CURRENT) USE OF MEDICATIONS: ICD-10-CM

## 2023-11-30 DIAGNOSIS — T86.20 COMPLICATION OF HEART TRANSPLANT, UNSPECIFIED COMPLICATION: ICD-10-CM

## 2023-11-30 DIAGNOSIS — Z94.1 STATUS POST HEART TRANSPLANT: ICD-10-CM

## 2023-11-30 LAB
ANION GAP SERPL CALC-SCNC: 11 MMOL/L (ref 8–16)
BUN SERPL-MCNC: 33 MG/DL (ref 8–23)
CALCIUM SERPL-MCNC: 8.9 MG/DL (ref 8.7–10.5)
CHLORIDE SERPL-SCNC: 107 MMOL/L (ref 95–110)
CO2 SERPL-SCNC: 26 MMOL/L (ref 23–29)
CREAT SERPL-MCNC: 2.3 MG/DL (ref 0.5–1.4)
EST. GFR  (NO RACE VARIABLE): 29.3 ML/MIN/1.73 M^2
GLUCOSE SERPL-MCNC: 105 MG/DL (ref 70–110)
POTASSIUM SERPL-SCNC: 4.2 MMOL/L (ref 3.5–5.1)
SODIUM SERPL-SCNC: 144 MMOL/L (ref 136–145)

## 2023-11-30 PROCEDURE — 99999 PR PBB SHADOW E&M-EST. PATIENT-LVL IV: CPT | Mod: PBBFAC,,, | Performed by: INTERNAL MEDICINE

## 2023-11-30 PROCEDURE — 99215 PR OFFICE/OUTPT VISIT, EST, LEVL V, 40-54 MIN: ICD-10-PCS | Mod: S$PBB,,, | Performed by: INTERNAL MEDICINE

## 2023-11-30 PROCEDURE — 36415 COLL VENOUS BLD VENIPUNCTURE: CPT | Mod: PN | Performed by: INTERNAL MEDICINE

## 2023-11-30 PROCEDURE — 99999 PR PBB SHADOW E&M-EST. PATIENT-LVL IV: ICD-10-PCS | Mod: PBBFAC,,, | Performed by: INTERNAL MEDICINE

## 2023-11-30 PROCEDURE — 99214 OFFICE O/P EST MOD 30 MIN: CPT | Mod: PBBFAC | Performed by: INTERNAL MEDICINE

## 2023-11-30 PROCEDURE — 80197 ASSAY OF TACROLIMUS: CPT | Performed by: INTERNAL MEDICINE

## 2023-11-30 PROCEDURE — 99215 OFFICE O/P EST HI 40 MIN: CPT | Mod: S$PBB,,, | Performed by: INTERNAL MEDICINE

## 2023-11-30 PROCEDURE — 80048 BASIC METABOLIC PNL TOTAL CA: CPT | Performed by: INTERNAL MEDICINE

## 2023-11-30 NOTE — PROGRESS NOTES
Renal clinic f/u note  Date of clinic visit: 11/30/23  Reason for f/u and chief c/o: h/o of kidney transplant      HPI: Pt is a 74 y/o male with a h/o of cadaveric kidney transplant and heart transplant at [facility] on 5/24/02, who presents for f/u. Pt was last seen in renal clinic in July 2023. Chart was reviewed. Pt has no new c/o's, feels well today, no abd pain, no fever, no SOB. Has all the prescribed meds and is taking them. Labs, meds, and immunosuppressive meds reviewed with pt. As documented a few days ago, prograf level was 23, pt was called who verified that that was a 3 hour level, not a trough level. Level was repeated 2 days ago, which this time was slightly sub-therapeutic at 3.7. Level was again repeated today (verified with pt, a true 12 hour trough level), result is pending.        Pt has had an eventful medical course since June 2022, which will be summarized here. Pt was previously on rapamune, which was switched to prograf in June 2022. A few week later, he caught COVID and experienced pneumonia and large diarrhea. During this period, prograf level was low to undetectable (<2). He was admitted to [facility] in July 2022, with Cr 12.9, supported on IVF's, FENa was 4%. Intrinsic renal damage was suggested, acute rejection was suspected, he was then transferred to [facility] in July 2022. Per review of [place] records, pt had a graft biopsy which showed antibody mediated rejection. No vascular rejection or T-cell rejection were present. Pt received further solumedrol pulse, plasmapheresis, IVIG, and retuximab. Pt was placed on valcyte for positive CMV. S cr improved markedly, but not to the prior baseline. There were no cardiac issues related to the prior heart transplant.     To review, the cause of ESRD is not known, and pt was never on dialysis before the transplant. 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     PAST SURGICAL HISTORY:  He  has a past surgical history that includes Kidney transplant; Heart transplant; Revision total hip arthroplasty; Eye surgery; Cataract extraction (Bilateral); and Colonoscopy (N/A, 10/6/2020).     SOCIAL HISTORY:  He  reports that he quit smoking about 37 years ago. His smoking use included cigarettes. He has a 20.00 pack-year smoking history. He has never used smokeless tobacco. He reports current alcohol use of about 1.0 standard drink of alcohol per week. He reports that he does not use drugs.     FAMILY MEDICAL HISTORY:  His family history includes Diabetes in his brother and maternal grandmother; Early death in his brother; Heart disease in his brother; Hyperlipidemia in his brother and sister; Hypertension in his brother; Kidney disease in his son; Stroke in his brother and mother.             Review of patient's allergies indicates:   Allergen Reactions    No known drug allergies         Meds reviewed     Current Outpatient Medications:     acetaminophen (TYLENOL) 325 MG tablet, Take 2 tablets (650 mg total) by mouth every 4 (four) hours as needed., Disp: , Rfl: 0    aspirin 81 MG Chew, Take 1 tablet (81 mg total) by mouth once daily., Disp: , Rfl: 0    atorvastatin (LIPITOR) 40 MG tablet, TAKE 1 TABLET ONCE DAILY, Disp: 90 tablet, Rfl: 3    calcium acetate,phosphat bind, (PHOSLO) 667 mg capsule, Take 2 capsules (1,334 mg total) by mouth 3 (three) times daily with meals., Disp: 180 capsule, Rfl: 11    cream base no.171, bulk, Crea, 2 g by Misc.(Non-Drug; Combo Route) route 4 (four) times daily as needed (pain).,  Disp: 240 g, Rfl: 6    famotidine (PEPCID) 20 MG tablet, Take 1 tablet (20 mg total) by mouth once daily., Disp: 30 tablet, Rfl: 11    hydrALAZINE (APRESOLINE) 50 MG tablet, TAKE 1 TABLET EVERY 8 HOURS, Disp: 270 tablet, Rfl: 3    mycophenolate (CELLCEPT) 250 mg Cap, Take 1 capsule (250 mg total) by mouth 2 (two) times daily., Disp: 90 capsule, Rfl: 3    NIFEdipine (PROCARDIA-XL) 60 MG (OSM) 24 hr tablet, TAKE 1 TABLET TWICE A DAY, Disp: 180 tablet, Rfl: 3    predniSONE (DELTASONE) 5 MG tablet, Take 1 tablet (5 mg total) by mouth once daily., Disp: 90 tablet, Rfl: 3    primidone (MYSOLINE) 50 MG Tab, Take 2 tablets (100 mg total) by mouth every evening., Disp: 180 tablet, Rfl: 3    propranoloL (INDERAL) 20 MG tablet, TAKE 1 TABLET TWICE A DAY, Disp: 180 tablet, Rfl: 3    semaglutide (OZEMPIC) 1 mg/dose (2 mg/1.5 mL) PnIj, Inject 1 mg into the skin every 7 days. 50 units every 7 days, Disp: , Rfl:     tacrolimus (PROGRAF) 1 MG Cap, Take 7 capsules (7 mg total) by mouth every morning AND 7 capsules (7 mg total) every evening., Disp: 1260 capsule, Rfl: 3    tamsulosin (FLOMAX) 0.4 mg Cap, Take 1 capsule (0.4 mg total) by mouth once daily., Disp: 90 capsule, Rfl: 3    torsemide (DEMADEX) 20 MG Tab, Take 2 tablets (40 mg total) by mouth once daily., Disp: 180 tablet, Rfl: 3    FREESTYLE SHREE 2 SENSOR Kit, APPLY 1 SENSOR EVERY 14    DAYS, Disp: 6 kit, Rfl: 3    insulin NPH/Reg human (HUMULIN 70/30 U-100 KWIKPEN) 100 unit/mL (70-30) InPn pen, Inject 40 Units into the skin daily with breakfast AND 30 Units daily with dinner or evening meal. (Patient not taking: Reported on 11/30/2023), Disp: 75 mL, Rfl: 1        REVIEW OF SYSTEMS:  Patient has no fever, fatigue, visual changes, chest pain, edema, cough, dyspnea, nausea, vomiting, constipation, diarrhea, arthralgias, pruritis, dizziness, weakness, depression, confusion.        PHYSICAL EXAM:   Blood pressure 154/84, pulse 63, weight 105.1 Kg, from 110.9 Kg, from 119.7 Kg,  from 122 Kg.  Gen:    WDWN male in no apparent distress  Psych: Normal mood and affect  Skin:    No rashes or ulcers  Neck:   No JVD  Chest:  Clear with no rales, rhonchi, wheezing with normal effort  CV:      Regular with no murmurs, gallops or rubs  Abd:     Soft, nontender, no distension  Ext:      2+ pitting edema     Labs reviewed   BMP  Lab Results   Component Value Date     11/21/2023    K 4.7 11/21/2023     11/21/2023    CO2 26 11/21/2023    BUN 32 (H) 11/21/2023    CREATININE 2.2 (H) 11/21/2023    CALCIUM 8.8 11/21/2023    ANIONGAP 11 11/21/2023    EGFRNORACEVR 30.9 (A) 11/21/2023     Lab Results   Component Value Date    WBC 4.57 11/21/2023    HGB 11.8 (L) 11/21/2023    HCT 38.1 (L) 11/21/2023    MCV 92 11/21/2023     11/21/2023       Lab Results   Component Value Date    .0 (H) 06/20/2022    CALCIUM 8.8 11/21/2023    PHOS 3.6 11/21/2023           Prograf level pending today    Repeat BK virus: not detectible  Previously:     Component Ref Range & Units 7 d ago  (7/6/23) 2 mo ago  (5/8/23) 2 mo ago  (4/20/23) 4 mo ago  (2/27/23) 5 mo ago  (2/13/23) 5 mo ago  (1/30/23) 6 mo ago  (1/13/23)   BK Virus DNA, Blood Not detected DETECTED Abnormal   DETECTED Abnormal   DETECTED Abnormal   DETECTED Abnormal   DETECTED Abnormal   DETECTED Abnormal   DETECTED Abnormal     BK Virus DNA PCR, Quant, Blood <125 Copies/mL <125  <125  294 High   193 High   697 High   332 High   344 High     Log BKV Copies/mL <2.10 Log (10) Copies/mL <2.10  <2.10 CM  2.47 High  CM  2.29 High  CM  2.84 High  CM  2.52                              IMPRESSION AND RECOMMENDATIONS:  72 y/o male with h/o of kidney and heart transplant in 2002 who presents for f/u. Pt had acute rejection in the past year:     1. Renal: s Cr stable, with baseline range. Overall, worse since experiencing acute rejection  Stable renal function. CKD stage 4  Likely has chronic rejection  K normal  Na normal  Ca normal  Acid-base stable  Stable  proteinuria     2. Immunosuppression: prograf level is pending today, mostly recently was high, then low, repeat pending   Pt takes 7 mg po q pm bid  No change in dose was recommended until repeat level available  Goal range is 5-8 (for reason of BK nephropathy)  Other medications and doses reviewed    S/p antibody mediated rejection in July 2022. No vascular rejection or T-cell rejection were present.   Pt received further solumedrol pulse, plasmapheresis, IVIG, and retuximab.   S/p valcyte for positive CMV: CMV quant titers improved     BK viremia:  Level now undetectable,  Mycophenoate dose was previously reduced from 500 mg to 250 mg po bid  Continue the same      Previously on Rapamune, switched to prograf in June 2022  H/o of kidney transplant at [facility] in 2002, cause of ESRD not clear, was never on dialysis  H/o of heart transplant at [facility] in 2002, heart failure due to IHSS. Being followed by heart transplant team in [place]     3. HTN: BP not controlled today  Pt said he hasnot taken any of his BP meds today yet  Meds reviewed     4. DM: chart was reviewed. Occurred post transplant.  Proteinuria, may be due to diabetic nephropathy     5. Med review: was done     6. Heart transplant: no current issues: has f/u in [place] in Aug 2023.     Plans and recommendations:  As discussed above  Total time spent 40 minutes including time needed to review the records, the   patient evaluation, documentation, face-to-face discussion with the patient,   more than 50% of the time was spent on coordination of care and counseling.    Level V visit.  RTC 4 months     Lucila Matthew MD

## 2023-12-01 DIAGNOSIS — Z94.1 HEART TRANSPLANTED: ICD-10-CM

## 2023-12-01 DIAGNOSIS — Z94.0 KIDNEY REPLACED BY TRANSPLANT: ICD-10-CM

## 2023-12-01 LAB — TACROLIMUS BLD-MCNC: 3.7 NG/ML (ref 5–15)

## 2023-12-01 RX ORDER — TACROLIMUS 1 MG/1
CAPSULE ORAL
Qty: 1350 CAPSULE | Refills: 3 | Status: SHIPPED | OUTPATIENT
Start: 2023-12-01 | End: 2024-11-30

## 2023-12-01 NOTE — TELEPHONE ENCOUNTER
Reviewed labs with Dr. Gloria Bocanegra. Pt's repeat tac level is 3.7, goal is 5-10. Will increase tac to 8/7 and repeat next Friday.

## 2023-12-03 DIAGNOSIS — Z71.89 COMPLEX CARE COORDINATION: ICD-10-CM

## 2023-12-08 ENCOUNTER — LAB VISIT (OUTPATIENT)
Dept: LAB | Facility: HOSPITAL | Age: 73
End: 2023-12-08
Attending: INTERNAL MEDICINE
Payer: MEDICARE

## 2023-12-08 DIAGNOSIS — T86.20 COMPLICATION OF HEART TRANSPLANT, UNSPECIFIED COMPLICATION: ICD-10-CM

## 2023-12-08 DIAGNOSIS — Z79.899 ENCOUNTER FOR LONG-TERM (CURRENT) USE OF MEDICATIONS: ICD-10-CM

## 2023-12-08 DIAGNOSIS — Z94.1 STATUS POST HEART TRANSPLANT: ICD-10-CM

## 2023-12-08 DIAGNOSIS — Z94.1 HEART TRANSPLANTED: Chronic | ICD-10-CM

## 2023-12-08 LAB
ANION GAP SERPL CALC-SCNC: 10 MMOL/L (ref 8–16)
BUN SERPL-MCNC: 34 MG/DL (ref 8–23)
CALCIUM SERPL-MCNC: 8.9 MG/DL (ref 8.7–10.5)
CHLORIDE SERPL-SCNC: 110 MMOL/L (ref 95–110)
CO2 SERPL-SCNC: 25 MMOL/L (ref 23–29)
CREAT SERPL-MCNC: 2.2 MG/DL (ref 0.5–1.4)
EST. GFR  (NO RACE VARIABLE): 30.9 ML/MIN/1.73 M^2
GLUCOSE SERPL-MCNC: 86 MG/DL (ref 70–110)
POTASSIUM SERPL-SCNC: 4.2 MMOL/L (ref 3.5–5.1)
SODIUM SERPL-SCNC: 145 MMOL/L (ref 136–145)

## 2023-12-08 PROCEDURE — 36415 COLL VENOUS BLD VENIPUNCTURE: CPT | Mod: PN | Performed by: INTERNAL MEDICINE

## 2023-12-08 PROCEDURE — 80048 BASIC METABOLIC PNL TOTAL CA: CPT | Performed by: INTERNAL MEDICINE

## 2023-12-08 PROCEDURE — 80197 ASSAY OF TACROLIMUS: CPT | Performed by: INTERNAL MEDICINE

## 2023-12-09 LAB — TACROLIMUS BLD-MCNC: 6.2 NG/ML (ref 5–15)

## 2023-12-11 ENCOUNTER — TELEPHONE (OUTPATIENT)
Dept: TRANSPLANT | Facility: CLINIC | Age: 73
End: 2023-12-11
Payer: MEDICARE

## 2023-12-11 NOTE — TELEPHONE ENCOUNTER
Called pt to review repeat tac level. Informed pt his tac level is within goal at 6.4. Will check labs in 3 months.

## 2023-12-21 ENCOUNTER — PATIENT MESSAGE (OUTPATIENT)
Dept: NEPHROLOGY | Facility: CLINIC | Age: 73
End: 2023-12-21
Payer: MEDICARE

## 2023-12-21 DIAGNOSIS — Z94.1 HEART TRANSPLANTED: ICD-10-CM

## 2023-12-21 DIAGNOSIS — Z94.0 KIDNEY REPLACED BY TRANSPLANT: ICD-10-CM

## 2023-12-21 RX ORDER — MYCOPHENOLATE MOFETIL 250 MG/1
250 CAPSULE ORAL 2 TIMES DAILY
Qty: 180 CAPSULE | Refills: 3 | Status: SHIPPED | OUTPATIENT
Start: 2023-12-21 | End: 2024-12-20

## 2024-01-25 RX ORDER — TAMSULOSIN HYDROCHLORIDE 0.4 MG/1
1 CAPSULE ORAL
Qty: 90 CAPSULE | Refills: 3 | Status: SHIPPED | OUTPATIENT
Start: 2024-01-25

## 2024-01-26 ENCOUNTER — PATIENT OUTREACH (OUTPATIENT)
Dept: ADMINISTRATIVE | Facility: HOSPITAL | Age: 74
End: 2024-01-26
Payer: MEDICARE

## 2024-01-26 NOTE — PROGRESS NOTES
PCP VISIT > 12 MONTHS: per chart review pt is overdue for annual visit, spoke to pt he states he will self schedule, he requested lab orders from 03/26/24 be added to lab appt 03/19/24 so he would only have to do one lab visit, lab orders combined to 03/19/24 visit.

## 2024-02-14 ENCOUNTER — PATIENT OUTREACH (OUTPATIENT)
Dept: ADMINISTRATIVE | Facility: HOSPITAL | Age: 74
End: 2024-02-14
Payer: MEDICARE

## 2024-02-14 NOTE — PROGRESS NOTES
LAST PCP VISIT > 12 MONTHS: per chart review pt is overdue for annual visit, pt is already scheduled for annual with Dr Singletary 02/28/24.

## 2024-02-27 DIAGNOSIS — Z00.00 ENCOUNTER FOR MEDICARE ANNUAL WELLNESS EXAM: ICD-10-CM

## 2024-02-28 ENCOUNTER — OFFICE VISIT (OUTPATIENT)
Dept: FAMILY MEDICINE | Facility: CLINIC | Age: 74
End: 2024-02-28
Attending: FAMILY MEDICINE
Payer: MEDICARE

## 2024-02-28 VITALS
WEIGHT: 233.25 LBS | BODY MASS INDEX: 29.93 KG/M2 | OXYGEN SATURATION: 98 % | HEIGHT: 74 IN | SYSTOLIC BLOOD PRESSURE: 120 MMHG | DIASTOLIC BLOOD PRESSURE: 76 MMHG | TEMPERATURE: 97 F | HEART RATE: 68 BPM

## 2024-02-28 DIAGNOSIS — E11.43 DIABETIC AUTONOMIC NEUROPATHY ASSOCIATED WITH TYPE 2 DIABETES MELLITUS: ICD-10-CM

## 2024-02-28 DIAGNOSIS — F33.41 RECURRENT MAJOR DEPRESSIVE DISORDER, IN PARTIAL REMISSION: ICD-10-CM

## 2024-02-28 DIAGNOSIS — I12.9 BENIGN HYPERTENSION WITH CKD (CHRONIC KIDNEY DISEASE) STAGE IV: ICD-10-CM

## 2024-02-28 DIAGNOSIS — N25.81 SECONDARY HYPERPARATHYROIDISM OF RENAL ORIGIN: ICD-10-CM

## 2024-02-28 DIAGNOSIS — Z94.1 HEART TRANSPLANTED: ICD-10-CM

## 2024-02-28 DIAGNOSIS — G25.0 DISABLING ESSENTIAL TREMOR: ICD-10-CM

## 2024-02-28 DIAGNOSIS — I77.1 TORTUOUS AORTA: ICD-10-CM

## 2024-02-28 DIAGNOSIS — C61 PROSTATE CANCER: ICD-10-CM

## 2024-02-28 DIAGNOSIS — N18.4 BENIGN HYPERTENSION WITH CKD (CHRONIC KIDNEY DISEASE) STAGE IV: ICD-10-CM

## 2024-02-28 PROBLEM — B25.9 CYTOMEGALOVIRUS (CMV) VIREMIA: Status: RESOLVED | Noted: 2022-08-05 | Resolved: 2024-02-28

## 2024-02-28 PROBLEM — B19.20 HEPATITIS C TEST POSITIVE: Status: RESOLVED | Noted: 2022-07-07 | Resolved: 2024-02-28

## 2024-02-28 PROCEDURE — 99999 PR PBB SHADOW E&M-EST. PATIENT-LVL V: CPT | Mod: PBBFAC,,, | Performed by: FAMILY MEDICINE

## 2024-02-28 PROCEDURE — 99214 OFFICE O/P EST MOD 30 MIN: CPT | Mod: S$PBB,,, | Performed by: FAMILY MEDICINE

## 2024-02-28 PROCEDURE — 99215 OFFICE O/P EST HI 40 MIN: CPT | Mod: PBBFAC,PO | Performed by: FAMILY MEDICINE

## 2024-02-28 NOTE — PROGRESS NOTES
Subjective:       Patient ID: Nigel Albrecht is a 73 y.o. male.    Chief Complaint: Annual Exam    73  y old male with t2DM , HTN ,prostate ca ,  DLP , CKD s/p OHT and DDKT here [place] , On aspirin, he walks 15 min daily 3 times weekly , He does not follow any renal diet , no low carb . Opthalmology :  Seen at [facility]  : seen last  month  , Fastin bs : 72-80     , 150  1 hrs after dinner  . F.u with Dr Vázquez([facility]) and transplant team in [place] . Denies any CP . Sob , palpations . off of prednisone. On active surveillance for prostate cancer . Still dealing with symptoms of post herpetic neuralgia . Depression is in remission .     Hypertension    Review of Systems   Constitutional: Negative.    HENT: Negative.     Eyes: Negative.    Respiratory: Negative.     Cardiovascular: Negative.    Gastrointestinal: Negative.    Genitourinary: Negative.    Musculoskeletal: Negative.    Skin: Negative.    Hematological: Negative.        Objective:      Physical Exam  Constitutional:       General: He is not in acute distress.     Appearance: He is well-developed. He is not diaphoretic.   HENT:      Head: Normocephalic and atraumatic.      Right Ear: External ear normal.      Left Ear: External ear normal.      Nose: Nose normal.      Mouth/Throat:      Pharynx: No oropharyngeal exudate.   Eyes:      General: No scleral icterus.        Right eye: No discharge.         Left eye: No discharge.      Conjunctiva/sclera: Conjunctivae normal.      Pupils: Pupils are equal, round, and reactive to light.   Neck:      Thyroid: No thyromegaly.      Vascular: No JVD.      Trachea: No tracheal deviation.   Cardiovascular:      Rate and Rhythm: Normal rate and regular rhythm.      Heart sounds: Normal heart sounds. No murmur heard.     No friction rub. No gallop.   Pulmonary:      Effort: Pulmonary effort is normal. No respiratory distress.      Breath sounds: Normal breath sounds. No stridor. No wheezing or rales.   Chest:       Chest wall: No tenderness.   Abdominal:      General: Bowel sounds are normal. There is no distension.      Palpations: Abdomen is soft.      Tenderness: There is no abdominal tenderness. There is no guarding or rebound.   Musculoskeletal:         General: No tenderness. Normal range of motion.      Cervical back: Normal range of motion and neck supple.   Lymphadenopathy:      Cervical: No cervical adenopathy.   Skin:     General: Skin is warm and dry.      Coloration: Skin is not pale.      Findings: No erythema or rash.   Neurological:      Mental Status: He is alert and oriented to person, place, and time.      Cranial Nerves: No cranial nerve deficit.      Motor: No abnormal muscle tone.      Coordination: Coordination normal.      Deep Tendon Reflexes: Reflexes are normal and symmetric. Reflexes normal.   Psychiatric:         Behavior: Behavior normal.         Thought Content: Thought content normal.         Judgment: Judgment normal.         Assessment:       1. Diabetic autonomic neuropathy associated with type 2 diabetes mellitus    2. Prostate cancer    3. Heart transplanted    4. Benign hypertension with CKD (chronic kidney disease) stage IV    5. Secondary hyperparathyroidism of renal origin    6. Recurrent major depressive disorder, in partial remission    7. Tortuous aorta        Plan:     Nigel was seen today for annual exam.    Diagnoses and all orders for this visit:    Diabetic autonomic neuropathy associated with type 2 diabetes mellitus    Prostate cancer    Heart transplanted    Benign hypertension with CKD (chronic kidney disease) stage IV    Secondary hyperparathyroidism of renal origin    Recurrent major depressive disorder, in partial remission    Tortuous aorta     Controlled. Cont med   F.u with urology   F.u with Transplant team   Controlled. F.u with neprho   In remission   Statin

## 2024-02-29 RX ORDER — PRIMIDONE 50 MG/1
100 TABLET ORAL NIGHTLY
Qty: 180 TABLET | Refills: 3 | Status: SHIPPED | OUTPATIENT
Start: 2024-02-29 | End: 2024-04-16 | Stop reason: SDUPTHER

## 2024-03-08 ENCOUNTER — TELEPHONE (OUTPATIENT)
Dept: TRANSPLANT | Facility: CLINIC | Age: 74
End: 2024-03-08
Payer: MEDICARE

## 2024-03-08 DIAGNOSIS — R06.02 SHORTNESS OF BREATH: ICD-10-CM

## 2024-03-08 DIAGNOSIS — T86.20 COMPLICATION OF HEART TRANSPLANT, UNSPECIFIED COMPLICATION: Primary | ICD-10-CM

## 2024-03-08 DIAGNOSIS — Z79.899 ENCOUNTER FOR LONG-TERM (CURRENT) USE OF MEDICATIONS: ICD-10-CM

## 2024-03-08 DIAGNOSIS — I10 HYPERTENSION, UNSPECIFIED TYPE: ICD-10-CM

## 2024-03-08 DIAGNOSIS — Z94.1 STATUS POST HEART TRANSPLANT: ICD-10-CM

## 2024-03-08 DIAGNOSIS — E78.2 MIXED HYPERLIPIDEMIA: ICD-10-CM

## 2024-03-08 DIAGNOSIS — Z12.5 SCREENING FOR PROSTATE CANCER: ICD-10-CM

## 2024-03-19 ENCOUNTER — LAB VISIT (OUTPATIENT)
Dept: LAB | Facility: HOSPITAL | Age: 74
End: 2024-03-19
Attending: UROLOGY
Payer: MEDICARE

## 2024-03-19 ENCOUNTER — PATIENT MESSAGE (OUTPATIENT)
Dept: ADMINISTRATIVE | Facility: CLINIC | Age: 74
End: 2024-03-19
Payer: MEDICARE

## 2024-03-19 DIAGNOSIS — Z94.0 KIDNEY TRANSPLANTED: ICD-10-CM

## 2024-03-19 DIAGNOSIS — C61 PROSTATE CANCER: ICD-10-CM

## 2024-03-19 LAB
ALBUMIN SERPL BCP-MCNC: 3.2 G/DL (ref 3.5–5.2)
ANION GAP SERPL CALC-SCNC: 7 MMOL/L (ref 8–16)
BASOPHILS # BLD AUTO: 0.02 K/UL (ref 0–0.2)
BASOPHILS NFR BLD: 0.4 % (ref 0–1.9)
BUN SERPL-MCNC: 33 MG/DL (ref 8–23)
CALCIUM SERPL-MCNC: 8.7 MG/DL (ref 8.7–10.5)
CHLORIDE SERPL-SCNC: 110 MMOL/L (ref 95–110)
CO2 SERPL-SCNC: 28 MMOL/L (ref 23–29)
COMPLEXED PSA SERPL-MCNC: 3.8 NG/ML (ref 0–4)
CREAT SERPL-MCNC: 2.4 MG/DL (ref 0.5–1.4)
DIFFERENTIAL METHOD BLD: ABNORMAL
EOSINOPHIL # BLD AUTO: 0.1 K/UL (ref 0–0.5)
EOSINOPHIL NFR BLD: 1.3 % (ref 0–8)
ERYTHROCYTE [DISTWIDTH] IN BLOOD BY AUTOMATED COUNT: 13.2 % (ref 11.5–14.5)
EST. GFR  (NO RACE VARIABLE): 27.8 ML/MIN/1.73 M^2
GLUCOSE SERPL-MCNC: 112 MG/DL (ref 70–110)
HCT VFR BLD AUTO: 38.9 % (ref 40–54)
HGB BLD-MCNC: 11.9 G/DL (ref 14–18)
IMM GRANULOCYTES # BLD AUTO: 0.01 K/UL (ref 0–0.04)
IMM GRANULOCYTES NFR BLD AUTO: 0.2 % (ref 0–0.5)
LYMPHOCYTES # BLD AUTO: 1.7 K/UL (ref 1–4.8)
LYMPHOCYTES NFR BLD: 35.6 % (ref 18–48)
MCH RBC QN AUTO: 28.5 PG (ref 27–31)
MCHC RBC AUTO-ENTMCNC: 30.6 G/DL (ref 32–36)
MCV RBC AUTO: 93 FL (ref 82–98)
MONOCYTES # BLD AUTO: 0.3 K/UL (ref 0.3–1)
MONOCYTES NFR BLD: 6.5 % (ref 4–15)
NEUTROPHILS # BLD AUTO: 2.7 K/UL (ref 1.8–7.7)
NEUTROPHILS NFR BLD: 56 % (ref 38–73)
NRBC BLD-RTO: 0 /100 WBC
PHOSPHATE SERPL-MCNC: 3.7 MG/DL (ref 2.7–4.5)
PLATELET # BLD AUTO: 207 K/UL (ref 150–450)
PMV BLD AUTO: 11.2 FL (ref 9.2–12.9)
POTASSIUM SERPL-SCNC: 4.5 MMOL/L (ref 3.5–5.1)
RBC # BLD AUTO: 4.17 M/UL (ref 4.6–6.2)
SODIUM SERPL-SCNC: 145 MMOL/L (ref 136–145)
WBC # BLD AUTO: 4.75 K/UL (ref 3.9–12.7)

## 2024-03-19 PROCEDURE — 80197 ASSAY OF TACROLIMUS: CPT | Performed by: INTERNAL MEDICINE

## 2024-03-19 PROCEDURE — 85025 COMPLETE CBC W/AUTO DIFF WBC: CPT | Performed by: INTERNAL MEDICINE

## 2024-03-19 PROCEDURE — 84153 ASSAY OF PSA TOTAL: CPT | Performed by: UROLOGY

## 2024-03-19 PROCEDURE — 36415 COLL VENOUS BLD VENIPUNCTURE: CPT | Mod: PN | Performed by: UROLOGY

## 2024-03-19 PROCEDURE — 80069 RENAL FUNCTION PANEL: CPT | Performed by: INTERNAL MEDICINE

## 2024-03-20 LAB — TACROLIMUS BLD-MCNC: 8.8 NG/ML (ref 5–15)

## 2024-04-05 ENCOUNTER — OFFICE VISIT (OUTPATIENT)
Dept: UROLOGY | Facility: CLINIC | Age: 74
End: 2024-04-05
Payer: MEDICARE

## 2024-04-05 VITALS
BODY MASS INDEX: 29.81 KG/M2 | HEIGHT: 74 IN | SYSTOLIC BLOOD PRESSURE: 136 MMHG | HEART RATE: 57 BPM | DIASTOLIC BLOOD PRESSURE: 73 MMHG | WEIGHT: 232.25 LBS

## 2024-04-05 DIAGNOSIS — N52.9 ERECTILE DYSFUNCTION, UNSPECIFIED ERECTILE DYSFUNCTION TYPE: ICD-10-CM

## 2024-04-05 DIAGNOSIS — N40.1 BENIGN PROSTATIC HYPERPLASIA WITH URINARY FREQUENCY: ICD-10-CM

## 2024-04-05 DIAGNOSIS — C61 PROSTATE CANCER: Primary | ICD-10-CM

## 2024-04-05 DIAGNOSIS — R35.0 BENIGN PROSTATIC HYPERPLASIA WITH URINARY FREQUENCY: ICD-10-CM

## 2024-04-05 PROCEDURE — 99999 PR PBB SHADOW E&M-EST. PATIENT-LVL V: CPT | Mod: PBBFAC,,, | Performed by: UROLOGY

## 2024-04-05 PROCEDURE — 99214 OFFICE O/P EST MOD 30 MIN: CPT | Mod: S$PBB,,, | Performed by: UROLOGY

## 2024-04-05 PROCEDURE — 99215 OFFICE O/P EST HI 40 MIN: CPT | Mod: PBBFAC | Performed by: UROLOGY

## 2024-04-05 RX ORDER — SILDENAFIL 100 MG/1
100 TABLET, FILM COATED ORAL DAILY PRN
Qty: 10 TABLET | Refills: 11 | Status: SHIPPED | OUTPATIENT
Start: 2024-04-05

## 2024-04-05 NOTE — PROGRESS NOTES
Chief Complaint:   Encounter Diagnoses   Name Primary?    Prostate cancer Yes    Benign prostatic hyperplasia with urinary frequency     Erectile dysfunction, unspecified erectile dysfunction type        HPI:   4/5/24- otherwise voiding well on tamsulosin, here today to discuss his PSA.  Patient also would like to re-attempt the Viagra.    3/31/23- it has been an extended time since we have seen him, he determined to pursue active surveillance after seeing Radiation Oncology.  PSA appears to be stable, current transplanted kidney is improving function.  Erections are currently not an issue.  Patient is otherwise voiding well on tamsulosin.    3/30/16: 66 yo man s/p renal transplant in 2002 here with nocturia x3, was x0-1 4-5 months ago.  No hesitancy.  Some dribble when done putting things away.Urgency.  Some double voiding.  No abd/pelvic pain and no exac/rel factors.  No hematuria.  No urolithiasis.  No urinary bother.  No  history.  Normal sexual function.  Not usually constipated.  Viagra for ED rx but not used but once.  No BPH meds.    Allergies:  No known drug allergies    Medications:  has a current medication list which includes the following prescription(s): amlodipine, atorvastatin, calcium carbonate, cholecalciferol (vitamin d3), freestyle jackie 2 sensor, fluticasone propionate, gabapentin, humulin 70/30 u-100 kwikpen, lisinopril, low-dose aspirin, metoprolol succinate, metoprolol succinate, multivit-min/fa/lycopen/lutein, mycophenolate, oxycodone-acetaminophen, ozempic, sirolimus, solifenacin, torsemide, and diazepam.    Review of Systems:  General: No fever, chills, fatigability, or weight loss.  Skin: No rashes, itching, or changes in color or texture of skin.  Chest: Denies PETERSON, cyanosis, wheezing, cough, and sputum production.  Abdomen: Appetite fine. No weight loss. Denies diarrhea, abdominal pain, hematemesis, or blood in stool.  Musculoskeletal: No joint stiffness or swelling. Some back  pain.  : As above.  All other review of systems negative.    PMH:   has a past medical history of Allergic rhinitis (2013), Blood transfusion, Cataract, CKD (chronic kidney disease), stage III (2014), -donor kidney transplant, Diabetes mellitus, type 2 (2013), Gout, arthritis (2013), Heart attack, Heart transplanted, Hyperlipidemia (2013), Hypertension, Immunodeficiency due to treatment with immunosuppressive medication, Morbid obesity (2013), Organ transplant (), Prophylactic immunotherapy, and Renal manifestation of secondary diabetes mellitus.    PSH:   has a past surgical history that includes Kidney transplant; Heart transplant; Revision total hip arthroplasty; Eye surgery; Cataract extraction (Bilateral); and Colonoscopy (N/A, 10/6/2020).    FamHx: family history includes Diabetes in his brother and maternal grandmother; Early death in his brother; Heart disease in his brother; Hyperlipidemia in his brother and sister; Hypertension in his brother; Kidney disease in his son; Stroke in his brother and mother.    SocHx:  reports that he quit smoking about 37 years ago. His smoking use included cigarettes. He has a 20.00 pack-year smoking history. He has never used smokeless tobacco. He reports current alcohol use of about 1.0 standard drink of alcohol per week. He reports that he does not use drugs.      Physical Exam:  Vitals:    22 0855   BP: (!) 154/83   Pulse: 67   Temp: 98.1 °F (36.7 °C)     General: A&Ox3, no apparent distress, no deformities  Neck: No masses, normal thyroid  Lungs: normal inspiration, no use of accessory muscles  Heart: normal pulse, no arrhythmias  Abdomen: Soft, NT, ND  Skin: The skin is warm and dry. No jaundice.  Ext: No c/c/e.  MARISOL: - 25g no nodules or fixation, otherwise benign.    Labs/Studies:    protein   pnbx Gl 6 38.9g 22  PSA 3.8 3/24  PSA 2.7   PSA 2.8   PSA 3.1   PSA 4.0   PSA 3.4   PSA  2.5 8/20  PSA 2.1 8/19    Impression/Plan:         1. Erectile dysfunction-  Patient was using TriMix, but does not like to use it and has not tried it in a while.  Patient would like to retry Viagra, it has been years but also after his cardiac transplant.  Therefore proceed with Viagra 100 mg, please see below in regards to our discussion today.  Reassess at the next appointment.    2. Prostate cancer- PSA did rise a little, still stable within the last couple of years though.  Patient would like to continue active surveillance.  Therefore assess a PSA as a lab visit in 3 months, if stable I have already scheduled him to be seen in 6 months with a PSA.  Will continue surveillance screening.    3. BPH- currently voiding well on tamsulosin, call when he needs a refill.    He knows that this may cause blue-green vision changes, reflux, flushing, headaches, and to not take this with nitro pills.  He understands this may cause chest pain and if so to report to the emergency department immediately.  Patient understands that this medication can cause death of taken with nitro pills for his heart.  He is understanding of all the side effects, and would still like to proceed.     Statement Selected

## 2024-04-16 ENCOUNTER — OFFICE VISIT (OUTPATIENT)
Dept: NEUROLOGY | Facility: CLINIC | Age: 74
End: 2024-04-16
Payer: MEDICARE

## 2024-04-16 VITALS
RESPIRATION RATE: 16 BRPM | HEIGHT: 74 IN | BODY MASS INDEX: 29.71 KG/M2 | WEIGHT: 231.5 LBS | DIASTOLIC BLOOD PRESSURE: 70 MMHG | HEART RATE: 58 BPM | SYSTOLIC BLOOD PRESSURE: 146 MMHG

## 2024-04-16 DIAGNOSIS — E11.43 DIABETIC AUTONOMIC NEUROPATHY ASSOCIATED WITH TYPE 2 DIABETES MELLITUS: ICD-10-CM

## 2024-04-16 DIAGNOSIS — Z94.0 DECEASED-DONOR KIDNEY TRANSPLANT: ICD-10-CM

## 2024-04-16 DIAGNOSIS — G25.0 BENIGN ESSENTIAL TREMOR: ICD-10-CM

## 2024-04-16 DIAGNOSIS — N18.32 TYPE 2 DIABETES MELLITUS WITH STAGE 3B CHRONIC KIDNEY DISEASE, WITH LONG-TERM CURRENT USE OF INSULIN: ICD-10-CM

## 2024-04-16 DIAGNOSIS — Z29.89 PROPHYLACTIC IMMUNOTHERAPY: ICD-10-CM

## 2024-04-16 DIAGNOSIS — E11.22 TYPE 2 DIABETES MELLITUS WITH STAGE 3B CHRONIC KIDNEY DISEASE, WITH LONG-TERM CURRENT USE OF INSULIN: ICD-10-CM

## 2024-04-16 DIAGNOSIS — G25.0 DISABLING ESSENTIAL TREMOR: Primary | ICD-10-CM

## 2024-04-16 DIAGNOSIS — Z94.1 HEART TRANSPLANTED: ICD-10-CM

## 2024-04-16 DIAGNOSIS — Z79.4 TYPE 2 DIABETES MELLITUS WITH STAGE 3B CHRONIC KIDNEY DISEASE, WITH LONG-TERM CURRENT USE OF INSULIN: ICD-10-CM

## 2024-04-16 PROCEDURE — 99215 OFFICE O/P EST HI 40 MIN: CPT | Mod: PBBFAC | Performed by: NURSE PRACTITIONER

## 2024-04-16 PROCEDURE — 99999 PR PBB SHADOW E&M-EST. PATIENT-LVL V: CPT | Mod: PBBFAC,,, | Performed by: NURSE PRACTITIONER

## 2024-04-16 PROCEDURE — 99214 OFFICE O/P EST MOD 30 MIN: CPT | Mod: S$PBB,,, | Performed by: NURSE PRACTITIONER

## 2024-04-16 RX ORDER — PROPRANOLOL HYDROCHLORIDE 20 MG/1
20 TABLET ORAL 2 TIMES DAILY
Qty: 180 TABLET | Refills: 3 | Status: SHIPPED | OUTPATIENT
Start: 2024-04-16

## 2024-04-16 RX ORDER — PRIMIDONE 50 MG/1
100 TABLET ORAL NIGHTLY
Qty: 180 TABLET | Refills: 3 | Status: SHIPPED | OUTPATIENT
Start: 2024-04-16

## 2024-04-16 NOTE — PROGRESS NOTES
Subjective:       Patient ID: Nigel Albrecht is a 73 y.o. male.    Chief Complaint: Disabling essential tremor and Medication Refill          HPI      The patient is presenting with tremors that became disabling in 2022. The tremors started insidiously and progressed slowly over time. The tremors are mainly in both arms symmetrically (minor asymmetry). The tremors do emerge during action like writing or holding things. No rest tremors. No neck tremors. No leg tremors upon standing. No voice tremors. No muscle rigidity of muscle stiffness. No postural instability. No memory loss or dementia. Cannot tell if alcohol improves the tremors. Anxiety and emotional stress exacerbates the tremor. No significant diurnal variation in the tremors. No history of strokes. No head injury. No known history of hyperthyroidism. On steroids (S/P renal and cardiac transplants). On 08- CTH NL. On 01- TSH NL. He's on Propranolol (Inderal) 40 mg BID which helps a little bit.     10-: Patient is new to me but known to Dr. Monroy. Present for ET managmnet. Patient is doing well. ET has lessened. Patient is able to feed self, he reports having better control since the last changes to his ET regimen. Tolerating Propranolol (Inderal) 40 mg BID and  Primidone 100 mg QHS, no adverse reactions noted. Patient decline any need for changes in regimen at this time.         INTERVAL HISTORY 04-: Present for ET managmnet. Patient is doing well. ET are unchanged. Patient is able to feed self, continues to be independent with ADL treatment. Tolerating Propranolol (Inderal) 40 mg BID and  Primidone 100 mg QHS, no adverse reactions noted. Patient decline any need for changes in regimen at this time. Refills submitted.     Review of Systems   Constitutional:  Positive for fatigue. Negative for appetite change.   HENT:  Negative for hearing loss and tinnitus.    Eyes:  Positive for visual disturbance. Negative for photophobia.    Respiratory:  Negative for apnea and shortness of breath.    Cardiovascular:  Negative for chest pain and palpitations.   Gastrointestinal:  Negative for nausea and vomiting.   Endocrine: Negative for cold intolerance and heat intolerance.   Genitourinary:  Positive for difficulty urinating. Negative for urgency.   Musculoskeletal:  Positive for arthralgias. Negative for back pain, gait problem, joint swelling, myalgias, neck pain and neck stiffness.   Skin:  Negative for color change and rash.   Allergic/Immunologic: Positive for immunocompromised state. Negative for environmental allergies.   Neurological:  Positive for tremors and numbness. Negative for dizziness, seizures, syncope, facial asymmetry, speech difficulty, weakness, light-headedness and headaches.        PHN   Hematological:  Negative for adenopathy. Does not bruise/bleed easily.   Psychiatric/Behavioral:  Negative for agitation, behavioral problems, confusion, decreased concentration, dysphoric mood, hallucinations, self-injury, sleep disturbance and suicidal ideas. The patient is not hyperactive.                  Current Outpatient Medications:     acetaminophen (TYLENOL) 325 MG tablet, Take 2 tablets (650 mg total) by mouth every 4 (four) hours as needed., Disp: , Rfl: 0    atorvastatin (LIPITOR) 40 MG tablet, TAKE 1 TABLET ONCE DAILY, Disp: 90 tablet, Rfl: 3    calcium acetate,phosphat bind, (PHOSLO) 667 mg capsule, Take 2 capsules (1,334 mg total) by mouth 3 (three) times daily with meals., Disp: 180 capsule, Rfl: 11    cream base no.171, bulk, Crea, 2 g by Misc.(Non-Drug; Combo Route) route 4 (four) times daily as needed (pain)., Disp: 240 g, Rfl: 6    FREESTYLE SHREE 2 SENSOR Kit, APPLY 1 SENSOR EVERY 14    DAYS, Disp: 6 kit, Rfl: 3    hydrALAZINE (APRESOLINE) 50 MG tablet, TAKE 1 TABLET EVERY 8 HOURS, Disp: 270 tablet, Rfl: 3    insulin NPH/Reg human (HUMULIN 70/30 U-100 KWIKPEN) 100 unit/mL (70-30) InPn pen, Inject 40 Units into the skin  daily with breakfast AND 30 Units daily with dinner or evening meal., Disp: 75 mL, Rfl: 1    mycophenolate (CELLCEPT) 250 mg Cap, Take 1 capsule (250 mg total) by mouth 2 (two) times daily., Disp: 180 capsule, Rfl: 3    NIFEdipine (PROCARDIA-XL) 60 MG (OSM) 24 hr tablet, TAKE 1 TABLET TWICE A DAY, Disp: 180 tablet, Rfl: 3    predniSONE (DELTASONE) 5 MG tablet, Take 1 tablet (5 mg total) by mouth once daily., Disp: 90 tablet, Rfl: 3    semaglutide (OZEMPIC) 1 mg/dose (2 mg/1.5 mL) PnIj, Inject 1 mg into the skin every 7 days., Disp: , Rfl:     sildenafiL (VIAGRA) 100 MG tablet, Take 1 tablet (100 mg total) by mouth daily as needed for Erectile Dysfunction., Disp: 10 tablet, Rfl: 11    tacrolimus (PROGRAF) 1 MG Cap, Take 8 capsules (8 mg total) by mouth every morning AND 7 capsules (7 mg total) every evening., Disp: 1350 capsule, Rfl: 3    tamsulosin (FLOMAX) 0.4 mg Cap, TAKE 1 CAPSULE ONCE DAILY, Disp: 90 capsule, Rfl: 3    torsemide (DEMADEX) 20 MG Tab, Take 2 tablets (40 mg total) by mouth once daily., Disp: 180 tablet, Rfl: 3    aspirin 81 MG Chew, Take 1 tablet (81 mg total) by mouth once daily., Disp: , Rfl: 0    primidone (MYSOLINE) 50 MG Tab, Take 2 tablets (100 mg total) by mouth every evening., Disp: 180 tablet, Rfl: 3    propranoloL (INDERAL) 20 MG tablet, Take 1 tablet (20 mg total) by mouth 2 (two) times daily., Disp: 180 tablet, Rfl: 3  Past Medical History:   Diagnosis Date    Allergic rhinitis 2013    Blood transfusion     Cataract     CKD (chronic kidney disease), stage III 2014    -donor kidney transplant     Diabetes mellitus, type 2 2013    Gout, arthritis 2013    Heart attack     Heart transplanted     Hyperlipidemia 2013    Hypertension     Immunodeficiency due to treatment with immunosuppressive medication     Morbid obesity 2013    Organ transplant 2002    heart and kidney    Orthostatic syncope 2022    Due to furosemide    Pneumonia due to COVID-19  virus 2022    Prophylactic immunotherapy     Prostate cancer 2023    Renal manifestation of secondary diabetes mellitus      Past Surgical History:   Procedure Laterality Date    CATARACT EXTRACTION Bilateral     COLONOSCOPY N/A 10/6/2020    Procedure: COLONOSCOPY;  Surgeon: Conner Redman MD;  Location: [facility];  Service: Endoscopy;  Laterality: N/A;    EYE SURGERY      HEART TRANSPLANT      KIDNEY TRANSPLANT      REVISION TOTAL HIP ARTHROPLASTY       Social History     Socioeconomic History    Marital status:    Occupational History     Employer: Retired   Tobacco Use    Smoking status: Former     Current packs/day: 0.00     Average packs/day: 1 pack/day for 20.0 years (20.0 ttl pk-yrs)     Types: Cigarettes     Start date: 1964     Quit date: 1984     Years since quittin.8    Smokeless tobacco: Never   Substance and Sexual Activity    Alcohol use: Yes     Alcohol/week: 1.0 standard drink of alcohol     Types: 1 Cans of beer per week     Comment: daily    Drug use: No    Sexual activity: Not Currently     Partners: Female     Social Determinants of Health     Financial Resource Strain: Patient Declined (2024)    Overall Financial Resource Strain (CARDIA)     Difficulty of Paying Living Expenses: Patient declined   Food Insecurity: No Food Insecurity (2024)    Hunger Vital Sign     Worried About Running Out of Food in the Last Year: Never true     Ran Out of Food in the Last Year: Never true   Transportation Needs: No Transportation Needs (2024)    PRAPARE - Transportation     Lack of Transportation (Medical): No     Lack of Transportation (Non-Medical): No   Physical Activity: Insufficiently Active (2024)    Exercise Vital Sign     Days of Exercise per Week: 2 days     Minutes of Exercise per Session: 20 min   Stress: No Stress Concern Present (2024)    [employer] [place] [employer] - Occupational Stress Questionnaire     Feeling of Stress : Not  at all   Social Connections: Unknown (2/21/2024)    Social Connection and Isolation Panel [NHANES]     Frequency of Communication with Friends and Family: Patient declined     Frequency of Social Gatherings with Friends and Family: Patient declined     Active Member of Clubs or Organizations: Yes     Attends Club or Organization Meetings: Patient declined     Marital Status:    Housing Stability: Unknown (2/21/2024)    Housing Stability Vital Sign     Unable to Pay for Housing in the Last Year: No     Unstable Housing in the Last Year: Patient declined             Past/Current Medical/Surgical History, Past/Current Social History, Past/Current Family History and Past/Current Medications were reviewed in detail.        Objective:           VITAL SIGNS WERE REVIEWED      GENERAL APPEARANCE:     The patient looks comfortable.    BMI 33.38    No signs of respiratory distress.    Normal breathing pattern.    No dysmorphic features    Normal eye contact.       GENERAL MEDICAL EXAM:    HEENT:  Head is atraumatic normocephalic.     FUNDOSCOPIC (OPHTHALMOSCOPIC) EXAMINATION showed no disc edema.      NECK: No JVD. No visible lesions or goiters.     CHEST-CARDIOPULMONARY: No cyanosis. No tachypnea. Normal respiratory effort.    [id number]: No jaundice. No stomas or lesions. No visible hernias. No catheters.     SKIN, HAIR, NAILS: No pathognomonic skin rash.No neurofibromatosis. No visible lesions.No stigmata of autoimmune disease. No clubbing.    LIMBS: No varicose veins. No visible swelling.    MUSCULOSKELETAL: No visible deformities.No visible lesions.           Neurologic Exam     Mental Status   Oriented to person, place, and time.   Follows 3 step commands.   Attention: normal. Concentration: normal.   Speech: speech is normal   Level of consciousness: alert  Able to name object. Able to repeat. Normal comprehension.     Cranial Nerves   Cranial nerves II through XII intact.     CN II    Visual fields full to confrontation.   Visual acuity: decreased  Right visual field deficit: none  Left visual field deficit: none     CN III, IV, VI   Pupils are equal, round, and reactive to light.  Extraocular motions are normal.   Right pupil: Size: 2 mm. Shape: regular. Reactivity: brisk. Consensual response: intact. Accommodation: intact.   Left pupil: Size: 2 mm. Shape: regular. Reactivity: brisk. Consensual response: intact. Accommodation: intact.   CN III: no CN III palsy  CN VI: no CN VI palsy  Nystagmus: none   Diplopia: none  Ophthalmoparesis: none  Upgaze: normal  Downgaze: normal  Conjugate gaze: present  Vestibulo-ocular reflex: present    CN V   Facial sensation intact.   Right facial sensation deficit: none  Left facial sensation deficit: none  Jaw jerk: normal    CN VII   Facial expression full, symmetric.   Right facial weakness: none  Left facial weakness: none    CN VIII   CN VIII normal.   Hearing: intact    CN IX, X   CN IX normal.   CN X normal.   Palate: symmetric    CN XI   CN XI normal.   Right sternocleidomastoid strength: normal  Left sternocleidomastoid strength: normal  Right trapezius strength: normal  Left trapezius strength: normal    CN XII   CN XII normal.   Tongue: not atrophic  Fasciculations: absent  Tongue deviation: none    Motor Exam   Muscle bulk: normal  Overall muscle tone: normal  Right arm tone: normal  Left arm tone: normal  Right arm pronator drift: absent  Left arm pronator drift: absent  Right leg tone: normal  Left leg tone: normal    Strength   Right neck flexion: 5/5  Left neck flexion: 5/5  Right neck extension: 5/5  Left neck extension: 5/5  Right deltoid: 5/5  Left deltoid: 5/5  Right biceps: 5/5  Left biceps: 5/5  Right triceps: 5/5  Left triceps: 5/5  Right wrist flexion: 5/5  Left wrist flexion: 5/5  Right wrist extension: 5/5  Left wrist extension: 5/5  Right interossei: 5/5  Left interossei: 5/5  Right iliopsoas: 5/5  Left iliopsoas: 5/5  Right  quadriceps: 5/5  Left quadriceps: 5/5  Right hamstrin/5  Left hamstrin/5  Right glutei: 5/5  Left glutei: 5/5  Right anterior tibial: 5/5  Left anterior tibial: 5/5  Right posterior tibial: 5/5  Left posterior tibial: 5/5  Right peroneal: 5/5  Left peroneal: 5/5  Right gastroc: 5/5  Left gastroc: 5/5    Sensory Exam   Right arm light touch: normal  Left arm light touch: normal  Right leg light touch: decreased from ankle  Left leg light touch: decreased from ankle  Right arm vibration: normal  Left arm vibration: normal  Right leg vibration: decreased from toes  Left leg vibration: decreased from toes  Right arm proprioception: normal  Left arm proprioception: normal  Right leg proprioception: decreased from toes  Left leg proprioception: decreased from toes  Left arm pinprick: normal  Right leg pinprick: decreased from ankle  Left leg pinprick: decreased from ankle  Graphesthesia: normal  Stereognosis: normal    Gait, Coordination, and Reflexes     Gait  Gait: normal    Coordination   Romberg: negative  Finger to nose coordination: normal  Heel to shin coordination: normal    Tremor   Resting tremor: absent  Intention tremor: present  Action tremor: left arm and right arm    Reflexes   Right brachioradialis: 2+  Left brachioradialis: 2+  Right biceps: 2+  Left biceps: 2+  Right triceps: 2+  Left triceps: 2+  Right patellar: 1+  Left patellar: 1+  Right achilles: 0  Left achilles: 0  Right plantar: normal  Left plantar: normal  Right Licona: absent  Left Licona: absent  Right ankle clonus: absent  Left ankle clonus: absent  Right pendular knee jerk: absent  Left pendular knee jerk: absent  BUE AP 10 Hz Tremors    Severe    RT>LT       Lab Results   Component Value Date    WBC 4.75 2024    HGB 11.9 (L) 2024    HCT 38.9 (L) 2024    MCV 93 2024     2024     Sodium   Date Value Ref Range Status   2024 145 136 - 145 mmol/L Final     Potassium   Date Value Ref Range  "Status   03/19/2024 4.5 3.5 - 5.1 mmol/L Final     Chloride   Date Value Ref Range Status   03/19/2024 110 95 - 110 mmol/L Final     CO2   Date Value Ref Range Status   03/19/2024 28 23 - 29 mmol/L Final     Glucose   Date Value Ref Range Status   03/19/2024 112 (H) 70 - 110 mg/dL Final     BUN   Date Value Ref Range Status   03/19/2024 33 (H) 8 - 23 mg/dL Final     Creatinine   Date Value Ref Range Status   03/19/2024 2.4 (H) 0.5 - 1.4 mg/dL Final     Calcium   Date Value Ref Range Status   03/19/2024 8.7 8.7 - 10.5 mg/dL Final     Total Protein   Date Value Ref Range Status   07/20/2023 6.5 6.0 - 8.4 g/dL Final     Albumin   Date Value Ref Range Status   03/19/2024 3.2 (L) 3.5 - 5.2 g/dL Final     Total Bilirubin   Date Value Ref Range Status   07/20/2023 0.4 0.1 - 1.0 mg/dL Final     Comment:     For infants and newborns, interpretation of results should be based  on gestational age, weight and in agreement with clinical  observations.    Premature Infant recommended reference ranges:  Up to 24 hours.............<8.0 mg/dL  Up to 48 hours............<12.0 mg/dL  3-5 days..................<15.0 mg/dL  6-29 days.................<15.0 mg/dL       Alkaline Phosphatase   Date Value Ref Range Status   07/20/2023 72 55 - 135 U/L Final     AST   Date Value Ref Range Status   07/20/2023 19 10 - 40 U/L Final     ALT   Date Value Ref Range Status   07/20/2023 10 10 - 44 U/L Final     Anion Gap   Date Value Ref Range Status   03/19/2024 7 (L) 8 - 16 mmol/L Final     eGFR if    Date Value Ref Range Status   07/27/2022 15.3 (A) >60 mL/min/1.73 m^2 Final     eGFR if non    Date Value Ref Range Status   07/27/2022 13.2 (A) >60 mL/min/1.73 m^2 Final     Comment:     Calculation used to obtain the estimated glomerular filtration  rate (eGFR) is the CKD-EPI equation.        No results found for: "QDEOYRWR15"  Lab Results   Component Value Date    TSH 2.600 07/20/2023    [id number] 87 06/22/2015    " D0ABCSX 6.0 2022    FREET4 1.01 2023    [facility] NL        2023    [facility] NL       Reviewed the neuroimaging independently       Assessment:       1. Disabling essential tremor    2. Diabetic autonomic neuropathy associated with type 2 diabetes mellitus    3. Benign essential tremor    4. Heart transplanted    5. -donor kidney transplant    6. Chronic immunosuppression with tacrolimus, mycophenolate    7. Type 2 diabetes mellitus with stage 3b chronic kidney disease, with long-term current use of insulin            Plan:           ESSENTIAL TREMOR (ET), BILATERAL UPPER EXTREMITIES (BUE), MODERATE TO SEVERE - DISABLING       MANAGEMENT     Avoid stimulants like caffeine, nicotineetc.    Cut down steroids if possible    Avoid hot drinks, drink from half empty glasses.    Wear heavy bracelet/watch.    Use heavy pens to write with.     Use heavy utensils to eat with.    Use heavy plates to carry food with.     I also counseled the patient about the fact the medication decreases the amplitude and not the rate of the tremor and less effective for titubition.  I also counseled the patient that the disease is slowly progressive.       NEURO PHARMACOTHERAPY     Continue Propranolol (Inderal) 40 mg BID as a maximum dose (HR 60).    Continue Primidone 100 mg QHS.    (Maximum 250-300 mg) The main side effect is sedation and was communicated with the patient. Sexual dysfunction and depression could happen as well. The patient verbalized understanding.      NEXT OPTIONS     Topiramate (Topamax)TPM titration to  mg BID which can cause mental slowing, transient tingling, kidney stones, weight loss, cleft lip and palate and rarely glaucoma and visual field defects . The patient was encouraged to drink a lot of fluids.     Zonisamide (Zonegran) -400 mg QHS is a good alternative to TPM in case of AE/SE.       Methazolamide (Neptazane) 50 mg TID (100 mg BID) which can cause sedation,  numbness, gastrointestinal upset and rarely kidney stones. Safety during pregnancy is unknown.    Gabapentin (Neurontin)  mg TID up 600 mg TID which can cause significant sedation.          INTERVENTIONAL OPTIONS:    FROM LEAST INVASIVE TO MOST INVASIVE     Neuravive (MRI guided high intensity focused ultrasound (MRIgFUS) of the Thalamic ViM nucleus.    Gamma Knife of the Thalamic ViM nucleus.    Deep Brain Stimulation (DBS) of the Thalamic ViM vs. cZI (caudal adelina incerta)         MEDICAL/SURGICAL COMORBIDITIES     All relevant medical comorbidities noted and managed by primary care physician and medical care team.          HEALTHY LIFESTYLE AND PREVENTATIVE CARE    The patient to adhere to the age-appropriate health maintenance guidelines including screening tests and vaccinations. The patient to adhere to  healthy lifestyle, optimal weight, exercise, healthy diet, good sleep hygiene and avoiding drugs including smoking, alcohol and recreational drugs.           Darryn Marie, MSN NP      Collaborating Provider: Jessica Monroy MD, FAAN Neurologist/Epileptologist    I spent a total of 30 minutes on the day of the visit.  This includes face to face time and non-face to face time preparing to see the patient (eg, review of tests), obtaining and/or reviewing separately obtained history, documenting clinical information in the electronic or other health record, independently interpreting results and communicating results to the patient/family/caregiver, or care coordinator.

## 2024-05-01 RX ORDER — TORSEMIDE 20 MG/1
40 TABLET ORAL
Qty: 180 TABLET | Refills: 3 | OUTPATIENT
Start: 2024-05-01

## 2024-05-01 RX ORDER — TORSEMIDE 20 MG/1
40 TABLET ORAL DAILY
Qty: 180 TABLET | Refills: 3 | Status: SHIPPED | OUTPATIENT
Start: 2024-05-01 | End: 2025-05-01

## 2024-05-01 NOTE — TELEPHONE ENCOUNTER
----- Message from Malathi Mary sent at 5/1/2024  9:35 AM CDT -----  Contact: Nigel   Nigel is calling to let you know that [facility] will be sending a refill request for the Torsemide 20 mg  He said he is out of the medication

## 2024-05-27 ENCOUNTER — OFFICE VISIT (OUTPATIENT)
Dept: TRANSPLANT | Facility: CLINIC | Age: 74
End: 2024-05-27
Payer: MEDICARE

## 2024-05-27 ENCOUNTER — HOSPITAL ENCOUNTER (OUTPATIENT)
Dept: RADIOLOGY | Facility: HOSPITAL | Age: 74
Discharge: HOME | End: 2024-05-27
Attending: INTERNAL MEDICINE
Payer: MEDICARE

## 2024-05-27 ENCOUNTER — HOSPITAL ENCOUNTER (OUTPATIENT)
Dept: CARDIOLOGY | Facility: HOSPITAL | Age: 74
Discharge: HOME | End: 2024-05-27
Attending: INTERNAL MEDICINE
Payer: MEDICARE

## 2024-05-27 ENCOUNTER — TELEPHONE (OUTPATIENT)
Dept: TRANSPLANT | Facility: CLINIC | Age: 74
End: 2024-05-27
Payer: MEDICARE

## 2024-05-27 VITALS
WEIGHT: 235 LBS | HEART RATE: 58 BPM | HEIGHT: 74 IN | BODY MASS INDEX: 30.16 KG/M2 | DIASTOLIC BLOOD PRESSURE: 80 MMHG | SYSTOLIC BLOOD PRESSURE: 173 MMHG

## 2024-05-27 VITALS — HEIGHT: 74 IN | BODY MASS INDEX: 29.65 KG/M2 | WEIGHT: 231 LBS

## 2024-05-27 DIAGNOSIS — T86.20 COMPLICATION OF HEART TRANSPLANT, UNSPECIFIED COMPLICATION: ICD-10-CM

## 2024-05-27 DIAGNOSIS — Z94.1 HEART TRANSPLANTED: Primary | Chronic | ICD-10-CM

## 2024-05-27 DIAGNOSIS — Z94.0 DECEASED-DONOR KIDNEY TRANSPLANT: Chronic | ICD-10-CM

## 2024-05-27 DIAGNOSIS — E78.2 MIXED HYPERLIPIDEMIA: ICD-10-CM

## 2024-05-27 DIAGNOSIS — Z94.1 STATUS POST HEART TRANSPLANT: ICD-10-CM

## 2024-05-27 DIAGNOSIS — R06.02 SHORTNESS OF BREATH: ICD-10-CM

## 2024-05-27 DIAGNOSIS — I10 HYPERTENSION, UNSPECIFIED TYPE: ICD-10-CM

## 2024-05-27 DIAGNOSIS — Z79.899 ENCOUNTER FOR LONG-TERM (CURRENT) USE OF MEDICATIONS: ICD-10-CM

## 2024-05-27 DIAGNOSIS — I12.9 BENIGN HYPERTENSION WITH CKD (CHRONIC KIDNEY DISEASE) STAGE IV: ICD-10-CM

## 2024-05-27 DIAGNOSIS — T86.290 VASCULOPATHY OF CARDIAC ALLOGRAFT: ICD-10-CM

## 2024-05-27 DIAGNOSIS — N18.4 BENIGN HYPERTENSION WITH CKD (CHRONIC KIDNEY DISEASE) STAGE IV: ICD-10-CM

## 2024-05-27 DIAGNOSIS — Z29.89 PROPHYLACTIC IMMUNOTHERAPY: Chronic | ICD-10-CM

## 2024-05-27 LAB
ASCENDING AORTA: 3.39 CM
AV INDEX (PROSTH): 0.64
AV MEAN GRADIENT: 4 MMHG
AV PEAK GRADIENT: 6 MMHG
AV VALVE AREA BY VELOCITY RATIO: 3.47 CM²
AV VALVE AREA: 2.95 CM²
AV VELOCITY RATIO: 0.75
BSA FOR ECHO PROCEDURE: 2.34 M2
CV ECHO LV RWT: 0.49 CM
CV STRESS BASE HR: 64 BPM
DIASTOLIC BLOOD PRESSURE: 65 MMHG
DOP CALC AO PEAK VEL: 1.22 M/S
DOP CALC AO VTI: 29.72 CM
DOP CALC LVOT AREA: 4.6 CM2
DOP CALC LVOT DIAMETER: 2.42 CM
DOP CALC LVOT PEAK VEL: 0.92 M/S
DOP CALC LVOT STROKE VOLUME: 87.67 CM3
DOP CALCLVOT PEAK VEL VTI: 19.07 CM
E WAVE DECELERATION TIME: 240.64 MSEC
E/A RATIO: 1.75
E/E' RATIO: 7.78 M/S
ECHO LV POSTERIOR WALL: 1.39 CM (ref 0.6–1.1)
EJECTION FRACTION: 65 %
FRACTIONAL SHORTENING: 39 % (ref 28–44)
INTERVENTRICULAR SEPTUM: 1.08 CM (ref 0.6–1.1)
LEFT INTERNAL DIMENSION IN SYSTOLE: 3.5 CM (ref 2.1–4)
LEFT VENTRICLE DIASTOLIC VOLUME INDEX: 69.36 ML/M2
LEFT VENTRICLE DIASTOLIC VOLUME: 160.23 ML
LEFT VENTRICLE MASS INDEX: 130 G/M2
LEFT VENTRICLE SYSTOLIC VOLUME INDEX: 22 ML/M2
LEFT VENTRICLE SYSTOLIC VOLUME: 50.81 ML
LEFT VENTRICULAR INTERNAL DIMENSION IN DIASTOLE: 5.7 CM (ref 3.5–6)
LEFT VENTRICULAR MASS: 300.24 G
LV LATERAL E/E' RATIO: 6.36 M/S
LV SEPTAL E/E' RATIO: 10 M/S
MV PEAK A VEL: 0.4 M/S
MV PEAK E VEL: 0.7 M/S
MV STENOSIS PRESSURE HALF TIME: 69.79 MS
MV VALVE AREA P 1/2 METHOD: 3.15 CM2
OHS CV CPX 1 MINUTE RECOVERY HEART RATE: 82 BPM
OHS CV CPX 85 PERCENT MAX PREDICTED HEART RATE MALE: 125
OHS CV CPX ESTIMATED METS: 6
OHS CV CPX MAX PREDICTED HEART RATE: 147
OHS CV CPX PATIENT IS FEMALE: 0
OHS CV CPX PATIENT IS MALE: 1
OHS CV CPX PEAK DIASTOLIC BLOOD PRESSURE: 68 MMHG
OHS CV CPX PEAK HEAR RATE: 95 BPM
OHS CV CPX PEAK RATE PRESSURE PRODUCT: ABNORMAL
OHS CV CPX PEAK SYSTOLIC BLOOD PRESSURE: 169 MMHG
OHS CV CPX PERCENT MAX PREDICTED HEART RATE ACHIEVED: 65
OHS CV CPX RATE PRESSURE PRODUCT PRESENTING: 9920
OHS CV RV/LV RATIO: 0.92 CM
PISA TR MAX VEL: 3.16 M/S
POST STRESS EJECTION FRACTION: 70 %
RA PRESSURE ESTIMATED: 3 MMHG
RIGHT VENTRICULAR END-DIASTOLIC DIMENSION: 5.24 CM
RV TB RVSP: 6 MMHG
SINUS: 3.94 CM
STJ: 3.58 CM
STRESS ECHO POST EXERCISE DUR MIN: 3 MINUTES
STRESS ECHO POST EXERCISE DUR SEC: 40 SECONDS
SYSTOLIC BLOOD PRESSURE: 155 MMHG
TDI LATERAL: 0.11 M/S
TDI SEPTAL: 0.07 M/S
TDI: 0.09 M/S
TR MAX PG: 40 MMHG
TRICUSPID ANNULAR PLANE SYSTOLIC EXCURSION: 1.58 CM
TV REST PULMONARY ARTERY PRESSURE: 43 MMHG
Z-SCORE OF LEFT VENTRICULAR DIMENSION IN END DIASTOLE: -4.66
Z-SCORE OF LEFT VENTRICULAR DIMENSION IN END SYSTOLE: -3.54

## 2024-05-27 PROCEDURE — 99214 OFFICE O/P EST MOD 30 MIN: CPT | Mod: S$PBB,,, | Performed by: INTERNAL MEDICINE

## 2024-05-27 PROCEDURE — 93351 STRESS TTE COMPLETE: CPT

## 2024-05-27 PROCEDURE — 99999 PR PBB SHADOW E&M-EST. PATIENT-LVL III: CPT | Mod: PBBFAC,,, | Performed by: INTERNAL MEDICINE

## 2024-05-27 PROCEDURE — 71046 X-RAY EXAM CHEST 2 VIEWS: CPT | Mod: 26,,, | Performed by: RADIOLOGY

## 2024-05-27 PROCEDURE — 99213 OFFICE O/P EST LOW 20 MIN: CPT | Mod: PBBFAC,25 | Performed by: INTERNAL MEDICINE

## 2024-05-27 PROCEDURE — 93351 STRESS TTE COMPLETE: CPT | Mod: 26,,, | Performed by: INTERNAL MEDICINE

## 2024-05-27 PROCEDURE — 71046 X-RAY EXAM CHEST 2 VIEWS: CPT | Mod: TC,FY

## 2024-05-27 NOTE — TELEPHONE ENCOUNTER
Reason for visit: 22 years post heart transplant clinic visit.     Immunosuppression:  Tacrolimus 8/7   MG BID  Prednisone 5 mg daily    Medications, lab and echo results reviewed with patient and Dr. Taylor. See MD note for orders and recommendations    Next follow up labs from today, Repeat abs in 6 months.

## 2024-06-12 RX ORDER — NIFEDIPINE 60 MG/1
TABLET, EXTENDED RELEASE ORAL
Qty: 180 TABLET | Refills: 3 | Status: SHIPPED | OUTPATIENT
Start: 2024-06-12

## 2024-06-26 DIAGNOSIS — E11.9 TYPE 2 DIABETES MELLITUS WITHOUT COMPLICATION: ICD-10-CM

## 2024-06-27 DIAGNOSIS — E78.5 HYPERLIPIDEMIA, UNSPECIFIED HYPERLIPIDEMIA TYPE: Primary | ICD-10-CM

## 2024-06-27 RX ORDER — ATORVASTATIN CALCIUM 40 MG/1
40 TABLET, FILM COATED ORAL
Qty: 90 TABLET | Refills: 3 | Status: SHIPPED | OUTPATIENT
Start: 2024-06-27

## 2024-06-30 DIAGNOSIS — E11.22 TYPE 2 DIABETES MELLITUS WITH STAGE 3B CHRONIC KIDNEY DISEASE, WITH LONG-TERM CURRENT USE OF INSULIN: ICD-10-CM

## 2024-06-30 DIAGNOSIS — N18.32 TYPE 2 DIABETES MELLITUS WITH STAGE 3B CHRONIC KIDNEY DISEASE, WITH LONG-TERM CURRENT USE OF INSULIN: ICD-10-CM

## 2024-06-30 DIAGNOSIS — Z79.4 TYPE 2 DIABETES MELLITUS WITH STAGE 3B CHRONIC KIDNEY DISEASE, WITH LONG-TERM CURRENT USE OF INSULIN: ICD-10-CM

## 2024-07-01 RX ORDER — FLASH GLUCOSE SENSOR
KIT MISCELLANEOUS
Qty: 6 KIT | Refills: 1 | Status: SHIPPED | OUTPATIENT
Start: 2024-07-01

## 2024-07-03 DIAGNOSIS — Z71.89 COMPLEX CARE COORDINATION: ICD-10-CM

## 2024-07-05 ENCOUNTER — LAB VISIT (OUTPATIENT)
Dept: LAB | Facility: HOSPITAL | Age: 74
End: 2024-07-05
Attending: UROLOGY
Payer: MEDICARE

## 2024-07-05 DIAGNOSIS — C61 PROSTATE CANCER: ICD-10-CM

## 2024-07-05 LAB — COMPLEXED PSA SERPL-MCNC: 4 NG/ML (ref 0–4)

## 2024-07-05 PROCEDURE — 84153 ASSAY OF PSA TOTAL: CPT | Performed by: UROLOGY

## 2024-07-05 PROCEDURE — 36415 COLL VENOUS BLD VENIPUNCTURE: CPT | Mod: PN | Performed by: UROLOGY

## 2024-07-13 ENCOUNTER — HOSPITAL ENCOUNTER (EMERGENCY)
Facility: HOSPITAL | Age: 74
Discharge: HOME | End: 2024-07-13
Attending: EMERGENCY MEDICINE
Payer: MEDICARE

## 2024-07-13 VITALS
OXYGEN SATURATION: 99 % | TEMPERATURE: 98 F | RESPIRATION RATE: 18 BRPM | WEIGHT: 226 LBS | HEART RATE: 98 BPM | BODY MASS INDEX: 29.01 KG/M2 | SYSTOLIC BLOOD PRESSURE: 119 MMHG | DIASTOLIC BLOOD PRESSURE: 72 MMHG

## 2024-07-13 DIAGNOSIS — U07.1 COVID-19: Primary | ICD-10-CM

## 2024-07-13 DIAGNOSIS — U07.1 COVID-19 VIRUS DETECTED: ICD-10-CM

## 2024-07-13 LAB — SARS-COV-2 RDRP RESP QL NAA+PROBE: POSITIVE

## 2024-07-13 PROCEDURE — 99283 EMERGENCY DEPT VISIT LOW MDM: CPT

## 2024-07-13 PROCEDURE — U0002 COVID-19 LAB TEST NON-CDC: HCPCS | Performed by: NURSE PRACTITIONER

## 2024-07-13 NOTE — ED PROVIDER NOTES
Encounter Date: 2024       History     Chief Complaint   Patient presents with    URI     Cough, congestion with headache x 4 days. Denies N/V, Chest pain and SOB . Pt has been taking tylenol and zyrtec with little improvement.     74-year-old male with complaint of cough and congestion runny nose over the past 3-4 days.  Denies fever.  Denies difficulty breathing.  Denies chest pain.        Review of patient's allergies indicates:  No Known Allergies  Past Medical History:   Diagnosis Date    Allergic rhinitis 2013    Blood transfusion     Cataract     CKD (chronic kidney disease), stage III 2014    -donor kidney transplant     Diabetes mellitus, type 2 2013    Gout, arthritis 2013    Heart attack     Heart transplanted     Hyperlipidemia 2013    Hypertension     Immunodeficiency due to treatment with immunosuppressive medication     Morbid obesity 2013    Organ transplant 2002    heart and kidney    Orthostatic syncope 2022    Due to furosemide    Pneumonia due to COVID-19 virus 2022    Prophylactic immunotherapy     Prostate cancer 2023    Renal manifestation of secondary diabetes mellitus      Past Surgical History:   Procedure Laterality Date    CATARACT EXTRACTION Bilateral     COLONOSCOPY N/A 10/6/2020    Procedure: COLONOSCOPY;  Surgeon: Conner Redman MD;  Location: [facility];  Service: Endoscopy;  Laterality: N/A;    EYE SURGERY      HEART TRANSPLANT      KIDNEY TRANSPLANT      REVISION TOTAL HIP ARTHROPLASTY       Family History   Problem Relation Name Age of Onset    Stroke Mother      Hyperlipidemia Sister 2     Diabetes Brother 4 decsd/ 5     Early death Brother 4 decsd/      Heart disease Brother 4 decsd/ 5     Hyperlipidemia Brother 4 decsd/ 5     Hypertension Brother 4 decsd/ 5     Stroke Brother 4 decsd/ 5     Kidney disease Son 2     Diabetes Maternal Grandmother      Melanoma Neg Hx      Eczema Neg Hx      Lupus Neg Hx      Psoriasis Neg  Hx       Social History     Tobacco Use    Smoking status: Former     Current packs/day: 0.00     Average packs/day: 1 pack/day for 20.0 years (20.0 ttl pk-yrs)     Types: Cigarettes     Start date: 1964     Quit date: 1984     Years since quittin.0    Smokeless tobacco: Never   Substance Use Topics    Alcohol use: Yes     Alcohol/week: 1.0 standard drink of alcohol     Types: 1 Cans of beer per week     Comment: daily    Drug use: No     Review of Systems   Constitutional:  Negative for fever.   HENT:  Positive for congestion. Negative for sore throat.    Respiratory:  Positive for cough. Negative for shortness of breath.    Cardiovascular:  Negative for chest pain.   Gastrointestinal:  Negative for nausea.   Genitourinary:  Negative for dysuria.   Musculoskeletal:  Negative for back pain.   Skin:  Negative for rash.   Neurological:  Negative for weakness.   Hematological:  Does not bruise/bleed easily.       Physical Exam     Initial Vitals [24 1837]   BP Pulse Resp Temp SpO2   119/72 98 18 98.3 °F (36.8 °C) 99 %      MAP       --         Physical Exam    Nursing note and vitals reviewed.  Constitutional: He appears well-developed and well-nourished.   HENT:   Head: Normocephalic and atraumatic.   + nasal congestion    Eyes: Conjunctivae are normal. Pupils are equal, round, and reactive to light.   Neck: Neck supple.   Normal range of motion.  Cardiovascular:  Normal rate, regular rhythm, normal heart sounds and intact distal pulses.           Pulmonary/Chest: Breath sounds normal.   Abdominal: Abdomen is soft. There is no rebound and no guarding.   Musculoskeletal:         General: Normal range of motion.      Cervical back: Normal range of motion and neck supple.     Neurological: He is alert and oriented to person, place, and time. He has normal strength.   Skin: Skin is warm and dry.   Psychiatric: He has a normal mood and affect. His behavior is normal. Thought content normal.         ED  Course   Procedures  Labs Reviewed   SARS-COV-2 RNA AMPLIFICATION, QUAL - Abnormal; Notable for the following components:       Result Value    SARS-CoV-2 RNA, Amplification, Qual Positive (*)     All other components within normal limits     Labs Reviewed   SARS-COV-2 RNA AMPLIFICATION, QUAL - Abnormal; Notable for the following components:       Result Value    SARS-CoV-2 RNA, Amplification, Qual Positive (*)     All other components within normal limits            Imaging Results    None          Medications - No data to display  Medical Decision Making                                    Clinical Impression:  Final diagnoses:  [U07.1] COVID-19 (Primary)          ED Disposition Condition    Discharge Stable          ED Prescriptions    None       Follow-up Information       Follow up With Specialties Details Why Contact Info    Krystin Zimmerman MD Family Medicine Schedule an appointment as soon as possible for a visit in 2 days  [address] [facility] [address]  [phone]               Danielito Walker, BELÉN  07/13/24 1927

## 2024-07-14 ENCOUNTER — NURSE TRIAGE (OUTPATIENT)
Dept: ADMINISTRATIVE | Facility: CLINIC | Age: 74
End: 2024-07-14
Payer: MEDICARE

## 2024-07-14 NOTE — TELEPHONE ENCOUNTER
Kidney transplant(5/24/2002), and heart tranplant( 5/24/2002) Pt responded to [facility] text message. Pt called x2 with no ocntact made. VM left.  Reason for Disposition   Message left on identified voice mail    Protocols used: No Contact or Duplicate Contact Call-A-

## 2024-07-15 ENCOUNTER — TELEPHONE (OUTPATIENT)
Dept: TRANSPLANT | Facility: CLINIC | Age: 74
End: 2024-07-15
Payer: MEDICARE

## 2024-07-15 ENCOUNTER — PATIENT OUTREACH (OUTPATIENT)
Dept: EMERGENCY MEDICINE | Facility: HOSPITAL | Age: 74
End: 2024-07-15
Payer: MEDICARE

## 2024-07-15 NOTE — PROGRESS NOTES
Patient was seen in the ED on 7/13/24. Phoned patient to assist with Post ED Discharge Navigation. Patient declined assistance scheduling a PCP follow-up appointment.  Margo Mehta

## 2024-07-17 ENCOUNTER — TELEPHONE (OUTPATIENT)
Dept: ADMINISTRATIVE | Facility: CLINIC | Age: 74
End: 2024-07-17
Payer: MEDICARE

## 2024-07-17 NOTE — PROGRESS NOTES
Spoke to patient today for Post ED Tracker Assessment. Pt wanted to know about how long he should quarantine after being discharged with Covid. I pulled up patient's discharge summary and informed him the Covid-19 Discharge instructions are listed as follows:     Please isolate yourself at home. You may leave home and/or return to work once the following conditions are met:  If you were not hospitalized and are not moderately to severely immunocompromised:  More than 5 days since symptoms first appeared AND  More than 24 hours fever free without medications AND  Symptoms are improving  Continue to wear a mask around others for 5 additional days.   If you were hospitalized OR are moderately to severely immunocompromised:  More than 20 days since symptoms first appeared  More than 24 hours fever free without medications  Symptoms have improved  If you had no symptoms but tested positive:  More than 5 days since the date of the first positive test (20 days if moderately to severely  immunocompromised). If you develop symptoms, then use the guidelines above.  Continue to wear a mask around others for 5 additional days.    Pt verbalized understanding and had no additional needs at this time and stated he does have a copy of the discharge summary from the hospital. Closing encounter.

## 2024-07-22 NOTE — PROGRESS NOTES
Subjective:   Mr. Albrecht is a 74 y.o. year old Black or  male who received a  heart and kidney transplant on 5/24/02.      CMV status: Unknown    73 YO M w/ PMH OHTx 5/24/02 and kidney tx 5/25/02 at [facility],  HTN, CKD, DM, HLD here for his 22nd annual follow up.    Seen in clinic last 1 year prior. In July 2022 he had an episode of AUBREY due to [facility] and was admitted to the hospital, treated with steroids and PLEX. Had a single session of HD but did not receive any additional sessions. He completed PLEX and got IV IG as outpatient.    Here today doing well overall. No major changes from his last visit with us a year ago. Patient denies N/V/F/C, lightheadedness, dizziness, PND, orthopnea, LE edema, abdominal pain, abdominal pressure, chest pain, chest pressure, syncope, pre-syncope.   Continued issues with some leg swelling, volume, followed by nephrology locally.     DSE today:     Left Ventricle: The left ventricle is normal in size. Normal wall thickness. Normal wall motion. There is normal systolic function with a visually estimated ejection fraction of 60 - 65%. Ejection fraction by visual approximation is 65%. There is normal diastolic function.    Right Ventricle: Normal right ventricular cavity size. Wall thickness is normal. Systolic function is normal.    IVC/SVC: Normal venous pressure at 3 mmHg.    Pericardium: There is an effusion adjacent to the right atrium.    Stress Protocol: The patient exercised for 3 minutes 40 seconds on a high ramp protocol, corresponding to a functional capacity of 6METS, achieving a peak heart rate of 95 bpm, which is 65% of the age predicted maximum heart rate. Their exercise capacity was moderately impaired. The patient reported no symptoms during the stress test. The test was stopped because the patient experienced fatigue.    Baseline ECG: The Baseline ECG reveals sinus rhythm with RBBB. The axis is normal. The ST segments are normal.    Stress ECG: There are  no ST segment deviation identified during the protocol. During stress, rare PACs are noted. During stress, occasional PVCs are noted. There is normal blood pressure response with stress.    ECG Conclusion: The ECG portion of the study is negative for ischemia. Sensitivity is reduced due to failure to reach target heart rate.    Post-stress Echo: The left ventricle systolic function is hyperdynamic with an EF of 70%. Right ventricle systolic function is normal.    Post-stress Impression: The study is negative with no echocardiographic evidence of stress induced ischemia.    Immunosuppression: tac 8/7 MMF 250mg po BID, prednisone 5mg   Immuno goal levels: 5-8  Immuno history (intolerance, finance, allergy, etc.): Rap DC      HLA/ DSA:  5/24/24 DSA DETECTED DR53(98473)--POSSIBLE WEAK DSA DETECTED: DR4(1360), DR52(1452)  7/20/23  DR53(77028)--UNABLE TO DETERMINE IF DQA AND DP ANTIBODIES ARE DSA C1Q invalid  7/19/2022 DR53(15661)  5/4/2022 DSA DR53 (97244) C1Q Invalid  1/10/2022 DR53 (81966) C1Q invalid  6/14/2021 DR53 (70,496) C1Q DR53 ((44453)  6/3/2020 DR53(38708)   C1Q:  DR53(89009)   9/11/17 DSA DR53(91618) C1Q:DR53(59697)      Last Echo: 5/27/24 Stress echo 60-65 % Negative     Last LHC: Dr. Clemons  07/19/2013 Coronaries normal         Infections: none  Prior or present malignancies:   Other complications:         Health Maintenance:   Cscope- 10/4/20  BMD 7/2023 normal, repeat 2025  Derm 6/9/2020 normal findings  CXR 5/27/24 - normal findings  Last CT chest 06/22/2016, Stable middle lobe pulmonary micronodule. No further surveillance.    Review of Systems   Constitutional: Negative for chills and fever.   HENT:  Negative for hearing loss.    Eyes:  Negative for visual disturbance.   Cardiovascular:  Positive for dyspnea on exertion and leg swelling. Negative for chest pain, irregular heartbeat, orthopnea, palpitations, paroxysmal nocturnal dyspnea and syncope.   Respiratory:  Negative for cough and shortness of  "breath.    Musculoskeletal:  Negative for muscle weakness.   Gastrointestinal:  Negative for diarrhea, nausea and vomiting.   Neurological:  Negative for focal weakness.   Psychiatric/Behavioral:  Negative for memory loss.        Objective:   Blood pressure (!) 173/80, pulse (!) 58, height 6' 2" (1.88 m), weight 106.6 kg (235 lb 0.2 oz).body mass index is 30.17 kg/m².    Physical Exam  Vitals reviewed.   Constitutional:       General: He is not in acute distress.  HENT:      Head: Atraumatic.   Eyes:      Extraocular Movements: Extraocular movements intact.   Cardiovascular:      Rate and Rhythm: Normal rate and regular rhythm.      Heart sounds: Normal heart sounds.   Pulmonary:      Breath sounds: Normal breath sounds.   Abdominal:      Palpations: Abdomen is soft.      Tenderness: There is no abdominal tenderness.   Musculoskeletal:         General: Normal range of motion.      Right lower leg: Edema present.      Left lower leg: Edema present.   Neurological:      General: No focal deficit present.      Mental Status: He is alert and oriented to person, place, and time.         Lab Results   Component Value Date    WBC 4.55 05/27/2024    HGB 11.6 (L) 05/27/2024    HCT 38.1 (L) 05/27/2024    MCV 94 05/27/2024     05/27/2024    CO2 27 05/27/2024    CREATININE 2.5 (H) 05/27/2024    CALCIUM 8.8 05/27/2024    ALBUMIN 3.3 (L) 05/27/2024    AST 24 05/27/2024     (H) 05/27/2024    ALT 13 05/27/2024    .0 (H) 06/20/2022       Lab Results   Component Value Date    INR 1.0 07/04/2022    INR 1.0 04/30/2019    INR 0.9 06/22/2016       BNP   Date Value Ref Range Status   05/27/2024 565 (H) 0 - 99 pg/mL Final     Comment:     Values of less than 100 pg/ml are consistent with non-CHF populations.   07/20/2023 345 (H) 0 - 99 pg/mL Final     Comment:     Values of less than 100 pg/ml are consistent with non-CHF populations.   08/05/2022 261 (H) 0 - 99 pg/mL Final     Comment:     Values of less than 100 pg/ml " "are consistent with non-CHF populations.       LD   Date Value Ref Range Status   07/24/2022 306 (H) 110 - 260 U/L Final     Comment:     Results are increased in hemolyzed samples.   07/04/2022 305 (H) 110 - 260 U/L Final     Comment:     Results are increased in hemolyzed samples.   06/25/2022 216 110 - 260 U/L Final     Comment:     Results are increased in hemolyzed samples.       Tacrolimus Lvl   Date Value Ref Range Status   05/27/2024 5.5 5.0 - 15.0 ng/mL Final     Comment:     Testing performed by a chemiluminescent microparticle   immunoassay on the Secure-NOK System.    CAUTION: No firm therapeutic range exists for tacrolimus in whole   blood. The   complexity of the clinical state, individual differences in   sensitivity to   immunosuppressive and nephrotoxic effects of tacrolimus,   co-administration   of other immunosuppressants, type of transplant, time post-transplant   and a   number of other factors contribute to different requirements for   optimal   blood levels of tacrolimus. Therefore, individual tacrolimus values   cannot   be used as the sole indicator for making changes in treatment regimen   and   each patient should be thoroughly evaluated clinically before changes   in   treatment regimens are made. Each user must establish his or her own   ranges   based on clinical experience.  Therapeutic ranges vary according to the commercial test used, and   therefore   should be established for each commercial test. Values obtained with   different assay methods cannot be used interchangeably due to   differences in   assay methods and cross-reactivity with metabolites, nor should   correction   factors be applied. Therefore, consistent use of one assay for   individual   patients is recommended.       No results found for: "SIROLIMUS"  Cyclosporine, LC/MS   Date Value Ref Range Status   03/01/2011 <10 (L) 100 - 400 ng/ml Final     Comment:     Reference Normals:  For Kidney Transplants: " 100-300 ng/mL     Labs were reviewed with the patient.    Assessment:     1. Heart transplanted    2. Vasculopathy of cardiac allograft    3. -donor kidney transplant    4. Benign hypertension with CKD (chronic kidney disease) stage IV    5. Chronic immunosuppression with tacrolimus, mycophenolate          Plan:     Just completed DSE. Feels well overall. Blood pressrue up but better at home. Will followup on rest of labs and adjust accordingly.   Goal ldl is less than 70, if ldl comes back elevated increase statin.     Return instructions as set forth by post transplant schedule or as needed:    Clinic: Return for labs and/or biopsy weekly the first month, every two weeks during month 2 and then monthly for the first year at the provider or coordinator's discretion. During the second year, return to clinic every 3 months. Post transplant year 3-5 return every 6 months. There will be a comprehensive post transplant evaluation every year that may include LHC/RHC/biopsy, stress test, echo, CXR, and other health screening exams.    In addition to the clinical assessment, I have ordered Allomap testing for this patient to assist in identification of moderate/severe acute cellular rejection (ACR) in a pt with stable Allograft function instead of endomyocardial biopsy.     Patient is reminded to call with any health changes as these can be early signs of transplant complications. Patient is advised to make sure any new medications or changes of old medications are discussed with a pharmacist or physician knowledgeable with transplant to avoid rejection/drug toxicity related to significant drug interactions.    Patient advised that it is recommended that all transplanted patients, and their close contacts and household members receive Covid vaccination.    UNOS Patient Status  Functional Status: 80% - Normal activity with effort: some symptoms of disease  Physical Capacity: No Limitations  Working for Income:  No  If no, reason not working: Patient Choice - Retired    Leann Cee MD

## 2024-08-19 RX ORDER — CALCIUM ACETATE 667 MG/1
1334 CAPSULE ORAL
Qty: 180 CAPSULE | Refills: 11 | Status: SHIPPED | OUTPATIENT
Start: 2024-08-19 | End: 2025-08-19

## 2024-08-27 NOTE — PROGRESS NOTES
"PCP: Dr. Singletary    HISTORY OF PRESENT ILLNESS: 74 year old  male presenting for diabetes follow up.     Patient has had diabetes for several years with the following complications: neuropathy, stage IV CKD. Other pertinent conditions: HTN, HLD, cardiac + renal transplant in 2002 on immunosuppressants. He has attended diabetes education in the past.  Past tried / failed medications: Humulin 70/30 insulin was discontinued because BGs normalized.  He continues to take Ozempic without any problems.     Since his last office visit, patient has not been hospitalized or in the emergency room. Patient uses Freestyle Georgette 2 CGM to monitor blood sugars. Per manual review, for the last 14 days, average BG is 106 with BG ranges between 99 - 148.  He has spent 97 % of time in range; 1 % low; and 2 % above range.      Blood glucose in clinic today: 211 mg/dl at 10:30 am  ( just finished drinking juice )    Vitals:    08/28/24 1022   BP: 106/60   Pulse: 105   SpO2: 97%   Weight: 106.9 kg (235 lb 12.5 oz)   Height: 6' 2" (1.88 m)     BMI 30.27    DM MEDICATIONS:  Ozempic 1 mg weekly ( only takes 18 clicks )    Review of Systems   Eyes:  Negative for visual disturbance.   Gastrointestinal:  Negative for diarrhea, nausea and vomiting.   Endocrine: Negative for polydipsia, polyphagia and polyuria.     Diabetes Management Status  Statin: Taking  ACE/ARB: Not taking    Screening or Prevention Patient's value Goal Complete/Controlled?   HgA1C Testing and Control   Lab Results   Component Value Date    HGBA1C 5.9 (H) 05/27/2024      Annually/Less than 8% Yes   Lipid profile : 05/27/2024 Annually Yes   LDL control Lab Results   Component Value Date    LDLCALC 64.4 05/27/2024    Annually/Less than 100 mg/dl  Yes   Nephropathy screening Lab Results   Component Value Date    LABMICR 1273.0 04/20/2023     Lab Results   Component Value Date    PROTEINUA 3+ (A) 05/08/2023     Lab Results   Component Value Date    UTPCR 1.27 (H) " 05/08/2023      Annually Yes   Blood pressure BP Readings from Last 1 Encounters:   08/28/24 106/60    Less than 140/90 No   Dilated retinal exam : 01/17/2024 Annually Yes   Foot exam   : 08/28/2024 Annually Yes     ACTIVITY LEVEL: Rarely Active. Discussed activities, benefits, methods, and precautions.  MEAL PLANNING: Patient reports number of meals per day to be 2 and number of snacks per day to be 2.   Patient is encouraged to carb count and consume no more than 45 - 60 grams of carbohydrates in each meal, and 1800 k / jovany per day.      BLOOD GLUCOSE TESTING:  Freestyle Georgette 2 CGM  SOCIAL HISTORY: . Lives with spouse. Has 2 children. Patient retired as manager for [employer].     Objective:     Physical Exam  Constitutional:       Appearance: He is well-developed.   HENT:      Head: Normocephalic and atraumatic.   Eyes:      Pupils: Pupils are equal, round, and reactive to light.   Cardiovascular:      Rate and Rhythm: Normal rate and regular rhythm.      Pulses:           Dorsalis pedis pulses are 2+ on the right side and 2+ on the left side.   Pulmonary:      Effort: Pulmonary effort is normal.      Breath sounds: Normal breath sounds.   Feet:      Right foot:      Protective Sensation: 6 sites tested.  6 sites sensed.      Skin integrity: No ulcer, callus or dry skin.      Left foot:      Protective Sensation: 6 sites tested.  6 sites sensed.      Skin integrity: No ulcer, callus or dry skin.   Skin:     General: Skin is warm and dry.      Findings: No rash.   Psychiatric:         Behavior: Behavior normal.         Thought Content: Thought content normal.         Judgment: Judgment normal.       Assessment / Plan:     1.) Diabetic autonomic neuropathy associated with type 2 diabetes mellitus  -  Continue Freestyle Georgette CGM for monitoring of blood sugars.  Will upgrade patient to the Freestyle Georgette 3 system, prescription for sensors and  sent to pharmacy. Since Fall 2022,  patient has lost greater than 50 pounds and blood sugars have stabilized with just weekly Ozempic ( only takes 17 clicks, 0.25 mg ).  Blood sugars have stabilized and he no longer needs insulin. Repeat A1C added to labs in October.    Orders:  -     POCT Glucose, Hand-Held Device  -     semaglutide (OZEMPIC) 0.25 mg or 0.5 mg (2 mg/3 mL) pen injector; Inject 0.5 mg into the skin every 7 days.  Dispense: 9 mL; Refill: 1  -     Hemoglobin A1C; Standing  -     blood-glucose sensor (FREESTYLE SHREE 3 SENSOR) [place]; Inject 1 Device into the skin every 14 (fourteen) days.  Dispense: 6 each; Refill: 3  -     blood-glucose meter,continuous (FREESTYLE SHREE 3 READER) [facility] Use as directed  Dispense: 1 each; Refill: 0    2.) Primary hypertension - continue medication as prescribed.    Additional Plan Details:    1.) Continue monitoring blood sugar via CGM. Discussed ADA goal for fasting blood sugar, 80 - 130 mg/dL; pp blood sugars below 180 mg/dl. Also, discussed prevention of hypoglycemia and the need to adjust goals to higher levels if persistent hypoglycemia.  Reminded to bring BG records or meter to each visit for review.  2.) Return to clinic in 6months for follow up. The patient was explained the above plan and given opportunity to ask questions.  He understands, chooses and consents to this plan and accepts all the risks, which include but are not limited to the risks mentioned above. He understands the alternative of having no testing, interventions or treatments at this time. He left content and without further questions.     OTTO Mendoza, [facility]    A total of 30 minutes was spent in face to face time, of which over 50 % was spent in counseling patient on disease process, complications, treatment, and side effects of medications.

## 2024-08-28 ENCOUNTER — OFFICE VISIT (OUTPATIENT)
Dept: DIABETES | Facility: CLINIC | Age: 74
End: 2024-08-28
Payer: MEDICARE

## 2024-08-28 ENCOUNTER — TELEPHONE (OUTPATIENT)
Dept: DIABETES | Facility: CLINIC | Age: 74
End: 2024-08-28
Payer: MEDICARE

## 2024-08-28 VITALS
BODY MASS INDEX: 30.26 KG/M2 | OXYGEN SATURATION: 97 % | SYSTOLIC BLOOD PRESSURE: 106 MMHG | HEART RATE: 105 BPM | DIASTOLIC BLOOD PRESSURE: 60 MMHG | WEIGHT: 235.81 LBS | HEIGHT: 74 IN

## 2024-08-28 DIAGNOSIS — I10 PRIMARY HYPERTENSION: Chronic | ICD-10-CM

## 2024-08-28 DIAGNOSIS — E11.43 DIABETIC AUTONOMIC NEUROPATHY ASSOCIATED WITH TYPE 2 DIABETES MELLITUS: Primary | Chronic | ICD-10-CM

## 2024-08-28 LAB — GLUCOSE SERPL-MCNC: 211 MG/DL (ref 70–110)

## 2024-08-28 PROCEDURE — 99999 PR PBB SHADOW E&M-EST. PATIENT-LVL IV: CPT | Mod: PBBFAC,,, | Performed by: NURSE PRACTITIONER

## 2024-08-28 PROCEDURE — 99214 OFFICE O/P EST MOD 30 MIN: CPT | Mod: S$PBB,,, | Performed by: NURSE PRACTITIONER

## 2024-08-28 PROCEDURE — 99999PBSHW POCT GLUCOSE, HAND-HELD DEVICE: Mod: PBBFAC,,,

## 2024-08-28 PROCEDURE — 99214 OFFICE O/P EST MOD 30 MIN: CPT | Mod: PBBFAC,PO | Performed by: NURSE PRACTITIONER

## 2024-08-28 PROCEDURE — 82962 GLUCOSE BLOOD TEST: CPT | Mod: PBBFAC,PO | Performed by: NURSE PRACTITIONER

## 2024-08-28 RX ORDER — SEMAGLUTIDE 0.68 MG/ML
0.5 INJECTION, SOLUTION SUBCUTANEOUS
Qty: 9 ML | Refills: 1 | Status: SHIPPED | OUTPATIENT
Start: 2024-08-28

## 2024-08-28 RX ORDER — BLOOD-GLUCOSE,RECEIVER,CONT
EACH MISCELLANEOUS
Qty: 1 EACH | Refills: 0 | Status: SHIPPED | OUTPATIENT
Start: 2024-08-28

## 2024-08-28 RX ORDER — BLOOD-GLUCOSE SENSOR
1 EACH MISCELLANEOUS
Qty: 6 EACH | Refills: 3 | Status: SHIPPED | OUTPATIENT
Start: 2024-08-28 | End: 2025-08-28

## 2024-08-28 NOTE — TELEPHONE ENCOUNTER
This is to inform you that your Prior Authorization request for the above members Ozempic (0.25 or  0.5 MG/DOSE) 2MG/3ML SC SOPN has been approved. If you are changing the member's therapy  the previously approved therapy will be canceled and replaced.  The authorization is valid from 07/29/2024 through 08/28/2025. A letter of explanation will also be  mailed to the patient

## 2024-08-29 ENCOUNTER — TELEPHONE (OUTPATIENT)
Dept: DIABETES | Facility: CLINIC | Age: 74
End: 2024-08-29
Payer: MEDICARE

## 2024-08-29 NOTE — TELEPHONE ENCOUNTER
PA Outcome: blood-glucose meter,continuous (FREESTYLE SHREE 3 READER) Misc     Note from payer: Your PA request has been approved. Additional information will be provided in the approval communication. (Message 114)  Approval Details    Authorized from July 29, 2024 to August 28, 2025

## 2024-09-05 ENCOUNTER — TELEPHONE (OUTPATIENT)
Dept: UROLOGY | Facility: CLINIC | Age: 74
End: 2024-09-05
Payer: MEDICARE

## 2024-09-05 NOTE — TELEPHONE ENCOUNTER
Called the patient, after verification of name and , the patient was informed that Dr Kelly is in sx on  so we are not able to move his appt . I offered the pt to see La instead since it was a follow up. Pt stated he will see La. Appt made pt voiced understanding         ----- Message from Allen Reilly MA sent at 2024  1:36 PM CDT -----  Contact: [email or web address] [phone]  Apt called                  Pt is requesting a call back to speak with staff to see if upcoming appt on 10/09/24 to be rescheduled to 10/08/24 if possible.

## 2024-09-26 ENCOUNTER — PATIENT MESSAGE (OUTPATIENT)
Dept: ADMINISTRATIVE | Facility: CLINIC | Age: 74
End: 2024-09-26
Payer: MEDICARE

## 2024-09-30 ENCOUNTER — OFFICE VISIT (OUTPATIENT)
Dept: FAMILY MEDICINE | Facility: CLINIC | Age: 74
End: 2024-09-30
Payer: MEDICARE

## 2024-09-30 VITALS
DIASTOLIC BLOOD PRESSURE: 76 MMHG | WEIGHT: 235.69 LBS | SYSTOLIC BLOOD PRESSURE: 127 MMHG | BODY MASS INDEX: 30.25 KG/M2 | HEIGHT: 74 IN

## 2024-09-30 DIAGNOSIS — N40.1 BENIGN PROSTATIC HYPERPLASIA WITH URINARY FREQUENCY: ICD-10-CM

## 2024-09-30 DIAGNOSIS — C61 PROSTATE CANCER: ICD-10-CM

## 2024-09-30 DIAGNOSIS — G25.0 BENIGN ESSENTIAL TREMOR: ICD-10-CM

## 2024-09-30 DIAGNOSIS — E11.43 DIABETIC AUTONOMIC NEUROPATHY ASSOCIATED WITH TYPE 2 DIABETES MELLITUS: Chronic | ICD-10-CM

## 2024-09-30 DIAGNOSIS — N25.81 SECONDARY HYPERPARATHYROIDISM OF RENAL ORIGIN: ICD-10-CM

## 2024-09-30 DIAGNOSIS — Z00.00 ENCOUNTER FOR PREVENTIVE HEALTH EXAMINATION: Primary | ICD-10-CM

## 2024-09-30 DIAGNOSIS — N18.4 CONTROLLED TYPE 2 DIABETES MELLITUS WITH STAGE 4 CHRONIC KIDNEY DISEASE, WITHOUT LONG-TERM CURRENT USE OF INSULIN: ICD-10-CM

## 2024-09-30 DIAGNOSIS — F33.41 RECURRENT MAJOR DEPRESSIVE DISORDER, IN PARTIAL REMISSION: ICD-10-CM

## 2024-09-30 DIAGNOSIS — R35.0 BENIGN PROSTATIC HYPERPLASIA WITH URINARY FREQUENCY: ICD-10-CM

## 2024-09-30 DIAGNOSIS — Z94.1 HEART TRANSPLANTED: Chronic | ICD-10-CM

## 2024-09-30 DIAGNOSIS — I12.9 BENIGN HYPERTENSION WITH CKD (CHRONIC KIDNEY DISEASE) STAGE IV: ICD-10-CM

## 2024-09-30 DIAGNOSIS — Z29.89 PROPHYLACTIC IMMUNOTHERAPY: Chronic | ICD-10-CM

## 2024-09-30 DIAGNOSIS — N18.4 BENIGN HYPERTENSION WITH CKD (CHRONIC KIDNEY DISEASE) STAGE IV: ICD-10-CM

## 2024-09-30 DIAGNOSIS — I70.0 AORTIC ATHEROSCLEROSIS: ICD-10-CM

## 2024-09-30 DIAGNOSIS — E11.22 CONTROLLED TYPE 2 DIABETES MELLITUS WITH STAGE 4 CHRONIC KIDNEY DISEASE, WITHOUT LONG-TERM CURRENT USE OF INSULIN: ICD-10-CM

## 2024-09-30 NOTE — PATIENT INSTRUCTIONS
Counseling and Referral of Other Preventative  (Italic type indicates deductible and co-insurance are waived)    Patient Name: Nigel Albrecht  Today's Date: 9/30/2024    Health Maintenance       Date Due Completion Date    Diabetes Urine Screening 04/20/2024 4/20/2023    Influenza Vaccine (1) 09/01/2024 9/27/2023    Override on 10/10/2014: Done    COVID-19 Vaccine (6 - 2024-25 season) 09/01/2024 10/12/2022    Hemoglobin A1c 11/27/2024 5/27/2024    Eye Exam 01/17/2025 1/17/2024    Override on 8/10/2017: Done (Per mAinata Perry)    Override on 2/18/2016: Done (Please see media for report from Aminata Perry Ph# [phone] or [phone])    Override on 7/1/2014: Done    Lipid Panel 05/27/2025 5/27/2024    PROSTATE-SPECIFIC ANTIGEN 07/05/2025 7/5/2024    DEXA Scan 07/20/2025 7/20/2023    Foot Exam 08/28/2025 8/28/2024    Override on 8/22/2018: Done    Override on 5/7/2018: Done    Override on 3/5/2018: Done    Override on 9/11/2017: Done    Override on 5/30/2017: Done    Override on 2/19/2017: Done    Override on 2/17/2017: Done    Override on 2/18/2015: Done    Override on 11/18/2014: Done    Override on 8/18/2014: Done    TETANUS VACCINE 09/02/2030 9/2/2020        No orders of the defined types were placed in this encounter.      The following information is provided to all patients.  This information is to help you find resources for any of the problems found today that may be affecting your health:                  Living healthy guide: www.[facility].[place].gov      Understanding Diabetes: www.diabetes.org      Eating healthy: www.cdc.gov/healthyweight      CDC home safety checklist: www.cdc.gov/steadi/patient.html      Agency on Aging: www.goea.[place].gov      Alcoholics anonymous (AA): www.aa.org      Physical Activity: www.jaswant.nih.gov/by4edvp      Tobacco use: www.quitwithusla.org

## 2024-09-30 NOTE — PROGRESS NOTES
The patient location is: [place]  The chief complaint leading to consultation is:  Medicare AWV    Visit type: audiovisual    Face to Face time with patient:  30 min  45 minutes of total time spent on the encounter, which includes face to face time and non-face to face time preparing to see the patient (eg, review of tests), Obtaining and/or reviewing separately obtained history, Documenting clinical information in the electronic or other health record, Independently interpreting results (not separately reported) and communicating results to the patient/family/caregiver, or Care coordination (not separately reported).         Each patient to whom he or she provides medical services by telemedicine is:  (1) informed of the relationship between the physician and patient and the respective role of any other health care provider with respect to management of the patient; and (2) notified that he or she may decline to receive medical services by telemedicine and may withdraw from such care at any time.    Notes:       Nigel Albrceht presented for a  Medicare AWV and comprehensive Health Risk Assessment today. The following components were reviewed and updated:    Medical history  Family History  Social history  Allergies and Current Medications  Health Risk Assessment  Health Maintenance  Care Team         ** See Completed Assessments for Annual Wellness Visit within the encounter summary.**         The following assessments were completed:  Living Situation  CAGE  Depression Screening  Fall Risk Assessment (MACH 10)  Hearing Assessment(HHI)  Cognitive Function Screening  Nutrition Screening  ADL Screening  PAQ Screening  Has urine leakage ever interrupted your daily activites or sleep? No  Do you think you could use some help to better manage urine leakage?No     Opioid documentation:      Patient does not have a current opioid prescription.        Vitals:    09/30/24 1002   BP: 127/76   Weight: 106.9 kg (235 lb 10.8  "oz)   Height: 6' 2" (1.88 m)     Body mass index is 30.26 kg/m².  Physical Exam  Constitutional:       General: He is not in acute distress.     Appearance: Normal appearance. He is not ill-appearing, toxic-appearing or diaphoretic.   Pulmonary:      Effort: Pulmonary effort is normal.   Neurological:      Mental Status: He is alert and oriented to person, place, and time.   Psychiatric:         Mood and Affect: Mood normal.         Behavior: Behavior normal.               Diagnoses and health risks identified today and associated recommendations/orders:    1. Encounter for preventive health examination  Screenings performed, as noted above.  Personal preventative testing needs reviewed.      2. Prostate cancer  Monitored by urology, sees Dr Kelly, active surveillance, follow up as indicated    3. Diabetic autonomic neuropathy associated with type 2 diabetes mellitus  Monitored, stable, follow up with pcp, no reported skin breakdowns    4. Secondary hyperparathyroidism of renal origin  Monitored, stable, followed by Dr Matthew    5. Recurrent major depressive disorder, in partial remission  Assessed, stable, denies depression today, follow up as needed    6. Aortic atherosclerosis  Monitored, stable, on a statin, cont tx    7. Benign hypertension with CKD (chronic kidney disease) stage IV  Monitored, stable, last GFR 26.5, hx of transplant, follow up as indicated    8. Heart transplanted  Monitored, and treated, followed by cardiology, transplant team, follow up as indicated    9. Chronic immunosuppression with tacrolimus, mycophenolate  Treated with Cellcept, tacrolimus, Phoslo, stable, cont tx    10. Benign prostatic hyperplasia with urinary frequency  Treated with, Flomax, stable, cont tx    11. Controlled type 2 diabetes mellitus with stage 4 chronic kidney disease, without long-term current use of insulin  Treated with Ozempic, stable, cont tx    12. Benign essential tremor  Treated with Mysoline, stable, " cont tx    Discussed vaccines      Provided Nigel with a 5-10 year written screening schedule and personal prevention plan. Recommendations were developed using the USPSTF age appropriate recommendations. Education, counseling, and referrals were provided as needed. After Visit Summary printed and given to patient which includes a list of additional screenings\tests needed.    No follow-ups on file.    Edu Burns, NP  I offered to discuss advanced care planning, including how to pick a person who would make decisions for you if you were unable to make them for yourself, called a health care power of , and what kind of decisions you might make such as use of life sustaining treatments such as ventilators and tube feeding when faced with a life limiting illness recorded on a living will that they will need to know. (How you want to be cared for as you near the end of your natural life)     X Patient is interested in learning more about how to make advanced directives.  I provided them paperwork and offered to discuss this with them.

## 2024-10-07 ENCOUNTER — LAB VISIT (OUTPATIENT)
Dept: LAB | Facility: HOSPITAL | Age: 74
End: 2024-10-07
Attending: UROLOGY
Payer: MEDICARE

## 2024-10-07 DIAGNOSIS — E78.2 MIXED HYPERLIPIDEMIA: ICD-10-CM

## 2024-10-07 DIAGNOSIS — Z79.899 ENCOUNTER FOR LONG-TERM (CURRENT) USE OF MEDICATIONS: ICD-10-CM

## 2024-10-07 DIAGNOSIS — T86.20 COMPLICATION OF HEART TRANSPLANT, UNSPECIFIED COMPLICATION: ICD-10-CM

## 2024-10-07 DIAGNOSIS — C61 PROSTATE CANCER: ICD-10-CM

## 2024-10-07 DIAGNOSIS — Z94.1 STATUS POST HEART TRANSPLANT: ICD-10-CM

## 2024-10-07 DIAGNOSIS — E11.43 DIABETIC AUTONOMIC NEUROPATHY ASSOCIATED WITH TYPE 2 DIABETES MELLITUS: Chronic | ICD-10-CM

## 2024-10-07 DIAGNOSIS — I10 HYPERTENSION, UNSPECIFIED TYPE: ICD-10-CM

## 2024-10-07 DIAGNOSIS — R06.02 SHORTNESS OF BREATH: ICD-10-CM

## 2024-10-07 LAB
ALBUMIN SERPL BCP-MCNC: 3.5 G/DL (ref 3.5–5.2)
ALP SERPL-CCNC: 78 U/L (ref 55–135)
ALT SERPL W/O P-5'-P-CCNC: 13 U/L (ref 10–44)
ANION GAP SERPL CALC-SCNC: 10 MMOL/L (ref 8–16)
AST SERPL-CCNC: 24 U/L (ref 10–40)
BILIRUB SERPL-MCNC: 0.5 MG/DL (ref 0.1–1)
BUN SERPL-MCNC: 33 MG/DL (ref 8–23)
CALCIUM SERPL-MCNC: 8.9 MG/DL (ref 8.7–10.5)
CHLORIDE SERPL-SCNC: 108 MMOL/L (ref 95–110)
CHOLEST SERPL-MCNC: 158 MG/DL (ref 120–199)
CHOLEST/HDLC SERPL: 2.3 {RATIO} (ref 2–5)
CO2 SERPL-SCNC: 24 MMOL/L (ref 23–29)
COMPLEXED PSA SERPL-MCNC: 5.3 NG/ML (ref 0–4)
CREAT SERPL-MCNC: 2.6 MG/DL (ref 0.5–1.4)
EST. GFR  (NO RACE VARIABLE): 25.1 ML/MIN/1.73 M^2
GLUCOSE SERPL-MCNC: 96 MG/DL (ref 70–110)
HDLC SERPL-MCNC: 69 MG/DL (ref 40–75)
HDLC SERPL: 43.7 % (ref 20–50)
LDLC SERPL CALC-MCNC: 69.2 MG/DL (ref 63–159)
NONHDLC SERPL-MCNC: 89 MG/DL
POTASSIUM SERPL-SCNC: 4 MMOL/L (ref 3.5–5.1)
PROT SERPL-MCNC: 6.8 G/DL (ref 6–8.4)
SODIUM SERPL-SCNC: 142 MMOL/L (ref 136–145)
TRIGL SERPL-MCNC: 99 MG/DL (ref 30–150)

## 2024-10-07 PROCEDURE — 81370 HLA I & II TYPING LR: CPT | Performed by: INTERNAL MEDICINE

## 2024-10-07 PROCEDURE — 86832 HLA CLASS I HIGH DEFIN QUAL: CPT | Performed by: INTERNAL MEDICINE

## 2024-10-07 PROCEDURE — 86977 RBC SERUM PRETX INCUBJ/INHIB: CPT | Mod: 59 | Performed by: INTERNAL MEDICINE

## 2024-10-07 PROCEDURE — 85025 COMPLETE CBC W/AUTO DIFF WBC: CPT | Performed by: INTERNAL MEDICINE

## 2024-10-07 PROCEDURE — 86806 LYMPHOCYTOTOXICITY ASSAY: CPT | Performed by: INTERNAL MEDICINE

## 2024-10-07 PROCEDURE — 84436 ASSAY OF TOTAL THYROXINE: CPT | Performed by: INTERNAL MEDICINE

## 2024-10-07 PROCEDURE — 86833 HLA CLASS II HIGH DEFIN QUAL: CPT | Performed by: INTERNAL MEDICINE

## 2024-10-07 PROCEDURE — 83036 HEMOGLOBIN GLYCOSYLATED A1C: CPT | Performed by: NURSE PRACTITIONER

## 2024-10-07 PROCEDURE — 80197 ASSAY OF TACROLIMUS: CPT | Performed by: INTERNAL MEDICINE

## 2024-10-07 PROCEDURE — 84443 ASSAY THYROID STIM HORMONE: CPT | Performed by: INTERNAL MEDICINE

## 2024-10-07 PROCEDURE — 84153 ASSAY OF PSA TOTAL: CPT | Performed by: UROLOGY

## 2024-10-07 PROCEDURE — 80053 COMPREHEN METABOLIC PANEL: CPT | Performed by: INTERNAL MEDICINE

## 2024-10-07 PROCEDURE — 80061 LIPID PANEL: CPT | Performed by: INTERNAL MEDICINE

## 2024-10-08 ENCOUNTER — HOSPITAL ENCOUNTER (OUTPATIENT)
Dept: CARDIOLOGY | Facility: HOSPITAL | Age: 74
Discharge: HOME | End: 2024-10-08
Attending: INTERNAL MEDICINE
Payer: MEDICARE

## 2024-10-08 ENCOUNTER — PATIENT MESSAGE (OUTPATIENT)
Dept: CARDIOLOGY | Facility: HOSPITAL | Age: 74
End: 2024-10-08
Payer: MEDICARE

## 2024-10-08 ENCOUNTER — OFFICE VISIT (OUTPATIENT)
Dept: UROLOGY | Facility: CLINIC | Age: 74
End: 2024-10-08
Payer: MEDICARE

## 2024-10-08 ENCOUNTER — OFFICE VISIT (OUTPATIENT)
Dept: TRANSPLANT | Facility: CLINIC | Age: 74
End: 2024-10-08
Payer: MEDICARE

## 2024-10-08 VITALS
HEART RATE: 94 BPM | TEMPERATURE: 98 F | WEIGHT: 234 LBS | BODY MASS INDEX: 30.03 KG/M2 | DIASTOLIC BLOOD PRESSURE: 84 MMHG | HEIGHT: 74 IN | SYSTOLIC BLOOD PRESSURE: 137 MMHG

## 2024-10-08 VITALS
BODY MASS INDEX: 30.02 KG/M2 | DIASTOLIC BLOOD PRESSURE: 70 MMHG | SYSTOLIC BLOOD PRESSURE: 116 MMHG | HEIGHT: 74 IN | HEART RATE: 104 BPM | WEIGHT: 233.94 LBS

## 2024-10-08 DIAGNOSIS — D84.821 IMMUNOCOMPROMISED STATE DUE TO DRUG THERAPY: ICD-10-CM

## 2024-10-08 DIAGNOSIS — Z01.89 NEEDS SLEEP APNEA ASSESSMENT: ICD-10-CM

## 2024-10-08 DIAGNOSIS — N40.1 BENIGN PROSTATIC HYPERPLASIA WITH URINARY FREQUENCY: Primary | ICD-10-CM

## 2024-10-08 DIAGNOSIS — I12.9 BENIGN HYPERTENSION WITH CKD (CHRONIC KIDNEY DISEASE) STAGE IV: ICD-10-CM

## 2024-10-08 DIAGNOSIS — R00.2 PALPITATIONS: ICD-10-CM

## 2024-10-08 DIAGNOSIS — N18.4 CONTROLLED TYPE 2 DIABETES MELLITUS WITH STAGE 4 CHRONIC KIDNEY DISEASE, WITHOUT LONG-TERM CURRENT USE OF INSULIN: ICD-10-CM

## 2024-10-08 DIAGNOSIS — I10 PRIMARY HYPERTENSION: Chronic | ICD-10-CM

## 2024-10-08 DIAGNOSIS — N18.4 BENIGN HYPERTENSION WITH CKD (CHRONIC KIDNEY DISEASE) STAGE IV: ICD-10-CM

## 2024-10-08 DIAGNOSIS — Z29.89 PROPHYLACTIC IMMUNOTHERAPY: Chronic | ICD-10-CM

## 2024-10-08 DIAGNOSIS — Z94.1 HEART TRANSPLANTED: Primary | Chronic | ICD-10-CM

## 2024-10-08 DIAGNOSIS — Z79.899 IMMUNOCOMPROMISED STATE DUE TO DRUG THERAPY: ICD-10-CM

## 2024-10-08 DIAGNOSIS — R35.0 BENIGN PROSTATIC HYPERPLASIA WITH URINARY FREQUENCY: Primary | ICD-10-CM

## 2024-10-08 DIAGNOSIS — Z94.0 DECEASED-DONOR KIDNEY TRANSPLANT: Chronic | ICD-10-CM

## 2024-10-08 DIAGNOSIS — C61 MALIGNANT NEOPLASM OF PROSTATE: ICD-10-CM

## 2024-10-08 DIAGNOSIS — E11.22 CONTROLLED TYPE 2 DIABETES MELLITUS WITH STAGE 4 CHRONIC KIDNEY DISEASE, WITHOUT LONG-TERM CURRENT USE OF INSULIN: ICD-10-CM

## 2024-10-08 LAB
BASOPHILS # BLD AUTO: 0.04 K/UL (ref 0–0.2)
BASOPHILS NFR BLD: 0.8 % (ref 0–1.9)
BILIRUB UR QL STRIP: NEGATIVE
CLASS I ANTIBODY COMMENTS - LUMINEX: NORMAL
CLASS II ANTIBODY COMMENTS - LUMINEX: NORMAL
DIFFERENTIAL METHOD BLD: ABNORMAL
DSA1 TESTING DATE: NORMAL
DSA12 TESTING DATE: NORMAL
DSA2 TESTING DATE: NORMAL
EOSINOPHIL # BLD AUTO: 0.1 K/UL (ref 0–0.5)
EOSINOPHIL NFR BLD: 1.4 % (ref 0–8)
ERYTHROCYTE [DISTWIDTH] IN BLOOD BY AUTOMATED COUNT: 14.5 % (ref 11.5–14.5)
ESTIMATED AVG GLUCOSE: 126 MG/DL (ref 68–131)
GLUCOSE UR QL STRIP: NEGATIVE
HBA1C MFR BLD: 6 % (ref 4–5.6)
HCT VFR BLD AUTO: 40.3 % (ref 40–54)
HGB BLD-MCNC: 12.5 G/DL (ref 14–18)
IMM GRANULOCYTES # BLD AUTO: 0.01 K/UL (ref 0–0.04)
IMM GRANULOCYTES NFR BLD AUTO: 0.2 % (ref 0–0.5)
KETONES UR QL STRIP: NEGATIVE
LEUKOCYTE ESTERASE UR QL STRIP: NEGATIVE
LYMPHOCYTES # BLD AUTO: 2.1 K/UL (ref 1–4.8)
LYMPHOCYTES NFR BLD: 40.3 % (ref 18–48)
MCH RBC QN AUTO: 27.9 PG (ref 27–31)
MCHC RBC AUTO-ENTMCNC: 31 G/DL (ref 32–36)
MCV RBC AUTO: 90 FL (ref 82–98)
MONOCYTES # BLD AUTO: 0.4 K/UL (ref 0.3–1)
MONOCYTES NFR BLD: 6.8 % (ref 4–15)
NEUTROPHILS # BLD AUTO: 2.6 K/UL (ref 1.8–7.7)
NEUTROPHILS NFR BLD: 50.5 % (ref 38–73)
NRBC BLD-RTO: 0 /100 WBC
PH, POC UA: 5.5
PLATELET # BLD AUTO: 189 K/UL (ref 150–450)
PMV BLD AUTO: 11.3 FL (ref 9.2–12.9)
POC BLOOD, URINE: NEGATIVE
POC NITRATES, URINE: NEGATIVE
POC RESIDUAL URINE VOLUME: 14 ML (ref 0–100)
PROT UR QL STRIP: POSITIVE
RBC # BLD AUTO: 4.48 M/UL (ref 4.6–6.2)
SERUM COLLECTION DT - LUMINEX CLASS I: NORMAL
SERUM COLLECTION DT - LUMINEX CLASS II: NORMAL
SP GR UR STRIP: 1.02 (ref 1–1.03)
T4 SERPL-MCNC: 6.2 UG/DL (ref 4.5–11.5)
TACROLIMUS BLD-MCNC: 5.9 NG/ML (ref 5–15)
TSH SERPL DL<=0.005 MIU/L-ACNC: 2.91 UIU/ML (ref 0.4–4)
UROBILINOGEN UR STRIP-ACNC: 0.2 (ref 0.3–2.2)
WBC # BLD AUTO: 5.14 K/UL (ref 3.9–12.7)

## 2024-10-08 PROCEDURE — 99999PBSHW POCT BLADDER SCAN: Mod: PBBFAC,,,

## 2024-10-08 PROCEDURE — 99213 OFFICE O/P EST LOW 20 MIN: CPT | Mod: PBBFAC | Performed by: INTERNAL MEDICINE

## 2024-10-08 PROCEDURE — 93005 ELECTROCARDIOGRAM TRACING: CPT

## 2024-10-08 PROCEDURE — 99999 PR PBB SHADOW E&M-EST. PATIENT-LVL III: CPT | Mod: PBBFAC,,, | Performed by: INTERNAL MEDICINE

## 2024-10-08 PROCEDURE — 99999PBSHW POCT URINALYSIS, DIPSTICK, AUTOMATED, W/O SCOPE: Mod: PBBFAC,,,

## 2024-10-08 PROCEDURE — 99213 OFFICE O/P EST LOW 20 MIN: CPT | Mod: PBBFAC,27 | Performed by: NURSE PRACTITIONER

## 2024-10-08 PROCEDURE — 81003 URINALYSIS AUTO W/O SCOPE: CPT | Mod: PBBFAC | Performed by: NURSE PRACTITIONER

## 2024-10-08 PROCEDURE — 99214 OFFICE O/P EST MOD 30 MIN: CPT | Mod: S$PBB,,, | Performed by: NURSE PRACTITIONER

## 2024-10-08 PROCEDURE — 51798 US URINE CAPACITY MEASURE: CPT | Mod: PBBFAC | Performed by: NURSE PRACTITIONER

## 2024-10-08 PROCEDURE — 93010 ELECTROCARDIOGRAM REPORT: CPT | Mod: ,,, | Performed by: INTERNAL MEDICINE

## 2024-10-08 PROCEDURE — 99999 PR PBB SHADOW E&M-EST. PATIENT-LVL III: CPT | Mod: PBBFAC,,, | Performed by: NURSE PRACTITIONER

## 2024-10-08 NOTE — PROGRESS NOTES
Chief Complaint:   Prostate cancer    HPI:   Patient is a 74-year-old male that has been followed by Dr. Kelly for prostate cancer.  Is pursuing active surveillance after seeing Radiation Oncology.  PSA slightly increased,5.3.  Is status post transplanted kidney.  No gross hematuria and voiding well on tamsulosin.  Urine in clinic is negative and PVR is 14 mL.  Robin WICK  4/5/24- otherwise voiding well on tamsulosin, here today to discuss his PSA.  Patient also would like to re-attempt the Viagra.     3/31/23- it has been an extended time since we have seen him, he determined to pursue active surveillance after seeing Radiation Oncology.  PSA appears to be stable, current transplanted kidney is improving function.  Erections are currently not an issue.  Patient is otherwise voiding well on tamsulosin.     3/30/16: 64 yo man s/p renal transplant in 2002 here with nocturia x3, was x0-1 4-5 months ago.  No hesitancy.  Some dribble when done putting things away.Urgency.  Some double voiding.  No abd/pelvic pain and no exac/rel factors.  No hematuria.  No urolithiasis.  No urinary bother.  No  history.  Normal sexual function.  Not usually constipated.  Viagra for ED rx but not used but once.  No BPH meds.  Allergies:  Patient has no known allergies.    Medications:  has a current medication list which includes the following prescription(s): acetaminophen, aspirin, atorvastatin, freestyle jackie 3 sensor, calcium acetate(phosphat bind), cream base no.171 (bulk), hydralazine, mycophenolate, primidone, propranolol, ozempic, sildenafil, tacrolimus, tamsulosin, torsemide, [DISCONTINUED] amlodipine, [DISCONTINUED] calcium carbonate, [DISCONTINUED] furosemide, [DISCONTINUED] gabapentin, [DISCONTINUED] insulin aspart u-100, [DISCONTINUED] insulin detemir u-100 (levemir), and [DISCONTINUED] lisinopril.    Review of Systems:  General: No fever, chills, fatigability, or weight loss.  Skin: No rashes, itching, or changes in  color or texture of skin.  Chest: Denies PETERSON, cyanosis, wheezing, cough, and sputum production.  Abdomen: Appetite fine. No weight loss. Denies diarrhea, abdominal pain, hematemesis, or blood in stool.  Musculoskeletal: No joint stiffness or swelling. Denies back pain.  : As above.  All other review of systems negative.    PMH:   has a past medical history of Allergic rhinitis (2013), Blood transfusion, Cataract, CKD (chronic kidney disease), stage III (2014), -donor kidney transplant, Diabetes mellitus, type 2 (2013), Gout, arthritis (2013), Heart attack, Heart transplanted, Hyperlipidemia (2013), Hypertension, Immunodeficiency due to treatment with immunosuppressive medication, Morbid obesity (2013), Organ transplant (), Orthostatic syncope (2022), Pneumonia due to COVID-19 virus (2022), Prophylactic immunotherapy, Prostate cancer (2023), and Renal manifestation of secondary diabetes mellitus.    PSH:   has a past surgical history that includes Kidney transplant; Heart transplant; Revision total hip arthroplasty; Eye surgery; Cataract extraction (Bilateral); and Colonoscopy (N/A, 10/6/2020).    FamHx: family history includes Diabetes in his brother and maternal grandmother; Early death in his brother; Heart disease in his brother; Hyperlipidemia in his brother and sister; Hypertension in his brother; Kidney disease in his son; Stroke in his brother and mother.    SocHx:  reports that he quit smoking about 40 years ago. His smoking use included cigarettes. He started smoking about 60 years ago. He has a 20 pack-year smoking history. He has never used smokeless tobacco. He reports current alcohol use of about 1.0 standard drink of alcohol per week. He reports that he does not use drugs.      Physical Exam:  Vitals:    10/08/24 1055   BP: 137/84   Pulse: 94   Temp: 97.8 °F (36.6 °C)     General: A&Ox3, no apparent distress, no deformities  Neck: No masses,  normal thyroid  Lungs: normal inspiration, no use of accessory muscles  Heart: normal pulse, no arrhythmias  Abdomen: Soft, NT, ND, no masses, no hernias, no hepatosplenomegaly  Lymphatic: Neck and groin nodes negative  Skin: The skin is warm and dry. No jaundice.  MARISOL: 1/22- 25g no nodules or fixation, otherwise benign.       Labs/Studies:    Latest Reference Range & Units 09/11/23 09:43 03/19/24 08:30 07/05/24 08:18 10/07/24 08:25   PSA Diagnostic 0.00 - 4.00 ng/mL 2.7 3.8 4.0 5.3 (H)   (H): Data is abnormally high   protein 9/23  pnbx Gl 6 38.9g 2/18/22  PSA 3.8 3/24  PSA 2.7 9/23  PSA 2.8 9/22  PSA 3.1 6/22  PSA 4.0 1/22  PSA 3.4 8/21  PSA 2.5 8/20  PSA 2.1 8/19  Impression/Plan:   Prostate cancer with rising PSA on active surveillance  Patient was presented to Dr. Kelly for recommendation, MD recommendations include proceed with bone scan and patient to return to Radiation Oncology for treatment options.  Was scheduled with Dr. Kelly after bone scan and appointment with Radiation Oncology.

## 2024-10-08 NOTE — PROGRESS NOTES
Subjective:   Mr. Albrecht is a 74 y.o. year old Black or  male who received a  heart and kidney transplant on 5/24/02.      CMV status: Unknown    73 YO M w/ PMH OHTx 5/24/02 and kidney tx 5/25/02 at [facility],  HTN, CKD, DM, HLD here for his 22nd annual follow up.    Seen in clinic last 1 year prior. In July 2022 he had an episode of AUBREY due to [facility] and was admitted to the hospital, treated with steroids and PLEX. Had a single session of HD but did not receive any additional sessions. He completed PLEX and got IV IG as outpatient.    Here today due to recent increase in HR at start of August, where he had no issue before. Usually states HR in 70s and 80s, but had gone to 100s, now back down to 80s again which is hs baseline. States he also has noticed a little increased PETRESON with exertion, although has no limitations in his exertions. Able to walk, do gardening, has been not sleeping as well as he usually does. Has been waking up around 230/3 am, then not able to go back to sleep. No social stressors or changes. No anxiety.     DSE today:     Left Ventricle: The left ventricle is normal in size. Normal wall thickness. Normal wall motion. There is normal systolic function with a visually estimated ejection fraction of 60 - 65%. Ejection fraction by visual approximation is 65%. There is normal diastolic function.    Right Ventricle: Normal right ventricular cavity size. Wall thickness is normal. Systolic function is normal.    IVC/SVC: Normal venous pressure at 3 mmHg.    Pericardium: There is an effusion adjacent to the right atrium.    Stress Protocol: The patient exercised for 3 minutes 40 seconds on a high ramp protocol, corresponding to a functional capacity of 6METS, achieving a peak heart rate of 95 bpm, which is 65% of the age predicted maximum heart rate. Their exercise capacity was moderately impaired. The patient reported no symptoms during the stress test. The test was stopped because the  patient experienced fatigue.    Baseline ECG: The Baseline ECG reveals sinus rhythm with RBBB. The axis is normal. The ST segments are normal.    Stress ECG: There are no ST segment deviation identified during the protocol. During stress, rare PACs are noted. During stress, occasional PVCs are noted. There is normal blood pressure response with stress.    ECG Conclusion: The ECG portion of the study is negative for ischemia. Sensitivity is reduced due to failure to reach target heart rate.    Post-stress Echo: The left ventricle systolic function is hyperdynamic with an EF of 70%. Right ventricle systolic function is normal.    Post-stress Impression: The study is negative with no echocardiographic evidence of stress induced ischemia.    Immunosuppression: tac 8/7 MMF 250mg po BID, prednisone 5mg   Immuno goal levels: 5-8  Immuno history (intolerance, finance, allergy, etc.): Rap DC      HLA/ DSA:  5/24/24 DSA DETECTED DR53(38017)--POSSIBLE WEAK DSA DETECTED: DR4(1360), DR52(1452)  7/20/23  DR53(86018)--UNABLE TO DETERMINE IF DQA AND DP ANTIBODIES ARE DSA C1Q invalid  7/19/2022 DR53(65676)  5/4/2022 DSA DR53 (13395) C1Q Invalid  1/10/2022 DR53 (67340) C1Q invalid  6/14/2021 DR53 (89,735) C1Q DR53 ((14770)  6/3/2020 DR53(17376)   C1Q:  DR53(40195)   9/11/17 DSA DR53(23560) C1Q:DR53(02892)      Last Echo: 5/27/24 Stress echo 60-65 % Negative     Last LHC: Dr. Clemons  07/19/2013 Coronaries normal         Infections: none  Prior or present malignancies:   Other complications:         Health Maintenance:   Cscope- 10/4/20  BMD 7/2023 normal, repeat 2025  Derm 6/9/2020 normal findings  CXR 5/27/24 - normal findings  Last CT chest 06/22/2016, Stable middle lobe pulmonary micronodule. No further surveillance.    Review of Systems   Constitutional: Negative for chills and fever.   HENT:  Negative for hearing loss.    Eyes:  Negative for visual disturbance.   Cardiovascular:  Positive for dyspnea on exertion and leg swelling.  "Negative for chest pain, irregular heartbeat, orthopnea, palpitations, paroxysmal nocturnal dyspnea and syncope.   Respiratory:  Negative for cough and shortness of breath.    Musculoskeletal:  Negative for muscle weakness.   Gastrointestinal:  Negative for diarrhea, nausea and vomiting.   Neurological:  Negative for focal weakness.   Psychiatric/Behavioral:  Negative for memory loss.        Objective:   Blood pressure 116/70, pulse 104, height 6' 2" (1.88 m), weight 106.1 kg (233 lb 14.5 oz).body mass index is 30.03 kg/m².    Physical Exam  Vitals reviewed.   Constitutional:       General: He is not in acute distress.  HENT:      Head: Atraumatic.   Eyes:      Extraocular Movements: Extraocular movements intact.   Cardiovascular:      Rate and Rhythm: Normal rate and regular rhythm.      Heart sounds: Normal heart sounds.   Pulmonary:      Breath sounds: Normal breath sounds.   Abdominal:      Palpations: Abdomen is soft.      Tenderness: There is no abdominal tenderness.   Musculoskeletal:         General: Normal range of motion.      Right lower leg: Edema present.      Left lower leg: Edema present.   Neurological:      General: No focal deficit present.      Mental Status: He is alert and oriented to person, place, and time.         Lab Results   Component Value Date    WBC 5.14 10/07/2024    HGB 12.5 (L) 10/07/2024    HCT 40.3 10/07/2024    MCV 90 10/07/2024     10/07/2024    CO2 24 10/07/2024    CREATININE 2.6 (H) 10/07/2024    CALCIUM 8.9 10/07/2024    ALBUMIN 3.5 10/07/2024    AST 24 10/07/2024     (H) 05/27/2024    ALT 13 10/07/2024    .0 (H) 06/20/2022       Lab Results   Component Value Date    INR 1.0 07/04/2022    INR 1.0 04/30/2019    INR 0.9 06/22/2016       BNP   Date Value Ref Range Status   05/27/2024 565 (H) 0 - 99 pg/mL Final     Comment:     Values of less than 100 pg/ml are consistent with non-CHF populations.   07/20/2023 345 (H) 0 - 99 pg/mL Final     Comment:     Values " of less than 100 pg/ml are consistent with non-CHF populations.   08/05/2022 261 (H) 0 - 99 pg/mL Final     Comment:     Values of less than 100 pg/ml are consistent with non-CHF populations.       LD   Date Value Ref Range Status   07/24/2022 306 (H) 110 - 260 U/L Final     Comment:     Results are increased in hemolyzed samples.   07/04/2022 305 (H) 110 - 260 U/L Final     Comment:     Results are increased in hemolyzed samples.   06/25/2022 216 110 - 260 U/L Final     Comment:     Results are increased in hemolyzed samples.       Tacrolimus Lvl   Date Value Ref Range Status   10/07/2024 5.9 5.0 - 15.0 ng/mL Final     Comment:     Testing performed by a chemiluminescent microparticle   immunoassay on the [employer] System.    CAUTION: No firm therapeutic range exists for tacrolimus in whole   blood. The   complexity of the clinical state, individual differences in   sensitivity to   immunosuppressive and nephrotoxic effects of tacrolimus,   co-administration   of other immunosuppressants, type of transplant, time post-transplant   and a   number of other factors contribute to different requirements for   optimal   blood levels of tacrolimus. Therefore, individual tacrolimus values   cannot   be used as the sole indicator for making changes in treatment regimen   and   each patient should be thoroughly evaluated clinically before changes   in   treatment regimens are made. Each user must establish his or her own   ranges   based on clinical experience.  Therapeutic ranges vary according to the commercial test used, and   therefore   should be established for each commercial test. Values obtained with   different assay methods cannot be used interchangeably due to   differences in   assay methods and cross-reactivity with metabolites, nor should   correction   factors be applied. Therefore, consistent use of one assay for   individual   patients is recommended.       No results found for:  ""SIROLIMUS"  Cyclosporine, LC/MS   Date Value Ref Range Status   2011 <10 (L) 100 - 400 ng/ml Final     Comment:     Reference Normals:  For Kidney Transplants: 100-300 ng/mL     Labs were reviewed with the patient.    Assessment:     1. Heart transplanted    2. Palpitations    3. Needs sleep apnea assessment    4. Primary hypertension    5. Benign hypertension with CKD (chronic kidney disease) stage IV    6. -donor kidney transplant    7. Chronic immunosuppression with tacrolimus, mycophenolate    8. Immunocompromised state due to drug therapy    9. Controlled type 2 diabetes mellitus with stage 4 chronic kidney disease, without long-term current use of insulin            Plan:     EKG with HR over 100, which per patient is new. Looking at prevous EKG appears rate has been traditionally slower.  Do wonder about atrial flutter will see if our EP team can review the EKG tomorrow when I return to [facility].   Additionally, have event monitor placed for 14 days, as he does not have the palpitations/faster HR all the time.   Stress test within normal limits earlier this year, however it is important to note he has not had a LHC in over 10 years if not longer due to being a combined H/K. Would like to see if a virtual visit with IC would lead to possible low dye amount LHC to be able to prognosticate re: CAV and being now over 20 yrs post OHT. Only do this if safe/IC team comfortable, patient is concerned and open to considering this.   Lastly, he has not been tested for ZEINAB recently, and wonder about sleep apnea contributing to symptoms. He will consider testing for this, not sure if he wants to, feels he does get good sleep/reset overall.   TTE to reassess as well changes in LVEF.     Return instructions as set forth by post transplant schedule or as needed:    Clinic: Return for labs and/or biopsy weekly the first month, every two weeks during month 2 and then monthly for the first year at the provider or " coordinator's discretion. During the second year, return to clinic every 3 months. Post transplant year 3-5 return every 6 months. There will be a comprehensive post transplant evaluation every year that may include LHC/RHC/biopsy, stress test, echo, CXR, and other health screening exams.    In addition to the clinical assessment, I have ordered Allomap testing for this patient to assist in identification of moderate/severe acute cellular rejection (ACR) in a pt with stable Allograft function instead of endomyocardial biopsy.     Patient is reminded to call with any health changes as these can be early signs of transplant complications. Patient is advised to make sure any new medications or changes of old medications are discussed with a pharmacist or physician knowledgeable with transplant to avoid rejection/drug toxicity related to significant drug interactions.    Patient advised that it is recommended that all transplanted patients, and their close contacts and household members receive Covid vaccination.    UNOS Patient Status  Functional Status: 80% - Normal activity with effort: some symptoms of disease  Physical Capacity: No Limitations  Working for Income: No  If no, reason not working: Patient Choice - Retired    Leann Cee MD

## 2024-10-09 ENCOUNTER — OFFICE VISIT (OUTPATIENT)
Dept: RADIATION ONCOLOGY | Facility: CLINIC | Age: 74
End: 2024-10-09
Payer: MEDICARE

## 2024-10-09 VITALS
SYSTOLIC BLOOD PRESSURE: 126 MMHG | HEIGHT: 74 IN | WEIGHT: 231.69 LBS | HEART RATE: 66 BPM | OXYGEN SATURATION: 97 % | DIASTOLIC BLOOD PRESSURE: 77 MMHG | RESPIRATION RATE: 18 BRPM | BODY MASS INDEX: 29.73 KG/M2 | TEMPERATURE: 99 F

## 2024-10-09 DIAGNOSIS — C61 MALIGNANT NEOPLASM OF PROSTATE: ICD-10-CM

## 2024-10-09 DIAGNOSIS — R35.0 BENIGN PROSTATIC HYPERPLASIA WITH URINARY FREQUENCY: ICD-10-CM

## 2024-10-09 DIAGNOSIS — N40.1 BENIGN PROSTATIC HYPERPLASIA WITH URINARY FREQUENCY: ICD-10-CM

## 2024-10-09 LAB
OHS QRS DURATION: 160 MS
OHS QTC CALCULATION: 544 MS

## 2024-10-09 PROCEDURE — 99999 PR PBB SHADOW E&M-EST. PATIENT-LVL V: CPT | Mod: PBBFAC,,, | Performed by: SPECIALIST

## 2024-10-09 PROCEDURE — 99215 OFFICE O/P EST HI 40 MIN: CPT | Mod: PBBFAC | Performed by: SPECIALIST

## 2024-10-09 PROCEDURE — 99215 OFFICE O/P EST HI 40 MIN: CPT | Mod: S$PBB,,, | Performed by: SPECIALIST

## 2024-10-09 NOTE — PROGRESS NOTES
I have been asked to see this delightful gentleman with low risk prostate cancer in a rising PSA currently on observation for low risk prostate cancer.  History of present illness:  Mr. Albrecht was noted to have a PSA rise from 3.4 in 2020 124 on 01/10/2022.  He went to prostate biopsy 02/18/2022 with finding of 1 of 12 cores involved with 9% 3+3 disease.  After discussions he elected to proceed with active surveillance.  He has done well since that time with his PSA rising to 5.3 on 10/07/2024 and is revisiting the idea of active surveillance.  He has had no interval difficulty.  His AUA symptom score is 10.  He does not have good erectile function with an IIEF 5 score of 5.  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  Right total hip arthroplasty with subsequent revision  Extracapsular cataract extractions with intra-ocular lenses OU  Allergies: None  Habits: He smoked less than or equal to a pack a day from age [age].  He occasionally drinks.  Family history: His father, paternal great grandfather and 2 brothers all had prostate cancer.  Social: He lives in [place].  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  Review of systems: Noncontributory  His ECOG performance status is 0  Physical examination: He is a well-developed well-nourished gentleman in no apparent distress.  He is alert and oriented answering questions appropriately.  He has no palpable supraclavicular adenopathy  On cardiovascular exam he has a regular rhythm and rate without murmur rub or gallop  His lungs are clear to auscultation  His abdomen is  benign.  He has a palpable kidney in the right lower quadrant  On  exam he has a normal male with both testes descended without masses  On rectal exam he has an approximate 35 cc prostate without nodularity  Impression:  Mr. Albrecht has stage I (T1c N0 M0) adenocarcinoma of the prostate (Reno's 3+3, PSA 5.3).  This represents low risk disease and we have had an extensive discussion about the natural history of prostate cancer.  At this point I have recommended he consider continuing active surveillance.  I would like to get an MRI of his prostate but I am concerned with his creatinine of 2.6.  He is currently not on any oral anti diabetic agents or insulin.  Plan: An MRI of the prostate has been ordered.  I will see him back in 6 months.  I certainly appreciate participating in this delightful gentleman's care.    A total of 60 minutes was spent reviewing records and face-to-face with the patient.

## 2024-10-10 ENCOUNTER — PATIENT MESSAGE (OUTPATIENT)
Dept: PULMONOLOGY | Facility: CLINIC | Age: 74
End: 2024-10-10
Payer: MEDICARE

## 2024-10-10 LAB
CLASS I ANTIBODY COMMENTS - LUMINEX: NORMAL
CLASS II ANTIBODY COMMENTS - LUMINEX: NORMAL
S32D1 TESTING DATE: NORMAL
S32D2 TESTING DATE: NORMAL
SERUM COLLECTION DT - LUMINEX CLASS I: NORMAL
SERUM COLLECTION DT - LUMINEX CLASS II: NORMAL
SP32D TESTING DATE: NORMAL

## 2024-10-11 ENCOUNTER — OFFICE VISIT (OUTPATIENT)
Dept: PULMONOLOGY | Facility: CLINIC | Age: 74
End: 2024-10-11
Payer: MEDICARE

## 2024-10-11 DIAGNOSIS — Z01.89 NEEDS SLEEP APNEA ASSESSMENT: ICD-10-CM

## 2024-10-11 NOTE — PROGRESS NOTES
Chief complaint:  No chief complaint on file.    The patient location is:  [facility]  The chief complaint leading to consultation is:  ZEINAB    Visit type: audiovisual    Face to Face time with patient: 13 minutes  20 minutes of total time spent on the encounter, which includes face to face time and non-face to face time preparing to see the patient (eg, review of tests), Obtaining and/or reviewing separately obtained history, Documenting clinical information in the electronic or other health record, Independently interpreting results (not separately reported) and communicating results to the patient/family/caregiver, or Care coordination (not separately reported).     Each patient to whom he or she provides medical services by telemedicine is:  (1) informed of the relationship between the physician and patient and the respective role of any other health care provider with respect to management of the patient; and (2) notified that he or she may decline to receive medical services by telemedicine and may withdraw from such care at any time.    History of present illness -  Nigel comes today to asleep clinic to establish care, he was referred by his cardiologist.    He has a history of longstanding heart transplant as well as kidney transplant and is doing well from that end.  Has been tolerating immunosuppression with tacrolimus and mycophenolate well.  Mentioned that the wife had told him that he has been snoring loudly for several months and he noticed that he is a little more sleepy and fatigued than usual.  No significant weight changes over the past several years        Symptoms: EDS, Non restorative sleep, and Snoring  Associated conditions: HTN, CAD, and ESKD  Occupational history: Part time, notary work.     Sleeping habits:  Bedtime: 9-11 pm  Latency: 10 min   Wake up time: 8-9   Refreshed: Yes  Sleep interruptions: wakes up 2-3 am sleeps again until 5 am     STOP-Bang Questionnaire (validated ZEINAB  screening questionnaire)  Negative unless checked off.  [x] Snoring    [x]  Tired/Fatigued/Sleepy  [] Obstruction (apneas/choking)  [x] Pressure (HTN)  [] BMI >35  [x] Age >50  [] Neck >40 cm  [] Gender male   STOP-Bang = 4 (low risk 0-2,high risk 3-8)    Physical examination -   Not performed as the nature of the visit  There were no vitals filed for this visit.   Review of Systems   Constitutional: Negative.    HENT: Negative.     Eyes: Negative.    Respiratory: Negative.     Cardiovascular: Negative.    Gastrointestinal: Negative.    Musculoskeletal: Negative.    Skin: Negative.    Neurological: Negative.        Relevant data (Independently reviewed by me) -    Prior notes -   Primary care and Cardiology    Assessment & Plan    Needs sleep apnea assessment  -     Ambulatory referral/consult to Sleep Disorders       The pretest probability of ZEINAB is moderate/high therefore we will proceed with a HSAT  We will reach out to him with the results of the sleep study.    RTC in 3 months     Greg Horton   10/11/2024

## 2024-10-15 ENCOUNTER — HOSPITAL ENCOUNTER (OUTPATIENT)
Dept: RADIOLOGY | Facility: HOSPITAL | Age: 74
Discharge: HOME | End: 2024-10-15
Attending: SPECIALIST
Payer: MEDICARE

## 2024-10-15 ENCOUNTER — TELEPHONE (OUTPATIENT)
Dept: CARDIOLOGY | Facility: CLINIC | Age: 74
End: 2024-10-15
Payer: MEDICARE

## 2024-10-15 DIAGNOSIS — C61 MALIGNANT NEOPLASM OF PROSTATE: ICD-10-CM

## 2024-10-15 PROCEDURE — 25500020 PHARM REV CODE 255: Mod: PN | Performed by: SPECIALIST

## 2024-10-15 PROCEDURE — 72197 MRI PELVIS W/O & W/DYE: CPT | Mod: TC,PN

## 2024-10-15 PROCEDURE — A9585 GADOBUTROL INJECTION: HCPCS | Mod: PN | Performed by: SPECIALIST

## 2024-10-15 PROCEDURE — 72197 MRI PELVIS W/O & W/DYE: CPT | Mod: 26,,, | Performed by: RADIOLOGY

## 2024-10-15 RX ORDER — GADOBUTROL 604.72 MG/ML
10 INJECTION INTRAVENOUS
Status: COMPLETED | OUTPATIENT
Start: 2024-10-15 | End: 2024-10-15

## 2024-10-15 RX ADMIN — GADOBUTROL 10 ML: 604.72 INJECTION INTRAVENOUS at 09:10

## 2024-10-15 NOTE — TELEPHONE ENCOUNTER
Contacted PT and informed him to just let them know when he is done with first testing and they will arrive him for the next PT stated verbal understanding       ----- Message from Vinicius sent at 10/15/2024 12:31 PM CDT -----  Contact: pt @  [phone]  Name of Who is Calling: pt        What is the request in detail:Pt has questions about upcoming appt        Can the clinic reply by [facility]: no        What Number [facility]:  [phone]

## 2024-10-16 ENCOUNTER — TELEPHONE (OUTPATIENT)
Dept: UROLOGY | Facility: CLINIC | Age: 74
End: 2024-10-16
Payer: MEDICARE

## 2024-10-16 ENCOUNTER — HOSPITAL ENCOUNTER (OUTPATIENT)
Dept: CARDIOLOGY | Facility: HOSPITAL | Age: 74
Discharge: HOME | End: 2024-10-16
Attending: INTERNAL MEDICINE
Payer: MEDICARE

## 2024-10-16 VITALS
BODY MASS INDEX: 29.65 KG/M2 | DIASTOLIC BLOOD PRESSURE: 77 MMHG | WEIGHT: 231 LBS | SYSTOLIC BLOOD PRESSURE: 126 MMHG | HEIGHT: 74 IN

## 2024-10-16 DIAGNOSIS — R00.2 PALPITATIONS: ICD-10-CM

## 2024-10-16 DIAGNOSIS — Z94.1 HEART TRANSPLANTED: Chronic | ICD-10-CM

## 2024-10-16 LAB
AORTIC ROOT ANNULUS: 3.54 CM
ASCENDING AORTA: 3.72 CM
AV INDEX (PROSTH): 0.66
AV MEAN GRADIENT: 2.7 MMHG
AV PEAK GRADIENT: 4 MMHG
AV VALVE AREA BY VELOCITY RATIO: 4.2 CM²
AV VALVE AREA: 3.5 CM²
AV VELOCITY RATIO: 0.8
BSA FOR ECHO PROCEDURE: 2.34 M2
CV ECHO LV RWT: 0.61 CM
DOP CALC AO PEAK VEL: 1 M/S
DOP CALC AO VTI: 21.3 CM
DOP CALC LVOT AREA: 5.3 CM2
DOP CALC LVOT DIAMETER: 2.6 CM
DOP CALC LVOT PEAK VEL: 0.8 M/S
DOP CALC LVOT STROKE VOLUME: 74.8 CM3
DOP CALC RVOT PEAK VEL: 0.85 M/S
DOP CALC RVOT VTI: 13.9 CM
DOP CALCLVOT PEAK VEL VTI: 14.1 CM
E WAVE DECELERATION TIME: 135.8 MSEC
E/A RATIO: 2.43
E/E' RATIO: 8.18 M/S
ECHO LV POSTERIOR WALL: 1.5 CM (ref 0.6–1.1)
EJECTION FRACTION: 60 %
FRACTIONAL SHORTENING: 28.6 % (ref 28–44)
INTERVENTRICULAR SEPTUM: 1.6 CM (ref 0.6–1.1)
IVRT: 88.49 MSEC
LA MAJOR: 6.57 CM
LA MINOR: 5.33 CM
LEFT ATRIUM SIZE: 5.37 CM
LEFT INTERNAL DIMENSION IN SYSTOLE: 3.5 CM (ref 2.1–4)
LEFT VENTRICLE DIASTOLIC VOLUME INDEX: 49.49 ML/M2
LEFT VENTRICLE DIASTOLIC VOLUME: 114.32 ML
LEFT VENTRICLE MASS INDEX: 142.3 G/M2
LEFT VENTRICLE SYSTOLIC VOLUME INDEX: 21.5 ML/M2
LEFT VENTRICLE SYSTOLIC VOLUME: 49.62 ML
LEFT VENTRICULAR INTERNAL DIMENSION IN DIASTOLE: 4.9 CM (ref 3.5–6)
LEFT VENTRICULAR MASS: 328.7 G
LV LATERAL E/E' RATIO: 6 M/S
LV SEPTAL E/E' RATIO: 12.86 M/S
LVED V (TEICH): 114.32 ML
LVES V (TEICH): 49.62 ML
LVOT MG: 1.36 MMHG
LVOT MV: 0.56 CM/S
MV PEAK A VEL: 0.37 M/S
MV PEAK E VEL: 0.9 M/S
MV STENOSIS PRESSURE HALF TIME: 39.38 MS
MV VALVE AREA P 1/2 METHOD: 5.59 CM2
PISA TR MAX VEL: 2.53 M/S
PV MEAN GRADIENT: 1 MMHG
PV MV: 0.63 M/S
PV PEAK GRADIENT: 4 MMHG
PV PEAK VELOCITY: 1 M/S
RA MAJOR: 6.77 CM
RA WIDTH: 5.3 CM
RIGHT VENTRICULAR END-DIASTOLIC DIMENSION: 4.22 CM
SINUS: 3.62 CM
STJ: 3.95 CM
TDI LATERAL: 0.15 M/S
TDI SEPTAL: 0.07 M/S
TDI: 0.11 M/S
TR MAX PG: 26 MMHG
TRICUSPID ANNULAR PLANE SYSTOLIC EXCURSION: 1.73 CM
Z-SCORE OF LEFT VENTRICULAR DIMENSION IN END DIASTOLE: -6.17
Z-SCORE OF LEFT VENTRICULAR DIMENSION IN END SYSTOLE: -3.54

## 2024-10-16 PROCEDURE — 93306 TTE W/DOPPLER COMPLETE: CPT

## 2024-10-16 PROCEDURE — 93306 TTE W/DOPPLER COMPLETE: CPT | Mod: 26,,, | Performed by: STUDENT IN AN ORGANIZED HEALTH CARE EDUCATION/TRAINING PROGRAM

## 2024-10-17 ENCOUNTER — OFFICE VISIT (OUTPATIENT)
Dept: CARDIOLOGY | Facility: CLINIC | Age: 74
End: 2024-10-17
Payer: MEDICARE

## 2024-10-17 DIAGNOSIS — E11.22 CONTROLLED TYPE 2 DIABETES MELLITUS WITH STAGE 4 CHRONIC KIDNEY DISEASE, WITHOUT LONG-TERM CURRENT USE OF INSULIN: ICD-10-CM

## 2024-10-17 DIAGNOSIS — N18.4 CONTROLLED TYPE 2 DIABETES MELLITUS WITH STAGE 4 CHRONIC KIDNEY DISEASE, WITHOUT LONG-TERM CURRENT USE OF INSULIN: ICD-10-CM

## 2024-10-17 DIAGNOSIS — Z94.1 STATUS POST HEART TRANSPLANT: ICD-10-CM

## 2024-10-17 DIAGNOSIS — R00.2 PALPITATIONS: ICD-10-CM

## 2024-10-17 DIAGNOSIS — R06.09 DOE (DYSPNEA ON EXERTION): Primary | ICD-10-CM

## 2024-10-17 DIAGNOSIS — Z94.1 HEART TRANSPLANTED: Chronic | ICD-10-CM

## 2024-10-17 DIAGNOSIS — R00.0 TACHYCARDIA: ICD-10-CM

## 2024-10-17 DIAGNOSIS — Z94.0 HISTORY OF KIDNEY TRANSPLANT: ICD-10-CM

## 2024-10-17 PROCEDURE — 99214 OFFICE O/P EST MOD 30 MIN: CPT | Mod: 95,,, | Performed by: INTERNAL MEDICINE

## 2024-10-17 NOTE — PROGRESS NOTES
Subjective:   Patient ID:  Nigel Albrecht is a 74 y.o. male who presents for evaluation of No chief complaint on file.  10.17.2024  This is a virtual follow-up  74-year-old male with history of cardiac and kidney transplant 22 years ago  One session of dialysis earlier this year.    Referred by heart failure team for possible heart catheterization.    He states that about 2-3 months ago he started to have dyspnea on exertion but no worsening of lower extremity swelling swelling.  Denies palpitations.    He denies chest pain.    He had multiple dobutamine stress echo that were normal  Patient also concerned about risk of VELASQUEZ with contrast.    EKG shows tachycardia with a rate of 104 compared to 70 usually.    Echocardiogram reviewed given concern for a flutter, can not tell if there is an EA fusion or loss of a waves due to possible flutter.    He is wearing currently a cardiac monitor.        Past Medical History:   Diagnosis Date    Allergic rhinitis 2013    Blood transfusion     Cataract     CKD (chronic kidney disease), stage III 2014    -donor kidney transplant     Diabetes mellitus, type 2 2013    Gout, arthritis 2013    Heart attack     Heart transplanted     Hyperlipidemia 2013    Hypertension     Immunodeficiency due to treatment with immunosuppressive medication     Morbid obesity 2013    Organ transplant 2002    heart and kidney    Orthostatic syncope 2022    Due to furosemide    Pneumonia due to COVID-19 virus 2022    Prophylactic immunotherapy     Prostate cancer 2023    Renal manifestation of secondary diabetes mellitus        Past Surgical History:   Procedure Laterality Date    CATARACT EXTRACTION Bilateral     COLONOSCOPY N/A 10/6/2020    Procedure: COLONOSCOPY;  Surgeon: Conner Redman MD;  Location: [facility];  Service: Endoscopy;  Laterality: N/A;    EYE SURGERY      HEART TRANSPLANT      KIDNEY TRANSPLANT      REVISION TOTAL HIP  ARTHROPLASTY         Social History     Tobacco Use    Smoking status: Former     Current packs/day: 0.00     Average packs/day: 1 pack/day for 20.0 years (20.0 ttl pk-yrs)     Types: Cigarettes     Start date: 1964     Quit date: 1984     Years since quittin.3    Smokeless tobacco: Never   Substance Use Topics    Alcohol use: Yes     Alcohol/week: 1.0 standard drink of alcohol     Types: 1 Cans of beer per week     Comment: daily    Drug use: No       Family History   Problem Relation Name Age of Onset    Stroke Mother      Hyperlipidemia Sister 2     Diabetes Brother 4 decsd/ 5     Early death Brother 4 decsd/ 5     Heart disease Brother 4 decsd/ 5     Hyperlipidemia Brother 4 decsd/ 5     Hypertension Brother 4 decsd/ 5     Stroke Brother 4 decsd/ 5     Kidney disease Son 2     Diabetes Maternal Grandmother      Melanoma Neg Hx      Eczema Neg Hx      Lupus Neg Hx      Psoriasis Neg Hx         Review of Systems   Cardiovascular:  Positive for dyspnea on exertion. Negative for chest pain, palpitations and syncope.   Genitourinary: Negative.    Neurological: Negative.        Current Outpatient Medications on File Prior to Visit   Medication Sig    acetaminophen (TYLENOL) 325 MG tablet Take 2 tablets (650 mg total) by mouth every 4 (four) hours as needed.    aspirin 81 MG Chew Take 1 tablet (81 mg total) by mouth once daily.    atorvastatin (LIPITOR) 40 MG tablet TAKE 1 TABLET ONCE DAILY    blood-glucose sensor (FREESTYLE SHREE 3 SENSOR) Madelin Inject 1 Device into the skin every 14 (fourteen) days.    calcium acetate,phosphat bind, (PHOSLO) 667 mg capsule Take 2 capsules (1,334 mg total) by mouth 3 (three) times daily with meals.    cream base no.171, bulk, Crea 2 g by Misc.(Non-Drug; Combo Route) route 4 (four) times daily as needed (pain).    hydrALAZINE (APRESOLINE) 50 MG tablet TAKE 1 TABLET EVERY 8 HOURS    mycophenolate (CELLCEPT) 250 mg Cap Take 1 capsule (250 mg total) by mouth 2 (two) times  daily.    primidone (MYSOLINE) 50 MG Tab Take 2 tablets (100 mg total) by mouth every evening.    propranoloL (INDERAL) 20 MG tablet Take 1 tablet (20 mg total) by mouth 2 (two) times daily.    semaglutide (OZEMPIC) 0.25 mg or 0.5 mg (2 mg/3 mL) pen injector Inject 0.5 mg into the skin every 7 days.    sildenafiL (VIAGRA) 100 MG tablet Take 1 tablet (100 mg total) by mouth daily as needed for Erectile Dysfunction.    tacrolimus (PROGRAF) 1 MG Cap Take 8 capsules (8 mg total) by mouth every morning AND 7 capsules (7 mg total) every evening.    tamsulosin (FLOMAX) 0.4 mg Cap TAKE 1 CAPSULE ONCE DAILY    torsemide (DEMADEX) 20 MG Tab Take 2 tablets (40 mg total) by mouth once daily.    [DISCONTINUED] amLODIPine (NORVASC) 10 MG tablet Take 0.5 tablets (5 mg total) by mouth once daily.    [DISCONTINUED] calcium carbonate (OS-CARRIE) 500 mg calcium (1,250 mg) tablet Take 1 tablet by mouth once daily.    [DISCONTINUED] furosemide (LASIX) 40 MG tablet Take 1 tablet (40 mg total) by mouth 2 (two) times daily.    [DISCONTINUED] gabapentin (NEURONTIN) 300 MG capsule TAKE 1 CAPSULE TWICE DAILY    [DISCONTINUED] insulin aspart U-100 (NOVOLOG) 100 unit/mL (3 mL) InPn pen Inject 0-5 Units into the skin before meals and at bedtime as needed (Hyperglycemia).    [DISCONTINUED] insulin detemir U-100 (LEVEMIR FLEXTOUCH) 100 unit/mL (3 mL) SubQ InPn pen Inject 5 Units into the skin every evening.    [DISCONTINUED] lisinopriL (PRINIVIL,ZESTRIL) 40 MG tablet Take 1 tablet (40 mg total) by mouth once daily.     No current facility-administered medications on file prior to visit.       Objective:   Objective:  Wt Readings from Last 3 Encounters:   10/16/24 104.8 kg (231 lb)   10/09/24 105.1 kg (231 lb 11.3 oz)   10/08/24 106.1 kg (234 lb)     Temp Readings from Last 3 Encounters:   10/09/24 98.5 °F (36.9 °C)   10/08/24 97.8 °F (36.6 °C) (Oral)   07/13/24 98.3 °F (36.8 °C) (Oral)     BP Readings from Last 3 Encounters:   10/16/24 126/77    10/09/24 126/77   10/08/24 137/84     Pulse Readings from Last 3 Encounters:   10/09/24 66   10/08/24 94   10/08/24 104       Physical Exam  Constitutional:       General: He is not in acute distress.     Appearance: Normal appearance.   Neurological:      Mental Status: He is alert and oriented to person, place, and time.         Lab Results   Component Value Date    CHOL 158 10/07/2024    CHOL 151 05/27/2024    CHOL 146 07/20/2023     Lab Results   Component Value Date    HDL 69 10/07/2024    HDL 71 05/27/2024    HDL 47 07/20/2023     Lab Results   Component Value Date    LDLCALC 69.2 10/07/2024    LDLCALC 64.4 05/27/2024    LDLCALC 68.8 07/20/2023     Lab Results   Component Value Date    TRIG 99 10/07/2024    TRIG 78 05/27/2024    TRIG 151 (H) 07/20/2023     Lab Results   Component Value Date    CHOLHDL 43.7 10/07/2024    CHOLHDL 47.0 05/27/2024    CHOLHDL 32.2 07/20/2023       Chemistry        Component Value Date/Time     10/07/2024 0825    K 4.0 10/07/2024 0825     10/07/2024 0825    CO2 24 10/07/2024 0825    BUN 33 (H) 10/07/2024 0825    CREATININE 2.6 (H) 10/07/2024 0825    GLU 96 10/07/2024 0825        Component Value Date/Time    CALCIUM 8.9 10/07/2024 0825    ALKPHOS 78 10/07/2024 0825    AST 24 10/07/2024 0825    ALT 13 10/07/2024 0825    BILITOT 0.5 10/07/2024 0825    ESTGFRAFRICA 15.3 (A) 07/27/2022 0531    EGFRNONAA 13.2 (A) 07/27/2022 0531          Lab Results   Component Value Date    TSH 2.906 10/07/2024     Lab Results   Component Value Date    INR 1.0 07/04/2022    INR 1.0 04/30/2019    INR 0.9 06/22/2016     Lab Results   Component Value Date    WBC 5.14 10/07/2024    HGB 12.5 (L) 10/07/2024    HCT 40.3 10/07/2024    MCV 90 10/07/2024     10/07/2024     BNP  @LABRCNTIP(BNP,BNPTRIAGEBLO)@  CrCl cannot be calculated (Patient's most recent lab result is older than the maximum 7 days allowed.).     Imaging:  ======    No results found for this or any previous visit.    No  results found for this or any previous visit.    Results for orders placed during the hospital encounter of 05/27/24    X-Ray Chest PA And Lateral    Narrative  EXAMINATION:  XR CHEST PA AND LATERAL    CLINICAL HISTORY:  Unspecified complication of heart transplant    TECHNIQUE:  PA and lateral views of the chest were performed.    COMPARISON:  07/20/2023    FINDINGS:  Status post sternotomy, history indicating heart transplant.  Heart size and pulmonary vessels are normal.  The lungs are expanded and clear.  No pleural fluid detected.  Sternal wires are aligned with 1 inferior wire broken, as before.    Impression  Stable findings status post heart transplant.  No acute cardiopulmonary disease.      Electronically signed by: Florentin Moses MD  Date:    05/27/2024  Time:    10:34    No results found for this or any previous visit.    No valid procedures specified.    No results found for this or any previous visit.      No results found for this or any previous visit.      Results for orders placed during the hospital encounter of 10/16/24    Echo    Interpretation Summary    Left Ventricle: The left ventricle is normal in size. Normal wall thickness. There is concentric hypertrophy. Normal wall motion. There is normal systolic function with a visually estimated ejection fraction of 55 - 60%. Ejection fraction is approximately 60%. There is normal diastolic function.    Right Ventricle: Normal right ventricular cavity size. Right ventricle wall motion  is normal. Systolic function is normal.    Mitral Valve: There is mild regurgitation.    Tricuspid Valve: There is mild regurgitation.      Diagnostic Results:  ECG: Reviewed    The 10-year ASCVD risk score (Phil SCHULTZ, et al., 2019) is: 31.4%    Values used to calculate the score:      Age: 74 years      Sex: Male      Is Non- : Yes      Diabetic: Yes      Tobacco smoker: No      Systolic Blood Pressure: 126 mmHg      Is BP treated: Yes       HDL Cholesterol: 69 mg/dL      Total Cholesterol: 158 mg/dL        Assessment and Plan:   PETERSON (dyspnea on exertion)  -     Cardiac PET Scan Stress; Future    Heart transplanted  -     Ambulatory referral/consult to Interventional Cardiology  -     Cardiac PET Scan Stress; Future    Palpitations  -     Ambulatory referral/consult to Interventional Cardiology    Tachycardia  -     Ambulatory referral/consult to Electrophysiology; Future; Expected date: 10/24/2024    Status post heart transplant    History of kidney transplant    Controlled type 2 diabetes mellitus with stage 4 chronic kidney disease, without long-term current use of insulin        Reviewed all tests and above medical conditions with patient in detail and formulated treatment plan.  Risk factor modification discussed.   Cardiac low salt diet discussed.  Maintaining healthy weight and weight loss goals were discussed in clinic.  Suspected flutter on on EKG.  This is a virtual visit could not repeat EKG today.    He has wearing a cardiac monitor.    Reviewed echocardiogram.  Loss of  A wave versus E/A fusion from tachycardia.  We will refer to EP as well.    Given kidney transplant.  Session of dialysis with in the past year.  No urgent indication for cardiac catheterization especially with higher-risk of VELASQUEZ.  Patient concerned as well.    We will do a dobutamine PET scan since regadenoson/adenosine is contraindicated in heart transplant patients.  If PET scan is concerning then we will consider doing a heart catheterization with minimal use of contrast    Follow up in  6 months or sooner depending on results

## 2024-10-24 DIAGNOSIS — I48.3 TYPICAL ATRIAL FLUTTER: ICD-10-CM

## 2024-10-24 DIAGNOSIS — Z94.1 HEART TRANSPLANTED: Primary | ICD-10-CM

## 2024-10-28 ENCOUNTER — TELEPHONE (OUTPATIENT)
Dept: TRANSPLANT | Facility: CLINIC | Age: 74
End: 2024-10-28
Payer: MEDICARE

## 2024-10-31 ENCOUNTER — TELEPHONE (OUTPATIENT)
Dept: CARDIOLOGY | Facility: HOSPITAL | Age: 74
End: 2024-10-31
Payer: MEDICARE

## 2024-11-04 ENCOUNTER — HOSPITAL ENCOUNTER (OUTPATIENT)
Dept: CARDIOLOGY | Facility: HOSPITAL | Age: 74
Discharge: HOME | End: 2024-11-04
Attending: INTERNAL MEDICINE
Payer: MEDICARE

## 2024-11-04 VITALS
HEART RATE: 107 BPM | WEIGHT: 231 LBS | SYSTOLIC BLOOD PRESSURE: 143 MMHG | BODY MASS INDEX: 29.65 KG/M2 | DIASTOLIC BLOOD PRESSURE: 84 MMHG | RESPIRATION RATE: 16 BRPM | HEIGHT: 74 IN

## 2024-11-04 DIAGNOSIS — R06.09 DOE (DYSPNEA ON EXERTION): ICD-10-CM

## 2024-11-04 DIAGNOSIS — Z94.1 HEART TRANSPLANTED: Chronic | ICD-10-CM

## 2024-11-04 LAB
CFR FLOW - ANTERIOR: 2.07
CFR FLOW - INFERIOR: 2.34
CFR FLOW - LATERAL: 2.44
CFR FLOW - MAX: 3.13
CFR FLOW - MIN: 1.68
CFR FLOW - SEPTAL: 2.18
CFR FLOW - WHOLE HEART: 2.26
CV STRESS BASE HR: 106 BPM
DIASTOLIC BLOOD PRESSURE: 106 MMHG
EJECTION FRACTION- HIGH: 59 %
END DIASTOLIC INDEX-HIGH: 155 ML/M2
END DIASTOLIC INDEX-LOW: 91 ML/M2
END SYSTOLIC INDEX-HIGH: 78 ML/M2
END SYSTOLIC INDEX-LOW: 40 ML/M2
NUC REST DIASTOLIC VOLUME INDEX: 122
NUC REST EJECTION FRACTION: 46
NUC REST SYSTOLIC VOLUME INDEX: 66
NUC STRESS DIASTOLIC VOLUME INDEX: 122
NUC STRESS EJECTION FRACTION: 47 %
NUC STRESS SYSTOLIC VOLUME INDEX: 65
OHS CV CPX 1 MINUTE RECOVERY HEART RATE: 105 BPM
OHS CV CPX 85 PERCENT MAX PREDICTED HEART RATE MALE: 124
OHS CV CPX MAX PREDICTED HEART RATE: 146
OHS CV CPX PATIENT IS FEMALE: 0
OHS CV CPX PATIENT IS MALE: 1
OHS CV CPX PEAK DIASTOLIC BLOOD PRESSURE: 96 MMHG
OHS CV CPX PEAK HEAR RATE: 93 BPM
OHS CV CPX PEAK RATE PRESSURE PRODUCT: NORMAL
OHS CV CPX PEAK SYSTOLIC BLOOD PRESSURE: 155 MMHG
OHS CV CPX PERCENT MAX PREDICTED HEART RATE ACHIEVED: 64
OHS CV CPX RATE PRESSURE PRODUCT PRESENTING: NORMAL
OHS CV INITIAL DOSE: 31.9 MCG/KG/MIN
OHS CV MODERATELY REDUCED FLOW CAPACITY: 0 %
OHS CV MYOCARDIAL STEAL: 0 %
OHS CV NO ISCHEMIA MILDLY REDUCED FLOW CAPACTY: 9 %
OHS CV NO ISCHEMIA MINIMALLY REDUCED FLOW CAPACITY: 19 %
OHS CV NON-TRANSMURAL MYOCARDIAL INFARCTION SINGLE CONTINUOUS REGION: 0 %
OHS CV NORMAL FLOW CAPACITY COMPARABLE TO HEALTHY YOUNG VOLUNTEERS: 72 %
OHS CV PEAK DOSE: 31.7 MCG/KG/MIN
OHS CV PET ID: 7541
OHS CV PRE-DOMINANTLY MYOCARDIAL SCAR: 0 %
OHS CV SEVERELY REDUCED FLOW CAPACITY LARGEST SINGLE CONTINUOUS REGION: 0 %
OHS CV SEVERELY REDUCED FLOW CAPACITY: 0 %
OHS CV TOTAL EXAM DLP: 489.38 MGY-CM
REST FLOW - ANTERIOR: 1.26 CC/MIN/G
REST FLOW - INFERIOR: 1 CC/MIN/G
REST FLOW - LATERAL: 1 CC/MIN/G
REST FLOW - MAX: 1.65 CC/MIN/G
REST FLOW - MIN: 0.43 CC/MIN/G
REST FLOW - SEPTAL: 1.01 CC/MIN/G
REST FLOW - WHOLE HEART: 1.07 CC/MIN/G
RETIRED EF AND QEF - SEE NOTES: 47 %
STRESS FLOW - ANTERIOR: 2.59 CC/MIN/G
STRESS FLOW - INFERIOR: 2.32 CC/MIN/G
STRESS FLOW - LATERAL: 2.39 CC/MIN/G
STRESS FLOW - MAX: 3.33 CC/MIN/G
STRESS FLOW - MIN: 0.86 CC/MIN/G
STRESS FLOW - SEPTAL: 2.18 CC/MIN/G
STRESS FLOW - WHOLE HEART: 2.37 CC/MIN/G
SYSTOLIC BLOOD PRESSURE: 166 MMHG

## 2024-11-04 PROCEDURE — 93018 CV STRESS TEST I&R ONLY: CPT | Mod: ,,, | Performed by: INTERNAL MEDICINE

## 2024-11-04 PROCEDURE — 93016 CV STRESS TEST SUPVJ ONLY: CPT | Mod: ,,, | Performed by: INTERNAL MEDICINE

## 2024-11-04 PROCEDURE — 93017 CV STRESS TEST TRACING ONLY: CPT

## 2024-11-04 PROCEDURE — 78434 AQMBF PET REST & RX STRESS: CPT | Mod: 26,,, | Performed by: INTERNAL MEDICINE

## 2024-11-04 PROCEDURE — 78431 MYOCRD IMG PET RST&STRS CT: CPT | Mod: 26,,, | Performed by: INTERNAL MEDICINE

## 2024-11-04 PROCEDURE — A9555 RB82 RUBIDIUM: HCPCS | Performed by: INTERNAL MEDICINE

## 2024-11-04 PROCEDURE — 63600175 PHARM REV CODE 636 W HCPCS: Performed by: INTERNAL MEDICINE

## 2024-11-04 RX ORDER — REGADENOSON 0.08 MG/ML
0.4 INJECTION, SOLUTION INTRAVENOUS
Status: COMPLETED | OUTPATIENT
Start: 2024-11-04 | End: 2024-11-04

## 2024-11-04 RX ORDER — AMINOPHYLLINE 25 MG/ML
75 INJECTION, SOLUTION INTRAVENOUS
Status: COMPLETED | OUTPATIENT
Start: 2024-11-04 | End: 2024-11-04

## 2024-11-04 RX ADMIN — AMINOPHYLLINE 75 MG: 25 INJECTION, SOLUTION INTRAVENOUS at 07:11

## 2024-11-04 RX ADMIN — REGADENOSON 0.4 MG: 0.08 INJECTION, SOLUTION INTRAVENOUS at 07:11

## 2024-11-04 RX ADMIN — RUBIDIUM CHLORIDE RB-82 31.7 MILLICURIE: 150 INJECTION, SOLUTION INTRAVENOUS at 07:11

## 2024-11-04 RX ADMIN — RUBIDIUM CHLORIDE RB-82 31.9 MILLICURIE: 150 INJECTION, SOLUTION INTRAVENOUS at 07:11

## 2024-11-11 ENCOUNTER — TELEPHONE (OUTPATIENT)
Dept: DIABETES | Facility: CLINIC | Age: 74
End: 2024-11-11
Payer: MEDICARE

## 2024-11-11 RX ORDER — PREDNISONE 5 MG/1
5 TABLET ORAL DAILY
COMMUNITY
End: 2024-11-11 | Stop reason: SDUPTHER

## 2024-11-11 RX ORDER — PREDNISONE 5 MG/1
5 TABLET ORAL DAILY
Qty: 90 TABLET | Refills: 3 | Status: SHIPPED | OUTPATIENT
Start: 2024-11-11

## 2024-11-11 NOTE — TELEPHONE ENCOUNTER
Request from [facility] for a prescription change to Georgette 3 plus Sensor. Georgette 3 Sensors on backorder. Please send to [facility]

## 2024-11-11 NOTE — TELEPHONE ENCOUNTER
----- Message from Anderson sent at 11/11/2024  8:45 AM CST -----  Contact: Nigel  Type:  RX Refill Request    Who Called: Nigel  Refill or New Rx:refill  RX Name and Strength:predinsone 5mg  How is the patient currently taking it? (ex. 1XDay):1xday  Is this a 30 day or 90 day RX:90 day  Preferred Pharmacy with phone number:  [facility] - [facility] - [facility] AT [address] [facility]  [place] [address]  Phone: [phone] Fax: [phone]    Local or Mail Order:mail  Ordering Provider:desean  Would the patient rather a call back or a response via [facility]? call  Best Call Back Number:[phone]   Additional Information: Pt is asking for a call back from the nurse because the medication is not in his chart

## 2024-11-12 RX ORDER — HYDROCHLOROTHIAZIDE 12.5 MG/1
1 CAPSULE ORAL
Qty: 6 EACH | Refills: 1 | Status: SHIPPED | OUTPATIENT
Start: 2024-11-12 | End: 2025-11-12

## 2024-11-22 ENCOUNTER — OFFICE VISIT (OUTPATIENT)
Dept: UROLOGY | Facility: CLINIC | Age: 74
End: 2024-11-22
Payer: MEDICARE

## 2024-11-22 VITALS
DIASTOLIC BLOOD PRESSURE: 94 MMHG | BODY MASS INDEX: 30.54 KG/M2 | HEART RATE: 109 BPM | HEIGHT: 74 IN | WEIGHT: 238 LBS | SYSTOLIC BLOOD PRESSURE: 150 MMHG

## 2024-11-22 DIAGNOSIS — N52.9 ERECTILE DYSFUNCTION, UNSPECIFIED ERECTILE DYSFUNCTION TYPE: ICD-10-CM

## 2024-11-22 DIAGNOSIS — C61 PROSTATE CANCER: Primary | ICD-10-CM

## 2024-11-22 DIAGNOSIS — R35.0 BENIGN PROSTATIC HYPERPLASIA WITH URINARY FREQUENCY: ICD-10-CM

## 2024-11-22 DIAGNOSIS — N40.1 BENIGN PROSTATIC HYPERPLASIA WITH URINARY FREQUENCY: ICD-10-CM

## 2024-11-22 PROCEDURE — 99214 OFFICE O/P EST MOD 30 MIN: CPT | Mod: PBBFAC | Performed by: UROLOGY

## 2024-11-22 PROCEDURE — 99214 OFFICE O/P EST MOD 30 MIN: CPT | Mod: S$PBB,,, | Performed by: UROLOGY

## 2024-11-22 PROCEDURE — 99999 PR PBB SHADOW E&M-EST. PATIENT-LVL IV: CPT | Mod: PBBFAC,,, | Performed by: UROLOGY

## 2024-11-22 NOTE — PROGRESS NOTES
Chief Complaint:   Encounter Diagnoses   Name Primary?    Prostate cancer Yes    Benign prostatic hyperplasia with urinary frequency     Erectile dysfunction, unspecified erectile dysfunction type        HPI:   11/22/24- here today to discuss his MRI per Radiation Oncology, currently voiding well on tamsulosin, no change in erections.    3/31/23- it has been an extended time since we have seen him, he determined to pursue active surveillance after seeing Radiation Oncology.  PSA appears to be stable, current transplanted kidney is improving function.  Erections are currently not an issue.  Patient is otherwise voiding well on tamsulosin.    3/30/16: 64 yo man s/p renal transplant in 2002 here with nocturia x3, was x0-1 4-5 months ago.  No hesitancy.  Some dribble when done putting things away.Urgency.  Some double voiding.  No abd/pelvic pain and no exac/rel factors.  No hematuria.  No urolithiasis.  No urinary bother.  No  history.  Normal sexual function.  Not usually constipated.  Viagra for ED rx but not used but once.  No BPH meds.    Allergies:  No known drug allergies    Medications:  has a current medication list which includes the following prescription(s): amlodipine, atorvastatin, calcium carbonate, cholecalciferol (vitamin d3), freestyle jackie 2 sensor, fluticasone propionate, gabapentin, humulin 70/30 u-100 kwikpen, lisinopril, low-dose aspirin, metoprolol succinate, metoprolol succinate, multivit-min/fa/lycopen/lutein, mycophenolate, oxycodone-acetaminophen, ozempic, sirolimus, solifenacin, torsemide, and diazepam.    Review of Systems:  General: No fever, chills, fatigability, or weight loss.  Skin: No rashes, itching, or changes in color or texture of skin.  Chest: Denies PETERSON, cyanosis, wheezing, cough, and sputum production.  Abdomen: Appetite fine. No weight loss. Denies diarrhea, abdominal pain, hematemesis, or blood in stool.  Musculoskeletal: No joint stiffness or swelling. Some back  pain.  : As above.  All other review of systems negative.    PMH:   has a past medical history of Allergic rhinitis (2013), Blood transfusion, Cataract, CKD (chronic kidney disease), stage III (2014), -donor kidney transplant, Diabetes mellitus, type 2 (2013), Gout, arthritis (2013), Heart attack, Heart transplanted, Hyperlipidemia (2013), Hypertension, Immunodeficiency due to treatment with immunosuppressive medication, Morbid obesity (2013), Organ transplant (), Prophylactic immunotherapy, and Renal manifestation of secondary diabetes mellitus.    PSH:   has a past surgical history that includes Kidney transplant; Heart transplant; Revision total hip arthroplasty; Eye surgery; Cataract extraction (Bilateral); and Colonoscopy (N/A, 10/6/2020).    FamHx: family history includes Diabetes in his brother and maternal grandmother; Early death in his brother; Heart disease in his brother; Hyperlipidemia in his brother and sister; Hypertension in his brother; Kidney disease in his son; Stroke in his brother and mother.    SocHx:  reports that he quit smoking about 37 years ago. His smoking use included cigarettes. He has a 20.00 pack-year smoking history. He has never used smokeless tobacco. He reports current alcohol use of about 1.0 standard drink of alcohol per week. He reports that he does not use drugs.      Physical Exam:  Vitals:    22 0855   BP: (!) 154/83   Pulse: 67   Temp: 98.1 °F (36.7 °C)     General: A&Ox3, no apparent distress, no deformities  Neck: No masses, normal thyroid  Lungs: normal inspiration, no use of accessory muscles  Heart: normal pulse, no arrhythmias  Abdomen: Soft, NT, ND  Skin: The skin is warm and dry. No jaundice.  Ext: No c/c/e.  MARISOL: - 25g no nodules or fixation, otherwise benign.    Labs/Studies:    protein   pnbx Gl 6 38.9g 22  PSA 5.3 10/24  PSA 4.0   PSA 3.8 3/24  PSA 2.7   PSA 2.8   PSA 3.1   PSA  4.0 1/22  PSA 3.4 8/21  PSA 2.5 8/20  PSA 2.1 8/19  MRI PI-RADS 5 25g 10/24    Impression/Plan:         1. Erectile dysfunction-  Patient was using TriMix,has not tried the Viagra 100 mg, currently not an issue.    2. Prostate cancer- per radiation oncology, MRI demonstrates no EPE, patient would like to continue active surveillance.  Therefore see me back in 3 months with a PSA..    3. BPH- currently voiding well on tamsulosin.

## 2024-12-02 ENCOUNTER — PATIENT MESSAGE (OUTPATIENT)
Dept: CARDIOLOGY | Facility: CLINIC | Age: 74
End: 2024-12-02
Payer: MEDICARE

## 2024-12-04 ENCOUNTER — OFFICE VISIT (OUTPATIENT)
Dept: CARDIOLOGY | Facility: CLINIC | Age: 74
End: 2024-12-04
Payer: MEDICARE

## 2024-12-04 VITALS
OXYGEN SATURATION: 98 % | HEART RATE: 105 BPM | HEIGHT: 74 IN | WEIGHT: 244.5 LBS | DIASTOLIC BLOOD PRESSURE: 80 MMHG | SYSTOLIC BLOOD PRESSURE: 138 MMHG | BODY MASS INDEX: 31.38 KG/M2

## 2024-12-04 DIAGNOSIS — Z94.1 HEART TRANSPLANTED: ICD-10-CM

## 2024-12-04 DIAGNOSIS — I48.3 TYPICAL ATRIAL FLUTTER: ICD-10-CM

## 2024-12-04 DIAGNOSIS — R00.0 TACHYCARDIA: ICD-10-CM

## 2024-12-04 PROCEDURE — 99204 OFFICE O/P NEW MOD 45 MIN: CPT | Mod: S$PBB,,, | Performed by: INTERNAL MEDICINE

## 2024-12-04 PROCEDURE — 99999 PR PBB SHADOW E&M-EST. PATIENT-LVL V: CPT | Mod: PBBFAC,,, | Performed by: INTERNAL MEDICINE

## 2024-12-04 PROCEDURE — 99215 OFFICE O/P EST HI 40 MIN: CPT | Mod: PBBFAC | Performed by: INTERNAL MEDICINE

## 2024-12-04 NOTE — PROGRESS NOTES
Subjective   Patient ID:  Nigel Albrecht is a 74 y.o. male who presents for evaluation of Atrial Flutter      75 y/o black male s/p OHTx 5/24/02 and kidney tx 5/25/02 at [facility],  HTN, stage III CKD, DM, HLP. He developed increased heart rates and was found to be in AFL. Event monitor showed persistent AFL, average  bpm. He was not started on OAC.     PET Stress 11/24:  ·  There are no clinically significant perfusion abnormalities. There is a small (<10%) amount of mild diffuse resting heterogeneity that improves with stress, indicative of non-obstructive disease.  ·  The whole heart absolute myocardial perfusion values were elevated at rest, normal during stress and CFR is mildly reduced in part due to elevated resting flow.  ·  CT attenuation images demonstrate no coronary calcifications and mild diffuse aortic calcifications in the descending aorta.  ·  The gated perfusion images showed an ejection fraction of 46% at rest and 47% during stress. A normal ejection fraction is greater than 47%.  ·  There is normal wall motion at rest and normal wall motion during stress.  ·  The study's ECG is negative for ischemia.    Event monitor 10/24:  The patient was monitored for a total of 13d 22h, underlying rhythm is Atrial fibrillation/Atrial flutter.  The minimum heart rate was 54 bpm; the maximum 116 bpm; the average 101 bpm.  100 % of Atrial fibrillation/Atrial flutter with longest episode of 13d 22h.  The total burden of AV Block present was 0 %  Total count of Ventricular Tachycardia (VT): 0 episode(s).  0 supraventricular episodes were found. Longest SVT Episode 0 s, Fastest SVT -- bpm  There were a total of 4818 PVCs with 1 morphologies and 0 couplets. Overall PVC [address] at 0.24 %  There were a total of 0 Other Beats. There were 0 total number of paced beats.  There were a total of 0 PSVCs with 0 couplets. Overall PSVC [address] at 0 %  There is a total of 2 patient events    Stress Echo done today  ? S/P heart  transplantation 2002  ? The left ventricle is normal in size with concentric remodeling and normal systolic function. The estimated ejection fraction is 60%.  ? Normal right ventricular size with normal right ventricular systolic function.  ? Normal left ventricular diastolic function.  ? The estimated PA systolic pressure is 20 mmHg.  ? Normal central venous pressure (3 mmHg).  ? The maximal doses of pharmaceutical stress agents were administered.  ? The ECG portion of this study is negative for myocardial ischemia.  ? The stress echo portion of this study is negative for myocardial ischemia.  ? Overall, this study is negative for myocardial ischemia. Sensitivity is impaired due to the patient's failure to achieve target heart rate.    My interpretation of the ECG is:    Past Medical History:  2013: Allergic rhinitis  No date: Blood transfusion  No date: Cataract  2014: CKD (chronic kidney disease), stage III  No date: -donor kidney transplant  2013: Diabetes mellitus, type 2  2013: Gout, arthritis  No date: Heart attack  No date: Heart transplanted  2013: Hyperlipidemia  No date: Hypertension  No date: Immunodeficiency due to treatment with immunosuppressive   medication  2013: Morbid obesity  2002: Organ transplant      Comment:  heart and kidney  2022: Orthostatic syncope      Comment:  Due to furosemide  2022: Pneumonia due to COVID-19 virus  No date: Prophylactic immunotherapy  2023: Prostate cancer  No date: Renal manifestation of secondary diabetes mellitus    Past Surgical History:  No date: CATARACT EXTRACTION; Bilateral  10/6/2020: COLONOSCOPY; N/A      Comment:  Procedure: COLONOSCOPY;  Surgeon: Conner Redman MD;                 Location: [facility];  Service: Endoscopy;  Laterality:                N/A;  No date: EYE SURGERY  No date: HEART TRANSPLANT  No date: KIDNEY TRANSPLANT  No date: REVISION TOTAL HIP ARTHROPLASTY    Social History     Socioeconomic History      Marital status:     Occupational History        Employer: Retired    Tobacco Use      Smoking status: Former        Packs/day: 0.00        Years: 1 pack/day for 20.0 years (20.0 ttl pk-yrs)        Types: Cigarettes        Start date: 1964        Quit date: 1984        Years since quittin.4      Smokeless tobacco: Never    Substance and Sexual Activity      Alcohol use: Yes        Alcohol/week: 1.0 standard drink of alcohol        Types: 1 Cans of beer per week        Comment: daily      Drug use: No      Sexual activity: Not Currently        Partners: Female    Social Drivers of Health  Financial Resource Strain: Low Risk  (2024)      Overall Financial Resource Strain (CARDIA)          Difficulty of Paying Living Expenses: Not hard at all  Food Insecurity: No Food Insecurity (2024)      Hunger Vital Sign          Worried About Running Out of Food in the Last Year: Never true          Ran Out of Food in the Last Year: Never true  Transportation Needs: No Transportation Needs (2024)      PRAPARE - Transportation          Lack of Transportation (Medical): No          Lack of Transportation (Non-Medical): No  Physical Activity: Inactive (2024)      Exercise Vital Sign          Days of Exercise per Week: 0 days          Minutes of Exercise per Session: 0 min  Stress: Stress Concern Present (2024)      [employer] - Occupational Stress Questionnaire          Feeling of Stress : To some extent  Housing Stability: Low Risk  (2024)      Housing Stability Vital Sign          Unable to Pay for Housing in the Last Year: No          Homeless in the Last Year: No    Review of patient's family history indicates:  Problem: Stroke      Relation: Mother          Name:               Age of Onset: (Not Specified)  Problem: Hyperlipidemia      Relation: Sister          Name: 2              Age of Onset: (Not Specified)  Problem:  Diabetes      Relation: Brother          Name: 4 decsd/ 5              Age of Onset: (Not Specified)  Problem: Early death      Relation: Brother          Name: 4 decsd/ 5              Age of Onset: (Not Specified)  Problem: Heart disease      Relation: Brother          Name: 4 decsd/ 5              Age of Onset: (Not Specified)  Problem: Hyperlipidemia      Relation: Brother          Name: 4 decsd/ 5              Age of Onset: (Not Specified)  Problem: Hypertension      Relation: Brother          Name: 4 decsd/ 5              Age of Onset: (Not Specified)  Problem: Stroke      Relation: Brother          Name: 4 decsd/ 5              Age of Onset: (Not Specified)  Problem: Kidney disease      Relation: Son          Name: 2              Age of Onset: (Not Specified)  Problem: Diabetes      Relation: Maternal Grandmother          Name:               Age of Onset: (Not Specified)  Problem: Melanoma      Relation: Neg Hx          Name:               Age of Onset: (Not Specified)  Problem: Eczema      Relation: Neg Hx          Name:               Age of Onset: (Not Specified)  Problem: Lupus      Relation: Neg Hx          Name:               Age of Onset: (Not Specified)  Problem: Psoriasis      Relation: Neg Hx          Name:               Age of Onset: (Not Specified)      Current Outpatient Medications:  acetaminophen (TYLENOL) 325 MG tablet, Take 2 tablets (650 mg total) by mouth every 4 (four) hours as needed., Disp: , Rfl: 0  aspirin 81 MG Chew, Take 1 tablet (81 mg total) by mouth once daily., Disp: , Rfl: 0  atorvastatin (LIPITOR) 40 MG tablet, TAKE 1 TABLET ONCE DAILY, Disp: 90 tablet, Rfl: 3  blood-glucose sensor (FREESTYLE SHREE 3 PLUS SENSOR) Madelin, Inject 1 Device into the skin Every 15 days., Disp: 6 each, Rfl: 1  calcium acetate,phosphat bind, (PHOSLO) 667 mg capsule, Take 2 capsules (1,334 mg total) by mouth 3 (three) times daily with meals., Disp: 180 capsule, Rfl: 11  cream base no.171, bulk, Crea, 2 g  by Misc.(Non-Drug; Combo Route) route 4 (four) times daily as needed (pain)., Disp: 240 g, Rfl: 6  hydrALAZINE (APRESOLINE) 50 MG tablet, TAKE 1 TABLET EVERY 8 HOURS, Disp: 270 tablet, Rfl: 3  mycophenolate (CELLCEPT) 250 mg Cap, Take 1 capsule (250 mg total) by mouth 2 (two) times daily., Disp: 180 capsule, Rfl: 3  predniSONE (DELTASONE) 5 MG tablet, Take 1 tablet (5 mg total) by mouth once daily., Disp: 90 tablet, Rfl: 3  primidone (MYSOLINE) 50 MG Tab, Take 2 tablets (100 mg total) by mouth every evening., Disp: 180 tablet, Rfl: 3  propranoloL (INDERAL) 20 MG tablet, Take 1 tablet (20 mg total) by mouth 2 (two) times daily., Disp: 180 tablet, Rfl: 3  semaglutide (OZEMPIC) 0.25 mg or 0.5 mg (2 mg/3 mL) pen injector, Inject 0.5 mg into the skin every 7 days., Disp: 9 mL, Rfl: 1  sildenafiL (VIAGRA) 100 MG tablet, Take 1 tablet (100 mg total) by mouth daily as needed for Erectile Dysfunction., Disp: 10 tablet, Rfl: 11  tacrolimus (PROGRAF) 1 MG Cap, Take 8 capsules (8 mg total) by mouth every morning AND 7 capsules (7 mg total) every evening., Disp: 1350 capsule, Rfl: 3  tamsulosin (FLOMAX) 0.4 mg Cap, TAKE 1 CAPSULE ONCE DAILY, Disp: 90 capsule, Rfl: 3  torsemide (DEMADEX) 20 MG Tab, Take 2 tablets (40 mg total) by mouth once daily., Disp: 180 tablet, Rfl: 3    No current facility-administered medications for this visit.            Review of Systems   Cardiovascular:  Positive for dyspnea on exertion. Negative for chest pain, palpitations and syncope.   Genitourinary: Negative.    Neurological: Negative.           Objective     Physical Exam  Vitals reviewed.   Constitutional:       General: He is not in acute distress.     Appearance: He is well-developed.   HENT:      Head: Normocephalic and atraumatic.   Eyes:      Pupils: Pupils are equal, round, and reactive to light.   Neck:      Thyroid: No thyromegaly.      Vascular: No JVD.   Cardiovascular:      Rate and Rhythm: Regular rhythm. Tachycardia present.       Chest Wall: PMI is not displaced.      Heart sounds: Normal heart sounds, S1 normal and S2 normal. No murmur heard.     No friction rub. No gallop.   Pulmonary:      Effort: Pulmonary effort is normal. No respiratory distress.      Breath sounds: Normal breath sounds. No wheezing or rales.   Abdominal:      General: Bowel sounds are normal. There is no distension.      Palpations: Abdomen is soft.      Tenderness: There is no abdominal tenderness. There is no guarding or rebound.   Musculoskeletal:         General: No tenderness. Normal range of motion.      Cervical back: Normal range of motion.   Skin:     General: Skin is warm and dry.      Findings: No erythema or rash.   Neurological:      Mental Status: He is alert and oriented to person, place, and time.      Cranial Nerves: No cranial nerve deficit.   Psychiatric:         Behavior: Behavior normal.         Thought Content: Thought content normal.         Judgment: Judgment normal.            Assessment and Plan     1. Tachycardia    2. Heart transplanted    3. Typical atrial flutter        Plan:  74 yoM here for AFL. 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 Will start OAC and offer ablation. Extensive discussion regarding risks, benefits, and alternative approaches to the procedure was had with the patient.  The patient voices understanding and all questions have been answered.  The patient would like to proceed as planned.    AFL ablation in patient with OHT  [facility]

## 2024-12-04 NOTE — Clinical Note
See note for AFL case in patient with heart transplant. Will take a little longer than the usual case thanks

## 2024-12-05 DIAGNOSIS — I48.3 TYPICAL ATRIAL FLUTTER: ICD-10-CM

## 2024-12-06 ENCOUNTER — TELEPHONE (OUTPATIENT)
Dept: ELECTROPHYSIOLOGY | Facility: CLINIC | Age: 74
End: 2024-12-06
Payer: MEDICARE

## 2024-12-06 DIAGNOSIS — I48.3 TYPICAL ATRIAL FLUTTER: ICD-10-CM

## 2024-12-06 RX ORDER — APIXABAN 5 MG/1
5 TABLET, FILM COATED ORAL 2 TIMES DAILY
Refills: 3 | OUTPATIENT
Start: 2024-12-06

## 2024-12-06 NOTE — TELEPHONE ENCOUNTER
Spoke with patient and scheduled procedure: 2/24/25  Labs to be done at: [facility]  Confirmed that patient is on GLP-1 agonist and instructed to hold 7 days prior  Advised to hold the following meds:eliquis only on the morning of the procedure   Confirmed that patient does not have any implanted device that has remote or monitor that could cause electrical interference  Instructions will be sent via portal and mail, as requested

## 2024-12-06 NOTE — TELEPHONE ENCOUNTER
Called and clarified med and ok to be on both primidone and eliquis and eliquis is the best option for the patient per Dr Blackman, they will dispense the medication

## 2024-12-06 NOTE — TELEPHONE ENCOUNTER
----- Message from REYES Ojeda sent at 12/6/2024  4:10 PM CST -----  Contact: [facility] @ [phone] # [phone]    ----- Message -----  From: Vinicius Maciel  Sent: 12/6/2024   4:00 PM CST  To: Yehuda Robertson Staff    Pharmacy is calling to clarify an RX.     RX name:  apixaban (ELIQUIS) 5 mg Tab     What do they need to clarify:  potential drug interaction  primidone (MYSOLINE) 50 MG Tab     Comments:

## 2024-12-26 DIAGNOSIS — I49.9 CARDIAC ARRHYTHMIA, UNSPECIFIED CARDIAC ARRHYTHMIA TYPE: ICD-10-CM

## 2024-12-26 DIAGNOSIS — I48.3 TYPICAL ATRIAL FLUTTER: Primary | ICD-10-CM

## 2025-01-06 ENCOUNTER — TELEPHONE (OUTPATIENT)
Dept: PULMONOLOGY | Facility: CLINIC | Age: 75
End: 2025-01-06
Payer: MEDICARE

## 2025-01-06 NOTE — TELEPHONE ENCOUNTER
----- Message from Shasta sent at 1/6/2025  2:10 PM CST -----  Contact: [phone] Patient  .[email or web address]     Patient is calling for Medical Advice regarding:Patient is calling to talk about up coming appointment. Please call and advise     How long has patient had these symptoms:    Pharmacy name and phone#:    Patient wants a call back or thru [facility]:call     Comments:    Please advise patient replies from provider may take up to 48 hours.

## 2025-01-08 ENCOUNTER — PATIENT OUTREACH (OUTPATIENT)
Dept: EMERGENCY MEDICINE | Facility: HOSPITAL | Age: 75
End: 2025-01-08
Payer: MEDICARE

## 2025-01-08 ENCOUNTER — TELEPHONE (OUTPATIENT)
Dept: PULMONOLOGY | Facility: CLINIC | Age: 75
End: 2025-01-08
Payer: MEDICARE

## 2025-01-08 ENCOUNTER — PATIENT MESSAGE (OUTPATIENT)
Dept: TRANSPLANT | Facility: CLINIC | Age: 75
End: 2025-01-08
Payer: MEDICARE

## 2025-01-08 NOTE — PROGRESS NOTES
Phoned patient for follow-up. Patient was unavailable. Message sent urging patient to call if additional assistance is needed.  Margo Mehta

## 2025-01-08 NOTE — PROGRESS NOTES
Reminded patient of the your upcoming virtual appointmnet on 1/10/25 at 2:30 with Dr Greg Horton MD. [facility], Pulmonology. Advised patient to protect your medical privacy and ensure a secure connection, please find a well-lit, private area with a strong wifi and mobile signal for uninterrupted communication with provider.  Margo Mehta

## 2025-01-08 NOTE — PROGRESS NOTES
Patient phoned regarding billing for upcoming appointment. Patient stated he was charged for the last visit and has questions prior to his upcoming appointment. In Basket message sent to Dr Horton's staff for contact patient to assist.  Margo Mehta

## 2025-01-08 NOTE — TELEPHONE ENCOUNTER
----- Message from Margo Mehta sent at 1/8/2025 11:42 AM CST -----  Regarding: Billing  Good Morning,  The above named patient is scheduled to see Dr Horton on 1/10/25. Patient stated last visit he was charged fr the visit and has questions about billing prior to coming in for appointment. Patient is requesting a call back. Please assist.  Margo Mehta

## 2025-01-10 ENCOUNTER — PATIENT MESSAGE (OUTPATIENT)
Dept: ELECTROPHYSIOLOGY | Facility: CLINIC | Age: 75
End: 2025-01-10
Payer: MEDICARE

## 2025-01-10 DIAGNOSIS — Z94.0 KIDNEY REPLACED BY TRANSPLANT: ICD-10-CM

## 2025-01-10 DIAGNOSIS — Z94.1 HEART TRANSPLANTED: ICD-10-CM

## 2025-01-10 RX ORDER — TACROLIMUS 1 MG/1
CAPSULE ORAL
Qty: 1350 CAPSULE | Refills: 3 | Status: SHIPPED | OUTPATIENT
Start: 2025-01-10 | End: 2026-01-10

## 2025-01-10 RX ORDER — MYCOPHENOLATE MOFETIL 250 MG/1
250 CAPSULE ORAL 2 TIMES DAILY
Qty: 180 CAPSULE | Refills: 3 | Status: SHIPPED | OUTPATIENT
Start: 2025-01-10 | End: 2026-01-10

## 2025-01-30 RX ORDER — TAMSULOSIN HYDROCHLORIDE 0.4 MG/1
1 CAPSULE ORAL
Qty: 90 CAPSULE | Refills: 3 | Status: SHIPPED | OUTPATIENT
Start: 2025-01-30

## 2025-02-05 ENCOUNTER — TELEPHONE (OUTPATIENT)
Dept: ELECTROPHYSIOLOGY | Facility: CLINIC | Age: 75
End: 2025-02-05
Payer: MEDICARE

## 2025-02-05 NOTE — TELEPHONE ENCOUNTER
CALLED PT, NO ANSWER LEFT MESSAGE THAT DR HUFF IS NOT DOING PROCEDURE ON THE 25 OR 26TH, ASKED THAT HE CALL US BACK IF HE NEEDS TO RESCHEDULE HIS PROCEDURE

## 2025-02-05 NOTE — TELEPHONE ENCOUNTER
----- Message from Martha sent at 2/5/2025  9:42 AM CST -----  Regarding: procedure  Pls call pt at [phone].  He is scheduled for a procedure on the 24th and wants to know if it can be rescheduled to the 25th or 26th.    Thank you

## 2025-02-10 ENCOUNTER — TELEPHONE (OUTPATIENT)
Dept: ELECTROPHYSIOLOGY | Facility: CLINIC | Age: 75
End: 2025-02-10
Payer: MEDICARE

## 2025-02-10 NOTE — TELEPHONE ENCOUNTER
Spoke to patient. Patient will keep the scheduled date on 2/24/2025 for his ablation with Dr. Blackman. Patient verbalized understanding and appreciated the call.

## 2025-02-11 ENCOUNTER — PATIENT OUTREACH (OUTPATIENT)
Dept: ADMINISTRATIVE | Facility: HOSPITAL | Age: 75
End: 2025-02-11
Payer: MEDICARE

## 2025-02-17 ENCOUNTER — LAB VISIT (OUTPATIENT)
Dept: LAB | Facility: HOSPITAL | Age: 75
End: 2025-02-17
Attending: INTERNAL MEDICINE
Payer: MEDICARE

## 2025-02-17 DIAGNOSIS — I49.9 CARDIAC ARRHYTHMIA, UNSPECIFIED CARDIAC ARRHYTHMIA TYPE: ICD-10-CM

## 2025-02-17 DIAGNOSIS — T86.20 COMPLICATION OF HEART TRANSPLANT, UNSPECIFIED COMPLICATION: ICD-10-CM

## 2025-02-17 DIAGNOSIS — Z79.899 ENCOUNTER FOR LONG-TERM (CURRENT) USE OF MEDICATIONS: ICD-10-CM

## 2025-02-17 DIAGNOSIS — E78.2 MIXED HYPERLIPIDEMIA: ICD-10-CM

## 2025-02-17 DIAGNOSIS — I48.3 TYPICAL ATRIAL FLUTTER: ICD-10-CM

## 2025-02-17 DIAGNOSIS — I10 HYPERTENSION, UNSPECIFIED TYPE: ICD-10-CM

## 2025-02-17 DIAGNOSIS — Z94.1 STATUS POST HEART TRANSPLANT: ICD-10-CM

## 2025-02-17 DIAGNOSIS — E11.9 TYPE 2 DIABETES MELLITUS WITHOUT COMPLICATION: ICD-10-CM

## 2025-02-17 DIAGNOSIS — R06.02 SHORTNESS OF BREATH: ICD-10-CM

## 2025-02-17 LAB
ALBUMIN/CREAT UR: 579.8 UG/MG (ref 0–30)
CREAT UR-MCNC: 129 MG/DL (ref 23–375)
ERYTHROCYTE [DISTWIDTH] IN BLOOD BY AUTOMATED COUNT: 14.1 % (ref 11.5–14.5)
HCT VFR BLD AUTO: 36.7 % (ref 40–54)
HGB BLD-MCNC: 11.2 G/DL (ref 14–18)
MCH RBC QN AUTO: 28.7 PG (ref 27–31)
MCHC RBC AUTO-ENTMCNC: 30.5 G/DL (ref 32–36)
MCV RBC AUTO: 94 FL (ref 82–98)
MICROALBUMIN UR DL<=1MG/L-MCNC: 748 UG/ML
PLATELET # BLD AUTO: 195 K/UL (ref 150–450)
PMV BLD AUTO: 11.4 FL (ref 9.2–12.9)
RBC # BLD AUTO: 3.9 M/UL (ref 4.6–6.2)
WBC # BLD AUTO: 4.41 K/UL (ref 3.9–12.7)

## 2025-02-17 PROCEDURE — 85730 THROMBOPLASTIN TIME PARTIAL: CPT | Performed by: INTERNAL MEDICINE

## 2025-02-17 PROCEDURE — 80048 BASIC METABOLIC PNL TOTAL CA: CPT | Performed by: INTERNAL MEDICINE

## 2025-02-17 PROCEDURE — 36415 COLL VENOUS BLD VENIPUNCTURE: CPT | Mod: PN | Performed by: INTERNAL MEDICINE

## 2025-02-17 PROCEDURE — 83036 HEMOGLOBIN GLYCOSYLATED A1C: CPT | Performed by: INTERNAL MEDICINE

## 2025-02-17 PROCEDURE — 85027 COMPLETE CBC AUTOMATED: CPT | Performed by: INTERNAL MEDICINE

## 2025-02-17 PROCEDURE — 82570 ASSAY OF URINE CREATININE: CPT | Performed by: FAMILY MEDICINE

## 2025-02-17 PROCEDURE — 85610 PROTHROMBIN TIME: CPT | Performed by: INTERNAL MEDICINE

## 2025-02-18 LAB
ANION GAP SERPL CALC-SCNC: 12 MMOL/L (ref 8–16)
APTT PPP: 23.5 SEC (ref 21–32)
BUN SERPL-MCNC: 30 MG/DL (ref 8–23)
CALCIUM SERPL-MCNC: 8.5 MG/DL (ref 8.7–10.5)
CHLORIDE SERPL-SCNC: 108 MMOL/L (ref 95–110)
CO2 SERPL-SCNC: 22 MMOL/L (ref 23–29)
CREAT SERPL-MCNC: 2.5 MG/DL (ref 0.5–1.4)
EST. GFR  (NO RACE VARIABLE): 26.3 ML/MIN/1.73 M^2
ESTIMATED AVG GLUCOSE: 123 MG/DL (ref 68–131)
GLUCOSE SERPL-MCNC: 115 MG/DL (ref 70–110)
HBA1C MFR BLD: 5.9 % (ref 4–5.6)
INR PPP: 1 (ref 0.8–1.2)
POTASSIUM SERPL-SCNC: 4.1 MMOL/L (ref 3.5–5.1)
PROTHROMBIN TIME: 11.4 SEC (ref 9–12.5)
SODIUM SERPL-SCNC: 142 MMOL/L (ref 136–145)

## 2025-02-20 ENCOUNTER — PATIENT MESSAGE (OUTPATIENT)
Dept: ELECTROPHYSIOLOGY | Facility: CLINIC | Age: 75
End: 2025-02-20
Payer: MEDICARE

## 2025-02-21 ENCOUNTER — TELEPHONE (OUTPATIENT)
Dept: ELECTROPHYSIOLOGY | Facility: CLINIC | Age: 75
End: 2025-02-21
Payer: MEDICARE

## 2025-02-21 NOTE — TELEPHONE ENCOUNTER
Spoke to patient    CONFIRMED procedure arrival time of 8 am    Reiterated instructions including:    -Directions to check in desk    -NPO after midnight night prior to procedure. Fasting upon arrival to the hospital the day of the procedure. Patient allowed to drink water up until 2 hours prior to arrival due to IV fluid shortage.     -High importance of HOLDING Ozempic 7 days prior to procedure (last dose on 2/13/25); holding Eliquis on day of procedure (last dose on 2/23/25)    - Confirms no new meds prescribed or med changes since scheduling -     -Pre-procedure LABS Hgb 11.2 on 2/17/25 and 12.5 on 10/7/24 (pt states no active bleeding); BUN 30 on 2/17/25 (pt's baseline); creatinine 2.5 on 2/17/25 (pt's baseline)    -Confirmed absence or presence of implanted device/stimulator Freestyle Georgette in situ    -Confirmed no recent or current fever, cough, or shortness of breath .    -Confirmed no redness, rash, irritation, or yeast infection to skin/ chest / groin area.     Pt would like to stay the night since he lives so far. Advised him to mention this during check in.    Patient verbalized understanding of above, denies any further questions and appreciated the call.

## 2025-02-24 ENCOUNTER — HOSPITAL ENCOUNTER (OUTPATIENT)
Dept: CARDIOLOGY | Facility: HOSPITAL | Age: 75
Discharge: HOME | End: 2025-02-24
Attending: INTERNAL MEDICINE | Admitting: INTERNAL MEDICINE
Payer: MEDICARE

## 2025-02-24 ENCOUNTER — HOSPITAL ENCOUNTER (OUTPATIENT)
Facility: HOSPITAL | Age: 75
Discharge: HOME | End: 2025-02-24
Attending: INTERNAL MEDICINE | Admitting: INTERNAL MEDICINE
Payer: MEDICARE

## 2025-02-24 ENCOUNTER — ANESTHESIA (OUTPATIENT)
Dept: MEDSURG UNIT | Facility: HOSPITAL | Age: 75
End: 2025-02-24
Payer: MEDICARE

## 2025-02-24 ENCOUNTER — ANESTHESIA EVENT (OUTPATIENT)
Dept: MEDSURG UNIT | Facility: HOSPITAL | Age: 75
End: 2025-02-24
Payer: MEDICARE

## 2025-02-24 ENCOUNTER — HOSPITAL ENCOUNTER (EMERGENCY)
Facility: HOSPITAL | Age: 75
Discharge: HOME | End: 2025-02-25
Attending: EMERGENCY MEDICINE
Payer: MEDICARE

## 2025-02-24 VITALS
WEIGHT: 240 LBS | HEART RATE: 66 BPM | HEIGHT: 74 IN | DIASTOLIC BLOOD PRESSURE: 89 MMHG | BODY MASS INDEX: 30.8 KG/M2 | SYSTOLIC BLOOD PRESSURE: 162 MMHG

## 2025-02-24 VITALS
WEIGHT: 240 LBS | BODY MASS INDEX: 30.8 KG/M2 | HEIGHT: 74 IN | SYSTOLIC BLOOD PRESSURE: 136 MMHG | DIASTOLIC BLOOD PRESSURE: 83 MMHG | HEART RATE: 80 BPM | TEMPERATURE: 99 F | OXYGEN SATURATION: 99 % | RESPIRATION RATE: 17 BRPM

## 2025-02-24 DIAGNOSIS — T81.89XA PROBLEM INVOLVING SURGICAL INCISION: Primary | ICD-10-CM

## 2025-02-24 DIAGNOSIS — N28.9 RENAL DYSFUNCTION: ICD-10-CM

## 2025-02-24 DIAGNOSIS — I48.92 ATRIAL FLUTTER: ICD-10-CM

## 2025-02-24 DIAGNOSIS — I48.3 TYPICAL ATRIAL FLUTTER: ICD-10-CM

## 2025-02-24 DIAGNOSIS — I49.9 ARRHYTHMIA: ICD-10-CM

## 2025-02-24 LAB
ALBUMIN SERPL BCP-MCNC: 3.5 G/DL (ref 3.5–5.2)
ALP SERPL-CCNC: 75 U/L (ref 40–150)
ALT SERPL W/O P-5'-P-CCNC: 12 U/L (ref 10–44)
ANION GAP SERPL CALC-SCNC: 11 MMOL/L (ref 8–16)
ASCENDING AORTA: 4.3 CM
AST SERPL-CCNC: 27 U/L (ref 10–40)
BASOPHILS # BLD AUTO: 0.04 K/UL (ref 0–0.2)
BASOPHILS NFR BLD: 0.7 % (ref 0–1.9)
BILIRUB SERPL-MCNC: 0.2 MG/DL (ref 0.1–1)
BSA FOR ECHO PROCEDURE: 2.38 M2
BUN SERPL-MCNC: 37 MG/DL (ref 8–23)
CALCIUM SERPL-MCNC: 8.6 MG/DL (ref 8.7–10.5)
CHLORIDE SERPL-SCNC: 110 MMOL/L (ref 95–110)
CO2 SERPL-SCNC: 25 MMOL/L (ref 23–29)
CREAT SERPL-MCNC: 2.6 MG/DL (ref 0.5–1.4)
DIFFERENTIAL METHOD BLD: ABNORMAL
EJECTION FRACTION: 60 %
EOSINOPHIL # BLD AUTO: 0 K/UL (ref 0–0.5)
EOSINOPHIL NFR BLD: 0.7 % (ref 0–8)
ERYTHROCYTE [DISTWIDTH] IN BLOOD BY AUTOMATED COUNT: 14.2 % (ref 11.5–14.5)
EST. GFR  (NO RACE VARIABLE): 25 ML/MIN/1.73 M^2
GLUCOSE SERPL-MCNC: 79 MG/DL (ref 70–110)
HCT VFR BLD AUTO: 35.5 % (ref 40–54)
HCV AB SERPL QL IA: POSITIVE
HEP C VIRUS HOLD SPECIMEN: NORMAL
HGB BLD-MCNC: 11.2 G/DL (ref 14–18)
HIV 1+2 AB+HIV1 P24 AG SERPL QL IA: NEGATIVE
IMM GRANULOCYTES # BLD AUTO: 0 K/UL (ref 0–0.04)
IMM GRANULOCYTES NFR BLD AUTO: 0 % (ref 0–0.5)
INR PPP: 1 (ref 0.8–1.2)
LYMPHOCYTES # BLD AUTO: 1.5 K/UL (ref 1–4.8)
LYMPHOCYTES NFR BLD: 28 % (ref 18–48)
MCH RBC QN AUTO: 28.6 PG (ref 27–31)
MCHC RBC AUTO-ENTMCNC: 31.5 G/DL (ref 32–36)
MCV RBC AUTO: 91 FL (ref 82–98)
MONOCYTES # BLD AUTO: 0.4 K/UL (ref 0.3–1)
MONOCYTES NFR BLD: 7.6 % (ref 4–15)
NEUTROPHILS # BLD AUTO: 3.4 K/UL (ref 1.8–7.7)
NEUTROPHILS NFR BLD: 63 % (ref 38–73)
NRBC BLD-RTO: 0 /100 WBC
OHS QRS DURATION: 166 MS
OHS QRS DURATION: 178 MS
OHS QTC CALCULATION: 516 MS
OHS QTC CALCULATION: 534 MS
PLATELET # BLD AUTO: 209 K/UL (ref 150–450)
PMV BLD AUTO: 10.4 FL (ref 9.2–12.9)
POCT GLUCOSE: 115 MG/DL (ref 70–110)
POCT GLUCOSE: 134 MG/DL (ref 70–110)
POTASSIUM SERPL-SCNC: 4.1 MMOL/L (ref 3.5–5.1)
PROT SERPL-MCNC: 6.6 G/DL (ref 6–8.4)
PROTHROMBIN TIME: 11.1 SEC (ref 9–12.5)
RBC # BLD AUTO: 3.92 M/UL (ref 4.6–6.2)
SINUS: 4 CM
SODIUM SERPL-SCNC: 146 MMOL/L (ref 136–145)
STJ: 3.2 CM
WBC # BLD AUTO: 5.42 K/UL (ref 3.9–12.7)

## 2025-02-24 PROCEDURE — 25000003 PHARM REV CODE 250: Performed by: NURSE ANESTHETIST, CERTIFIED REGISTERED

## 2025-02-24 PROCEDURE — 93325 DOPPLER ECHO COLOR FLOW MAPG: CPT | Mod: 26,,, | Performed by: INTERNAL MEDICINE

## 2025-02-24 PROCEDURE — 93653 COMPRE EP EVAL TX SVT: CPT | Performed by: INTERNAL MEDICINE

## 2025-02-24 PROCEDURE — C2630 CATH, EP, COOL-TIP: HCPCS | Performed by: INTERNAL MEDICINE

## 2025-02-24 PROCEDURE — 93010 ELECTROCARDIOGRAM REPORT: CPT | Mod: XE,,, | Performed by: INTERNAL MEDICINE

## 2025-02-24 PROCEDURE — 63600175 PHARM REV CODE 636 W HCPCS: Performed by: NURSE ANESTHETIST, CERTIFIED REGISTERED

## 2025-02-24 PROCEDURE — 27201423 OPTIME MED/SURG SUP & DEVICES STERILE SUPPLY: Performed by: INTERNAL MEDICINE

## 2025-02-24 PROCEDURE — C1887 CATHETER, GUIDING: HCPCS | Performed by: INTERNAL MEDICINE

## 2025-02-24 PROCEDURE — 37000009 HC ANESTHESIA EA ADD 15 MINS: Performed by: INTERNAL MEDICINE

## 2025-02-24 PROCEDURE — 87522 HEPATITIS C REVRS TRNSCRPJ: CPT | Performed by: EMERGENCY MEDICINE

## 2025-02-24 PROCEDURE — 99284 EMERGENCY DEPT VISIT MOD MDM: CPT | Mod: 25

## 2025-02-24 PROCEDURE — 93320 DOPPLER ECHO COMPLETE: CPT | Mod: 26,,, | Performed by: INTERNAL MEDICINE

## 2025-02-24 PROCEDURE — 25000003 PHARM REV CODE 250: Performed by: INTERNAL MEDICINE

## 2025-02-24 PROCEDURE — 85025 COMPLETE CBC W/AUTO DIFF WBC: CPT | Performed by: EMERGENCY MEDICINE

## 2025-02-24 PROCEDURE — C1766 INTRO/SHEATH,STRBLE,NON-PEEL: HCPCS | Performed by: INTERNAL MEDICINE

## 2025-02-24 PROCEDURE — 87389 HIV-1 AG W/HIV-1&-2 AB AG IA: CPT | Performed by: EMERGENCY MEDICINE

## 2025-02-24 PROCEDURE — C1730 CATH, EP, 19 OR FEW ELECT: HCPCS | Performed by: INTERNAL MEDICINE

## 2025-02-24 PROCEDURE — 86803 HEPATITIS C AB TEST: CPT | Performed by: EMERGENCY MEDICINE

## 2025-02-24 PROCEDURE — 93325 DOPPLER ECHO COLOR FLOW MAPG: CPT

## 2025-02-24 PROCEDURE — D9220A PRA ANESTHESIA: Mod: ANES,,, | Performed by: ANESTHESIOLOGY

## 2025-02-24 PROCEDURE — 80053 COMPREHEN METABOLIC PANEL: CPT | Performed by: EMERGENCY MEDICINE

## 2025-02-24 PROCEDURE — 82962 GLUCOSE BLOOD TEST: CPT | Performed by: INTERNAL MEDICINE

## 2025-02-24 PROCEDURE — 37000008 HC ANESTHESIA 1ST 15 MINUTES: Performed by: INTERNAL MEDICINE

## 2025-02-24 PROCEDURE — 93005 ELECTROCARDIOGRAM TRACING: CPT

## 2025-02-24 PROCEDURE — 93653 COMPRE EP EVAL TX SVT: CPT | Mod: ,,, | Performed by: INTERNAL MEDICINE

## 2025-02-24 PROCEDURE — 93010 ELECTROCARDIOGRAM REPORT: CPT | Mod: ,,, | Performed by: INTERNAL MEDICINE

## 2025-02-24 PROCEDURE — D9220A PRA ANESTHESIA: Mod: CRNA,,, | Performed by: NURSE ANESTHETIST, CERTIFIED REGISTERED

## 2025-02-24 PROCEDURE — 93312 ECHO TRANSESOPHAGEAL: CPT | Mod: 26,,, | Performed by: INTERNAL MEDICINE

## 2025-02-24 PROCEDURE — 85610 PROTHROMBIN TIME: CPT | Performed by: EMERGENCY MEDICINE

## 2025-02-24 PROCEDURE — 36415 COLL VENOUS BLD VENIPUNCTURE: CPT | Performed by: EMERGENCY MEDICINE

## 2025-02-24 PROCEDURE — C1894 INTRO/SHEATH, NON-LASER: HCPCS | Performed by: INTERNAL MEDICINE

## 2025-02-24 PROCEDURE — 63600175 PHARM REV CODE 636 W HCPCS: Performed by: INTERNAL MEDICINE

## 2025-02-24 RX ORDER — HEPARIN SODIUM 1000 [USP'U]/ML
INJECTION, SOLUTION INTRAVENOUS; SUBCUTANEOUS
Status: DISCONTINUED | OUTPATIENT
Start: 2025-02-24 | End: 2025-02-24

## 2025-02-24 RX ORDER — CEFAZOLIN 2 G/1
2 INJECTION, POWDER, FOR SOLUTION INTRAMUSCULAR; INTRAVENOUS
Status: DISCONTINUED | OUTPATIENT
Start: 2025-02-24 | End: 2025-02-24 | Stop reason: HOSPADM

## 2025-02-24 RX ORDER — GLUCAGON 1 MG
1 KIT INJECTION
Status: DISCONTINUED | OUTPATIENT
Start: 2025-02-24 | End: 2025-02-24 | Stop reason: HOSPADM

## 2025-02-24 RX ORDER — PROTAMINE SULFATE 10 MG/ML
INJECTION, SOLUTION INTRAVENOUS
Status: DISCONTINUED | OUTPATIENT
Start: 2025-02-24 | End: 2025-02-24

## 2025-02-24 RX ORDER — PROPOFOL 10 MG/ML
VIAL (ML) INTRAVENOUS CONTINUOUS PRN
Status: DISCONTINUED | OUTPATIENT
Start: 2025-02-24 | End: 2025-02-24

## 2025-02-24 RX ORDER — FENTANYL CITRATE 50 UG/ML
INJECTION, SOLUTION INTRAMUSCULAR; INTRAVENOUS
Status: DISCONTINUED | OUTPATIENT
Start: 2025-02-24 | End: 2025-02-24

## 2025-02-24 RX ORDER — NIFEDIPINE 10 MG/1
CAPSULE ORAL EVERY 8 HOURS
COMMUNITY

## 2025-02-24 RX ORDER — HEPARIN SODIUM 10000 [USP'U]/100ML
INJECTION, SOLUTION INTRAVENOUS CONTINUOUS PRN
Status: DISCONTINUED | OUTPATIENT
Start: 2025-02-24 | End: 2025-02-24

## 2025-02-24 RX ORDER — ACETAMINOPHEN 325 MG/1
650 TABLET ORAL EVERY 4 HOURS PRN
Status: DISCONTINUED | OUTPATIENT
Start: 2025-02-24 | End: 2025-02-24 | Stop reason: HOSPADM

## 2025-02-24 RX ORDER — DEXMEDETOMIDINE HYDROCHLORIDE 100 UG/ML
INJECTION, SOLUTION INTRAVENOUS
Status: DISCONTINUED | OUTPATIENT
Start: 2025-02-24 | End: 2025-02-24

## 2025-02-24 RX ORDER — CEFAZOLIN SODIUM 1 G/3ML
INJECTION, POWDER, FOR SOLUTION INTRAMUSCULAR; INTRAVENOUS
Status: DISCONTINUED | OUTPATIENT
Start: 2025-02-24 | End: 2025-02-24

## 2025-02-24 RX ORDER — SODIUM CHLORIDE 0.9 % (FLUSH) 0.9 %
3 SYRINGE (ML) INJECTION
Status: DISCONTINUED | OUTPATIENT
Start: 2025-02-24 | End: 2025-02-24 | Stop reason: HOSPADM

## 2025-02-24 RX ORDER — HEPARIN SOD,PORCINE/0.9 % NACL 1000/500ML
INTRAVENOUS SOLUTION INTRAVENOUS
Status: DISCONTINUED | OUTPATIENT
Start: 2025-02-24 | End: 2025-02-24 | Stop reason: HOSPADM

## 2025-02-24 RX ORDER — LIDOCAINE HYDROCHLORIDE 20 MG/ML
INJECTION, SOLUTION INFILTRATION; PERINEURAL
Status: DISCONTINUED | OUTPATIENT
Start: 2025-02-24 | End: 2025-02-24 | Stop reason: HOSPADM

## 2025-02-24 RX ADMIN — DEXMEDETOMIDINE 12 MCG: 200 INJECTION, SOLUTION INTRAVENOUS at 09:02

## 2025-02-24 RX ADMIN — FENTANYL CITRATE 25 MCG: 0.05 INJECTION, SOLUTION INTRAMUSCULAR; INTRAVENOUS at 09:02

## 2025-02-24 RX ADMIN — PROPOFOL 150 MCG/KG/MIN: 10 INJECTION, EMULSION INTRAVENOUS at 09:02

## 2025-02-24 RX ADMIN — DEXMEDETOMIDINE 8 MCG: 200 INJECTION, SOLUTION INTRAVENOUS at 10:02

## 2025-02-24 RX ADMIN — HEPARIN SODIUM 20000 UNITS: 1000 INJECTION, SOLUTION INTRAVENOUS; SUBCUTANEOUS at 10:02

## 2025-02-24 RX ADMIN — CEFAZOLIN 2 G: 330 INJECTION, POWDER, FOR SOLUTION INTRAMUSCULAR; INTRAVENOUS at 09:02

## 2025-02-24 RX ADMIN — PROTAMINE SULFATE 5 MG: 10 INJECTION, SOLUTION INTRAVENOUS at 11:02

## 2025-02-24 RX ADMIN — HEPARIN SODIUM AND DEXTROSE 12 UNITS/KG/HR: 10000; 5 INJECTION INTRAVENOUS at 10:02

## 2025-02-24 RX ADMIN — HEPARIN SODIUM 3250 UNITS: 1000 INJECTION, SOLUTION INTRAVENOUS; SUBCUTANEOUS at 10:02

## 2025-02-24 RX ADMIN — SODIUM CHLORIDE: 9 INJECTION, SOLUTION INTRAVENOUS at 09:02

## 2025-02-24 RX ADMIN — PROTAMINE SULFATE 95 MG: 10 INJECTION, SOLUTION INTRAVENOUS at 11:02

## 2025-02-24 RX ADMIN — HEPARIN SODIUM 11000 UNITS: 1000 INJECTION, SOLUTION INTRAVENOUS; SUBCUTANEOUS at 10:02

## 2025-02-24 NOTE — H&P
[facility] - Short Stay Cardiac Unit  Cardiac Electrophysiology  History and Physical     Admission Date: 2/24/2025  Code Status: Prior   Attending Provider: Marcelo Blackman MD   Principal Problem:Atrial flutter    Subjective:     Chief Complaint:  Atrial flutter    HPI:  Nigel Albrecht is a 74 y.o. male s/p OHTx 5/24/02 and kidney tx 5/25/02 at [facility], HTN, stage III CKD, DM, HLP, AFL    History obtained from previous visits as well as through patient report.    Background:     From last office visit with Dr. Blackman on 12/4/2024.     75 y/o black male s/p OHTx 5/24/02 and kidney tx 5/25/02 at [facility], HTN, stage III CKD, DM, HLP. He developed increased heart rates and was found to be in AFL. Event monitor showed persistent AFL, average  bpm. He was not started on OAC.     In summary, 74 yoM here for AFL. 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 Will start OAC and offer ablation. Extensive discussion regarding risks, benefits, and alternative approaches to the procedure was had with the patient.  The patient voices understanding and all questions have been answered.  The patient would like to proceed as planned.     AFL ablation in patient with OHT  TANYA/HD Grid    Interval Hx:     Nigel Albrecht presents today to [facility] for scheduled RFA for atrial flutter with Dr. Blackman. He denies any chest pain, palpitations, SOB, PETERSON, dizziness, light headedness, weakness, syncope, or near syncopal episodes. He denies any bleeding, infections, fevers, rashes, or surgeries in the past 30 days. She is currently taking Eliquis. Last dose of Eliquis was on 2/23/2025 PM.    ECG today shows atrial flutter at 91 bpm    Pertinent labs reviewed from 2/17/2025. INR 1.0, Hgb 11.2, Cr 2.5    Echo  Result Date: 10/16/2024    Left Ventricle: The left ventricle is normal in size. Normal wall   thickness. There is concentric hypertrophy. Normal wall motion. There is   normal systolic function with a visually estimated ejection fraction of 55    - 60%. Ejection fraction is approximately 60%. There is normal diastolic   function.    Right Ventricle: Normal right ventricular cavity size. Right ventricle   wall motion  is normal. Systolic function is normal.    Mitral Valve: There is mild regurgitation.    Tricuspid Valve: There is mild regurgitation.    Stress Echo Which stress agent will be used? Treadmill Exercise; Color Flow Doppler? No  Result Date: 5/27/2024    Left Ventricle: The left ventricle is normal in size. Normal wall thickness. Normal wall motion. There is normal systolic function with a visually estimated ejection fraction of 60 - 65%. Ejection fraction by visual approximation is 65%. There is normal diastolic function.    Right Ventricle: Normal right ventricular cavity size. Wall thickness is normal. Systolic function is normal.    IVC/SVC: Normal venous pressure at 3 mmHg.    Pericardium: There is an effusion adjacent to the right atrium.    Stress Protocol: The patient exercised for 3 minutes 40 seconds on a high ramp protocol, corresponding to a functional capacity of 6METS, achieving a peak heart rate of 95 bpm, which is 65% of the age predicted maximum heart rate. Their exercise capacity was moderately impaired. The patient reported no symptoms during the stress test. The test was stopped because the patient experienced fatigue.    Baseline ECG: The Baseline ECG reveals sinus rhythm with RBBB. The axis is normal. The ST segments are normal.    Stress ECG: There are no ST segment deviation identified during the protocol. During stress, rare PACs are noted. During stress, occasional PVCs are noted. There is normal blood pressure response with stress.    ECG Conclusion: The ECG portion of the study is negative for ischemia. Sensitivity is reduced due to failure to reach target heart rate.    Post-stress Echo: The left ventricle systolic function is hyperdynamic with an EF of 70%. Right ventricle systolic function is normal.    Post-stress        Display both the narrative and impression [both]  The study is negative with no echocardiographic   evidence of stress induced ischemia.      PET Stress :  ·  There are no clinically significant perfusion abnormalities. There is a small (<10%) amount of mild diffuse resting heterogeneity that improves with stress, indicative of non-obstructive disease.  ·  The whole heart absolute myocardial perfusion values were elevated at rest, normal during stress and CFR is mildly reduced in part due to elevated resting flow.  ·  CT attenuation images demonstrate no coronary calcifications and mild diffuse aortic calcifications in the descending aorta.  ·  The gated perfusion images showed an ejection fraction of 46% at rest and 47% during stress. A normal ejection fraction is greater than 47%.  ·  There is normal wall motion at rest and normal wall motion during stress.  ·  The study's ECG is negative for ischemia.      Past Medical History:   Diagnosis Date    Allergic rhinitis 2013    Blood transfusion     Cataract     CKD (chronic kidney disease), stage III 2014    -donor kidney transplant     Diabetes mellitus, type 2 2013    Gout, arthritis 2013    Heart attack     Heart transplanted     Hyperlipidemia 2013    Hypertension     Immunodeficiency due to treatment with immunosuppressive medication     Morbid obesity 2013    Organ transplant 2002    heart and kidney    Orthostatic syncope 2022    Due to furosemide    Pneumonia due to COVID-19 virus 2022    Prophylactic immunotherapy     Prostate cancer 2023    Renal manifestation of secondary diabetes mellitus        Past Surgical History:   Procedure Laterality Date    CATARACT EXTRACTION Bilateral     COLONOSCOPY N/A 10/6/2020    Procedure: COLONOSCOPY;  Surgeon: Conner Redman MD;  Location: [facility];  Service: Endoscopy;  Laterality: N/A;    EYE SURGERY      HEART TRANSPLANT      KIDNEY TRANSPLANT       REVISION TOTAL HIP ARTHROPLASTY         Review of patient's allergies indicates:  No Known Allergies    No current facility-administered medications on file prior to encounter.     Current Outpatient Medications on File Prior to Encounter   Medication Sig    acetaminophen (TYLENOL) 325 MG tablet Take 2 tablets (650 mg total) by mouth every 4 (four) hours as needed.    apixaban (ELIQUIS) 5 mg Tab Take 1 tablet (5 mg total) by mouth 2 (two) times daily.    aspirin 81 MG Chew Take 1 tablet (81 mg total) by mouth once daily.    atorvastatin (LIPITOR) 40 MG tablet TAKE 1 TABLET ONCE DAILY    calcium acetate,phosphat bind, (PHOSLO) 667 mg capsule Take 2 capsules (1,334 mg total) by mouth 3 (three) times daily with meals.    cream base no.171, bulk, Crea 2 g by Misc.(Non-Drug; Combo Route) route 4 (four) times daily as needed (pain).    hydrALAZINE (APRESOLINE) 50 MG tablet TAKE 1 TABLET EVERY 8 HOURS    predniSONE (DELTASONE) 5 MG tablet Take 1 tablet (5 mg total) by mouth once daily.    primidone (MYSOLINE) 50 MG Tab Take 2 tablets (100 mg total) by mouth every evening.    propranoloL (INDERAL) 20 MG tablet Take 1 tablet (20 mg total) by mouth 2 (two) times daily.    torsemide (DEMADEX) 20 MG Tab Take 2 tablets (40 mg total) by mouth once daily.    blood-glucose sensor (FREESTYLE SHREE 3 PLUS SENSOR) Madelin Inject 1 Device into the skin Every 15 days.    semaglutide (OZEMPIC) 0.25 mg or 0.5 mg (2 mg/3 mL) pen injector Inject 0.5 mg into the skin every 7 days.    sildenafiL (VIAGRA) 100 MG tablet Take 1 tablet (100 mg total) by mouth daily as needed for Erectile Dysfunction.    [DISCONTINUED] amLODIPine (NORVASC) 10 MG tablet Take 0.5 tablets (5 mg total) by mouth once daily.    [DISCONTINUED] calcium carbonate (OS-CARRIE) 500 mg calcium (1,250 mg) tablet Take 1 tablet by mouth once daily.    [DISCONTINUED] furosemide (LASIX) 40 MG tablet Take 1 tablet (40 mg total) by mouth 2 (two) times daily.    [DISCONTINUED] gabapentin  (NEURONTIN) 300 MG capsule TAKE 1 CAPSULE TWICE DAILY    [DISCONTINUED] insulin aspart U-100 (NOVOLOG) 100 unit/mL (3 mL) InPn pen Inject 0-5 Units into the skin before meals and at bedtime as needed (Hyperglycemia).    [DISCONTINUED] insulin detemir U-100 (LEVEMIR FLEXTOUCH) 100 unit/mL (3 mL) SubQ InPn pen Inject 5 Units into the skin every evening.    [DISCONTINUED] lisinopriL (PRINIVIL,ZESTRIL) 40 MG tablet Take 1 tablet (40 mg total) by mouth once daily.     Family History       Problem Relation (Age of Onset)    Diabetes Brother, Maternal Grandmother    Early death Brother    Heart disease Brother    Hyperlipidemia Sister, Brother    Hypertension Brother    Kidney disease Son    Stroke Mother, Brother          Tobacco Use    Smoking status: Former     Current packs/day: 0.00     Average packs/day: 1 pack/day for 20.0 years (20.0 ttl pk-yrs)     Types: Cigarettes     Start date: 1964     Quit date: 1984     Years since quittin.6    Smokeless tobacco: Never   Substance and Sexual Activity    Alcohol use: Yes     Alcohol/week: 1.0 standard drink of alcohol     Types: 1 Cans of beer per week     Comment: occ    Drug use: No    Sexual activity: Not Currently     Partners: Female     Review of Systems   Constitutional: Negative for chills, fever and malaise/fatigue.   HENT:  Negative for nosebleeds.    Eyes: Negative.    Cardiovascular:  Positive for leg swelling. Negative for chest pain, irregular heartbeat, palpitations and syncope.   Respiratory:  Negative for shortness of breath.    Hematologic/Lymphatic: Negative.    Skin:  Negative for itching and rash.   Musculoskeletal: Negative.    Gastrointestinal:  Negative for abdominal pain and nausea.   Genitourinary:  Negative for hematuria.   Neurological:  Negative for dizziness and light-headedness.   Psychiatric/Behavioral:  Negative for altered mental status.      Objective:     Vital Signs (Most Recent):  Temp: 97.9 °F (36.6 °C) (25  0808)  Pulse: 108 (02/24/25 0808)  Resp: 20 (02/24/25 0808)  BP: (!) 162/89 (02/24/25 0813)  SpO2: 100 % (02/24/25 0808) Vital Signs (24h Range):  Temp:  [97.9 °F (36.6 °C)] 97.9 °F (36.6 °C)  Pulse:  [108] 108  Resp:  [20] 20  SpO2:  [100 %] 100 %  BP: (162)/(89-94) 162/89       Weight: 108.9 kg (240 lb)  Body mass index is 30.81 kg/m².    SpO2: 100 %        Physical Exam  Vitals and nursing note reviewed.   Constitutional:       General: He is not in acute distress.     Appearance: Normal appearance.   HENT:      Head: Normocephalic.      Mouth/Throat:      Mouth: Mucous membranes are moist.   Eyes:      Extraocular Movements: Extraocular movements intact.   Cardiovascular:      Rate and Rhythm: Normal rate. Rhythm irregular.      Pulses:           Dorsalis pedis pulses are 2+ on the right side and 2+ on the left side.   Pulmonary:      Effort: Pulmonary effort is normal. No respiratory distress.      Breath sounds: Normal breath sounds. No wheezing or rales.   Abdominal:      General: There is no distension.      Palpations: Abdomen is soft.   Musculoskeletal:         General: Normal range of motion.      Cervical back: Normal range of motion.   Skin:     General: Skin is warm.   Neurological:      General: No focal deficit present.      Mental Status: He is alert.   Psychiatric:         Mood and Affect: Mood normal.     Significant Labs: Pertinent pre-procedure labs reviewed    Significant Imaging: ECG  Assessment and Plan:     Atrial flutter    Plan:   -RFA for atrial flutter  -Anesthesia for sedation   -ERIC prior     Prior to procedure, Dr. Blackman at bedside speaking with patient and family. Extensive discussion with patient regarding the nature of RFA for atrial flutter. We discussed risks and benefits at length. Our discussion included, but was not limited to the risk of death, infection, bleeding, stroke, MI, cardiac perforation, embolism, cardiac tamponade, vascular injury, AE fistula, injury to phrenic  nerve, and other organic injury including the possibility for need for surgery or pacemaker implantation. Patient verbalized understanding. All questions answered, no further questions or concerns, patient would like to proceed. Consents signed.      NAV CrouchP-C  Cardiac Electrophysiology  [facility] - Short Stay Cardiac Unit    Attending: Dr. Marcelo Blackman

## 2025-02-24 NOTE — HPI
Nigel Albrecht is a 74 y.o. male s/p OHTx 5/24/02 and kidney tx 5/25/02 at [facility], HTN, stage III CKD, DM, HLP, AFL    History obtained from previous visits as well as through patient report.    Background:     From last office visit with Dr. Blackman on 12/4/2024.     73 y/o black male s/p OHTx 5/24/02 and kidney tx 5/25/02 at [facility], HTN, stage III CKD, DM, HLP. He developed increased heart rates and was found to be in AFL. Event monitor showed persistent AFL, average  bpm. He was not started on OAC.     In summary, 74 yoM here for AFL. 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 Will start OAC and offer ablation. Extensive discussion regarding risks, benefits, and alternative approaches to the procedure was had with the patient.  The patient voices understanding and all questions have been answered.  The patient would like to proceed as planned.     AFL ablation in patient with OHT  TANYA/HD Grid    Interval Hx:     Nigel Albrecht presents today to [facility] for scheduled RFA for atrial flutter with Dr. Blackman. He denies any chest pain, palpitations, SOB, PETERSON, dizziness, light headedness, weakness, syncope, or near syncopal episodes. He denies any bleeding, infections, fevers, rashes, or surgeries in the past 30 days. She is currently taking Eliquis. Last dose of Eliquis was on ***.    ECG today shows atrial flutter at 91 bpm    Pertinent labs reviewed from 2/17/2025. INR 1.0, Hgb 11.2, Cr 2.5    Echo  Result Date: 10/16/2024    Left Ventricle: The left ventricle is normal in size. Normal wall   thickness. There is concentric hypertrophy. Normal wall motion. There is   normal systolic function with a visually estimated ejection fraction of 55   - 60%. Ejection fraction is approximately 60%. There is normal diastolic   function.    Right Ventricle: Normal right ventricular cavity size. Right ventricle   wall motion  is normal. Systolic function is normal.    Mitral Valve: There is mild regurgitation.    Tricuspid Valve:  There is mild regurgitation.        Stress Echo Which stress agent will be used? Treadmill Exercise; Color Flow Doppler? No  Result Date: 5/27/2024    Left Ventricle: The left ventricle is normal in size. Normal wall   thickness. Normal wall motion. There is normal systolic function with a   visually estimated ejection fraction of 60 - 65%. Ejection fraction by   visual approximation is 65%. There is normal diastolic function.    Right Ventricle: Normal right ventricular cavity size. Wall thickness   is normal. Systolic function is normal.    IVC/SVC: Normal venous pressure at 3 mmHg.    Pericardium: There is an effusion adjacent to the right atrium.    Stress Protocol: The patient exercised for 3 minutes 40 seconds on a   high ramp protocol, corresponding to a functional capacity of 6METS,   achieving a peak heart rate of 95 bpm, which is 65% of the age predicted   maximum heart rate. Their exercise capacity was moderately impaired. The   patient reported no symptoms during the stress test. The test was stopped   because the patient experienced fatigue.    Baseline ECG: The Baseline ECG reveals sinus rhythm with RBBB. The axis   is normal. The ST segments are normal.    Stress ECG: There are no ST segment deviation identified during the   protocol. During stress, rare PACs are noted. During stress, occasional   PVCs are noted. There is normal blood pressure response with stress.    ECG Conclusion: The ECG portion of the study is negative for ischemia.   Sensitivity is reduced due to failure to reach target heart rate.    Post-stress Echo: The left ventricle systolic function is hyperdynamic   with an EF of 70%. Right ventricle systolic function is normal.    Post-stress       Display both the narrative and impression [both]  The study is negative with no echocardiographic   evidence of stress induced ischemia.      PET Stress 11/24:  ·  There are no clinically significant perfusion abnormalities. There is a small  (<10%) amount of mild diffuse resting heterogeneity that improves with stress, indicative of non-obstructive disease.  ·  The whole heart absolute myocardial perfusion values were elevated at rest, normal during stress and CFR is mildly reduced in part due to elevated resting flow.  ·  CT attenuation images demonstrate no coronary calcifications and mild diffuse aortic calcifications in the descending aorta.  ·  The gated perfusion images showed an ejection fraction of 46% at rest and 47% during stress. A normal ejection fraction is greater than 47%.  ·  There is normal wall motion at rest and normal wall motion during stress.  ·  The study's ECG is negative for ischemia.

## 2025-02-24 NOTE — DISCHARGE SUMMARY
[facility] - Short Stay Cardiac Unit  Cardiac Electrophysiology  Discharge Summary      Patient Name: Nigel Albrecht  MRN: [id number]  Admission Date: 2/24/2025  Hospital Length of Stay: 0 days  Discharge Date and Time:  02/24/2025 3:58 PM  Attending Physician: Marcelo Blackman MD    Discharging Provider: CHARLINE Crouch  Primary Care Physician: Krystin [facility] MD    HPI:   Nigel Albrecht is a 74 y.o. male s/p OHTx 5/24/02 and kidney tx 5/25/02 at [facility], HTN, stage III CKD, DM, HLP, AFL    History obtained from previous visits as well as through patient report.    Background:     From last office visit with Dr. Blackman on 12/4/2024.     73 y/o black male s/p OHTx 5/24/02 and kidney tx 5/25/02 at [facility], HTN, stage III CKD, DM, HLP. He developed increased heart rates and was found to be in AFL. Event monitor showed persistent AFL, average  bpm. He was not started on OAC.     In summary, 74 yoM here for AFL. 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 Will start OAC and offer ablation. Extensive discussion regarding risks, benefits, and alternative approaches to the procedure was had with the patient.  The patient voices understanding and all questions have been answered.  The patient would like to proceed as planned.     AFL ablation in patient with OHT  TANYA/HD Grid    Interval Hx:     Nigel Albrecht presents today to [facility] for scheduled RFA for atrial flutter with Dr. Blackman. He denies any chest pain, palpitations, SOB, PETERSON, dizziness, light headedness, weakness, syncope, or near syncopal episodes. He denies any bleeding, infections, fevers, rashes, or surgeries in the past 30 days. She is currently taking Eliquis. Last dose of Eliquis was on 2/23/2025 .    ECG today shows atrial flutter at 91 bpm    Pertinent labs reviewed from 2/17/2025. INR 1.0, Hgb 11.2, Cr 2.5    Echo  Result Date: 10/16/2024    Left Ventricle: The left ventricle is normal in size. Normal wall   thickness. There is concentric  hypertrophy. Normal wall motion. There is   normal systolic function with a visually estimated ejection fraction of 55   - 60%. Ejection fraction is approximately 60%. There is normal diastolic   function.    Right Ventricle: Normal right ventricular cavity size. Right ventricle   wall motion  is normal. Systolic function is normal.    Mitral Valve: There is mild regurgitation.    Tricuspid Valve: There is mild regurgitation.      Stress Echo Which stress agent will be used? Treadmill Exercise; Color Flow Doppler? No  Result Date: 5/27/2024    Left Ventricle: The left ventricle is normal in size. Normal wall   thickness. Normal wall motion. There is normal systolic function with a   visually estimated ejection fraction of 60 - 65%. Ejection fraction by   visual approximation is 65%. There is normal diastolic function.    Right Ventricle: Normal right ventricular cavity size. Wall thickness   is normal. Systolic function is normal.    IVC/SVC: Normal venous pressure at 3 mmHg.    Pericardium: There is an effusion adjacent to the right atrium.    Stress Protocol: The patient exercised for 3 minutes 40 seconds on a   high ramp protocol, corresponding to a functional capacity of 6METS,   achieving a peak heart rate of 95 bpm, which is 65% of the age predicted   maximum heart rate. Their exercise capacity was moderately impaired. The   patient reported no symptoms during the stress test. The test was stopped   because the patient experienced fatigue.    Baseline ECG: The Baseline ECG reveals sinus rhythm with RBBB. The axis   is normal. The ST segments are normal.    Stress ECG: There are no ST segment deviation identified during the   protocol. During stress, rare PACs are noted. During stress, occasional   PVCs are noted. There is normal blood pressure response with stress.    ECG Conclusion: The ECG portion of the study is negative for ischemia.   Sensitivity is reduced due to failure to reach target heart rate.     Post-stress Echo: The left ventricle systolic function is hyperdynamic   with an EF of 70%. Right ventricle systolic function is normal.    Post-stress       Display both the narrative and impression [both]  The study is negative with no echocardiographic   evidence of stress induced ischemia.      PET Stress 11/24:  ·  There are no clinically significant perfusion abnormalities. There is a small (<10%) amount of mild diffuse resting heterogeneity that improves with stress, indicative of non-obstructive disease.  ·  The whole heart absolute myocardial perfusion values were elevated at rest, normal during stress and CFR is mildly reduced in part due to elevated resting flow.  ·  CT attenuation images demonstrate no coronary calcifications and mild diffuse aortic calcifications in the descending aorta.  ·  The gated perfusion images showed an ejection fraction of 46% at rest and 47% during stress. A normal ejection fraction is greater than 47%.  ·  There is normal wall motion at rest and normal wall motion during stress.  ·  The study's ECG is negative for ischemia.    Procedure(s) (LRB):  Ablation, Atrial Flutter, Typical (N/A)  Transesophageal echo (ERIC) intra-procedure log documentation (N/A)     Indwelling Lines/Drains at time of discharge:  None     Hospital Course:  Patient underwent RF-CTI (see procedure note for details), tolerated procedure well with no acute complications. Admitted to PACU, post-procedure ECG showed sinus rhythm at 64 bpm  ms  ms QT/QTc 518/534 ms. Right groin site with sutures removed, dressings C/D/I. No hematoma, or bruit noted. Mild oozing noted to incisional site. Quick clot and pressure dressing applied. Hemostasis obtained. Bedrest completed x 4 hours. He was monitored on telemetry, no acute arrhythmias.     Patient ambulating, tolerating PO intake, and voiding with no issues. Denies any chest pain or SOB. Discharge plans discussed with Dr. Blackman. Patient to continue  all home medications including Eliquis for CVA prophylaxis. Stop Eliquis in one month. Follow up with Dr. Blackman in 3 months.  He was assessed at bedside prior to discharge. He reported feeling well and denied chest discomfort, shortness of breath, palpitations, lightheadedness, or any other acute symptoms. Discharge plans/instructions discussed with patient who verbalized understanding and agreement of plans of care. No further questions or concerns voiced at this time. He was discharged home in stable condition.     Physical Exam:   Gen: No acute distress, pleasant patient answering questions appropriately  CVS: Regular rate and rhythm  CHEST: Breathing even and unlabored  ABD: Soft, non-tender, nondistended  GROINS:Right groin site soft, non tender, no bleeding, no swelling, no hematoma. Dressing to right groin C/D/I  Neurologic: Normal strength and tone. No focal numbness or weakness.   Psychiatric: Normal mood and affect. Behavior is normal. Alert and oriented X 3.  EXT: No Edema     Goals of Care Treatment Preferences:  Code Status: Full Code    Significant Diagnostic Studies:   Electrophysiology Procedure  Result Date: 2/24/2025    CCW CTI flutter   Bi-caval anastamosis   3D mapping performed with Ensite.   CTI ablation. I certify that I was present for the critical steps of the procedure including the diagnostic, surgical and/or interventional portions. Procedure Log documented by Documenter: Iqra Manzanares RN and verified by Marcelo Blackman MD. Date: 2/24/2025  Time: 11:44 AM    Transesophageal echo (ERIC)  Result Date: 2/24/2025    ERIC performed prior to PVI in the EP lab; there is no evidence of intracardiac thrombus.   Left Ventricle: The left ventricle is normal in size. There is normal systolic function with a visually estimated ejection fraction of 55 - 60%. Ejection fraction is approximately 60%.   Right Ventricle: Normal right ventricular cavity size. Systolic function is normal.   Left Atrium:  "The left atrial appendage has a windsock morphology. Appendage velocity is normal at greater than 40 cm/sec. There is no thrombus in the left atrial appendage.   Aortic Valve: The aortic valve is a trileaflet valve. There is mild aortic valve sclerosis.   Aorta: Aortic root is mildly dilated measuring 4.0 cm. Ascending aorta is mildly to moderately dilated measuring 4.3 cm. Grade 1 atherosclerosis of the descending aorta and in the aortic arch with mild thickening.     Discharged Condition: stable    Disposition: Home or Self Care    Follow Up:    Patient Instructions:      Lifting restrictions     No driving until:   Order Comments: No driving for at least the next 48 hours     Other restrictions (specify):   Order Comments: Ablation Discharge Instructions and Care of Your Groin      Follow up:  · Follow up with Dr. Blackman in 3 months.    Medications:  · Make sure to continue taking your blood thinner apixiban (trade name: Eliquis) after your procedure for one month.   · You may experience chest discomfort (also known as "pericarditis") with deep breathes, coughing, and/or laying down which is typically normal following your procedure. If this occurs, you can take ibuprofen (Motrin) 800 mg every 8 hours for 2-3 days. If the chest pain is persistent or severe please visit the nearest emergency department.    Groin site management, precautions, and restrictions:  · Remove the bandages over your groin area the morning after your procedure. You can shower after you remove these bandages. Wash the sites at least once daily with soap and water. Keep the groin sites clean and dry. You do not need to apply ointments or bandages to the area.   · Wear loose clothes and loose underwear.  · Do not take a bath or submerge your groin area (for example: Jacuzzi, swimming pool, or lake) or at least 1 week or when the puncture sites in your groin have completely healed.     If bleeding should occur at the groin sites following " discharge:  · If oozing from groin site occurs, lay down and apply pressure to the puncture site with your fingers without letting up for 15 minutes and lay flat for 1 hour. If bleeding has resolved, you can continue to monitor. If the bleeding continues or there is significant swelling or pain in the groin area, please call 911 immediately and visit the nearest ER for evaluation and treatment. Do not drive yourself to the hospital. DO NOT STOP TAKING YOUR BLOOD THINNER UNLESS INSTRUCTED BY A PHYSICIAN.     Activity Instructions:  · Do not drive or operate any dangerous machinery for 24 hours, but optimally 48-72 hours since you were given general anesthesia.   · Do not strain during bowel movements for the first 3 to 4 days after the procedure to prevent bleeding from the groin sites.  · Avoid heavy lifting (more than 10 pounds) and pushing or pulling heavy objects for the first 5 to 7 days after the procedure.  · Do not participate in strenuous activities for 5 days after the procedure. This includes most sports - jogging, golfing, play tennis, and bowling.  · You may climb stairs if needed, but walk up and down the stairs more slowly than usual.  · Gradually increase your activities until you reach your normal activity level within one week after the procedure.    Go to the Emergency Department if you develop:   · Change in color or temperature of the leg  · Redness, warmth, or drainage/pus at the groin sites  · Chills or fever greater than 101.0 F   · Severe bleeding or swelling at the groin sites  · Acute Weakness or numbness   · Visual, gait or speech disturbances   · New chest pain, palpitations, shortness of breath, fainting     Notify your health care provider if you experience any of the following:  temperature >100.4     Notify your health care provider if you experience any of the following:  persistent nausea and vomiting or diarrhea     Notify your health care provider if you experience any of the  following:  severe uncontrolled pain     Notify your health care provider if you experience any of the following:  redness, tenderness, or signs of infection (pain, swelling, redness, odor or green/yellow discharge around incision site)     Notify your health care provider if you experience any of the following:  difficulty breathing or increased cough     Notify your health care provider if you experience any of the following:  severe persistent headache     Notify your health care provider if you experience any of the following:  worsening rash     Notify your health care provider if you experience any of the following:  persistent dizziness, light-headedness, or visual disturbances     Notify your health care provider if you experience any of the following:  increased confusion or weakness     Remove dressing in 24 hours     Medications:  Reconciled Home Medications:      Medication List        CONTINUE taking these medications      acetaminophen 325 MG tablet  Commonly known as: TYLENOL  Take 2 tablets (650 mg total) by mouth every 4 (four) hours as needed.     apixaban 5 mg Tab  Commonly known as: ELIQUIS  Take 1 tablet (5 mg total) by mouth 2 (two) times daily.     aspirin 81 MG Chew  Take 1 tablet (81 mg total) by mouth once daily.     atorvastatin 40 MG tablet  Commonly known as: LIPITOR  TAKE 1 TABLET ONCE DAILY     calcium acetate(phosphat bind) 667 mg capsule  Commonly known as: PHOSLO  Take 2 capsules (1,334 mg total) by mouth 3 (three) times daily with meals.     cream base no.171 (bulk) Crea  2 g by Misc.(Non-Drug; Combo Route) route 4 (four) times daily as needed (pain).     FREESTYLE SHREE 3 PLUS SENSOR Madelin  Generic drug: blood-glucose sensor  Inject 1 Device into the skin Every 15 days.     hydrALAZINE 50 MG tablet  Commonly known as: APRESOLINE  TAKE 1 TABLET EVERY 8 HOURS     mycophenolate 250 mg Cap  Commonly known as: CELLCEPT  Take 1 capsule (250 mg total) by mouth 2 (two) times daily.      OZEMPIC 0.25 mg or 0.5 mg (2 mg/3 mL) pen injector  Generic drug: semaglutide  Inject 0.5 mg into the skin every 7 days.     predniSONE 5 MG tablet  Commonly known as: DELTASONE  Take 1 tablet (5 mg total) by mouth once daily.     primidone 50 MG Tab  Commonly known as: MYSOLINE  Take 2 tablets (100 mg total) by mouth every evening.     propranoloL 20 MG tablet  Commonly known as: INDERAL  Take 1 tablet (20 mg total) by mouth 2 (two) times daily.     sildenafiL 100 MG tablet  Commonly known as: VIAGRA  Take 1 tablet (100 mg total) by mouth daily as needed for Erectile Dysfunction.     tacrolimus 1 MG Cap  Commonly known as: PROGRAF  Take 8 capsules (8 mg total) by mouth every morning AND 7 capsules (7 mg total) every evening.     tamsulosin 0.4 mg Cap  Commonly known as: FLOMAX  TAKE 1 CAPSULE ONCE DAILY     torsemide 20 MG Tab  Commonly known as: DEMADEX  Take 2 tablets (40 mg total) by mouth once daily.            Plan:   -Resume Eliquis tonight. Continue 5 mg BID x 1 month  -Continue all other home medications with no changes  -Follow up with Dr. Blackman in 3 months.     Time spent on the discharge of patient:  45 minutes    SHERRILL Crouch-TRISTA  Cardiac Electrophysiology  [facility] - Short Stay Cardiac Unit    Attending: Dr. Marcelo Blackman

## 2025-02-24 NOTE — NURSING TRANSFER
Nursing Transfer Note      2/24/2025   2:10 PM    Nurse giving handoff:eran,rn   Nurse receiving handoff:yadiel napier [facility] rn    Reason patient is being transferred: ep pacu 2 to [facility] 11/home    Transfer To: ep pacu 2 to [facility] 11/home    Transfer via stretcher    Transfer with cardiac monitoring, tele box on confirmed by tele tech    Transported by eran,rn    Transfer Vital Signs:  Blood Pressure:145/79  Heart Rate:76  O2:100% room air  Temperature:97.9  Respirations:16    Telemetry: Box Number [id number], Rate 76, Rhythm sr, and Telemetry  chantal  Order for Tele Monitor? Yes    Additional Lines: none    Medicines sent: none    Any special needs or follow-up needed: sutures/bedrest done at 1420.     Patient belongings transferred with patient:  [facility] locker    Chart send with patient: Yes    Notified: spouse    Patient reassessed at: 2/24/25 1345. Next due 1415  Upon arrival to floor: cardiac monitor applied, patient oriented to room, call bell in reach, and bed in lowest position,  groin check done with [facility] rn upon arrival.

## 2025-02-24 NOTE — ANESTHESIA PREPROCEDURE EVALUATION
02/24/2025  Nigel Albrecht is a 74 y.o., male.      Pre-op Assessment    I have reviewed the Patient Summary Reports.       I have reviewed the Medications.     Review of Systems  Anesthesia Hx:   History of prior surgery of interest to airway management or planning:            Denies Personal Hx of Anesthesia complications.                    Cardiovascular:     Hypertension  Past MI                Heart xplant 2002  NL BiV Fxn on 12/24 Echo                           Pulmonary:  Pneumonia                      Renal/:  Chronic Renal Disease                Musculoskeletal:  Arthritis               Endocrine:  Diabetes           Psych:  Psychiatric History                  Physical Exam  General: Well nourished    Airway:  Mallampati: III / II  Mouth Opening: Normal  TM Distance: Normal  Neck ROM: Normal ROM    Chest/Lungs:  Normal Respiratory Rate    Heart:  Rate: Normal        Anesthesia Plan  Type of Anesthesia, risks & benefits discussed:    Anesthesia Type: Gen Natural Airway  Intra-op Monitoring Plan: Standard ASA Monitors  Induction:  IV  Informed Consent: Informed consent signed with the Patient and all parties understand the risks and agree with anesthesia plan.  All questions answered.   ASA Score: 3  Day of Surgery Review of History & Physical: H&P Update referred to the surgeon/provider.  Anesthesia Plan Notes: NPO confirmed.  No history of anesthesia problems.    Ready For Surgery From Anesthesia Perspective.     .

## 2025-02-24 NOTE — NURSING
Off unit via wheelchair for discharge home.  Pt accompanied by transporter and his wife.  Right groin with pressure dressing is clean dry and intact.  Right groin is soft.  No bleeding or hematoma noted.

## 2025-02-24 NOTE — SUBJECTIVE & OBJECTIVE
Past Medical History:   Diagnosis Date    Allergic rhinitis 2013    Blood transfusion     Cataract     CKD (chronic kidney disease), stage III 2014    -donor kidney transplant     Diabetes mellitus, type 2 2013    Gout, arthritis 2013    Heart attack     Heart transplanted     Hyperlipidemia 2013    Hypertension     Immunodeficiency due to treatment with immunosuppressive medication     Morbid obesity 2013    Organ transplant 2002    heart and kidney    Orthostatic syncope 2022    Due to furosemide    Pneumonia due to COVID-19 virus 2022    Prophylactic immunotherapy     Prostate cancer 2023    Renal manifestation of secondary diabetes mellitus        Past Surgical History:   Procedure Laterality Date    CATARACT EXTRACTION Bilateral     COLONOSCOPY N/A 10/6/2020    Procedure: COLONOSCOPY;  Surgeon: Conner Redman MD;  Location: [facility];  Service: Endoscopy;  Laterality: N/A;    EYE SURGERY      HEART TRANSPLANT      KIDNEY TRANSPLANT      REVISION TOTAL HIP ARTHROPLASTY         Review of patient's allergies indicates:  No Known Allergies    No current facility-administered medications on file prior to encounter.     Current Outpatient Medications on File Prior to Encounter   Medication Sig    acetaminophen (TYLENOL) 325 MG tablet Take 2 tablets (650 mg total) by mouth every 4 (four) hours as needed.    apixaban (ELIQUIS) 5 mg Tab Take 1 tablet (5 mg total) by mouth 2 (two) times daily.    aspirin 81 MG Chew Take 1 tablet (81 mg total) by mouth once daily.    atorvastatin (LIPITOR) 40 MG tablet TAKE 1 TABLET ONCE DAILY    calcium acetate,phosphat bind, (PHOSLO) 667 mg capsule Take 2 capsules (1,334 mg total) by mouth 3 (three) times daily with meals.    cream base no.171, bulk, Crea 2 g by Misc.(Non-Drug; Combo Route) route 4 (four) times daily as needed (pain).    hydrALAZINE (APRESOLINE) 50 MG tablet TAKE 1 TABLET EVERY 8 HOURS    predniSONE (DELTASONE) 5 MG  tablet Take 1 tablet (5 mg total) by mouth once daily.    primidone (MYSOLINE) 50 MG Tab Take 2 tablets (100 mg total) by mouth every evening.    propranoloL (INDERAL) 20 MG tablet Take 1 tablet (20 mg total) by mouth 2 (two) times daily.    torsemide (DEMADEX) 20 MG Tab Take 2 tablets (40 mg total) by mouth once daily.    blood-glucose sensor (FREESTYLE SHREE 3 PLUS SENSOR) Madelin Inject 1 Device into the skin Every 15 days.    semaglutide (OZEMPIC) 0.25 mg or 0.5 mg (2 mg/3 mL) pen injector Inject 0.5 mg into the skin every 7 days.    sildenafiL (VIAGRA) 100 MG tablet Take 1 tablet (100 mg total) by mouth daily as needed for Erectile Dysfunction.    [DISCONTINUED] amLODIPine (NORVASC) 10 MG tablet Take 0.5 tablets (5 mg total) by mouth once daily.    [DISCONTINUED] calcium carbonate (OS-CARRIE) 500 mg calcium (1,250 mg) tablet Take 1 tablet by mouth once daily.    [DISCONTINUED] furosemide (LASIX) 40 MG tablet Take 1 tablet (40 mg total) by mouth 2 (two) times daily.    [DISCONTINUED] gabapentin (NEURONTIN) 300 MG capsule TAKE 1 CAPSULE TWICE DAILY    [DISCONTINUED] insulin aspart U-100 (NOVOLOG) 100 unit/mL (3 mL) InPn pen Inject 0-5 Units into the skin before meals and at bedtime as needed (Hyperglycemia).    [DISCONTINUED] insulin detemir U-100 (LEVEMIR FLEXTOUCH) 100 unit/mL (3 mL) SubQ InPn pen Inject 5 Units into the skin every evening.    [DISCONTINUED] lisinopriL (PRINIVIL,ZESTRIL) 40 MG tablet Take 1 tablet (40 mg total) by mouth once daily.     Family History       Problem Relation (Age of Onset)    Diabetes Brother, Maternal Grandmother    Early death Brother    Heart disease Brother    Hyperlipidemia Sister, Brother    Hypertension Brother    Kidney disease Son    Stroke Mother, Brother          Tobacco Use    Smoking status: Former     Current packs/day: 0.00     Average packs/day: 1 pack/day for 20.0 years (20.0 ttl pk-yrs)     Types: Cigarettes     Start date: 7/8/1964     Quit date: 7/8/1984     Years  since quittin.6    Smokeless tobacco: Never   Substance and Sexual Activity    Alcohol use: Yes     Alcohol/week: 1.0 standard drink of alcohol     Types: 1 Cans of beer per week     Comment: occ    Drug use: No    Sexual activity: Not Currently     Partners: Female     Review of Systems   Constitutional: Negative for chills, fever and malaise/fatigue.   HENT:  Negative for nosebleeds.    Eyes: Negative.    Cardiovascular:  Positive for leg swelling. Negative for chest pain, irregular heartbeat, palpitations and syncope.   Respiratory:  Negative for shortness of breath.    Hematologic/Lymphatic: Negative.    Skin:  Negative for itching and rash.   Musculoskeletal: Negative.    Gastrointestinal:  Negative for abdominal pain and nausea.   Genitourinary:  Negative for hematuria.   Neurological:  Negative for dizziness and light-headedness.   Psychiatric/Behavioral:  Negative for altered mental status.      Objective:     Vital Signs (Most Recent):  Temp: 97.9 °F (36.6 °C) (25 0808)  Pulse: 108 (25 0808)  Resp: 20 (25 0808)  BP: (!) 162/89 (25 0813)  SpO2: 100 % (25 0808) Vital Signs (24h Range):  Temp:  [97.9 °F (36.6 °C)] 97.9 °F (36.6 °C)  Pulse:  [108] 108  Resp:  [20] 20  SpO2:  [100 %] 100 %  BP: (162)/(89-94) 162/89       Weight: 108.9 kg (240 lb)  Body mass index is 30.81 kg/m².    SpO2: 100 %        Physical Exam  Vitals and nursing note reviewed.   Constitutional:       General: He is not in acute distress.     Appearance: Normal appearance.   HENT:      Head: Normocephalic.      Mouth/Throat:      Mouth: Mucous membranes are moist.   Eyes:      Extraocular Movements: Extraocular movements intact.   Cardiovascular:      Rate and Rhythm: Normal rate. Rhythm irregular.      Pulses:           Dorsalis pedis pulses are 2+ on the right side and 2+ on the left side.   Pulmonary:      Effort: Pulmonary effort is normal. No respiratory distress.      Breath sounds: Normal breath  sounds. No wheezing or rales.   Abdominal:      General: There is no distension.      Palpations: Abdomen is soft.   Musculoskeletal:         General: Normal range of motion.      Cervical back: Normal range of motion.   Skin:     General: Skin is warm.   Neurological:      General: No focal deficit present.      Mental Status: He is alert.   Psychiatric:         Mood and Affect: Mood normal.            Significant Labs: Pertinent pre-procedure labs reviewed    Significant Imaging: ECG

## 2025-02-24 NOTE — DISCHARGE INSTRUCTIONS
"Ablation Discharge Instructions and Care of Your Groin      Follow up:  Follow up with Dr. Blackman in 3 months.    Medications:  Make sure to continue taking your blood thinner apixiban (trade name: Eliquis) after your procedure for one month.   You may experience chest discomfort (also known as "pericarditis") with deep breathes, coughing, and/or laying down which is typically normal following your procedure. If this occurs, you can take ibuprofen (Motrin) 800 mg every 8 hours for 2-3 days. If the chest pain is persistent or severe please visit the nearest emergency department.    Groin site management, precautions, and restrictions:  Remove the bandages over your groin area the morning after your procedure. You can shower after you remove these bandages. Wash the sites at least once daily with soap and water. Keep the groin sites clean and dry. You do not need to apply ointments or bandages to the area.   Wear loose clothes and loose underwear.  Do not take a bath or submerge your groin area (for example: Jacuzzi, swimming pool, or lake) or at least 1 week or when the puncture sites in your groin have completely healed.     If bleeding should occur at the groin sites following discharge:  If oozing from groin site occurs, lay down and apply pressure to the puncture site with your fingers without letting up for 15 minutes and lay flat for 1 hour. If bleeding has resolved, you can continue to monitor. If the bleeding continues or there is significant swelling or pain in the groin area, please call 911 immediately and visit the nearest ER for evaluation and treatment. Do not drive yourself to the hospital. DO NOT STOP TAKING YOUR BLOOD THINNER UNLESS INSTRUCTED BY A PHYSICIAN.     Activity Instructions:  Do not drive or operate any dangerous machinery for 24 hours, but optimally 48-72 hours since you were given general anesthesia.   Do not strain during bowel movements for the first 3 to 4 days after the procedure to " prevent bleeding from the groin sites.  Avoid heavy lifting (more than 10 pounds) and pushing or pulling heavy objects for the first 5 to 7 days after the procedure.  Do not participate in strenuous activities for 5 days after the procedure. This includes most sports - jogging, golfing, play tennis, and bowling.  You may climb stairs if needed, but walk up and down the stairs more slowly than usual.  Gradually increase your activities until you reach your normal activity level within one week after the procedure.    Go to the Emergency Department if you develop:   Change in color or temperature of the leg  Redness, warmth, or drainage/pus at the groin sites  Chills or fever greater than 101.0 F   Severe bleeding or swelling at the groin sites  Acute Weakness or numbness   Visual, gait or speech disturbances   New chest pain, palpitations, shortness of breath, fainting

## 2025-02-24 NOTE — NURSING
Suture removed from right groin.  Oozing noted.  Manual pressure applied to right groin for 10 minutes.  Ailin, Np assessed patient groin.  Will extend bedrest to 1515.  No c/o pain or SOB.

## 2025-02-24 NOTE — PLAN OF CARE
Received via stretcher from  PACU.  Report from REYES harris.  Awake alert and oriented.  Pt spouse called to bedside.  Oriented to room and call bell provided.  Denies pain or SOB.  Right groin with sutures intact. No bleeding or hematoma noted to right groin.  Right groin soft.  No bleeding or hematoma noted.  VSS.  Food and drinks provided.  Will continue to monitor.

## 2025-02-24 NOTE — HOSPITAL COURSE
Patient underwent RF-CTI (see procedure note for details), tolerated procedure well with no acute complications. Admitted to PACU, post-procedure ECG showed sinus rhythm at 64 bpm  ms  ms QT/QTc 518/534 ms. Right groin site with sutures removed, dressings C/D/I. No bleeding, hematoma, or bruit noted. Bedrest completed x 3 hours. He was monitored on telemetry, no acute arrhythmias.     Patient ambulating, tolerating PO intake, and voiding with no issues. Denies any chest pain or SOB. Discharge plans discussed with Dr. Blackman. Patient to continue all home medications including Eliquis for CVA prophylaxis. Stop Eliquis in one month. Follow up with Dr. Blackman in 3 months.  He was assessed at bedside prior to discharge. He reported feeling well and denied chest discomfort, shortness of breath, palpitations, lightheadedness, or any other acute symptoms. Discharge plans/instructions discussed with patient who verbalized understanding and agreement of plans of care. No further questions or concerns voiced at this time. He was discharged home in stable condition.     Physical Exam:   Gen: No acute distress, pleasant patient answering questions appropriately  CVS: Regular rate and rhythm  CHEST: Breathing even and unlabored  ABD: Soft, non-tender, nondistended  GROINS:Right groin site soft, non tender, no bleeding, no swelling, no hematoma. Dressing to right groin C/D/I  Neurologic: Normal strength and tone. No focal numbness or weakness.   Psychiatric: Normal mood and affect. Behavior is normal. Alert and oriented X 3.  EXT: No Edema

## 2025-02-24 NOTE — ANESTHESIA POSTPROCEDURE EVALUATION
Anesthesia Post Evaluation    Patient: Nigel SCHMID [facility]    Procedure(s) Performed: Procedure(s) (LRB):  Ablation, Atrial Flutter, Typical (N/A)  Transesophageal echo (ERIC) intra-procedure log documentation (N/A)    Final Anesthesia Type: general      Patient location during evaluation: PACU  Patient participation: Yes- Able to Participate  Level of consciousness: awake and alert  Post-procedure vital signs: reviewed and stable  Pain management: adequate  Airway patency: patent    PONV status at discharge: No PONV  Anesthetic complications: no      Cardiovascular status: blood pressure returned to baseline  Respiratory status: unassisted  Hydration status: euvolemic  Follow-up not needed.              Vitals Value Taken Time   /87 02/24/25 12:31   Temp 36.4 °C (97.5 °F) 02/24/25 11:38   Pulse 71 02/24/25 12:37   Resp 11 02/24/25 12:37   SpO2 100 % 02/24/25 12:37   Vitals shown include unfiled device data.      No case tracking events are documented in the log.      Pain/Jacki Score: Pain Rating Prior to Med Admin: 0 (2/24/2025 12:00 PM)  Jacki Score: 8 (2/24/2025 12:30 PM)

## 2025-02-24 NOTE — NURSING
Pressure dressing was applied to right groin per Hga, NP.  Pt ambulated around unit without difficulty. Right groin remains clean dry and intact.  No bleeding or hematoma noted.  VSS.  Voided.  Pt and pt spouse both verbalized an understanding of d/c instructions.  IV d/c'd with cath tip intact.

## 2025-02-24 NOTE — CONSULTS
[facility]  ERIC Consult      Nigel Albrecht  YOB: 1950  Medical Record Number:  [id number]  Attending Physician:  Marcelo Blackman MD   Date of Admission: 2/24/2025       Hospital Day:  0  Current Principal Problem:  Atrial flutter      History     Cc: ERIC for RFA    HPI  73 y/o black male s/p OHTx 5/24/02 and kidney tx 5/25/02 at [facility], HTN, stage III CKD, DM, HLP. He developed increased heart rates and was found to be in AFL. Event monitor showed persistent AFL, average  bpm. He was not started on OAC.     Nigel Albrecht presents today to [facility] for scheduled RFA for atrial flutter with Dr. Blackman.  ECG today shows atrial flutter at 91 bpm.    Anticoagulant/antiplatelets: eliquis, aspirin  Platelet count: 195  INR: 1.0    History of stroke:  no  Dysphagia or odynophagia:  no  Liver Disease, esophageal disease, or known varices:  no  Upper GI Bleeding:  no  Snoring:  yes   Sleep Apnea:  likely  Prior neck surgery or radiation:  no  Last oral intake: last pm   Able to move neck in all directions:  yes  Use of GLP-1:  no      Medications - Outpatient  Prior to Admission medications    Medication Sig Start Date End Date Taking? Authorizing Provider   acetaminophen (TYLENOL) 325 MG tablet Take 2 tablets (650 mg total) by mouth every 4 (four) hours as needed. 7/20/22  Yes Mary Rocha MD   apixaban (ELIQUIS) 5 mg Tab Take 1 tablet (5 mg total) by mouth 2 (two) times daily. 12/6/24  Yes Marcelo Blackman MD   aspirin 81 MG Chew Take 1 tablet (81 mg total) by mouth once daily. 7/21/22  Yes Mary Rocha MD   atorvastatin (LIPITOR) 40 MG tablet TAKE 1 TABLET ONCE DAILY 6/27/24  Yes Yoan Aguirre MD   calcium acetate,phosphat bind, (PHOSLO) 667 mg capsule Take 2 capsules (1,334 mg total) by mouth 3 (three) times daily with meals. 8/19/24 8/19/25 Yes Lucila Matthew MD   cream base no.171, bulk, Crea 2 g by Misc.(Non-Drug; Combo Route) route 4 (four) times  daily as needed (pain). 6/8/23  Yes Stephanie Merino PA-C   hydrALAZINE (APRESOLINE) 50 MG tablet TAKE 1 TABLET EVERY 8 HOURS 8/25/23  Yes Fatimah Dunne DO   mycophenolate (CELLCEPT) 250 mg Cap Take 1 capsule (250 mg total) by mouth 2 (two) times daily. 1/10/25 1/10/26 Yes Lucila Matthew MD   predniSONE (DELTASONE) 5 MG tablet Take 1 tablet (5 mg total) by mouth once daily. 11/11/24  Yes Lucila Matthew MD   primidone (MYSOLINE) 50 MG Tab Take 2 tablets (100 mg total) by mouth every evening. 4/16/24  Yes Glenys Marie NP   propranoloL (INDERAL) 20 MG tablet Take 1 tablet (20 mg total) by mouth 2 (two) times daily. 4/16/24  Yes Glenys Marie NP   tacrolimus (PROGRAF) 1 MG Cap Take 8 capsules (8 mg total) by mouth every morning AND 7 capsules (7 mg total) every evening. 1/10/25 1/10/26 Yes Leann Cee MD   tamsulosin (FLOMAX) 0.4 mg Cap TAKE 1 CAPSULE ONCE DAILY 1/30/25  Yes Jorge Kelly MD   torsemide (DEMADEX) 20 MG Tab Take 2 tablets (40 mg total) by mouth once daily. 5/1/24 5/1/25 Yes Lucila Matthew MD   blood-glucose sensor (FREESTYLE SHREE 3 PLUS SENSOR) Madelin Inject 1 Device into the skin Every 15 days. 11/12/24 11/12/25  Jael Garrison, PhD, NP-C   semaglutide (OZEMPIC) 0.25 mg or 0.5 mg (2 mg/3 mL) pen injector Inject 0.5 mg into the skin every 7 days. 8/28/24   Jael Garrison, PhD, NP-C   sildenafiL (VIAGRA) 100 MG tablet Take 1 tablet (100 mg total) by mouth daily as needed for Erectile Dysfunction. 4/5/24   Jorge Kelly MD   amLODIPine (NORVASC) 10 MG tablet Take 0.5 tablets (5 mg total) by mouth once daily. 6/3/22 7/7/22  Tray Chinchilla MD   calcium carbonate (OS-CARRIE) 500 mg calcium (1,250 mg) tablet Take 1 tablet by mouth once daily.  7/20/22  Provider, Historical   furosemide (LASIX) 40 MG tablet Take 1 tablet (40 mg total) by mouth 2 (two) times daily. 7/27/22 8/6/22  Edy Reese MD   gabapentin (NEURONTIN) 300 MG capsule TAKE 1 CAPSULE TWICE  DAILY 22  Lexie Liang NP   insulin aspart U-100 (NOVOLOG) 100 unit/mL (3 mL) InPn pen Inject 0-5 Units into the skin before meals and at bedtime as needed (Hyperglycemia). 22  Mary Rocha MD   insulin detemir U-100 (LEVEMIR FLEXTOUCH) 100 unit/mL (3 mL) SubQ InPn pen Inject 5 Units into the skin every evening. 22  Mary Rocha MD   lisinopriL (PRINIVIL,ZESTRIL) 40 MG tablet Take 1 tablet (40 mg total) by mouth once daily. 22  Krystin Zimmerman MD         Medications - Current  Scheduled Meds:  Continuous Infusions:  PRN Meds:.  Current Facility-Administered Medications:     ceFAZolin (Ancef) IV (PEDS and ADULTS), 2 g, Intravenous, On Call Procedure      Allergies  Review of patient's allergies indicates:  No Known Allergies      Past Medical History  Past Medical History:   Diagnosis Date    Allergic rhinitis 2013    Blood transfusion     Cataract     CKD (chronic kidney disease), stage III 2014    -donor kidney transplant     Diabetes mellitus, type 2 2013    Gout, arthritis 2013    Heart attack     Heart transplanted     Hyperlipidemia 2013    Hypertension     Immunodeficiency due to treatment with immunosuppressive medication     Morbid obesity 2013    Organ transplant 2002    heart and kidney    Orthostatic syncope 2022    Due to furosemide    Pneumonia due to COVID-19 virus 2022    Prophylactic immunotherapy     Prostate cancer 2023    Renal manifestation of secondary diabetes mellitus          Past Surgical History  Past Surgical History:   Procedure Laterality Date    CATARACT EXTRACTION Bilateral     COLONOSCOPY N/A 10/6/2020    Procedure: COLONOSCOPY;  Surgeon: Conner Redman MD;  Location: [facility];  Service: Endoscopy;  Laterality: N/A;    EYE SURGERY      HEART TRANSPLANT      KIDNEY TRANSPLANT      REVISION TOTAL HIP ARTHROPLASTY           Social History  Social History      Socioeconomic History    Marital status:    Occupational History     Employer: Retired   Tobacco Use    Smoking status: Former     Current packs/day: 0.00     Average packs/day: 1 pack/day for 20.0 years (20.0 ttl pk-yrs)     Types: Cigarettes     Start date: 1964     Quit date: 1984     Years since quittin.6    Smokeless tobacco: Never   Substance and Sexual Activity    Alcohol use: Yes     Alcohol/week: 1.0 standard drink of alcohol     Types: 1 Cans of beer per week     Comment: occ    Drug use: No    Sexual activity: Not Currently     Partners: Female     Social Drivers of Health     Financial Resource Strain: Low Risk  (2024)    Overall Financial Resource Strain (CARDIA)     Difficulty of Paying Living Expenses: Not hard at all   Food Insecurity: No Food Insecurity (2024)    Hunger Vital Sign     Worried About Running Out of Food in the Last Year: Never true     Ran Out of Food in the Last Year: Never true   Transportation Needs: No Transportation Needs (2024)    PRAPARE - Transportation     Lack of Transportation (Medical): No     Lack of Transportation (Non-Medical): No   Physical Activity: Inactive (2024)    Exercise Vital Sign     Days of Exercise per Week: 0 days     Minutes of Exercise per Session: 0 min   Stress: Stress Concern Present (2024)    [employer] - Occupational Stress Questionnaire     Feeling of Stress : To some extent   Housing Stability: Unknown (2024)    Housing Stability Vital Sign     Unable to Pay for Housing in the Last Year: No     Homeless in the Last Year: No         ROS  10 point ROS performed and negative except as stated in HPI     Physical Examination     Vital Signs  24 Hour VS Range    Temp:  [97.9 °F (36.6 °C)]   Pulse:  [108]   Resp:  [20]   BP: (162)/(89-94)   SpO2:  [100 %]       Physical Exam:   Constitutional: no acute distress  Cardiovascular: irregularly irregular rate and  "rhythm  Pulmonary: Normal respiratory effort   Neuro: alert and oriented, no focal deficits      Data       No results for input(s): "WBC", "HGB", "HCT", "PLT" in the last 168 hours.     No results for input(s): "PROTIME", "INR" in the last 168 hours.     No results for input(s): "NA", "K", "CL", "CO2", "BUN", "CREATININE", "ANIONGAP", "CALCIUM" in the last 168 hours.     No results for input(s): "PROT", "ALBUMIN", "BILITOT", "ALKPHOS", "AST", "ALT" in the last 168 hours.     No results for input(s): "TROPONINI" in the last 168 hours.     BNP (pg/mL)   Date Value   05/27/2024 565 (H)   07/20/2023 345 (H)   08/05/2022 261 (H)   08/04/2022 351 (H)   07/18/2022 615 (H)       No results for input(s): "LABBLOO" in the last 168 hours.         Assessment & Plan     #Atrial flutter  - proceed with ERIC for RFA      TTE 10/16/24:    Left Ventricle: The left ventricle is normal in size. Normal wall thickness. There is concentric hypertrophy. Normal wall motion. There is normal systolic function with a visually estimated ejection fraction of 55 - 60%. Ejection fraction is approximately 60%. There is normal diastolic function.    Right Ventricle: Normal right ventricular cavity size. Right ventricle wall motion  is normal. Systolic function is normal.    Mitral Valve: There is mild regurgitation.    Tricuspid Valve: There is mild regurgitation.      -No absolute contraindications of esophageal stricture, tumor, perforation, laceration,or diverticulum and/or active GI bleed.  -The risks, benefits & alternatives of the procedure were explained to the patient.   -The risks of transesophageal echo include but are not limited to:  Dental trauma, esophageal trauma/perforation, bleeding, laryngospasm/brochospasm, aspiration, sore throat/hoarseness, & dislodgement of the endotracheal tube/nasogastric tube (where applicable).  -The risks of moderate sedation include hypotension, respiratory depression, arrhythmias, bronchospasm, & death. "    -Informed consent was obtained. The patient is agreeable to proceed with the procedure and all questions and concerns addressed.    Case was discussed with an attending physician in echocardiography lab prior to procedure.    [facility]

## 2025-02-24 NOTE — TRANSFER OF CARE
"Anesthesia Transfer of Care Note    Patient: Nigel Alvaradoopshire    Procedure(s) Performed: Procedure(s) (LRB):  Ablation, Atrial Flutter, Typical (N/A)  Transesophageal echo (ERIC) intra-procedure log documentation (N/A)    Patient location: PACU    Anesthesia Type: general    Transport from OR: Transported from OR on 6-10 L/min O2 by face mask with adequate spontaneous ventilation    Post pain: adequate analgesia    Post assessment: no apparent anesthetic complications    Post vital signs: stable    Level of consciousness: awake and sedated    Nausea/Vomiting: no nausea/vomiting    Complications: none    Transfer of care protocol was followed      Last vitals: Visit Vitals  BP (!) 162/89   Pulse 108   Temp 36.6 °C (97.9 °F) (Temporal)   Resp 20   Ht 6' 2" (1.88 m)   Wt 108.9 kg (240 lb)   SpO2 100%   BMI 30.81 kg/m²     "

## 2025-02-24 NOTE — PLAN OF CARE
Pt aaox4 follows commands. S/p flutter ablation. Pt arrived with right groin sutures adithya, well approx. No hematoma noted.  Pt did have a few episodes of sang drainage on guaze. Manual pressure held by ep pacu rn. Ep np aware.  Guaze placed over site to check.  Sutures placed at 1120. Per ep np chauncey, bedrest and sutures to be removed in 3hrs. Done at 1420.  Doppler pulses noted to ble. Edema to ble. 12 lead EKG done and in chart per md order. Pt denies pain or sob. Room air. Pt was cold in ep pacu 2, warm blankets placed.  Pt has diabetic monitoring device to right upper arm.  Bg 134 upon arrival to ep pacu. Pt s/p heart and kidney tx 2002.  Pt's wife updated over phone. And re updated over text messaging system. Pt tolerating diet cranberry in ep pacu 2. Pt did void via urinal clear yellow urine.  See I/o flowsheet for volume of urine. See flowsheet for full assessment.

## 2025-02-25 VITALS
DIASTOLIC BLOOD PRESSURE: 97 MMHG | SYSTOLIC BLOOD PRESSURE: 190 MMHG | TEMPERATURE: 98 F | RESPIRATION RATE: 14 BRPM | HEART RATE: 80 BPM | OXYGEN SATURATION: 100 %

## 2025-02-25 PROBLEM — N28.9 RENAL DYSFUNCTION: Status: ACTIVE | Noted: 2025-02-25

## 2025-02-25 PROBLEM — T81.89XA PROBLEM INVOLVING SURGICAL INCISION: Status: ACTIVE | Noted: 2025-02-25

## 2025-02-25 LAB
HCV RNA SERPL QL NAA+PROBE: NOT DETECTED
HCV RNA SPEC NAA+PROBE-ACNC: NOT DETECTED IU/ML

## 2025-02-25 NOTE — ED PROVIDER NOTES
SCRIBE #1 NOTE: I, Renae Velez, am scribing for, and in the presence of, Brady Longo Jr., MD. I have scribed the entire note.       History     Chief Complaint   Patient presents with    Post-op Problem     Right groin bleeding after a ablation that was done 10am this morning [facility]. Area dressed with a 4x4. Currently in NSR. Denies SOB and CP. +Blood thinner. Took Eliquis on yesterday     Review of patient's allergies indicates:  No Known Allergies      History of Present Illness     HPI    2025, 10:16 PM  History obtained from the patient      History of Present Illness: Nigel Albrecht is a 74 y.o. male patient with a PMHx of kidney transplant, HTN, DM, obesity, HLD, gout, CKD, prostate cancer, and heart transplant who presents to the Emergency Department for evaluation of a post-op problem which onset after leaving [facility] campus post an ablation at 10:00 am. Symptoms are constant and mild in severity. No mitigating or exacerbating factors reported. No associated sxs reported. Patient denies any numbness, N/V/D, lightheadedness, SOB, chest pain, dizziness, and all other sxs at this time. Prior Tx includes 4x4 dressing. No further complaints or concerns at this time.       Arrival mode: Ambulance Service    PCP: Krystin Zimmerman MD        Past Medical History:  Past Medical History:   Diagnosis Date    Allergic rhinitis 2013    Blood transfusion     Cataract     CKD (chronic kidney disease), stage III 2014    -donor kidney transplant     Diabetes mellitus, type 2 2013    Gout, arthritis 2013    Heart attack     Heart transplanted     Hyperlipidemia 2013    Hypertension     Immunodeficiency due to treatment with immunosuppressive medication     Morbid obesity 2013    Organ transplant 2002    heart and kidney    Orthostatic syncope 2022    Due to furosemide    Pneumonia due to COVID-19 virus 2022    Prophylactic immunotherapy      Prostate cancer 2023    Renal manifestation of secondary diabetes mellitus        Past Surgical History:  Past Surgical History:   Procedure Laterality Date    ABLATION, ATRIAL FLUTTER, TYPICAL N/A 2025    Procedure: Ablation, Atrial Flutter, Typical;  Surgeon: Marcelo Blackman MD;  Location: [facility] EP LAB;  Service: Cardiology;  Laterality: N/A;  AFL, ERIC (cx if SR), RFA, TANYA w/HD GRID, MAC, MB, 3 Prep *Heart & Kidney Transplant*    CATARACT EXTRACTION Bilateral     COLONOSCOPY N/A 10/6/2020    Procedure: COLONOSCOPY;  Surgeon: Conner Redmna MD;  Location: [facility] ENDO;  Service: Endoscopy;  Laterality: N/A;    ECHOCARDIOGRAM,TRANSESOPHAGEAL N/A 2025    Procedure: Transesophageal echo (ERIC) intra-procedure log documentation;  Surgeon: Chad De Jesus MD;  Location: [facility] EP LAB;  Service: Cardiology;  Laterality: N/A;    EYE SURGERY      HEART TRANSPLANT      KIDNEY TRANSPLANT      REVISION TOTAL HIP ARTHROPLASTY           Family History:  Family History   Problem Relation Name Age of Onset    Stroke Mother      Hyperlipidemia Sister 2     Diabetes Brother 4 decsd/ 5     Early death Brother 4 decsd/ 5     Heart disease Brother 4 decsd/ 5     Hyperlipidemia Brother 4 decsd/ 5     Hypertension Brother 4 decsd/ 5     Stroke Brother 4 decsd/ 5     Kidney disease Son 2     Diabetes Maternal Grandmother      Melanoma Neg Hx      Eczema Neg Hx      Lupus Neg Hx      Psoriasis Neg Hx         Social History:  Social History     Tobacco Use    Smoking status: Former     Current packs/day: 0.00     Average packs/day: 1 pack/day for 20.0 years (20.0 ttl pk-yrs)     Types: Cigarettes     Start date: 1964     Quit date: 1984     Years since quittin.6    Smokeless tobacco: Never   Substance and Sexual Activity    Alcohol use: Yes     Alcohol/week: 1.0 standard drink of alcohol     Types: 1 Cans of beer per week     Comment: occ    Drug use: No    Sexual activity: Not Currently     Partners: Female         Review of Systems     Review of Systems   Respiratory:  Negative for shortness of breath.    Cardiovascular:  Negative for chest pain.   Gastrointestinal:  Negative for diarrhea, nausea and vomiting.   Neurological:  Negative for dizziness, light-headedness and numbness.   All other systems reviewed and are negative.     Physical Exam     Initial Vitals [02/24/25 2128]   BP Pulse Resp Temp SpO2   (!) 200/99 93 18 98.2 °F (36.8 °C) 100 %      MAP       --          Physical Exam  Nursing Notes and Vital Signs Reviewed.  Constitutional: Patient is in no acute distress. Well-developed and well-nourished.  Head: Atraumatic. Normocephalic.  Eyes: PERRL. EOM intact. Conjunctivae are not pale. No scleral icterus.  ENT: Mucous membranes are moist. Oropharynx is clear and symmetric.    Neck: Supple. Full ROM. No lymphadenopathy.  Cardiovascular: Regular rate. Sinus arrhythmia. No murmurs, rubs, or gallops. Distal pulses are 2+ and symmetric.  Pulmonary/Chest: No respiratory distress. Clear to auscultation bilaterally. No wheezing or rales.  Abdominal: Soft and non-distended.  There is no tenderness.  No rebound, guarding, or rigidity.  Genitourinary: Oozing from the R coronary catheter insertion site.  Musculoskeletal: Moves all extremities. No obvious deformities. No edema. No calf tenderness.  Skin: Warm and dry.  Neurological:  Alert, awake, and appropriate.  Normal speech.  No acute focal neurological deficits are appreciated.  Psychiatric: Normal affect. Good eye contact. Appropriate in content.     ED Course   Procedures  ED Vital Signs:  Vitals:    02/24/25 2128 02/24/25 2158 02/24/25 2233 02/24/25 2247   BP: (!) 200/99 (S) (!) 186/92 (S) (!) 190/96 (!) 183/106   Pulse: 93 83 86 83   Resp: 18 18 18 (!) 23   Temp: 98.2 °F (36.8 °C)      TempSrc: Oral      SpO2: 100% 100% 100% 100%    02/25/25 0003 02/25/25 0119   BP: (S) (!) 195/97 (S) (!) 190/97   Pulse: 81 80   Resp: 12 14   Temp:     TempSrc:     SpO2: 100%  100%       Abnormal Lab Results:  Labs Reviewed   HEPATITIS C ANTIBODY - Abnormal       Result Value    Hepatitis C Ab Positive (*)     Narrative:     Release to patient->Immediate   CBC W/ AUTO DIFFERENTIAL - Abnormal    WBC 5.42      RBC 3.92 (*)     Hemoglobin 11.2 (*)     Hematocrit 35.5 (*)     MCV 91      MCH 28.6      MCHC 31.5 (*)     RDW 14.2      Platelets 209      MPV 10.4      Immature Granulocytes 0.0      Gran # (ANC) 3.4      Immature Grans (Abs) 0.00      Lymph # 1.5      Mono # 0.4      Eos # 0.0      Baso # 0.04      nRBC 0      Gran % 63.0      Lymph % 28.0      Mono % 7.6      Eosinophil % 0.7      Basophil % 0.7      Differential Method Automated     COMPREHENSIVE METABOLIC PANEL - Abnormal    Sodium 146 (*)     Potassium 4.1      Chloride 110      CO2 25      Glucose 79      BUN 37 (*)     Creatinine 2.6 (*)     Calcium 8.6 (*)     Total Protein 6.6      Albumin 3.5      Total Bilirubin 0.2      Alkaline Phosphatase 75      AST 27      ALT 12      eGFR 25 (*)     Anion Gap 11     HEP C VIRUS HOLD SPECIMEN    HEP C Virus Hold Specimen Hold for HCV sendout      Narrative:     Release to patient->Immediate   HIV 1 / 2 ANTIBODY    HIV 1/2 Ag/Ab Negative      Narrative:     Release to patient->Immediate   PROTIME-INR    Prothrombin Time 11.1      INR 1.0     HEPATITIS C RNA, QUANTITATIVE, PCR    HCV Quantitative Result Not Detected      HCV, Qualitative Not Detected      Narrative:     Release to patient->Immediate        All Lab Results:  Results for orders placed or performed during the hospital encounter of 02/24/25   Hepatitis C RNA, Quantitative, PCR    Collection Time: 02/24/25  9:31 PM   Result Value Ref Range    HCV Quantitative Result Not Detected <12 IU/mL    HCV, Qualitative Not Detected Not Detected   Hepatitis C Antibody    Collection Time: 02/24/25 10:39 PM   Result Value Ref Range    Hepatitis C Ab Positive (A) Negative   HCV Virus Hold Specimen    Collection Time: 02/24/25 10:39 PM    Result Value Ref Range    HEP C Virus Hold Specimen Hold for HCV sendout    HIV 1/2 Ag/Ab (4th Gen)    Collection Time: 02/24/25 10:39 PM   Result Value Ref Range    HIV 1/2 Ag/Ab Negative Negative   CBC auto differential    Collection Time: 02/24/25 10:39 PM   Result Value Ref Range    WBC 5.42 3.90 - 12.70 K/uL    RBC 3.92 (L) 4.60 - 6.20 M/uL    Hemoglobin 11.2 (L) 14.0 - 18.0 g/dL    Hematocrit 35.5 (L) 40.0 - 54.0 %    MCV 91 82 - 98 fL    MCH 28.6 27.0 - 31.0 pg    MCHC 31.5 (L) 32.0 - 36.0 g/dL    RDW 14.2 11.5 - 14.5 %    Platelets 209 150 - 450 K/uL    MPV 10.4 9.2 - 12.9 fL    Immature Granulocytes 0.0 0.0 - 0.5 %    Gran # (ANC) 3.4 1.8 - 7.7 K/uL    Immature Grans (Abs) 0.00 0.00 - 0.04 K/uL    Lymph # 1.5 1.0 - 4.8 K/uL    Mono # 0.4 0.3 - 1.0 K/uL    Eos # 0.0 0.0 - 0.5 K/uL    Baso # 0.04 0.00 - 0.20 K/uL    nRBC 0 0 /100 WBC    Gran % 63.0 38.0 - 73.0 %    Lymph % 28.0 18.0 - 48.0 %    Mono % 7.6 4.0 - 15.0 %    Eosinophil % 0.7 0.0 - 8.0 %    Basophil % 0.7 0.0 - 1.9 %    Differential Method Automated    Comprehensive metabolic panel    Collection Time: 02/24/25 10:39 PM   Result Value Ref Range    Sodium 146 (H) 136 - 145 mmol/L    Potassium 4.1 3.5 - 5.1 mmol/L    Chloride 110 95 - 110 mmol/L    CO2 25 23 - 29 mmol/L    Glucose 79 70 - 110 mg/dL    BUN 37 (H) 8 - 23 mg/dL    Creatinine 2.6 (H) 0.5 - 1.4 mg/dL    Calcium 8.6 (L) 8.7 - 10.5 mg/dL    Total Protein 6.6 6.0 - 8.4 g/dL    Albumin 3.5 3.5 - 5.2 g/dL    Total Bilirubin 0.2 0.1 - 1.0 mg/dL    Alkaline Phosphatase 75 40 - 150 U/L    AST 27 10 - 40 U/L    ALT 12 10 - 44 U/L    eGFR 25 (A) >60 mL/min/1.73 m^2    Anion Gap 11 8 - 16 mmol/L   Protime-INR    Collection Time: 02/24/25 10:39 PM   Result Value Ref Range    Prothrombin Time 11.1 9.0 - 12.5 sec    INR 1.0 0.8 - 1.2     *Note: Due to a large number of results and/or encounters for the requested time period, some results have not been displayed. A complete set of results can be found  in Results Review.         Imaging Results:  Imaging Results              US Soft Tissue, Groin Right (Final result)  Result time 02/25/25 07:56:27      Final result by Modesto Mccracken MD (02/25/25 07:56:27)                   Impression:      No acute finding.      Electronically signed by: Modesto Mccracken  Date:    02/25/2025  Time:    07:56               Narrative:    EXAMINATION:  US SOFT TISSUE, GROIN RIGHT    CLINICAL HISTORY:  bleeding from heart cath site.  cath with ablation done today in [facility];    TECHNIQUE:  Grayscale and color Doppler and spectral evaluation of the right groin was performed    COMPARISON:  None    FINDINGS:  No evidence of hematoma or pseudoaneurysm in the area of concern, right groin.  The common femoral artery is widely patent.                                       The EKG was ordered, reviewed, and independently interpreted by the ED provider.  Interpretation time: 11:55  Rate: 64 BPM  Rhythm: sinus arrhythmia and NSR  Interpretation: right bundle branch block. No STEMI.         The Emergency Provider reviewed the vital signs and test results, which are outlined above.     ED Discussion       1:41 AM: Reassessed pt at this time. Pt is able to tolerate PO and ambulate without assistance. Bleeding is controlled.  Dressing placed.  Discussed with patient and/or family/caretaker all pertinent ED information and results. Discussed pt dx and plan of tx. Gave patient all f/u and return to the ED instructions. All questions and concerns were addressed at this time. Patient and/or family/caretaker expresses understanding of information and instructions, and is comfortable with plan to discharge. Pt is stable for discharge.    I discussed with patient and/or family/caretaker that evaluation in the ED does not suggest any emergent or life threatening medical conditions requiring immediate intervention beyond what was provided in the ED, and I believe patient is safe for discharge.  Regardless, an  unremarkable evaluation in the ED does not preclude the development or presence of a serious of life threatening condition. As such, it was instructed that the patient return immediately for any worsening or change in current symptoms.    I discussed wound care precautions; specifically, that all wounds have risk of infection despite efforts to cleanse and debride the wound; and there is a risk of an occult foreign body (and thus increased risk of infection) despite a negative examination.  I discussed with patient need to return for any signs of infection, specifically redness, increased pain, fever, drainage of pus, or any concern, immediately.       Medical Decision Making  Amount and/or Complexity of Data Reviewed  Labs: ordered. Decision-making details documented in ED Course.  Radiology: ordered. Decision-making details documented in ED Course.  ECG/medicine tests: ordered and independent interpretation performed. Decision-making details documented in ED Course.    Risk  OTC drugs.  Prescription drug management.  Parenteral controlled substances.  Risk Details: OTC drugs, prescription drugs and controlled substances considered.  Due to patient's symptoms improving and pain controlled pain medications ordered appropriately.  Differential diagnosis: Dehydration, electrolyte abnormality, arrhythmia, infection, bleeding among others                  ED Medication(s):  Medications - No data to display    Discharge Medication List as of 2/25/2025  1:44 AM           Follow-up Information       Krystin Zimmerman MD. Schedule an appointment as soon as possible for a visit in 1 week.    Specialty: Family Medicine  Why: and with your physician, For wound re-check  Contact information:  [address] [facility] [address]  [phone]               [facility] - Emergency Dept..    Specialty: Emergency Medicine  Why: As needed, If symptoms worsen  Contact information:  [address] [facility]  [place] [address]  [phone]                               Scribe Attestation:   Scribe #1: I performed the above scribed service and the documentation accurately describes the services I performed. I attest to the accuracy of the note.     Attending:   Physician Attestation Statement for Scribe #1: I, Brady Longo Jr., MD, personally performed the services described in this documentation, as scribed by Renae Velez, in my presence, and it is both accurate and complete.           Clinical Impression       ICD-10-CM ICD-9-CM   1. Problem involving surgical incision  T81.89XA 998.83   2. Renal dysfunction  N28.9 593.9                 Brady Longo Jr., MD  02/26/25 1800

## 2025-02-25 NOTE — ED NOTES
Bed: 05  Expected date: 2/24/25  Expected time: 12:00 AM  Means of arrival: Ambulance Service  Comments:

## 2025-02-27 LAB
POC ACTIVATED CLOTTING TIME K: 101 SEC (ref 74–137)
POC ACTIVATED CLOTTING TIME K: 181 SEC (ref 74–137)
POC ACTIVATED CLOTTING TIME K: 245 SEC (ref 74–137)
POC ACTIVATED CLOTTING TIME K: 342 SEC (ref 74–137)
SAMPLE: ABNORMAL
SAMPLE: NORMAL

## 2025-02-28 DIAGNOSIS — G25.0 DISABLING ESSENTIAL TREMOR: ICD-10-CM

## 2025-02-28 RX ORDER — PRIMIDONE 50 MG/1
100 TABLET ORAL NIGHTLY
Qty: 180 TABLET | Refills: 3 | Status: SHIPPED | OUTPATIENT
Start: 2025-02-28

## 2025-02-28 RX ORDER — PROPRANOLOL HYDROCHLORIDE 20 MG/1
20 TABLET ORAL 2 TIMES DAILY
Qty: 180 TABLET | Refills: 3 | Status: SHIPPED | OUTPATIENT
Start: 2025-02-28

## 2025-03-03 ENCOUNTER — LAB VISIT (OUTPATIENT)
Dept: LAB | Facility: HOSPITAL | Age: 75
End: 2025-03-03
Attending: UROLOGY
Payer: MEDICARE

## 2025-03-03 DIAGNOSIS — C61 PROSTATE CANCER: ICD-10-CM

## 2025-03-03 PROCEDURE — 84153 ASSAY OF PSA TOTAL: CPT | Performed by: UROLOGY

## 2025-03-03 PROCEDURE — 36415 COLL VENOUS BLD VENIPUNCTURE: CPT | Mod: PN | Performed by: UROLOGY

## 2025-03-04 LAB — COMPLEXED PSA SERPL-MCNC: 5.3 NG/ML (ref 0–4)

## 2025-03-07 ENCOUNTER — OFFICE VISIT (OUTPATIENT)
Dept: UROLOGY | Facility: CLINIC | Age: 75
End: 2025-03-07
Payer: MEDICARE

## 2025-03-07 VITALS
BODY MASS INDEX: 30.56 KG/M2 | HEIGHT: 74 IN | DIASTOLIC BLOOD PRESSURE: 67 MMHG | WEIGHT: 238.13 LBS | SYSTOLIC BLOOD PRESSURE: 115 MMHG | HEART RATE: 58 BPM

## 2025-03-07 DIAGNOSIS — R35.0 BENIGN PROSTATIC HYPERPLASIA WITH URINARY FREQUENCY: ICD-10-CM

## 2025-03-07 DIAGNOSIS — N40.1 BENIGN PROSTATIC HYPERPLASIA WITH URINARY FREQUENCY: ICD-10-CM

## 2025-03-07 DIAGNOSIS — C61 PROSTATE CANCER: Primary | ICD-10-CM

## 2025-03-07 DIAGNOSIS — I48.0 PAROXYSMAL ATRIAL FIBRILLATION: Primary | ICD-10-CM

## 2025-03-07 DIAGNOSIS — N52.9 ERECTILE DYSFUNCTION, UNSPECIFIED ERECTILE DYSFUNCTION TYPE: ICD-10-CM

## 2025-03-07 PROCEDURE — 99215 OFFICE O/P EST HI 40 MIN: CPT | Mod: PBBFAC | Performed by: UROLOGY

## 2025-03-07 PROCEDURE — 99999 PR PBB SHADOW E&M-EST. PATIENT-LVL V: CPT | Mod: PBBFAC,,, | Performed by: UROLOGY

## 2025-03-07 NOTE — PROGRESS NOTES
Chief Complaint:   Encounter Diagnoses   Name Primary?    Prostate cancer Yes    Benign prostatic hyperplasia with urinary frequency     Erectile dysfunction, unspecified erectile dysfunction type        HPI:   3/7/25- currently voiding well, here today to discuss his PSA.  Brief questions about erectile dysfunction.    3/31/23- it has been an extended time since we have seen him, he determined to pursue active surveillance after seeing Radiation Oncology.  PSA appears to be stable, current transplanted kidney is improving function.  Erections are currently not an issue.  Patient is otherwise voiding well on tamsulosin.    3/30/16: 64 yo man s/p renal transplant in 2002 here with nocturia x3, was x0-1 4-5 months ago.  No hesitancy.  Some dribble when done putting things away.Urgency.  Some double voiding.  No abd/pelvic pain and no exac/rel factors.  No hematuria.  No urolithiasis.  No urinary bother.  No  history.  Normal sexual function.  Not usually constipated.  Viagra for ED rx but not used but once.  No BPH meds.    Allergies:  No known drug allergies    Medications:  has a current medication list which includes the following prescription(s): amlodipine, atorvastatin, calcium carbonate, cholecalciferol (vitamin d3), freestyle jackie 2 sensor, fluticasone propionate, gabapentin, humulin 70/30 u-100 kwikpen, lisinopril, low-dose aspirin, metoprolol succinate, metoprolol succinate, multivit-min/fa/lycopen/lutein, mycophenolate, oxycodone-acetaminophen, ozempic, sirolimus, solifenacin, torsemide, and diazepam.    Review of Systems:  General: No fever, chills, fatigability, or weight loss.  Skin: No rashes, itching, or changes in color or texture of skin.  Chest: Denies PETERSON, cyanosis, wheezing, cough, and sputum production.  Abdomen: Appetite fine. No weight loss. Denies diarrhea, abdominal pain, hematemesis, or blood in stool.  Musculoskeletal: No joint stiffness or swelling. Some back pain.  : As above.  All  other review of systems negative.    PMH:   has a past medical history of Allergic rhinitis (2013), Blood transfusion, Cataract, CKD (chronic kidney disease), stage III (2014), -donor kidney transplant, Diabetes mellitus, type 2 (2013), Gout, arthritis (2013), Heart attack, Heart transplanted, Hyperlipidemia (2013), Hypertension, Immunodeficiency due to treatment with immunosuppressive medication, Morbid obesity (2013), Organ transplant (), Prophylactic immunotherapy, and Renal manifestation of secondary diabetes mellitus.    PSH:   has a past surgical history that includes Kidney transplant; Heart transplant; Revision total hip arthroplasty; Eye surgery; Cataract extraction (Bilateral); and Colonoscopy (N/A, 10/6/2020).    FamHx: family history includes Diabetes in his brother and maternal grandmother; Early death in his brother; Heart disease in his brother; Hyperlipidemia in his brother and sister; Hypertension in his brother; Kidney disease in his son; Stroke in his brother and mother.    SocHx:  reports that he quit smoking about 37 years ago. His smoking use included cigarettes. He has a 20.00 pack-year smoking history. He has never used smokeless tobacco. He reports current alcohol use of about 1.0 standard drink of alcohol per week. He reports that he does not use drugs.      Physical Exam:  Vitals:    22 0855   BP: (!) 154/83   Pulse: 67   Temp: 98.1 °F (36.7 °C)     General: A&Ox3, no apparent distress, no deformities  Neck: No masses, normal thyroid  Lungs: normal inspiration, no use of accessory muscles  Heart: normal pulse, no arrhythmias  Abdomen: Soft, NT, ND  Skin: The skin is warm and dry. No jaundice.  Ext: No c/c/e.  MARISOL: - 25g no nodules or fixation, otherwise benign.    Labs/Studies:    protein   pnbx Gl 6 38.9g 22  PSA 5.3 3/25  PSA 5.3 10/24  PSA 4.0   PSA 3.8 3/24  PSA 2.7   PSA 2.8   PSA 3.1   PSA 4.0  1/22  PSA 3.4 8/21  PSA 2.5 8/20  PSA 2.1 8/19  MRI PI-RADS 5 25g 10/24    Impression/Plan:         1. Erectile dysfunction-  Patient was using TriMix but did not want to proceed.  He has not tried the Viagra 100 mg as of yet but will attempt.    2. Prostate cancer- per radiation oncology, MRI demonstrates no EPE, patient would like to continue active surveillance.  PSA is stable and we will repeat it in 6 months.    3. BPH- currently voiding well on tamsulosin.

## 2025-03-19 DIAGNOSIS — Z94.1 HEART TRANSPLANTED: Primary | Chronic | ICD-10-CM

## 2025-03-21 DIAGNOSIS — E11.43 DIABETIC AUTONOMIC NEUROPATHY ASSOCIATED WITH TYPE 2 DIABETES MELLITUS: Chronic | ICD-10-CM

## 2025-03-22 RX ORDER — SEMAGLUTIDE 0.68 MG/ML
INJECTION, SOLUTION SUBCUTANEOUS
Qty: 9 ML | Refills: 1 | Status: SHIPPED | OUTPATIENT
Start: 2025-03-22

## 2025-03-26 ENCOUNTER — TELEPHONE (OUTPATIENT)
Dept: TRANSPLANT | Facility: CLINIC | Age: 75
End: 2025-03-26
Payer: MEDICARE

## 2025-03-26 RX ORDER — TORSEMIDE 20 MG/1
TABLET ORAL
Qty: 180 TABLET | Refills: 3 | Status: SHIPPED | OUTPATIENT
Start: 2025-03-26

## 2025-03-26 RX ORDER — NIFEDIPINE 60 MG/1
60 TABLET, EXTENDED RELEASE ORAL 2 TIMES DAILY
COMMUNITY
End: 2025-03-27 | Stop reason: SDUPTHER

## 2025-03-26 NOTE — TELEPHONE ENCOUNTER
Patient called stating that he called for a refill on his Procardia XL 60mg bid and was told the prescription is . This med strength is no longer listed on his MAR. Listed is Nifedipine 10 mg every 8 hours. This was listed as a historical medication. Patient states he has never been on this dosage. Patient states he has been taking this med and dose for 3-4 years. Patient states he feels tired all the time. Instructed patient to take his BP twice daily for the next several days and contact us with results. Notified MD for med review

## 2025-03-27 NOTE — PROGRESS NOTES
Per Dr. Aguirre, patient may resume Procardia XL 60mg bid. Patient called with his blood pressures and they range from 120s to 150s over 70s to 80s. MAR changed to reflect this. Prescription sent to Dr. Aguirre for approval.

## 2025-04-07 ENCOUNTER — OFFICE VISIT (OUTPATIENT)
Dept: DIABETES | Facility: CLINIC | Age: 75
End: 2025-04-07
Payer: MEDICARE

## 2025-04-07 DIAGNOSIS — E11.22 CONTROLLED TYPE 2 DIABETES MELLITUS WITH STAGE 4 CHRONIC KIDNEY DISEASE, WITHOUT LONG-TERM CURRENT USE OF INSULIN: Primary | ICD-10-CM

## 2025-04-07 DIAGNOSIS — N18.4 CONTROLLED TYPE 2 DIABETES MELLITUS WITH STAGE 4 CHRONIC KIDNEY DISEASE, WITHOUT LONG-TERM CURRENT USE OF INSULIN: Primary | ICD-10-CM

## 2025-04-07 PROCEDURE — 98004 SYNCH AUDIO-VIDEO EST SF 10: CPT | Mod: 95,,, | Performed by: NURSE PRACTITIONER

## 2025-04-07 NOTE — PROGRESS NOTES
PCP: Dr. Singletary    HISTORY OF PRESENT ILLNESS: 74-year-old  male presenting virtually for diabetes follow up.     Patient has had diabetes for several years with the following complications: neuropathy, stage IV CKD. Other pertinent conditions: HTN, HLD, cardiac + renal transplant in 2002 on immunosuppressants. He has attended diabetes education in the past.  Past tried / failed medications: Humulin 70/30 insulin was discontinued because BGs normalized.  He continues to take Ozempic without any problems.     Since his last office visit, patient has not been hospitalized or in the emergency room. Patient uses Freestyle Georgette 3 CGM to monitor blood sugars. He is NOT currently connected to our [facility] practice site. Per patient, for the last 14 days, he has spent 91 % of time in range. He reports that hypoglycemia has resolved.     DM MEDICATIONS:  Ozempic 1 mg pen ( only takes 18 clicks, 0.25 mg weekly )    Review of Systems   Eyes:  Negative for visual disturbance.   Gastrointestinal:  Negative for diarrhea, nausea and vomiting.   Endocrine: Negative for polydipsia, polyphagia and polyuria.     Diabetes Management Status  Statin: Taking  ACE/ARB: Not taking    Screening or Prevention Patient's value Goal Complete/Controlled?   HgA1C Testing and Control   Lab Results   Component Value Date    HGBA1C 5.9 (H) 02/17/2025      Annually/Less than 8% Yes   Lipid profile : 10/07/2024 Annually Yes   LDL control Lab Results   Component Value Date    LDLCALC 69.2 10/07/2024    Annually/Less than 100 mg/dl  Yes   Nephropathy screening Lab Results   Component Value Date    LABMICR 748.0 02/17/2025     Lab Results   Component Value Date    PROTEINUA 3+ (A) 05/08/2023     Lab Results   Component Value Date    UTPCR 1.27 (H) 05/08/2023      Annually Yes   Blood pressure BP Readings from Last 1 Encounters:   03/07/25 115/67    Less than 140/90 No   Dilated retinal exam : 01/17/2024 Annually Yes   Foot exam   :  08/28/2024 Annually Yes     ACTIVITY LEVEL: Rarely Active. Discussed activities, benefits, methods, and precautions.  MEAL PLANNING: Patient reports number of meals per day to be 2 and number of snacks per day to be 2.   Patient is encouraged to carb count and consume no more than 45 - 60 grams of carbohydrates in each meal, and 1800 k / jovany per day.      BLOOD GLUCOSE TESTING:  Freestyle Georgette 3 CGM  SOCIAL HISTORY: . Lives with spouse. Has 2 children. Patient retired as manager for [employer].     Objective:     Physical Exam  Constitutional:       Appearance: He is well-developed.   HENT:      Head: Normocephalic and atraumatic.   Eyes:      Pupils: Pupils are equal, round, and reactive to light.   Pulmonary:      Effort: Pulmonary effort is normal.   Psychiatric:         Behavior: Behavior normal.         Thought Content: Thought content normal.         Judgment: Judgment normal.       Assessment / Plan:     1.) Controlled type 2 diabetes mellitus with stage 4 chronic kidney disease, without long-term current use of insulin       -  Continue Freestyle Georgette 3 CGM for monitoring of blood sugars. Since Fall 2022, patient has lost greater than 50 pounds and blood sugars have stabilized with just weekly Ozempic ( only takes 17 clicks, 0.25 mg ).  Blood sugars have stabilized and he no longer needs insulin.     Additional Plan Details:    1.) Continue monitoring blood sugar via CGM. Discussed ADA goal for fasting blood sugar, 80 - 130 mg/dL; pp blood sugars below 180 mg/dl. Also, discussed prevention of hypoglycemia and the need to adjust goals to higher levels if persistent hypoglycemia.  Reminded to bring BG records or meter to each visit for review.  2.) Return to clinic in 6months for follow up. The patient was explained the above plan and given opportunity to ask questions.  He understands, chooses and consents to this plan and accepts all the risks, which include but are not limited to the  risks mentioned above. He understands the alternative of having no testing, interventions or treatments at this time. He left content and without further questions.     Jael Garrison, RUBYC, [facility]  The patient location is: Home  The chief complaint leading to consultation is: Type 2 Diabetes    Visit type: audiovisual    Total Time Spent in Medical Discussion with Patient: 15 minutes  18 minutes of total time spent on the encounter, which includes face to face time and non-face to face time preparing to see the patient (eg, review of tests), Obtaining and/or reviewing separately obtained history, Documenting clinical information in the electronic or other health record, Independently interpreting results (not separately reported) and communicating results to the patient/family/caregiver, or Care coordination (not separately reported).     Each patient to whom he or she provides medical services by telemedicine is:  (1) informed of the relationship between the physician and patient and the respective role of any other health care provider with respect to management of the patient; and (2) notified that he or she may decline to receive medical services by telemedicine and may withdraw from such care at any time.

## 2025-04-09 ENCOUNTER — OFFICE VISIT (OUTPATIENT)
Dept: RADIATION ONCOLOGY | Facility: CLINIC | Age: 75
End: 2025-04-09
Payer: MEDICARE

## 2025-04-09 VITALS
RESPIRATION RATE: 18 BRPM | HEART RATE: 58 BPM | BODY MASS INDEX: 30.08 KG/M2 | DIASTOLIC BLOOD PRESSURE: 68 MMHG | TEMPERATURE: 98 F | WEIGHT: 234.38 LBS | SYSTOLIC BLOOD PRESSURE: 127 MMHG | HEIGHT: 74 IN | OXYGEN SATURATION: 99 %

## 2025-04-09 DIAGNOSIS — C61 MALIGNANT NEOPLASM OF PROSTATE: Primary | ICD-10-CM

## 2025-04-09 PROCEDURE — 99999 PR PBB SHADOW E&M-EST. PATIENT-LVL IV: CPT | Mod: PBBFAC,,, | Performed by: SPECIALIST

## 2025-04-09 PROCEDURE — 99214 OFFICE O/P EST MOD 30 MIN: CPT | Mod: PBBFAC | Performed by: SPECIALIST

## 2025-04-09 NOTE — PROGRESS NOTES
Mr. Albrecht returns for follow-up of his low risk prostate cancer.  His PSA has remained stable at 5.3.  At my request he underwent MRI of the prostate on 10/15/2024.  This shows a 1.5 cm lesion at the mid gland and base in the left transitional zone.  His 3+3 disease was from the apex.  He has no new complaints.  Physical exam: On rectal exam his prostate is normal  Impression: I do believe that sampling of this high-risk zone of the prostate needs to be ensured.  I have recommended he undergo fusion biopsies.  I defer this decision to Dr. Kelly.  Plan: I will see him back after his biopsies.

## 2025-04-10 ENCOUNTER — OFFICE VISIT (OUTPATIENT)
Dept: UROLOGY | Facility: CLINIC | Age: 75
End: 2025-04-10
Payer: MEDICARE

## 2025-04-10 VITALS — HEART RATE: 64 BPM | SYSTOLIC BLOOD PRESSURE: 157 MMHG | DIASTOLIC BLOOD PRESSURE: 74 MMHG

## 2025-04-10 DIAGNOSIS — R35.0 BENIGN PROSTATIC HYPERPLASIA WITH URINARY FREQUENCY: ICD-10-CM

## 2025-04-10 DIAGNOSIS — N52.9 ERECTILE DYSFUNCTION, UNSPECIFIED ERECTILE DYSFUNCTION TYPE: ICD-10-CM

## 2025-04-10 DIAGNOSIS — Z01.818 PRE-OP EXAM: ICD-10-CM

## 2025-04-10 DIAGNOSIS — C61 PROSTATE CANCER: Primary | ICD-10-CM

## 2025-04-10 DIAGNOSIS — N40.1 BENIGN PROSTATIC HYPERPLASIA WITH URINARY FREQUENCY: ICD-10-CM

## 2025-04-10 PROCEDURE — 99215 OFFICE O/P EST HI 40 MIN: CPT | Mod: PBBFAC | Performed by: UROLOGY

## 2025-04-10 PROCEDURE — 99999 PR PBB SHADOW E&M-EST. PATIENT-LVL V: CPT | Mod: PBBFAC,,, | Performed by: UROLOGY

## 2025-04-10 RX ORDER — CEFAZOLIN SODIUM 2 G/50ML
2 SOLUTION INTRAVENOUS
OUTPATIENT
Start: 2025-04-10

## 2025-04-10 NOTE — H&P (VIEW-ONLY)
Chief Complaint:   Encounter Diagnoses   Name Primary?    Prostate cancer Yes    Benign prostatic hyperplasia with urinary frequency     Erectile dysfunction, unspecified erectile dysfunction type        HPI:   4/10/25- patient returns from Radiation Oncology today to discuss pursuing a fusion guided biopsy.  No voiding complaints.    3/31/23- it has been an extended time since we have seen him, he determined to pursue active surveillance after seeing Radiation Oncology.  PSA appears to be stable, current transplanted kidney is improving function.  Erections are currently not an issue.  Patient is otherwise voiding well on tamsulosin.    3/30/16: 66 yo man s/p renal transplant in 2002 here with nocturia x3, was x0-1 4-5 months ago.  No hesitancy.  Some dribble when done putting things away.Urgency.  Some double voiding.  No abd/pelvic pain and no exac/rel factors.  No hematuria.  No urolithiasis.  No urinary bother.  No  history.  Normal sexual function.  Not usually constipated.  Viagra for ED rx but not used but once.  No BPH meds.    Allergies:  No known drug allergies    Medications:  has a current medication list which includes the following prescription(s): amlodipine, atorvastatin, calcium carbonate, cholecalciferol (vitamin d3), freestyle jackie 2 sensor, fluticasone propionate, gabapentin, humulin 70/30 u-100 kwikpen, lisinopril, low-dose aspirin, metoprolol succinate, metoprolol succinate, multivit-min/fa/lycopen/lutein, mycophenolate, oxycodone-acetaminophen, ozempic, sirolimus, solifenacin, torsemide, and diazepam.    Review of Systems:  General: No fever, chills, fatigability, or weight loss.  Skin: No rashes, itching, or changes in color or texture of skin.  Chest: Denies PETERSON, cyanosis, wheezing, cough, and sputum production.  Abdomen: Appetite fine. No weight loss. Denies diarrhea, abdominal pain, hematemesis, or blood in stool.  Musculoskeletal: No joint stiffness or swelling. Some back pain.  :  As above.  All other review of systems negative.    PMH:   has a past medical history of Allergic rhinitis (2013), Blood transfusion, Cataract, CKD (chronic kidney disease), stage III (2014), -donor kidney transplant, Diabetes mellitus, type 2 (2013), Gout, arthritis (2013), Heart attack, Heart transplanted, Hyperlipidemia (2013), Hypertension, Immunodeficiency due to treatment with immunosuppressive medication, Morbid obesity (2013), Organ transplant (), Prophylactic immunotherapy, and Renal manifestation of secondary diabetes mellitus.    PSH:   has a past surgical history that includes Kidney transplant; Heart transplant; Revision total hip arthroplasty; Eye surgery; Cataract extraction (Bilateral); and Colonoscopy (N/A, 10/6/2020).    FamHx: family history includes Diabetes in his brother and maternal grandmother; Early death in his brother; Heart disease in his brother; Hyperlipidemia in his brother and sister; Hypertension in his brother; Kidney disease in his son; Stroke in his brother and mother.    SocHx:  reports that he quit smoking about 37 years ago. His smoking use included cigarettes. He has a 20.00 pack-year smoking history. He has never used smokeless tobacco. He reports current alcohol use of about 1.0 standard drink of alcohol per week. He reports that he does not use drugs.      Physical Exam:  Vitals:    22 0855   BP: (!) 154/83   Pulse: 67   Temp: 98.1 °F (36.7 °C)     General: A&Ox3, no apparent distress, no deformities  Neck: No masses, normal thyroid  Lungs: normal inspiration, no use of accessory muscles  Heart: normal pulse, no arrhythmias  Abdomen: Soft, NT, ND  Skin: The skin is warm and dry. No jaundice.  Ext: No c/c/e.  MARISOL: - 25g no nodules or fixation, otherwise benign.    Labs/Studies:    protein   pnbx Gl 6 38.9g 22  PSA 5.3 3/25  PSA 5.3 10/24  PSA 4.0   PSA 3.8 3/24  PSA 2.7   PSA 2.8   PSA 3.1  6/22  PSA 4.0 1/22  PSA 3.4 8/21  PSA 2.5 8/20  PSA 2.1 8/19  MRI PI-RADS 5 25g 10/24    Impression/Plan:         1. Erectile dysfunction-  Patient was using TriMix but did not want to proceed.  He has not tried the Viagra 100 mg as of yet but will attempt.    2. Prostate cancer- per radiation oncology, MRI demonstrates no EPE but recommends biopsying this area on MRI, which appears to be abnormal since he is on active surveillance.  Therefore will proceed with a fusion guided prostate needle biopsy.  Patient is aware of the risks, benefits and alternatives which include but are not limited to hematuria, clot retention, difficulty voiding, blood in his stool, blood in the semen.  Risk of infection to the prostate requiring antibiotics or hospitalization.  Risk of anesthesia which could include heart attack, stroke, death, DVT and PE.  Patient understanding all the above has elected to pursue.  Patient has requested that we do this in July, will obtain a PSA beforehand.    3. BPH- currently voiding well on tamsulosin.

## 2025-04-14 ENCOUNTER — TELEPHONE (OUTPATIENT)
Dept: UROLOGY | Facility: CLINIC | Age: 75
End: 2025-04-14
Payer: MEDICARE

## 2025-04-14 NOTE — TELEPHONE ENCOUNTER
Called the patient, after verification of name and , the patient was informed  that I will find out from DR Kelly if  that he is requesting  is still available for him to do the sx. Voiced understanding       ----- Message from Odalis sent at 2025  9:56 AM CDT -----  Contact: Nigel  Type:  Sooner Apoointment RequestCaller is requesting a sooner appointment.  Name of Caller:Nigel When is the first available appointment?pt is scheduled for 25 and would like to be scheduled for sooner Symptoms: Would the patient rather a call back or a response via [facility]? callBe Call Back Number:[phone] Information:  Please call for additional information

## 2025-04-15 DIAGNOSIS — Z01.818 PRE-OP TESTING: ICD-10-CM

## 2025-04-15 DIAGNOSIS — C61 PROSTATE CANCER: Primary | ICD-10-CM

## 2025-04-23 ENCOUNTER — HOSPITAL ENCOUNTER (OUTPATIENT)
Dept: RADIOLOGY | Facility: HOSPITAL | Age: 75
Discharge: HOME | End: 2025-04-23
Attending: UROLOGY
Payer: MEDICARE

## 2025-04-23 ENCOUNTER — OFFICE VISIT (OUTPATIENT)
Dept: INTERNAL MEDICINE | Facility: CLINIC | Age: 75
End: 2025-04-23
Payer: MEDICARE

## 2025-04-23 VITALS
DIASTOLIC BLOOD PRESSURE: 75 MMHG | OXYGEN SATURATION: 97 % | HEART RATE: 64 BPM | TEMPERATURE: 97 F | SYSTOLIC BLOOD PRESSURE: 155 MMHG

## 2025-04-23 DIAGNOSIS — N40.1 BENIGN PROSTATIC HYPERPLASIA WITH URINARY FREQUENCY: ICD-10-CM

## 2025-04-23 DIAGNOSIS — N52.9 ERECTILE DYSFUNCTION, UNSPECIFIED ERECTILE DYSFUNCTION TYPE: ICD-10-CM

## 2025-04-23 DIAGNOSIS — T86.11 ANTIBODY MEDIATED REJECTION OF KIDNEY TRANSPLANT: ICD-10-CM

## 2025-04-23 DIAGNOSIS — I10 PRIMARY HYPERTENSION: Chronic | ICD-10-CM

## 2025-04-23 DIAGNOSIS — R93.89 ABNORMAL FINDING OF DIAGNOSTIC IMAGING: ICD-10-CM

## 2025-04-23 DIAGNOSIS — R35.0 BENIGN PROSTATIC HYPERPLASIA WITH URINARY FREQUENCY: ICD-10-CM

## 2025-04-23 DIAGNOSIS — Z01.818 PREOP TESTING: ICD-10-CM

## 2025-04-23 DIAGNOSIS — Z29.89 PROPHYLACTIC IMMUNOTHERAPY: Chronic | ICD-10-CM

## 2025-04-23 DIAGNOSIS — Z01.818 PRE-OP EXAM: Primary | ICD-10-CM

## 2025-04-23 PROCEDURE — 99212 OFFICE O/P EST SF 10 MIN: CPT | Mod: PBBFAC,25

## 2025-04-23 PROCEDURE — 99999 PR PBB SHADOW E&M-EST. PATIENT-LVL II: CPT | Mod: PBBFAC,,,

## 2025-04-23 PROCEDURE — 71045 X-RAY EXAM CHEST 1 VIEW: CPT | Mod: TC

## 2025-04-23 PROCEDURE — 71045 X-RAY EXAM CHEST 1 VIEW: CPT | Mod: 26,,, | Performed by: RADIOLOGY

## 2025-04-23 NOTE — PROGRESS NOTES
Preoperative History and Physical                                                             Hospital Medicine      Chief Complaint: Preoperative evaluation    History of Present Illness:      Nigel Albrecht is a 74 y.o. male who  has a past medical history of Allergic rhinitis, Blood transfusion, Cataract, CKD (chronic kidney disease), stage III, -donor kidney transplant, Diabetes mellitus, type 2, Gout, arthritis, Heart attack, Heart transplanted, Hepatitis C antibody positive in blood, Hyperlipidemia, Hypertension, Immunodeficiency due to treatment with immunosuppressive medication, Morbid obesity, Organ transplant, Orthostatic syncope, Pneumonia due to COVID-19 virus, Prophylactic immunotherapy, Prostate cancer, and Renal manifestation of secondary diabetes mellitus who presents to the office today for a preoperative consultation at the request of Dr. Kelly who plans on performing PROSTATE BIOPSY on 2025.     Per radiation oncology, MRI demonstrates no EPE but recommends biopsying this area on MRI, which appears to be abnormal since he is on active surveillance.  Therefore will proceed with a fusion guided prostate needle biopsy.      Functional Status:      The patient is able to climb a flight of stairs. The patient is able to ambulate without difficulty. The patient's functional status is not affected by their joint pain. The patient's functional status is not affected by shortness of breath, chest pain, dyspnea on exertion and fatigue.      Past Medical History:      Past Medical History:   Diagnosis Date    Allergic rhinitis 2013    Blood transfusion     Cataract     CKD (chronic kidney disease), stage III 2014    -donor kidney transplant     Diabetes mellitus, type 2 2013    Gout, arthritis 2013    Heart attack     Heart transplanted     Hepatitis C antibody positive in blood 2025    RNA NEGATIVE    Hyperlipidemia 2013    Hypertension      Immunodeficiency due to treatment with immunosuppressive medication     Morbid obesity 2013    Organ transplant 2002    heart and kidney    Orthostatic syncope 2022    Due to furosemide    Pneumonia due to COVID-19 virus 2022    Prophylactic immunotherapy     Prostate cancer 2023    Renal manifestation of secondary diabetes mellitus         Past Surgical History:      Past Surgical History:   Procedure Laterality Date    ABLATION, ATRIAL FLUTTER, TYPICAL N/A 2025    Procedure: Ablation, Atrial Flutter, Typical;  Surgeon: Marcelo Blackman MD;  Location: [facility] EP LAB;  Service: Cardiology;  Laterality: N/A;  AFL, ERIC (cx if SR), RFA, TANYA w/HD GRID, MAC, MB, 3 Prep *Heart & Kidney Transplant*    CATARACT EXTRACTION Bilateral     COLONOSCOPY N/A 10/06/2020    Procedure: COLONOSCOPY;  Surgeon: Conner Redman MD;  Location: [facility] ENDO;  Service: Endoscopy;  Laterality: N/A;    ECHOCARDIOGRAM,TRANSESOPHAGEAL N/A 2025    Procedure: Transesophageal echo (ERIC) intra-procedure log documentation;  Surgeon: Chad De Jesus MD;  Location: [facility] EP LAB;  Service: Cardiology;  Laterality: N/A;    EYE SURGERY      HEART TRANSPLANT      KIDNEY TRANSPLANT      REVISION TOTAL HIP ARTHROPLASTY          Social History:      Social History     Socioeconomic History    Marital status:    Occupational History     Employer: Retired   Tobacco Use    Smoking status: Former     Current packs/day: 0.00     Average packs/day: 1 pack/day for 20.0 years (20.0 ttl pk-yrs)     Types: Cigarettes     Start date: 1964     Quit date: 1984     Years since quittin.8    Smokeless tobacco: Never   Substance and Sexual Activity    Alcohol use: Yes     Alcohol/week: 1.0 standard drink of alcohol     Types: 1 Cans of beer per week     Comment: occ    Drug use: No    Sexual activity: Not Currently     Partners: Female     Social Drivers of Health     Financial Resource Strain: Low Risk  (2025)     Overall Financial Resource Strain (CARDIA)     Difficulty of Paying Living Expenses: Not very hard   Food Insecurity: No Food Insecurity (4/23/2025)    Hunger Vital Sign     Worried About Running Out of Food in the Last Year: Never true     Ran Out of Food in the Last Year: Never true   Transportation Needs: No Transportation Needs (4/23/2025)    PRAPARE - Transportation     Lack of Transportation (Medical): No     Lack of Transportation (Non-Medical): No   Physical Activity: Inactive (4/23/2025)    Exercise Vital Sign     Days of Exercise per Week: 2 days     Minutes of Exercise per Session: 0 min   Stress: Stress Concern Present (4/23/2025)    [employer] - Occupational Stress Questionnaire     Feeling of Stress : To some extent   Housing Stability: Unknown (9/30/2024)    Housing Stability Vital Sign     Unable to Pay for Housing in the Last Year: No     Homeless in the Last Year: No        Family History:      Family History   Problem Relation Name Age of Onset    Stroke Mother      Hyperlipidemia Sister 2     Diabetes Brother 4 decsd/ 5     Early death Brother 4 decsd/ 5     Heart disease Brother 4 decsd/ 5     Hyperlipidemia Brother 4 decsd/ 5     Hypertension Brother 4 decsd/ 5     Stroke Brother 4 decsd/ 5     Kidney disease Son 2     Diabetes Maternal Grandmother      Melanoma Neg Hx      Eczema Neg Hx      Lupus Neg Hx      Psoriasis Neg Hx         Allergies:      Review of patient's allergies indicates:  No Known Allergies    Medications:      Current Outpatient Medications   Medication Sig    acetaminophen (TYLENOL) 325 MG tablet Take 2 tablets (650 mg total) by mouth every 4 (four) hours as needed.    aspirin 81 MG Chew Take 1 tablet (81 mg total) by mouth once daily.    atorvastatin (LIPITOR) 40 MG tablet TAKE 1 TABLET ONCE DAILY    blood-glucose sensor (FREESTYLE SHREE 3 PLUS SENSOR) Madelin Inject 1 Device into the skin Every 15 days.    calcium acetate,phosphat bind,  (PHOSLO) 667 mg capsule Take 2 capsules (1,334 mg total) by mouth 3 (three) times daily with meals.    cream base no.171, bulk, Crea 2 g by Misc.(Non-Drug; Combo Route) route 4 (four) times daily as needed (pain).    hydrALAZINE (APRESOLINE) 50 MG tablet TAKE 1 TABLET EVERY 8 HOURS    mycophenolate (CELLCEPT) 250 mg Cap Take 1 capsule (250 mg total) by mouth 2 (two) times daily.    NIFEdipine (PROCARDIA-XL) 60 MG (OSM) 24 hr tablet Take 1 tablet (60 mg total) by mouth 2 (two) times a day.    OZEMPIC 0.25 mg or 0.5 mg (2 mg/3 mL) pen injector INJECT 0.5 MG              SUBCUTANEOUSLY EVERY 7 DAYS    predniSONE (DELTASONE) 5 MG tablet Take 1 tablet (5 mg total) by mouth once daily.    primidone (MYSOLINE) 50 MG Tab TAKE 2 TABLETS EVERY       EVENING    propranoloL (INDERAL) 20 MG tablet TAKE 1 TABLET TWICE A DAY    sildenafiL (VIAGRA) 100 MG tablet Take 1 tablet (100 mg total) by mouth daily as needed for Erectile Dysfunction.    tacrolimus (PROGRAF) 1 MG Cap Take 8 capsules (8 mg total) by mouth every morning AND 7 capsules (7 mg total) every evening.    tamsulosin (FLOMAX) 0.4 mg Cap TAKE 1 CAPSULE ONCE DAILY    torsemide (DEMADEX) 20 MG Tab TAKE 2 TABLETS (40MG TOTAL)ONCE DAILY     No current facility-administered medications for this visit.       Vitals:          Review of Systems:        Constitutional: Negative for fever, chills, weight loss, malaise/fatigue and diaphoresis.   HENT: Negative for hearing loss, ear pain, nosebleeds, congestion, sore throat, neck pain, tinnitus and ear discharge.    Eyes: Negative for blurred vision, double vision, photophobia, pain, discharge and redness.   Respiratory: Negative for cough, hemoptysis, sputum production, shortness of breath, wheezing and stridor.    Cardiovascular: Negative for chest pain, palpitations, orthopnea, claudication, leg swelling and PND.   Gastrointestinal: Negative for heartburn, nausea, vomiting, abdominal pain, diarrhea, constipation, blood in stool  and melena.   Genitourinary: Negative for dysuria, urgency, frequency, hematuria and flank pain.   Musculoskeletal: Negative for myalgias, back pain, joint pain and falls.   Skin: Negative for itching and rash.   Neurological: Negative for dizziness, tingling, tremors, sensory change, speech change, focal weakness, seizures, loss of consciousness, weakness and headaches.   Endo/Heme/Allergies: Negative for environmental allergies and polydipsia. Does not bruise/bleed easily.   Psychiatric/Behavioral: Negative for depression, suicidal ideas, hallucinations, memory loss and substance abuse. The patient is not nervous/anxious and does not have insomnia.    All 14 systems reviewed and negative except as noted above.    Physical Exam:      Constitutional: Appears well-developed, well-nourished and in no acute distress.  Pt is oriented to person, place, and time.   Head: Normocephalic and atraumatic. Mucous membranes moist.  Neck: Neck supple no mass.   Cardiovascular: Normal rate and regular rhythm.  S1 S2 appreciated by ascultation.  Pulmonary/Chest: Effort normal and clear to auscultation bilaterally. No respiratory distress.   Abdomen: Soft. Non-tender and non-distended. Bowel sounds are normal.   Neurological: Pt is alert and oriented to person, place, and time. Moves all extremities.  Skin: Warm and dry. No lesions.  Extremities: No clubbing cyanosis or edema.    Laboratory data:      Reviewed and noted in plan where applicable. Please see chart for full laboratory data.    Lab Results   Component Value Date    INR 1.0 02/24/2025    INR 1.0 02/17/2025    INR 1.0 07/04/2022       Lab Results   Component Value Date    WBC 5.12 04/23/2025    HGB 11.2 (L) 04/23/2025    HCT 37.7 (L) 04/23/2025    MCV 95 04/23/2025     04/23/2025       Predictors of intubation difficulty:       Morbid obesity? Obese   Anatomically abnormal facies? no   Prominent incisors? no   Receding mandible? no   Short, thick neck? no   Neck  range of motion: normal   Dentition:  no dentures or partials    Cardiographics:      ECG (dated 2/24/25):   Vent. Rate :  64 BPM     Atrial Rate :  64 BPM      P-R Int : 196 ms          QRS Dur : 178 ms       QT Int : 518 ms       P-R-T Axes :  95  42  38 degrees     QTcB Int : 534 ms     Normal sinus rhythm with sinus arrhythmia   Right bundle branch block   Abnormal ECG   When compared with ECG of 24-Feb-2025 08:27,   Sinus rhythm has replaced Atrial fibrillation   Confirmed by Jimbo Yun ([id number]) on 2/24/2025 2:26:40 PM     Echocardiogram (dated 10/8/24):     Left Ventricle: The left ventricle is normal in size. Normal wall thickness. There is concentric hypertrophy. Normal wall motion. There is normal systolic function with a visually estimated ejection fraction of 55 - 60%. Ejection fraction is approximately 60%. There is normal diastolic function.    Right Ventricle: Normal right ventricular cavity size. Right ventricle wall motion  is normal. Systolic function is normal.    Mitral Valve: There is mild regurgitation.    Tricuspid Valve: There is mild regurgitation.    Imaging:      Chest x-ray (dated 4/23/25):   EXAM:   XR CHEST 1 VIEW PRE-OP     CLINICAL HISTORY: Physical exam     COMPARISON: 05/27/2024.     FINDINGS:  The lungs are clear.   No pneumothorax.  The cardiac silhouette size and contour is within normal limits.              Impression:        Negative portable chest radiograph.      Assessment:      74 y.o. male with planned surgery as above.    Known risk factors for perioperative complications: MI, HTN, HLD, kidney/heart transplant recipient  Difficulty with intubation is not anticipated.      Plan:      Pre-op exam  Assessment & Plan:  Known risk factors for perioperative complications: Coronary disease    Heart and renal transplant patient  Difficulty with intubation is not anticipated.    He saw Dr. Quintanilla on 4/25/25 for e consult r/t cardiac clearance -- recommendation: ok to proceed if  no angina and no clinical decompensation   Ok to hold eliquis 3 days in advance and aspirin 5-7 days in advance    Cardiac Risk Estimation:     Revised Cardiac Risk Index for Pre-Operative Risk from [email or web address]  on 4/29/2025  ** All calculations should be rechecked by clinician prior to use **    RESULT SUMMARY:  0 points  RCRI Score    3.9 %  Risk of major cardiac event      INPUTS:  High-risk surgery --> 0 = No  History of ischemic heart disease --> 0 = No  History of congestive heart failure --> 0 = No  History of cerebrovascular disease --> 0 = No  Pre-operative treatment with insulin --> 0 = No  Pre-operative creatinine >2 mg/dL / 176.8 µmol/L --> 0 = No      1.) Preoperative workup as follows: chest x-ray, ECG, hemoglobin, hematocrit, electrolytes, creatinine, glucose, liver function studies.  2.) Change in medication regimen before surgery:  Ok to hold eliquis 3 days in advance and aspirin 5-7 days in advance per Cardiology recommendations. Hold Viagra at least 24 hrs before scheduled procedure. Hold torsemide the morning of procedure  3.) Prophylaxis for cardiac events with perioperative beta-blockers: He currently takes propanolol which he will continue the morning of surgery.  4.) Invasive hemodynamic monitoring perioperatively: at the discretion of anesthesiologist.  5.) Deep vein thrombosis prophylaxis postoperatively: regimen to be chosen by surgical team.  6.) Surveillance for postoperative MI with ECG immediately postoperatively and on postoperati ve days 1 and 2 AND troponin levels 24 hours postoperatively and on day 4 or hospital discharge (whichever comes first): at the discretion of anesthesiologist.  7.) Current medications which may produce withdrawal symptoms if withheld perioperatively: Propanolol  8.) Other measures:   Postoperative hypertension management with IV hydralazine until able to take oral medications.  Postoperative incentive spirometry to prevent pneumonia.  He was cleared by  Cardiology and Nephrology for procedure.  He will continue the morning of procedure:  Nifedipine, Prednisone, Propanolol, Tacrolimus, Tamsulosin, and Mycophenolate       Orders:  -     Ambulatory referral/consult to Pre-Admit    Abnormal finding of diagnostic imaging  Assessment & Plan:  -MRI prostate on 10/15/24 showed a suspicious left transitional zone mass measuring 15 x 14 x 9 mm concerning for a high-grade prostate adenocarcinoma. MRI fusion biopsy is recommended. This lesion has been mapped on the [facility]  for subsequent fusion biopsy.   -Scheduled for PROSTATE BIOPSY w/ Dr. Kelly on 4/29/25.      Erectile dysfunction, unspecified erectile dysfunction type  Assessment & Plan:  -Pt currently takes Viagra which is was instructed to hold for 24 hrs before procedure      Primary hypertension  Assessment & Plan:  -He currently takes Nifedipine, Hydralazine, and Propanolol for management of BP. He will continue the morning of surgery Nifedipine and Propanolol. He may resume Hydralazine postop as BP permits       Chronic immunosuppression with tacrolimus, mycophenolate  Assessment & Plan:  -He is currently taking tacrolimus and mycophenolate which he will continue the morning of surgery  -Hx of heart/renal transplants      Benign prostatic hyperplasia with urinary frequency  Assessment & Plan:  -He is currently on Tamsulosin which he will continue the morning of surgery

## 2025-04-23 NOTE — PRE-PROCEDURE INSTRUCTIONS
Pre op instructions reviewed with patient during Clinic Visit with Provider.    To confirm, Surgery is scheduled on 4/29/25. We will call you late afternoon the business day prior to surgery with your arrival time.    Pending Cardiology and Nephrology Clearances     *Please report to the [facility] Lobby (1st Floor) located off of [facility] (2nd Entrance/Building on the left, in front of the flag pole). Address: [address] [place] [address] [place] [id number]      INSTRUCTIONS IMPORTANT!!!  DO NOT Eat, Drink, or Smoke after 12 midnight unless instructed otherwise by your Surgeon. OK to brush teeth, no gum, candy or mints!    MORNING OF SURGERY, drink small sip of water with the following medications instructed by Pre-Admit Provider:  Nifedipine, Prednisone, Propanolol, Flomax, Prograf, Cellcept, Hydralazine     *Additional Medication Instructions:   - CONTINUE TO HOLD OZEMPIC PRIOR TO SURGERY    - CONTINUE TO HOLD ASPIRIN PRIOR TO SURGERY    Diabetic Patients: If you take diabetic or weight loss medication, Do NOT take morning of surgery unless instructed by Doctor. Metformin to be stopped 24 hrs prior to surgery.     DO NOT take long-acting insulin the evening before surgery. Blood sugars will be checked in pre-op by Nurse.    If you take Ozempic/ Mounjaro / Wegovy / Trulicity / Semaglutide, any weight loss injections OR PHENTERMINE --->>> PLEASE LET US KNOW IMMEDIATELY, as these medications need to be stopped 7 days prior to surgery!    *Patients should HOLD all vitamins, herbal supplements, weight loss medication, aspirin products & NSAIDS 7 days prior to surgery, as these can thin the blood. Ok to take Tylenol.    ____  Avoid Alcoholic beverages 3 days prior to surgery, as it can thin the blood.  ____  NO Acrylic/fake nails or nail polish worn day of surgery (specifically hand/arm & foot surgeries).  ____  NO powder, lotions, deodorants, oils or cream on body.  ____  Remove all jewelry, piercings, &  foreign objects prior to arrival and leave at home.  ____  Remove Dentures, Hearing Aids & Contact Lens prior to surgery.  ____  Bring photo ID and insurance information to hospital (Leave Valuables at Home).  ____  If going home the same day, arrange for a ride home. You will not be able to drive for 24 hrs if Anesthesia was used.   ____  Males: Stop ED medications (Viagra, Cialis) 24 hrs prior to surgery.  ____  Wear clean, loose fitting clothing to allow for dressings/ bandages.    Bathing Instructions:    -Shower with anti-bacterial Soap (ex: Hibiclens or Dial) the night before surgery and the morning of.   -Do not use Hibiclens on your face or genitals.   -Apply clean clothes after shower.  -Do not shave your face morning of surgery   -Do not shave your body 3 days prior to surgery unless instructed otherwise by your Surgeon.    [facility]:  Only 2 adults allowed in pre op/recovery area during your procedure. You MUST HAVE A RIDE HOME from a responsible adult that you know and trust. Medical Transport, Uber or Lyft can ONLY be used if patient has a responsible adult to accompany them during ride home.       *Signs and symptoms of Infection Before or After Surgery:               !!!If you experience any fever, chills, nausea/ vomiting, foul odor/ excessive drainage from surgical site, flu-like symptoms, new wounds or cuts, PLEASE CALL THE SURGEON OFFICE at [phone] or SEND MESSAGE THROUGH [employer] PORTAL!!!     *If you are running late day of surgery, please call the Surgery Dept @ [phone].    *Billing question, please call  [phone]     Thank you,  -[facility] Pre Admit Dept.  [phone]   or [phone]  M-F 7:30 am-4:00 pm (Closed Major Holidays)    Additional Tests Scheduled Today:  LABS / CHEST XRAY (1ST Floor) Check in at the

## 2025-04-24 ENCOUNTER — DOCUMENTATION ONLY (OUTPATIENT)
Dept: NEPHROLOGY | Facility: CLINIC | Age: 75
End: 2025-04-24
Payer: MEDICARE

## 2025-04-24 NOTE — PROGRESS NOTES
Renal note:  Pt is cleared for prostate biopsy procedure from renal and kidney transplant point of view.    Lucila Matthew MD

## 2025-04-25 ENCOUNTER — E-CONSULT (OUTPATIENT)
Dept: CARDIOLOGY | Facility: CLINIC | Age: 75
End: 2025-04-25
Payer: MEDICARE

## 2025-04-25 DIAGNOSIS — Z01.810 PREOP CARDIOVASCULAR EXAM: Primary | ICD-10-CM

## 2025-04-25 NOTE — CONSULTS
[facility] - Cardiology  Response for E-Consult     Patient Name: Nigel Albrecht  MRN: [id number]  Primary Care Provider: Krystin Zimmerman MD   Requesting Provider: Marcelo Bland II, MD  E-Consult to General Cardiology  Consult performed by: Tevin Zamudio MD  Consult ordered by: Marcelo Bland II, MD  Reason for consult: preop          Recommendation: ok to proceed if no angina and no clinical decompensation   Ok to hold eliquis 3 days in advance and aspirin 5-7 days in advance     Additional future steps to consider: n/a    Total time of Consultation: 5 minute    I did not speak to the requesting provider verbally about this.     *This eConsult is based on the clinical data available to me and is furnished without benefit of a physical examination. The eConsult will need to be interpreted in light of any clinical issues or changes in patient status not available to me at the time of filing this eConsults. Significant changes in patient condition or level of acuity should result in immediate formal consultation and reevaluation. Please alert me if you have further questions.    Thank you for this eConsult referral.     Tevin Zamudio MD  [facility] - Cardiology

## 2025-04-28 NOTE — PRE-PROCEDURE INSTRUCTIONS
Called and spoke with Nigel about the following:     Please arrive to [facility] ([facility] at 0845 on 4/29/25 for your scheduled procedure.  Address: [address] [place] [address] [place]. [id number] (2nd Building on left, 1st Floor Lobby)    !!!NO FOOD after midnight!     Thank you,  -[facility] Pre [facility] Dept.  Mon-Fri 7:30 am - 4 pm [phone]

## 2025-04-29 ENCOUNTER — TELEPHONE (OUTPATIENT)
Dept: UROLOGY | Facility: CLINIC | Age: 75
End: 2025-04-29
Payer: MEDICARE

## 2025-04-29 ENCOUNTER — ANESTHESIA EVENT (OUTPATIENT)
Dept: SURGERY | Facility: HOSPITAL | Age: 75
End: 2025-04-29
Payer: MEDICARE

## 2025-04-29 ENCOUNTER — HOSPITAL ENCOUNTER (OUTPATIENT)
Facility: HOSPITAL | Age: 75
Discharge: HOME | End: 2025-04-29
Attending: UROLOGY | Admitting: UROLOGY
Payer: MEDICARE

## 2025-04-29 ENCOUNTER — ANESTHESIA (OUTPATIENT)
Dept: SURGERY | Facility: HOSPITAL | Age: 75
End: 2025-04-29
Payer: MEDICARE

## 2025-04-29 VITALS
SYSTOLIC BLOOD PRESSURE: 138 MMHG | BODY MASS INDEX: 30.05 KG/M2 | WEIGHT: 234.13 LBS | HEART RATE: 59 BPM | DIASTOLIC BLOOD PRESSURE: 72 MMHG | TEMPERATURE: 98 F | HEIGHT: 74 IN | RESPIRATION RATE: 15 BRPM | OXYGEN SATURATION: 99 %

## 2025-04-29 DIAGNOSIS — C61 PROSTATE CANCER: Primary | ICD-10-CM

## 2025-04-29 DIAGNOSIS — T86.11 ANTIBODY MEDIATED REJECTION OF KIDNEY TRANSPLANT: ICD-10-CM

## 2025-04-29 DIAGNOSIS — Z01.818 PREOP TESTING: ICD-10-CM

## 2025-04-29 PROBLEM — R93.89 ABNORMAL FINDING OF DIAGNOSTIC IMAGING: Status: ACTIVE | Noted: 2025-04-29

## 2025-04-29 LAB — POCT GLUCOSE: 131 MG/DL (ref 70–110)

## 2025-04-29 PROCEDURE — 63600175 PHARM REV CODE 636 W HCPCS: Performed by: FAMILY MEDICINE

## 2025-04-29 PROCEDURE — 36000705 HC OR TIME LEV I EA ADD 15 MIN: Performed by: UROLOGY

## 2025-04-29 PROCEDURE — 76872 US TRANSRECTAL: CPT | Mod: 26,,, | Performed by: UROLOGY

## 2025-04-29 PROCEDURE — 71000033 HC RECOVERY, INTIAL HOUR: Performed by: UROLOGY

## 2025-04-29 PROCEDURE — 37000008 HC ANESTHESIA 1ST 15 MINUTES: Performed by: UROLOGY

## 2025-04-29 PROCEDURE — 71000015 HC POSTOP RECOV 1ST HR: Performed by: UROLOGY

## 2025-04-29 PROCEDURE — 63600175 PHARM REV CODE 636 W HCPCS: Performed by: UROLOGY

## 2025-04-29 PROCEDURE — 37000009 HC ANESTHESIA EA ADD 15 MINS: Performed by: UROLOGY

## 2025-04-29 PROCEDURE — G0416 PROSTATE BIOPSY, ANY MTHD: HCPCS | Mod: TC | Performed by: UROLOGY

## 2025-04-29 PROCEDURE — 36000704 HC OR TIME LEV I 1ST 15 MIN: Performed by: UROLOGY

## 2025-04-29 PROCEDURE — 55700 PR BIOPSY OF PROSTATE,NEEDLE/PUNCH: CPT | Mod: ,,, | Performed by: UROLOGY

## 2025-04-29 RX ORDER — OXYCODONE AND ACETAMINOPHEN 5; 325 MG/1; MG/1
1 TABLET ORAL
Status: DISCONTINUED | OUTPATIENT
Start: 2025-04-29 | End: 2025-04-29 | Stop reason: HOSPADM

## 2025-04-29 RX ORDER — OXYCODONE AND ACETAMINOPHEN 5; 325 MG/1; MG/1
1 TABLET ORAL EVERY 4 HOURS PRN
Qty: 10 TABLET | Refills: 0 | Status: SHIPPED | OUTPATIENT
Start: 2025-04-29

## 2025-04-29 RX ORDER — PROPOFOL 10 MG/ML
VIAL (ML) INTRAVENOUS
Status: DISCONTINUED | OUTPATIENT
Start: 2025-04-29 | End: 2025-04-29

## 2025-04-29 RX ORDER — CEFAZOLIN 2 G/1
2 INJECTION, POWDER, FOR SOLUTION INTRAMUSCULAR; INTRAVENOUS
Status: COMPLETED | OUTPATIENT
Start: 2025-04-29 | End: 2025-04-29

## 2025-04-29 RX ORDER — LIDOCAINE HYDROCHLORIDE 20 MG/ML
INJECTION, SOLUTION EPIDURAL; INFILTRATION; INTRACAUDAL; PERINEURAL
Status: DISCONTINUED | OUTPATIENT
Start: 2025-04-29 | End: 2025-04-29

## 2025-04-29 RX ORDER — ONDANSETRON HYDROCHLORIDE 2 MG/ML
4 INJECTION, SOLUTION INTRAVENOUS DAILY PRN
Status: DISCONTINUED | OUTPATIENT
Start: 2025-04-29 | End: 2025-04-29 | Stop reason: HOSPADM

## 2025-04-29 RX ORDER — HYDROMORPHONE HYDROCHLORIDE 2 MG/ML
0.2 INJECTION, SOLUTION INTRAMUSCULAR; INTRAVENOUS; SUBCUTANEOUS EVERY 5 MIN PRN
Status: DISCONTINUED | OUTPATIENT
Start: 2025-04-29 | End: 2025-04-29 | Stop reason: HOSPADM

## 2025-04-29 RX ORDER — SULFAMETHOXAZOLE AND TRIMETHOPRIM 800; 160 MG/1; MG/1
1 TABLET ORAL 2 TIMES DAILY
Qty: 10 TABLET | Refills: 0 | Status: SHIPPED | OUTPATIENT
Start: 2025-04-29

## 2025-04-29 RX ADMIN — PROPOFOL 50 MG: 10 INJECTION, EMULSION INTRAVENOUS at 09:04

## 2025-04-29 RX ADMIN — LIDOCAINE HYDROCHLORIDE 50 MG: 20 INJECTION, SOLUTION EPIDURAL; INFILTRATION; INTRACAUDAL; PERINEURAL at 09:04

## 2025-04-29 RX ADMIN — SODIUM CHLORIDE, SODIUM LACTATE, POTASSIUM CHLORIDE, AND CALCIUM CHLORIDE: .6; .31; .03; .02 INJECTION, SOLUTION INTRAVENOUS at 09:04

## 2025-04-29 RX ADMIN — PROPOFOL 50 MG: 10 INJECTION, EMULSION INTRAVENOUS at 10:04

## 2025-04-29 RX ADMIN — CEFAZOLIN 2 G: 2 INJECTION, POWDER, FOR SOLUTION INTRAMUSCULAR; INTRAVENOUS at 10:04

## 2025-04-29 NOTE — TRANSFER OF CARE
"Anesthesia Transfer of Care Note    Patient: Nigel SCHMID [facility]    Procedure(s) Performed: Procedure(s) (LRB):  BIOPSY, PROSTATE, RECTAL APPROACH, WITH US GUIDANCE (N/A)  BIOPSY, PROSTATE, USING PROSTATE MAPPING    Patient location: PACU    Anesthesia Type: MAC    Transport from OR: Transported from OR on room air with adequate spontaneous ventilation    Post pain: adequate analgesia    Post assessment: no apparent anesthetic complications    Post vital signs: stable    Level of consciousness: awake and responds to stimulation    Nausea/Vomiting: no nausea/vomiting    Complications: none    Transfer of care protocol was followed      Last vitals: Visit Vitals  BP (!) 198/94 (BP Location: Right arm, Patient Position: Sitting)   Pulse (!) 56   Temp 36.6 °C (97.9 °F) (Temporal)   Resp 18   Ht 6' 2" (1.88 m)   Wt 106.2 kg (234 lb 2.1 oz)   SpO2 100%   BMI 30.06 kg/m²     "

## 2025-04-29 NOTE — OP NOTE
Date of Procedure: 04/29/2025    PREOP DIAGNOSIS:  Prostate cancer    POSTOP DIAGNOSIS:  Prostate cancer    PROCEDURES:      1. Transrectal ultrasound  2. Ultrasound guidance for needle biopsy   3. Prostate needle biopsy      SURGEON:  Jorge Kelly M.D.    Assistants: None    Specimen:  DESHAUN, right base, right mid, right apex, left base, left mid, left apex all of the prostate    ANESTHESIA:  MAC anesthesia    BLOOD LOSS:  Minimal.    FINDINGS:  36g     PROCEDURE IN DETAIL:  Patient was brought to the operative suite and placed under MAC anesthesia and positioned into the left lateral decubitus position.  Transrectal ultrasound probe was placed within the prostate, prostate was then measured for volume assessment, please see above.  Everypost system was used to fuse the MRI to the ultrasound, region of interest or target was easily identified.  We then took 4 biopsy specimens from the DESHAUN, then took 2 biopsies each from the right base, right mid, right apex, left base, left mid, and left apex.  At the conclusion probe was removed patient was awoken and taken to the PACU in stable condition.  We will contact him with the results returned, otherwise see me in 3-4 weeks.    COMPLICATIONS:  None

## 2025-04-29 NOTE — ANESTHESIA POSTPROCEDURE EVALUATION
Anesthesia Post Evaluation    Patient: [facility]    Procedure(s) Performed: Procedure(s) (LRB):  BIOPSY, PROSTATE, RECTAL APPROACH, WITH US GUIDANCE (N/A)    Final Anesthesia Type: MAC      Patient location during evaluation: PACU  Patient participation: Yes- Able to Participate  Level of consciousness: awake and alert  Post-procedure vital signs: reviewed and stable  Airway patency: patent      Anesthetic complications: no      Cardiovascular status: blood pressure returned to baseline  Respiratory status: unassisted and spontaneous ventilation  Hydration status: euvolemic  Follow-up not needed.              Vitals Value Taken Time   /70 04/29/25 10:45   Temp 36.7 °C (98 °F) 04/29/25 10:23   Pulse 60 04/29/25 10:50   Resp 26 04/29/25 10:50   SpO2 100 % 04/29/25 10:50   Vitals shown include unfiled device data.      No case tracking events are documented in the log.      Pain/Jacki Score: Jacki Score: 10 (4/29/2025 10:45 AM)

## 2025-04-29 NOTE — DISCHARGE SUMMARY
[facility] - [facility] (Hospital)  Discharge Note  Short Stay    Procedure(s) (LRB):  BIOPSY, PROSTATE, RECTAL APPROACH, WITH US GUIDANCE (N/A)      OUTCOME: Patient tolerated treatment/procedure well without complication and is now ready for discharge.    DISPOSITION: Home or Self Care    FINAL DIAGNOSIS:   prostate cancer    FOLLOWUP: In clinic    DISCHARGE INSTRUCTIONS:    Discharge Procedure Orders   X-Ray Chest 1 View Pre-OP   Standing Status: Future Number of Occurrences: 1 Standing Exp. Date: 04/23/26     Order Specific Question Answer Comments   May the Radiologist modify the order per protocol to meet the clinical needs of the patient? Yes      Diet Adult Regular     Notify your health care provider if you experience any of the following:  severe uncontrolled pain     Notify your health care provider if you experience any of the following:  persistent nausea and vomiting or diarrhea     Notify your health care provider if you experience any of the following:  temperature >100.4     Activity as tolerated        TIME SPENT ON DISCHARGE: 5 minutes

## 2025-04-29 NOTE — ASSESSMENT & PLAN NOTE
Known risk factors for perioperative complications: Coronary disease    Heart and renal transplant patient  Difficulty with intubation is not anticipated.    He saw Dr. Quintanilla on 4/25/25 for e consult r/t cardiac clearance -- recommendation: ok to proceed if no angina and no clinical decompensation   Ok to hold eliquis 3 days in advance and aspirin 5-7 days in advance    Cardiac Risk Estimation:     Revised Cardiac Risk Index for Pre-Operative Risk from [employer]  on 4/29/2025  ** All calculations should be rechecked by clinician prior to use **    RESULT SUMMARY:  0 points  RCRI Score    3.9 %  Risk of major cardiac event      INPUTS:  High-risk surgery --> 0 = No  History of ischemic heart disease --> 0 = No  History of congestive heart failure --> 0 = No  History of cerebrovascular disease --> 0 = No  Pre-operative treatment with insulin --> 0 = No  Pre-operative creatinine >2 mg/dL / 176.8 µmol/L --> 0 = No      1.) Preoperative workup as follows: chest x-ray, ECG, hemoglobin, hematocrit, electrolytes, creatinine, glucose, liver function studies.  2.) Change in medication regimen before surgery:  Ok to hold eliquis 3 days in advance and aspirin 5-7 days in advance per Cardiology recommendations. Hold Viagra at least 24 hrs before scheduled procedure. Hold torsemide the morning of procedure  3.) Prophylaxis for cardiac events with perioperative beta-blockers: He currently takes propanolol which he will continue the morning of surgery.  4.) Invasive hemodynamic monitoring perioperatively: at the discretion of anesthesiologist.  5.) Deep vein thrombosis prophylaxis postoperatively: regimen to be chosen by surgical team.  6.) Surveillance for postoperative MI with ECG immediately postoperatively and on postoperati ve days 1 and 2 AND troponin levels 24 hours postoperatively and on day 4 or hospital discharge (whichever comes first): at the discretion of anesthesiologist.  7.) Current medications which may produce  withdrawal symptoms if withheld perioperatively: Propanolol  8.) Other measures:   Postoperative hypertension management with IV hydralazine until able to take oral medications.  Postoperative incentive spirometry to prevent pneumonia.  He was cleared by Cardiology and Nephrology for procedure.  He will continue the morning of procedure:  Nifedipine, Prednisone, Propanolol, Tacrolimus, Tamsulosin, and Mycophenolate

## 2025-04-29 NOTE — ANESTHESIA PREPROCEDURE EVALUATION
2025  Nigel Albrecht is a 74 y.o., male.    Past Medical History:   Diagnosis Date    Allergic rhinitis 2013    Blood transfusion     Cataract     CKD (chronic kidney disease), stage III 2014    -donor kidney transplant     Diabetes mellitus, type 2 2013    Gout, arthritis 2013    Heart attack     Heart transplanted     Hepatitis C antibody positive in blood 2025    RNA NEGATIVE    Hyperlipidemia 2013    Hypertension     Immunodeficiency due to treatment with immunosuppressive medication     Morbid obesity 2013    Organ transplant 2002    heart and kidney    Orthostatic syncope 2022    Due to furosemide    Pneumonia due to COVID-19 virus 2022    Prophylactic immunotherapy     Prostate cancer 2023    Renal manifestation of secondary diabetes mellitus      Past Surgical History:   Procedure Laterality Date    ABLATION, ATRIAL FLUTTER, TYPICAL N/A 2025    Procedure: Ablation, Atrial Flutter, Typical;  Surgeon: Marcelo Blackman MD;  Location: [facility] LAB;  Service: Cardiology;  Laterality: N/A;  AFL, ERIC (cx if SR), RFA, TANYA w/HD GRID, MAC, MB, 3 Prep *Heart & Kidney Transplant*    CATARACT EXTRACTION Bilateral     COLONOSCOPY N/A 10/06/2020    Procedure: COLONOSCOPY;  Surgeon: Conner Redman MD;  Location: [facility] ENDO;  Service: Endoscopy;  Laterality: N/A;    ECHOCARDIOGRAM,TRANSESOPHAGEAL N/A 2025    Procedure: Transesophageal echo (ERIC) intra-procedure log documentation;  Surgeon: Chad De Jesus MD;  Location: [facility] EP LAB;  Service: Cardiology;  Laterality: N/A;    EYE SURGERY      HEART TRANSPLANT      KIDNEY TRANSPLANT      REVISION TOTAL HIP ARTHROPLASTY           Pre-op Assessment    I have reviewed the Patient Summary Reports.    I have reviewed the NPO Status.   I have reviewed the Medications.     Review of  "Systems  Anesthesia Hx:  No problems with previous Anesthesia                Social:  Former Smoker       Hematology/Oncology:  Hematology Normal                                     Cardiovascular:     Hypertension  Past MI     Dysrhythmias       hyperlipidemia   ECG has been reviewed. S/p heart transplant    Cards:  "Recommendation: ok to proceed if no angina and no clinical decompensation   Ok to hold eliquis 3 days in advance and aspirin 5-7 days in advance "                               Pulmonary:  Pulmonary Normal                       Renal/:  Chronic Renal Disease, CKD                Hepatic/GI:  Hepatic/GI Normal                    Neurological:  Neurology Normal                                      Endocrine:  Diabetes               Physical Exam  General: Well nourished    Airway:  Mallampati: II   Mouth Opening: Normal  TM Distance: Normal  Neck ROM: Normal ROM    Dental:  Intact        Anesthesia Plan  Type of Anesthesia, risks & benefits discussed:    Anesthesia Type: Gen ETT, MAC  Intra-op Monitoring Plan: Standard ASA Monitors  Post Op Pain Control Plan: multimodal analgesia  Induction:  IV  Airway Plan: , Post-Induction  Informed Consent: Informed consent signed with the Patient and all parties understand the risks and agree with anesthesia plan.  All questions answered.   ASA Score: 3    Ready For Surgery From Anesthesia Perspective.     .      Chemistry        Component Value Date/Time     04/23/2025 1444     (H) 02/24/2025 2239    K 4.1 04/23/2025 1444    K 4.1 02/24/2025 2239     04/23/2025 1444     02/24/2025 2239    CO2 25 04/23/2025 1444    CO2 25 02/24/2025 2239    BUN 36 (H) 04/23/2025 1444    CREATININE 2.2 (H) 04/23/2025 1444    GLU 79 02/24/2025 2239        Component Value Date/Time    CALCIUM 8.5 (L) 04/23/2025 1444    CALCIUM 8.6 (L) 02/24/2025 2239    ALKPHOS 69 04/23/2025 1444    ALKPHOS 75 02/24/2025 2239    AST 31 04/23/2025 1444    AST 27 02/24/2025 2239 "    ALT 21 04/23/2025 1444    ALT 12 02/24/2025 2239    BILITOT 0.3 04/23/2025 1444    BILITOT 0.2 02/24/2025 2239    ESTGFRAFRICA 15.3 (A) 07/27/2022 0531    EGFRNONAA 13.2 (A) 07/27/2022 0531        Lab Results   Component Value Date    WBC 5.12 04/23/2025    HGB 11.2 (L) 04/23/2025    HCT 37.7 (L) 04/23/2025    MCV 95 04/23/2025     04/23/2025       Normal sinus rhythm with sinus arrhythmia   Right bundle branch block   Abnormal ECG   When compared with ECG of 24-Feb-2025 08:27,   Sinus rhythm has replaced Atrial fibrillation   Confirmed by Jimbo Yun ([id number]) on 2/24/2025 2:26:40 PM     Echo 10/16/24:    Left Ventricle: The left ventricle is normal in size. Normal wall thickness. There is concentric hypertrophy. Normal wall motion. There is normal systolic function with a visually estimated ejection fraction of 55 - 60%. Ejection fraction is approximately 60%. There is normal diastolic function.    Right Ventricle: Normal right ventricular cavity size. Right ventricle wall motion  is normal. Systolic function is normal.    Mitral Valve: There is mild regurgitation.    Tricuspid Valve: There is mild regurgitation.        ERIC 2/24/25:    ERIC performed prior to PVI in the EP lab; there is no evidence of intracardiac thrombus.    Left Ventricle: The left ventricle is normal in size. There is normal systolic function with a visually estimated ejection fraction of 55 - 60%. Ejection fraction is approximately 60%.    Right Ventricle: Normal right ventricular cavity size. Systolic function is normal.    Left Atrium: The left atrial appendage has a windsock morphology. Appendage velocity is normal at greater than 40 cm/sec. There is no thrombus in the left atrial appendage.    Aortic Valve: The aortic valve is a trileaflet valve. There is mild aortic valve sclerosis.    Aorta: Aortic root is mildly dilated measuring 4.0 cm. Ascending aorta is mildly to moderately dilated measuring 4.3 cm. Grade 1 atherosclerosis  of the descending aorta and in the aortic arch with mild thickening.

## 2025-04-29 NOTE — ASSESSMENT & PLAN NOTE
-He currently takes Nifedipine, Hydralazine, and Propanolol for management of BP. He will continue the morning of surgery Nifedipine and Propanolol. He may resume Hydralazine postop as BP permits

## 2025-04-29 NOTE — ASSESSMENT & PLAN NOTE
-MRI prostate on 10/15/24 showed a suspicious left transitional zone mass measuring 15 x 14 x 9 mm concerning for a high-grade prostate adenocarcinoma. MRI fusion biopsy is recommended. This lesion has been mapped on the [employer]  for subsequent fusion biopsy.   -Scheduled for PROSTATE BIOPSY w/ Dr. Kelly on 4/29/25.

## 2025-04-29 NOTE — ASSESSMENT & PLAN NOTE
-He is currently taking tacrolimus and mycophenolate which he will continue the morning of surgery  -Hx of heart/renal transplants

## 2025-04-30 LAB
ESTROGEN SERPL-MCNC: ABNORMAL PG/ML
INSULIN SERPL-ACNC: ABNORMAL U[IU]/ML
LAB AP CLINICAL INFORMATION: ABNORMAL
LAB AP DIAGNOSIS CATEGORY: ABNORMAL
LAB AP GROSS DESCRIPTION: ABNORMAL
LAB AP PERFORMING LOCATION(S): ABNORMAL
LAB AP REPORT FOOTNOTES: ABNORMAL

## 2025-05-08 ENCOUNTER — APPOINTMENT (OUTPATIENT)
Dept: RADIOLOGY | Facility: HOSPITAL | Age: 75
End: 2025-05-08
Attending: INTERNAL MEDICINE
Payer: MEDICARE

## 2025-05-08 DIAGNOSIS — Z94.1 HEART TRANSPLANTED: Chronic | ICD-10-CM

## 2025-05-08 PROCEDURE — 77080 DXA BONE DENSITY AXIAL: CPT | Mod: TC

## 2025-05-08 PROCEDURE — 77080 DXA BONE DENSITY AXIAL: CPT | Mod: 26,,, | Performed by: RADIOLOGY

## 2025-05-12 ENCOUNTER — TELEPHONE (OUTPATIENT)
Dept: TRANSPLANT | Facility: CLINIC | Age: 75
End: 2025-05-12
Payer: MEDICARE

## 2025-05-12 DIAGNOSIS — Z79.899 ENCOUNTER FOR LONG-TERM (CURRENT) USE OF MEDICATIONS: Primary | ICD-10-CM

## 2025-05-12 DIAGNOSIS — Z94.1 STATUS POST HEART TRANSPLANT: ICD-10-CM

## 2025-05-12 NOTE — TELEPHONE ENCOUNTER
Called Patient to remind him//her of their appointment with Dr Gonsalez on Wednesday 5/14/25 with/without tests. Patient confirmed.

## 2025-05-14 ENCOUNTER — HOSPITAL ENCOUNTER (OUTPATIENT)
Dept: CARDIOLOGY | Facility: HOSPITAL | Age: 75
Discharge: HOME | End: 2025-05-14
Attending: INTERNAL MEDICINE
Payer: MEDICARE

## 2025-05-14 ENCOUNTER — OFFICE VISIT (OUTPATIENT)
Dept: TRANSPLANT | Facility: CLINIC | Age: 75
End: 2025-05-14
Attending: INTERNAL MEDICINE
Payer: MEDICARE

## 2025-05-14 ENCOUNTER — TELEPHONE (OUTPATIENT)
Dept: TRANSPLANT | Facility: CLINIC | Age: 75
End: 2025-05-14
Payer: MEDICARE

## 2025-05-14 ENCOUNTER — HOSPITAL ENCOUNTER (OUTPATIENT)
Dept: RADIOLOGY | Facility: HOSPITAL | Age: 75
Discharge: HOME | End: 2025-05-14
Attending: INTERNAL MEDICINE
Payer: MEDICARE

## 2025-05-14 VITALS
HEIGHT: 74 IN | OXYGEN SATURATION: 100 % | HEIGHT: 74 IN | SYSTOLIC BLOOD PRESSURE: 159 MMHG | WEIGHT: 234 LBS | DIASTOLIC BLOOD PRESSURE: 76 MMHG | BODY MASS INDEX: 30.5 KG/M2 | HEART RATE: 64 BPM | WEIGHT: 237.63 LBS | BODY MASS INDEX: 30.03 KG/M2

## 2025-05-14 DIAGNOSIS — T86.290 VASCULOPATHY OF CARDIAC ALLOGRAFT: ICD-10-CM

## 2025-05-14 DIAGNOSIS — R06.02 SHORTNESS OF BREATH: ICD-10-CM

## 2025-05-14 DIAGNOSIS — Z79.899 IMMUNOCOMPROMISED STATE DUE TO DRUG THERAPY: ICD-10-CM

## 2025-05-14 DIAGNOSIS — D84.821 IMMUNOCOMPROMISED STATE DUE TO DRUG THERAPY: ICD-10-CM

## 2025-05-14 DIAGNOSIS — I70.0 AORTIC ATHEROSCLEROSIS: ICD-10-CM

## 2025-05-14 DIAGNOSIS — E66.811 CLASS 1 OBESITY DUE TO EXCESS CALORIES WITH SERIOUS COMORBIDITY AND BODY MASS INDEX (BMI) OF 30.0 TO 30.9 IN ADULT: ICD-10-CM

## 2025-05-14 DIAGNOSIS — Z79.899 ENCOUNTER FOR LONG-TERM (CURRENT) USE OF MEDICATIONS: ICD-10-CM

## 2025-05-14 DIAGNOSIS — T86.20 COMPLICATION OF HEART TRANSPLANT, UNSPECIFIED COMPLICATION: ICD-10-CM

## 2025-05-14 DIAGNOSIS — Z94.1 STATUS POST HEART TRANSPLANT: ICD-10-CM

## 2025-05-14 DIAGNOSIS — E66.09 CLASS 1 OBESITY DUE TO EXCESS CALORIES WITH SERIOUS COMORBIDITY AND BODY MASS INDEX (BMI) OF 30.0 TO 30.9 IN ADULT: ICD-10-CM

## 2025-05-14 DIAGNOSIS — Z94.1 HEART TRANSPLANTED: Primary | ICD-10-CM

## 2025-05-14 DIAGNOSIS — Z94.0 DECEASED-DONOR KIDNEY TRANSPLANT: Chronic | ICD-10-CM

## 2025-05-14 DIAGNOSIS — E78.2 MIXED HYPERLIPIDEMIA: ICD-10-CM

## 2025-05-14 DIAGNOSIS — Z79.899 ENCOUNTER FOR LONG-TERM (CURRENT) USE OF MEDICATIONS: Primary | ICD-10-CM

## 2025-05-14 DIAGNOSIS — T86.11 ANTIBODY MEDIATED REJECTION OF KIDNEY TRANSPLANT: ICD-10-CM

## 2025-05-14 DIAGNOSIS — I10 HYPERTENSION, UNSPECIFIED TYPE: ICD-10-CM

## 2025-05-14 LAB
AORTIC SIZE INDEX (SOV): 1.6 CM/M2
AORTIC SIZE INDEX: 1.5 CM/M2
ASCENDING AORTA: 3.4 CM
AV AREA BY CONTINUOUS VTI: 3.3 CM2
AV INDEX (PROSTH): 0.71
AV LVOT MEAN GRADIENT: 2 MMHG
AV LVOT PEAK GRADIENT: 3 MMHG
AV MEAN GRADIENT: 5 MMHG
AV PEAK GRADIENT: 8 MMHG
AV VALVE AREA BY VELOCITY RATIO: 3.2 CM²
AV VALVE AREA: 3.5 CM2
AV VELOCITY RATIO: 0.64
BSA FOR ECHO PROCEDURE: 2.35 M2
CV ECHO LV RWT: 0.48 CM
CV STRESS BASE HR: 63 BPM
DIASTOLIC BLOOD PRESSURE: 61 MMHG
DOP CALC AO PEAK VEL: 1.4 M/S
DOP CALC AO VTI: 30.9 CM
DOP CALC LVOT AREA: 4.9 CM2
DOP CALC LVOT DIAMETER: 2.5 CM
DOP CALC LVOT PEAK VEL: 0.9 M/S
DOP CALC LVOT STROKE VOLUME: 107 CM3
DOP CALCLVOT PEAK VEL VTI: 21.8 CM
E WAVE DECELERATION TIME: 193 MS
E/A RATIO: 2.13
E/E' RATIO: 8 M/S
ECHO EF ESTIMATED: 58 %
ECHO LV POSTERIOR WALL: 1.3 CM (ref 0.6–1.1)
EJECTION FRACTION: 58 %
FRACTIONAL SHORTENING: 31.5 % (ref 28–44)
INTERVENTRICULAR SEPTUM: 1.4 CM (ref 0.6–1.1)
IVC DIAMETER: 1.99 CM
IVRT: 74 MS
LEFT INTERNAL DIMENSION IN SYSTOLE: 3.7 CM (ref 2.1–4)
LEFT VENTRICLE DIASTOLIC VOLUME INDEX: 59.48 ML/M2
LEFT VENTRICLE DIASTOLIC VOLUME: 138 ML
LEFT VENTRICLE MASS INDEX: 134.4 G/M2
LEFT VENTRICLE SYSTOLIC VOLUME INDEX: 25 ML/M2
LEFT VENTRICLE SYSTOLIC VOLUME: 58 ML
LEFT VENTRICULAR INTERNAL DIMENSION IN DIASTOLE: 5.4 CM (ref 3.5–6)
LEFT VENTRICULAR MASS: 311.7 G
LV LATERAL E/E' RATIO: 6.2
LV SEPTAL E/E' RATIO: 11.6
MV PEAK A VEL: 0.38 M/S
MV PEAK E VEL: 0.81 M/S
OHS CV CPX 1 MINUTE RECOVERY HEART RATE: 76 BPM
OHS CV CPX 85 PERCENT MAX PREDICTED HEART RATE MALE: 124
OHS CV CPX ESTIMATED METS: 5
OHS CV CPX MAX PREDICTED HEART RATE: 146
OHS CV CPX PATIENT IS FEMALE: 0
OHS CV CPX PATIENT IS MALE: 1
OHS CV CPX PEAK DIASTOLIC BLOOD PRESSURE: 52 MMHG
OHS CV CPX PEAK HEAR RATE: 83 BPM
OHS CV CPX PEAK RATE PRESSURE PRODUCT: ABNORMAL
OHS CV CPX PEAK SYSTOLIC BLOOD PRESSURE: 129 MMHG
OHS CV CPX PERCENT MAX PREDICTED HEART RATE ACHIEVED: 57
OHS CV CPX RATE PRESSURE PRODUCT PRESENTING: 8568
OHS CV RV/LV RATIO: 0.8 CM
PISA TR MAX VEL: 3.2 M/S
POST STRESS EJECTION FRACTION: 68 %
RA PRESSURE ESTIMATED: 3 MMHG
RIGHT VENTRICLE DIASTOLIC BASEL DIMENSION: 4.3 CM
RV TB RVSP: 6 MMHG
RV TISSUE DOPPLER FREE WALL SYSTOLIC VELOCITY 1 (APICAL 4 CHAMBER VIEW): 9.81 CM/S
SINUS: 3.76 CM
STJ: 3 CM
STRESS ECHO POST EXERCISE DUR MIN: 3 MINUTES
STRESS ECHO POST EXERCISE DUR SEC: 17 SECONDS
SYSTOLIC BLOOD PRESSURE: 136 MMHG
TDI LATERAL: 0.13 M/S
TDI SEPTAL: 0.07 M/S
TDI: 0.1 M/S
TRICUSPID ANNULAR PLANE SYSTOLIC EXCURSION: 1.8 CM
TV PEAK GRADIENT: 40 MMHG
TV REST PULMONARY ARTERY PRESSURE: 44 MMHG
Z-SCORE OF LEFT VENTRICULAR DIMENSION IN END DIASTOLE: -5.39
Z-SCORE OF LEFT VENTRICULAR DIMENSION IN END SYSTOLE: -3.25

## 2025-05-14 PROCEDURE — 99999 PR PBB SHADOW E&M-EST. PATIENT-LVL IV: CPT | Mod: PBBFAC,,, | Performed by: INTERNAL MEDICINE

## 2025-05-14 PROCEDURE — 93351 STRESS TTE COMPLETE: CPT

## 2025-05-14 PROCEDURE — 93351 STRESS TTE COMPLETE: CPT | Mod: 26,,, | Performed by: INTERNAL MEDICINE

## 2025-05-14 PROCEDURE — 71046 X-RAY EXAM CHEST 2 VIEWS: CPT | Mod: 26,,, | Performed by: RADIOLOGY

## 2025-05-14 PROCEDURE — 99214 OFFICE O/P EST MOD 30 MIN: CPT | Mod: PBBFAC,25 | Performed by: INTERNAL MEDICINE

## 2025-05-14 PROCEDURE — 71046 X-RAY EXAM CHEST 2 VIEWS: CPT | Mod: TC,FY

## 2025-05-14 RX ORDER — HYDRALAZINE HYDROCHLORIDE 50 MG/1
50 TABLET, FILM COATED ORAL EVERY 8 HOURS
Start: 2025-05-14

## 2025-05-14 NOTE — TELEPHONE ENCOUNTER
Reason for visit:  23 years post heart transplant clinic visit.     Immunosuppression:  Tacrolimus 8mg in am, 7 mg in pm   mg BID  Prednisone 5 mg QD    Assessment: Patient presented to clinic with self, ambulates without difficulty. Patient denies chest pain, SOB or palpations. No edema noted. No N/V or fevers. Cr 3.4, results forwarded to patient's nephrologist. PSA 10.79, patient with appts for rad onc and urology.     Psychosocial Evaluation:  -physical activity: patient stays active  -work: retired  -transportation: personal  -support: family  -issues/concerns: none    Education:   Reviewed health maintenance activities. Patient saw dentist this past March. Patient follows closely with EP for history of a.flutter, urology, and endocrinology. Patient stated he sees nephrology yearly, but will reach out in light of his creatinine today.     Medications, lab and echo results reviewed with patient and Dr. Gonsalez. See MD note for orders and recommendations.    Repeat creatinine and urine protein creatinine ratio tomorrow.   Next follow up in 6 month(s) for routine labs.      In addition to the clinical assessment, We have order for Allomap and Allosure (Heart Care Kit) for Surveillance testing for this patient to assist in identification of moderate/severe acute cellular rejection (ACR) and/or antibody mediated rejection instead of endomyocardial biopsy.   Patient is reminded to call with any health changes as these can be early signs of transplant complications. Patient is advised to make sure any new medications or changes of old medications are discussed with a pharmacist or physician knowledgeable with transplant to avoid rejection/drug toxicity related to significant drug interactions.

## 2025-05-14 NOTE — PROGRESS NOTES
"Subjective:   Mr. Albrecht is a 74 y.o. year old Black or  male who received a   heart and kidney transplant on 5/24/02.      CMV status: UNKNOWN DONORS done at [facility]; BUT HE IS +    HPI  75 yo BM s/p OHTx 5/24/02 and kidney tx 5/25/02 at [facility].  He has  HTN, CKD, DM, HLD and prostate CA here for f/u visit.  He had A Flutter Dec 2024 and in Feb 2025 underwent ablation at [facility] by Dr. Blackman.  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  He is followed by Dr. Jarquin Nephrologist in , no longer renal tx.    Current immunosuppression:  Tacro 8/7   mg BID  Pred 5 mg qd    Home -140's/70's    He reports that he has been feeling well, Chronic edema without change.      Review of Systems   Constitutional: Positive for weight loss (intentional). Negative for malaise/fatigue and weight gain.   Cardiovascular:  Positive for leg swelling. Negative for chest pain, dyspnea on exertion, near-syncope, orthopnea, palpitations and syncope.   Endocrine:        He reports good control of DM on current meds   Hematologic/Lymphatic: Does not bruise/bleed easily.   Genitourinary:         Prostate CA under treatment; he has ED but does not use PDE-5 i   Neurological:  Positive for tremors (controlled on beta blockers). Negative for brief paralysis, dizziness, focal weakness, light-headedness and weakness.     Objective:   Blood pressure (!) 159/76, pulse 64, height 6' 2" (1.88 m), weight 107.8 kg (237 lb 10.5 oz), SpO2 100%.body mass index is 30.51 kg/m².  Physical Exam  Constitutional:       General: He is not in acute distress.     Appearance: He is well-developed. He is not ill-appearing, toxic-appearing or diaphoretic.      Comments: BP (!) 159/76 (BP Location: Right arm, Patient Position: Sitting)   Pulse 64   Ht 6' 2" (1.88 m)   Wt 107.8 kg (237 lb 10.5 oz)   SpO2 100%   BMI 30.51 kg/m²   Friendly BM in NAD   HENT:      Head: Normocephalic and atraumatic.      Nose: No " congestion or rhinorrhea.      Mouth/Throat:      Mouth: Mucous membranes are moist.      Pharynx: Oropharynx is clear.   Eyes:      General: No scleral icterus.        Right eye: No discharge.         Left eye: No discharge.      Conjunctiva/sclera: Conjunctivae normal.   Neck:      Thyroid: No thyromegaly.      Vascular: JVD (12 cm) present. No carotid bruit.      Trachea: No tracheal deviation.   Cardiovascular:      Rate and Rhythm: Normal rate and regular rhythm.      Heart sounds: Normal heart sounds. No murmur heard.     No gallop.   Pulmonary:      Effort: Pulmonary effort is normal.      Breath sounds: Normal breath sounds.   Abdominal:      General: Bowel sounds are normal. There is no distension.      Palpations: Abdomen is soft. There is no hepatomegaly (10 cm) or mass.      Tenderness: There is no abdominal tenderness. There is no guarding or rebound.   Musculoskeletal:         General: Swelling (3+ edema below the knees bilaterally) present. No tenderness.      Right lower leg: Edema present.      Left lower leg: Edema present.   Lymphadenopathy:      Cervical: No cervical adenopathy (also no supraclavicular or axillary nodes).   Skin:     General: Skin is warm and dry.   Neurological:      General: No focal deficit present.      Mental Status: He is alert and oriented to person, place, and time. Mental status is at baseline.   Psychiatric:         Mood and Affect: Mood normal.         Behavior: Behavior normal.         Thought Content: Thought content normal.         Judgment: Judgment normal.       Labs reviewed as follows:  Lab Results   Component Value Date     (H) 05/08/2025     (H) 05/27/2024     (H) 07/20/2023     Lab Results   Component Value Date     05/08/2025     04/23/2025    K 4.3 05/08/2025    K 4.1 04/23/2025     05/08/2025     04/23/2025    CO2 24 05/08/2025    CO2 25 04/23/2025     05/08/2025     (H) 04/23/2025    BUN 46 (H)  05/08/2025    BUN 36 (H) 04/23/2025    CREATININE 3.4 (H) 05/08/2025    CREATININE 2.2 (H) 04/23/2025    CALCIUM 8.6 (L) 05/08/2025    CALCIUM 8.5 (L) 04/23/2025    ANIONGAP 9 05/08/2025    ANIONGAP 7 (L) 04/23/2025      MG 2.2 05/08/2025    PHOS 3.7 03/19/2024    HGBA1C 5.9 (H) 05/08/2025    HGBA1C 5.9 (H) 02/17/2025    AST 28 05/08/2025    AST 27 02/24/2025    ALT 11 05/08/2025    ALT 12 02/24/2025    ALBUMIN 3.4 (L) 05/08/2025    ALBUMIN 3.5 02/24/2025    PROT 6.3 05/08/2025    PROT 6.6 02/24/2025    BILITOT 0.3 05/08/2025    BILITOT 0.2 02/24/2025    WBC 5.39 05/08/2025    HGB 11.2 (L) 05/08/2025    HGB 11.2 (L) 02/24/2025    HCT 36.1 (L) 05/08/2025    HCT 35.5 (L) 02/24/2025     05/08/2025     02/24/2025    INR 1.0 02/24/2025     (H) 07/24/2022    TSH 2.961 05/08/2025    FREET4 0.99 05/08/2025    CHOL 155 05/08/2025    LDLCALC 62.6 (L) 05/08/2025    TRIG 97 05/08/2025    TACROLIMUS 5.5 05/08/2025 5/14/25 Exercise Stress ECHO--reviewed and stress portion of no value due to beta-blockers and failure to achieve THR    Stress Protocol: The patient exercised for 3 minutes 17 seconds on a high ramp protocol, achieving a peak heart rate of 83 bpm, which is 57% of the age predicted maximum heart rate. Their exercise capacity was severely impaired. The patient reported shortness of breath during the stress test. The test was stopped because the patient experienced fatigue and shortness of breath.    Left Ventricle: The left ventricle is at the upper limit of normal in size. Mildly increased ventricular mass. Mildly increased wall thickness. Mild septal thickening. There is mild concentric hypertrophy. Septal motion is abnormal. There is normal systolic function with a visually estimated ejection fraction of 55 - 60%. Ejection fraction is approximately 58%. There is normal diastolic function.    Right Ventricle: The right ventricle is mildly dilated Wall thickness is normal. Systolic function  is normal.    Mitral Valve: There is trivial-mild regurgitation.    Pulmonary Artery: There is mild pulmonary hypertension. The estimated pulmonary artery systolic pressure is 44 mmHg.    IVC/SVC: Normal venous pressure at 3 mmHg.    Baseline ECG: The Baseline ECG reveals sinus rhythm. The axis is normal. The ST segments are normal.    Stress ECG: There is no ST segment deviation identified during the protocol. There are no arrhythmias during stress. There is normal blood pressure response with stress.    ECG Conclusion: The ECG portion of the study is negative for ischemia. Sensitivity is reduced due to failure to reach target heart rate.    Post-stress Echo: The left ventricle systolic function is hyperdynamic with an EF of 68%.    Post-stress Conclusion: The study is negative with no echocardiographic evidence of stress induced ischemia.       2025 CXR  FINDINGS:  The lungs are clear.   No pneumothorax.  The cardiac silhouette size and contour is within normal limits.    2024 PET stress    There are no clinically significant perfusion abnormalities. There is a small (<10%) amount of mild diffuse resting heterogeneity that improves with stress, indicative of non-obstructive disease.    The whole heart absolute myocardial perfusion values were elevated at rest, normal during stress and CFR is mildly reduced in part due to elevated resting flow.    CT attenuation images demonstrate no coronary calcifications and mild diffuse aortic calcifications in the descending aorta.    The gated perfusion images showed an ejection fraction of 46% at rest and 47% during stress. A normal ejection fraction is greater than 47%.    There is normal wall motion at rest and normal wall motion during stress.    The study's ECG is negative for ischemia.       Assessment:     1. Heart transplanted    2. -donor kidney transplant    3. Immunocompromised state due to drug therapy    4. 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 Class 1 obesity due to excess calories with serious comorbidity and body mass index (BMI) of 30.0 to 30.9 in adult    7. Aortic atherosclerosis        Plan:   Since we would not plan to perform coronary angio with him being post renal tx and with his renal dysfunction, I would not screen for ischemia at future visits since the result will not impact his management    Not sure why Cr bumped over baseline past 3 weeks but might be fluke.  Will repeat BMP next Tuesday in [place] and obtain U/A at that time.  No long on ACE though has DM, not sure when stopped or why.  If proteinuria would likely benefit from low dose but can defer to nephrology.    Will ask our coordinator pass all along to renal transplant including HLA's as they might wish to see him    Stop viagra (he says not taking)    Should take hydralazine 3x/day and he promises to be this more faithfully as BP up here today      Return instructions as set forth by post transplant schedule or as needed:    Clinic: Return for labs and/or biopsy weekly the first month, every two weeks during month 2 and then monthly for the first year at the provider or coordinator's discretion. During the second year, return to clinic every 3 months. Post transplant year 3-5 return every 6 months. There will be a comprehensive post transplant evaluation every year that may include LHC/RHC/biopsy, stress test, echo, CXR, and other health screening exams.    In addition to the clinical assessment, I have ordered Allomap testing for this patient to assist in identification of moderate/severe acute cellular rejection (ACR) in a pt with stable Allograft function instead of endomyocardial biopsy.     Patient is reminded to call with any health changes as these can be early signs of transplant complications. Patient is advised to make sure any new medications or changes of old medications are discussed with a pharmacist or  physician knowledgeable with transplant to avoid rejection/drug toxicity related to significant drug interactions.    Patient advised that it is recommended that all transplanted patients, and their close contacts and household members receive Covid vaccination.    UNOS Patient Status  Functional Status: 100% - Normal, no complaints, no evidence of disease  Physical Capacity: No Limitations  Working for Income: No  If no, reason not working: Patient Choice - Retired    Advance Care Planning     Date: 05/14/2025    Power of   I initiated the process of voluntary advance care planning today and explained the importance of this process to the patient.  I introduced the concept of advance directives to the patient, as well. Then the patient received detailed information about the importance of designating a Health Care Power of  (HCPOA). He was also instructed to communicate with this person about their wishes for future healthcare, should he become sick and lose decision-making capacity. The patient has not previously appointed a HCPOA. After our discussion, the patient has decided to complete a HCPOA and has appointed his significant other, health care agent: Leeann.      A total of 5 min was spent on advance care planning, goals of care discussion, emotional support, formulating and communicating prognosis and exploring burden/benefit of various approaches of treatment. This discussion occurred on a fully voluntary basis with the verbal consent of the patient and/or family.       John Gonsalez Jr, MD

## 2025-05-15 ENCOUNTER — TELEPHONE (OUTPATIENT)
Dept: TRANSPLANT | Facility: CLINIC | Age: 75
End: 2025-05-15
Payer: MEDICARE

## 2025-05-15 ENCOUNTER — LAB VISIT (OUTPATIENT)
Dept: LAB | Facility: HOSPITAL | Age: 75
End: 2025-05-15
Attending: UROLOGY
Payer: MEDICARE

## 2025-05-15 DIAGNOSIS — R06.02 SHORTNESS OF BREATH: ICD-10-CM

## 2025-05-15 DIAGNOSIS — I10 HYPERTENSION, UNSPECIFIED TYPE: ICD-10-CM

## 2025-05-15 DIAGNOSIS — Z79.899 ENCOUNTER FOR LONG-TERM (CURRENT) USE OF MEDICATIONS: ICD-10-CM

## 2025-05-15 DIAGNOSIS — T86.20 COMPLICATION OF HEART TRANSPLANT, UNSPECIFIED COMPLICATION: ICD-10-CM

## 2025-05-15 DIAGNOSIS — Z94.1 STATUS POST HEART TRANSPLANT: ICD-10-CM

## 2025-05-15 DIAGNOSIS — E78.2 MIXED HYPERLIPIDEMIA: ICD-10-CM

## 2025-05-15 LAB
ALBUMIN SERPL BCP-MCNC: 3.1 G/DL (ref 3.5–5.2)
ALP SERPL-CCNC: 60 UNIT/L (ref 40–150)
ALT SERPL W/O P-5'-P-CCNC: 12 UNIT/L (ref 10–44)
ANION GAP (OHS): 9 MMOL/L (ref 8–16)
AST SERPL-CCNC: 26 UNIT/L (ref 11–45)
BILIRUB SERPL-MCNC: 0.3 MG/DL (ref 0.1–1)
BUN SERPL-MCNC: 41 MG/DL (ref 8–23)
CALCIUM SERPL-MCNC: 8.5 MG/DL (ref 8.7–10.5)
CHLORIDE SERPL-SCNC: 110 MMOL/L (ref 95–110)
CO2 SERPL-SCNC: 24 MMOL/L (ref 23–29)
CREAT SERPL-MCNC: 2.5 MG/DL (ref 0.5–1.4)
CREAT UR-MCNC: 41 MG/DL (ref 23–375)
GFR SERPLBLD CREATININE-BSD FMLA CKD-EPI: 26 ML/MIN/1.73/M2
GLUCOSE SERPL-MCNC: 147 MG/DL (ref 70–110)
POTASSIUM SERPL-SCNC: 4.7 MMOL/L (ref 3.5–5.1)
PROT SERPL-MCNC: 6.3 GM/DL (ref 6–8.4)
PROT UR-MCNC: 62 MG/DL
PROT/CREAT UR: 1.51 MG/G{CREAT}
SODIUM SERPL-SCNC: 143 MMOL/L (ref 136–145)

## 2025-05-15 PROCEDURE — 80053 COMPREHEN METABOLIC PANEL: CPT

## 2025-05-15 PROCEDURE — 82570 ASSAY OF URINE CREATININE: CPT

## 2025-05-15 PROCEDURE — 36415 COLL VENOUS BLD VENIPUNCTURE: CPT

## 2025-05-15 NOTE — TELEPHONE ENCOUNTER
Phone call to patient regarding follow up labs and clinic visit next week.  States he would like to see what his creatinine results are today and make a decision on the next step.   ----- Message from Karen Wilkerson DO sent at 5/15/2025  2:33 PM CDT -----  Talked to Tremayne already about him. We will re-open his case for nowHe will need to be seen some time next week with repeat routine labs, UA, UPC, US kidney transplant as well as DSA. Once the initial labs and US ktxp is back we may want to discuss him at the Wednesday meeting Karen  ----- Message -----  From: Malou Conn NP  Sent: 5/15/2025   2:25 PM CDT  To: Tremayne Beach RN; Irais Olsen MD; Fa#    We are currently not following this patient. Do we want him to come to clinic or f/u with gen nephrology . His BL ~ 2.0 ; He was f/u with [facility] but I dont see recent office visits? Malou  ----- Message -----  From: Danika Ni RN  Sent: 5/15/2025   9:04 AM CDT  To: Edy Reese MD; Malou Conn NP; Pa#    Good Morning,   Dr. Gonsalez saw Mr. Albrecht in clinic yesterday, asked for his note to be forwarded to kidney transplant team. His creatinine is >3 so wanted to notify you in case kidney txp wanted to see him.     Thanks!  Danika

## 2025-05-16 DIAGNOSIS — Z94.0 KIDNEY REPLACED BY TRANSPLANT: Primary | ICD-10-CM

## 2025-05-21 ENCOUNTER — HOSPITAL ENCOUNTER (OUTPATIENT)
Dept: RADIOLOGY | Facility: HOSPITAL | Age: 75
Discharge: HOME | End: 2025-05-21
Attending: INTERNAL MEDICINE
Payer: MEDICARE

## 2025-05-21 ENCOUNTER — RESULTS FOLLOW-UP (OUTPATIENT)
Dept: TRANSPLANT | Facility: HOSPITAL | Age: 75
End: 2025-05-21
Payer: MEDICARE

## 2025-05-21 DIAGNOSIS — Z94.0 KIDNEY REPLACED BY TRANSPLANT: ICD-10-CM

## 2025-05-21 DIAGNOSIS — Z94.1 HEART TRANSPLANTED: ICD-10-CM

## 2025-05-21 PROCEDURE — 76776 US EXAM K TRANSPL W/DOPPLER: CPT | Mod: TC

## 2025-05-21 PROCEDURE — 76776 US EXAM K TRANSPL W/DOPPLER: CPT | Mod: 26,,, | Performed by: RADIOLOGY

## 2025-05-29 ENCOUNTER — PATIENT MESSAGE (OUTPATIENT)
Dept: TRANSPLANT | Facility: CLINIC | Age: 75
End: 2025-05-29
Payer: MEDICARE

## 2025-05-29 DIAGNOSIS — Z94.0 KIDNEY REPLACED BY TRANSPLANT: Primary | ICD-10-CM

## 2025-05-29 RX ORDER — OLMESARTAN MEDOXOMIL 20 MG/1
20 TABLET ORAL DAILY
Qty: 90 TABLET | Refills: 3 | Status: SHIPPED | OUTPATIENT
Start: 2025-05-29 | End: 2026-05-29

## 2025-05-29 RX ORDER — MYCOPHENOLATE MOFETIL 250 MG/1
500 CAPSULE ORAL 2 TIMES DAILY
Qty: 360 CAPSULE | Refills: 3 | Status: SHIPPED | OUTPATIENT
Start: 2025-05-29 | End: 2026-05-29

## 2025-05-29 NOTE — TELEPHONE ENCOUNTER
Pt made aware of below orders            ----- Message from Edy Reese MD sent at 5/29/2025  9:21 AM CDT -----  Let's increase MMF to 500 bid and repeat a cbc in 3 weeks to decide if we can go up at that time to 750 bid.   ----- Message -----  From: Sera Canchola RN  Sent: 5/29/2025   9:11 AM CDT  To: Edy Reese MD    Hey Dr Reese, this is what is heart coordinator responded to me with:      Dr. Gonsalez saw him last so I'll check with him regarding the olmesartan and get back to you. Looking back through the chart, and I see where the high DSAs where noted as a chronic issue. I only see   treatment for kidney rejection in 2022. Otherwise, no actions outside of ensuring adequate IS. This past year, we wanted to do a LHC, but due to kidney function we opted for cardiac PET instead.                     ----- Message -----  From: Sera Canchola RN  Sent: 5/29/2025   9:00 AM CDT  To: Sera Canchola RN      ----- Message -----  From: Edy Reese MD  Sent: 5/29/2025   8:44 AM CDT  To: [facility] Post-Kidney Transplant Clinical    An we please ask the posttransplant coordinator with heart if they see any issue using Olmesartan for his proteinuria. He had some hyperkalemia 3 yrs ago but I do not know if that was associated with   lisinopril. I suspect it was.     Also ask if they take any action with a class II DSA, which has been high for years.   Let me know. Thanks very much  ----- Message -----  From: Lab, Background User  Sent: 5/21/2025   8:44 AM CDT  To: Edy Reese MD

## 2025-05-29 NOTE — PROGRESS NOTES
An we please ask the posttransplant coordinator with heart if they see any issue using Olmesartan for his proteinuria. He had some hyperkalemia 3 yrs ago but I do not know if that was associated with lisinopril. I suspect it was.     Also ask if they take any action with a class II DSA, which has been high for years.   Let me know. Thanks very much

## 2025-05-30 ENCOUNTER — OFFICE VISIT (OUTPATIENT)
Dept: RADIATION ONCOLOGY | Facility: CLINIC | Age: 75
End: 2025-05-30
Payer: MEDICARE

## 2025-05-30 DIAGNOSIS — C61 PROSTATE CANCER: Primary | ICD-10-CM

## 2025-05-30 DIAGNOSIS — C61 MALIGNANT NEOPLASM OF PROSTATE: Primary | ICD-10-CM

## 2025-05-30 NOTE — PROGRESS NOTES
Discussed with rad onc and he will need to get started on lupron and will then go to XRT per them.

## 2025-05-30 NOTE — PROGRESS NOTES
The patient location is: [place]  The chief complaint leading to consultation is:  Change in grade of known prostate cancer    Visit type: audiovisual    Face to Face time with patient:  5   minutes of total time spent on the encounter, which includes face to face time and non-face to face time preparing to see the patient (eg, review of tests), Obtaining and/or reviewing separately obtained history, Documenting clinical information in the electronic or other health record, Independently interpreting results (not separately reported) and communicating results to the patient/family/caregiver, or Care coordination (not separately reported).         Each patient to whom he or she provides medical services by telemedicine is:  (1) informed of the relationship between the physician and patient and the respective role of any other health care provider with respect to management of the patient; and (2) notified that he or she may decline to receive medical services by telemedicine and may withdraw from such care at any time.    Notes:   New biopsy shows 8 of 15 cores positive.  Left mid is Luz's 4+3 (60% pattern 4) involving 40%.  Left apex 3+4 (40% pattern 4) involving 30%.  Targeted biopsy 3+4 (40% pattern 4) involving 30%.  His PSA prior to the biopsy was measured at 10.79.  His risk of lymph node involvement is 22%.  His risk of extracapsular extension is 74%.  This is a significant change from his prior low risk numbers.  I have recommended at least 6-8 months of hormonal suppression in combination with external beam radiotherapy treating both his pelvic lymph nodes as well as his prostate and seminal vesicles.  This will be somewhat challenging as he has a transplanted kidney in his pelvis.  He desires to proceed with treatment.  I will contact Dr. Kelly regarding Depo Lupron.  I will schedule him to come here in 2 months for simulation.

## 2025-06-02 ENCOUNTER — TELEPHONE (OUTPATIENT)
Dept: UROLOGY | Facility: CLINIC | Age: 75
End: 2025-06-02
Payer: MEDICARE

## 2025-06-04 ENCOUNTER — TELEPHONE (OUTPATIENT)
Dept: TRANSPLANT | Facility: CLINIC | Age: 75
End: 2025-06-04
Payer: MEDICARE

## 2025-06-04 DIAGNOSIS — Z94.0 KIDNEY REPLACED BY TRANSPLANT: Primary | ICD-10-CM

## 2025-06-05 NOTE — PROGRESS NOTES
The patient location is: [place]  The chief complaint leading to consultation is:     Kidney Post-Transplant Assessment    Visit type: audiovisual    Face to Face time with patient: 20  30 minutes of total time spent on the encounter, which includes face to face time and non-face to face time preparing to see the patient (eg, review of tests), Obtaining and/or reviewing separately obtained history, Documenting clinical information in the electronic or other health record, Independently interpreting results (not separately reported) and communicating results to the patient/family/caregiver, or Care coordination (not separately reported).         Each patient to whom he or she provides medical services by telemedicine is:  (1) informed of the relationship between the physician and patient and the respective role of any other health care provider with respect to management of the patient; and (2) notified that he or she may decline to receive medical services by telemedicine and may withdraw from such care at any time.    Notes:         Kidney Post-Transplant Assessment    Referring Physician:   Current Nephrologist: Laith Wilkerson    ORGAN:   Donor Type:   PHS Increased Risk:   Cold Ischemia:  mins  Induction Medications:     Subjective:     CC:  Reassessment of renal allograft function and management of chronic immunosuppression.    HPI:  Mr. Albrecht is a 74 y.o. year old Black or  male who received a  heart and kidney transplant on 5/24/02.  He has CKD stage 3 - GFR 30-59 and his baseline creatinine is between mid 2.0s . He takes mycophenolate mofetil and sirolimus for maintenance immunosuppression. He denies any recent hospitalizations or ER visits since his previous clinic visit.    Hospitalization/ ED visits   Rejection:     7/21/22: KIDNEY (PERCUTANEOUS TRANSPLANT BIOPSY):   1)  MODERATE MICROCIRCULATION INFLAMMATION WITH TRANSVERSE GLOMERULOPATHY AND   C4d POSITIVITY DIAGNOSTIC OF  ACUTE AND CHRONIC ANTIBODY-MEDIATED REJECTION.   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  patient was treated with 5 doses of PLEX and 2 doses of IVIG. 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    LOV 5/15/23  Interval HX:    Prostate biopsy 2/2022  Noting adenocarcinoma   Prostate cancer-- follows with urology,   recently referred to rad onc; plan for lupron and will then go to XRT     Overall feels well. No health concerns today.   Denies chest pain, SOB, leg pain, abdominal pain or LUTs.     General Nephrologist: following with Dr. Matthew    Heart Transplant: Dr. Saavedra       Intake~  reports adequate hydration   UOP-no problems reported      BP Readings from Last 3 Encounters:   06/06/25 134/67   05/14/25 (!) 159/76   04/29/25 138/72     PCP: BELÉN Garrison: management of DM   DM controlled   Lab Results   Component Value Date    HGBA1C 5.9 (H) 05/08/2025         Lab /diagnostic results reviewed with patient today.      Review of Systems   Constitutional:  Negative for appetite change, chills, fatigue and fever.   HENT:  Negative for trouble swallowing.    Eyes:  Positive for visual disturbance.        Wears glasses    Respiratory:  Negative for cough, chest tightness, shortness of breath and wheezing.    Cardiovascular:  Positive for leg swelling. Negative for chest pain and palpitations.   Gastrointestinal:  Positive for abdominal distention. Negative for abdominal pain, constipation, diarrhea and nausea.   Genitourinary:  Negative for difficulty urinating, frequency and urgency.   Musculoskeletal:  Positive for arthralgias (HX Gout arthritis ). Negative for myalgias.   Skin:  Negative for rash.   Allergic/Immunologic: Positive for immunocompromised state.   Neurological:  Negative for dizziness, weakness, light-headedness and headaches.         HX neuropathy    Psychiatric/Behavioral:  Negative for sleep disturbance.        Objective:   There were no  "vitals taken for this visit.body mass index is unknown because there is no height or weight on file.    Physical Exam  Constitutional:       General: He is not in acute distress.     Appearance: He is well-developed. He is obese. He is not diaphoretic.   Cardiovascular:      Rate and Rhythm: Normal rate and regular rhythm.      Heart sounds: Normal heart sounds. No murmur heard.     No friction rub. No gallop.   Pulmonary:      Effort: Pulmonary effort is normal. No respiratory distress.      Breath sounds: Normal breath sounds. No wheezing or rales.   Abdominal:      General: Bowel sounds are normal. There is distension.      Palpations: Abdomen is soft.      Tenderness: There is no abdominal tenderness.   Musculoskeletal:         General: No tenderness. Normal range of motion.      Right lower leg: Edema present.      Left lower leg: Edema present.   Skin:     General: Skin is warm and dry.      Findings: No rash.      Nails: There is no clubbing.   Neurological:      Mental Status: He is alert and oriented to person, place, and time.   Psychiatric:         Behavior: Behavior normal.         Labs:  Lab Results   Component Value Date    WBC 6.15 05/21/2025    HGB 10.4 (L) 05/21/2025    HCT 34.1 (L) 05/21/2025     05/21/2025    K 3.8 05/21/2025     05/21/2025    CO2 26 05/21/2025    BUN 38 (H) 05/21/2025    CREATININE 2.5 (H) 05/21/2025    EGFRNONAA 13.2 (A) 07/27/2022    CALCIUM 8.3 (L) 05/21/2025    PHOS 3.6 05/21/2025    MG 1.9 05/21/2025    ALBUMIN 3.2 (L) 05/21/2025    AST 26 05/21/2025    ALT 14 05/21/2025       No results found for: "EXTANC", "EXTWBC", "EXTSEGS", "EXTPLATELETS", "EXTHEMOGLOBI", "EXTHEMATOCRI", "EXTCREATININ", "EXTSODIUM", "EXTPOTASSIUM", "EXTBUN", "EXTCO2", "EXTCALCIUM", "EXTPHOSPHORU", "EXTGLUCOSE", "EXTALBUMIN", "EXTAST", "EXTALT", "EXTBILITOTAL", "EXTLIPASE", "EXTAMYLASE"    No results found for: "EXTCYCLOSLVL", "EXTSIROLIMUS", "EXTTACROLVL", "EXTPROTCRE", "EXTPTHINTACT", " ""EXTPROTEINUA", "EXTWBCUA", "EXTRBCUA"    Labs were reviewed with the patient.    Assessment:     1. -donor kidney transplant    2. Immunocompromised state due to drug therapy    3. Controlled type 2 diabetes mellitus with stage 4 chronic kidney disease, without long-term current use of insulin    4. Benign hypertension with CKD (chronic kidney disease) stage IV    5. Prostate cancer    6. Class 1 obesity due to excess calories with serious comorbidity and body mass index (BMI) of 30.0 to 30.9 in adult        Plan:     Current issues:  prostate adenocarcinoma--> ,   recently referred to rad onc; plan for lupron and  XRT starting at the end of the month  -->mgmt deferred to urology    Proteinuria: recently started on Olmesartan   Per Dr Reese: MMF increased to 500 mg BID on 25  Repeat CBC 3 week to decide if we can go up at that time to 750 bid.   -will review with Dr Reese MMF increase and recent tx plan with prostate cancer    Cont f/u with general nephrology, Dr Matthew for CKD care and cardiology for heart txp f/u      1. CKD stage 4: will continue follow up as per our center guidelines. patient to continue close follow up with the local General nephrologist. Education provided in appropriate fluid intake, potassium intake. Continue with oral hydration.      2. Immunosuppression:   Prograf trough 5, cont prograf 8/ ,  Mg BID , prednisone 5 mg QD--IS mgmt deferred heart txp   Will closely monitor for toxicities, education provided about adherence to medicines and need to communicate any side effect to the transplant nurse or physician.            3. Allograft Function:CKD 4-STABLE   stable at baseline for the patient. Continue follow up as per our guidelines and with the local General nephrologist. Communication will be sent today.     baseline creatinine is between mid 2.0s .  Lab Results   Component Value Date    CREATININE 2.5 (H) 2025        Latest Reference Range & Units 22yr " 11mo,  Kidney; Heart-Post 23 Year  05/21/25   eGFR >60 mL/min/1.73/m2 26 (L) [1]        4. Hypertension management:  Continue with home blood pressure monitoring, low salt and healthy life discussed with the patient..  BP Readings from Last 3 Encounters:   06/06/25 134/67   05/14/25 (!) 159/76   04/29/25 138/72   -cont nifedipine,  inderal, hydralazine,   flomax,   torsemide  --deferred management to Heart TXP/Cardiology      5. Metabolic Bone Disease/Secondary Hyperparathyroidism:calcium and phosphorus level discussed with the patient, patient will continue follow up with the general nephrologist for management of metabolic bone disease  calcium and phosphorus as per our center protocol. Will monitor PTH, Vit D level, calcium.     Cont phos lo as prescribed -->MGMT deferred to general nephrology    Lab Results   Component Value Date    .0 (H) 06/20/2022    CALCIUM 8.3 (L) 05/21/2025    PHOS 3.6 05/21/2025    PHOS 3.7 03/19/2024    PHOS 3.6 11/21/2023         6. Electrolytes: reviewed with the patient, essentially within the normal range no need for acute changes today, will monitor as per our center guidelines.    -->MGMT deferred to general nephrology    Lab Results   Component Value Date     05/21/2025    K 3.8 05/21/2025     05/21/2025    CO2 26 05/21/2025    CO2 24 05/15/2025    CO2 24 05/08/2025       7. Anemia: will continue monitoring as per our center guidelines. No indication for acute intervention today.  -->MGMT deferred to general nephrology    Lab Results   Component Value Date    WBC 6.15 05/21/2025    HGB 10.4 (L) 05/21/2025    HCT 34.1 (L) 05/21/2025    MCV 92 05/21/2025     05/21/2025       8.Proteinuria: will continue with pr/cr ratio as per our center guidelines  -->MGMT deferred to general nephrology  Proteinuria: Recently  started on Olmesartan   Lab Results   Component Value Date    PROTEINURINE 142 (H) 05/21/2025    CREATRANDUR 93.0 05/21/2025    UTPCR 1.53 (H)  05/21/2025    UTPCR 1.51 (H) 05/15/2025    UTPCR 1.27 (H) 05/08/2023        9. 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 Weight education: provided during the clinic visit.   There is no height or weight on file to calculate BMI.       11.Patient safety education regarding immunosuppression including prophylaxis posttransplant for CMV, PCP : Education provided about vaccination and prevention of infections.    12.  Cytopenias: no significant cytopenias will monitor as per our guidelines. Medicine list reviewed including potential causes of drug-induced cytopenias  Lab Results   Component Value Date    WBC 6.15 05/21/2025    HGB 10.4 (L) 05/21/2025    HCT 34.1 (L) 05/21/2025    MCV 92 05/21/2025     05/21/2025       Follow-up:   Annual follow-up with kidney transplant clinic with repeat labs, including renal function panel with UA, urine protein/creatinine ratio, and drug trough level q3 months.  Patient also reminded to follow-up with general nephrologist.    Labs: since patient remains at high risk for rejection and drug-related complications that warrant close monitoring, labs will be ordered as follows: continue twice weekly CBC, renal panel, and drug level for first month; then same labs once weekly through 3rd month post-transplant.  Urine for UA and protein/creatinine ratio monthly.  Serum BK - PCR at 1-, 3-, 6-, 9-, 12-, 18-, 24-, 36-, 48-, and 60 months post-transplant.  Hepatic panel at 1-, 2-, 3-, 6-, 9-, 12-, 18-,  24-, and 36- months post-transplant.    Education:   Material provided to the patient.  Patient reminded to call with any health changes since these can be early signs of significant complications.  Also, I advised the patient to be sure any new medications or changes of old medications are discussed with either a pharmacist or physician knowledgeable with transplant to avoid rejection/drug toxicity related to significant drug interactions.    Exercise: reminded Nigel of the importance of regular exercise for weight management, blood sugar and blood pressure management.  I also explained exercise has been shown to improve cardiovascular health, energy level, and sleep hygiene.  Lastly, I advised him that cardiovascular complications are leading cause of death for renal transplant recipients, and regular exercise can help lower this risk.    I spoke with the patient for 30 minutes. More than half dedicated to counseling and education. All questions answered    Malou Conn NP-C  Transplant Nephrology    OS Patient Status  Functional Status: 80% - Normal activity with effort: some symptoms of disease  Physical Capacity: No Limitations

## 2025-06-06 ENCOUNTER — OFFICE VISIT (OUTPATIENT)
Dept: UROLOGY | Facility: CLINIC | Age: 75
End: 2025-06-06
Payer: MEDICARE

## 2025-06-06 VITALS
SYSTOLIC BLOOD PRESSURE: 134 MMHG | DIASTOLIC BLOOD PRESSURE: 67 MMHG | BODY MASS INDEX: 30.5 KG/M2 | HEIGHT: 74 IN | WEIGHT: 237.63 LBS | HEART RATE: 53 BPM

## 2025-06-06 DIAGNOSIS — N52.9 ERECTILE DYSFUNCTION, UNSPECIFIED ERECTILE DYSFUNCTION TYPE: ICD-10-CM

## 2025-06-06 DIAGNOSIS — N40.1 BENIGN PROSTATIC HYPERPLASIA WITH URINARY FREQUENCY: ICD-10-CM

## 2025-06-06 DIAGNOSIS — R35.0 BENIGN PROSTATIC HYPERPLASIA WITH URINARY FREQUENCY: ICD-10-CM

## 2025-06-06 DIAGNOSIS — C61 PROSTATE CANCER: Primary | ICD-10-CM

## 2025-06-06 PROBLEM — Z01.818 PRE-OP EXAM: Status: RESOLVED | Noted: 2025-04-29 | Resolved: 2025-06-06

## 2025-06-06 PROCEDURE — 99999 PR PBB SHADOW E&M-EST. PATIENT-LVL V: CPT | Mod: PBBFAC,,, | Performed by: UROLOGY

## 2025-06-06 PROCEDURE — 99215 OFFICE O/P EST HI 40 MIN: CPT | Mod: PBBFAC | Performed by: UROLOGY

## 2025-06-07 NOTE — TELEPHONE ENCOUNTER
----- Message from Larissa Vigil sent at 4/9/2019  9:46 AM CDT -----  Contact: Pt   Pt is calling .Type:  Needs Medical Advice    Who Called: Pt   Symptoms (please be specific):  Pt is having itching and burning from a shingles breakout   How long has patient had these symptoms:  Saturday a week ago   Pharmacy name and phone #:  .  [facility] [place] [address]  [place] [address]  Phone: [phone] Fax: [phone]  Would the patient rather a call back or a response via [facility]? Call back   Best Call Back Number: [phone]   Additional Information: In addition to sending a script Pt is requesting to have nurse call Pt back           ..Thank You  Alia Vigil      
Is he taking zyrtec ?   
Pt states he has not taken Zyrtec in a while.   
Spoke with pt, he states he went to ER last week for shingles and now is complaining of itching and burning. He states he made an appointment with you on 4/10/19 but would like something sent in for itching today.   Please advise?   
Spoke with pt. Informed pt Dr. Singletary states she kadi like him to try Zyrtec for the itching. Also informed pt to call the office back if the itching does not get better. Pt verbalized understanding.   
Try  zyrtec for itching   
6 (moderate pain)

## 2025-06-09 ENCOUNTER — OFFICE VISIT (OUTPATIENT)
Dept: TRANSPLANT | Facility: CLINIC | Age: 75
End: 2025-06-09
Payer: MEDICARE

## 2025-06-09 DIAGNOSIS — E66.811 CLASS 1 OBESITY DUE TO EXCESS CALORIES WITH SERIOUS COMORBIDITY AND BODY MASS INDEX (BMI) OF 30.0 TO 30.9 IN ADULT: ICD-10-CM

## 2025-06-09 DIAGNOSIS — Z94.0 DECEASED-DONOR KIDNEY TRANSPLANT: Primary | Chronic | ICD-10-CM

## 2025-06-09 DIAGNOSIS — C61 PROSTATE CANCER: Primary | ICD-10-CM

## 2025-06-09 DIAGNOSIS — I12.9 BENIGN HYPERTENSION WITH CKD (CHRONIC KIDNEY DISEASE) STAGE IV: ICD-10-CM

## 2025-06-09 DIAGNOSIS — C61 PROSTATE CANCER: ICD-10-CM

## 2025-06-09 DIAGNOSIS — E11.22 CONTROLLED TYPE 2 DIABETES MELLITUS WITH STAGE 4 CHRONIC KIDNEY DISEASE, WITHOUT LONG-TERM CURRENT USE OF INSULIN: ICD-10-CM

## 2025-06-09 DIAGNOSIS — N18.4 CONTROLLED TYPE 2 DIABETES MELLITUS WITH STAGE 4 CHRONIC KIDNEY DISEASE, WITHOUT LONG-TERM CURRENT USE OF INSULIN: ICD-10-CM

## 2025-06-09 DIAGNOSIS — Z79.899 IMMUNOCOMPROMISED STATE DUE TO DRUG THERAPY: ICD-10-CM

## 2025-06-09 DIAGNOSIS — D84.821 IMMUNOCOMPROMISED STATE DUE TO DRUG THERAPY: ICD-10-CM

## 2025-06-09 DIAGNOSIS — E66.09 CLASS 1 OBESITY DUE TO EXCESS CALORIES WITH SERIOUS COMORBIDITY AND BODY MASS INDEX (BMI) OF 30.0 TO 30.9 IN ADULT: ICD-10-CM

## 2025-06-09 DIAGNOSIS — N18.4 BENIGN HYPERTENSION WITH CKD (CHRONIC KIDNEY DISEASE) STAGE IV: ICD-10-CM

## 2025-06-09 RX ORDER — CEFAZOLIN SODIUM 2 G/50ML
2 SOLUTION INTRAVENOUS
OUTPATIENT
Start: 2025-06-09

## 2025-06-09 NOTE — Clinical Note
FYI: since MMF recently increased with plans to increase again in 3 weeks if CBC is stable:   Current issues: prostate adenocarcinoma--> ,   recently referred to rad onc; plan for lupron and  XRT starting at the end of the month  -->mgmt deferred to urology  Proteinuria: recently started on Olmesartan  Per Dr Reese: MMF increased to 500 mg BID on 5/29/25 Repeat CBC 3 week to decide if we can go up at that time to 750 bid.   Cont f/u with general nephrology, Dr Matthew for CKD care and cardiology for heart txp f/u

## 2025-06-09 NOTE — LETTER
June 9, 2025        Lucila Matthew  [address]  [place] [id number]  Phone: [phone]  Fax: [phone]             [facility] 1st Fl  [address]  [facility] [address]  Phone: [phone]   Patient: Nigel Albrecht   MR Number: [id number]   YOB: 1950   Date of Visit: 6/9/2025       Dear Dr. Lucila Matthew    Thank you for referring Nigel Albrecht to me for evaluation. Attached you will find relevant portions of my assessment and plan of care.    If you have questions, please do not hesitate to call me. I look forward to following Nigel Albrecht along with you.    Sincerely,    Malou Conn, NP    Enclosure    If you would like to receive this communication electronically, please contact [email or web address] or [phone] to request [employer] Link access.    [employer] is a tool which provides read-only access to select patient information with whom you have a relationship. Its easy to use and provides real time access to review your patients record including encounter summaries, notes, results, and demographic information.    If you feel you have received this communication in error or would no longer like to receive these types of communications, please e-mail [email or web address]

## 2025-06-09 NOTE — PROGRESS NOTES
Per Rad Onc will pursue fiducial marker placement and SpaceOar.    Patient understands the risks, benefits and alternatives of the above-stated procedure.  He understands that the SpaceOAR will be placed between the rectum and the prostate.  He understands the risk of possible perirectal abscess, infection, hematuria, blood in the rectum.  The risk of discomfort and pain, understanding that the product will dissolve naturally.  He also understands that we will be placing fiducial markers at the same time, similar risks as with the SpaceOAR.  Risk of bleeding to the prostate, requiring other procedures.  Risk of difficulty voiding due to inflammation.  Risk of heart attack, stroke, death, DVT and PE.  Patient understanding of all the above has elected to pursue the procedure as stated.

## 2025-06-10 ENCOUNTER — PATIENT MESSAGE (OUTPATIENT)
Dept: TRANSPLANT | Facility: CLINIC | Age: 75
End: 2025-06-10
Payer: MEDICARE

## 2025-06-10 ENCOUNTER — TELEPHONE (OUTPATIENT)
Dept: TRANSPLANT | Facility: CLINIC | Age: 75
End: 2025-06-10
Payer: MEDICARE

## 2025-06-10 DIAGNOSIS — Z94.0 KIDNEY REPLACED BY TRANSPLANT: Primary | ICD-10-CM

## 2025-06-10 NOTE — TELEPHONE ENCOUNTER
CBC ordered and scheduled.              ----- Message from Sera Canchola sent at 6/9/2025 11:14 AM CDT -----    ----- Message -----  From: Malou Conn NP  Sent: 6/9/2025  11:12 AM CDT  To: Edy Reese MD; [facility] Post-Kidney Transp#    FYI: since MMF recently increased with plans to increase again in 3 weeks if CBC is stable:     Current issues:  prostate adenocarcinoma--> ,   recently referred to rad onc; plan for lupron and  XRT starting at the end of the month  -->mgmt deferred to urology    Proteinuria: recently started on Olmesartan   Per Dr Reese: MMF increased to 500 mg BID on 5/29/25  Repeat CBC 3 week to decide if we can go up at that time to 750 bid.     Cont f/u with general nephrology, Dr Matthew for CKD care and cardiology for heart txp f/u

## 2025-06-12 ENCOUNTER — TELEPHONE (OUTPATIENT)
Dept: UROLOGY | Facility: CLINIC | Age: 75
End: 2025-06-12
Payer: MEDICARE

## 2025-06-12 NOTE — TELEPHONE ENCOUNTER
Tried calling pt someone answered but hung up tried calling back and no one answered. Labs were added on 7/2

## 2025-06-16 ENCOUNTER — LAB VISIT (OUTPATIENT)
Dept: LAB | Facility: HOSPITAL | Age: 75
End: 2025-06-16
Attending: INTERNAL MEDICINE
Payer: MEDICARE

## 2025-06-16 DIAGNOSIS — Z01.818 PRE-OP EXAM: ICD-10-CM

## 2025-06-16 DIAGNOSIS — Z94.0 KIDNEY REPLACED BY TRANSPLANT: ICD-10-CM

## 2025-06-16 DIAGNOSIS — C61 PROSTATE CANCER: ICD-10-CM

## 2025-06-16 LAB
ABSOLUTE EOSINOPHIL (OHS): 0.06 K/UL
ABSOLUTE MONOCYTE (OHS): 0.39 K/UL (ref 0.3–1)
ABSOLUTE NEUTROPHIL COUNT (OHS): 3.1 K/UL (ref 1.8–7.7)
BASOPHILS # BLD AUTO: 0.02 K/UL
BASOPHILS NFR BLD AUTO: 0.4 %
ERYTHROCYTE [DISTWIDTH] IN BLOOD BY AUTOMATED COUNT: 13.7 % (ref 11.5–14.5)
HCT VFR BLD AUTO: 34.6 % (ref 40–54)
HGB BLD-MCNC: 10.3 GM/DL (ref 14–18)
IMM GRANULOCYTES # BLD AUTO: 0.01 K/UL (ref 0–0.04)
IMM GRANULOCYTES NFR BLD AUTO: 0.2 % (ref 0–0.5)
LYMPHOCYTES # BLD AUTO: 1.65 K/UL (ref 1–4.8)
MCH RBC QN AUTO: 28.2 PG (ref 27–31)
MCHC RBC AUTO-ENTMCNC: 29.8 G/DL (ref 32–36)
MCV RBC AUTO: 95 FL (ref 82–98)
NUCLEATED RBC (/100WBC) (OHS): 0 /100 WBC
PLATELET # BLD AUTO: 163 K/UL (ref 150–450)
PMV BLD AUTO: 11.5 FL (ref 9.2–12.9)
RBC # BLD AUTO: 3.65 M/UL (ref 4.6–6.2)
RELATIVE EOSINOPHIL (OHS): 1.1 %
RELATIVE LYMPHOCYTE (OHS): 31.5 % (ref 18–48)
RELATIVE MONOCYTE (OHS): 7.5 % (ref 4–15)
RELATIVE NEUTROPHIL (OHS): 59.3 % (ref 38–73)
WBC # BLD AUTO: 5.23 K/UL (ref 3.9–12.7)

## 2025-06-16 PROCEDURE — 36415 COLL VENOUS BLD VENIPUNCTURE: CPT | Mod: PN

## 2025-06-16 PROCEDURE — 85025 COMPLETE CBC W/AUTO DIFF WBC: CPT

## 2025-06-16 PROCEDURE — 84153 ASSAY OF PSA TOTAL: CPT

## 2025-06-16 PROCEDURE — 82040 ASSAY OF SERUM ALBUMIN: CPT

## 2025-06-17 LAB
ALBUMIN SERPL BCP-MCNC: 3.4 G/DL (ref 3.5–5.2)
ALP SERPL-CCNC: 59 UNIT/L (ref 40–150)
ALT SERPL W/O P-5'-P-CCNC: 9 UNIT/L (ref 10–44)
ANION GAP (OHS): 9 MMOL/L (ref 8–16)
AST SERPL-CCNC: 22 UNIT/L (ref 11–45)
BILIRUB SERPL-MCNC: 0.2 MG/DL (ref 0.1–1)
BUN SERPL-MCNC: 40 MG/DL (ref 8–23)
CALCIUM SERPL-MCNC: 8.3 MG/DL (ref 8.7–10.5)
CHLORIDE SERPL-SCNC: 109 MMOL/L (ref 95–110)
CO2 SERPL-SCNC: 24 MMOL/L (ref 23–29)
CREAT SERPL-MCNC: 2.9 MG/DL (ref 0.5–1.4)
GFR SERPLBLD CREATININE-BSD FMLA CKD-EPI: 22 ML/MIN/1.73/M2
GLUCOSE SERPL-MCNC: 156 MG/DL (ref 70–110)
POTASSIUM SERPL-SCNC: 5.1 MMOL/L (ref 3.5–5.1)
PROT SERPL-MCNC: 5.7 GM/DL (ref 6–8.4)
PSA SERPL-MCNC: 5.98 NG/ML
SODIUM SERPL-SCNC: 142 MMOL/L (ref 136–145)

## 2025-06-20 ENCOUNTER — TELEPHONE (OUTPATIENT)
Dept: UROLOGY | Facility: CLINIC | Age: 75
End: 2025-06-20
Payer: MEDICARE

## 2025-06-20 NOTE — TELEPHONE ENCOUNTER
Contacted Tiff for the SPACE OAR, 7/15 confirmed for her to be there for surgery that day.     Tried calling pt 3xs to go over pre op instructions there was no answer LEFT VM for him to return our call       ----- Message from Jorge Kelly MD sent at 6/9/2025  1:14 PM CDT -----  Need gold seeds and space oar in the OR on 7/15; can you let the space oar rep know please, thank you  ----- Message -----  From: Juan Ansari MD  Sent: 6/9/2025  12:53 PM CDT  To: Jorge Kelly MD    Thank you brother.  Setting him up for simulation in 2 months.  Would need fiducials and spacer OA are in about 6 weeks.  ----- Message -----  From: Jorge Kelly MD  Sent: 6/6/2025  10:28 AM CDT  To: Juan Ansari MD    Lupron given today

## 2025-06-23 ENCOUNTER — TELEPHONE (OUTPATIENT)
Dept: UROLOGY | Facility: CLINIC | Age: 75
End: 2025-06-23
Payer: MEDICARE

## 2025-06-23 DIAGNOSIS — Z01.818 PRE-OP EVALUATION: Primary | ICD-10-CM

## 2025-06-23 NOTE — TELEPHONE ENCOUNTER
Called the patient, after verification of name and , the patient was informed  of his pre op instructions for his procedure.  Advise pt to give us a call with any questions or concerns. He voiced understanding       ----- Message from Myriam sent at 2025 12:36 PM CDT -----  Contact: JOSSIE SIERRA [[id number]]  .Type:  Patient Requesting Call    Who Called:JOSSIE SIERRA [[id number]]  Does the patient know what this is regarding?:Patient returning a call back in regards to upcoming procedure questions.  Would the patient rather a call back or a response via [facility]? Call back  Best Call Back Number:[phone] (M)  Additional Information:

## 2025-06-25 ENCOUNTER — TELEPHONE (OUTPATIENT)
Dept: TRANSPLANT | Facility: CLINIC | Age: 75
End: 2025-06-25
Payer: MEDICARE

## 2025-06-25 NOTE — TELEPHONE ENCOUNTER
Asked to clear for SPACE PAR procedure.  I am not familiar with this procedure but as long as no incision if required wound healing should not be a problem and he is cleared for A&S.  If incision is required please send me details of the procedure and postpone until I can transition him off of sirolimus.    Dr. John Gonsalez  Cell [phone]

## 2025-06-25 NOTE — LETTER
June 25, 2025    Nigel Albrecht  [address]  [facility] [address]          Dear Nigel Albrecht,  MRN: [id number]    xx      If you have any questions or concerns, please don't hesitate to call.    Sincerely,        [facility]  [facility] [place] [address]  [phone]

## 2025-07-01 ENCOUNTER — PATIENT MESSAGE (OUTPATIENT)
Dept: CARDIOLOGY | Facility: CLINIC | Age: 75
End: 2025-07-01
Payer: MEDICARE

## 2025-07-01 NOTE — PROGRESS NOTES
Subjective   Patient ID:  Nigel Albrecht is a 75 y.o. male who presents for follow-up of Atrial Flutter      75 y/o black male s/p OHTx 5/24/02 and kidney tx 5/25/02 at [facility],  HTN, stage III CKD, DM, HLP.     1/24: He developed increased heart rates and was found to be in AFL. Event monitor showed persistent AFL, average  bpm. He was not started on OAC.     Interval history: Ablation 2/24/25 of CCW AFL, NSR on ECG 5/14/25    Ablation 2/24/25:  ·  CCW CTI flutter  ·  Bi-caval anastamosis  ·  3D mapping performed with Ensite.  ·  CTI ablation.    PET Stress 11/24:  ·  There are no clinically significant perfusion abnormalities. There is a small (<10%) amount of mild diffuse resting heterogeneity that improves with stress, indicative of non-obstructive disease.  ·  The whole heart absolute myocardial perfusion values were elevated at rest, normal during stress and CFR is mildly reduced in part due to elevated resting flow.  ·  CT attenuation images demonstrate no coronary calcifications and mild diffuse aortic calcifications in the descending aorta.  ·  The gated perfusion images showed an ejection fraction of 46% at rest and 47% during stress. A normal ejection fraction is greater than 47%.  ·  There is normal wall motion at rest and normal wall motion during stress.  ·  The study's ECG is negative for ischemia.    Event monitor 10/24:  The patient was monitored for a total of 13d 22h, underlying rhythm is Atrial fibrillation/Atrial flutter.  The minimum heart rate was 54 bpm; the maximum 116 bpm; the average 101 bpm.  100 % of Atrial fibrillation/Atrial flutter with longest episode of 13d 22h.  The total burden of AV Block present was 0 %  Total count of Ventricular Tachycardia (VT): 0 episode(s).  0 supraventricular episodes were found. Longest SVT Episode 0 s, Fastest SVT -- bpm  There were a total of 4818 PVCs with 1 morphologies and 0 couplets. Overall PVC [place] at 0.24 %  There were a total of 0 Other  Beats. There were 0 total number of paced beats.  There were a total of 0 PSVCs with 0 couplets. Overall PSVC [place] at 0 %  There is a total of 2 patient events    Stress Echo done today  ? S/P heart transplantation 2002  ? The left ventricle is normal in size with concentric remodeling and normal systolic function. The estimated ejection fraction is 60%.  ? Normal right ventricular size with normal right ventricular systolic function.  ? Normal left ventricular diastolic function.  ? The estimated PA systolic pressure is 20 mmHg.  ? Normal central venous pressure (3 mmHg).  ? The maximal doses of pharmaceutical stress agents were administered.  ? The ECG portion of this study is negative for myocardial ischemia.  ? The stress echo portion of this study is negative for myocardial ischemia.  ? Overall, this study is negative for myocardial ischemia. Sensitivity is impaired due to the patient's failure to achieve target heart rate.    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   medication  2013: Morbid obesity  2002: Organ transplant      Comment:  heart and kidney  2022: Orthostatic syncope      Comment:  Due to furosemide  2022: Pneumonia due to COVID-19 virus  No date: Prophylactic immunotherapy  2023: Prostate cancer  No date: Renal manifestation of secondary diabetes mellitus    Past Surgical History:  2025: ABLATION, ATRIAL FLUTTER, TYPICAL; N/A      Comment:  Procedure:  Ablation, Atrial Flutter, Typical;  Surgeon:                Marcelo Blackman MD;  Location: [facility] EP LAB;                 Service: Cardiology;  Laterality: N/A;  AFL, ERIC (cx if                SR), RFA, TANYA w/HD GRID, MAC, MB, 3 Prep *Heart & Kidney                Transplant*  No date: CATARACT EXTRACTION; Bilateral  10/06/2020: COLONOSCOPY; N/A      Comment:  Procedure: COLONOSCOPY;  Surgeon: Conner Redman MD;                 Location: [facility] ENDO;  Service: Endoscopy;  Laterality:                N/A;  2025: ECHOCARDIOGRAM,TRANSESOPHAGEAL; N/A      Comment:  Procedure: Transesophageal echo (ERIC) intra-procedure                log documentation;  Surgeon: Chad De Jesus MD;                 Location: [facility] EP LAB;  Service: Cardiology;  Laterality:               N/A;  No date: EYE SURGERY  No date: HEART TRANSPLANT  No date: KIDNEY TRANSPLANT  No date: REVISION TOTAL HIP ARTHROPLASTY  2025: TRANSRECTAL BIOPSY OF PROSTATE WITH ULTRASOUND GUIDANCE; N/  A      Comment:  Procedure: BIOPSY, PROSTATE, RECTAL APPROACH, WITH US                GUIDANCE;  Surgeon: Jorge Kelly MD;  Location:                [facility] OR;  Service: Urology;  Laterality: N/A;    Social History    Socioeconomic History      Marital status:     Occupational History        Employer: Retired    Tobacco Use      Smoking status: Former        Packs/day: 0.00        Years: 1 pack/day for 20.0 years (20.0 ttl pk-yrs)        Types: Cigarettes        Start date: 1964        Quit date: 1984        Years since quittin.0      Smokeless tobacco: Never    Substance and Sexual Activity      Alcohol use: Yes        Alcohol/week: 1.0 standard drink of alcohol        Types: 1 Cans of beer per week        Comment: occ      Drug use: No      Sexual activity: Not Currently        Partners: Female    Social Drivers of Health  Financial Resource Strain: Low Risk  (2025)      Overall Financial Resource Strain (CARDIA)           Difficulty of Paying Living Expenses: Not very hard  Food Insecurity: No Food Insecurity (4/23/2025)      Hunger Vital Sign          Worried About Running Out of Food in the Last Year: Never true          Ran Out of Food in the Last Year: Never true  Transportation Needs: No Transportation Needs (4/23/2025)      PRAPARE - Transportation          Lack of Transportation (Medical): No          Lack of Transportation (Non-Medical): No  Physical Activity: Inactive (4/23/2025)      Exercise Vital Sign          Days of Exercise per Week: 2 days          Minutes of Exercise per Session: 0 min  Stress: Stress Concern Present (4/23/2025)      [place] [employer] - Occupational Stress Questionnaire          Feeling of Stress : To some extent  Housing Stability: Unknown (9/30/2024)      Housing Stability Vital Sign          Unable to Pay for Housing in the Last Year: No          Homeless in the Last Year: No    Review of patient's family history indicates:  Problem: Stroke      Relation: Mother          Name:               Age of Onset: (Not Specified)  Problem: Hyperlipidemia      Relation: Sister          Name: 2              Age of Onset: (Not Specified)  Problem: Diabetes      Relation: Brother          Name: 4 decsd/ 5              Age of Onset: (Not Specified)  Problem: Early death      Relation: Brother          Name: 4 decsd/ 5              Age of Onset: (Not Specified)  Problem: Heart disease      Relation: Brother          Name: 4 decsd/ 5              Age of Onset: (Not Specified)  Problem: Hyperlipidemia      Relation: Brother          Name: 4 decsd/ 5              Age of Onset: (Not Specified)  Problem: Hypertension      Relation: Brother          Name: 4 decsd/ 5              Age of Onset: (Not Specified)  Problem: Stroke      Relation: Brother          Name: 4 decsd/ 5              Age of Onset: (Not Specified)  Problem: Kidney disease      Relation: Son          Name: 2              Age of  Onset: (Not Specified)  Problem: Diabetes      Relation: Maternal Grandmother          Name:               Age of Onset: (Not Specified)  Problem: Melanoma      Relation: Neg Hx          Name:               Age of Onset: (Not Specified)  Problem: Eczema      Relation: Neg Hx          Name:               Age of Onset: (Not Specified)  Problem: Lupus      Relation: Neg Hx          Name:               Age of Onset: (Not Specified)  Problem: Psoriasis      Relation: Neg Hx          Name:               Age of Onset: (Not Specified)    Current Outpatient Medications:  acetaminophen (TYLENOL) 325 MG tablet, Take 2 tablets (650 mg total) by mouth every 4 (four) hours as needed., Disp: , Rfl: 0  aspirin 81 MG Chew, Take 1 tablet (81 mg total) by mouth once daily., Disp: , Rfl: 0  atorvastatin (LIPITOR) 40 MG tablet, TAKE 1 TABLET ONCE DAILY, Disp: 90 tablet, Rfl: 3  blood-glucose sensor (FREESTYLE SHREE 3 PLUS SENSOR) [place], Inject 1 Device into the skin Every 15 days., Disp: 6 each, Rfl: 1  calcium acetate,phosphat bind, (PHOSLO) 667 mg capsule, Take 2 capsules (1,334 mg total) by mouth 3 (three) times daily with meals., Disp: 180 capsule, Rfl: 11  cream base no.171, bulk, Crea, 2 g by Misc.(Non-Drug; Combo Route) route 4 (four) times daily as needed (pain)., Disp: 240 g, Rfl: 6  hydrALAZINE (APRESOLINE) 50 MG tablet, Take 1 tablet (50 mg total) by mouth every 8 (eight) hours., Disp: , Rfl:   mycophenolate (CELLCEPT) 250 mg Cap, Take 2 capsules (500 mg total) by mouth 2 (two) times daily., Disp: 360 capsule, Rfl: 3  NIFEdipine (PROCARDIA-XL) 60 MG (OSM) 24 hr tablet, Take 1 tablet (60 mg total) by mouth 2 (two) times a day., Disp: 180 tablet, Rfl: 3  olmesartan (BENICAR) 20 MG tablet, Take 1 tablet (20 mg total) by mouth once daily., Disp: 90 tablet, Rfl: 3  OZEMPIC 0.25 mg or 0.5 mg (2 mg/3 mL) pen injector, INJECT 0.5 MG              SUBCUTANEOUSLY EVERY 7 DAYS, Disp: 9 mL, Rfl: 1  predniSONE (DELTASONE) 5 MG tablet, Take  1 tablet (5 mg total) by mouth once daily., Disp: 90 tablet, Rfl: 3  primidone (MYSOLINE) 50 MG Tab, TAKE 2 TABLETS EVERY       EVENING, Disp: 180 tablet, Rfl: 3  propranoloL (INDERAL) 20 MG tablet, TAKE 1 TABLET TWICE A DAY, Disp: 180 tablet, Rfl: 3  tacrolimus (PROGRAF) 1 MG Cap, Take 8 capsules (8 mg total) by mouth every morning AND 7 capsules (7 mg total) every evening., Disp: 1350 capsule, Rfl: 3  tamsulosin (FLOMAX) 0.4 mg Cap, TAKE 1 CAPSULE ONCE DAILY, Disp: 90 capsule, Rfl: 3  torsemide (DEMADEX) 20 MG Tab, TAKE 2 TABLETS (40MG TOTAL)ONCE DAILY, Disp: 180 tablet, Rfl: 3    No current facility-administered medications for this visit.          Review of Systems   Constitutional: Negative.   HENT: Negative.     Eyes: Negative.    Cardiovascular:  Negative for chest pain, dyspnea on exertion, leg swelling, near-syncope, palpitations and syncope.   Respiratory: Negative.  Negative for shortness of breath.    Endocrine: Negative.    Hematologic/Lymphatic: Negative.    Skin: Negative.    Musculoskeletal: Negative.    Gastrointestinal: Negative.    Genitourinary: Negative.    Neurological: Negative.  Negative for dizziness and light-headedness.   Psychiatric/Behavioral: Negative.     Allergic/Immunologic: Negative.           Objective     Physical Exam  Vitals reviewed.   Constitutional:       General: He is not in acute distress.     Appearance: He is well-developed.   HENT:      Head: Normocephalic and atraumatic.   Eyes:      Pupils: Pupils are equal, round, and reactive to light.   Neck:      Thyroid: No thyromegaly.      Vascular: No JVD.   Cardiovascular:      Rate and Rhythm: Normal rate and regular rhythm.      Chest Wall: PMI is not displaced.      Heart sounds: Normal heart sounds, S1 normal and S2 normal. No murmur heard.     No friction rub. No gallop.   Pulmonary:      Effort: Pulmonary effort is normal. No respiratory distress.      Breath sounds: Normal breath sounds. No wheezing or rales.    Abdominal:      General: Bowel sounds are normal. There is no distension.      Palpations: Abdomen is soft.      Tenderness: There is no abdominal tenderness. There is no guarding or rebound.   Musculoskeletal:         General: No tenderness. Normal range of motion.      Cervical back: Normal range of motion.   Skin:     General: Skin is warm and dry.      Findings: No erythema or rash.   Neurological:      Mental Status: He is alert and oriented to person, place, and time.      Cranial Nerves: No cranial nerve deficit.   Psychiatric:         Behavior: Behavior normal.         Thought Content: Thought content normal.         Judgment: Judgment normal.            Assessment and Plan     1. Typical atrial flutter        Plan:  75 yom OHT here for post ablation visit. No recurrence of AFL since his ablation. He is off OAC. RTC 6m

## 2025-07-02 ENCOUNTER — OFFICE VISIT (OUTPATIENT)
Dept: CARDIOLOGY | Facility: CLINIC | Age: 75
End: 2025-07-02
Payer: MEDICARE

## 2025-07-02 ENCOUNTER — HOSPITAL ENCOUNTER (OUTPATIENT)
Dept: RADIOLOGY | Facility: HOSPITAL | Age: 75
Discharge: HOME | End: 2025-07-02
Attending: UROLOGY
Payer: MEDICARE

## 2025-07-02 VITALS
WEIGHT: 241.31 LBS | HEART RATE: 58 BPM | HEIGHT: 74 IN | SYSTOLIC BLOOD PRESSURE: 145 MMHG | RESPIRATION RATE: 16 BRPM | DIASTOLIC BLOOD PRESSURE: 77 MMHG | OXYGEN SATURATION: 99 % | BODY MASS INDEX: 30.97 KG/M2

## 2025-07-02 DIAGNOSIS — Z01.818 PRE-OP EXAM: ICD-10-CM

## 2025-07-02 DIAGNOSIS — I48.3 TYPICAL ATRIAL FLUTTER: Primary | ICD-10-CM

## 2025-07-02 PROCEDURE — 71046 X-RAY EXAM CHEST 2 VIEWS: CPT | Mod: 26,,, | Performed by: RADIOLOGY

## 2025-07-02 PROCEDURE — 99999 PR PBB SHADOW E&M-EST. PATIENT-LVL IV: CPT | Mod: PBBFAC,,, | Performed by: INTERNAL MEDICINE

## 2025-07-02 PROCEDURE — 99214 OFFICE O/P EST MOD 30 MIN: CPT | Mod: PBBFAC,25 | Performed by: INTERNAL MEDICINE

## 2025-07-02 PROCEDURE — 71046 X-RAY EXAM CHEST 2 VIEWS: CPT | Mod: TC

## 2025-07-03 ENCOUNTER — RESULTS FOLLOW-UP (OUTPATIENT)
Dept: TRANSPLANT | Facility: CLINIC | Age: 75
End: 2025-07-03

## 2025-07-09 NOTE — PRE-PROCEDURE INSTRUCTIONS
7/9/25 @ 0933 AM Encouraged patient to come to [facility] appt today, 7/9/25, at 2 PM. Pt states he is unable to make [facility] appt for 2 PM today. Encouraged patient to come tomorrow, (Thursday 7/10/25) for [facility] clinic. Pt. states he is unable to come at all on Thursday 7/10/25. Pt scheduled for [facility] appt. at 11:00 AM on Friday 7/11/25. Pt reports he does not want to do 8:45 AM appt on Friday d/t traffic.

## 2025-07-11 ENCOUNTER — E-CONSULT (OUTPATIENT)
Dept: CARDIOLOGY | Facility: CLINIC | Age: 75
End: 2025-07-11
Payer: MEDICARE

## 2025-07-11 ENCOUNTER — OFFICE VISIT (OUTPATIENT)
Dept: INTERNAL MEDICINE | Facility: CLINIC | Age: 75
End: 2025-07-11
Payer: MEDICARE

## 2025-07-11 DIAGNOSIS — N18.4 CONTROLLED TYPE 2 DIABETES MELLITUS WITH STAGE 4 CHRONIC KIDNEY DISEASE, WITHOUT LONG-TERM CURRENT USE OF INSULIN: ICD-10-CM

## 2025-07-11 DIAGNOSIS — E11.22 CONTROLLED TYPE 2 DIABETES MELLITUS WITH STAGE 4 CHRONIC KIDNEY DISEASE, WITHOUT LONG-TERM CURRENT USE OF INSULIN: ICD-10-CM

## 2025-07-11 DIAGNOSIS — I10 PRIMARY HYPERTENSION: Chronic | ICD-10-CM

## 2025-07-11 DIAGNOSIS — Z94.0 DECEASED-DONOR KIDNEY TRANSPLANT: Chronic | ICD-10-CM

## 2025-07-11 DIAGNOSIS — Z01.818 PRE-OP EVALUATION: Primary | ICD-10-CM

## 2025-07-11 DIAGNOSIS — D84.9 IMMUNOCOMPROMISED STATE: ICD-10-CM

## 2025-07-11 DIAGNOSIS — Z01.810 PREOP CARDIOVASCULAR EXAM: Primary | ICD-10-CM

## 2025-07-11 DIAGNOSIS — Z94.1 HEART TRANSPLANTED: Chronic | ICD-10-CM

## 2025-07-11 DIAGNOSIS — N18.32 CKD STAGE 3B, GFR 30-44 ML/MIN: ICD-10-CM

## 2025-07-11 PROCEDURE — 99211 OFF/OP EST MAY X REQ PHY/QHP: CPT | Mod: PBBFAC

## 2025-07-11 PROCEDURE — 99214 OFFICE O/P EST MOD 30 MIN: CPT | Mod: S$PBB,,, | Performed by: INTERNAL MEDICINE

## 2025-07-11 PROCEDURE — 99999 PR PBB SHADOW E&M-EST. PATIENT-LVL I: CPT | Mod: PBBFAC,,,

## 2025-07-11 NOTE — PRE ADMISSION SCREENING
Pre op instructions reviewed with patient during Clinic Visit with Provider.    To confirm, Surgery is scheduled on 7/15/25. We will call you late afternoon the business day prior to surgery with your arrival time.    *Please report to the [facility] (1st Floor) located off of [facility] (2nd Entrance/Building on the left, in front of the flag pole). Address: [address] [place] [address] [place]. [address]    INSTRUCTIONS IMPORTANT!!!  DO NOT Eat, Drink, or Smoke after 12 midnight unless instructed otherwise by your Surgeon. OK to brush teeth, no gum, candy or mints!      MORNING OF SURGERY, drink small sip of water with the following medications instructed by Pre-Admit Provider:  Hydralazine, Nifedipine, Prednisone, Propanolol, Tacrolimus, and Mycophenolate     *Additional Medication Instructions: PLEASE CONTINUE TO HOLD OZEMPIC PRIOR TO SURGERY.  - NO SMOKING OR VENTING AFTER MIDNIGHT ON THE EVENING PRIOR TO SURGERY  - NO ASPIRIN products & NSAIDS 7 days prior to surgery    *Blood Thinners: CONTINUE TO HOLD ASPIRIN prior to surgery per Physician Instructions! Call your Surgeon office to inquire about any   questions regarding your blood thinner medication.    -     Stop taking *ADHD Medication:  48 hrs prior to surgery, as this can affect the anesthesia used.     Diabetic Patients: If you take diabetic or weight loss medication, Do NOT take morning of surgery unless instructed by Doctor. Metformin to be stopped 24 hrs prior to surgery.     DO NOT take long-acting insulin the evening before surgery. Blood sugars will be checked in pre-op by Nurse.    If you take Ozempic/ Mounjaro / Wegovy / Trulicity / Semaglutide, Tirzepatide any weight loss injections OR PHENTERMINE --->>> PLEASE LET US KNOW IMMEDIATELY, as these medications need to be stopped 7 days prior to surgery!    *Patients should HOLD all vitamins, herbal supplements, weight loss medication, ASPIRIN products & NSAIDS 7 days prior to surgery,  as these can thin the blood. Ok to take Tylenol.    ____  Avoid Alcoholic beverages 3 days prior to surgery, as it can thin the blood.  ____  NO Acrylic/fake nails or nail polish worn day of surgery (specifically hand/arm & foot surgeries).  ____  NO powder, lotions, deodorants, oils or cream on body.  ____  Remove all jewelry, piercings, & foreign objects prior to arrival and leave at home.  ____  Remove Dentures, Hearing Aids & Contact Lens prior to surgery.  ____  Bring photo ID and insurance information to hospital (Leave Valuables at Home).  ____  If going home the same day, arrange for a ride home. You will not be able to drive for 24 hrs if Anesthesia was used.   ____  Males: Stop ED medications (Viagra, Cialis) 24 hrs prior to surgery.  ____  Wear clean, loose fitting clothing to allow for dressings/ bandages.    Bathing Instructions:    -Shower with anti-bacterial Soap (ex: Hibiclens or Dial) the night before surgery and the morning of.   -Do not use Hibiclens on your face or genitals.   -Apply clean clothes after shower.  -Do not shave your face morning of surgery               -Do not use oil, lotion, cream, powder or deodorant  -Do not shave your body 3 days prior to surgery unless instructed otherwise by your Surgeon.    [facility] Vis[facility]/Ride Policy:  Only 2 adults allowed in pre op/recovery area during your procedure. You MUST HAVE A RIDE HOME from a responsible adult that you know and trust. Medical Transport, Uber or Lyft can ONLY be used if patient has a responsible adult to accompany them during ride home.       *Signs and symptoms of Infection Before or After Surgery:               !!!If you experience any fever, chills, nausea/ vomiting, foul odor/ excessive drainage from surgical site, flu-like symptoms, new wounds or cuts, PLEASE CALL THE SURGEON OFFICE at [phone] or SEND MESSAGE THROUGH [facility]!!!     *If you are running late day of surgery, please call the Surgery Dept @  [phone].    *Billing question, please call  [phone]     Thank you,  -[facility] Pre Admit Dept.  [phone]   or [phone]  M-F 7:30 am-4:00 pm (Closed Major Holidays)

## 2025-07-11 NOTE — PROGRESS NOTES
The patient has had an E-Consult completed. Please refer to that note as needed. Please communicate the information below to the patient. Based on the recommendations of the specialist, I recommend that the patient do the following:    Mr. Albrecht is a 74 y.o. year old Black or  male who received a  heart and kidney transplant on 5/24/02.  He has CKD stage 3 - GFR 30-59 and his baseline creatinine is between mid 2.0s . He takes mycophenolate mofetil and Tacrolimus for maintenance immunosuppression. From the kidney transplant standpoint there is no contra-indication for urologic procedure.

## 2025-07-11 NOTE — CONSULTS
[facility]  Response for E-Consult     Patient Name: Nigel Albrecht  MRN: [id number]  Primary Care Provider: Krystin Zimmerman MD   Requesting Provider: Marcelo Bland II, MD  E-Consult to General Cardiology  Consult performed by: Tevin Zamudio MD  Consult ordered by: Marcelo Bland II, MD  Reason for consult: preop          Recommendation:     Additional future steps to consider:  Okay to proceed with his penile procedure/surgery pill if no clinical decompensation.    Okay to hold aspirin for 5 days prior to his surgery    Total time of Consultation: 5 minute    I did not speak to the requesting provider verbally about this.     *This eConsult is based on the clinical data available to me and is furnished without benefit of a physical examination. The eConsult will need to be interpreted in light of any clinical issues or changes in patient status not available to me at the time of filing this eConsults. Significant changes in patient condition or level of acuity should result in immediate formal consultation and reevaluation. Please alert me if you have further questions.    Thank you for this eConsult referral.     Tevin Zamudio MD  The [address]

## 2025-07-11 NOTE — PROGRESS NOTES
Preoperative History and Physical                                                             Hospital Medicine      Chief Complaint: Preoperative evaluation    History of Present Illness:      Nigel Alrbecht is a 75 y.o. male who  has a past medical history of Allergic rhinitis, Blood transfusion, Cataract, CKD (chronic kidney disease), stage III, -donor kidney transplant, Diabetes mellitus, type 2, Gout, arthritis, Heart attack, Heart transplanted, Hepatitis C antibody positive in blood, Hyperlipidemia, Hypertension, Immunodeficiency due to treatment with immunosuppressive medication, Morbid obesity, Organ transplant, Orthostatic syncope, Pneumonia due to COVID-19 virus, Prophylactic immunotherapy, Prostate cancer, and Renal manifestation of secondary diabetes mellitus who presents to the office today for a preoperative consultation at the request of Dr. Kelly who plans on performing TRANSPERINEAL INSERTION OF PERIPROSTATIC GEL on July 15, 2025.    Functional Status:      The patient is able to climb a flight of stairs. The patient is able to ambulate without difficulty. The patient's functional status is not affected by their joint pain. The patient's functional status is not affected by shortness of breath, chest pain, dyspnea on exertion and fatigue.      Past Medical History:      Past Medical History:   Diagnosis Date    Allergic rhinitis 2013    Blood transfusion     Cataract     CKD (chronic kidney disease), stage III 2014    -donor kidney transplant     Diabetes mellitus, type 2 2013    Gout, arthritis 2013    Heart attack     Heart transplanted     Hepatitis C antibody positive in blood 2025    RNA NEGATIVE    Hyperlipidemia 2013    Hypertension     Immunodeficiency due to treatment with immunosuppressive medication     Morbid obesity 2013    Organ transplant 2002    heart and kidney    Orthostatic syncope 2022    Due to furosemide     Pneumonia due to COVID-19 virus 2022    Prophylactic immunotherapy     Prostate cancer 2023    Renal manifestation of secondary diabetes mellitus         Past Surgical History:      Past Surgical History:   Procedure Laterality Date    ABLATION, ATRIAL FLUTTER, TYPICAL N/A 2025    Procedure: Ablation, Atrial Flutter, Typical;  Surgeon: Marcelo Blackman MD;  Location: [facility] EP LAB;  Service: Cardiology;  Laterality: N/A;  AFL, ERIC (cx if SR), RFA, TANYA w/HD GRID, MAC, MB, 3 Prep *Heart & Kidney Transplant*    CATARACT EXTRACTION Bilateral     COLONOSCOPY N/A 10/06/2020    Procedure: COLONOSCOPY;  Surgeon: Conner Redman MD;  Location: [facility] ENDO;  Service: Endoscopy;  Laterality: N/A;    ECHOCARDIOGRAM,TRANSESOPHAGEAL N/A 2025    Procedure: Transesophageal echo (ERIC) intra-procedure log documentation;  Surgeon: Chad De Jesus MD;  Location: [facility] EP LAB;  Service: Cardiology;  Laterality: N/A;    EYE SURGERY      HEART TRANSPLANT      KIDNEY TRANSPLANT      REVISION TOTAL HIP ARTHROPLASTY      TRANSRECTAL BIOPSY OF PROSTATE WITH ULTRASOUND GUIDANCE N/A 2025    Procedure: BIOPSY, PROSTATE, RECTAL APPROACH, WITH US GUIDANCE;  Surgeon: Jorge Kelly MD;  Location: [facility] OR;  Service: Urology;  Laterality: N/A;        Social History:      Social History     Socioeconomic History    Marital status:    Occupational History     Employer: Retired   Tobacco Use    Smoking status: Former     Current packs/day: 0.00     Average packs/day: 1 pack/day for 20.0 years (20.0 ttl pk-yrs)     Types: Cigarettes     Start date: 1964     Quit date: 1984     Years since quittin.0    Smokeless tobacco: Never   Substance and Sexual Activity    Alcohol use: Yes     Alcohol/week: 1.0 standard drink of alcohol     Types: 1 Cans of beer per week     Comment: occ    Drug use: No    Sexual activity: Not Currently     Partners: Female     Social Drivers of Health     Financial Resource  Strain: Low Risk  (4/23/2025)    Overall Financial Resource Strain (CARDIA)     Difficulty of Paying Living Expenses: Not very hard   Food Insecurity: No Food Insecurity (4/23/2025)    Hunger Vital Sign     Worried About Running Out of Food in the Last Year: Never true     Ran Out of Food in the Last Year: Never true   Transportation Needs: No Transportation Needs (4/23/2025)    PRAPARE - Transportation     Lack of Transportation (Medical): No     Lack of Transportation (Non-Medical): No   Physical Activity: Inactive (4/23/2025)    Exercise Vital Sign     Days of Exercise per Week: 2 days     Minutes of Exercise per Session: 0 min   Stress: Stress Concern Present (4/23/2025)    [employer] - Occupational Stress Questionnaire     Feeling of Stress : To some extent   Housing Stability: Unknown (9/30/2024)    Housing Stability Vital Sign     Unable to Pay for Housing in the Last Year: No     Homeless in the Last Year: No        Family History:      Family History   Problem Relation Name Age of Onset    Stroke Mother      Hyperlipidemia Sister 2     Diabetes Brother 4 decsd/ 5     Early death Brother 4 decsd/ 5     Heart disease Brother 4 decsd/ 5     Hyperlipidemia Brother 4 decsd/ 5     Hypertension Brother 4 decsd/ 5     Stroke Brother 4 decsd/ 5     Kidney disease Son 2     Diabetes Maternal Grandmother      Melanoma Neg Hx      Eczema Neg Hx      Lupus Neg Hx      Psoriasis Neg Hx         Allergies:      Review of patient's allergies indicates:  No Known Allergies    Medications:      Current Outpatient Medications   Medication Sig    acetaminophen (TYLENOL) 325 MG tablet Take 2 tablets (650 mg total) by mouth every 4 (four) hours as needed.    aspirin 81 MG Chew Take 1 tablet (81 mg total) by mouth once daily.    atorvastatin (LIPITOR) 40 MG tablet TAKE 1 TABLET ONCE DAILY    blood-glucose sensor (FREESTYLE SHREE 3 PLUS SENSOR) [place] Inject 1 Device into the skin Every 15 days.     calcium acetate,phosphat bind, (PHOSLO) 667 mg capsule Take 2 capsules (1,334 mg total) by mouth 3 (three) times daily with meals.    cream base no.171, bulk, Crea 2 g by Misc.(Non-Drug; Combo Route) route 4 (four) times daily as needed (pain).    hydrALAZINE (APRESOLINE) 50 MG tablet Take 1 tablet (50 mg total) by mouth every 8 (eight) hours.    mycophenolate (CELLCEPT) 250 mg Cap Take 2 capsules (500 mg total) by mouth 2 (two) times daily.    NIFEdipine (PROCARDIA-XL) 60 MG (OSM) 24 hr tablet Take 1 tablet (60 mg total) by mouth 2 (two) times a day.    olmesartan (BENICAR) 20 MG tablet Take 1 tablet (20 mg total) by mouth once daily.    OZEMPIC 0.25 mg or 0.5 mg (2 mg/3 mL) pen injector INJECT 0.5 MG              SUBCUTANEOUSLY EVERY 7 DAYS    predniSONE (DELTASONE) 5 MG tablet Take 1 tablet (5 mg total) by mouth once daily.    primidone (MYSOLINE) 50 MG Tab TAKE 2 TABLETS EVERY       EVENING    propranoloL (INDERAL) 20 MG tablet TAKE 1 TABLET TWICE A DAY    tacrolimus (PROGRAF) 1 MG Cap Take 8 capsules (8 mg total) by mouth every morning AND 7 capsules (7 mg total) every evening.    tamsulosin (FLOMAX) 0.4 mg Cap TAKE 1 CAPSULE ONCE DAILY    torsemide (DEMADEX) 20 MG Tab TAKE 2 TABLETS (40MG TOTAL)ONCE DAILY     No current facility-administered medications for this visit.       Vitals:      BP:  135/75  HR:  54  RR:  18  SpO2:  97% room air    Review of Systems:        Constitutional: Negative for fever, chills, weight loss, malaise/fatigue and diaphoresis.   HENT: Negative for hearing loss, ear pain, nosebleeds, congestion, sore throat, neck pain, tinnitus and ear discharge.    Eyes: Negative for blurred vision, double vision, photophobia, pain, discharge and redness.   Respiratory: Negative for cough, hemoptysis, sputum production, shortness of breath, wheezing and stridor.    Cardiovascular: Negative for chest pain, palpitations, orthopnea, claudication, leg swelling and PND.   Gastrointestinal: Negative for  heartburn, nausea, vomiting, abdominal pain, diarrhea, constipation, blood in stool and melena.   Genitourinary: Negative for dysuria, urgency, frequency, hematuria and flank pain.   Musculoskeletal: Negative for myalgias, back pain, joint pain and falls.   Skin: Negative for itching and rash.   Neurological: Negative for dizziness, tingling, tremors, sensory change, speech change, focal weakness, seizures, loss of consciousness, weakness and headaches.   Endo/Heme/Allergies: Negative for environmental allergies and polydipsia. Does not bruise/bleed easily.   Psychiatric/Behavioral: Negative for depression, suicidal ideas, hallucinations, memory loss and substance abuse. The patient is not nervous/anxious and does not have insomnia.    All 14 systems reviewed and negative except as noted above.    Physical Exam:      Constitutional: Appears well-developed, well-nourished and in no acute distress.  Pt is oriented to person, place, and time.   Head: Normocephalic and atraumatic. Mucous membranes moist.  Neck: Neck supple no mass.   Cardiovascular: Normal rate and regular rhythm.  S1 S2 appreciated by ascultation.  Pulmonary/Chest: Effort normal and clear to auscultation bilaterally. No respiratory distress.   Abdomen: Soft. Non-tender and non-distended. Bowel sounds are normal.   Neurological: Pt is alert and oriented to person, place, and time. Moves all extremities.  Skin: Warm and dry. No lesions.  Extremities: No clubbing cyanosis or edema.    Laboratory data:      Reviewed and noted in plan where applicable. Please see chart for full laboratory data.    Lab Results   Component Value Date    INR 1.0 02/24/2025    INR 1.0 02/17/2025    INR 1.0 07/04/2022       Lab Results   Component Value Date    WBC 4.64 07/02/2025    HGB 9.8 (L) 07/02/2025    HCT 32.7 (L) 07/02/2025    MCV 94 07/02/2025     07/02/2025       Predictors of intubation difficulty:       Morbid obesity? overweight   Anatomically abnormal  facies? no   Prominent incisors? no   Receding mandible? no   Short, thick neck? no   Neck range of motion: normal   Dentition: No chipped, loose, or missing teeth.    Cardiographics:      ECG (dated 2/24/25):  Vent. Rate :  64 BPM     Atrial Rate :  64 BPM      P-R Int : 196 ms          QRS Dur : 178 ms       QT Int : 518 ms       P-R-T Axes :  95  42  38 degrees     QTcB Int : 534 ms     Normal sinus rhythm with sinus arrhythmia   Right bundle branch block   Abnormal ECG   When compared with ECG of 24-Feb-2025 08:27,   Sinus rhythm has replaced Atrial fibrillation   Confirmed by Jimbo Yun ([id number]) on 2/24/2025 2:26:40 PM      Echocardiogram (dated 5/14/25):     Stress Protocol: The patient exercised for 3 minutes 17 seconds on a high ramp protocol, achieving a peak heart rate of 83 bpm, which is 57% of the age predicted maximum heart rate. Their exercise capacity was severely impaired. The patient reported shortness of breath during the stress test. The test was stopped because the patient experienced fatigue and shortness of breath.    Left Ventricle: The left ventricle is at the upper limit of normal in size. Mildly increased ventricular mass. Mildly increased wall thickness. Mild septal thickening. There is mild concentric hypertrophy. Septal motion is abnormal. There is normal systolic function with a visually estimated ejection fraction of 55 - 60%. Ejection fraction is approximately 58%. There is normal diastolic function.    Right Ventricle: The right ventricle is mildly dilated Wall thickness is normal. Systolic function is normal.    Mitral Valve: There is trivial-mild regurgitation.    Pulmonary Artery: There is mild pulmonary hypertension. The estimated pulmonary artery systolic pressure is 44 mmHg.    IVC/SVC: Normal venous pressure at 3 mmHg.    Baseline ECG: The Baseline ECG reveals sinus rhythm. The axis is normal. The ST segments are normal.    Stress ECG: There is no ST segment deviation  identified during the protocol. There are no arrhythmias during stress. There is normal blood pressure response with stress.    ECG Conclusion: The ECG portion of the study is negative for ischemia. Sensitivity is reduced due to failure to reach target heart rate.    Post-stress Echo: The left ventricle systolic function is hyperdynamic with an EF of 68%.    Post-stress Conclusion: The study is negative with no echocardiographic evidence of stress induced ischemia.    Imaging:      Chest x-ray (dated 4/23/25):   EXAM:   XR CHEST 1 VIEW PRE-OP     CLINICAL HISTORY: Physical exam     COMPARISON: 05/27/2024.     FINDINGS:  The lungs are clear.   No pneumothorax.  The cardiac silhouette size and contour is within normal limits.              Impression:        Negative portable chest radiograph.    Assessment:      75 y.o. male with planned surgery as above.    Known risk factors for perioperative complications: Coronary disease, Heart and renal transplant    Difficulty with intubation is not anticipated.    Plan:      Pre-op evaluation  Assessment & Plan:  Dr. Kelly plans on performing TRANSPERINEAL INSERTION OF PERIPROSTATIC GEL on July 15, 2025.    Known risk factors for perioperative complications: Coronary disease, Heart and renal transplant     Difficulty with intubation is not anticipated.    Dr. Quintanilla (cardiology) cleared on 7/11/25 --   Additional future steps to consider:  Okay to proceed with his penile procedure/surgery pill if no clinical decompensation.    Okay to hold aspirin for 5 days prior to his surgery    Cardiac Risk Estimation:     Revised Cardiac Risk Index for Pre-Operative Risk from [email or web address]  on 7/14/2025  ** All calculations should be rechecked by clinician prior to use **    RESULT SUMMARY:  0 points  RCRI Score    0.5 %  Risk of major cardiac event      INPUTS:  High-risk surgery --> 0 = No  History of ischemic heart disease --> 0 = No  History of congestive heart failure --> 0 =  No  History of cerebrovascular disease --> 0 = No  Pre-operative treatment with insulin --> 0 = No  Pre-operative creatinine >2 mg/dL / 176.8 µmol/L --> 0 = No      1.) Preoperative workup as follows: chest x-ray, ECG, hemoglobin, hematocrit, electrolytes, creatinine, glucose, liver function studies.  2.) Change in medication regimen before surgery: The morning of surgery he can take:  Hydralazine, Nifedipine, Prednisone, Propanolol, Tacrolimus, and Mycophenolate  Last dose of Ozempic was 7/4/25. May resume post procedure  Last dose of ASA was 7/8/25. May resume post procedure.  3.) Prophylaxis for cardiac events with perioperative beta-blockers: He is currently taking Propanolol 20 mg twice daily which he will continue the morning of surgery.  4.) Invasive hemodynamic monitoring perioperatively: at the discretion of anesthesiologist.  5.) Deep vein thrombosis prophylaxis postoperatively: regimen to be chosen by surgical team.  6.) Surveillance for postoperative MI with ECG immediately postoperatively and on postoperati ve days 1 and 2 AND troponin levels 24 hours postoperatively and on day 4 or hospital discharge (whichever comes first): not indicated.  7.) Current medications which may produce withdrawal symptoms if withheld perioperatively: Propanolol  8.) Other measures: Postoperative hypertension management with IV hydralazine until able to take oral medications.  Postoperative incentive spirometry to prevent pneumonia.   Received clearance from Cardiology Transplant team -- Dr. John Arthur  E consult for Nephrology transplant to Dr. Edy Reese for clearance on 7/11/25 -- from the kidney transplant standpoint there is no contra-indication for urologic procedure.        Orders:  -     Ambulatory referral/consult to Pre-Admit  -     E-Consult to Nephrology    Heart transplanted  Assessment & Plan:  -Transplant 5/24/02  -Received clearance from Cardiology Transplant team -- Dr. John Arthur  -Continue  Tacrolimus, Mycophenolate, and Prednisone the morning of surgery      -donor kidney transplant  Assessment & Plan:  -Transplant 02  -He will continue Tacrolimus, Mycophenolate, and Prednisone the morning of procedure  -E consult from Nephrology transplant to Dr. Edy Reese for clearance on 25 -- from the kidney transplant standpoint there is no contra-indication for urologic procedure.      Orders:  -     E-Consult to Nephrology    CKD stage 3b, GFR 30-44 ml/min    Immunocompromised state    Primary hypertension  Assessment & Plan:  -He currently takes Nifedipine, Hydralazine, and Propanolol for management of BP. He will continue all medications the morning of surgery      Controlled type 2 diabetes mellitus with stage 4 chronic kidney disease, without long-term current use of insulin  Assessment & Plan:  Hemoglobin A1C   Date Value Ref Range Status   2025 5.9 (H) 4.0 - 5.6 % Final     Comment:     ADA Screening Guidelines:  5.7-6.4%  Consistent with prediabetes  >or=6.5%  Consistent with diabetes    High levels of fetal hemoglobin interfere with the HbA1C  assay. Heterozygous hemoglobin variants (HbS, HgC, etc)do  not significantly interfere with this assay.   However, presence of multiple variants may affect accuracy.     10/07/2024 6.0 (H) 4.0 - 5.6 % Final     Comment:     ADA Screening Guidelines:  5.7-6.4%  Consistent with prediabetes  >or=6.5%  Consistent with diabetes    High levels of fetal hemoglobin interfere with the HbA1C  assay. Heterozygous hemoglobin variants (HbS, HgC, etc)do  not significantly interfere with this assay.   However, presence of multiple variants may affect accuracy.     2024 5.9 (H) 4.0 - 5.6 % Final     Comment:     ADA Screening Guidelines:  5.7-6.4%  Consistent with prediabetes  >or=6.5%  Consistent with diabetes    High levels of fetal hemoglobin interfere with the HbA1C  assay. Heterozygous hemoglobin variants (HbS, HgC, etc)do  not significantly  interfere with this assay.   However, presence of multiple variants may affect accuracy.       Hemoglobin A1c   Date Value Ref Range Status   05/08/2025 5.9 (H) 4.0 - 5.6 % Final     Comment:     ADA Screening Guidelines:  5.7-6.4%  Consistent with prediabetes  >=6.5%  Consistent with diabetes    High levels of fetal hemoglobin interfere with the HbA1C  assay. Heterozygous hemoglobin variants (HbS, HgC, etc)do  not significantly interfere with this assay.   However, presence of multiple variants may affect accuracy.      -He is currently taking Ozempic 0.5 mg weekly  -Pt instructed to hold at least 7 days before procedure. His last dose 7/4/25. May resume postoperatively

## 2025-07-14 NOTE — ASSESSMENT & PLAN NOTE
-Transplant 5/24/02  -He will continue Tacrolimus, Mycophenolate, and Prednisone the morning of procedure  -E consult from Nephrology transplant to Dr. Edy Reese for clearance on 7/11/25 -- from the kidney transplant standpoint there is no contra-indication for urologic procedure.

## 2025-07-14 NOTE — ASSESSMENT & PLAN NOTE
Dr. Kelly plans on performing TRANSPERINEAL INSERTION OF PERIPROSTATIC GEL on July 15, 2025.    Known risk factors for perioperative complications: Coronary disease, Heart and renal transplant     Difficulty with intubation is not anticipated.    Dr. Quintanilla (cardiology) cleared on 7/11/25 --   Additional future steps to consider:  Okay to proceed with his penile procedure/surgery pill if no clinical decompensation.    Okay to hold aspirin for 5 days prior to his surgery    Cardiac Risk Estimation:     Revised Cardiac Risk Index for Pre-Operative Risk from [email or web address]  on 7/14/2025  ** All calculations should be rechecked by clinician prior to use **    RESULT SUMMARY:  0 points  RCRI Score    0.5 %  Risk of major cardiac event      INPUTS:  High-risk surgery --> 0 = No  History of ischemic heart disease --> 0 = No  History of congestive heart failure --> 0 = No  History of cerebrovascular disease --> 0 = No  Pre-operative treatment with insulin --> 0 = No  Pre-operative creatinine >2 mg/dL / 176.8 µmol/L --> 0 = No      1.) Preoperative workup as follows: chest x-ray, ECG, hemoglobin, hematocrit, electrolytes, creatinine, glucose, liver function studies.  2.) Change in medication regimen before surgery: The morning of surgery he can take:  Hydralazine, Nifedipine, Prednisone, Propanolol, Tacrolimus, and Mycophenolate  Last dose of Ozempic was 7/4/25. May resume post procedure  Last dose of ASA was 7/8/25. May resume post procedure.  3.) Prophylaxis for cardiac events with perioperative beta-blockers: He is currently taking Propanolol 20 mg twice daily which he will continue the morning of surgery.  4.) Invasive hemodynamic monitoring perioperatively: at the discretion of anesthesiologist.  5.) Deep vein thrombosis prophylaxis postoperatively: regimen to be chosen by surgical team.  6.) Surveillance for postoperative MI with ECG immediately postoperatively and on postoperati ve days 1 and 2 AND troponin levels 24  hours postoperatively and on day 4 or hospital discharge (whichever comes first): not indicated.  7.) Current medications which may produce withdrawal symptoms if withheld perioperatively: Propanolol  8.) Other measures: Postoperative hypertension management with IV hydralazine until able to take oral medications.  Postoperative incentive spirometry to prevent pneumonia.   Received clearance from Cardiology Transplant team -- Dr. John Arthur  E consult for Nephrology transplant to Dr. Edy Reese for clearance on 7/11/25 -- from the kidney transplant standpoint there is no contra-indication for urologic procedure.

## 2025-07-14 NOTE — ASSESSMENT & PLAN NOTE
Hemoglobin A1C   Date Value Ref Range Status   02/17/2025 5.9 (H) 4.0 - 5.6 % Final     Comment:     ADA Screening Guidelines:  5.7-6.4%  Consistent with prediabetes  >or=6.5%  Consistent with diabetes    High levels of fetal hemoglobin interfere with the HbA1C  assay. Heterozygous hemoglobin variants (HbS, HgC, etc)do  not significantly interfere with this assay.   However, presence of multiple variants may affect accuracy.     10/07/2024 6.0 (H) 4.0 - 5.6 % Final     Comment:     ADA Screening Guidelines:  5.7-6.4%  Consistent with prediabetes  >or=6.5%  Consistent with diabetes    High levels of fetal hemoglobin interfere with the HbA1C  assay. Heterozygous hemoglobin variants (HbS, HgC, etc)do  not significantly interfere with this assay.   However, presence of multiple variants may affect accuracy.     05/27/2024 5.9 (H) 4.0 - 5.6 % Final     Comment:     ADA Screening Guidelines:  5.7-6.4%  Consistent with prediabetes  >or=6.5%  Consistent with diabetes    High levels of fetal hemoglobin interfere with the HbA1C  assay. Heterozygous hemoglobin variants (HbS, HgC, etc)do  not significantly interfere with this assay.   However, presence of multiple variants may affect accuracy.       Hemoglobin A1c   Date Value Ref Range Status   05/08/2025 5.9 (H) 4.0 - 5.6 % Final     Comment:     ADA Screening Guidelines:  5.7-6.4%  Consistent with prediabetes  >=6.5%  Consistent with diabetes    High levels of fetal hemoglobin interfere with the HbA1C  assay. Heterozygous hemoglobin variants (HbS, HgC, etc)do  not significantly interfere with this assay.   However, presence of multiple variants may affect accuracy.      -He is currently taking Ozempic 0.5 mg weekly  -Pt instructed to hold at least 7 days before procedure. His last dose 7/4/25. May resume postoperatively

## 2025-07-14 NOTE — ASSESSMENT & PLAN NOTE
-He currently takes Nifedipine, Hydralazine, and Propanolol for management of BP. He will continue all medications the morning of surgery

## 2025-07-14 NOTE — ASSESSMENT & PLAN NOTE
-Transplant 5/24/02  -Received clearance from Cardiology Transplant team -- Dr. John Arthur  -Continue Tacrolimus, Mycophenolate, and Prednisone the morning of surgery

## 2025-07-14 NOTE — PRE-PROCEDURE INSTRUCTIONS
Called and spoke with PATIENT about the following:     Please arrive to [facility] ([facility] at 8:30AM on 7/15/25 for your scheduled procedure.  Address: [address] [place] [address] [place] [id number] ([address] Building on left, 1st Floor Lobby)    NO FOOD, DRINK OR TOBACCO PRODUCTS AFTER MIDNIGHT THE NIGHT BEFORE SURGERY.     Do not eat after 12 midnight, Do not smoke or use chewing tobacco after 12 midnight!  OK to brush teeth, but no gum, candy, or mints!         Thank you,  -[facility] Pre Admit Dept.  Mon-Fri 7:30 am - 4 pm [phone]

## 2025-07-15 ENCOUNTER — HOSPITAL ENCOUNTER (OUTPATIENT)
Facility: HOSPITAL | Age: 75
Discharge: HOME | End: 2025-07-15
Attending: UROLOGY | Admitting: UROLOGY
Payer: MEDICARE

## 2025-07-15 ENCOUNTER — ANESTHESIA (OUTPATIENT)
Dept: SURGERY | Facility: HOSPITAL | Age: 75
End: 2025-07-15
Payer: MEDICARE

## 2025-07-15 ENCOUNTER — ANESTHESIA EVENT (OUTPATIENT)
Dept: SURGERY | Facility: HOSPITAL | Age: 75
End: 2025-07-15
Payer: MEDICARE

## 2025-07-15 VITALS
RESPIRATION RATE: 11 BRPM | HEART RATE: 63 BPM | BODY MASS INDEX: 29.6 KG/M2 | WEIGHT: 230.63 LBS | OXYGEN SATURATION: 100 % | DIASTOLIC BLOOD PRESSURE: 80 MMHG | TEMPERATURE: 98 F | SYSTOLIC BLOOD PRESSURE: 164 MMHG | HEIGHT: 74 IN

## 2025-07-15 DIAGNOSIS — C61 PROSTATE CANCER: Primary | ICD-10-CM

## 2025-07-15 LAB — POCT GLUCOSE: 168 MG/DL (ref 70–110)

## 2025-07-15 PROCEDURE — 71000033 HC RECOVERY, INTIAL HOUR: Performed by: UROLOGY

## 2025-07-15 PROCEDURE — 55876 PLACE RT DEVICE/MARKER PROS: CPT | Mod: 51,,, | Performed by: UROLOGY

## 2025-07-15 PROCEDURE — 63600175 PHARM REV CODE 636 W HCPCS: Performed by: NURSE ANESTHETIST, CERTIFIED REGISTERED

## 2025-07-15 PROCEDURE — 25000003 PHARM REV CODE 250: Performed by: STUDENT IN AN ORGANIZED HEALTH CARE EDUCATION/TRAINING PROGRAM

## 2025-07-15 PROCEDURE — 27201423 OPTIME MED/SURG SUP & DEVICES STERILE SUPPLY: Performed by: UROLOGY

## 2025-07-15 PROCEDURE — 63600175 PHARM REV CODE 636 W HCPCS: Performed by: UROLOGY

## 2025-07-15 PROCEDURE — C1889 IMPLANT/INSERT DEVICE, NOC: HCPCS | Performed by: UROLOGY

## 2025-07-15 PROCEDURE — 76942 ECHO GUIDE FOR BIOPSY: CPT | Mod: 26,XS,, | Performed by: UROLOGY

## 2025-07-15 PROCEDURE — 55874 TPRNL PLMT BIODEGRDABL MATRL: CPT | Mod: ,,, | Performed by: UROLOGY

## 2025-07-15 PROCEDURE — 37000008 HC ANESTHESIA 1ST 15 MINUTES: Performed by: UROLOGY

## 2025-07-15 PROCEDURE — A4648 IMPLANTABLE TISSUE MARKER: HCPCS | Performed by: UROLOGY

## 2025-07-15 PROCEDURE — 71000015 HC POSTOP RECOV 1ST HR: Performed by: UROLOGY

## 2025-07-15 PROCEDURE — 36000706: Performed by: UROLOGY

## 2025-07-15 PROCEDURE — 37000009 HC ANESTHESIA EA ADD 15 MINS: Performed by: UROLOGY

## 2025-07-15 PROCEDURE — 36000707: Performed by: UROLOGY

## 2025-07-15 DEVICE — 0.75MM X 1.0CM VISICOIL LOADED IN AN 18 GAUGE PRE-WAXED BRACHYTHERAPY NEEDLE.
Type: IMPLANTABLE DEVICE | Site: PERITONEUM | Status: FUNCTIONAL
Brand: VISICOIL

## 2025-07-15 RX ORDER — LEVOFLOXACIN 500 MG/1
500 TABLET, FILM COATED ORAL DAILY
Qty: 7 TABLET | Refills: 0 | Status: SHIPPED | OUTPATIENT
Start: 2025-07-15 | End: 2025-07-22

## 2025-07-15 RX ORDER — SODIUM CHLORIDE, SODIUM LACTATE, POTASSIUM CHLORIDE, CALCIUM CHLORIDE 600; 310; 30; 20 MG/100ML; MG/100ML; MG/100ML; MG/100ML
INJECTION, SOLUTION INTRAVENOUS CONTINUOUS PRN
Status: DISCONTINUED | OUTPATIENT
Start: 2025-07-15 | End: 2025-07-15

## 2025-07-15 RX ORDER — CEFAZOLIN 2 G/1
2 INJECTION, POWDER, FOR SOLUTION INTRAMUSCULAR; INTRAVENOUS
Status: COMPLETED | OUTPATIENT
Start: 2025-07-15 | End: 2025-07-15

## 2025-07-15 RX ORDER — OXYCODONE AND ACETAMINOPHEN 5; 325 MG/1; MG/1
1 TABLET ORAL
Status: DISCONTINUED | OUTPATIENT
Start: 2025-07-15 | End: 2025-07-15 | Stop reason: HOSPADM

## 2025-07-15 RX ORDER — GLUCAGON 1 MG
1 KIT INJECTION
Status: DISCONTINUED | OUTPATIENT
Start: 2025-07-15 | End: 2025-07-15 | Stop reason: HOSPADM

## 2025-07-15 RX ORDER — OXYCODONE AND ACETAMINOPHEN 5; 325 MG/1; MG/1
1 TABLET ORAL EVERY 4 HOURS PRN
Qty: 10 TABLET | Refills: 0 | Status: SHIPPED | OUTPATIENT
Start: 2025-07-15

## 2025-07-15 RX ORDER — ONDANSETRON HYDROCHLORIDE 2 MG/ML
4 INJECTION, SOLUTION INTRAVENOUS DAILY PRN
Status: DISCONTINUED | OUTPATIENT
Start: 2025-07-15 | End: 2025-07-15 | Stop reason: HOSPADM

## 2025-07-15 RX ORDER — PROPOFOL 10 MG/ML
INJECTION, EMULSION INTRAVENOUS
Status: DISCONTINUED | OUTPATIENT
Start: 2025-07-15 | End: 2025-07-15

## 2025-07-15 RX ORDER — HYDROMORPHONE HYDROCHLORIDE 2 MG/ML
0.2 INJECTION, SOLUTION INTRAMUSCULAR; INTRAVENOUS; SUBCUTANEOUS EVERY 5 MIN PRN
Status: DISCONTINUED | OUTPATIENT
Start: 2025-07-15 | End: 2025-07-15 | Stop reason: HOSPADM

## 2025-07-15 RX ORDER — PROPOFOL 10 MG/ML
VIAL (ML) INTRAVENOUS CONTINUOUS PRN
Status: DISCONTINUED | OUTPATIENT
Start: 2025-07-15 | End: 2025-07-15

## 2025-07-15 RX ORDER — LIDOCAINE HYDROCHLORIDE 20 MG/ML
INJECTION INTRAVENOUS
Status: DISCONTINUED | OUTPATIENT
Start: 2025-07-15 | End: 2025-07-15

## 2025-07-15 RX ADMIN — OXYCODONE HYDROCHLORIDE AND ACETAMINOPHEN 1 TABLET: 5; 325 TABLET ORAL at 11:07

## 2025-07-15 RX ADMIN — PROPOFOL 100 MG: 10 INJECTION, EMULSION INTRAVENOUS at 09:07

## 2025-07-15 RX ADMIN — PROPOFOL 50 MCG/KG/MIN: 10 INJECTION, EMULSION INTRAVENOUS at 09:07

## 2025-07-15 RX ADMIN — SODIUM CHLORIDE, SODIUM LACTATE, POTASSIUM CHLORIDE, AND CALCIUM CHLORIDE: 600; 310; 30; 20 INJECTION, SOLUTION INTRAVENOUS at 09:07

## 2025-07-15 RX ADMIN — LIDOCAINE HYDROCHLORIDE 100 MG: 20 INJECTION INTRAVENOUS at 09:07

## 2025-07-15 RX ADMIN — CEFAZOLIN 2 G: 2 INJECTION, POWDER, FOR SOLUTION INTRAMUSCULAR; INTRAVENOUS at 09:07

## 2025-07-15 NOTE — DISCHARGE SUMMARY
[facility] (H[facility])  Discharge Note  Short Stay    Procedure(s) (LRB):  TRANSPERINEAL INSERTION OF PERIPROSTATIC GEL (N/A)  CYSTOSCOPY (N/A)      OUTCOME: Patient tolerated treatment/procedure well without complication and is now ready for discharge.    DISPOSITION: Home or Self Care    FINAL DIAGNOSIS:   prostate cancer    FOLLOWUP: In clinic    DISCHARGE INSTRUCTIONS:    Discharge Procedure Orders   Diet Adult Regular     Notify your health care provider if you experience any of the following:  severe uncontrolled pain     Notify your health care provider if you experience any of the following:  persistent nausea and vomiting or diarrhea     Notify your health care provider if you experience any of the following:  temperature >100.4     Activity as tolerated        TIME SPENT ON DISCHARGE: 5 minutes

## 2025-07-15 NOTE — OP NOTE
Date of Procedure:  07/15/2025    PREOPERATIVE DIAGNOSIS:  Prostate cancer.    POSTOPERATIVE DIAGNOSIS:  Prostate cancer.    PROCEDURES:    1. Fiducial seed placement  2. SpaceOAR insertion  3. transrectal ultrasound with needle guidance.  4. Cystoscopy     SURGEON:  Jorge Kelly M.D.    Assistants: None    Specimen:  None    ANESTHESIA:  MAC anesthesia    BLOOD LOSS:  Minimal    FINDINGS:  Fiducial markers placed at right base, right apex and left mid gland, SpaceOAR placed appropriately.  Cystoscopy unremarkable.  Some gel was seen extracted into the bladder though.  No evidence of trauma to the bladder or prostatic urethra.    PROCEDURE IN DETAIL:  Patient was brought into the operating suite where he was placed under general anesthesia in the dorsal lithotomy position.  Scrotal contents were taped out of surgical plane with an Ioban drape.  Patient was then sterilely prepped.  Using our bed adaptor we were able to attach our transrectal ultrasound probe and begin a perineal approach.  Prostate was localized and placed in the midline position upon our screen and locked into place.  We were then able to place our fiducial markers using both the axial and sagittal views, in the right base, right apex and left mid gland.  Confirmation was done demonstrating markers holding position within the prostate.  After placement of the fiducial markers, the SpaceOAR was prepared.  Making sure not to clog the Y connector.  This was done per factory guidelines.  We then slowly inserted our SpaceOAR needle bevel down approximately 2 cm above the ultrasound probe over the rectal plane into the perirectal fat posterior to the prostate.  Needle was demonstrated in the midline position confirmed on both axial and sagittal views, saline flushes were used for hydrodissection.  With her needle in position and reassessed on axial and sagittal views, we began injecting the SpaceOAR into his posterior prostatic space, at a steady  rate.  During infusion of SpaceOAR, it was noted to expand through the prostatic urethra into the bladder.  At which point we stopped immediately, although on both axial and sagittal we demonstrated a good placement of the SpaceOAR underneath the prostate  the rectum.  No evidence of trauma to the rectum.  Needle was removed, prostate ultrasound was removed.  At this point we determined appropriate to investigate the bladder due to this finding.  A flexible cystoscope was then introduced into the urethra, and advanced into the bladder under direct vision. The urethral mucosa appeared normal, and no strictures were noted. The sphincter appeared to be normal, and the veru montanum was unremarkable. The prostatic mucosa demonstrated lateral lobe coaptation, no evidence of trauma from a needle. The bladder neck was normal. Inspection of the interior of the bladder was then carried out. The trigone was unremarkable, with no mucosal lesions or signs of needle trauma. The ureteral orifices were normal in position and configuration.  We then monitored the ureteral orifices demonstrating no evidence of hematuria.  Systematic inspection of the mucosa of the bladder it was then carried out, rotating the cystoscope so that all areas of the left and right lateral walls, the dome of the bladder, and the posterior wall were all visualized. The cystoscope was then advanced further into the bladder, and maximum deflection of the scope was performed so that the bladder neck could be inspected. No mucosal lesions were noted there. The cystoscope was then removed, and the procedure terminated.  No evidence of trauma or disruption to the bladder.  Patient was awoken and taken to the PACU in stable condition, he will follow back up with Radiation Oncology.    COMPLICATIONS:  None

## 2025-07-15 NOTE — H&P
Chief Complaint:        Encounter Diagnoses   Name Primary?    Prostate cancer Yes    Benign prostatic hyperplasia with urinary frequency      Erectile dysfunction, unspecified erectile dysfunction type           HPI:   6/6/25- doing well after the biopsy, here today for Lupron injection in his discuss future plans, it appears that radiation will be scheduled.  Voiding is currently controlled.  Erections are currently not an issue.     3/31/23- it has been an extended time since we have seen him, he determined to pursue active surveillance after seeing Radiation Oncology.  PSA appears to be stable, current transplanted kidney is improving function.  Erections are currently not an issue.  Patient is otherwise voiding well on tamsulosin.     3/30/16: 66 yo man s/p renal transplant in 2002 here with nocturia x3, was x0-1 4-5 months ago.  No hesitancy.  Some dribble when done putting things away.Urgency.  Some double voiding.  No abd/pelvic pain and no exac/rel factors.  No hematuria.  No urolithiasis.  No urinary bother.  No  history.  Normal sexual function.  Not usually constipated.  Viagra for ED rx but not used but once.  No BPH meds.     Allergies:  No known drug allergies     Medications:  has a current medication list which includes the following prescription(s): amlodipine, atorvastatin, calcium carbonate, cholecalciferol (vitamin d3), freestyle jackie 2 sensor, fluticasone propionate, gabapentin, humulin 70/30 u-100 kwikpen, lisinopril, low-dose aspirin, metoprolol succinate, metoprolol succinate, multivit-min/fa/lycopen/lutein, mycophenolate, oxycodone-acetaminophen, ozempic, sirolimus, solifenacin, torsemide, and diazepam.     Review of Systems:  General: No fever, chills, fatigability, or weight loss.  Skin: No rashes, itching, or changes in color or texture of skin.  Chest: Denies PETERSON, cyanosis, wheezing, cough, and sputum production.  Abdomen: Appetite fine. No weight loss. Denies diarrhea, abdominal  pain, hematemesis, or blood in stool.  Musculoskeletal: No joint stiffness or swelling. Some back pain.  : As above.  All other review of systems negative.     PMH:   has a past medical history of Allergic rhinitis (2013), Blood transfusion, Cataract, CKD (chronic kidney disease), stage III (2014), -donor kidney transplant, Diabetes mellitus, type 2 (2013), Gout, arthritis (2013), Heart attack, Heart transplanted, Hyperlipidemia (2013), Hypertension, Immunodeficiency due to treatment with immunosuppressive medication, Morbid obesity (2013), Organ transplant (), Prophylactic immunotherapy, and Renal manifestation of secondary diabetes mellitus.     PSH:   has a past surgical history that includes Kidney transplant; Heart transplant; Revision total hip arthroplasty; Eye surgery; Cataract extraction (Bilateral); and Colonoscopy (N/A, 10/6/2020).     FamHx: family history includes Diabetes in his brother and maternal grandmother; Early death in his brother; Heart disease in his brother; Hyperlipidemia in his brother and sister; Hypertension in his brother; Kidney disease in his son; Stroke in his brother and mother.     SocHx:  reports that he quit smoking about 37 years ago. His smoking use included cigarettes. He has a 20.00 pack-year smoking history. He has never used smokeless tobacco. He reports current alcohol use of about 1.0 standard drink of alcohol per week. He reports that he does not use drugs.       Physical Exam:      Vitals:     22 0855   BP: (!) 154/83   Pulse: 67   Temp: 98.1 °F (36.7 °C)      General: A&Ox3, no apparent distress, no deformities  Neck: No masses, normal thyroid  Lungs: normal inspiration, no use of accessory muscles  Heart: normal pulse, no arrhythmias  Abdomen: Soft, NT, ND  Skin: The skin is warm and dry. No jaundice.  Ext: No c/c/e.  MARISOL: - 25g no nodules or fixation, otherwise benign.     Labs/Studies:    protein   MRI  guided biopsy Gl7 36g 4/29/25  pnbx Gl 6 38.9g 2/18/22  PSA 10.79 5/25  PSA 5.3 3/25  PSA 5.3 10/24  PSA 4.0 7/24  PSA 3.8 3/24  PSA 2.7 9/23  PSA 2.8 9/22  PSA 3.1 6/22  PSA 4.0 1/22  PSA 3.4 8/21  PSA 2.5 8/20  PSA 2.1 8/19  MRI PI-RADS 5 25g 10/24     Impression/Plan:          1. Erectile dysfunction-  Patient was using TriMix but did not want to proceed.  He has not tried the Viagra 100 mg as of yet but currently not an issue.     2. Prostate cancer-  XRT,  Lupron 6/6/25 (one dose only)     Patient has seen Radiation Oncology and has decided to pursue XRT, Lupron to be given today.  Follow back up in 6 months with a PSA.  Call with any other issues in the meantime.     3. BPH- currently voiding well on tamsulosin.

## 2025-07-15 NOTE — ANESTHESIA PREPROCEDURE EVALUATION
07/15/2025  Nigel Alvaradoopshire is a 75 y.o., male.    Past Medical History:   Diagnosis Date    Allergic rhinitis 2013    Blood transfusion     Cataract     CKD (chronic kidney disease), stage III 2014    -donor kidney transplant     Diabetes mellitus, type 2 2013    Gout, arthritis 2013    Heart attack     Heart transplanted     Hepatitis C antibody positive in blood 2025    RNA NEGATIVE    Hyperlipidemia 2013    Hypertension     Immunodeficiency due to treatment with immunosuppressive medication     Morbid obesity 2013    Organ transplant 2002    heart and kidney    Orthostatic syncope 2022    Due to furosemide    Pneumonia due to COVID-19 virus 2022    Prophylactic immunotherapy     Prostate cancer 2023    Renal manifestation of secondary diabetes mellitus      Past Surgical History:   Procedure Laterality Date    ABLATION, ATRIAL FLUTTER, TYPICAL N/A 2025    Procedure: Ablation, Atrial Flutter, Typical;  Surgeon: Marcelo Blackman MD;  Location: [facility] EP LAB;  Service: Cardiology;  Laterality: N/A;  AFL, ERIC (cx if SR), RFA, TANYA w/HD GRID, MAC, MB, 3 Prep *Heart & Kidney Transplant*    CATARACT EXTRACTION Bilateral     COLONOSCOPY N/A 10/06/2020    Procedure: COLONOSCOPY;  Surgeon: Conner Redman MD;  Location: [facility] ENDO;  Service: Endoscopy;  Laterality: N/A;    ECHOCARDIOGRAM,TRANSESOPHAGEAL N/A 2025    Procedure: Transesophageal echo (ERIC) intra-procedure log documentation;  Surgeon: Chad De Jesus MD;  Location: [facility] EP LAB;  Service: Cardiology;  Laterality: N/A;    EYE SURGERY      HEART TRANSPLANT      KIDNEY TRANSPLANT      REVISION TOTAL HIP ARTHROPLASTY      TRANSRECTAL BIOPSY OF PROSTATE WITH ULTRASOUND GUIDANCE N/A 2025    Procedure: BIOPSY, PROSTATE, RECTAL APPROACH, WITH US GUIDANCE;  Surgeon: Robin  Jorge TATE MD;  Location[facility];  Service: Urology;  Laterality: N/A;         Pre-op Assessment    I have reviewed the Patient Summary Reports.    I have reviewed the NPO Status.   I have reviewed the Medications.     Review of Systems  Anesthesia Hx:  No problems with previous Anesthesia   History of prior surgery of interest to airway management or planning:  Previous anesthesia: MAC, General          Denies Personal Hx of Anesthesia complications.                    Social:  Former Smoker       Hematology/Oncology:       -- Anemia:               Hematology Comments: 9.8/32.7                    Cardiovascular:     Hypertension  Past MI     Dysrhythmias       hyperlipidemia   ECG has been reviewed. S/p heart transplant    Dr. Quintanilla (cardiology) cleared on 7/11/25 --   Additional future steps to consider:  Okay to proceed with his penile procedure/surgery pill if no clinical decompensation.    Okay to hold aspirin for 5 days prior to his surgery    -Received clearance from Cardiology Transplant team -- Dr. John Arthur  -Continue Tacrolimus, Mycophenolate, and Prednisone the morning of surgery                                 Pulmonary:  Pulmonary Normal                       Renal/:  Chronic Renal Disease, CKD                Hepatic/GI:  Hepatic/GI Normal                    Neurological:  Neurology Normal                                      Endocrine:  Diabetes, type 2   BMI 31    Daily Prednisone       Obesity / BMI > 30      Physical Exam  General: Well nourished, Cooperative, Alert and Oriented    Airway:  Mallampati: II   Mouth Opening: Normal  TM Distance: Normal  Neck ROM: Normal ROM    Dental:  Intact  Patient denies any currently loose or chipped teeth; Patient denies any removable dental appliances        Anesthesia Plan  Type of Anesthesia, risks & benefits discussed:    Anesthesia Type: MAC  Intra-op Monitoring Plan: Standard ASA Monitors  Post Op Pain Control Plan: multimodal analgesia and  IV/PO Opioids PRN  Induction:  IV  Airway Plan: , Post-Induction  Informed Consent: Informed consent signed with the Patient and all parties understand the risks and agree with anesthesia plan.  All questions answered.   ASA Score: 3  Day of Surgery Review of History & Physical: H&P Update referred to the surgeon/provider.    Ready For Surgery From Anesthesia Perspective.     .      Chemistry        Component Value Date/Time     06/16/2025 0853     (H) 02/24/2025 2239    K 5.1 06/16/2025 0853    K 4.1 02/24/2025 2239     06/16/2025 0853     02/24/2025 2239    CO2 24 06/16/2025 0853    CO2 25 02/24/2025 2239    BUN 40 (H) 06/16/2025 0853    CREATININE 2.9 (H) 06/16/2025 0853     (H) 06/16/2025 0853    GLU 79 02/24/2025 2239        Component Value Date/Time    CALCIUM 8.3 (L) 06/16/2025 0853    CALCIUM 8.6 (L) 02/24/2025 2239    ALKPHOS 59 06/16/2025 0853    ALKPHOS 75 02/24/2025 2239    AST 22 06/16/2025 0853    AST 27 02/24/2025 2239    ALT 9 (L) 06/16/2025 0853    ALT 12 02/24/2025 2239    BILITOT 0.2 06/16/2025 0853    BILITOT 0.2 02/24/2025 2239    ESTGFRAFRICA 15.3 (A) 07/27/2022 0531    EGFRNONAA 13.2 (A) 07/27/2022 0531        Lab Results   Component Value Date    WBC 4.64 07/02/2025    HGB 9.8 (L) 07/02/2025    HCT 32.7 (L) 07/02/2025    MCV 94 07/02/2025     07/02/2025       Normal sinus rhythm with sinus arrhythmia   Right bundle branch block   Abnormal ECG   When compared with ECG of 24-Feb-2025 08:27,   Sinus rhythm has replaced Atrial fibrillation   Confirmed by Jimbo Yun ([id number]) on 2/24/2025 2:26:40 PM     Echo 10/16/24:    Left Ventricle: The left ventricle is normal in size. Normal wall thickness. There is concentric hypertrophy. Normal wall motion. There is normal systolic function with a visually estimated ejection fraction of 55 - 60%. Ejection fraction is approximately 60%. There is normal diastolic function.    Right Ventricle: Normal right ventricular  cavity size. Right ventricle wall motion  is normal. Systolic function is normal.    Mitral Valve: There is mild regurgitation.    Tricuspid Valve: There is mild regurgitation.        ERIC 2/24/25:    ERIC performed prior to PVI in the EP lab; there is no evidence of intracardiac thrombus.    Left Ventricle: The left ventricle is normal in size. There is normal systolic function with a visually estimated ejection fraction of 55 - 60%. Ejection fraction is approximately 60%.    Right Ventricle: Normal right ventricular cavity size. Systolic function is normal.    Left Atrium: The left atrial appendage has a windsock morphology. Appendage velocity is normal at greater than 40 cm/sec. There is no thrombus in the left atrial appendage.    Aortic Valve: The aortic valve is a trileaflet valve. There is mild aortic valve sclerosis.    Aorta: Aortic root is mildly dilated measuring 4.0 cm. Ascending aorta is mildly to moderately dilated measuring 4.3 cm. Grade 1 atherosclerosis of the descending aorta and in the aortic arch with mild thickening.

## 2025-07-15 NOTE — TRANSFER OF CARE
"Anesthesia Transfer of Care Note    Patient: Nigel Albrecht    Procedure(s) Performed: Procedure(s) (LRB):  TRANSPERINEAL INSERTION OF PERIPROSTATIC GEL (N/A)  CYSTOSCOPY (N/A)    Patient location: PACU    Anesthesia Type: MAC    Transport from OR: Transported from OR on room air with adequate spontaneous ventilation    Post pain: adequate analgesia    Post assessment: no apparent anesthetic complications    Post vital signs: stable    Level of consciousness: awake and alert    Nausea/Vomiting: no nausea/vomiting    Complications: none    Transfer of care protocol was followed    Last vitals: Visit Vitals  BP (!) 154/74 (BP Location: Right arm, Patient Position: Sitting)   Pulse (!) 53   Temp 36.5 °C (97.7 °F) (Temporal)   Resp 18   Ht 6' 2" (1.88 m)   Wt 104.6 kg (230 lb 9.6 oz)   SpO2 100%   BMI 29.61 kg/m²     "

## 2025-07-17 ENCOUNTER — TELEPHONE (OUTPATIENT)
Dept: UROLOGY | Facility: CLINIC | Age: 75
End: 2025-07-17
Payer: MEDICARE

## 2025-07-18 NOTE — ANESTHESIA POSTPROCEDURE EVALUATION
Anesthesia Post Evaluation    Patient: Nigel Albrecht    Procedure(s) Performed: Procedure(s) (LRB):  TRANSPERINEAL INSERTION OF PERIPROSTATIC GEL (N/A)  CYSTOSCOPY (N/A)    Final Anesthesia Type: MAC      Patient location during evaluation: PACU  Patient participation: Yes- Able to Participate  Level of consciousness: awake and alert and oriented  Post-procedure vital signs: reviewed and stable  Pain management: adequate  Airway patency: patent  ZEINAB mitigation strategies: Multimodal analgesia  PONV status at discharge: No PONV  Anesthetic complications: no      Cardiovascular status: blood pressure returned to baseline and hemodynamically stable  Respiratory status: unassisted  Hydration status: euvolemic  Follow-up not needed.              Vitals Value Taken Time   /80 07/15/25 11:25   Temp 36.7 °C (98 °F) 07/15/25 11:25   Pulse 63 07/15/25 11:30   Resp 21 07/15/25 11:27   SpO2 100 % 07/15/25 11:26   Vitals shown include unfiled device data.      Event Time   Out of Recovery 11:29:01         Pain/Jacki Score: No data recorded

## 2025-07-23 ENCOUNTER — PATIENT MESSAGE (OUTPATIENT)
Dept: UROLOGY | Facility: CLINIC | Age: 75
End: 2025-07-23
Payer: MEDICARE

## 2025-08-03 DIAGNOSIS — E11.43 DIABETIC AUTONOMIC NEUROPATHY ASSOCIATED WITH TYPE 2 DIABETES MELLITUS: Chronic | ICD-10-CM

## 2025-08-04 ENCOUNTER — PATIENT MESSAGE (OUTPATIENT)
Dept: RADIATION ONCOLOGY | Facility: CLINIC | Age: 75
End: 2025-08-04
Payer: MEDICARE

## 2025-08-04 NOTE — TELEPHONE ENCOUNTER
LOV 04/07/2025  Scheduled 10/08/2025   Orders place for PT/OT. Pt has d/c orders, however lives at home alone and very unsteady on feet. RN spoke with pt's son who is POA, son recommends inpatient therapy. Pt has completed therapy here on ARU, son felt they helped him tremendously. Will follow up with PT/OT recs.

## 2025-08-05 RX ORDER — SEMAGLUTIDE 0.68 MG/ML
INJECTION, SOLUTION SUBCUTANEOUS
Qty: 9 ML | Refills: 1 | Status: SHIPPED | OUTPATIENT
Start: 2025-08-05

## 2025-08-07 ENCOUNTER — OFFICE VISIT (OUTPATIENT)
Dept: UROLOGY | Facility: CLINIC | Age: 75
End: 2025-08-07
Payer: MEDICARE

## 2025-08-07 ENCOUNTER — TELEPHONE (OUTPATIENT)
Dept: UROLOGY | Facility: CLINIC | Age: 75
End: 2025-08-07
Payer: MEDICARE

## 2025-08-07 DIAGNOSIS — N40.1 BENIGN PROSTATIC HYPERPLASIA WITH URINARY FREQUENCY: ICD-10-CM

## 2025-08-07 DIAGNOSIS — C61 PROSTATE CANCER: Primary | ICD-10-CM

## 2025-08-07 DIAGNOSIS — N52.9 ERECTILE DYSFUNCTION, UNSPECIFIED ERECTILE DYSFUNCTION TYPE: ICD-10-CM

## 2025-08-07 DIAGNOSIS — R35.0 BENIGN PROSTATIC HYPERPLASIA WITH URINARY FREQUENCY: ICD-10-CM

## 2025-08-07 NOTE — PROGRESS NOTES
Chief Complaint:   Encounter Diagnoses   Name Primary?    Prostate cancer Yes    Benign prostatic hyperplasia with urinary frequency     Erectile dysfunction, unspecified erectile dysfunction type        HPI:   8/7/25- patient is currently doing well, start radiation soon, currently no symptoms.  This is a virtual visit.    3/31/23- it has been an extended time since we have seen him, he determined to pursue active surveillance after seeing Radiation Oncology.  PSA appears to be stable, current transplanted kidney is improving function.  Erections are currently not an issue.  Patient is otherwise voiding well on tamsulosin.    3/30/16: 64 yo man s/p renal transplant in 2002 here with nocturia x3, was x0-1 4-5 months ago.  No hesitancy.  Some dribble when done putting things away.Urgency.  Some double voiding.  No abd/pelvic pain and no exac/rel factors.  No hematuria.  No urolithiasis.  No urinary bother.  No  history.  Normal sexual function.  Not usually constipated.  Viagra for ED rx but not used but once.  No BPH meds.    Allergies:  No known drug allergies    Medications:  has a current medication list which includes the following prescription(s): amlodipine, atorvastatin, calcium carbonate, cholecalciferol (vitamin d3), freestyle jackie 2 sensor, fluticasone propionate, gabapentin, humulin 70/30 u-100 kwikpen, lisinopril, low-dose aspirin, metoprolol succinate, metoprolol succinate, multivit-min/fa/lycopen/lutein, mycophenolate, oxycodone-acetaminophen, ozempic, sirolimus, solifenacin, torsemide, and diazepam.    Review of Systems:  General: No fever, chills, fatigability, or weight loss.  Skin: No rashes, itching, or changes in color or texture of skin.  Chest: Denies PETERSON, cyanosis, wheezing, cough, and sputum production.  Abdomen: Appetite fine. No weight loss. Denies diarrhea, abdominal pain, hematemesis, or blood in stool.  Musculoskeletal: No joint stiffness or swelling. Some back pain.  : As  above.  All other review of systems negative.    PMH:   has a past medical history of Allergic rhinitis (2013), Blood transfusion, Cataract, CKD (chronic kidney disease), stage III (2014), -donor kidney transplant, Diabetes mellitus, type 2 (2013), Gout, arthritis (2013), Heart attack, Heart transplanted, Hyperlipidemia (2013), Hypertension, Immunodeficiency due to treatment with immunosuppressive medication, Morbid obesity (2013), Organ transplant (), Prophylactic immunotherapy, and Renal manifestation of secondary diabetes mellitus.    PSH:   has a past surgical history that includes Kidney transplant; Heart transplant; Revision total hip arthroplasty; Eye surgery; Cataract extraction (Bilateral); and Colonoscopy (N/A, 10/6/2020).    FamHx: family history includes Diabetes in his brother and maternal grandmother; Early death in his brother; Heart disease in his brother; Hyperlipidemia in his brother and sister; Hypertension in his brother; Kidney disease in his son; Stroke in his brother and mother.    SocHx:  reports that he quit smoking about 37 years ago. His smoking use included cigarettes. He has a 20.00 pack-year smoking history. He has never used smokeless tobacco. He reports current alcohol use of about 1.0 standard drink of alcohol per week. He reports that he does not use drugs.      Physical Exam:  Vitals:    22 0855   BP: (!) 154/83   Pulse: 67   Temp: 98.1 °F (36.7 °C)     General: A&Ox3, no apparent distress, no deformities  Neck: No masses  Lungs: normal inspiration  MARISOL: - g no nodules or fixation, otherwise benign.    Labs/Studies:    protein   SpaceOar 7/15/25  MRI guided biopsy Gl7 36g 25  pnbx Gl 6 38.9g 22  PSA 10.79   PSA 5.3 3/25  PSA 5.3 10/24  PSA 4.0   PSA 3.8 3/24  PSA 2.7   PSA 2.8   PSA 3.1   PSA 4.0   PSA 3.4   PSA 2.5   PSA 2.1   MRI PI-RADS 5 25g 10/24    Impression/Plan:          1. Erectile dysfunction-  Patient was using TriMix but did not want to proceed.  He has not tried the Viagra 100 mg as of yet but currently not an issue.  This was a virtual visit.    2. Prostate cancer-  XRT,  Lupron 6/6/25 (one dose only)    Patient is to start XRT soon, only needs 1 dose of Lupron which was given in June.  Follow back up in 6 months.  Call with any other issues in the meantime.    3. BPH- currently voiding well on tamsulosin.    The patient location is: home  Visit type: Virtual visit with synchronous audio and video  Total time spent with patient: 10 min, nurse time with pt after 10 min  Each patient to whom he or she provides medical services by telemedicine is:  (1) informed of the relationship between the physician and patient and the respective role of any other health care provider with respect to management of the patient; and (2) notified that he or she may decline to receive medical services by telemedicine and may withdraw from such care at any time.

## 2025-08-11 ENCOUNTER — HOSPITAL ENCOUNTER (OUTPATIENT)
Dept: RADIATION THERAPY | Facility: HOSPITAL | Age: 75
Discharge: HOME | End: 2025-08-11
Attending: INTERNAL MEDICINE
Payer: MEDICARE

## 2025-08-11 ENCOUNTER — HOSPITAL ENCOUNTER (OUTPATIENT)
Dept: RADIOLOGY | Facility: HOSPITAL | Age: 75
Discharge: HOME | End: 2025-08-11
Attending: INTERNAL MEDICINE
Payer: MEDICARE

## 2025-08-11 PROCEDURE — 77334 RADIATION TREATMENT AID(S): CPT | Mod: 26,,, | Performed by: SPECIALIST

## 2025-08-11 PROCEDURE — 77014 HC CT GUIDANCE RADIATION THERAPY FLDS PLACEMENT: CPT | Mod: TC | Performed by: SPECIALIST

## 2025-08-11 PROCEDURE — 77334 RADIATION TREATMENT AID(S): CPT | Mod: TC | Performed by: SPECIALIST

## 2025-08-11 PROCEDURE — 77263 THER RADIOLOGY TX PLNG CPLX: CPT | Mod: ,,, | Performed by: SPECIALIST

## 2025-08-11 PROCEDURE — 77014 PR  CT GUIDANCE PLACEMENT RAD THERAPY FIELDS: CPT | Mod: 26,,, | Performed by: SPECIALIST

## 2025-08-29 ENCOUNTER — PATIENT MESSAGE (OUTPATIENT)
Dept: ADMINISTRATIVE | Facility: HOSPITAL | Age: 75
End: 2025-08-29
Payer: MEDICARE

## 2025-09-02 ENCOUNTER — HOSPITAL ENCOUNTER (OUTPATIENT)
Dept: RADIATION THERAPY | Facility: HOSPITAL | Age: 75
Discharge: HOME | End: 2025-09-02
Attending: SPECIALIST
Payer: MEDICARE

## 2025-09-02 ENCOUNTER — TELEPHONE (OUTPATIENT)
Dept: DIABETES | Facility: CLINIC | Age: 75
End: 2025-09-02
Payer: MEDICARE

## 2025-09-02 RX ORDER — BLOOD-GLUCOSE SENSOR
EACH MISCELLANEOUS
Qty: 6 EACH | Refills: 1 | Status: SHIPPED | OUTPATIENT
Start: 2025-09-02

## 2025-09-03 ENCOUNTER — DOCUMENTATION ONLY (OUTPATIENT)
Dept: HEMATOLOGY/ONCOLOGY | Facility: CLINIC | Age: 75
End: 2025-09-03
Payer: MEDICARE

## 2025-09-04 DIAGNOSIS — Z29.9 PREVENTIVE MEASURE: Primary | ICD-10-CM

## (undated) DEVICE — COVER SITE-RITE PROBE 96IN

## (undated) DEVICE — CATH TACTFLEX SE BID CURVE F-J

## (undated) DEVICE — DRESSING TELFA N ADH 3X8

## (undated) DEVICE — SCRUB DYNA-HEX LIQ 4% CHG 4OZ

## (undated) DEVICE — APPLICATOR CHLORAPREP ORN 26ML

## (undated) DEVICE — CONTAINER SPECIMEN OR STER 4OZ

## (undated) DEVICE — SET TUBING COOL POINT IRR

## (undated) DEVICE — GOWN POLY REINF X-LONG XL

## (undated) DEVICE — BOWL STERILE LARGE 32OZ

## (undated) DEVICE — KIT ENSITE ELECTRODE SURFACE

## (undated) DEVICE — SPACEOAR SYSTEMS
Type: IMPLANTABLE DEVICE | Site: PERITONEUM | Status: NON-FUNCTIONAL
Brand: SPACEOAR VUE™ SYSTEM - 10ML

## (undated) DEVICE — NDL MAXCORE BIOPSY 18G 25CM

## (undated) DEVICE — CATH MAP BI-D HD SENSOR ENABLE

## (undated) DEVICE — SOL NORMAL USPCA 0.9%

## (undated) DEVICE — PROTECTOR ULNAR NERVE DISP

## (undated) DEVICE — LINE PRESSURE MONITORING 96IN

## (undated) DEVICE — IRRIGATION SET Y-TYPE TUR/BLAD

## (undated) DEVICE — PAD DEFIB CADENCE ADULT R2

## (undated) DEVICE — CYSTOSCOPE ASCOPE 4 REV DEFL

## (undated) DEVICE — DRAPE THREE-QTR REINF 53X77IN

## (undated) DEVICE — DRAPE THREE-QUARTER 53X77IN

## (undated) DEVICE — TOWEL OR DISP STRL BLUE 4/PK

## (undated) DEVICE — INTRODUCER HEMOSTASIS 7.5F

## (undated) DEVICE — SPONGE COTTON TRAY 4X4IN

## (undated) DEVICE — SHEATH HEMOSTASIS 8.5FR

## (undated) DEVICE — PAD GROUND UNIV STYLE CORD 9IN

## (undated) DEVICE — PACK EP DRAPE OMC

## (undated) DEVICE — INTRO AGILIS MED CRL 8.5F 71CM

## (undated) DEVICE — COVER PROBE NEOGUARD 1X11.8IN

## (undated) DEVICE — R CATH BIDIRECTIONL DF CRV 7FR

## (undated) DEVICE — SKIN MARKER DEVON 160